# Patient Record
Sex: FEMALE | Race: WHITE | NOT HISPANIC OR LATINO | Employment: OTHER | URBAN - NONMETROPOLITAN AREA
[De-identification: names, ages, dates, MRNs, and addresses within clinical notes are randomized per-mention and may not be internally consistent; named-entity substitution may affect disease eponyms.]

---

## 2016-10-31 LAB — LDLC SERPL CALC-MCNC: 87 MG/DL

## 2017-03-23 ENCOUNTER — TELEPHONE (OUTPATIENT)
Dept: WOUND CARE | Facility: OTHER | Age: 72
End: 2017-03-23

## 2017-03-23 NOTE — TELEPHONE ENCOUNTER
Patient is cancelling her appointment with Kerri Elliott on Friday 3/24/17 and the rest of her appointments with Diabetes because her new insurance requires her to pay $40.00 every time she come in to see a specialist visit. Patient will be following up with her primary doctor for diabetes.

## 2017-04-11 ENCOUNTER — TELEPHONE (OUTPATIENT)
Dept: EDUCATION SERVICES | Facility: HOSPITAL | Age: 72
End: 2017-04-11

## 2017-04-11 DIAGNOSIS — Z79.4 TYPE 2 DIABETES MELLITUS WITH HYPERGLYCEMIA, WITH LONG-TERM CURRENT USE OF INSULIN (H): Primary | ICD-10-CM

## 2017-04-11 DIAGNOSIS — E11.65 TYPE 2 DIABETES MELLITUS WITH HYPERGLYCEMIA, WITH LONG-TERM CURRENT USE OF INSULIN (H): Primary | ICD-10-CM

## 2017-04-17 ENCOUNTER — OFFICE VISIT (OUTPATIENT)
Dept: WOUND CARE | Facility: OTHER | Age: 72
End: 2017-04-17
Attending: NURSE PRACTITIONER
Payer: MEDICARE

## 2017-04-17 VITALS
HEIGHT: 63 IN | DIASTOLIC BLOOD PRESSURE: 60 MMHG | HEART RATE: 70 BPM | SYSTOLIC BLOOD PRESSURE: 118 MMHG | OXYGEN SATURATION: 98 % | BODY MASS INDEX: 27.36 KG/M2 | WEIGHT: 154.4 LBS

## 2017-04-17 DIAGNOSIS — E11.65 TYPE 2 DIABETES MELLITUS WITH HYPERGLYCEMIA, WITH LONG-TERM CURRENT USE OF INSULIN (H): Primary | ICD-10-CM

## 2017-04-17 DIAGNOSIS — Z79.4 TYPE 2 DIABETES MELLITUS WITH HYPERGLYCEMIA, WITH LONG-TERM CURRENT USE OF INSULIN (H): Primary | ICD-10-CM

## 2017-04-17 LAB — HBA1C MFR BLD: 7 % (ref 0–5.7)

## 2017-04-17 PROCEDURE — 36416 COLLJ CAPILLARY BLOOD SPEC: CPT | Performed by: NURSE PRACTITIONER

## 2017-04-17 PROCEDURE — 83036 HEMOGLOBIN GLYCOSYLATED A1C: CPT | Performed by: NURSE PRACTITIONER

## 2017-04-17 PROCEDURE — 99213 OFFICE O/P EST LOW 20 MIN: CPT | Performed by: NURSE PRACTITIONER

## 2017-04-17 PROCEDURE — 99212 OFFICE O/P EST SF 10 MIN: CPT

## 2017-04-17 ASSESSMENT — PAIN SCALES - GENERAL: PAINLEVEL: NO PAIN (0)

## 2017-04-17 NOTE — PATIENT INSTRUCTIONS
Peripheral Neuropathy  Peripheral neuropathy is a condition that affects the nerves of the arms or legs. It causes a change in physical feeling. Sometimes it causes weakness in the muscles. You may feel tingling, numbness or shooting pains. Symptoms may be more common at night). Skin may be extra sensitive to light touch or temperature changes.  Neuropathy may be a complication of a chronic disease such as diabetes. A ruptured disk with pressure on the spinal nerve may also lead to the problem. Certain vitamin deficiencies may lead to it. It may also be caused by exposure to certain drugs or chemicals   Home care    Tell the healthcare provider about all medicines you take. This includes prescription and over-the-counter medicines, vitamins, and herbs. Ask if any of the medicines may be causing your problems. Do not make any changes to prescription medicines without talking to your healthcare provider first.     You may be prescribed medicines to help relieve the tingling feeling or for pain. Take all medicines as directed.    A numb hand or foot may be more prone to injury. To help protect it:    Always use oven mitts.    Test water with an unaffected hand or foot.     Use caution when trimming nails. File sharp areas.    Wear shoes that fit well to avoid pressure points, blisters, and ulcers.    Inspect your hands and feet carefully (including the soles of your feet and between your toes) at least once a week. If you see red areas, sores, or other problems, tell your healthcare provider.  Follow-up care  Follow up with your doctor or as advised by our staff. You may need further testing or evaluation.  When to seek medical advice  Call your healthcare provider right away if any of the following occur:    Redness, swelling, cracking, or ulcer on any numb area, especially the feet    New symptoms of numbness or muscle weakness numbness    Loss of bowel or bladder control     Slurred speech, confusion, or trouble  speaking, walking, or seeing    9150-1219 [employer]. [address] [place] [address], [place] [address]. All rights reserved. This information is not intended as a substitute for professional medical care. Always follow your healthcare professional's instructions.      A1c 7%--Great job!!      Continue working on healthy eating and exercising (start low and slow, work up to 30 min, 5x/week)    BG goals:  Fasting and before meals <130, >80  2 hour after eating <180    We only need 1/2 of these numbers to be within target then your A1c will be within target    Medication changes   Watch for continued low BGs    Please let me know if you have continue low blood sugars       Follow up   CGM (continuous glucose monitor) study    Check his BGs at least 4 times a day.     Use your dual wave with meals that contain fat (either good or bad)  Use your temp basal when you have had 2 BGs >250 (increase it)  Use your temp basal when you are more active (decrease it)     Please add the correction to pump when BGs >150.       It's okay to add only 50% of carbs before consuming a meal    Call me sooner if any problems/concerns and/or questions develop including consistent low BGs <70 or consistent high BGs >200  [phone] (Justine, Unit Coordinator)    [phone] (Ruth Nurse)  [phone] (Kerri's cell)

## 2017-04-17 NOTE — PROGRESS NOTES
SUBJECTIVE:  Flora Acuna, 72 year old, female presents with the following Chief Complaint(s) with HPI to follow:  Chief Complaint   Patient presents with     Diabetes     follow up        Diabetes Follow-up      Patient is checking blood sugars: 2.5x/day.  Results:  Overnight: 63, 104-284  Breakfast:   Mid-mornin-247  Lunch: 112-183  Mid-afternoon: 152-202  Dinner: 120-255  Bedtime: 135      Symptoms of hypoglycemia (low blood sugar): yes, history of occasional hypoglycemia unawareness;     Paresthesias (numbness or burning in feet) or sores: Yes--sometimes worse , no sores    Diabetic eye exam within the last year: Yes    Breakfast eaten regularly: No    Patient counting carbs: Yes       HPI: Flora's here today for the follow up regarding her Diabetes mellitus, Type 2.    A1C      7.4   2016  A1C      7.5   2/10/2015  Current Diabetes medication:   1.  Insulin pump, with humalog   ASA use: yes, 325 mg daily  Statin use: yes, simvastatin 40 mg daily  Denies any issues with the insulin pump.      Due for her A1c.    Flora reports that she was sick for the month of January.  She's feeling better.  States she has lost 5 lbs from this illness.  Has been having occasional low BGs.        Patient Active Problem List   Diagnosis     CARDIOVASCULAR SCREENING; LDL GOAL LESS THAN 160     Numbness and tingling in left hand     Elevated blood pressure     Diabetes mellitus, type 2 (H)     Personal history of malignant neoplasm of breast     ACP (advance care planning)       Past Medical History:   Diagnosis Date     Congestive heart failure, unspecified      Diabetes (H)      H/O rheumatoid arthritis      HLD (hyperlipidemia)      HTN (hypertension)      Myocardial infarction (H)      Uncomplicated asthma        Past Surgical History:   Procedure Laterality Date     APPENDECTOMY OPEN CHILD       AS TOTAL KNEE ARTHROPLASTY Right      CHOLECYSTECTOMY       HYSTERECTOMY       MASTECTOMY Bilateral       SHOULDER SURGERY Right      SHOULDER SURGERY Left        Family History   Problem Relation Age of Onset     Breast Cancer Maternal Aunt      Breast Cancer Maternal Aunt      Lung Cancer Father        Social History   Substance Use Topics     Smoking status: Never Smoker     Smokeless tobacco: Never Used     Alcohol use No       Current Outpatient Prescriptions   Medication Sig Dispense Refill     baclofen (LIORESAL) 10 MG tablet 2 tabs in the morning, 1 tab mid day and 2 tabs at night.       acetaminophen (TYLENOL) 325 MG tablet Take 650 mg by mouth       albuterol (PROAIR HFA, PROVENTIL HFA, VENTOLIN HFA) 108 (90 BASE) MCG/ACT inhaler Inhale 2 puffs into the lungs       Calcium Carb-Cholecalciferol (CALCIUM 500 + D) 500-400 MG-UNIT TABS        Insulin Aspart (INSULIN PUMP - OUTPATIENT)        Lancet Devices MISC        LIFESCAN FINEPOINT LANCETS MISC TEST FOUR TIMES DAILY       anastrozole (ARIMIDEX) 1 MG tablet Take 1 mg by mouth       blood glucose monitoring (CATHY CONTOUR NEXT) test strip Test blood sugars four times per day.  Type 2 Diabetes 250.00       cholecalciferol (VITAMIN D3) 5000 UNITS TABS tablet Take 5,000 mg by mouth       furosemide (LASIX) 40 MG tablet TAKE 1 TABLET BY MOUTH DAILY       insulin lispro (HUMALOG VIAL) 100 UNIT/ML VIAL USE IN INSULIN PUMP AS DIRECTED       metoprolol (LOPRESSOR) 50 MG tablet TAKE 1 TABLET BY MOUTH TWICE DAILY       levothyroxine (SYNTHROID, LEVOTHROID) 75 MCG tablet TAKE 1 TABLET BY MOUTH DAILY       nitroglycerin (NITROSTAT) 0.4 MG SL tablet Place 0.4 mg under the tongue       omeprazole (PRILOSEC) 20 MG capsule Take 20 mg by mouth       potassium chloride SA (K-DUR,KLOR-CON M) 10 MEQ tablet TAKE 2 TABLETS BY MOUTH DAILY       ramipril (ALTACE) 10 MG capsule TAKE 1 CAPSULE BY MOUTH EVERY DAY       simvastatin (ZOCOR) 40 MG tablet TAKE 1 TABLET BY MOUTH AT BEDTIME       benzonatate (TESSALON) 200 MG capsule Take 1 capsule (200 mg) by mouth 3 times daily as needed for  cough 20 capsule 0     acyclovir (ZOVIRAX) 5 % ointment Apply topically 5 times daily 5 g 0     anastrozole (ARIMIDEX) 1 MG tablet   3     famotidine (PEPCID) 20 MG tablet        metoprolol (LOPRESSOR) 50 MG tablet   3     omeprazole (PRILOSEC) 20 MG capsule   2     potassium chloride SA (K-DUR,KLOR-CON M) 10 MEQ tablet   7     Acetone, Urine, Test (KETONE TEST) STRP Use as directed 50 strip 4     glucagon (GLUCAGON EMERGENCY) 1 MG injection Inject 1 mg Subcutaneous once 1 mg 12     insulin lispro (HUMALOG VIAL) 100 UNIT/ML soln For use in an insulin pump.  TDD 40-50 Units10 10 mL 12     INSULIN PUMP - OUTPATIENT Date last updated:  2/4/15  Music Kickup: Model 723  BASAL RATES and times:  12   AM (midnight): 0.9 units/hour    9    PM: 0.8 units/hour   Basal Pattern A:  Flora Acuna will use when N/A.  CARB RATIO and times:  12   AM (midnight): 13.0  4     PM: 10  Corection Factor (Sensitivity) and times:  12   AM (midnight): 55 mg/dL  BLOOD GLUCOSE TARGET and times:  12   AM (midnight): 100 - 150  7     AM:  100 - 120  9    PM:  100 - 150  Active Insulin Time:  3 hours  Sensor:  No  Carelink / Diasend username:  [email or web address]  Carelink / Diasend Password:  [email or web address]       blood glucose (CATHY CONTOUR NEXT) test strip Use to test blood sugar 4-6 times daily or as directed. 100 strip 12     clindamycin (CLEOCIN) 300 MG capsule Take 300 mg by mouth as needed (Take 3 capsules by mouth before dental procedure.)       SIMVASTATIN PO Take 40 mg by mouth At Bedtime       Furosemide (LASIX PO) Take 40 mg by mouth daily       Levothyroxine Sodium (SYNTHROID PO) Take 75 mcg by mouth daily       Nitroglycerin (NITROSTAT SL) Place 0.4 mg under the tongue every 5 minutes as needed for chest pain       BACLOFEN PO Take 10 mg by mouth        RAMIPRIL PO Take 10 mg by mouth daily       calcium carbonate (OS-BUDDY 500 MG [place]. [place]) 500 MG tablet Take 500 mg by mouth daily       VITAMIN D, CHOLECALCIFEROL, PO Take 5,000 Units by  "mouth daily         Allergies   Allergen Reactions     Contrast Dye Difficulty breathing     Gadolinium Derivatives      Other reaction(s): Difficulty in swallowing, Laryngeal spasm  Patient had an injection into rt. Shoulder capsule prior to MRI arthrogram.  Contained multihance gadobenate, omnipaque iohexol, and buffered lidocaine.       Ammonia      Cory-1 [Lidocaine Hcl]      Novocaine allergy       Codeine Sulfate      Food      Shrimp     Iodine-131 Swelling     Ct dye     Nitrous Oxide      Novocain [Procaine]      Penicillins      Tramadol      Morphine Palpitations       REVIEW OF SYSTEMS  Skin: negative  Eyes: negative  Ears/Nose/Throat: negative  Respiratory: No shortness of breath, dyspnea on exertion, cough, or hemoptysis; history of asthma  Cardiovascular: history of HF (cause unknown)  Gastrointestinal: negative  Genitourinary: urgency and frequency   Musculoskeletal: positive for back pain, neck pain, arthritis and right shoulder (needs surgery)  Neurologic: positive for numbness or tingling of feet  Psychiatric: negative  Hematologic/Lymphatic/Immunologic: history of breast cancer (left) x 2 (same spot)  Endocrine: positive for diabetes and thyroid disease     OBJECTIVE:  /60 (BP Location: Right arm, Patient Position: Chair, Cuff Size: Adult Large)  Pulse 70  Ht 5' 3\" (1.6 m)  Wt 154 lb 6.4 oz (70 kg)  SpO2 98%  BMI 27.35 kg/m2  General appearance: healthy, alert and no distress  Neck: Neck supple. No adenopathy. Thyroid symmetric, normal size, Carotids without bruits.  Lungs: negative. Good diaphragmatic excursion. Lungs clear  Heart: negative.  No lifts, heaves, or thrills. RRR. No murmurs, clicks gallops or rub  Abdomen: Abdomen soft, non-tender. BS normal.    Psych: normal mentation; affect bright   Hematologic/Lymphatic/Immunologic: normal ant/post cervical nodes    LABs:  Results for orders placed or performed in visit on 04/17/17   Hemoglobin A1c   Result Value Ref Range    " Hemoglobin A1C 7 %         ASSESSMENT / PLAN:  (E11.65,  Z79.4) Type 2 diabetes mellitus with hyperglycemia, with long-term current use of insulin (H)  (primary encounter diagnosis)  Comment:   A1c is great.  However, Flora has been having issues with low BGs.      Insulin pump information:   Average: 170 +/-57  Basal/bolus ratio: 21.3 (73%)/8.1 (27%)   Testin.5x/day    Hypoglycemia: 1 (3%)    Plan:   Encouraged a CGM study  Hemoglobin A1c          Adjustment:   Carb ratio  000 14 (was 13)      Education focused on the following:  Work on checking your BGs at least 4 times a day.     Use your dual wave with meals that contain fat (either good or bad)  Use your temp basal when you have had 2 BGs >250 (increase it)  Use your temp basal when you are more active (decrease it)     Please add the correction to pump when BGs >150.       Talked about entering 1/2 the amount of carbs BEFORE consuming the carbs (if concerns with low BGs)--you can always add another bolus if needed.      Patient Instructions     Peripheral Neuropathy  Peripheral neuropathy is a condition that affects the nerves of the arms or legs. It causes a change in physical feeling. Sometimes it causes weakness in the muscles. You may feel tingling, numbness or shooting pains. Symptoms may be more common at night). Skin may be extra sensitive to light touch or temperature changes.  Neuropathy may be a complication of a chronic disease such as diabetes. A ruptured disk with pressure on the spinal nerve may also lead to the problem. Certain vitamin deficiencies may lead to it. It may also be caused by exposure to certain drugs or chemicals   Home care    Tell the healthcare provider about all medicines you take. This includes prescription and over-the-counter medicines, vitamins, and herbs. Ask if any of the medicines may be causing your problems. Do not make any changes to prescription medicines without talking to your healthcare provider  first.     You may be prescribed medicines to help relieve the tingling feeling or for pain. Take all medicines as directed.    A numb hand or foot may be more prone to injury. To help protect it:    Always use oven mitts.    Test water with an unaffected hand or foot.     Use caution when trimming nails. File sharp areas.    Wear shoes that fit well to avoid pressure points, blisters, and ulcers.    Inspect your hands and feet carefully (including the soles of your feet and between your toes) at least once a week. If you see red areas, sores, or other problems, tell your healthcare provider.  Follow-up care  Follow up with your doctor or as advised by our staff. You may need further testing or evaluation.  When to seek medical advice  Call your healthcare provider right away if any of the following occur:    Redness, swelling, cracking, or ulcer on any numb area, especially the feet    New symptoms of numbness or muscle weakness numbness    Loss of bowel or bladder control     Slurred speech, confusion, or trouble speaking, walking, or seeing    0552-2257 [employer]. [address] [place] [address]. All rights reserved. This information is not intended as a substitute for professional medical care. Always follow your healthcare professional's instructions.      A1c 7%--Great job!!      Continue working on healthy eating and exercising (start low and slow, work up to 30 min, 5x/week)    BG goals:  Fasting and before meals <130, >80  2 hour after eating <180    We only need 1/2 of these numbers to be within target then your A1c will be within target    Medication changes   Watch for continued low BGs    Please let me know if you have continue low blood sugars       Follow up   CGM (continuous glucose monitor) study    Check his BGs at least 4 times a day.     Use your dual wave with meals that contain fat (either good or bad)  Use your temp basal when you have had 2 BGs >250 (increase  it)  Use your temp basal when you are more active (decrease it)     Please add the correction to pump when BGs >150.       It's okay to add only 50% of carbs before consuming a meal    Call me sooner if any problems/concerns and/or questions develop including consistent low BGs <70 or consistent high BGs >200  [phone] (Justine, Unit Coordinator)    [phone] (Ruth Nurse)  [phone] (Kerri's cell)      Time: 45 minutes  Barrier: none  Willingness to learn: accepting    Kerri Elliott RN P-BC  Disease Management    Cc: Dr. Mejia      With the electronic record, we can now more quickly and easily track our patient diabetic goals. Our diabetes clinical review is in progress and these are the indicators we are monitoring for good diabetes health.     1.) HbA1C less than 7 (measurement of your average blood sugars)  2.) Blood Pressure less than 140/80  3.) LDL less than 100 (bad cholesterol)  4.) HbA1C is checked in the last 6 months and below 7% (more frequently if not at goal or adjusting medications)  5.) LDL is checked in the last 12 months (more frequently if not at goal or adjusting medications)  6.) Taking one baby aspirin daily (unless otherwise instructed)  7.) No tobacco use  8) Statin use     You have achieved 7 out of 8 of these and I am encouraging you to come in and get tuned up to achieve 8 out of 8!  Here is what you have achieved so far in my goals for you:  1.) HbA1C  less than 7:                              NO  Your last  HbA1C :A1C       Lab Results   Component Value Date    A1C 7 04/17/2017    A1C 6.8 09/21/2016    A1C 7.4 01/26/2016    A1C 7.5 02/10/2015       2.) Blood Pressure less than 140/80:       YES      Your last    BP Readings from Last 1 Encounters:   04/17/17 118/60     3.) LDL less than 100:                              YES      Your last     LDL Cholesterol Calculated   Date Value Ref Range Status   10/31/2016 87 mg/dL Final   ]      4.) Checked HbA1C in the past 6 months:  YES      5.) Checked LDL in the past 12 months:    YES   6.) Taking one aspirin daily:                       YES     7.) No tobacco use:                                        YES      8.) Statin use      YES

## 2017-04-17 NOTE — MR AVS SNAPSHOT
After Visit Summary   4/17/2017    Flora Acuna    MRN: [id number]           Patient Information     Date Of Birth          1945        Visit Information        Provider Department      4/17/2017 2:00 PM Kerri Elliott NP [facility] [place]        Care Instructions      Peripheral Neuropathy  Peripheral neuropathy is a condition that affects the nerves of the arms or legs. It causes a change in physical feeling. Sometimes it causes weakness in the muscles. You may feel tingling, numbness or shooting pains. Symptoms may be more common at night). Skin may be extra sensitive to light touch or temperature changes.  Neuropathy may be a complication of a chronic disease such as diabetes. A ruptured disk with pressure on the spinal nerve may also lead to the problem. Certain vitamin deficiencies may lead to it. It may also be caused by exposure to certain drugs or chemicals   Home care    Tell the healthcare provider about all medicines you take. This includes prescription and over-the-counter medicines, vitamins, and herbs. Ask if any of the medicines may be causing your problems. Do not make any changes to prescription medicines without talking to your healthcare provider first.     You may be prescribed medicines to help relieve the tingling feeling or for pain. Take all medicines as directed.    A numb hand or foot may be more prone to injury. To help protect it:    Always use oven mitts.    Test water with an unaffected hand or foot.     Use caution when trimming nails. File sharp areas.    Wear shoes that fit well to avoid pressure points, blisters, and ulcers.    Inspect your hands and feet carefully (including the soles of your feet and between your toes) at least once a week. If you see red areas, sores, or other problems, tell your healthcare provider.  Follow-up care  Follow up with your doctor or as advised by our staff. You may need further testing or evaluation.  When to seek  medical advice  Call your healthcare provider right away if any of the following occur:    Redness, swelling, cracking, or ulcer on any numb area, especially the feet    New symptoms of numbness or muscle weakness numbness    Loss of bowel or bladder control     Slurred speech, confusion, or trouble speaking, walking, or seeing    3509-2289 [employer]. [address] [place] [address]. All rights reserved. This information is not intended as a substitute for professional medical care. Always follow your healthcare professional's instructions.      A1c 7%--Great job!!      Continue working on healthy eating and exercising (start low and slow, work up to 30 min, 5x/week)    BG goals:  Fasting and before meals <130, >80  2 hour after eating <180    We only need 1/2 of these numbers to be within target then your A1c will be within target    Medication changes   Watch for continued low BGs    Please let me know if you have continue low blood sugars       Follow up   CGM (continuous glucose monitor) study    Check his BGs at least 4 times a day.     Use your dual wave with meals that contain fat (either good or bad)  Use your temp basal when you have had 2 BGs >250 (increase it)  Use your temp basal when you are more active (decrease it)     Please add the correction to pump when BGs >150.       It's okay to add only 50% of carbs before consuming a meal    Call me sooner if any problems/concerns and/or questions develop including consistent low BGs <70 or consistent high BGs >200  [phone] (Justine, Unit Coordinator)    [phone] (Ruth Nurse)  [phone] (Kerri's cell)          Follow-ups after your visit        Who to contact     If you have questions or need follow up information about today's clinic visit or your schedule please contact [facility] directly at [phone].  Normal or non-critical lab and imaging results will be communicated to you by MyChart, letter or phone  "within 4 business days after the clinic has received the results. If you do not hear from us within 7 days, please contact the clinic through [employer] or phone. If you have a critical or abnormal lab result, we will notify you by phone as soon as possible.  Submit refill requests through [employer] or call your pharmacy and they will forward the refill request to us. Please allow 3 business days for your refill to be completed.          Additional Information About Your Visit        Interview Master Information     Interview Master lets you send messages to your doctor, view your test results, renew your prescriptions, schedule appointments and more. To sign up, go to [email or web address] . Click on \"Log in\" on the left side of the screen, which will take you to the Welcome page. Then click on \"Sign up Now\" on the right side of the page.     You will be asked to enter the access code listed below, as well as some personal information. Please follow the directions to create your username and password.     Your access code is: [email or web address]  Expires: 2017  2:42 PM     Your access code will  in 90 days. If you need help or a new code, please call your [facility] clinic or [phone].        Care EveryWhere ID     This is your Care EveryWhere ID. This could be used by other organizations to access your [facility] medical records  [id number]        Your Vitals Were     Pulse Height Pulse Oximetry BMI (Body Mass Index)          70 5' 3\" (1.6 m) 98% 27.35 kg/m2         Blood Pressure from Last 3 Encounters:   17 118/60   16 102/62   16 104/59    Weight from Last 3 Encounters:   17 154 lb 6.4 oz (70 kg)   16 155 lb 4.8 oz (70.4 kg)   16 156 lb 9.6 oz (71 kg)              Today, you had the following     No orders found for display       Primary Care Provider Office Phone # Fax #    Salvador Mejia [phone]       [facility] [address] [facility] [address]        Thank you!  "    [facility] you [facility]  [facility] your [facility]. Our goal is always to provide you with excellent care. Hearing back from our patients is one way we can continue to improve our services. Please take a few minutes to complete the written survey that you may receive in the mail after your visit with us. Thank you!             Your Updated Medication List - Protect others around you: Learn how to safely use, store and throw away your medicines at www.disposemymeds.org.          This list is accurate as of: 4/17/17  2:42 PM.  Always use your most recent med list.                   Brand Name Dispense Instructions for use    acetaminophen 325 MG tablet    TYLENOL     Take 650 mg by mouth       acyclovir 5 % ointment    ZOVIRAX    5 g    Apply topically 5 times daily       albuterol 108 (90 BASE) MCG/ACT Inhaler    PROAIR HFA/PROVENTIL HFA/VENTOLIN HFA     Inhale 2 puffs into the lungs       * anastrozole 1 MG tablet    ARIMIDEX         * anastrozole 1 MG tablet    ARIMIDEX     Take 1 mg by mouth       * BACLOFEN PO      Take 10 mg by mouth       * baclofen 10 MG tablet    LIORESAL     2 tabs in the morning, 1 tab mid day and 2 tabs at night.       benzonatate 200 MG capsule    TESSALON    20 capsule    Take 1 capsule (200 mg) by mouth 3 times daily as needed for cough       * CATHY CONTOUR NEXT test strip   Generic drug:  blood glucose monitoring      Test blood sugars four times per day.  Type 2 Diabetes 250.00       * blood glucose monitoring test strip    CATHY CONTOUR NEXT    100 strip    Use to test blood sugar 4-6 times daily or as directed.       calcium 500 + D 500-400 MG-UNIT Tabs   Generic drug:  Calcium Carb-Cholecalciferol          calcium carbonate 500 MG tablet    OS-BUDDY 500 mg [place]. Ca     Take 500 mg by mouth daily       clindamycin 300 MG capsule    CLEOCIN     Take 300 mg by mouth as needed (Take 3 capsules by mouth before dental procedure.)       famotidine 20 MG tablet    PEPCID          GLUCAGON EMERGENCY 1 MG kit   Generic drug:  glucagon     1 mg    Inject 1 mg Subcutaneous once       * humaLOG 100 UNIT/ML injection   Generic drug:  insulin lispro     10 mL    For use in an insulin pump.  TDD 40-50 Units10       * humaLOG 100 UNIT/ML injection   Generic drug:  insulin lispro      USE IN INSULIN PUMP AS DIRECTED       INSULIN PUMP - OUTPATIENT          insulin pump infusion      Date last updated:  2/4/15 [employer] Minimed: Model 723 BASAL RATES and times: 12   AM (midnight): 0.9 units/hour   9    PM: 0.8 units/hour  Basal Pattern A:  Flora Apariciocurt will use when N/A. CARB RATIO and times: 12   AM (midnight): 13.0 4     PM: 10 Corection Factor (Sensitivity) and times: 12   AM (midnight): 55 mg/dL BLOOD GLUCOSE TARGET and times: 12   AM (midnight): 100 - 150 7     AM:  100 - 120 9    PM:  100 - 150  Active Insulin Time:  3 hours Sensor:  No Carelink / Diasend username:  [email or web address] Carelink / Diasend Password:  [email or web address]       Ketone Test Strp     50 strip    Use as directed       Lancet Devices Misc          * LASIX PO      Take 40 mg by mouth daily       * furosemide 40 MG tablet    LASIX     TAKE 1 TABLET BY MOUTH DAILY       LIFESCAN FINEPOINT LANCETS Misc      TEST FOUR TIMES DAILY       * metoprolol 50 MG tablet    LOPRESSOR         * metoprolol 50 MG tablet    LOPRESSOR     TAKE 1 TABLET BY MOUTH TWICE DAILY       * NITROSTAT SL      Place 0.4 mg under the tongue every 5 minutes as needed for chest pain       * NITROSTAT 0.4 MG sublingual tablet   Generic drug:  nitroglycerin      Place 0.4 mg under the tongue       * omeprazole 20 MG CR capsule    priLOSEC         * priLOSEC 20 MG CR capsule   Generic drug:  omeprazole      Take 20 mg by mouth       * potassium chloride SA 10 MEQ CR tablet    K-DUR/KLOR-CON M         * potassium chloride SA 10 MEQ CR tablet    K-DUR/KLOR-CON M     TAKE 2 TABLETS BY MOUTH DAILY       * RAMIPRIL PO      Take 10 mg by mouth daily       * ramipril 10 MG  capsule    ALTACE     TAKE 1 CAPSULE BY MOUTH EVERY DAY       * SIMVASTATIN PO      Take 40 mg by mouth At Bedtime       * simvastatin 40 MG tablet    ZOCOR     TAKE 1 TABLET BY MOUTH AT BEDTIME       * SYNTHROID PO      Take 75 mcg by mouth daily       * levothyroxine 75 MCG tablet    SYNTHROID/LEVOTHROID     TAKE 1 TABLET BY MOUTH DAILY       * VITAMIN D (CHOLECALCIFEROL) PO      Take 5,000 Units by mouth daily       * cholecalciferol 5000 UNITS Tabs tablet    vitamin D3     Take 5,000 mg by mouth       * Notice:  This list has 26 medication(s) that are the same as other medications prescribed for you. Read the directions carefully, and ask your doctor or other care provider to review them with you.

## 2017-04-17 NOTE — PROGRESS NOTES
"Chief Complaint   Patient presents with     Diabetes     follow up       Initial There were no vitals taken for this visit. Estimated body mass index is 27.51 kg/(m^2) as calculated from the following:    Height as of 6/6/16: 5' 3\" (1.6 m).    Weight as of 9/21/16: 155 lb 4.8 oz (70.4 kg).  Medication Reconciliation: complete   Ruth Velasquez      "

## 2017-04-25 DIAGNOSIS — E11.65 TYPE 2 DIABETES MELLITUS WITH HYPERGLYCEMIA, WITH LONG-TERM CURRENT USE OF INSULIN (H): Primary | ICD-10-CM

## 2017-04-25 DIAGNOSIS — Z79.4 TYPE 2 DIABETES MELLITUS WITH HYPERGLYCEMIA, WITH LONG-TERM CURRENT USE OF INSULIN (H): Primary | ICD-10-CM

## 2017-04-27 ENCOUNTER — ALLIED HEALTH/NURSE VISIT (OUTPATIENT)
Dept: WOUND CARE | Facility: OTHER | Age: 72
End: 2017-04-27
Attending: NURSE PRACTITIONER
Payer: MEDICARE

## 2017-04-27 DIAGNOSIS — E11.9 DIABETES MELLITUS, TYPE 2 (H): Primary | ICD-10-CM

## 2017-04-27 PROCEDURE — 95250 CONT GLUC MNTR PHYS/QHP EQP: CPT | Performed by: NURSE PRACTITIONER

## 2017-04-27 NOTE — MR AVS SNAPSHOT
"              After Visit Summary   4/27/2017    Flora Acuna    MRN: [id number]           Patient Information     Date Of Birth          1945        Visit Information        Provider Department      4/27/2017 2:45 PM Nurse, Hc Connor [facility]        Today's Diagnoses     Diabetes mellitus, type 2 (H)    -  1       Follow-ups after your visit        Your next 10 appointments already scheduled     May 03, 2017 10:15 AM CDT   (Arrive by 10:00 AM)   Nurse Only with Hc Dm Nurse   [facility] ([facility])    [address]  [facility] [address] [phone]            May 05, 2017 10:30 AM CDT   (Arrive by 10:15 AM)   Return Visit with Kerri Elliott NP   [facility] ([facility])    [address]  [facility] [address] [phone]              Who to contact     If you have questions or need follow up information about today's clinic visit or your schedule please contact [facility] directly at [phone].  Normal or non-critical lab and imaging results will be communicated to you by Aidinhart, letter or phone within 4 business days after the clinic has received the results. If you do not hear from us within 7 days, please contact the clinic through Aidinhart or phone. If you have a critical or abnormal lab result, we will notify you by phone as soon as possible.  Submit refill requests through [employer] or call your pharmacy and they will forward the refill request to us. Please allow 3 business days for your refill to be completed.          Additional Information About Your Visit        MyChart Information     [employer] lets you send messages to your doctor, view your test results, renew your prescriptions, schedule appointments and more. To sign up, go to [email or web address] . Click on \"Log in\" on the left side of the screen, which will take you to the Welcome page. Then click on \"Sign up Now\" on the right side of the page. "     You will be asked to enter the access code listed below, as well as some personal information. Please follow the directions to create your username and password.     Your access code is: [email or web address]  Expires: 2017  2:42 PM     Your access code will  in 90 days. If you need help or a new code, please call your [facility] clinic or [phone].        Care EveryWhere ID     This is your Care EveryWhere ID. This could be used by other organizations to access your [facility] medical records  [id number]         Blood Pressure from Last 3 Encounters:   17 118/60   16 102/62   16 104/59    Weight from Last 3 Encounters:   17 154 lb 6.4 oz (70 kg)   16 155 lb 4.8 oz (70.4 kg)   16 156 lb 9.6 oz (71 kg)              Today, you had the following     No orders found for display       Primary Care Provider Office Phone # Fax #    Salvador Mejia [phone]       [facility] [address] [address]        Thank you!     Thank you for choosing [facility]  for your care. Our goal is always to provide you with excellent care. Hearing back from our patients is one way we can continue to improve our services. Please take a few minutes to complete the written survey that you may receive in the mail after your visit with us. Thank you!             Your Updated Medication List - Protect others around you: Learn how to safely use, store and throw away your medicines at www.disposemymeds.org.          This list is accurate as of: 17 11:59 PM.  Always use your most recent med list.                   Brand Name Dispense Instructions for use    acetaminophen 325 MG tablet    TYLENOL     Take 650 mg by mouth       acyclovir 5 % ointment    ZOVIRAX    5 g    Apply topically 5 times daily       albuterol 108 (90 BASE) MCG/ACT Inhaler    PROAIR HFA/PROVENTIL HFA/VENTOLIN HFA     Inhale 2 puffs into the lungs       * anastrozole 1 MG tablet     ARIMIDEX         * anastrozole 1 MG tablet    ARIMIDEX     Take 1 mg by mouth       * BACLOFEN PO      Take 10 mg by mouth       * baclofen 10 MG tablet    LIORESAL     2 tabs in the morning, 1 tab mid day and 2 tabs at night.       benzonatate 200 MG capsule    TESSALON    20 capsule    Take 1 capsule (200 mg) by mouth 3 times daily as needed for cough       * CATHY CONTOUR NEXT test strip   Generic drug:  blood glucose monitoring      Test blood sugars four times per day.  Type 2 Diabetes 250.00       * blood glucose monitoring test strip    CATHY CONTOUR NEXT    100 strip    Use to test blood sugar 4-6 times daily or as directed.       calcium 500 + D 500-400 MG-UNIT Tabs   Generic drug:  Calcium Carb-Cholecalciferol          calcium carbonate 500 MG tablet    OS-BUDDY 500 mg [place]. Ca     Take 500 mg by mouth daily       clindamycin 300 MG capsule    CLEOCIN     Take 300 mg by mouth as needed (Take 3 capsules by mouth before dental procedure.)       famotidine 20 MG tablet    PEPCID         GLUCAGON EMERGENCY 1 MG kit   Generic drug:  glucagon     1 mg    Inject 1 mg Subcutaneous once       * INSULIN PUMP - OUTPATIENT          * insulin aspart 100 UNITS/ML injection    NovoLOG VIAL    20 mL    To be used with the insulin pump  TDD: 40 units       insulin pump infusion      Date last updated:  2/4/15 [employer] LS15 Minimed: Model 723 BASAL RATES and times: 12   AM (midnight): 0.9 units/hour   9    PM: 0.8 units/hour  Basal Pattern A:  Flora Acuna will use when N/A. CARB RATIO and times: 12   AM (midnight): 13.0 4     PM: 10 Corection Factor (Sensitivity) and times: 12   AM (midnight): 55 mg/dL BLOOD GLUCOSE TARGET and times: 12   AM (midnight): 100 - 150 7     AM:  100 - 120 9    PM:  100 - 150  Active Insulin Time:  3 hours Sensor:  No Carelink / Diasend username:  [email or web address] Carelink / Diasend Password:  [email or web address]       Ketone Test Strp     50 strip    Use as directed       Lancet Devices Misc          * LASIX PO       Take 40 mg by mouth daily       * furosemide 40 MG tablet    LASIX     TAKE 1 TABLET BY MOUTH DAILY       [employer] LANCETS Misc      TEST FOUR TIMES DAILY       * metoprolol 50 MG tablet    LOPRESSOR         * metoprolol 50 MG tablet    LOPRESSOR     TAKE 1 TABLET BY MOUTH TWICE DAILY       * NITROSTAT SL      Place 0.4 mg under the tongue every 5 minutes as needed for chest pain       * NITROSTAT 0.4 MG sublingual tablet   Generic drug:  nitroglycerin      Place 0.4 mg under the tongue       * omeprazole 20 MG CR capsule    priLOSEC         * priLOSEC 20 MG CR capsule   Generic drug:  omeprazole      Take 20 mg by mouth       * potassium chloride SA 10 MEQ CR tablet    K-DUR/KLOR-CON M         * potassium chloride SA 10 MEQ CR tablet    K-DUR/KLOR-CON M     TAKE 2 TABLETS BY MOUTH DAILY       * RAMIPRIL PO      Take 10 mg by mouth daily       * ramipril 10 MG capsule    ALTACE     TAKE 1 CAPSULE BY MOUTH EVERY DAY       * SIMVASTATIN PO      Take 40 mg by mouth At Bedtime       * simvastatin 40 MG tablet    ZOCOR     TAKE 1 TABLET BY MOUTH AT BEDTIME       * SYNTHROID PO      Take 75 mcg by mouth daily       * levothyroxine 75 MCG tablet    SYNTHROID/LEVOTHROID     TAKE 1 TABLET BY MOUTH DAILY       * VITAMIN D (CHOLECALCIFEROL) PO      Take 5,000 Units by mouth daily       * cholecalciferol 5000 UNITS Tabs tablet    vitamin D3     Take 5,000 mg by mouth       * Notice:  This list has 26 medication(s) that are the same as other medications prescribed for you. Read the directions carefully, and ask your doctor or other care provider to review them with you.

## 2017-04-28 NOTE — PROGRESS NOTES
Flora Acuna is here for a glucose sensor insertion for a CGMS study. Sensor agreement signed.    Sensor attached to Left flank. No redness, drainage or bleeding noted. Pt instructed on testing schedule, how to complete the patient log, restrictions during wear, and to remove if needs to have MRI, CT or x-ray. Pt will return on 5/3/2017 for detach and evaluation.     Sensor Lot #: [id number]  Exp date: 10/7/2017  Transmitter S/N:  [id number]  Hook up time: 2:00 pm    First BG to be done at:     Written instructions and patient log given to patient.

## 2017-05-01 ENCOUNTER — TELEPHONE (OUTPATIENT)
Dept: WOUND CARE | Facility: OTHER | Age: 72
End: 2017-05-01

## 2017-05-01 NOTE — TELEPHONE ENCOUNTER
Called Flora, who's been having issues with high BGs.     She does have an appointment this Friday.      Denies any recent illnesses, colds, steroid use.      Told her that diabetes can change.    Encouraged her to please take her correction as she can.      Kerri Elliott RN FNP-BC  Disease Management

## 2017-05-03 ENCOUNTER — ALLIED HEALTH/NURSE VISIT (OUTPATIENT)
Dept: WOUND CARE | Facility: OTHER | Age: 72
End: 2017-05-03
Attending: NURSE PRACTITIONER
Payer: MEDICARE

## 2017-05-03 DIAGNOSIS — E11.9 DIABETES MELLITUS, TYPE 2 (H): Primary | ICD-10-CM

## 2017-05-03 NOTE — MR AVS SNAPSHOT
"              After Visit Summary   5/3/2017    Flora Acuna    MRN: [id number]           Patient Information     Date Of Birth          1945        Visit Information        Provider Department      5/3/2017 10:15 AM [facility]        Today's Diagnoses     Diabetes mellitus, type 2 (H)    -  1       Follow-ups after your visit        Your next 10 appointments already scheduled     May 05, 2017 10:30 AM CDT   (Arrive by 10:15 AM)   Return Visit with Kerri Elliott NP   [facility] ([facility] [place] [facility])    [address]  [place] [id number] [phone]              Who to contact     If you have questions or need follow up information about today's clinic visit or your schedule please contact [facility] directly at [phone].  Normal or non-critical lab and imaging results will be communicated to you by Tocagenhart, letter or phone within 4 business days after the clinic has received the results. If you do not hear from us within 7 days, please contact the clinic through MyChart or phone. If you have a critical or abnormal lab result, we will notify you by phone as soon as possible.  Submit refill requests through Apto or call your pharmacy and they will forward the refill request to us. Please allow 3 business days for your refill to be completed.          Additional Information About Your Visit        MyChart Information     Apto lets you send messages to your doctor, view your test results, renew your prescriptions, schedule appointments and more. To sign up, go to [email or web address] . Click on \"Log in\" on the left side of the screen, which will take you to the Welcome page. Then click on \"Sign up Now\" on the right side of the page.     You will be asked to enter the access code listed below, as well as some personal information. Please follow the directions to create your username and password.     Your access code is: " [email or web address]  Expires: 2017  2:42 PM     Your access code will  in 90 days. If you need help or a new code, please call your [facility] clinic or [phone].        Care EveryWhere ID     This is your Care EveryWhere ID. This could be used by other organizations to access your [facility] medical records  [id number]         Blood Pressure from Last 3 Encounters:   17 118/60   16 102/62   16 104/59    Weight from Last 3 Encounters:   17 154 lb 6.4 oz (70 kg)   16 155 lb 4.8 oz (70.4 kg)   16 156 lb 9.6 oz (71 kg)              Today, you had the following     No orders found for display       Primary Care Provider Office Phone # Fax #    Salvador Mejia [phone]       [facility] [address] [facility] [address]        Thank you!     Thank you for choosing [facility]  for your care. Our goal is always to provide you with excellent care. Hearing back from our patients is one way we can continue to improve our services. Please take a few minutes to complete the written survey that you may receive in the mail after your visit with us. Thank you!             Your Updated Medication List - Protect others around you: Learn how to safely use, store and throw away your medicines at www.disposemymeds.org.          This list is accurate as of: 5/3/17 11:59 PM.  Always use your most recent med list.                   Brand Name Dispense Instructions for use    acetaminophen 325 MG tablet    TYLENOL     Take 650 mg by mouth       acyclovir 5 % ointment    ZOVIRAX    5 g    Apply topically 5 times daily       albuterol 108 (90 BASE) MCG/ACT Inhaler    PROAIR HFA/PROVENTIL HFA/VENTOLIN HFA     Inhale 2 puffs into the lungs       * anastrozole 1 MG tablet    ARIMIDEX         * anastrozole 1 MG tablet    ARIMIDEX     Take 1 mg by mouth       * BACLOFEN PO      Take 10 mg by mouth       * baclofen 10 MG tablet    LIORESAL     2 tabs in the morning, 1 tab  mid day and 2 tabs at night.       benzonatate 200 MG capsule    TESSALON    20 capsule    Take 1 capsule (200 mg) by mouth 3 times daily as needed for cough       * CATHY CONTOUR NEXT test strip   Generic drug:  blood glucose monitoring      Test blood sugars four times per day.  Type 2 Diabetes 250.00       * blood glucose monitoring test strip    CATHY CONTOUR NEXT    100 strip    Use to test blood sugar 4-6 times daily or as directed.       calcium 500 + D 500-400 MG-UNIT Tabs   Generic drug:  Calcium Carb-Cholecalciferol          calcium carbonate 500 MG tablet    OS-BUDDY 500 mg [place]. Ca     Take 500 mg by mouth daily       clindamycin 300 MG capsule    CLEOCIN     Take 300 mg by mouth as needed (Take 3 capsules by mouth before dental procedure.)       famotidine 20 MG tablet    PEPCID         GLUCAGON EMERGENCY 1 MG kit   Generic drug:  glucagon     1 mg    Inject 1 mg Subcutaneous once       * INSULIN PUMP - OUTPATIENT          * insulin aspart 100 UNITS/ML injection    NovoLOG VIAL    20 mL    To be used with the insulin pump  TDD: 40 units       insulin pump infusion      Date last updated:  2/4/15 [employer] MinimAquaMost: Model 723 BASAL RATES and times: 12   AM (midnight): 0.9 units/hour   9    PM: 0.8 units/hour  Basal Pattern A:  Flora Armand will use when N/A. CARB RATIO and times: 12   AM (midnight): 13.0 4     PM: 10 Corection Factor (Sensitivity) and times: 12   AM (midnight): 55 mg/dL BLOOD GLUCOSE TARGET and times: 12   AM (midnight): 100 - 150 7     AM:  100 - 120 9    PM:  100 - 150  Active Insulin Time:  3 hours Sensor:  No Carelink / Diasend username:  [email or web address] Carelink / Diasend Password:  [email or web address]       Ketone Test Strp     50 strip    Use as directed       Lancet Devices Misc          * LASIX PO      Take 40 mg by mouth daily       * furosemide 40 MG tablet    LASIX     TAKE 1 TABLET BY MOUTH DAILY       LIFESCAN FINEPOINT LANCETS Misc      TEST FOUR TIMES DAILY       * metoprolol 50 MG  tablet    LOPRESSOR         * metoprolol 50 MG tablet    LOPRESSOR     TAKE 1 TABLET BY MOUTH TWICE DAILY       * NITROSTAT SL      Place 0.4 mg under the tongue every 5 minutes as needed for chest pain       * NITROSTAT 0.4 MG sublingual tablet   Generic drug:  nitroglycerin      Place 0.4 mg under the tongue       * omeprazole 20 MG CR capsule    priLOSEC         * priLOSEC 20 MG CR capsule   Generic drug:  omeprazole      Take 20 mg by mouth       * potassium chloride SA 10 MEQ CR tablet    K-DUR/KLOR-CON M         * potassium chloride SA 10 MEQ CR tablet    K-DUR/KLOR-CON M     TAKE 2 TABLETS BY MOUTH DAILY       * RAMIPRIL PO      Take 10 mg by mouth daily       * ramipril 10 MG capsule    ALTACE     TAKE 1 CAPSULE BY MOUTH EVERY DAY       * SIMVASTATIN PO      Take 40 mg by mouth At Bedtime       * simvastatin 40 MG tablet    ZOCOR     TAKE 1 TABLET BY MOUTH AT BEDTIME       * SYNTHROID PO      Take 75 mcg by mouth daily       * levothyroxine 75 MCG tablet    SYNTHROID/LEVOTHROID     TAKE 1 TABLET BY MOUTH DAILY       * VITAMIN D (CHOLECALCIFEROL) PO      Take 5,000 Units by mouth daily       * cholecalciferol 5000 UNITS Tabs tablet    vitamin D3     Take 5,000 mg by mouth       * Notice:  This list has 26 medication(s) that are the same as other medications prescribed for you. Read the directions carefully, and ask your doctor or other care provider to review them with you.

## 2017-05-05 ENCOUNTER — OFFICE VISIT (OUTPATIENT)
Dept: WOUND CARE | Facility: OTHER | Age: 72
End: 2017-05-05
Attending: NURSE PRACTITIONER
Payer: COMMERCIAL

## 2017-05-05 VITALS
SYSTOLIC BLOOD PRESSURE: 121 MMHG | BODY MASS INDEX: 27.29 KG/M2 | WEIGHT: 154 LBS | DIASTOLIC BLOOD PRESSURE: 66 MMHG | OXYGEN SATURATION: 96 % | HEART RATE: 64 BPM | HEIGHT: 63 IN

## 2017-05-05 DIAGNOSIS — E13.9 LADA (LATENT AUTOIMMUNE DIABETES IN ADULTS), MANAGED AS TYPE 1 (H): Primary | ICD-10-CM

## 2017-05-05 PROCEDURE — 95250 CONT GLUC MNTR PHYS/QHP EQP: CPT | Performed by: NURSE PRACTITIONER

## 2017-05-05 PROCEDURE — 99212 OFFICE O/P EST SF 10 MIN: CPT

## 2017-05-05 PROCEDURE — 95251 CONT GLUC MNTR ANALYSIS I&R: CPT | Performed by: NURSE PRACTITIONER

## 2017-05-05 PROCEDURE — 99214 OFFICE O/P EST MOD 30 MIN: CPT | Mod: 25 | Performed by: NURSE PRACTITIONER

## 2017-05-05 ASSESSMENT — PAIN SCALES - GENERAL: PAINLEVEL: SEVERE PAIN (6)

## 2017-05-05 NOTE — PROGRESS NOTES
"Chief Complaint   Patient presents with     Diabetes     CGM evaluation       Initial /66 (BP Location: Right arm, Patient Position: Chair, Cuff Size: Adult Large)  Pulse 64  Ht 5' 3\" (1.6 m)  Wt 154 lb (69.9 kg)  SpO2 96%  BMI 27.28 kg/m2 Estimated body mass index is 27.28 kg/(m^2) as calculated from the following:    Height as of this encounter: 5' 3\" (1.6 m).    Weight as of this encounter: 154 lb (69.9 kg).  Medication Reconciliation: complete   Ruth Velasquez      "

## 2017-05-09 NOTE — PROGRESS NOTES
DOS: 5/5/17  SUBJECTIVE:  Flora Acuna, 72 year old, female presents with the following Chief Complaint(s) with HPI to follow:  Chief Complaint   Patient presents with     Diabetes     CGM evaluation        Diabetes Follow-up      Patient is checking blood sugars: 4.0x/day.  Results:  Overnight: 171  Breakfast:   Mid-morning: no checks  Lunch: 113-330  Mid-afternoon: 123 and 202  Dinner: 116-309  Bedtime: 108-342      Symptoms of hypoglycemia (low blood sugar): none, history of occasional hypoglycemia unawareness;     Paresthesias (numbness or burning in feet) or sores: Yes--sometimes worse , no sores    Diabetic eye exam within the last year: Yes    Breakfast eaten regularly: No    Patient counting carbs: Yes       HPI: Flora's here today for the follow up regarding her Diabetes mellitus, Type 2.    Lab Results   Component Value Date    A1C 7 04/17/2017    A1C 6.8 09/21/2016    A1C 7.4 01/26/2016    A1C 7.5 02/10/2015     Current Diabetes medication:   1.  Insulin pump, with humalog   ASA use: yes, 325 mg daily  Statin use: yes, simvastatin 40 mg daily  Denies any issues with the insulin pump.      Flora's here today for her CGM study evaluation.            Patient Active Problem List   Diagnosis     CARDIOVASCULAR SCREENING; LDL GOAL LESS THAN 160     Numbness and tingling in left hand     Elevated blood pressure     Diabetes mellitus, type 2 (H)     Personal history of malignant neoplasm of breast     ACP (advance care planning)       Past Medical History:   Diagnosis Date     Congestive heart failure, unspecified      Diabetes (H)      H/O rheumatoid arthritis      HLD (hyperlipidemia)      HTN (hypertension)      Myocardial infarction (H)      Uncomplicated asthma        Past Surgical History:   Procedure Laterality Date     APPENDECTOMY OPEN CHILD       AS TOTAL KNEE ARTHROPLASTY Right      CHOLECYSTECTOMY       HYSTERECTOMY       MASTECTOMY Bilateral      SHOULDER SURGERY Right      SHOULDER  SURGERY Left        Family History   Problem Relation Age of Onset     Breast Cancer Maternal Aunt      Breast Cancer Maternal Aunt      Lung Cancer Father        Social History   Substance Use Topics     Smoking status: Never Smoker     Smokeless tobacco: Never Used     Alcohol use No       Current Outpatient Prescriptions   Medication Sig Dispense Refill     insulin aspart (NOVOLOG VIAL) 100 UNITS/ML injection To be used with the insulin pump    TDD: 40 units 20 mL 3     baclofen (LIORESAL) 10 MG tablet 2 tabs in the morning, 1 tab mid day and 2 tabs at night.       acetaminophen (TYLENOL) 325 MG tablet Take 650 mg by mouth       albuterol (PROAIR HFA, PROVENTIL HFA, VENTOLIN HFA) 108 (90 BASE) MCG/ACT inhaler Inhale 2 puffs into the lungs       Insulin Aspart (INSULIN PUMP - OUTPATIENT)        Lancet Devices MISC        LIFESCAN FINEPOINT LANCETS MISC TEST FOUR TIMES DAILY       blood glucose monitoring (CATHY CONTOUR NEXT) test strip Test blood sugars four times per day.  Type 2 Diabetes 250.00       cholecalciferol (VITAMIN D3) 5000 UNITS TABS tablet Take 5,000 mg by mouth       furosemide (LASIX) 40 MG tablet TAKE 1 TABLET BY MOUTH DAILY       metoprolol (LOPRESSOR) 50 MG tablet TAKE 1 TABLET BY MOUTH TWICE DAILY       levothyroxine (SYNTHROID, LEVOTHROID) 75 MCG tablet TAKE 1 TABLET BY MOUTH DAILY       nitroglycerin (NITROSTAT) 0.4 MG SL tablet Place 0.4 mg under the tongue       omeprazole (PRILOSEC) 20 MG capsule Take 20 mg by mouth       potassium chloride SA (K-DUR,KLOR-CON M) 10 MEQ tablet TAKE 2 TABLETS BY MOUTH DAILY       ramipril (ALTACE) 10 MG capsule TAKE 1 CAPSULE BY MOUTH EVERY DAY       simvastatin (ZOCOR) 40 MG tablet TAKE 1 TABLET BY MOUTH AT BEDTIME       acyclovir (ZOVIRAX) 5 % ointment Apply topically 5 times daily 5 g 0     anastrozole (ARIMIDEX) 1 MG tablet   3     famotidine (PEPCID) 20 MG tablet        metoprolol (LOPRESSOR) 50 MG tablet   3     Acetone, Urine, Test (KETONE TEST) STRP  Use as directed 50 strip 4     glucagon (GLUCAGON EMERGENCY) 1 MG injection Inject 1 mg Subcutaneous once 1 mg 12     INSULIN PUMP - OUTPATIENT Date last updated:  2/4/15  [employer] Minimed: Model 723  BASAL RATES and times:  12   AM (midnight): 0.9 units/hour    9    PM: 0.8 units/hour   Basal Pattern A:  Flora Acuna will use when N/A.  CARB RATIO and times:  12   AM (midnight): 13.0  4     PM: 10  Corection Factor (Sensitivity) and times:  12   AM (midnight): 55 mg/dL  BLOOD GLUCOSE TARGET and times:  12   AM (midnight): 100 - 150  7     AM:  100 - 120  9    PM:  100 - 150  Active Insulin Time:  3 hours  Sensor:  No  Carelink / Diasend username:  [email or web address]  Carelink / Diasend Password:  [email or web address]       clindamycin (CLEOCIN) 300 MG capsule Take 300 mg by mouth as needed (Take 3 capsules by mouth before dental procedure.)       SIMVASTATIN PO Take 40 mg by mouth At Bedtime       RAMIPRIL PO Take 10 mg by mouth daily       calcium carbonate (OS-BUDDY 500 MG [place]. CA) 500 MG tablet Take 500 mg by mouth daily       VITAMIN D, CHOLECALCIFEROL, PO Take 5,000 Units by mouth daily         Allergies   Allergen Reactions     Contrast Dye Difficulty breathing     Gadolinium Derivatives      Other reaction(s): Difficulty in swallowing, Laryngeal spasm  Patient had an injection into rt. Shoulder capsule prior to MRI arthrogram.  Contained multihance gadobenate, omnipaque iohexol, and buffered lidocaine.       Ammonia      Cory-1 [Lidocaine Hcl]      Novocaine allergy       Codeine Sulfate      Food      Shrimp     Iodine-131 Swelling     Ct dye     Nitrous Oxide      Novocain [Procaine]      Penicillins      Tramadol      Morphine Palpitations       REVIEW OF SYSTEMS  Skin: negative  Eyes: negative  Ears/Nose/Throat: negative  Respiratory: No shortness of breath, dyspnea on exertion, cough, or hemoptysis; history of asthma  Cardiovascular: history of HF (cause unknown)  Gastrointestinal: negative  Genitourinary: urgency and  "frequency   Musculoskeletal: positive for back pain, neck pain, arthritis and right shoulder (needs surgery)  Neurologic: positive for numbness or tingling of feet  Psychiatric: negative  Hematologic/Lymphatic/Immunologic: history of breast cancer (left) x 2 (same spot)  Endocrine: positive for diabetes and thyroid disease     OBJECTIVE:  /66 (BP Location: Right arm, Patient Position: Chair, Cuff Size: Adult Large)  Pulse 64  Ht 5' 3\" (1.6 m)  Wt 154 lb (69.9 kg)  SpO2 96%  BMI 27.28 kg/m2  General appearance: healthy, alert and no distress  Neck: Neck supple. No adenopathy. Thyroid symmetric, normal size, Carotids without bruits.  Lungs: negative. Good diaphragmatic excursion. Lungs clear  Heart: negative.  No lifts, heaves, or thrills. RRR. No murmurs, clicks gallops or rub  Abdomen: Abdomen soft, non-tender. BS normal.    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 S  Est. A1c:  9.7%    Date: 17  Lowest: 198 Highest: 371  Date: 17  Lowest: 205 Highest: 400  Date: 17  Lowest: 83 Highest: 383  Date: 17  Lowest: 69 Highest: 254  Date: 17  Lowest: 146 Highest: 276  Date: 17  Lowest: 72 Highest: 361  Date: 5/3/17  Lowest: 104 Highest: 252    Distribution Data:  Percentage above 140: 95%  Percentage within : 5%  Percentage below 70: 0%    Nocturnal glucose control:  Lowest: 72  Highest: 362    Post-prandial glucose control:  After breakfast:  Lowest: 154  Highest: 400    After lunch:  Lowest: 69  Highest 383    After supper:  Lowest: " 83  Highest: 321    Hypoglycemia incidence:  none    Food choices/Carbohydrate countin17  1.  Twizzlers pull apart  2.  Chicken with brown gravey    17  1.  Hard cinnamon toast  2.  Subway  3.  Check dumpling soup    17  1.  No breakfast  2.  Chicken dumpling soup  3.  Rigatoni with meat sauce    17  1.  Melania snap cookies  2.  Chicken dumpling soup  3.  Ham, mashed potatoes, corn on the cob  4.  Chocolate milk    17  1.  Melania snap cookies  2.  Chicken dumpling soup and ice cream cone  3.  Rigatoni with meat sauce    17  1.  No breakfast  2.  Rigatoni with meat sauce  3.  Lean cuisine, herb crusted chicken      Exercise:  Nothing regular    Recommendations:  See note    Insulin pump information:   Average: 203 +/- 65  Basal/bolus ratio: 21.3 (65%)/11.5 (35%)   Testin.0x/day    Hypoglycemia: 0    Plan:   Adjustment:   Carb ratio  0000 12 (was 14)    Insulin sensitivity  0000 52 (was 55)  1800 46 (was 50)          Education focused on the following:  Work on checking your BGs at least 4 times a day.     Use your dual wave with supper   Please add the correction to pump when BGs >150.       Encouraged Flora to add protein to meals and snacks.      Reminded her to let the insulin pump do the math    Talked about entering 1/2 the amount of carbs BEFORE consuming the carbs (if concerns with low BGs)--you can always add another bolus if needed.      Patient Instructions     Continue working on healthy eating and moving (start low and slow, work up to 30 min, 5x/week)    BG goals:  Fasting and before meals <130, >80  2 hour after eating <180    We only need 1/2 of these numbers to be within target then your A1c will be within target    Medication changes   Watch for low BGs with recent adjustments    Please let me know if you have continue low blood sugars       Follow up   2 weeks pump download    Check his BGs at least 4 times a day.     Use your dual wave with supper     Please  add the correction to pump when BGs >150.       It's okay to add only 50% of carbs before consuming a meal    Call me sooner if any problems/concerns and/or questions develop including consistent low BGs <70 or consistent high BGs >200  [phone] (Justine, Unit Coordinator)    [phone] (Ruth Nurse)  [phone] (Kerri's cell)          Time: 60 minutes  Barrier: none  Willingness to learn: accepting    Kerri Elliott RN [facility]  Disease Management    Cc: Dr. Mejia    60 minutes was spent with patient.    Over 50%  of this time was spent on counseling patient regarding illness, medication and/or treatment options, coordinating further cares and follow ups that are needed along with resource material that will be helpful in the treatment of these issues.       With the electronic record, we can now more quickly and easily track our patient diabetic goals. Our diabetes clinical review is in progress and these are the indicators we are monitoring for good diabetes health.     1.) HbA1C less than 7 (measurement of your average blood sugars)  2.) Blood Pressure less than 140/80  3.) LDL less than 100 (bad cholesterol)  4.) HbA1C is checked in the last 6 months and below 7% (more frequently if not at goal or adjusting medications)  5.) LDL is checked in the last 12 months (more frequently if not at goal or adjusting medications)  6.) Taking one baby aspirin daily (unless otherwise instructed)  7.) No tobacco use  8) Statin use     You have achieved 7 out of 8 of these and I am encouraging you to come in and get tuned up to achieve 8 out of 8!  Here is what you have achieved so far in my goals for you:  1.) HbA1C  less than 7:                              NO  Your last  HbA1C :A1C       Lab Results   Component Value Date    A1C 7 04/17/2017    A1C 6.8 09/21/2016    A1C 7.4 01/26/2016    A1C 7.5 02/10/2015       2.) Blood Pressure less than 140/80:       YES      Your last    BP Readings from Last 1 Encounters:    05/05/17 121/66     3.) LDL less than 100:                              YES      Your last     LDL Cholesterol Calculated   Date Value Ref Range Status   10/31/2016 87 mg/dL Final   ]  4.) Checked HbA1C in the past 6 months: YES      5.) Checked LDL in the past 12 months:    YES   6.) Taking one aspirin daily:                       YES     7.) No tobacco use:                                        YES      8.) Statin use      YES

## 2017-05-09 NOTE — PATIENT INSTRUCTIONS
Continue working on healthy eating and moving (start low and slow, work up to 30 min, 5x/week)    BG goals:  Fasting and before meals <130, >80  2 hour after eating <180    We only need 1/2 of these numbers to be within target then your A1c will be within target    Medication changes   Watch for low BGs with recent adjustments    Please let me know if you have continue low blood sugars       Follow up   2 weeks pump download    Check his BGs at least 4 times a day.     Use your dual wave with supper     Please add the correction to pump when BGs >150.       It's okay to add only 50% of carbs before consuming a meal    Call me sooner if any problems/concerns and/or questions develop including consistent low BGs <70 or consistent high BGs >200  [phone] (Justine, Unit Coordinator)    [phone] (Ruth Nurse)  [phone] (Kerri's cell)

## 2017-05-11 DIAGNOSIS — M25.569: Primary | ICD-10-CM

## 2017-05-15 ENCOUNTER — HOSPITAL ENCOUNTER (OUTPATIENT)
Dept: MRI IMAGING | Facility: HOSPITAL | Age: 72
Discharge: HOME | End: 2017-05-15
Attending: FAMILY MEDICINE | Admitting: FAMILY MEDICINE
Payer: MEDICARE

## 2017-05-15 ENCOUNTER — ALLIED HEALTH/NURSE VISIT (OUTPATIENT)
Dept: WOUND CARE | Facility: OTHER | Age: 72
End: 2017-05-15
Attending: NURSE PRACTITIONER
Payer: MEDICARE

## 2017-05-15 DIAGNOSIS — E11.9 DIABETES MELLITUS, TYPE 2 (H): Primary | ICD-10-CM

## 2017-05-15 PROCEDURE — 73721 MRI JNT OF LWR EXTRE W/O DYE: CPT | Mod: TC,LT

## 2017-05-15 NOTE — MR AVS SNAPSHOT
"              After Visit Summary   5/15/2017    Flora Acuna    MRN: [id number]           Patient Information     Date Of Birth          1945        Visit Information        Provider Department      5/15/2017 10:00 AM Nurse, [facility]        Today's Diagnoses     Diabetes mellitus, type 2 (H)    -  1       Follow-ups after your visit        Your next 10 appointments already scheduled     May 15, 2017  2:00 PM CDT   Radiology with [facility]   [facility] ([facility] )    [address] [place] [address] [phone]              Who to contact     If you have questions or need follow up information about today's clinic visit or your schedule please contact [facility] directly at [phone].  Normal or non-critical lab and imaging results will be communicated to you by MyChart, letter or phone within 4 business days after the clinic has received the results. If you do not hear from us within 7 days, please contact the clinic through MyChart or phone. If you have a critical or abnormal lab result, we will notify you by phone as soon as possible.  Submit refill requests through [employer] or call your pharmacy and they will forward the refill request to us. Please allow 3 business days for your refill to be completed.          Additional Information About Your Visit        Flexishar[employer] video Information     [employer] lets you send messages to your doctor, view your test results, renew your prescriptions, schedule appointments and more. To sign up, go to [email or web address] . Click on \"Log in\" on the left side of the screen, which will take you to the Welcome page. Then click on \"Sign up Now\" on the right side of the page.     You will be asked to enter the access code listed below, as well as some personal information. Please follow the directions to create your username and password.     Your access code is: [email or web address]  Expires: 7/16/2017  2:42 PM     Your access " code will  in 90 days. If you need help or a new code, please call your [facility] clinic or [phone].        Care EveryWhere ID     This is your Care EveryWhere ID. This could be used by other organizations to access your [facility] medical records  [id number]         Blood Pressure from Last 3 Encounters:   17 121/66   17 118/60   16 102/62    Weight from Last 3 Encounters:   17 154 lb (69.9 kg)   17 154 lb 6.4 oz (70 kg)   16 155 lb 4.8 oz (70.4 kg)              Today, you had the following     No orders found for display       Primary Care Provider Office Phone # Fax #    Salvador Mejia [phone]       [facility] [address] [facility] [address]        Thank you!     Thank you for choosing [facility]  for your care. Our goal is always to provide you with excellent care. Hearing back from our patients is one way we can continue to improve our services. Please take a few minutes to complete the written survey that you may receive in the mail after your visit with us. Thank you!             Your Updated Medication List - Protect others around you: Learn how to safely use, store and throw away your medicines at www.disposemymeds.org.          This list is accurate as of: 5/15/17 10:26 AM.  Always use your most recent med list.                   Brand Name Dispense Instructions for use    acetaminophen 325 MG tablet    TYLENOL     Take 650 mg by mouth       acyclovir 5 % ointment    ZOVIRAX    5 g    Apply topically 5 times daily       albuterol 108 (90 BASE) MCG/ACT Inhaler    PROAIR HFA/PROVENTIL HFA/VENTOLIN HFA     Inhale 2 puffs into the lungs       anastrozole 1 MG tablet    ARIMIDEX         baclofen 10 MG tablet    LIORESAL     2 tabs in the morning, 1 tab mid day and 2 tabs at night.       CATHY CONTOUR NEXT test strip   Generic drug:  blood glucose monitoring      Test blood sugars four times per day.  Type 2 Diabetes 250.00        calcium carbonate 500 MG tablet    OS-BUDDY 500 mg [place]. Ca     Take 500 mg by mouth daily       clindamycin 300 MG capsule    CLEOCIN     Take 300 mg by mouth as needed (Take 3 capsules by mouth before dental procedure.)       famotidine 20 MG tablet    PEPCID         furosemide 40 MG tablet    LASIX     TAKE 1 TABLET BY MOUTH DAILY       GLUCAGON EMERGENCY 1 MG kit   Generic drug:  glucagon     1 mg    Inject 1 mg Subcutaneous once       * INSULIN PUMP - OUTPATIENT          * insulin aspart 100 UNITS/ML injection    NovoLOG VIAL    20 mL    To be used with the insulin pump  TDD: 40 units       insulin pump infusion      Date last updated:  2/4/15 [employer] Labs: Model 723 BASAL RATES and times: 12   AM (midnight): 0.9 units/hour   9    PM: 0.8 units/hour  Basal Pattern A:  Flora Acuna will use when N/A. CARB RATIO and times: 12   AM (midnight): 13.0 4     PM: 10 Corection Factor (Sensitivity) and times: 12   AM (midnight): 55 mg/dL BLOOD GLUCOSE TARGET and times: 12   AM (midnight): 100 - 150 7     AM:  100 - 120 9    PM:  100 - 150  Active Insulin Time:  3 hours Sensor:  No Carelink / Diasend username:  [email or web address] Carelink / Diasend Password:  [email or web address]       Ketone Test Strp     50 strip    Use as directed       Lancet Devices Misc          levothyroxine 75 MCG tablet    SYNTHROID/LEVOTHROID     TAKE 1 TABLET BY MOUTH DAILY       [employer] FINEPOINT LANCETS Misc      TEST FOUR TIMES DAILY       * metoprolol 50 MG tablet    LOPRESSOR         * metoprolol 50 MG tablet    LOPRESSOR     TAKE 1 TABLET BY MOUTH TWICE DAILY       NITROSTAT 0.4 MG sublingual tablet   Generic drug:  nitroglycerin      Place 0.4 mg under the tongue       potassium chloride SA 10 MEQ CR tablet    K-DUR/KLOR-CON M     TAKE 2 TABLETS BY MOUTH DAILY       priLOSEC 20 MG CR capsule   Generic drug:  omeprazole      Take 20 mg by mouth       * RAMIPRIL PO      Take 10 mg by mouth daily       * ramipril 10 MG capsule    ALTACE     TAKE 1  CAPSULE BY MOUTH EVERY DAY       * SIMVASTATIN PO      Take 40 mg by mouth At Bedtime       * simvastatin 40 MG tablet    ZOCOR     TAKE 1 TABLET BY MOUTH AT BEDTIME       * VITAMIN D (CHOLECALCIFEROL) PO      Take 5,000 Units by mouth daily       * cholecalciferol 5000 UNITS Tabs tablet    vitamin D3     Take 5,000 mg by mouth       * Notice:  This list has 10 medication(s) that are the same as other medications prescribed for you. Read the directions carefully, and ask your doctor or other care provider to review them with you.

## 2017-05-30 ENCOUNTER — TELEPHONE (OUTPATIENT)
Dept: WOUND CARE | Facility: OTHER | Age: 72
End: 2017-05-30

## 2017-05-30 NOTE — TELEPHONE ENCOUNTER
Flora stopped by for an insulin pump download:    Insulin pump information:   Average: 181 +/- 51  Basal/bolus ratio: 21.3 (64%)/ 12.1 (36%)   Testin.4x/day    Hypoglycemia: 0    BGs continue to improve.      Encouraged Flora to please call if any issues arise.      Kerri Elliott RN FNP-BC  Disease Management

## 2017-06-05 ENCOUNTER — TELEPHONE (OUTPATIENT)
Dept: WOUND CARE | Facility: OTHER | Age: 72
End: 2017-06-05

## 2017-06-05 NOTE — TELEPHONE ENCOUNTER
Flora stopped by for a pump download:    Insulin pump information:   Average: 181 +/- 51  Basal/bolus ratio: 21.3 (64%)/12.1 (36%)   Testin.4x/day    Hypoglycemia: 0%    No adjustments at this time.      Encouraged Flora to keep using her dual wave.      Call with questions/concerns and/or problems.      Kerri Elliott RN FNP-BC  Disease Management

## 2017-06-11 ENCOUNTER — HOSPITAL ENCOUNTER (EMERGENCY)
Facility: HOSPITAL | Age: 72
Discharge: HOME | End: 2017-06-11
Attending: PHYSICIAN ASSISTANT | Admitting: PHYSICIAN ASSISTANT
Payer: MEDICARE

## 2017-06-11 VITALS
BODY MASS INDEX: 27.1 KG/M2 | SYSTOLIC BLOOD PRESSURE: 164 MMHG | HEART RATE: 93 BPM | DIASTOLIC BLOOD PRESSURE: 84 MMHG | TEMPERATURE: 98.2 F | WEIGHT: 153 LBS | RESPIRATION RATE: 16 BRPM | OXYGEN SATURATION: 98 %

## 2017-06-11 DIAGNOSIS — M54.50 ACUTE MIDLINE LOW BACK PAIN WITHOUT SCIATICA: ICD-10-CM

## 2017-06-11 PROCEDURE — 99284 EMERGENCY DEPT VISIT MOD MDM: CPT | Mod: 25

## 2017-06-11 PROCEDURE — 96375 TX/PRO/DX INJ NEW DRUG ADDON: CPT

## 2017-06-11 PROCEDURE — 96374 THER/PROPH/DIAG INJ IV PUSH: CPT

## 2017-06-11 PROCEDURE — 72131 CT LUMBAR SPINE W/O DYE: CPT | Mod: TC

## 2017-06-11 PROCEDURE — 25000125 ZZHC RX 250: Performed by: PHYSICIAN ASSISTANT

## 2017-06-11 PROCEDURE — 99284 EMERGENCY DEPT VISIT MOD MDM: CPT | Performed by: PHYSICIAN ASSISTANT

## 2017-06-11 PROCEDURE — 25000128 H RX IP 250 OP 636: Performed by: PHYSICIAN ASSISTANT

## 2017-06-11 PROCEDURE — 96376 TX/PRO/DX INJ SAME DRUG ADON: CPT

## 2017-06-11 RX ORDER — FENTANYL CITRATE 50 UG/ML
50 INJECTION, SOLUTION INTRAMUSCULAR; INTRAVENOUS ONCE
Status: COMPLETED | OUTPATIENT
Start: 2017-06-11 | End: 2017-06-11

## 2017-06-11 RX ORDER — ONDANSETRON 2 MG/ML
4 INJECTION INTRAMUSCULAR; INTRAVENOUS EVERY 30 MIN PRN
Status: DISCONTINUED | OUTPATIENT
Start: 2017-06-11 | End: 2017-06-11 | Stop reason: HOSPADM

## 2017-06-11 RX ORDER — METHYLPREDNISOLONE SODIUM SUCCINATE 125 MG/2ML
125 INJECTION, POWDER, LYOPHILIZED, FOR SOLUTION INTRAMUSCULAR; INTRAVENOUS ONCE
Status: COMPLETED | OUTPATIENT
Start: 2017-06-11 | End: 2017-06-11

## 2017-06-11 RX ORDER — KETOROLAC TROMETHAMINE 15 MG/ML
15 INJECTION, SOLUTION INTRAMUSCULAR; INTRAVENOUS ONCE
Status: COMPLETED | OUTPATIENT
Start: 2017-06-11 | End: 2017-06-11

## 2017-06-11 RX ADMIN — METHYLPREDNISOLONE SODIUM SUCCINATE 125 MG: 125 INJECTION, POWDER, FOR SOLUTION INTRAMUSCULAR; INTRAVENOUS at 19:24

## 2017-06-11 RX ADMIN — KETOROLAC TROMETHAMINE 15 MG: 15 INJECTION, SOLUTION INTRAMUSCULAR; INTRAVENOUS at 21:14

## 2017-06-11 RX ADMIN — FENTANYL CITRATE 50 MCG: 50 INJECTION, SOLUTION INTRAMUSCULAR; INTRAVENOUS at 18:23

## 2017-06-11 RX ADMIN — ONDANSETRON 4 MG: 2 INJECTION INTRAMUSCULAR; INTRAVENOUS at 18:23

## 2017-06-11 RX ADMIN — FENTANYL CITRATE 50 MCG: 50 INJECTION, SOLUTION INTRAMUSCULAR; INTRAVENOUS at 19:24

## 2017-06-11 NOTE — ED NOTES
"73 y/o female presents with \"excuritiating back pain\" States she has a hx of herniated discs and \"this feels like that\". Patient usually uses walker and walks very slowly - however unable to walk or bear weight on feet today. Patient requesting MRI. Denies any new injury. Also c/o numbness in legs  "

## 2017-06-11 NOTE — ED AVS SNAPSHOT
[facility]    [address] [address]    Phone:  [phone]                                       Flora Acuna   MRN: [id number]    Department:  [facility]   Date of Visit:  6/11/2017           Patient Information     Date Of Birth          1945        Your diagnoses for this visit were:     Acute midline low back pain without sciatica        You were seen by Taylor Velazquez PA-C.      Follow-up Information     Follow up with Salvador Mejia MD.    Specialty:  Family Practice    Contact information:    [facility]  [address] [facility] [address] [phone]          Follow up with [facility].    Specialty:  EMERGENCY MEDICINE    Why:  If symptoms worsen    Contact information:    [address] [address] [phone]    Additional information:    From [facility]: Take [address]. Turn left at [address]. Turn left at the first stoplight on [address]. At the first stop sign, take a right onto [address]. Take a left into the parking lot and continue through until you reach the North enterance of the building.       From [place]: Take [address]. Take the [address] ramp towards [place]. Turn left onto [address]. Take a slight right onto [address]. Turn left at the first stoplight on [address]. At the first stop sign, take a right onto [address]. Take a left into the parking lot and continue through until you reach the North enterance of the building.       From [place]: Take [address]. Take a right at [address]. At the first stop sign, take a right onto [address]. Take a left into the parking lot and continue through until you reach the North enterance of the building.         Discharge Instructions       Please follow-up with your primary care provider.    Please return HERE for ANY other concerns or questions.      Future Appointments        Provider Department Dept Phone Center    6/15/2017 11:15 AM HC DM Nurse [facility] [place] [phone] [place]         Review of your medicines      Our records show that you are taking the medicines listed below. If these are incorrect, please call your family doctor or clinic.        Dose / Directions Last dose taken    acetaminophen 325 MG tablet   Commonly known as:  TYLENOL   Dose:  650 mg        Take 650 mg by mouth   Refills:  0        albuterol 108 (90 BASE) MCG/ACT Inhaler   Commonly known as:  PROAIR HFA/PROVENTIL HFA/VENTOLIN HFA   Dose:  2 puff        Inhale 2 puffs into the lungs   Refills:  0        anastrozole 1 MG tablet   Commonly known as:  ARIMIDEX        Refills:  3        baclofen 10 MG tablet   Commonly known as:  LIORESAL        2 tabs in the morning, 1 tab mid day and 2 tabs at night.   Refills:  0        CATHY CONTOUR NEXT test strip   Generic drug:  blood glucose monitoring        Test blood sugars four times per day.  Type 2 Diabetes 250.00   Refills:  0        calcium carbonate 1250 MG tablet   Commonly known as:  OS-BUDDY 500 mg [place]. Ca   Dose:  500 mg        Take 500 mg by mouth daily   Refills:  0        clindamycin 300 MG capsule   Commonly known as:  CLEOCIN   Dose:  300 mg        Take 300 mg by mouth as needed (Take 3 capsules by mouth before dental procedure.)   Refills:  0        famotidine 20 MG tablet   Commonly known as:  PEPCID        Refills:  0        furosemide 40 MG tablet   Commonly known as:  LASIX        TAKE 1 TABLET BY MOUTH DAILY   Refills:  0        GLUCAGON EMERGENCY 1 MG kit   Dose:  1 mg   Quantity:  1 mg   Generic drug:  glucagon        Inject 1 mg Subcutaneous once   Refills:  12        * INSULIN PUMP - OUTPATIENT        Refills:  0        * insulin aspart 100 UNITS/ML injection   Commonly known as:  NovoLOG VIAL   Quantity:  20 mL        To be used with the insulin pump  TDD: 40 units   Refills:  3        insulin pump  infusion        Date last updated:  2/4/15 Medtronic Minimed: Model 723 BASAL RATES and times: 12   AM (midnight): 0.9 units/hour   9    PM: 0.8 units/hour  Basal Pattern A:  Flora Acuna will use when N/A. CARB RATIO and times: 12   AM (midnight): 13.0 4     PM: 10 Corection Factor (Sensitivity) and times: 12   AM (midnight): 55 mg/dL BLOOD GLUCOSE TARGET and times: 12   AM (midnight): 100 - 150 7     AM:  100 - 120 9    PM:  100 - 150  Active Insulin Time:  3 hours Sensor:  No Carelink / Diasend username:  [email or web address] Carelink / Diasend Password:  [email or web address]   Refills:  0        Ketone Test Strp   Quantity:  50 strip        Use as directed   Refills:  4        Lancet Devices Misc        Refills:  0        levothyroxine 75 MCG tablet   Commonly known as:  SYNTHROID/LEVOTHROID        TAKE 1 TABLET BY MOUTH DAILY   Refills:  0        [employer] Digital MediaCAN FINEPOINT LANCETS Misc        TEST FOUR TIMES DAILY   Refills:  0        * metoprolol 50 MG tablet   Commonly known as:  LOPRESSOR        Refills:  3        * metoprolol 50 MG tablet   Commonly known as:  LOPRESSOR        TAKE 1 TABLET BY MOUTH TWICE DAILY   Refills:  0        NITROSTAT 0.4 MG sublingual tablet   Dose:  0.4 mg   Generic drug:  nitroglycerin        Place 0.4 mg under the tongue   Refills:  0        potassium chloride SA 10 MEQ CR tablet   Commonly known as:  K-DUR/KLOR-CON M        TAKE 2 TABLETS BY MOUTH DAILY   Refills:  0        priLOSEC 20 MG CR capsule   Dose:  20 mg   Generic drug:  omeprazole        Take 20 mg by mouth   Refills:  0        * RAMIPRIL PO   Dose:  10 mg        Take 10 mg by mouth daily   Refills:  0        * ramipril 10 MG capsule   Commonly known as:  ALTACE        TAKE 1 CAPSULE BY MOUTH EVERY DAY   Refills:  0        * SIMVASTATIN PO   Dose:  40 mg        Take 40 mg by mouth At Bedtime   Refills:  0        * simvastatin 40 MG tablet   Commonly known as:  ZOCOR        TAKE 1 TABLET BY MOUTH AT BEDTIME   Refills:  0        * VITAMIN D  "(CHOLECALCIFEROL) PO   Dose:  5000 Units        Take 5,000 Units by mouth daily   Refills:  0        * cholecalciferol 5000 UNITS Tabs tablet   Commonly known as:  vitamin D3   Dose:  5000 mg        Take 5,000 mg by mouth   Refills:  0        * Notice:  This list has 10 medication(s) that are the same as other medications prescribed for you. Read the directions carefully, and ask your doctor or other care provider to review them with you.            Procedures and tests performed during your visit     CT Lumbar Spine w/o Contrast      Orders Needing Specimen Collection     None      Pending Results     Date and Time Order Name Status Description    2017 1920 CT Lumbar Spine w/o Contrast In process             Pending Culture Results     No orders found from 2017 to 2017.            Thank you for choosing [facility]       Thank you for choosing [facility] for your care. Our goal is always to provide you with excellent care. Hearing back from our patients is one way we can continue to improve our services. Please take a few minutes to complete the written survey that you may receive in the mail after you visit with us. Thank you!        [employer] Information     [employer] lets you send messages to your doctor, view your test results, renew your prescriptions, schedule appointments and more. To sign up, go to [email or web address] . Click on \"Log in\" on the left side of the screen, which will take you to the Welcome page. Then click on \"Sign up Now\" on the right side of the page.     You will be asked to enter the access code listed below, as well as some personal information. Please follow the directions to create your username and password.     Your access code is: [email or web address]  Expires: 2017  2:42 PM     Your access code will  in 90 days. If you need help or a new code, please call your [facility] clinic or [phone].        Care EveryWhere ID     This is your Care EveryWhere ID. This could be " used by other organizations to access your [facility] medical records  [id number]        After Visit Summary       This is your record. Keep this with you and show to your community pharmacist(s) and doctor(s) at your next visit.

## 2017-06-12 ASSESSMENT — ENCOUNTER SYMPTOMS
BRUISES/BLEEDS EASILY: 0
APPETITE CHANGE: 0
SHORTNESS OF BREATH: 0
ACTIVITY CHANGE: 1
VOMITING: 0
CHILLS: 0
BACK PAIN: 1
NAUSEA: 0
FEVER: 0
HEADACHES: 0
ABDOMINAL PAIN: 0

## 2017-06-12 NOTE — ED NOTES
"Patient up to bathroom, crying out stating she is in \"excruciating pain.\" ALISON Vazquez updated and orders placed for pain medication.   "

## 2017-06-12 NOTE — DISCHARGE INSTRUCTIONS
Please follow-up with your primary care provider.    Please return HERE for ANY other concerns or questions.

## 2017-06-12 NOTE — ED NOTES
Discharge instructions reviewed with patient with no questions or concerns. Vital signs stable prior to discharge. Patient's family member to bring patient home.

## 2017-06-12 NOTE — ED PROVIDER NOTES
"  History     Chief Complaint   Patient presents with     Back Pain     The history is provided by the patient.     Flora Acuna is a 72 year old female who presented to the ED along with son for evaluation of low back pain.  She tells me that \"I've had a herniated disk in the past and this is what this is.\"  She is requesting an MRI.  NO bowel or bladder dysfunction.  No falls.  Able to ambulate. No abdominal pain.  No pain when still.  Pain much improved when pushing on back.     I have reviewed the Medications, Allergies, Past Medical and Surgical History, and Social History in the Epic system.    Allergies:   Allergies   Allergen Reactions     Contrast Dye Difficulty breathing     Gadolinium Derivatives      Other reaction(s): Difficulty in swallowing, Laryngeal spasm  Patient had an injection into rt. Shoulder capsule prior to MRI arthrogram.  Contained multihance gadobenate, omnipaque iohexol, and buffered lidocaine.       Ammonia      Cory-1 [Lidocaine Hcl]      Novocaine allergy       Codeine Sulfate      Food      Shrimp     Iodine-131 Swelling     Ct dye     Nitrous Oxide      Novocain [Procaine]      Penicillins      Tramadol      Morphine Palpitations         No current facility-administered medications on file prior to encounter.   Current Outpatient Prescriptions on File Prior to Encounter:  insulin aspart (NOVOLOG VIAL) 100 UNITS/ML injection To be used with the insulin pumpTDD: 40 units   baclofen (LIORESAL) 10 MG tablet 2 tabs in the morning, 1 tab mid day and 2 tabs at night.   acetaminophen (TYLENOL) 325 MG tablet Take 650 mg by mouth   Insulin Aspart (INSULIN PUMP - OUTPATIENT)    Lancet Devices MISC    LIFESCAN FINEPOINT LANCETS MISC TEST FOUR TIMES DAILY   blood glucose monitoring (CATHY CONTOUR NEXT) test strip Test blood sugars four times per day.  Type 2 Diabetes 250.00   cholecalciferol (VITAMIN D3) 5000 UNITS TABS tablet Take 5,000 mg by mouth   furosemide (LASIX) 40 MG tablet TAKE 1 " TABLET BY MOUTH DAILY   metoprolol (LOPRESSOR) 50 MG tablet TAKE 1 TABLET BY MOUTH TWICE DAILY   levothyroxine (SYNTHROID, LEVOTHROID) 75 MCG tablet TAKE 1 TABLET BY MOUTH DAILY   omeprazole (PRILOSEC) 20 MG capsule Take 20 mg by mouth   potassium chloride SA (K-DUR,KLOR-CON M) 10 MEQ tablet TAKE 2 TABLETS BY MOUTH DAILY   ramipril (ALTACE) 10 MG capsule TAKE 1 CAPSULE BY MOUTH EVERY DAY   simvastatin (ZOCOR) 40 MG tablet TAKE 1 TABLET BY MOUTH AT BEDTIME   anastrozole (ARIMIDEX) 1 MG tablet    famotidine (PEPCID) 20 MG tablet    metoprolol (LOPRESSOR) 50 MG tablet    INSULIN PUMP - OUTPATIENT Date last updated:  2/4/15[employer] Minimed: Model 723BASAL RATES and times:12   AM (midnight): 0.9 units/hour  9    PM: 0.8 units/hour Basal Pattern A:  Flora Acuna will use when N/A.CARB RATIO and times:12   AM (midnight): 13.04     PM: 10Corection Factor (Sensitivity) and times:12   AM (midnight): 55 mg/dLBLOOD GLUCOSE TARGET and times:12   AM (midnight): 100 - 1507     AM:  100 - 1209    PM:  100 - 150  Active Insulin Time:  3 hoursSensor:  S[employer]Stream / Diasend username:  [email or web address] / Diasend Password:  [email or web address]   calcium carbonate (OS-BUDDY 500 MG [place]. [place]) 500 MG tablet Take 500 mg by mouth daily   VITAMIN D, CHOLECALCIFEROL, PO Take 5,000 Units by mouth daily   albuterol (PROAIR HFA, PROVENTIL HFA, VENTOLIN HFA) 108 (90 BASE) MCG/ACT inhaler Inhale 2 puffs into the lungs   nitroglycerin (NITROSTAT) 0.4 MG SL tablet Place 0.4 mg under the tongue   Acetone, Urine, Test (KETONE TEST) STRP Use as directed   glucagon (GLUCAGON EMERGENCY) 1 MG injection Inject 1 mg Subcutaneous once   clindamycin (CLEOCIN) 300 MG capsule Take 300 mg by mouth as needed (Take 3 capsules by mouth before dental procedure.)   SIMVASTATIN PO Take 40 mg by mouth At Bedtime   RAMIPRIL PO Take 10 mg by mouth daily       Patient Active Problem List   Diagnosis     CARDIOVASCULAR SCREENING; LDL GOAL LESS THAN 160     Numbness and tingling in  "left hand     Elevated blood pressure     Diabetes mellitus, type 2 (H)     Personal history of malignant neoplasm of breast     ACP (advance care planning)       Past Surgical History:   Procedure Laterality Date     APPENDECTOMY OPEN CHILD       AS TOTAL KNEE ARTHROPLASTY Right      CHOLECYSTECTOMY       HYSTERECTOMY       MASTECTOMY Bilateral      SHOULDER SURGERY Right      SHOULDER SURGERY Left        Social History   Substance Use Topics     Smoking status: Never Smoker     Smokeless tobacco: Never Used     Alcohol use No         There is no immunization history on file for this patient.    BMI: Estimated body mass index is 27.1 kg/(m^2) as calculated from the following:    Height as of 5/5/17: 1.6 m (5' 3\").    Weight as of this encounter: 69.4 kg (153 lb).      Review of Systems   Constitutional: Positive for activity change. Negative for appetite change, chills and fever.   Respiratory: Negative for shortness of breath.    Cardiovascular: Negative for chest pain.   Gastrointestinal: Negative for abdominal pain, nausea and vomiting.   Genitourinary: Negative.    Musculoskeletal: Positive for back pain.   Skin: Negative.    Neurological: Negative for headaches.   Hematological: Does not bruise/bleed easily.       Physical Exam   BP: 166/80  Heart Rate: 78  Temp: 98.2  F (36.8  C)  Weight: 69.4 kg (153 lb)  SpO2: 100 %  Physical Exam   Constitutional: She is oriented to person, place, and time. She appears well-developed and well-nourished.   Cardiovascular: Normal rate and regular rhythm.    Pulmonary/Chest: Effort normal.   Musculoskeletal: She exhibits tenderness. She exhibits no edema.   Mild increased pain on palpation of the lumbar spine    Neurological: She is alert and oriented to person, place, and time.   Skin: Skin is warm and dry.   Psychiatric: She has a normal mood and affect.   Nursing note and vitals reviewed.      ED Course     ED Course     Procedures        Medications   fentaNYL Citrate " (PF) (SUBLIMAZE) injection 50 mcg (50 mcg Intravenous Given 6/11/17 1823)   methylPREDNISolone sodium succinate (solu-MEDROL) injection 125 mg (125 mg Intravenous Given 6/11/17 1924)   fentaNYL Citrate (PF) (SUBLIMAZE) injection 50 mcg (50 mcg Intravenous Given 6/11/17 1924)   ketorolac (TORADOL) injection 15 mg (15 mg Intravenous Given 6/11/17 2114)     Results for orders placed or performed during the hospital encounter of 06/11/17 (from the past 24 hour(s))   CT Lumbar Spine w/o Contrast    Narrative    CT SCAN OF LUMBAR SPINE    REPORT:  The T12-L1 disk is normal in height.  There is broad-based  annular bulging noted at L1-L2.  There is posterior osteophyte and  posterolateral disk protrusion on the right L1 nerve root.  There is  gaseous degeneration of the L2-L3 disk. There is posterolateral  osteophyte at L2-L3 on the right, pressing the right L2 nerve root.  At L3-L4 there is broad-based annular bulging, facet joint  hypertrophic changes, and ligamentum flavum hypertrophy.  This results  in moderate central spinal stenosis and compression of both L4 nerve  roots in the lateral recesses.  At L4-L5 postoperative changes are  noted on the right.  There are facet joint degenerative changes noted  bilaterally.  At L5-S1 there is severe facet joint degenerative  changes.  There is posterolateral disk protrusions which, together  with the facet joint degenerative changes, result in narrowing of the  neural foramina at L5-S1 bilaterally, compressing both L5 nerve roots.  No intradural abnormalities are seen.  Paravertebral soft tissues  appear normal.    IMPRESSION:  ASYMMETRIC DISK PROTRUSIONS L1-L2 AND L2-L3 ON THE RIGHT,  COMPRESSING THE RIGHT L1 AND THE RIGHT L2 NERVE ROOTS.  CENTRAL SPINAL  STENOSIS AT L3-L4, COMPRESSING BOTH L4 NERVE ROOTS.  Exam Date: Jun 11, 2017 07:55:44 PM  Author: SURINDER FOSTER  This report is final and signed          Critical Care time:  none               Labs Ordered and Resulted  "from Time of ED Arrival Up to the Time of Departure from the ED - No data to display    Assessments & Plan (with Medical Decision Making)   Ms. Acuna refused any oral medications because \"they attack my heart.\"  She will not take morphine or dilaudid.  I gave her a trial of Fentanyl, toradol, and solumedrol.  This seems to help and she was able to ambulate slowly.  I had her admitted here for intractable pain and possible MRI in the morning.  She refused.  No red flags.  Discharged with son.  Follow-up with [facility] tomorrow. She has orthopedics scheduled tomorrow as well in [place].     I have reviewed the nursing notes.    I have reviewed the findings, diagnosis, plan and need for follow up with the patient.       New Prescriptions    No medications on file       Final diagnoses:   Acute midline low back pain without sciatica       6/11/2017   [facility]     Taylor Velazquez PA-C  06/12/17 1008    "

## 2017-06-13 NOTE — PROGRESS NOTES
CT Scan of Lumbar Spine report routed to PCP, Dr. VIRA Mejia.  Impression -ASYMMETRIC DISK PROTRUSIONS L1-L2 AND L2-L3 ON THE RIGHT, COMPRESSING THE RIGHT L1 AND THE RIGHT L2 NERVE ROOTS.  CENTRAL SPINAL STENOSIS AT L3-L4, COMPRESSING BOTH L4 NERVE ROOTS. Pt was seen in ED on6/11/17, refused admission, follow-up with [facility] tomorrow. She has orthopedics scheduled tomorrow as well in [place].

## 2017-06-14 DIAGNOSIS — M54.16 LUMBAR BACK PAIN WITH RADICULOPATHY AFFECTING LEFT LOWER EXTREMITY: Primary | ICD-10-CM

## 2017-06-28 ENCOUNTER — HOSPITAL ENCOUNTER (EMERGENCY)
Facility: HOSPITAL | Age: 72
Discharge: HOME | End: 2017-06-28
Attending: EMERGENCY MEDICINE | Admitting: EMERGENCY MEDICINE
Payer: MEDICARE

## 2017-06-28 ENCOUNTER — HOSPITAL ENCOUNTER (OUTPATIENT)
Dept: INTERVENTIONAL RADIOLOGY/VASCULAR | Facility: HOSPITAL | Age: 72
Discharge: HOME | End: 2017-06-28
Attending: FAMILY MEDICINE | Admitting: FAMILY MEDICINE
Payer: MEDICARE

## 2017-06-28 VITALS
SYSTOLIC BLOOD PRESSURE: 144 MMHG | HEIGHT: 62 IN | DIASTOLIC BLOOD PRESSURE: 62 MMHG | RESPIRATION RATE: 18 BRPM | TEMPERATURE: 98.1 F | OXYGEN SATURATION: 94 %

## 2017-06-28 DIAGNOSIS — T78.40XA ALLERGIC REACTION, INITIAL ENCOUNTER: ICD-10-CM

## 2017-06-28 LAB — GLUCOSE BLDC GLUCOMTR-MCNC: 313 MG/DL (ref 70–99)

## 2017-06-28 PROCEDURE — 99284 EMERGENCY DEPT VISIT MOD MDM: CPT | Performed by: EMERGENCY MEDICINE

## 2017-06-28 PROCEDURE — 25000125 ZZHC RX 250: Performed by: EMERGENCY MEDICINE

## 2017-06-28 PROCEDURE — 25000132 ZZH RX MED GY IP 250 OP 250 PS 637: Mod: GY | Performed by: EMERGENCY MEDICINE

## 2017-06-28 PROCEDURE — A9270 NON-COVERED ITEM OR SERVICE: HCPCS | Mod: GY | Performed by: EMERGENCY MEDICINE

## 2017-06-28 PROCEDURE — S0028 INJECTION, FAMOTIDINE, 20 MG: HCPCS | Performed by: EMERGENCY MEDICINE

## 2017-06-28 PROCEDURE — 99284 EMERGENCY DEPT VISIT MOD MDM: CPT | Mod: 25

## 2017-06-28 PROCEDURE — 96374 THER/PROPH/DIAG INJ IV PUSH: CPT | Mod: XS

## 2017-06-28 PROCEDURE — 00000146 ZZHCL STATISTIC GLUCOSE BY METER IP

## 2017-06-28 PROCEDURE — 94640 AIRWAY INHALATION TREATMENT: CPT

## 2017-06-28 PROCEDURE — 96375 TX/PRO/DX INJ NEW DRUG ADDON: CPT | Mod: XS

## 2017-06-28 PROCEDURE — 96372 THER/PROPH/DIAG INJ SC/IM: CPT | Mod: XS

## 2017-06-28 PROCEDURE — 25000128 H RX IP 250 OP 636

## 2017-06-28 RX ORDER — METHYLPREDNISOLONE ACETATE 80 MG/ML
80 INJECTION, SUSPENSION INTRA-ARTICULAR; INTRALESIONAL; INTRAMUSCULAR; SOFT TISSUE ONCE
Status: COMPLETED | OUTPATIENT
Start: 2017-06-28 | End: 2017-06-28

## 2017-06-28 RX ORDER — DIPHENHYDRAMINE HYDROCHLORIDE 50 MG/ML
25 INJECTION INTRAMUSCULAR; INTRAVENOUS ONCE
Status: COMPLETED | OUTPATIENT
Start: 2017-06-28 | End: 2017-06-28

## 2017-06-28 RX ORDER — METHYLPREDNISOLONE SODIUM SUCCINATE 125 MG/2ML
62.5 INJECTION, POWDER, LYOPHILIZED, FOR SOLUTION INTRAMUSCULAR; INTRAVENOUS ONCE
Status: COMPLETED | OUTPATIENT
Start: 2017-06-28 | End: 2017-06-28

## 2017-06-28 RX ORDER — METHYLPREDNISOLONE SODIUM SUCCINATE 125 MG/2ML
INJECTION, POWDER, LYOPHILIZED, FOR SOLUTION INTRAMUSCULAR; INTRAVENOUS
Status: COMPLETED
Start: 2017-06-28 | End: 2017-06-28

## 2017-06-28 RX ORDER — IOPAMIDOL 612 MG/ML
15 INJECTION, SOLUTION INTRATHECAL ONCE
Status: COMPLETED | OUTPATIENT
Start: 2017-06-28 | End: 2017-06-28

## 2017-06-28 RX ORDER — DIPHENHYDRAMINE HYDROCHLORIDE 50 MG/ML
INJECTION INTRAMUSCULAR; INTRAVENOUS
Status: COMPLETED
Start: 2017-06-28 | End: 2017-06-28

## 2017-06-28 RX ORDER — EPINEPHRINE 1 MG/ML
INJECTION INTRAMUSCULAR; INTRAVENOUS; SUBCUTANEOUS
Status: COMPLETED
Start: 2017-06-28 | End: 2017-06-28

## 2017-06-28 RX ORDER — EPINEPHRINE 1 MG/ML
0.3 INJECTION INTRAMUSCULAR; INTRAVENOUS; SUBCUTANEOUS ONCE
Status: COMPLETED | OUTPATIENT
Start: 2017-06-28 | End: 2017-06-28

## 2017-06-28 RX ORDER — METHYLPREDNISOLONE ACETATE 80 MG/ML
INJECTION, SUSPENSION INTRA-ARTICULAR; INTRALESIONAL; INTRAMUSCULAR; SOFT TISSUE
Status: DISPENSED
Start: 2017-06-28 | End: 2017-06-28

## 2017-06-28 RX ADMIN — METHYLPREDNISOLONE SODIUM SUCCINATE 62.5 MG: 125 INJECTION INTRAMUSCULAR; INTRAVENOUS at 12:35

## 2017-06-28 RX ADMIN — EPINEPHRINE 0.3 MG: 1 INJECTION INTRAMUSCULAR; INTRAVENOUS; SUBCUTANEOUS at 12:32

## 2017-06-28 RX ADMIN — RACEPINEPHRINE HYDROCHLORIDE 0.5 ML: 11.25 SOLUTION RESPIRATORY (INHALATION) at 12:59

## 2017-06-28 RX ADMIN — METHYLPREDNISOLONE SODIUM SUCCINATE 62.5 MG: 125 INJECTION, POWDER, FOR SOLUTION INTRAMUSCULAR; INTRAVENOUS at 12:35

## 2017-06-28 RX ADMIN — METHYLPREDNISOLONE ACETATE 80 MG: 80 INJECTION, SUSPENSION INTRA-ARTICULAR; INTRALESIONAL; INTRAMUSCULAR; SOFT TISSUE at 12:07

## 2017-06-28 RX ADMIN — DIPHENHYDRAMINE HYDROCHLORIDE 50 MG: 50 INJECTION, SOLUTION INTRAMUSCULAR; INTRAVENOUS at 12:37

## 2017-06-28 RX ADMIN — IOPAMIDOL 6 ML: 612 INJECTION, SOLUTION INTRATHECAL at 12:08

## 2017-06-28 RX ADMIN — FAMOTIDINE 20 MG: 10 INJECTION, SOLUTION INTRAVENOUS at 12:57

## 2017-06-28 RX ADMIN — DIPHENHYDRAMINE HYDROCHLORIDE 50 MG: 50 INJECTION INTRAMUSCULAR; INTRAVENOUS at 12:37

## 2017-06-28 ASSESSMENT — ENCOUNTER SYMPTOMS
FACIAL SWELLING: 1
RESPIRATORY NEGATIVE: 1
TROUBLE SWALLOWING: 1
SORE THROAT: 1
EYES NEGATIVE: 1
COLOR CHANGE: 1
ACTIVITY CHANGE: 1
NEUROLOGICAL NEGATIVE: 1
CARDIOVASCULAR NEGATIVE: 1
VOICE CHANGE: 1
GASTROINTESTINAL NEGATIVE: 1
RHINORRHEA: 1

## 2017-06-28 NOTE — IP AVS SNAPSHOT
MRN:[id number]                      After Visit Summary   6/28/2017    Flora Acuna    MRN: [id number]           Visit Information        Provider Department      6/28/2017 11:30 AM Radiologist, Need Interventional; [facility] Inter[facility]iology           Review of your medicines      UNREVIEWED medicines. Ask your doctor about these medicines        Dose / Directions    acetaminophen 325 MG tablet   Commonly known as:  TYLENOL        Dose:  650 mg   Take 650 mg by mouth   Refills:  0       albuterol 108 (90 BASE) MCG/ACT Inhaler   Commonly known as:  PROAIR HFA/PROVENTIL HFA/VENTOLIN HFA        Dose:  2 puff   Inhale 2 puffs into the lungs   Refills:  0       anastrozole 1 MG tablet   Commonly known as:  ARIMIDEX        Refills:  3       baclofen 10 MG tablet   Commonly known as:  LIORESAL        2 tabs in the morning, 1 tab mid day and 2 tabs at night.   Refills:  0       calcium carbonate 1250 MG tablet   Commonly known as:  OS-BUDDY 500 mg [place]. [place]        Dose:  500 mg   Take 500 mg by mouth daily   Refills:  0       clindamycin 300 MG capsule   Commonly known as:  CLEOCIN        Dose:  300 mg   Take 300 mg by mouth as needed (Take 3 capsules by mouth before dental procedure.)   Refills:  0       famotidine 20 MG tablet   Commonly known as:  PEPCID        Refills:  0       furosemide 40 MG tablet   Commonly known as:  LASIX        TAKE 1 TABLET BY MOUTH DAILY   Refills:  0       GLUCAGON EMERGENCY 1 MG kit   Used for:  Diabetes mellitus, type 2 (H)   Generic drug:  glucagon        Dose:  1 mg   Inject 1 mg Subcutaneous once   Quantity:  1 mg   Refills:  12       * INSULIN PUMP - OUTPATIENT        Refills:  0       * insulin aspart 100 UNITS/ML injection   Commonly known as:  NovoLOG VIAL   Used for:  Type 2 diabetes mellitus with hyperglycemia, with long-term current use of insulin (H)        To be used with the insulin pump  TDD: 40 units   Quantity:  20 mL   Refills:   3       levothyroxine 75 MCG tablet   Commonly known as:  SYNTHROID/LEVOTHROID        TAKE 1 TABLET BY MOUTH DAILY   Refills:  0       * metoprolol 50 MG tablet   Commonly known as:  LOPRESSOR        Refills:  3       * metoprolol 50 MG tablet   Commonly known as:  LOPRESSOR        TAKE 1 TABLET BY MOUTH TWICE DAILY   Refills:  0       NITROSTAT 0.4 MG sublingual tablet   Generic drug:  nitroGLYcerin        Dose:  0.4 mg   Place 0.4 mg under the tongue   Refills:  0       potassium chloride SA 10 MEQ CR tablet   Commonly known as:  K-DUR/KLOR-CON M        TAKE 2 TABLETS BY MOUTH DAILY   Refills:  0       priLOSEC 20 MG CR capsule   Generic drug:  omeprazole        Dose:  20 mg   Take 20 mg by mouth   Refills:  0       * RAMIPRIL PO        Dose:  10 mg   Take 10 mg by mouth daily   Refills:  0       * ramipril 10 MG capsule   Commonly known as:  ALTACE        TAKE 1 CAPSULE BY MOUTH EVERY DAY   Refills:  0       * SIMVASTATIN PO        Dose:  40 mg   Take 40 mg by mouth At Bedtime   Refills:  0       * simvastatin 40 MG tablet   Commonly known as:  ZOCOR        TAKE 1 TABLET BY MOUTH AT BEDTIME   Refills:  0       * VITAMIN D (CHOLECALCIFEROL) PO        Dose:  5000 Units   Take 5,000 Units by mouth daily   Refills:  0       * cholecalciferol 5000 UNITS Tabs tablet   Commonly known as:  vitamin D3        Dose:  5000 mg   Take 5,000 mg by mouth   Refills:  0       * Notice:  This list has 10 medication(s) that are the same as other medications prescribed for you. Read the directions carefully, and ask your doctor or other care provider to review them with you.      CONTINUE these medicines which have NOT CHANGED        Dose / Directions    CATHY CONTOUR NEXT test strip   Generic drug:  blood glucose monitoring        Test blood sugars four times per day.  Type 2 Diabetes 250.00   Refills:  0       insulin pump infusion        Date last updated:  2/4/15 [employer] Minimed: Model 723 BASAL RATES and times: 12   AM  (midnight): 0.9 units/hour   9    PM: 0.8 units/hour  Basal Pattern A:  Flora Acuna will use when N/A. CARB RATIO and times: 12   AM (midnight): 13.0 4     PM: 10 Corection Factor (Sensitivity) and times: 12   AM (midnight): 55 mg/dL BLOOD GLUCOSE TARGET and times: 12   AM (midnight): 100 - 150 7     AM:  100 - 120 9    PM:  100 - 150  Active Insulin Time:  3 hours Sensor:  No Carelink / Diasend username:  elias Carelink / Diasend Password:  [email or web address]   Refills:  0       Ketone Test Strp   Used for:  Diabetes mellitus type 1 (H)        Use as directed   Quantity:  50 strip   Refills:  4       Lancet Devices Misc        Refills:  0       LIFESCAN FINEPOINT LANCETS Misc        TEST FOUR TIMES DAILY   Refills:  0                Protect others around you: Learn how to safely use, store and throw away your medicines at www.disposemymeds.org.         Follow-ups after your visit         Care Instructions        Further instructions from your care team       Home number on file [phone] (home)  Is it ok to leave a message at this number(s)? Yes    Dr. Melendrez completed your procedure on 6/28/2017.    Current Pain Level (0-10 Scale): 4/10  Post Pain Level (0-10):  3/10    Radiology Discharge instructions for Steroid Injection    Activity Level:     Do not do any heavy activity or exercise for 24 hours.   Do not drive for 4 hours after your injection.  Diet:   Return to your normal diet.  Medications:   If you have stopped taking your Aspirin, Coumadin/Warfarin, Ibuprofen, or any   other blood thinner for this procedure you may resume in the morning unless   your primary care provider has given you other instructions.    Diabetics may see an increase in blood sugar after steroid injections. If you are concerned about your blood sugar, please contact your family doctor.    Site Care:  Remove the bandage and bathe or shower the morning after the procedure.      Please allow two weeks to experience improvement in  "your pain.  If you have any further issues, please contact your provider.    Call your Primary Care Provider if you have the following (if your primary care provider is not available please seek emergency care):   Nausea with vomiting   Severe headache   Drowsiness or confusion   Redness or drainage at the injection or puncture site   Temperature over 101 degrees F   Other concerns   Worsening back pain   Stiff neck       Additional Information About Your Visit        DreamDryhart Information     DreamDryhart lets you send messages to your doctor, view your test results, renew your prescriptions, schedule appointments and more. To sign up, go to [email or web address] . Click on \"Log in\" on the left side of the screen, which will take you to the Welcome page. Then click on \"Sign up Now\" on the right side of the page.     You will be asked to enter the access code listed below, as well as some personal information. Please follow the directions to create your username and password.     Your access code is: [email or web address]  Expires: 2017  2:42 PM     Your access code will  in 90 days. If you need help or a new code, please call your [facility] clinic or [phone].        Care EveryWhere ID     This is your Care EveryWhere ID. This could be used by other organizations to access your [facility] medical records  [id number]         Primary Care Provider Office Phone # Fax #    Salvador Mejia [phone]      Equal Access to Services     [facility]: Hadii leola fragoso hadasho Soomaali, waaxda luqadaha, qaybta kaalmada adeyuliayada, fracisco jain . So [facility] [phone].    ATENCIÓN: Si habla español, tiene a conley disposición servicios gratuitos de asistencia lingüística. Gisell al [phone].    We comply with applicable federal civil rights laws and [place] laws. We do not discriminate on the basis of race, color, national origin, age, disability sex, sexual orientation or gender " identity.            Thank you!     Thank you for choosing [facility] for your care. Our goal is always to provide you with excellent care. Hearing back from our patients is one way we can continue to improve our services. Please take a few minutes to complete the written survey that you may receive in the mail after you visit with us. Thank you!             Medication List: This is a list of all your medications and when to take them. Check marks below indicate your daily home schedule. Keep this list as a reference.      Medications           Morning Afternoon Evening Bedtime As Needed    acetaminophen 325 MG tablet   Commonly known as:  TYLENOL   Take 650 mg by mouth                                albuterol 108 (90 BASE) MCG/ACT Inhaler   Commonly known as:  PROAIR HFA/PROVENTIL HFA/VENTOLIN HFA   Inhale 2 puffs into the lungs                                anastrozole 1 MG tablet   Commonly known as:  ARIMIDEX                                baclofen 10 MG tablet   Commonly known as:  LIORESAL   2 tabs in the morning, 1 tab mid day and 2 tabs at night.                                CATHY CONTOUR NEXT test strip   Test blood sugars four times per day.  Type 2 Diabetes 250.00   Generic drug:  blood glucose monitoring                                calcium carbonate 1250 MG tablet   Commonly known as:  OS-BUDDY 500 mg [place]. [place]   Take 500 mg by mouth daily                                clindamycin 300 MG capsule   Commonly known as:  CLEOCIN   Take 300 mg by mouth as needed (Take 3 capsules by mouth before dental procedure.)                                famotidine 20 MG tablet   Commonly known as:  PEPCID                                furosemide 40 MG tablet   Commonly known as:  LASIX   TAKE 1 TABLET BY MOUTH DAILY                                GLUCAGON EMERGENCY 1 MG kit   Inject 1 mg Subcutaneous once   Generic drug:  glucagon                                * INSULIN PUMP - OUTPATIENT                                 * insulin aspart 100 UNITS/ML injection   Commonly known as:  NovoLOG VIAL   To be used with the insulin pump  TDD: 40 units                                insulin pump infusion   Date last updated:  2/4/15 Medtronic Minimed: Model 723 BASAL RATES and times: 12   AM (midnight): 0.9 units/hour   9    PM: 0.8 units/hour  Basal Pattern A:  Flora Acuna will use when N/A. CARB RATIO and times: 12   AM (midnight): 13.0 4     PM: 10 Corection Factor (Sensitivity) and times: 12   AM (midnight): 55 mg/dL BLOOD GLUCOSE TARGET and times: 12   AM (midnight): 100 - 150 7     AM:  100 - 120 9    PM:  100 - 150  Active Insulin Time:  3 hours Sensor:  No Carelink / Diasend username:  [email or web address] Carelink / Diasend Password:  [email or web address]                                Ketone Test Strp   Use as directed                                Lancet Devices Misc                                levothyroxine 75 MCG tablet   Commonly known as:  SYNTHROID/LEVOTHROID   TAKE 1 TABLET BY MOUTH DAILY                                [employer] LANCETS Misc   TEST FOUR TIMES DAILY                                * metoprolol 50 MG tablet   Commonly known as:  LOPRESSOR                                * metoprolol 50 MG tablet   Commonly known as:  LOPRESSOR   TAKE 1 TABLET BY MOUTH TWICE DAILY                                NITROSTAT 0.4 MG sublingual tablet   Place 0.4 mg under the tongue   Generic drug:  nitroGLYcerin                                potassium chloride SA 10 MEQ CR tablet   Commonly known as:  K-DUR/KLOR-CON M   TAKE 2 TABLETS BY MOUTH DAILY                                priLOSEC 20 MG CR capsule   Take 20 mg by mouth   Generic drug:  omeprazole                                * RAMIPRIL PO   Take 10 mg by mouth daily                                * ramipril 10 MG capsule   Commonly known as:  ALTACE   TAKE 1 CAPSULE BY MOUTH EVERY DAY                                * SIMVASTATIN PO   Take 40 mg by mouth At Bedtime                                 * simvastatin 40 MG tablet   Commonly known as:  ZOCOR   TAKE 1 TABLET BY MOUTH AT BEDTIME                                * VITAMIN D (CHOLECALCIFEROL) PO   Take 5,000 Units by mouth daily                                * cholecalciferol 5000 UNITS Tabs tablet   Commonly known as:  vitamin D3   Take 5,000 mg by mouth                                * Notice:  This list has 10 medication(s) that are the same as other medications prescribed for you. Read the directions carefully, and ask your doctor or other care provider to review them with you.

## 2017-06-28 NOTE — ED PROVIDER NOTES
"  History     Chief Complaint   Patient presents with     Allergic Reaction     Was  having stroid injection in DI, received 6ml of dye and starting having difficulty breathing     HPI  Flora Acuna is a 72 year old female with above hx.  Apparently was having an JAYLEN with a steroid prep because of previous issues with contrast.  Since she was having localization before injection today, she received some contrast before the actual steroid injection.  By the time she left the Imaging dep't she had developed some pruritic tightness in her throat but without other respiratory or systemic symptoms.     I have reviewed the Medications, Allergies, Past Medical and Surgical History, and Social History in the Epic system.  Review of Systems   Constitutional: Positive for activity change.   HENT: Positive for congestion, facial swelling, rhinorrhea, sore throat, trouble swallowing and voice change.    Eyes: Negative.    Respiratory: Negative.    Cardiovascular: Negative.    Gastrointestinal: Negative.    Skin: Positive for color change.   Neurological: Negative.      Physical Exam   BP: (!) 189/97  Heart Rate: 95  Temp: 97.7  F (36.5  C)  Resp: 24  Height: 157.5 cm (5' 2\")  SpO2: 100 %  Physical Exam   Constitutional: She is oriented to person, place, and time. She appears well-developed and well-nourished. No distress.   HENT:   Head: Normocephalic and atraumatic.   Right Ear: External ear normal.   Left Ear: External ear normal.   Mouth/Throat: No oropharyngeal exudate.   Eyes: Conjunctivae and EOM are normal. Pupils are equal, round, and reactive to light.   Neck: Normal range of motion. Neck supple. No JVD present. No tracheal deviation present. No thyromegaly present.   Cardiovascular: Normal rate.    Pulmonary/Chest: Effort normal. No respiratory distress. She has no wheezes. She has no rales.   Abdominal: Soft.   Musculoskeletal: Normal range of motion.   Neurological: She is alert and oriented to person, place, " and time.   Skin: Skin is warm. She is not diaphoretic.     ED Course     ED Course     Procedures  Critical Care time:  none    Labs Ordered and Resulted from Time of ED Arrival Up to the Time of Departure from the ED   GLUCOSE BY METER - Abnormal; Notable for the following:        Result Value    Glucose 313 (*)     All other components within normal limits     Assessments & Plan (with Medical Decision Making)   Flora had a mild upper respiratory mucous membraine allergic response so epi 0.3mg SC given, IV placed for benadry 25mg + pepcid 20mg IV for antihistaminic effect along with solumedrol 62.5mg IV and racemic epi neb all with salutary effect as Flora felt improved and ready to go home.   I have reviewed the nursing notes.    I have reviewed the findings, diagnosis, plan and need for follow up with the patient.       New Prescriptions    No medications on file       Final diagnoses:   Allergic reaction, initial encounter       6/28/2017   [facility]     Alexander Rubalcava MD  06/28/17 5101

## 2017-06-28 NOTE — ED NOTES
"Pt brought over from DI. Pt had recieved an injection in her back. Pt had known allergies to medication given but was given a steriod preinjection. Injection was at 1200. Pt suddenly developed \"My throat is swollen and I can't talk\" and anxiety. Pt brought over from DI to ER and pt hooked onto monitor/iv started.  "

## 2017-06-28 NOTE — ED NOTES
Will hold remaining 62.5 mg Solumedrol / order of Dr. Rubalcava. Pt did receive 62.5mg IV solumedrol.

## 2017-06-28 NOTE — ED NOTES
Dr. Rubalcava notified of prior steroid injections and the Pt was given 62.5mg of Solumedrol and would wait for further orders if he wished for the full 125mg to be given.

## 2017-06-28 NOTE — ED NOTES
Dr. Rubalcava notified of pt's arrival, allergic Rx from IV contrast given in DI prior to admit to the ED. Pt stating sensation of throat closing. Sats 100%. Order for Epinephrine 0.3 cc Subq, Benadryl 50mg IV, and Solumedrol 125mg IV. ([facility] / phone)

## 2017-06-28 NOTE — ED NOTES
Pt stated she understood discharge instructions. Pt trying to find a friend to pick her up. Pt informed she may have to wait in lobby for friend if ER space needed. Pt stated she understood.

## 2017-06-28 NOTE — ED NOTES
"Pt stated she had a oral steroid \"8mg 12 hours before the procedure and 8mg 2 hours before the procedure\". Pt was placed on O2 by RN avtar intial sat monitor done . Color at that time was pink with brisk cap refill. O2 shut off, sats 100 % on 4 lpm nc.  "

## 2017-06-28 NOTE — ED NOTES
Solumedrol 125 being given. Pt stating that she had taken a steroid prep, 8mg of a steroid 12 hours prior toprocedure and 2 hours prior to procedure. Then states she was given a steroid injection into her back. Gave Solumedrol 62.5 mg and stopped to confirm with Dr. Rubalcava he wished to have the full 125 mg given due to prior steroids given.

## 2017-06-28 NOTE — ED NOTES
Pt still talking in soft voice, periodically coughing/clearing throat. RR 20, sat on room air 92-95%. States feels sleepy after medications given.

## 2017-06-28 NOTE — IP AVS SNAPSHOT
[facility]    [address] [place] [id number]    Phone:  [phone]    Fax:  [phone]                                       After Visit Summary   6/28/2017    Flora Acuna    MRN: [id number]           After Visit Summary Signature Page     I have received my discharge instructions, and my questions have been answered. I have discussed any challenges I see with this plan with the nurse or doctor.    ..........................................................................................................................................  Patient/Patient Representative Signature      ..........................................................................................................................................  Patient Representative Print Name and Relationship to Patient    ..................................................               ................................................  Date                                            Time    ..........................................................................................................................................  Reviewed by Signature/Title    ...................................................              ..............................................  Date                                                            Time

## 2017-06-28 NOTE — DISCHARGE INSTRUCTIONS
Home number on file [phone] (home)  Is it ok to leave a message at this number(s)? Yes    Dr. Melendrez completed your procedure on 6/28/2017.    Current Pain Level (0-10 Scale): 4/10  Post Pain Level (0-10):  3/10    Radiology Discharge instructions for Steroid Injection    Activity Level:     Do not do any heavy activity or exercise for 24 hours.   Do not drive for 4 hours after your injection.  Diet:   Return to your normal diet.  Medications:   If you have stopped taking your Aspirin, Coumadin/Warfarin, Ibuprofen, or any   other blood thinner for this procedure you may resume in the morning unless   your primary care provider has given you other instructions.    Diabetics may see an increase in blood sugar after steroid injections. If you are concerned about your blood sugar, please contact your family doctor.    Site Care:  Remove the bandage and bathe or shower the morning after the procedure.      Please allow two weeks to experience improvement in your pain.  If you have any further issues, please contact your provider.    Call your Primary Care Provider if you have the following (if your primary care provider is not available please seek emergency care):   Nausea with vomiting   Severe headache   Drowsiness or confusion   Redness or drainage at the injection or puncture site   Temperature over 101 degrees F   Other concerns   Worsening back pain   Stiff neck

## 2017-06-28 NOTE — ED AVS SNAPSHOT
[facility]    [address] [address]    Phone:  [phone]                                       Flora Acuna   MRN: [id number]    Department:  [facility]   Date of Visit:  6/28/2017           Patient Information     Date Of Birth          1945        Your diagnoses for this visit were:     Allergic reaction, initial encounter        You were seen by Alexander Rubalcava MD.      Follow-up Information     Follow up with Salvador Mejia MD.    Specialty:  Family Practice    Why:  As needed    Contact information:    [facility]  [address] [place] [id number]  [phone]          Discharge Instructions         Generalized Allergic Reaction (Other)  You are having an allergic reaction. Almost anything can cause one. Different people are allergic to different things. It is usually something that you ate or swallowed, came into contact with by getting or putting it on your skin or clothes, or something you breathed in the air. This can be very annoying and sometimes scary.  Most of us think of allergic reactions when we have a rash or itchy skin. Symptoms can include:    Rash, hives, redness, welts, blisters    Itching, burning, stinging, pain    Dry, flaky, cracking, scaly skin    Swelling of the face, lips or other parts of the body    Hoarse voice    Trouble swallowing, feeling like your throat is closing    Trouble breathing, wheezing    Nausea, vomiting, diarrhea, stomach cramps    Feeling faint or lightheaded, rapid heart rate  Sometimes the cause may be obvious. However, there are so many things that can cause a reaction that you may not be able to figure out. The most important things to help find your allergen are:    Remembering when it started    What you were doing at the time or just before that    Any activities you were involved in    Any new products or contacts  Here are some common causes, but remember almost anything can cause a reaction,  and you may not even be aware that you came into contact with one of these things.    Dust, mold, pollen    Plants, such aspoison ivy and poison oak are common ones, but there are many others    Animals    Foods such as shrimp, shellfish, peanuts, milk products, gluten, eggs; also colorings, flavorings, additives    Insect bites or stings such as bees, mosquitos, flees, ticks    Medicines such as penicillin, sulfa drugs, amoxicillin, aspirin, ibuprofen; any medicine can cause a reaction    Jewelry such as nickel, gold (new, or something you ve worn for a while including zippers, and buttons)    Latex such as in gloves, clothes, toys, balloons, or some tapes (some people allergic to latex may also have problems with foods like bananas, avocados, kiwi, papaya, or chestnuts)    Lotions, perfumes, cosmetics, soaps, shampoos, skincare products, nail products    Chemicals or dyes in clothing, linen, , hair dyes, soaps, iodine  Many viruses and common colds can cause a rash, but is not an allergic reaction. Sometimes it is hard to tell the difference between allergies, sensitivity and intolerance to something. This is especially true with food. Many things can cause diarrhea, vomiting, stomach cramps, and skin irritation.  Home care    The goal of our treatment is to help relieve the symptoms, and get you feeling better. The rash will usually fade over several days, but can sometimes last a couple of weeks. Over the next couple of days, there may be times when it is gets a little worse, and then better again. Here are some things to do:    If you know what you are allergic to, avoid it because future reactions could be worse than this one.    Avoid tight clothing and anything that heats up your skin (hot showers/baths, direct sunlight) since heat will make itching worse.    An ice pack will relieve local areas of intense itching and redness. Don t put the ice directly on the skin, because it can damage the skin.  You can also ice put it in a plastic bag. Wrap it in something like a thin towel, opal shirt, or cloth, or use a bag of frozen peas.    Oral Benadryl (diphenhydramine) is an antihistamine available at drug and grocery stores. Unless a prescription antihistamine was given, Benadryl may be used to reduce itching if large areas of the skin are involved. It may make you sleepy, so be careful using it in the daytime or when going to school, working, or driving. [NOTE: Do not use Benadryl if you have glaucoma or if you are a man with trouble urinating due to an enlarged prostate.] There are antihistamines that causes less drowsiness and are good alternatives for daytime use. Ask your pharmacist for suggestions.    Do not use Benadryl cream on your skin, because in some people it can cause a further reaction, and make you allergic to Benadryl.    Try not to scratch. This can tear the skin and cause an infection.    Using heat-steam to clean your home, using high-efficiency particulate (HEPA) vacuums and filters, avoiding food and pet triggers, exterminating cockroaches, and frequent house cleaning are a few of the strategies used to decrease allergic reactions.  Follow-up care  Follow up with your healthcare provider, or as advised. If you had a severe reaction today, or if you have had several mild-moderate allergic reactions in the past, ask your doctor about allergy testing to find out what you are allergic to. If your reaction included dizziness, fainting or trouble breathing or swallowing, ask your doctor about carrying injectable epinephrine for home use.  Call 911  Call 911 if any of these occur:    Trouble breathing or swallowing, wheezing    Hoarse voice or trouble speaking    Confused    Very drowsy or trouble awakening    Fainting or loss of consciousness    Rapid heart rate    Low blood pressure    Feeling of doom    Nausea, vomiting, abdominal pain, diarrhea    Vomiting blood, or large amounts of blood in  stool    Seizure  When to seek medical advice  Call your healthcare provider right away if any of these occur:    Spreading areas of itching, redness or swelling    New or worse swelling in the face, eyelids, lips, mouth, throat or tongue    Dizziness, weakness    Signs of infection:    Spreading redness    Increased pain or swelling    Fever (1 degree above your normal temperature) lasting for 24 to 48 hours  Date Last Reviewed: 7/30/2015 2000-2017 [employer]. [address] [place] [address], [place] [address]. All rights reserved. This information is not intended as a substitute for professional medical care. Always follow your healthcare professional's instructions.          Drug Reaction: Allergic  You are having an allergic reaction to a drug you have taken. This causes an itchy rash and sometimes swelling of various parts of the body. It may also cause trouble swallowing or breathing. The rash may take a few hours or up to 2 weeks to go away. In the future, remember to tell your doctor about your allergy to this drug so that drugs of this type won't be used again.  Any medicine can cause an allergic reaction. However, penicillin and related drugs, sulfa drugs, aspirin, ibuprofen, and seizure medicines cause the most allergic reactions. Vaccines may also trigger allergies. People whose parents or siblings have allergies are at a higher risk of developing a drug allergy. Allergy testing may sometimes be required to determine the cause.  Symptoms may occur within minutes, hours, or even weeks after exposure to the drug. It can be a mild or severe reaction, or potentially life threatening. Most of us think of allergic reactions when we have a rash or itchy skin. Symptoms can include:    Rash, hives, redness, welts, blisters    Itching, burning, stinging, pain    Dry, flaky, cracking, scaly skin    Swelling of the face, lips or other parts of the body    Fever.  Sometimes fever is the only symptom of a  drug reaction.  In elderly people, the risk of fever increases with the number of drugs the older person takes.  More severe symptoms include:    Trouble swallowing, feeling like your throat is closing    Trouble breathing, wheezing    Hoarse voice or trouble speaking    Nausea, vomiting, diarrhea, stomach cramps    Feeling faint or lightheaded, rapid heart rate  Home care    The goal of treatment is to help relieve the symptoms, and get you feeling better. Mild to moderate drug reactions usually respond quickly to antihistamines and steroids. The rash will usually fade over several days, but can sometimes last a couple of weeks. Over the next couple of days, there may be times when it is gets a little worse, and then better again. Here are some things to do:    Throw the drug away and do not take it again. The next reaction may be much worse.    Add this drug reaction to your electronic medical record.    When getting a new medicine, always tell the healthcare provider that you are allergic to this drug. Make certain they write it down in your medical record.    Avoid tight clothing and anything that heats up your skin (hot showers/baths, direct sunlight) since heat will make itching worse.    An ice pack (ice cubes in a plastic bag, wrapped in a thin towel, or a frozen bag of peas) will relieve local areas of intense itching and redness.  Don't put ice directly on the skin.    Avoid scratching which may worsen the reaction, damage your skin and lead to an infection.    Oral Benadryl (diphenhydramine) is an antihistamine available at drug and grocery stores. Unless a prescription antihistamine was given, Benadryl may be used to reduce itching if large areas of the skin are involved. It may make you sleepy, so be careful using it in the daytime or when going to school, working, or driving. [Note: Do not use Benadryl if you have glaucoma or if you are a man with trouble urinating due to an enlarged prostate.]  There  are other antihistamines that cause less drowsiness and are a good alternative for daytime use. Ask your pharmacist for suggestions.    Do not use Benadryl cream on your skin, because in some people it can cause a further reaction, and make you allergic to Benadryl.    Calamine lotion or oatmeal baths sometimes help with itching.  Follow-up care  Follow up with your healthcare provider, or as advised if your symptoms do not continue to improve or they get worse.  Call 911  Call 911 if any of these occur:    Trouble breathing or swallowing, wheezing    New or worsening swelling in the mouth, throat, or tongue    Hoarse voice or trouble speaking     Fainting or loss of consciousness    Rapid heart rate    Low blood pressure    Feeling of doom    Nausea, vomiting, abdominal pain, diarrhea  When to seek medical advice  Call your healthcare provider right away if any of these occur:    Shortness of breath    Increased swelling in the face, eyelids, or lips    Dizziness, weakness    Continuing or recurring symptoms    Spreading areas of itching, redness or swelling    Signs of infection:    Spreading redness    Increased pain or swelling    Fever (1 degree above your normal temperature) lasting for 24 to 48 hours Or, whatever your healthcare provider told you to report based on your medical condition    Colored fluid draining from the inflamed area  Date Last Reviewed: 7/30/2015 2000-2017 [employer]. [address] [place] [address], [place] [address]. All rights reserved. This information is not intended as a substitute for professional medical care. Always follow your healthcare professional's instructions.      Flora,   You had some allergic reaction with the hoarseness and itchiness in your throat.  This was anticipated in part so the prep you received before must have helped keep it from getting more serious.   With the epinephrine, antihistamines in the form of benadryl and pepcid, partial dose of  steroids, and the specially formulated nebulization treatment hopefully have stopped the allergic reaction.  Presumably you should feel better over the next couple days.  Hopefully, the disk injections will work making all of this worthwhile.  Good luck!       Review of your medicines      Our records show that you are taking the medicines listed below. If these are incorrect, please call your family doctor or clinic.        Dose / Directions Last dose taken    acetaminophen 325 MG tablet   Commonly known as:  TYLENOL   Dose:  650 mg        Take 650 mg by mouth   Refills:  0        albuterol 108 (90 BASE) MCG/ACT Inhaler   Commonly known as:  PROAIR HFA/PROVENTIL HFA/VENTOLIN HFA   Dose:  2 puff        Inhale 2 puffs into the lungs   Refills:  0        baclofen 10 MG tablet   Commonly known as:  LIORESAL        2 tabs in the morning, 1 tab mid day and 2 tabs at night.   Refills:  0        CATHY CONTOUR NEXT test strip   Generic drug:  blood glucose monitoring        Test blood sugars four times per day.  Type 2 Diabetes 250.00   Refills:  0        calcium carbonate 1250 MG tablet   Commonly known as:  OS-BUDDY 500 mg [place]. [place]   Dose:  500 mg        Take 500 mg by mouth daily   Refills:  0        clindamycin 300 MG capsule   Commonly known as:  CLEOCIN   Dose:  300 mg        Take 300 mg by mouth as needed (Take 3 capsules by mouth before dental procedure.)   Refills:  0        furosemide 40 MG tablet   Commonly known as:  LASIX        TAKE 1 TABLET BY MOUTH DAILY   Refills:  0        GLUCAGON EMERGENCY 1 MG kit   Dose:  1 mg   Quantity:  1 mg   Generic drug:  glucagon        Inject 1 mg Subcutaneous once   Refills:  12        * INSULIN PUMP - OUTPATIENT        Refills:  0        * insulin aspart 100 UNITS/ML injection   Commonly known as:  NovoLOG VIAL   Quantity:  20 mL        To be used with the insulin pump  TDD: 40 units   Refills:  3        insulin pump infusion        Date last updated:  2/4/15 [employer] Minimed:  Model 723 BASAL RATES and times: 12   AM (midnight): 0.9 units/hour   9    PM: 0.8 units/hour  Basal Pattern A:  Flora Acuna will use when N/A. CARB RATIO and times: 12   AM (midnight): 13.0 4     PM: 10 Corection Factor (Sensitivity) and times: 12   AM (midnight): 55 mg/dL BLOOD GLUCOSE TARGET and times: 12   AM (midnight): 100 - 150 7     AM:  100 - 120 9    PM:  100 - 150  Active Insulin Time:  3 hours Sensor:  No Carelink / Diasend username:  [email or web address] Carelink / Diasend Password:  [email or web address]   Refills:  0        Ketone Test Strp   Quantity:  50 strip        Use as directed   Refills:  4        Lancet Devices Misc        Refills:  0        levothyroxine 75 MCG tablet   Commonly known as:  SYNTHROID/LEVOTHROID        TAKE 1 TABLET BY MOUTH DAILY   Refills:  0        LIFESCAN FINEPOINT LANCETS Misc        TEST FOUR TIMES DAILY   Refills:  0        * metoprolol 50 MG tablet   Commonly known as:  LOPRESSOR        2 times daily   Refills:  3        * metoprolol 50 MG tablet   Commonly known as:  LOPRESSOR        TAKE 1 TABLET BY MOUTH TWICE DAILY   Refills:  0        NITROSTAT 0.4 MG sublingual tablet   Dose:  0.4 mg   Generic drug:  nitroGLYcerin        Place 0.4 mg under the tongue   Refills:  0        potassium chloride SA 10 MEQ CR tablet   Commonly known as:  K-DUR/KLOR-CON M        TAKE 2 TABLETS BY MOUTH DAILY   Refills:  0        priLOSEC 20 MG CR capsule   Dose:  20 mg   Generic drug:  omeprazole        Take 20 mg by mouth 2 times daily   Refills:  0        ramipril 10 MG capsule   Commonly known as:  ALTACE        TAKE 1 CAPSULE BY MOUTH twice a day   Refills:  0        simvastatin 40 MG tablet   Commonly known as:  ZOCOR        TAKE 1 TABLET BY MOUTH AT BEDTIME   Refills:  0        * VITAMIN D (CHOLECALCIFEROL) PO   Dose:  5000 Units        Take 5,000 Units by mouth daily   Refills:  0        * cholecalciferol 5000 UNITS Tabs tablet   Commonly known as:  vitamin D3   Dose:  5000 mg        Take 5,000 mg  "by mouth   Refills:  0        * Notice:  This list has 6 medication(s) that are the same as other medications prescribed for you. Read the directions carefully, and ask your doctor or other care provider to review them with you.            Procedures and tests performed during your visit     Glucose by meter      Orders Needing Specimen Collection     None      Pending Results     No orders found from 2017 to 2017.            Pending Culture Results     No orders found from 2017 to 2017.            Thank you for choosing [facility]       Thank you for choosing [facility] for your care. Our goal is always to provide you with excellent care. Hearing back from our patients is one way we can continue to improve our services. Please take a few minutes to complete the written survey that you may receive in the mail after you visit with us. Thank you!        [employer] Information     [employer] lets you send messages to your doctor, view your test results, renew your prescriptions, schedule appointments and more. To sign up, go to [email or web address] . Click on \"Log in\" on the left side of the screen, which will take you to the Welcome page. Then click on \"Sign up Now\" on the right side of the page.     You will be asked to enter the access code listed below, as well as some personal information. Please follow the directions to create your username and password.     Your access code is: [email or web address]  Expires: 2017  2:42 PM     Your access code will  in 90 days. If you need help or a new code, please call your [facility] clinic or [phone].        Care EveryWhere ID     This is your Care EveryWhere ID. This could be used by other organizations to access your [facility] medical records  [id number]        Equal Access to Services     LANA GARCIA : romana Loya qaybta kaalmada adeegyada, waxay idiin hayaan adeeg kharash la'aan ah. So [facility] [phone].    ATENCIÓN: " Si habla bakari, tiene a conley disposición servicios gratuitos de asistencia lingüística. Llame al [phone].    We comply with applicable federal civil rights laws and [place] laws. We do not discriminate on the basis of race, color, national origin, age, disability sex, sexual orientation or gender identity.            After Visit Summary       This is your record. Keep this with you and show to your community pharmacist(s) and doctor(s) at your next visit.

## 2017-06-28 NOTE — PROGRESS NOTES
Injection Documentation of Provider History    PER-PROVIDER HISTORY, Dr. VELEZ   Is this for treatment of acute herpes zoster, post herpetic neuralgia, post-decompressive radiculitis, or post-surgical scarring?   No  Does the patient have radiculopathy or sciatica?  Yes  How long has the patient had any physical therapy, home therapy or chiropractor care?  O weeks.  FUNCTIONAL LOSS/WALKER  How long has the patient taken NSaids or muscle relaxants?  2 weeks.  IBUPROFREN 800/TYLENOL  Is there any history of systemic/local infection or unstable medical conditions?  No

## 2017-06-28 NOTE — DISCHARGE INSTRUCTIONS
Generalized Allergic Reaction (Other)  You are having an allergic reaction. Almost anything can cause one. Different people are allergic to different things. It is usually something that you ate or swallowed, came into contact with by getting or putting it on your skin or clothes, or something you breathed in the air. This can be very annoying and sometimes scary.  Most of us think of allergic reactions when we have a rash or itchy skin. Symptoms can include:    Rash, hives, redness, welts, blisters    Itching, burning, stinging, pain    Dry, flaky, cracking, scaly skin    Swelling of the face, lips or other parts of the body    Hoarse voice    Trouble swallowing, feeling like your throat is closing    Trouble breathing, wheezing    Nausea, vomiting, diarrhea, stomach cramps    Feeling faint or lightheaded, rapid heart rate  Sometimes the cause may be obvious. However, there are so many things that can cause a reaction that you may not be able to figure out. The most important things to help find your allergen are:    Remembering when it started    What you were doing at the time or just before that    Any activities you were involved in    Any new products or contacts  Here are some common causes, but remember almost anything can cause a reaction, and you may not even be aware that you came into contact with one of these things.    Dust, mold, pollen    Plants, such aspoison ivy and poison oak are common ones, but there are many others    Animals    Foods such as shrimp, shellfish, peanuts, milk products, gluten, eggs; also colorings, flavorings, additives    Insect bites or stings such as bees, mosquitos, flees, ticks    Medicines such as penicillin, sulfa drugs, amoxicillin, aspirin, ibuprofen; any medicine can cause a reaction    Jewelry such as nickel, gold (new, or something you ve worn for a while including zippers, and buttons)    Latex such as in gloves, clothes, toys, balloons, or some tapes (some people  allergic to latex may also have problems with foods like bananas, avocados, kiwi, papaya, or chestnuts)    Lotions, perfumes, cosmetics, soaps, shampoos, skincare products, nail products    Chemicals or dyes in clothing, linen, , hair dyes, soaps, iodine  Many viruses and common colds can cause a rash, but is not an allergic reaction. Sometimes it is hard to tell the difference between allergies, sensitivity and intolerance to something. This is especially true with food. Many things can cause diarrhea, vomiting, stomach cramps, and skin irritation.  Home care    The goal of our treatment is to help relieve the symptoms, and get you feeling better. The rash will usually fade over several days, but can sometimes last a couple of weeks. Over the next couple of days, there may be times when it is gets a little worse, and then better again. Here are some things to do:    If you know what you are allergic to, avoid it because future reactions could be worse than this one.    Avoid tight clothing and anything that heats up your skin (hot showers/baths, direct sunlight) since heat will make itching worse.    An ice pack will relieve local areas of intense itching and redness. Don t put the ice directly on the skin, because it can damage the skin. You can also ice put it in a plastic bag. Wrap it in something like a thin towel, opal shirt, or cloth, or use a bag of frozen peas.    Oral Benadryl (diphenhydramine) is an antihistamine available at drug and grocery stores. Unless a prescription antihistamine was given, Benadryl may be used to reduce itching if large areas of the skin are involved. It may make you sleepy, so be careful using it in the daytime or when going to school, working, or driving. [NOTE: Do not use Benadryl if you have glaucoma or if you are a man with trouble urinating due to an enlarged prostate.] There are antihistamines that causes less drowsiness and are good alternatives for daytime use. Ask  your pharmacist for suggestions.    Do not use Benadryl cream on your skin, because in some people it can cause a further reaction, and make you allergic to Benadryl.    Try not to scratch. This can tear the skin and cause an infection.    Using heat-steam to clean your home, using high-efficiency particulate (HEPA) vacuums and filters, avoiding food and pet triggers, exterminating cockroaches, and frequent house cleaning are a few of the strategies used to decrease allergic reactions.  Follow-up care  Follow up with your healthcare provider, or as advised. If you had a severe reaction today, or if you have had several mild-moderate allergic reactions in the past, ask your doctor about allergy testing to find out what you are allergic to. If your reaction included dizziness, fainting or trouble breathing or swallowing, ask your doctor about carrying injectable epinephrine for home use.  Call 911  Call 911 if any of these occur:    Trouble breathing or swallowing, wheezing    Hoarse voice or trouble speaking    Confused    Very drowsy or trouble awakening    Fainting or loss of consciousness    Rapid heart rate    Low blood pressure    Feeling of doom    Nausea, vomiting, abdominal pain, diarrhea    Vomiting blood, or large amounts of blood in stool    Seizure  When to seek medical advice  Call your healthcare provider right away if any of these occur:    Spreading areas of itching, redness or swelling    New or worse swelling in the face, eyelids, lips, mouth, throat or tongue    Dizziness, weakness    Signs of infection:    Spreading redness    Increased pain or swelling    Fever (1 degree above your normal temperature) lasting for 24 to 48 hours  Date Last Reviewed: 7/30/2015 2000-2017 [employer]. [address] [place] [address], [place] [address]. All rights reserved. This information is not intended as a substitute for professional medical care. Always follow your healthcare professional's  instructions.          Drug Reaction: Allergic  You are having an allergic reaction to a drug you have taken. This causes an itchy rash and sometimes swelling of various parts of the body. It may also cause trouble swallowing or breathing. The rash may take a few hours or up to 2 weeks to go away. In the future, remember to tell your doctor about your allergy to this drug so that drugs of this type won't be used again.  Any medicine can cause an allergic reaction. However, penicillin and related drugs, sulfa drugs, aspirin, ibuprofen, and seizure medicines cause the most allergic reactions. Vaccines may also trigger allergies. People whose parents or siblings have allergies are at a higher risk of developing a drug allergy. Allergy testing may sometimes be required to determine the cause.  Symptoms may occur within minutes, hours, or even weeks after exposure to the drug. It can be a mild or severe reaction, or potentially life threatening. Most of us think of allergic reactions when we have a rash or itchy skin. Symptoms can include:    Rash, hives, redness, welts, blisters    Itching, burning, stinging, pain    Dry, flaky, cracking, scaly skin    Swelling of the face, lips or other parts of the body    Fever.  Sometimes fever is the only symptom of a drug reaction.  In elderly people, the risk of fever increases with the number of drugs the older person takes.  More severe symptoms include:    Trouble swallowing, feeling like your throat is closing    Trouble breathing, wheezing    Hoarse voice or trouble speaking    Nausea, vomiting, diarrhea, stomach cramps    Feeling faint or lightheaded, rapid heart rate  Home care    The goal of treatment is to help relieve the symptoms, and get you feeling better. Mild to moderate drug reactions usually respond quickly to antihistamines and steroids. The rash will usually fade over several days, but can sometimes last a couple of weeks. Over the next couple of days, there  may be times when it is gets a little worse, and then better again. Here are some things to do:    Throw the drug away and do not take it again. The next reaction may be much worse.    Add this drug reaction to your electronic medical record.    When getting a new medicine, always tell the healthcare provider that you are allergic to this drug. Make certain they write it down in your medical record.    Avoid tight clothing and anything that heats up your skin (hot showers/baths, direct sunlight) since heat will make itching worse.    An ice pack (ice cubes in a plastic bag, wrapped in a thin towel, or a frozen bag of peas) will relieve local areas of intense itching and redness.  Don't put ice directly on the skin.    Avoid scratching which may worsen the reaction, damage your skin and lead to an infection.    Oral Benadryl (diphenhydramine) is an antihistamine available at drug and grocery stores. Unless a prescription antihistamine was given, Benadryl may be used to reduce itching if large areas of the skin are involved. It may make you sleepy, so be careful using it in the daytime or when going to school, working, or driving. [Note: Do not use Benadryl if you have glaucoma or if you are a man with trouble urinating due to an enlarged prostate.]  There are other antihistamines that cause less drowsiness and are a good alternative for daytime use. Ask your pharmacist for suggestions.    Do not use Benadryl cream on your skin, because in some people it can cause a further reaction, and make you allergic to Benadryl.    Calamine lotion or oatmeal baths sometimes help with itching.  Follow-up care  Follow up with your healthcare provider, or as advised if your symptoms do not continue to improve or they get worse.  Call 911  Call 911 if any of these occur:    Trouble breathing or swallowing, wheezing    New or worsening swelling in the mouth, throat, or tongue    Hoarse voice or trouble speaking     Fainting or loss  of consciousness    Rapid heart rate    Low blood pressure    Feeling of doom    Nausea, vomiting, abdominal pain, diarrhea  When to seek medical advice  Call your healthcare provider right away if any of these occur:    Shortness of breath    Increased swelling in the face, eyelids, or lips    Dizziness, weakness    Continuing or recurring symptoms    Spreading areas of itching, redness or swelling    Signs of infection:    Spreading redness    Increased pain or swelling    Fever (1 degree above your normal temperature) lasting for 24 to 48 hours Or, whatever your healthcare provider told you to report based on your medical condition    Colored fluid draining from the inflamed area  Date Last Reviewed: 7/30/2015 2000-2017 [employer]. [address] [place] [address]. All rights reserved. This information is not intended as a substitute for professional medical care. Always follow your healthcare professional's instructions.      Flora,   You had some allergic reaction with the hoarseness and itchiness in your throat.  This was anticipated in part so the prep you received before must have helped keep it from getting more serious.   With the epinephrine, antihistamines in the form of benadryl and pepcid, partial dose of steroids, and the specially formulated nebulization treatment hopefully have stopped the allergic reaction.  Presumably you should feel better over the next couple days.  Hopefully, the disk injections will work making all of this worthwhile.  Good luck!

## 2017-06-28 NOTE — PROGRESS NOTES
Verified post-procedural status.  There were no complicationsand patient has no symptoms..  The patient had no questions or concerns.Patient reports pain scale of 3/10, and No bleeding.      UPON DISCHARGE, PATIENT HAD THROAT IRRITATION AND WAS ANXIOUS ABOUT THIS.  SHE KEEPS CLEARING HER THROAT AND FELT HER FACE PUFFY AND THROAT MAYBE CLOSING.  RADIOLOGY RN IQRA AND DIONICIO ACCOMPANIED PATIENT TO ER; REPORT GIVEN TO DODIE LE NOTIFIED AND STAYED WITH PATIENT UNTIL ON WAY TO ER WITH RADIOLOGY RN & ANESTHESIA.

## 2017-06-28 NOTE — ED AVS SNAPSHOT
[facility]    [address]    [place] [address]    Phone:  [phone]                                       Flora Acuna   MRN: [id number]    Department:  [facility]   Date of Visit:  6/28/2017           After Visit Summary Signature Page     I have received my discharge instructions, and my questions have been answered. I have discussed any challenges I see with this plan with the nurse or doctor.    ..........................................................................................................................................  Patient/Patient Representative Signature      ..........................................................................................................................................  Patient Representative Print Name and Relationship to Patient    ..................................................               ................................................  Date                                            Time    ..........................................................................................................................................  Reviewed by Signature/Title    ...................................................              ..............................................  Date                                                            Time

## 2017-08-15 LAB — HBA1C MFR BLD: 7.7 % (ref 0–5.7)

## 2017-08-22 ENCOUNTER — TELEPHONE (OUTPATIENT)
Dept: WOUND CARE | Facility: OTHER | Age: 72
End: 2017-08-22

## 2017-08-22 NOTE — TELEPHONE ENCOUNTER
Called Flora.      Explained the following    Morning of surgery:  1. Check your blood sugar.   If it's <70--correct it with 16 grams of glucose (4 of them) and recheck 15 minutes later. Also, decrease using your temp basal--6 hours, 75%.   If your blood sugar is normal--just leave it  If your blood sugar is high--correct it. Check your blood 2 hours after correction. If your blood sugar is low after a correction--correct it with glucose tablets and use temp basal (6 hours, 75%)     2. Check your blood sugar before surgery  If it's low <70, tell them and correct it. Decrease your basal using the temp basal (4 hours, 50%)    3. After surgery  Check your blood sugar.   If you have any carb food/drink--okay to using your bolus      Please call if any issues.      Follow up in one month.      Kerri Elliott RN FNP-BC  Disease Management

## 2017-08-22 NOTE — TELEPHONE ENCOUNTER
Patient is having surgery on Thursday and she would like to speak to Kerri about that and her insulin pump.

## 2017-09-05 ENCOUNTER — HOSPITAL ENCOUNTER (EMERGENCY)
Facility: HOSPITAL | Age: 72
Discharge: HOME | End: 2017-09-05
Attending: FAMILY MEDICINE | Admitting: FAMILY MEDICINE
Payer: MEDICARE

## 2017-09-05 VITALS
DIASTOLIC BLOOD PRESSURE: 72 MMHG | HEART RATE: 91 BPM | TEMPERATURE: 98.2 F | OXYGEN SATURATION: 98 % | SYSTOLIC BLOOD PRESSURE: 104 MMHG | RESPIRATION RATE: 16 BRPM

## 2017-09-05 DIAGNOSIS — R42 LIGHTHEADEDNESS: ICD-10-CM

## 2017-09-05 LAB
ALBUMIN SERPL-MCNC: 3.4 G/DL (ref 3.4–5)
ALBUMIN UR-MCNC: NEGATIVE MG/DL
ALP SERPL-CCNC: 36 U/L (ref 40–150)
ALT SERPL W P-5'-P-CCNC: 26 U/L (ref 0–50)
ANION GAP SERPL CALCULATED.3IONS-SCNC: 9 MMOL/L (ref 3–14)
APPEARANCE UR: CLEAR
AST SERPL W P-5'-P-CCNC: 22 U/L (ref 0–45)
BASOPHILS # BLD AUTO: 0 10E9/L (ref 0–0.2)
BASOPHILS NFR BLD AUTO: 0.5 %
BILIRUB SERPL-MCNC: 0.9 MG/DL (ref 0.2–1.3)
BILIRUB UR QL STRIP: NEGATIVE
BUN SERPL-MCNC: 38 MG/DL (ref 7–30)
CALCIUM SERPL-MCNC: 10 MG/DL (ref 8.5–10.1)
CHLORIDE SERPL-SCNC: 91 MMOL/L (ref 94–109)
CO2 SERPL-SCNC: 34 MMOL/L (ref 20–32)
COLOR UR AUTO: YELLOW
CREAT SERPL-MCNC: 1.28 MG/DL (ref 0.52–1.04)
CRP SERPL-MCNC: <2.9 MG/L (ref 0–8)
D DIMER PPP DDU-MCNC: 762 NG/ML D-DU (ref 0–300)
DEPRECATED S PYO AG THROAT QL EIA: NORMAL
DIFFERENTIAL METHOD BLD: NORMAL
EOSINOPHIL # BLD AUTO: 0.2 10E9/L (ref 0–0.7)
EOSINOPHIL NFR BLD AUTO: 1.9 %
ERYTHROCYTE [DISTWIDTH] IN BLOOD BY AUTOMATED COUNT: 11.9 % (ref 10–15)
GFR SERPL CREATININE-BSD FRML MDRD: 41 ML/MIN/1.7M2
GLUCOSE SERPL-MCNC: 212 MG/DL (ref 70–99)
GLUCOSE UR STRIP-MCNC: 30 MG/DL
HCT VFR BLD AUTO: 36.3 % (ref 35–47)
HGB BLD-MCNC: 12.1 G/DL (ref 11.7–15.7)
HGB UR QL STRIP: NEGATIVE
IMM GRANULOCYTES # BLD: 0.1 10E9/L (ref 0–0.4)
IMM GRANULOCYTES NFR BLD: 0.8 %
KETONES UR STRIP-MCNC: NEGATIVE MG/DL
LEUKOCYTE ESTERASE UR QL STRIP: NEGATIVE
LYMPHOCYTES # BLD AUTO: 1.5 10E9/L (ref 0.8–5.3)
LYMPHOCYTES NFR BLD AUTO: 16.5 %
MCH RBC QN AUTO: 30.8 PG (ref 26.5–33)
MCHC RBC AUTO-ENTMCNC: 33.3 G/DL (ref 31.5–36.5)
MCV RBC AUTO: 92 FL (ref 78–100)
MONOCYTES # BLD AUTO: 0.6 10E9/L (ref 0–1.3)
MONOCYTES NFR BLD AUTO: 7 %
NEUTROPHILS # BLD AUTO: 6.5 10E9/L (ref 1.6–8.3)
NEUTROPHILS NFR BLD AUTO: 73.3 %
NITRATE UR QL: NEGATIVE
NRBC # BLD AUTO: 0 10*3/UL
NRBC BLD AUTO-RTO: 0 /100
NT-PROBNP SERPL-MCNC: 242 PG/ML (ref 0–900)
PH UR STRIP: 6.5 PH (ref 4.7–8)
PLATELET # BLD AUTO: 209 10E9/L (ref 150–450)
POTASSIUM SERPL-SCNC: 4.1 MMOL/L (ref 3.4–5.3)
PROT SERPL-MCNC: 7.1 G/DL (ref 6.8–8.8)
RBC # BLD AUTO: 3.93 10E12/L (ref 3.8–5.2)
SODIUM SERPL-SCNC: 134 MMOL/L (ref 133–144)
SOURCE: ABNORMAL
SP GR UR STRIP: 1.01 (ref 1–1.03)
SPECIMEN SOURCE: NORMAL
TROPONIN I SERPL-MCNC: <0.015 UG/L (ref 0–0.04)
UROBILINOGEN UR STRIP-MCNC: NORMAL MG/DL (ref 0–2)
WBC # BLD AUTO: 8.9 10E9/L (ref 4–11)

## 2017-09-05 PROCEDURE — 99284 EMERGENCY DEPT VISIT MOD MDM: CPT | Performed by: FAMILY MEDICINE

## 2017-09-05 PROCEDURE — 81003 URINALYSIS AUTO W/O SCOPE: CPT | Performed by: FAMILY MEDICINE

## 2017-09-05 PROCEDURE — 71020 ZZHC CHEST TWO VIEWS, FRONT/LAT: CPT | Mod: TC

## 2017-09-05 PROCEDURE — 85379 FIBRIN DEGRADATION QUANT: CPT | Performed by: FAMILY MEDICINE

## 2017-09-05 PROCEDURE — 85025 COMPLETE CBC W/AUTO DIFF WBC: CPT | Performed by: FAMILY MEDICINE

## 2017-09-05 PROCEDURE — 84484 ASSAY OF TROPONIN QUANT: CPT | Performed by: FAMILY MEDICINE

## 2017-09-05 PROCEDURE — 25000128 H RX IP 250 OP 636: Performed by: FAMILY MEDICINE

## 2017-09-05 PROCEDURE — 96360 HYDRATION IV INFUSION INIT: CPT

## 2017-09-05 PROCEDURE — 87880 STREP A ASSAY W/OPTIC: CPT | Performed by: FAMILY MEDICINE

## 2017-09-05 PROCEDURE — 93010 ELECTROCARDIOGRAM REPORT: CPT | Performed by: INTERNAL MEDICINE

## 2017-09-05 PROCEDURE — 93005 ELECTROCARDIOGRAM TRACING: CPT

## 2017-09-05 PROCEDURE — A9270 NON-COVERED ITEM OR SERVICE: HCPCS | Mod: GY | Performed by: FAMILY MEDICINE

## 2017-09-05 PROCEDURE — 25000131 ZZH RX MED GY IP 250 OP 636 PS 637: Mod: GY | Performed by: FAMILY MEDICINE

## 2017-09-05 PROCEDURE — 36415 COLL VENOUS BLD VENIPUNCTURE: CPT | Performed by: FAMILY MEDICINE

## 2017-09-05 PROCEDURE — 80053 COMPREHEN METABOLIC PANEL: CPT | Performed by: FAMILY MEDICINE

## 2017-09-05 PROCEDURE — 83880 ASSAY OF NATRIURETIC PEPTIDE: CPT | Performed by: FAMILY MEDICINE

## 2017-09-05 PROCEDURE — 86140 C-REACTIVE PROTEIN: CPT | Performed by: FAMILY MEDICINE

## 2017-09-05 PROCEDURE — 87081 CULTURE SCREEN ONLY: CPT | Performed by: FAMILY MEDICINE

## 2017-09-05 PROCEDURE — 99285 EMERGENCY DEPT VISIT HI MDM: CPT | Mod: 25

## 2017-09-05 RX ORDER — MECLIZINE HYDROCHLORIDE 25 MG/1
25 TABLET ORAL 3 TIMES DAILY PRN
Qty: 90 TABLET | Refills: 0 | Status: SHIPPED | OUTPATIENT
Start: 2017-09-05 | End: 2017-10-24

## 2017-09-05 RX ORDER — MECLIZINE HYDROCHLORIDE 25 MG/1
25 TABLET ORAL ONCE
Status: COMPLETED | OUTPATIENT
Start: 2017-09-05 | End: 2017-09-05

## 2017-09-05 RX ADMIN — SODIUM CHLORIDE 500 ML: 9 INJECTION, SOLUTION INTRAVENOUS at 17:13

## 2017-09-05 RX ADMIN — MECLIZINE HYDROCHLORIDE 25 MG: 25 TABLET ORAL at 15:20

## 2017-09-05 ASSESSMENT — ENCOUNTER SYMPTOMS
APPETITE CHANGE: 0
LIGHT-HEADEDNESS: 1
ACTIVITY CHANGE: 1
NERVOUS/ANXIOUS: 1
WHEEZING: 0
FATIGUE: 1
DIARRHEA: 0
ABDOMINAL PAIN: 0
CONSTIPATION: 0
FEVER: 0
SHORTNESS OF BREATH: 1
MUSCULOSKELETAL NEGATIVE: 1
COUGH: 0
WEAKNESS: 1
DIZZINESS: 0
NAUSEA: 0
VOMITING: 0
DYSURIA: 0

## 2017-09-05 NOTE — ED AVS SNAPSHOT
[facility]    [address] [place] [address]    Phone:  [phone]                                       Flora Acuna   MRN: [id number]    Department:  [facility]   Date of Visit:  9/5/2017           Patient Information     Date Of Birth          1945        Your diagnoses for this visit were:     Lightheadedness        You were seen by Tracee Mae MD.      Follow-up Information     Follow up with Salvador Mejia MD In 2 days.    Specialty:  Family Practice    Why:  Follow up ED visit    Contact information:    [facility]  [address] [facility] [address]  [phone]          Discharge Instructions         Dizziness (Vertigo) and Balance Problems: Staying Safe     Replace burned-out lightbulbs to keep your home safe and well lit.   Falls or accidents can lead to pain, broken bones, and fear of future falls. Protect yourself and others by preparing for episodes. Simple steps can help you stay safe at home and wherever you go.  Lighting  Keep all areas well lit. This helps your eyes send the right signals to the brain. It also makes you less likely to trip and fall. If bright lights make symptoms worse, dim the lights or lie in a dark room until the dizziness passes. Then turn the lights back to their normal level.  Tips:    Keep a flashlight by the bed.    Place nightlights in bathrooms and hallways.    Replace burned-out bulbs, or have someone replace them for you.  Preventing falls  To reduce your risk of falling:    Get out of bed or up from a chair slowly.    Wear low-heeled shoes that fit properly and have slip-resistant soles.    Remove throw rugs. Clear clutter from walkways.    Use handrails on stairs. Have handrails installed or adjusted if needed.    Install grab bars in the bathroom. Don't use towel racks for balance.    Use a shower stool. Also put adhesive strips in the shower or on the tub floor.  Going out  With a little time  and preparation, you can get around safely.  Tips:    Bring a cane or walking aid if needed.    Give yourself plenty of time in case you start to get dizzy.    Ask your healthcare provider what type of exercise is safe for your condition.    Be patient. If an activity such as walking through a crowded shop causes you stress, you may not be ready for it yet.  Driving  If you become dizzy or disoriented while driving, you could hurt yourself and others. That's why it's best to not drive until symptoms have gone away. In some cases, your license may be temporarily held until it's safe for you to drive again.  For safety:    Ask a friend to drive for you.    Take public transportation.    Walk to stores and other places when you can.  Asking for help  Don't be afraid to ask for help running errands, cooking meals, and doing exercise. Whether it's a friend, loved one, neighbor, or stranger on the street, a little help can make a world of difference.   Date Last Reviewed: 11/1/2016 2000-2017 [employer]. [address] [place] [address]. All rights reserved. This information is not intended as a substitute for professional medical care. Always follow your healthcare professional's instructions.          Future Appointments        Provider Department Dept Phone Center    10/24/2017 10:30 AM Kerri Elliott NP [facility] [phone] Hossein Delcid         Review of your medicines      START taking        Dose / Directions Last dose taken    meclizine 25 MG tablet   Commonly known as:  MEDI-MECLIZINE   Dose:  25 mg   Quantity:  90 tablet        Take 1 tablet (25 mg) by mouth 3 times daily as needed for dizziness   Refills:  0          Our records show that you are taking the medicines listed below. If these are incorrect, please call your family doctor or clinic.        Dose / Directions Last dose taken    acetaminophen 325 MG tablet   Commonly known as:  TYLENOL   Dose:  650 mg         Take 650 mg by mouth   Refills:  0        albuterol 108 (90 BASE) MCG/ACT Inhaler   Commonly known as:  PROAIR HFA/PROVENTIL HFA/VENTOLIN HFA   Dose:  2 puff        Inhale 2 puffs into the lungs   Refills:  0        baclofen 10 MG tablet   Commonly known as:  LIORESAL        2 tabs in the morning, 1 tab mid day and 2 tabs at night.   Refills:  0        CATHY CONTOUR NEXT test strip   Generic drug:  blood glucose monitoring        Test blood sugars four times per day.  Type 2 Diabetes 250.00   Refills:  0        calcium carbonate 1250 MG tablet   Commonly known as:  OS-BUDDY 500 mg [place]. Ca   Dose:  500 mg        Take 500 mg by mouth daily   Refills:  0        clindamycin 300 MG capsule   Commonly known as:  CLEOCIN   Dose:  300 mg        Take 300 mg by mouth as needed (Take 3 capsules by mouth before dental procedure.)   Refills:  0        furosemide 40 MG tablet   Commonly known as:  LASIX        TAKE 1 TABLET BY MOUTH DAILY   Refills:  0        GLUCAGON EMERGENCY 1 MG kit   Dose:  1 mg   Quantity:  1 mg   Generic drug:  glucagon        Inject 1 mg Subcutaneous once   Refills:  12        * INSULIN PUMP - OUTPATIENT        Refills:  0        * insulin aspart 100 UNITS/ML injection   Commonly known as:  NovoLOG VIAL   Quantity:  20 mL        To be used with the insulin pump  TDD: 40 units   Refills:  3        insulin pump infusion        Date last updated:  2/4/15 [employer]: Model 723 BASAL RATES and times: 12   AM (midnight): 0.9 units/hour   9    PM: 0.8 units/hour  Basal Pattern A:  Flora Acuna will use when N/A. CARB RATIO and times: 12   AM (midnight): 13.0 4     PM: 10 Corection Factor (Sensitivity) and times: 12   AM (midnight): 55 mg/dL BLOOD GLUCOSE TARGET and times: 12   AM (midnight): 100 - 150 7     AM:  100 - 120 9    PM:  100 - 150  Active Insulin Time:  3 hours Sensor:  No Carelink / Diasend username:  [email or web address] Carelink / Diasend Password:  [email or web address]   Refills:  0        Ketone Test Strp    Quantity:  50 strip        Use as directed   Refills:  4        Lancet Devices Misc        Refills:  0        levothyroxine 75 MCG tablet   Commonly known as:  SYNTHROID/LEVOTHROID        TAKE 1 TABLET BY MOUTH DAILY   Refills:  0        LIFESCAN FINEPOINT LANCETS Misc        TEST FOUR TIMES DAILY   Refills:  0        metoprolol 50 MG tablet   Commonly known as:  LOPRESSOR        2 times daily   Refills:  3        NITROSTAT 0.4 MG sublingual tablet   Dose:  0.4 mg   Generic drug:  nitroGLYcerin        Place 0.4 mg under the tongue   Refills:  0        potassium chloride SA 10 MEQ CR tablet   Commonly known as:  K-DUR/KLOR-CON M        TAKE 2 TABLETS BY MOUTH DAILY   Refills:  0        priLOSEC 20 MG CR capsule   Dose:  20 mg   Generic drug:  omeprazole        Take 20 mg by mouth 2 times daily   Refills:  0        ramipril 10 MG capsule   Commonly known as:  ALTACE        TAKE 1 CAPSULE BY MOUTH twice a day   Refills:  0        simvastatin 40 MG tablet   Commonly known as:  ZOCOR        TAKE 1 TABLET BY MOUTH AT BEDTIME   Refills:  0        * VITAMIN D (CHOLECALCIFEROL) PO   Dose:  5000 Units        Take 5,000 Units by mouth daily   Refills:  0        * cholecalciferol 5000 UNITS Tabs tablet   Commonly known as:  vitamin D3   Dose:  5000 mg        Take 5,000 mg by mouth   Refills:  0        * Notice:  This list has 4 medication(s) that are the same as other medications prescribed for you. Read the directions carefully, and ask your doctor or other care provider to review them with you.            Prescriptions were sent or printed at these locations (1 Prescription)                   [facility] [address] [place] [address] [address] AT [facility] [address] [place] [address]    Telephone:  [phone]   Fax:  [phone]   Hours:                  E-Prescribed (1 of 1)         meclizine (MEDI-MECLIZINE) 25 MG tablet                Procedures and tests performed during your visit      "Beta strep group A culture    CBC with platelets differential    CRP inflammation    Chest XR,  PA & LAT    Comprehensive metabolic panel    D-Dimer (FV Range)    EKG 12 lead    Nt probnp inpatient (BNP)    Orthostatic blood pressure and pulse    Peripheral IV catheter    Rapid strep screen    Troponin I    UA reflex to Microscopic and Culture    Vital signs      Orders Needing Specimen Collection     None      Pending Results     Date and Time Order Name Status Description    2017 1358 Beta strep group A culture In process             Pending Culture Results     Date and Time Order Name Status Description    2017 1358 Beta strep group A culture In process             Thank you for choosing [facility]       Thank you for choosing [facility] for your care. Our goal is always to provide you with excellent care. Hearing back from our patients is one way we can continue to improve our services. Please take a few minutes to complete the written survey that you may receive in the mail after you visit with us. Thank you!        [employer] Information     [employer] lets you send messages to your doctor, view your test results, renew your prescriptions, schedule appointments and more. To sign up, go to [email or web address] . Click on \"Log in\" on the left side of the screen, which will take you to the Welcome page. Then click on \"Sign up Now\" on the right side of the page.     You will be asked to enter the access code listed below, as well as some personal information. Please follow the directions to create your username and password.     Your access code is: [email or web address]  Expires: 2017  7:05 PM     Your access code will  in 90 days. If you need help or a new code, please call your [facility] clinic or [phone].        Care EveryWhere ID     This is your Care EveryWhere ID. This could be used by other organizations to access your [facility] medical records  [id number]        Equal Access to Services     LANA " JOSE : Karenii leola Thakur, wapkda luqadaha, qaybta kaalmemo finley, fracisco martinez. So [facility] [phone].    ATENCIÓN: Si habla español, tiene a conley disposición servicios gratuitos de asistencia lingüística. Llame al [phone].    We comply with applicable federal civil rights laws and [place] laws. We do not discriminate on the basis of race, color, national origin, age, disability sex, sexual orientation or gender identity.            After Visit Summary       This is your record. Keep this with you and show to your community pharmacist(s) and doctor(s) at your next visit.

## 2017-09-05 NOTE — ED PROVIDER NOTES
History     Chief Complaint   Patient presents with     Dizziness     c/i gets dizzy when she starts walking since friday     Shortness of Breath     c/o sob with any exertion, total knee replacement aug 24th     HPI  lFora Acuna is a 72 year old female who has been having lightheadedness when she gets up to walk.  She states she also feels weak, has dyspnea with any exertion.  She is not running a fever, has not had any GI issues, chest pain or pain of any sort.  She had a TKA done on 8/24, has not had any issues with the knee.  Patient has multiple cardiac stents.  She also has recurrent breast cancer that is in process of evaluation.    I have reviewed the Medications, Allergies, Past Medical and Surgical History, and Social History in the Epic system.    Allergies:   Allergies   Allergen Reactions     Contrast Dye Difficulty breathing     Gadolinium Derivatives      Other reaction(s): Difficulty in swallowing, Laryngeal spasm  Patient had an injection into rt. Shoulder capsule prior to MRI arthrogram.  Contained multihance gadobenate, omnipaque iohexol, and buffered lidocaine.       Ammonia      Cory-1 [Lidocaine Hcl]      Novocaine allergy       Codeine Sulfate      Food      Shrimp     Iodine-131 Swelling     Ct dye     Nitrous Oxide      Novocain [Procaine]      Penicillins      Tramadol      Morphine Palpitations         No current facility-administered medications on file prior to encounter.   Current Outpatient Prescriptions on File Prior to Encounter:  insulin aspart (NOVOLOG VIAL) 100 UNITS/ML injection To be used with the insulin pumpTDD: 40 units   baclofen (LIORESAL) 10 MG tablet 2 tabs in the morning, 1 tab mid day and 2 tabs at night.   acetaminophen (TYLENOL) 325 MG tablet Take 650 mg by mouth   Insulin Aspart (INSULIN PUMP - OUTPATIENT)    blood glucose monitoring (CATHY CONTOUR NEXT) test strip Test blood sugars four times per day.  Type 2 Diabetes 250.00   cholecalciferol (VITAMIN D3)  5000 UNITS TABS tablet Take 5,000 mg by mouth   furosemide (LASIX) 40 MG tablet TAKE 1 TABLET BY MOUTH DAILY   levothyroxine (SYNTHROID, LEVOTHROID) 75 MCG tablet TAKE 1 TABLET BY MOUTH DAILY   omeprazole (PRILOSEC) 20 MG capsule Take 20 mg by mouth 2 times daily    potassium chloride SA (K-DUR,KLOR-CON M) 10 MEQ tablet TAKE 2 TABLETS BY MOUTH DAILY   ramipril (ALTACE) 10 MG capsule TAKE 1 CAPSULE BY MOUTH twice a day   simvastatin (ZOCOR) 40 MG tablet TAKE 1 TABLET BY MOUTH AT BEDTIME   metoprolol (LOPRESSOR) 50 MG tablet 2 times daily    INSULIN PUMP - OUTPATIENT Date last updated:  2/4/15[employer]: Model 723BASAL RATES and times:12   AM (midnight): 0.9 units/hour  9    PM: 0.8 units/hour Basal Pattern A:  Flora Acnua will use when N/A.CARB RATIO and times:12   AM (midnight): 13.04     PM: 10Corection Factor (Sensitivity) and times:12   AM (midnight): 55 mg/dLBLOOD GLUCOSE TARGET and times:12   AM (midnight): 100 - 1507     AM:  100 - 1209    PM:  100 - 150  Active Insulin Time:  3 hoursSensor:  NoCThisClicks / Diasend username:  [email or web address] [employer] / [employer] Password:  [email or web address]   calcium carbonate (OS-BUDDY 500 MG [place]. [place]) 500 MG tablet Take 500 mg by mouth daily   VITAMIN D, CHOLECALCIFEROL, PO Take 5,000 Units by mouth daily   albuterol (PROAIR HFA, PROVENTIL HFA, VENTOLIN HFA) 108 (90 BASE) MCG/ACT inhaler Inhale 2 puffs into the lungs   Lancet Devices MISC    LIFESCAN FINEPOINT LANCETS MISC TEST FOUR TIMES DAILY   nitroglycerin (NITROSTAT) 0.4 MG SL tablet Place 0.4 mg under the tongue   [DISCONTINUED] metoprolol (LOPRESSOR) 50 MG tablet TAKE 1 TABLET BY MOUTH TWICE DAILY   Acetone, Urine, Test (KETONE TEST) STRP Use as directed   glucagon (GLUCAGON EMERGENCY) 1 MG injection Inject 1 mg Subcutaneous once   clindamycin (CLEOCIN) 300 MG capsule Take 300 mg by mouth as needed (Take 3 capsules by mouth before dental procedure.)       Patient Active Problem List   Diagnosis     CARDIOVASCULAR SCREENING; LDL  "GOAL LESS THAN 160     Numbness and tingling in left hand     Elevated blood pressure     Diabetes mellitus, type 2 (H)     Personal history of malignant neoplasm of breast     ACP (advance care planning)       Past Surgical History:   Procedure Laterality Date     APPENDECTOMY OPEN CHILD       AS TOTAL KNEE ARTHROPLASTY Right      CHOLECYSTECTOMY       HYSTERECTOMY       MASTECTOMY Bilateral      SHOULDER SURGERY Right      SHOULDER SURGERY Left        Social History   Substance Use Topics     Smoking status: Never Smoker     Smokeless tobacco: Never Used     Alcohol use No         There is no immunization history on file for this patient.    BMI: Estimated body mass index is 27.1 kg/(m^2) as calculated from the following:    Height as of 5/5/17: 1.6 m (5' 3\").    Weight as of 6/11/17: 69.4 kg (153 lb).      Review of Systems   Constitutional: Positive for activity change and fatigue. Negative for appetite change and fever.   HENT: Negative.    Respiratory: Positive for shortness of breath. Negative for cough and wheezing.    Cardiovascular: Negative for chest pain.   Gastrointestinal: Negative for abdominal pain, constipation, diarrhea, nausea and vomiting.   Genitourinary: Negative for dysuria.   Musculoskeletal: Negative.    Neurological: Positive for weakness and light-headedness. Negative for dizziness.   Psychiatric/Behavioral: The patient is nervous/anxious.        Physical Exam   BP: 148/73  Heart Rate: 75  Temp: 98.2  F (36.8  C)  Resp: 16  SpO2: 99 %  Physical Exam   Constitutional: She is oriented to person, place, and time. She appears well-developed and well-nourished. No distress.   HENT:   Head: Normocephalic and atraumatic.   Eyes: EOM are normal. Pupils are equal, round, and reactive to light. Right eye exhibits no nystagmus. Left eye exhibits no nystagmus.   Neck: Normal range of motion. Neck supple.   Cardiovascular: Normal rate, regular rhythm, normal heart sounds and intact distal pulses.    No " murmur heard.  Pulmonary/Chest: Effort normal and breath sounds normal. No respiratory distress.   Abdominal: Soft. Bowel sounds are normal.   Musculoskeletal: Normal range of motion. She exhibits no edema.   Neurological: She is alert and oriented to person, place, and time. No cranial nerve deficit.   Skin: Skin is warm and dry.   Psychiatric: She has a normal mood and affect.   Nursing note and vitals reviewed.      ED Course     ED Course     Procedures             EKG Interpretation:      Interpreted by Tracee Julian  Time reviewed: 1420  Symptoms at time of EKG: lightheadedness   Rhythm: normal sinus   Rate: Normal  Axis: Normal  Ectopy: none  Conduction: normal  ST Segments/ T Waves: T wave inversion I, V2, V3, V4, V5 and V6  Q Waves: III  Comparison to prior: T wave inversion new since 2014 (last EKG in chart)    Clinical Impression: no acute changes and non-specific EKG      Labs Ordered and Resulted from Time of ED Arrival Up to the Time of Departure from the ED   COMPREHENSIVE METABOLIC PANEL - Abnormal; Notable for the following:        Result Value    Chloride 91 (*)     Carbon Dioxide 34 (*)     Glucose 212 (*)     Urea Nitrogen 38 (*)     Creatinine 1.28 (*)     GFR Estimate 41 (*)     GFR Estimate If Black 50 (*)     Alkaline Phosphatase 36 (*)     All other components within normal limits   UA MACROSCOPIC WITH REFLEX TO MICRO AND CULTURE - Abnormal; Notable for the following:     Glucose Urine 30 (*)     All other components within normal limits   D-DIMER (FV RANGE) - Abnormal; Notable for the following:     D-Dimer ng/mL 762 (*)     All other components within normal limits   CBC WITH PLATELETS DIFFERENTIAL   CRP INFLAMMATION   TROPONIN I   NT PROBNP INPATIENT   VITAL SIGNS   PERIPHERAL IV CATHETER   ORTHOSTATIC BLOOD PRESSURE AND PULSE   RAPID STREP SCREEN   BETA STREP GROUP A CULTURE       Assessments & Plan (with Medical Decision Making)   Patient orthostatic with 40mmHg drop from  lying to sitting.  Given 500ml NS bolus.  CXR clear, labs show renal insufficiency and elevated blood glucose (212), but no other major abnormalities.  Given 500ml of fluid, orthostasis gone, but markedly unsteady when getting up.  Discussed CT of head to look for thalamic or basilar stroke, patient wants to try to mobilize and will then consider CT if she fails to improve once up.  She did well with her walker, asked to be discharged.  Reviewed reason for CT with her, she states she will return if things worsen.    I have reviewed the nursing notes.    I have reviewed the findings, diagnosis, plan and need for follow up with the patient.       New Prescriptions    MECLIZINE (MEDI-MECLIZINE) 25 MG TABLET    Take 1 tablet (25 mg) by mouth 3 times daily as needed for dizziness       Final diagnoses:   Lightheadedness       9/5/2017   [facility]     Tracee Mae MD  09/05/17 [id number]

## 2017-09-05 NOTE — ED AVS SNAPSHOT
[facility]    [address]    [place] [address]    Phone:  [phone]                                       Flora Acuna   MRN: [id number]    Department:  [facility]   Date of Visit:  9/5/2017           After Visit Summary Signature Page     I have received my discharge instructions, and my questions have been answered. I have discussed any challenges I see with this plan with the nurse or doctor.    ..........................................................................................................................................  Patient/Patient Representative Signature      ..........................................................................................................................................  Patient Representative Print Name and Relationship to Patient    ..................................................               ................................................  Date                                            Time    ..........................................................................................................................................  Reviewed by Signature/Title    ...................................................              ..............................................  Date                                                            Time

## 2017-09-05 NOTE — DISCHARGE INSTRUCTIONS
Dizziness (Vertigo) and Balance Problems: Staying Safe     Replace burned-out lightbulbs to keep your home safe and well lit.   Falls or accidents can lead to pain, broken bones, and fear of future falls. Protect yourself and others by preparing for episodes. Simple steps can help you stay safe at home and wherever you go.  Lighting  Keep all areas well lit. This helps your eyes send the right signals to the brain. It also makes you less likely to trip and fall. If bright lights make symptoms worse, dim the lights or lie in a dark room until the dizziness passes. Then turn the lights back to their normal level.  Tips:    Keep a flashlight by the bed.    Place nightlights in bathrooms and hallways.    Replace burned-out bulbs, or have someone replace them for you.  Preventing falls  To reduce your risk of falling:    Get out of bed or up from a chair slowly.    Wear low-heeled shoes that fit properly and have slip-resistant soles.    Remove throw rugs. Clear clutter from walkways.    Use handrails on stairs. Have handrails installed or adjusted if needed.    Install grab bars in the bathroom. Don't use towel racks for balance.    Use a shower stool. Also put adhesive strips in the shower or on the tub floor.  Going out  With a little time and preparation, you can get around safely.  Tips:    Bring a cane or walking aid if needed.    Give yourself plenty of time in case you start to get dizzy.    Ask your healthcare provider what type of exercise is safe for your condition.    Be patient. If an activity such as walking through a crowded shop causes you stress, you may not be ready for it yet.  Driving  If you become dizzy or disoriented while driving, you could hurt yourself and others. That's why it's best to not drive until symptoms have gone away. In some cases, your license may be temporarily held until it's safe for you to drive again.  For safety:    Ask a friend to drive for you.    Take public  transportation.    Walk to stores and other places when you can.  Asking for help  Don't be afraid to ask for help running errands, cooking meals, and doing exercise. Whether it's a friend, loved one, neighbor, or stranger on the street, a little help can make a world of difference.   Date Last Reviewed: 11/1/2016 2000-2017 [employer]. [address] [place] [address], [place] [id number]. All rights reserved. This information is not intended as a substitute for professional medical care. Always follow your healthcare professional's instructions.

## 2017-09-05 NOTE — ED NOTES
Pt to ED room 3 with c/o dizziness and shortness of breath since Friday. Pt denies chest pain and states she has a hx of CHF and takes lasix. Pt denies n/v/d and also states that she has had a sore throat and feeling like she can't talk since Friday. Pt is alert and oriented and denies chest pain. Pt has a hx of bilateral breast CA and was in remission after a double mastectomy. Pt now has a dx of breast CA.

## 2017-09-06 NOTE — ED NOTES
Discharge instructions reviewed with patient, and Rx sent to [facility]. Pt ambulated with a steady gait to the exit with daughter-in-law.

## 2017-09-07 LAB
BACTERIA SPEC CULT: NORMAL
SPECIMEN SOURCE: NORMAL

## 2017-10-24 ENCOUNTER — OFFICE VISIT (OUTPATIENT)
Dept: WOUND CARE | Facility: OTHER | Age: 72
End: 2017-10-24
Attending: NURSE PRACTITIONER
Payer: COMMERCIAL

## 2017-10-24 VITALS
BODY MASS INDEX: 27.71 KG/M2 | HEART RATE: 80 BPM | OXYGEN SATURATION: 97 % | SYSTOLIC BLOOD PRESSURE: 120 MMHG | DIASTOLIC BLOOD PRESSURE: 61 MMHG | HEIGHT: 62 IN | WEIGHT: 150.6 LBS

## 2017-10-24 DIAGNOSIS — E13.9 LADA (LATENT AUTOIMMUNE DIABETES IN ADULTS), MANAGED AS TYPE 1 (H): Primary | ICD-10-CM

## 2017-10-24 PROCEDURE — 99214 OFFICE O/P EST MOD 30 MIN: CPT | Performed by: NURSE PRACTITIONER

## 2017-10-24 PROCEDURE — 99212 OFFICE O/P EST SF 10 MIN: CPT

## 2017-10-24 ASSESSMENT — PAIN SCALES - GENERAL: PAINLEVEL: NO PAIN (0)

## 2017-10-24 NOTE — PROGRESS NOTES
"Chief Complaint   Patient presents with     Diabetes     insulin adjustment       Initial /61  Pulse 80  Ht 5' 2\" (1.575 m)  Wt 150 lb 9.6 oz (68.3 kg)  SpO2 97%  BMI 27.55 kg/m2 Estimated body mass index is 27.55 kg/(m^2) as calculated from the following:    Height as of this encounter: 5' 2\" (1.575 m).    Weight as of this encounter: 150 lb 9.6 oz (68.3 kg).  Medication Reconciliation: complete   Ruth Velasquez      "

## 2017-10-24 NOTE — PATIENT INSTRUCTIONS
Continue working on healthy eating and moving as best as you can (start low and slow, work up to 30 min, 5x/week)    BG goals:  Fasting and before meals <130, >80  2 hour after eating <180    We only need 1/2 of these numbers to be within target then your A1c will be within target    Medication changes   None for now.      1.  Check BGs at least 4 times a day.     2.  Use your dual with meals     3.  Please add the correction to pump when BGs >125.  4.  Please work on giving bolus (some is better than none) BEFORE you consume carbs     Please let me know if you have continue low blood sugars       Follow up   Mid-November (after 15th)--stop by lab first    Call me sooner if any problems/concerns and/or questions develop including consistent low BGs <70 or consistent high BGs >200  [phone] (Justine, Unit Coordinator)    [phone] (Ruth Nurse)  [phone] (Kerri's cell)

## 2017-10-24 NOTE — MR AVS SNAPSHOT
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      1.  Check BGs at least 4 times a day.     2.  Use your dual with meals     3.  Please add the correction to pump when BGs >125.  4.  Please work on giving bolus (some is better than none) BEFORE you consume carbs     Please let me know if you have continue low blood sugars       Follow up   Mid-November (after 15th)--stop by lab first    Call me sooner if any problems/concerns and/or questions develop including consistent low BGs <70 or consistent high BGs >200  [phone] (Justine, Unit Coordinator)    [phone] (Ruth Nurse)  [phone] (Kerri's cell)            Follow-ups after your visit        Who to contact     If you have questions or need follow up information about today's clinic visit or your schedule please contact [facility] directly at [phone].  Normal or non-critical lab and imaging results will be communicated to you by MyChart, letter or phone within 4 business days after the clinic has received the results. If you do not hear from us within 7 days, please contact the clinic through [employer] or phone. If you have a critical or abnormal lab result, we will notify you by phone as soon as possible.  Submit refill requests through [employer] or call your pharmacy and  "they will forward the refill request to us. Please allow 3 business days for your refill to be completed.          Additional Information About Your Visit        MyChart Information     [employer] lets you send messages to your doctor, view your test results, renew your prescriptions, schedule appointments and more. To sign up, go to [email or web address] . Click on \"Log in\" on the left side of the screen, which will take you to the Welcome page. Then click on \"Sign up Now\" on the right side of the page.     You will be asked to enter the access code listed below, as well as some personal information. Please follow the directions to create your username and password.     Your access code is: [email or web address]  Expires: 2017  7:05 PM     Your access code will  in 90 days. If you need help or a new code, please call your [facility] clinic or [phone].        Care EveryWhere ID     This is your [facility] EveryWhere ID. This could be used by other organizations to access your [facility] medical records  [id number]        Your Vitals Were     Pulse Height Pulse Oximetry BMI (Body Mass Index)          80 5' 2\" (1.575 m) 97% 27.55 kg/m2         Blood Pressure from Last 3 Encounters:   10/24/17 120/61   17 104/72   17 144/62    Weight from Last 3 Encounters:   10/24/17 150 lb 9.6 oz (68.3 kg)   17 153 lb (69.4 kg)   17 154 lb (69.9 kg)              Today, you had the following     No orders found for display       Primary Care Provider Office Phone # Fax #    Salvador Mejia [phone]       [facility] [address] [facility] [address]        Equal Access to Services     ALONZO GARCIA : Inna Thakur, romana gordon, fracisco dunn. So [facility] [phone].    ATENCIÓN: Si habla español, tiene a conley disposición servicios gratuitos de asistencia lingüística. Llcoral al [phone].    We comply with applicable " federal civil rights laws and [place] laws. We do not discriminate on the basis of race, color, national origin, age, disability, sex, sexual orientation, or gender identity.            Thank you!     Thank you for choosing [facility]  for your care. Our goal is always to provide you with excellent care. Hearing back from our patients is one way we can continue to improve our services. Please take a few minutes to complete the written survey that you may receive in the mail after your visit with us. Thank you!             Your Updated Medication List - Protect others around you: Learn how to safely use, store and throw away your medicines at www.disposemymeds.org.          This list is accurate as of: 10/24/17 11:28 AM.  Always use your most recent med list.                   Brand Name Dispense Instructions for use Diagnosis    acetaminophen 325 MG tablet    TYLENOL     Take 650 mg by mouth        albuterol 108 (90 BASE) MCG/ACT Inhaler    PROAIR HFA/PROVENTIL HFA/VENTOLIN HFA     Inhale 2 puffs into the lungs        baclofen 10 MG tablet    LIORESAL     2 tabs in the morning, 1 tab mid day and 2 tabs at night.        CATHY CONTOUR NEXT test strip   Generic drug:  blood glucose monitoring      Test blood sugars four times per day.  Type 2 Diabetes 250.00        calcium carbonate 1250 MG tablet    OS-BUDDY 500 mg [place]     Take 500 mg by mouth daily        clindamycin 300 MG capsule    CLEOCIN     Take 300 mg by mouth as needed (Take 3 capsules by mouth before dental procedure.)        furosemide 40 MG tablet    LASIX     TAKE 1 TABLET BY MOUTH DAILY        GLUCAGON EMERGENCY 1 MG kit   Generic drug:  glucagon     1 mg    Inject 1 mg Subcutaneous once    Diabetes mellitus, type 2 (H)       * INSULIN PUMP - OUTPATIENT           * insulin aspart 100 UNITS/ML injection    NovoLOG VIAL    20 mL    To be used with the insulin pump  TDD: 40 units    Type 2 diabetes mellitus with hyperglycemia, with  long-term current use of insulin (H)       insulin pump infusion      Date last updated:  2/4/15 Medtronic Minimed: Model 723 BASAL RATES and times: 12   AM (midnight): 0.9 units/hour   9    PM: 0.8 units/hour  Basal Pattern A:  Flora Acuna will use when N/A. CARB RATIO and times: 12   AM (midnight): 13.0 4     PM: 10 Corection Factor (Sensitivity) and times: 12   AM (midnight): 55 mg/dL BLOOD GLUCOSE TARGET and times: 12   AM (midnight): 100 - 150 7     AM:  100 - 120 9    PM:  100 - 150  Active Insulin Time:  3 hours Sensor:  No Carelink / Diasend username:  [email or web address] Carelink / Diasend Password:  [email or web address]        Ketone Test Strp     50 strip    Use as directed    Diabetes mellitus type 1 (H)       Lancet Devices Misc           levothyroxine 75 MCG tablet    SYNTHROID/LEVOTHROID     TAKE 1 TABLET BY MOUTH DAILY        LIFESCAN FINEPOINT LANCETS Misc      TEST FOUR TIMES DAILY        metoprolol 50 MG tablet    LOPRESSOR     2 times daily        NITROSTAT 0.4 MG sublingual tablet   Generic drug:  nitroGLYcerin      Place 0.4 mg under the tongue        potassium chloride SA 10 MEQ CR tablet    K-DUR/KLOR-CON M     TAKE 2 TABLETS BY MOUTH DAILY        priLOSEC 20 MG CR capsule   Generic drug:  omeprazole      Take 20 mg by mouth 2 times daily        ramipril 10 MG capsule    ALTACE     TAKE 1 CAPSULE BY MOUTH twice a day        simvastatin 40 MG tablet    ZOCOR     TAKE 1 TABLET BY MOUTH AT BEDTIME        * VITAMIN D (CHOLECALCIFEROL) PO      Take 5,000 Units by mouth daily        * cholecalciferol 5000 UNITS Tabs tablet    vitamin D3     Take 5,000 mg by mouth        * Notice:  This list has 4 medication(s) that are the same as other medications prescribed for you. Read the directions carefully, and ask your doctor or other care provider to review them with you.

## 2017-10-24 NOTE — PROGRESS NOTES
SUBJECTIVE:  Flora Acuna, 72 year old, female presents with the following Chief Complaint(s) with HPI to follow:  Chief Complaint   Patient presents with     Diabetes     insulin adjustment        Diabetes Follow-up      Patient is checking blood sugars: 3.2x/day.  Results:  Overnight: no checks  Breakfast: 121-177  Mid-mornin and 154  Lunch: 126-283  Mid-afternoon: no checks  Dinner: 63, 111-301  Bedtime: 81 and 318      Symptoms of hypoglycemia (low blood sugar): BG 63; history of occasional hypoglycemia unawareness;     Paresthesias (numbness or burning in feet) or sores: Yes--same; no sores    Diabetic eye exam within the last year: Yes (Nov.)    Breakfast eaten regularly: No (bret)    Patient counting carbs: Yes       HPI: Flora's here today for the follow up regarding her KAREEM, managed as a Type 1    Lab Results   Component Value Date    A1C 7.7 08/15/2017    A1C 7 2017    A1C 6.8 2016    A1C 7.4 2016    A1C 7.5 02/10/2015     Current Diabetes medication:   1.  Insulin pump, with Novolog   ASA use: yes, 325 mg daily  Statin use: yes, simvastatin 40 mg daily  Denies any issues with the insulin pump.      Flora's here today for follow up regarding her last insulin pump adjust.    Denies any complaints     Noted one low BG, followed by a BG of 318.  When asked what she used to correct it--2 glasses of chocolate milk + 10 candies.      Continues to enter bolus during or after consuming carbs.              Patient Active Problem List   Diagnosis     CARDIOVASCULAR SCREENING; LDL GOAL LESS THAN 160     Numbness and tingling in left hand     Elevated blood pressure     Diabetes mellitus, type 2 (H)     Personal history of malignant neoplasm of breast     ACP (advance care planning)       Past Medical History:   Diagnosis Date     Congestive heart failure, unspecified      Diabetes (H)      H/O rheumatoid arthritis      HLD (hyperlipidemia)      HTN (hypertension)      Myocardial  infarction      Uncomplicated asthma        Past Surgical History:   Procedure Laterality Date     APPENDECTOMY OPEN CHILD       AS TOTAL KNEE ARTHROPLASTY Right      CHOLECYSTECTOMY       HYSTERECTOMY       MASTECTOMY Bilateral      SHOULDER SURGERY Right      SHOULDER SURGERY Left        Family History   Problem Relation Age of Onset     Breast Cancer Maternal Aunt      Breast Cancer Maternal Aunt      Lung Cancer Father        Social History   Substance Use Topics     Smoking status: Never Smoker     Smokeless tobacco: Never Used     Alcohol use No       Current Outpatient Prescriptions   Medication Sig Dispense Refill     insulin aspart (NOVOLOG VIAL) 100 UNITS/ML injection To be used with the insulin pump    TDD: 40 units 20 mL 3     baclofen (LIORESAL) 10 MG tablet 2 tabs in the morning, 1 tab mid day and 2 tabs at night.       acetaminophen (TYLENOL) 325 MG tablet Take 650 mg by mouth       albuterol (PROAIR HFA, PROVENTIL HFA, VENTOLIN HFA) 108 (90 BASE) MCG/ACT inhaler Inhale 2 puffs into the lungs       Insulin Aspart (INSULIN PUMP - OUTPATIENT)        Lancet Devices MIS[employer] World [employer] Group[employer] FINEPOINT LANCETS MISC TEST FOUR TIMES DAILY       blood glucose monitoring (CATHY CONTOUR NEXT) test strip Test blood sugars four times per day.  Type 2 Diabetes 250.00       cholecalciferol (VITAMIN D3) 5000 UNITS TABS tablet Take 5,000 mg by mouth       furosemide (LASIX) 40 MG tablet TAKE 1 TABLET BY MOUTH DAILY       levothyroxine (SYNTHROID, LEVOTHROID) 75 MCG tablet TAKE 1 TABLET BY MOUTH DAILY       nitroglycerin (NITROSTAT) 0.4 MG SL tablet Place 0.4 mg under the tongue       omeprazole (PRILOSEC) 20 MG capsule Take 20 mg by mouth 2 times daily        potassium chloride SA (K-DUR,KLOR-CON M) 10 MEQ tablet TAKE 2 TABLETS BY MOUTH DAILY       ramipril (ALTACE) 10 MG capsule TAKE 1 CAPSULE BY MOUTH twice a day       simvastatin (ZOCOR) 40 MG tablet TAKE 1 TABLET BY MOUTH AT BEDTIME       metoprolol (LOPRESSOR) 50 MG  tablet 2 times daily   3     Acetone, Urine, Test (KETONE TEST) STRP Use as directed 50 strip 4     glucagon (GLUCAGON EMERGENCY) 1 MG injection Inject 1 mg Subcutaneous once 1 mg 12     INSULIN PUMP - OUTPATIENT Date last updated:  2/4/15  Medtronic Minimed: Model 723  BASAL RATES and times:  12   AM (midnight): 0.9 units/hour    9    PM: 0.8 units/hour   Basal Pattern A:  Flora Apariciocurt will use when N/A.  CARB RATIO and times:  12   AM (midnight): 13.0  4     PM: 10  Corection Factor (Sensitivity) and times:  12   AM (midnight): 55 mg/dL  BLOOD GLUCOSE TARGET and times:  12   AM (midnight): 100 - 150  7     AM:  100 - 120  9    PM:  100 - 150  Active Insulin Time:  3 hours  Sensor:  No  Carelink / Diasend username:  [email or web address]  Carelink / Diasend Password:  [email or web address]       clindamycin (CLEOCIN) 300 MG capsule Take 300 mg by mouth as needed (Take 3 capsules by mouth before dental procedure.)       calcium carbonate (OS-BUDDY 500 MG [place]) 500 MG tablet Take 500 mg by mouth daily       VITAMIN D, CHOLECALCIFEROL, PO Take 5,000 Units by mouth daily         Allergies   Allergen Reactions     Contrast Dye Difficulty breathing     Gadolinium Derivatives      Other reaction(s): Difficulty in swallowing, Laryngeal spasm  Patient had an injection into rt. Shoulder capsule prior to MRI arthrogram.  Contained multihance gadobenate, omnipaque iohexol, and buffered lidocaine.       Ammonia      Cory-1 [Lidocaine Hcl]      Novocaine allergy       Codeine Sulfate      Food      Shrimp     Iodine-131 Swelling     Ct dye     Nitrous Oxide      Novocain [Procaine]      Penicillins      Tramadol      Morphine Palpitations       REVIEW OF SYSTEMS  Skin: negative  Eyes: negative  Ears/Nose/Throat: negative  Respiratory: No shortness of breath, dyspnea on exertion, cough, or hemoptysis; history of asthma  Cardiovascular: history of HF (cause unknown)  Gastrointestinal: negative  Genitourinary: urgency and frequency  "  Musculoskeletal: positive for back pain, neck pain, arthritis and right shoulder (needs surgery)  Neurologic: positive for numbness or tingling of feet  Psychiatric: negative  Hematologic/Lymphatic/Immunologic: history of breast cancer (left) x 2 (same spot)  Endocrine: positive for diabetes and thyroid disease     OBJECTIVE:  /61  Pulse 80  Ht 5' 2\" (1.575 m)  Wt 150 lb 9.6 oz (68.3 kg)  SpO2 97%  BMI 27.55 kg/m2  General appearance: healthy, alert and no distress  Neck: Neck supple. No adenopathy. Thyroid symmetric, normal size.  Lungs: negative. Good diaphragmatic excursion. Lungs clear  Heart: negative.  No lifts, heaves, or thrills. RRR. 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   Education focused on entering the carb bolus BEFORE she consume carbs.      Insulin pump information:   Average: 162 +/- 55 (last visit: 203 +/- 65)  Basal/bolus ratio: 21.2 (69%)/9.4 (31%)   Testing: 3.2x/day    Hypoglycemia: 1 (2%)     Plan: Lipid Profile (Chol, Trig, HDL, LDL calc),         Albumin Random Urine Quantitative with Creat         Ratio, Hemoglobin A1c          She due for her labs.   Will have her stop by for the above lab work.     No adjustments today    Education focused on the following:  Work on checking your BGs at least 4 times a day.     Continue using your dual wave with every meal (or meals that contain fat and carb)   Please add the correction to pump when BGs >150.     Enter bolus BEFORE consuming carbs.      Talked about entering 1/2 the amount of carbs BEFORE consuming the carbs (if concerns " with low BGs)--you can always add another bolus if needed.      Follow up as scheduled.      Patient Instructions     Continue working on healthy eating and moving as best as you can (start low and slow, work up to 30 min, 5x/week)    BG goals:  Fasting and before meals <130, >80  2 hour after eating <180    We only need 1/2 of these numbers to be within target then your A1c will be within target    Medication changes   None for now.      1.  Check BGs at least 4 times a day.     2.  Use your dual with meals     3.  Please add the correction to pump when BGs >125.  4.  Please work on giving bolus (some is better than none) BEFORE you consume carbs     Please let me know if you have continue low blood sugars       Follow up   Mid-November (after 15th)--stop by lab first    Call me sooner if any problems/concerns and/or questions develop including consistent low BGs <70 or consistent high BGs >200  [phone] (Justine, Unit Coordinator)    [phone] (Ruth Nurse)  [phone] (Kerri's cell)      Time: 50 minutes  Barrier: none  Willingness to learn: accepting    Kerri Elliott RN [facility]  Disease Management    Cc: Dr. Mejia    50 minutes was spent with patient.    Over 50%  of this time was spent on counseling patient regarding illness, medication and/or treatment options, coordinating further cares and follow ups that are needed along with resource material that will be helpful in the treatment of these issues.       With the electronic record, we can now more quickly and easily track our patient diabetic goals. Our diabetes clinical review is in progress and these are the indicators we are monitoring for good diabetes health.     1.) HbA1C less than 7 (measurement of your average blood sugars)  2.) Blood Pressure less than 140/80  3.) LDL less than 100 (bad cholesterol)  4.) HbA1C is checked in the last 6 months and below 7% (more frequently if not at goal or adjusting medications)  5.) LDL is checked in the last  12 months (more frequently if not at goal or adjusting medications)  6.) Taking one baby aspirin daily (unless otherwise instructed)  7.) No tobacco use  8) Statin use     You have achieved 7 out of 8 of these and I am encouraging you to come in and get tuned up to achieve 8 out of 8!  Here is what you have achieved so far in my goals for you:  1.) HbA1C  less than 7:                              NO  Your last  HbA1C :A1C       Lab Results   Component Value Date    A1C 7.7 08/15/2017    A1C 7 04/17/2017    A1C 6.8 09/21/2016    A1C 7.4 01/26/2016    A1C 7.5 02/10/2015     2.) Blood Pressure less than 140/80:       YES      Your last    BP Readings from Last 1 Encounters:   10/24/17 120/61     3.) LDL less than 100:                              YES      Your last     LDL Cholesterol Calculated   Date Value Ref Range Status   10/31/2016 87 mg/dL Final   ]  4.) Checked HbA1C in the past 6 months: YES      5.) Checked LDL in the past 12 months:    YES   6.) Taking one aspirin daily:                       YES     7.) No tobacco use:                                        YES      8.) Statin use      YES

## 2017-11-20 ENCOUNTER — OFFICE VISIT (OUTPATIENT)
Dept: WOUND CARE | Facility: OTHER | Age: 72
End: 2017-11-20
Attending: NURSE PRACTITIONER
Payer: COMMERCIAL

## 2017-11-20 VITALS
DIASTOLIC BLOOD PRESSURE: 66 MMHG | HEART RATE: 78 BPM | OXYGEN SATURATION: 99 % | WEIGHT: 149.2 LBS | HEIGHT: 62 IN | SYSTOLIC BLOOD PRESSURE: 119 MMHG | BODY MASS INDEX: 27.46 KG/M2

## 2017-11-20 DIAGNOSIS — E13.9 LADA (LATENT AUTOIMMUNE DIABETES IN ADULTS), MANAGED AS TYPE 1 (H): Primary | ICD-10-CM

## 2017-11-20 PROCEDURE — 99213 OFFICE O/P EST LOW 20 MIN: CPT | Performed by: NURSE PRACTITIONER

## 2017-11-20 PROCEDURE — 99212 OFFICE O/P EST SF 10 MIN: CPT

## 2017-11-20 ASSESSMENT — ANXIETY QUESTIONNAIRES
2. NOT BEING ABLE TO STOP OR CONTROL WORRYING: NOT AT ALL
3. WORRYING TOO MUCH ABOUT DIFFERENT THINGS: NOT AT ALL
6. BECOMING EASILY ANNOYED OR IRRITABLE: NOT AT ALL
GAD7 TOTAL SCORE: 0
1. FEELING NERVOUS, ANXIOUS, OR ON EDGE: NOT AT ALL
5. BEING SO RESTLESS THAT IT IS HARD TO SIT STILL: NOT AT ALL
7. FEELING AFRAID AS IF SOMETHING AWFUL MIGHT HAPPEN: NOT AT ALL

## 2017-11-20 ASSESSMENT — PAIN SCALES - GENERAL: PAINLEVEL: NO PAIN (0)

## 2017-11-20 ASSESSMENT — PATIENT HEALTH QUESTIONNAIRE - PHQ9: 5. POOR APPETITE OR OVEREATING: NOT AT ALL

## 2017-11-20 NOTE — PATIENT INSTRUCTIONS
"  Continue working on healthy eating and moving as best as you can (start low and slow, work up to 30 min, 5x/week)    BG goals:  Fasting and before meals <130, >80  2 hour after eating <180    We only need 1/2 of these numbers to be within target then your A1c will be within target    Medication changes   Watch for low BGs.      1.  Keep checking your BG before meals and bedtime    2.  Use your dual with meals     3.  Please work on giving bolus (some is better than none) BEFORE you consume carbs     Follow up   One month    Please consider the tips from the \"Sleep Steal[employer]\"    Call me sooner if any problems/concerns and/or questions develop including consistent low BGs <70 or consistent high BGs >200  [phone] (Justine, Unit Coordinator)    [phone] (Ruth Nurse)  [phone] (Kerri's cell)    "

## 2017-11-20 NOTE — MR AVS SNAPSHOT
"              After Visit Summary   11/20/2017    Flora Acuna    MRN: [id number]           Patient Information     Date Of Birth          1945        Visit Information        Provider Department      11/20/2017 11:00 AM Kerri Elliott NP [facility]        Care Instructions      Continue working on healthy eating and moving as best as you can (start low and slow, work up to 30 min, 5x/week)    BG goals:  Fasting and before meals <130, >80  2 hour after eating <180    We only need 1/2 of these numbers to be within target then your A1c will be within target    Medication changes   Watch for low BGs.      1.  Keep checking your BG before meals and bedtime    2.  Use your dual with meals     3.  Please work on giving bolus (some is better than none) BEFORE you consume carbs     Follow up   One month    Please consider the tips from the \"Sleep Stealers\"    Call me sooner if any problems/concerns and/or questions develop including consistent low BGs <70 or consistent high BGs >200  [phone] (Justine, Unit Coordinator)    [phone] (Ruth Nurse)  [phone] (Kerri's cell)            Follow-ups after your visit        Who to contact     If you have questions or need follow up information about today's clinic visit or your schedule please contact [facility] directly at [phone].  Normal or non-critical lab and imaging results will be communicated to you by MyChart, letter or phone within 4 business days after the clinic has received the results. If you do not hear from us within 7 days, please contact the clinic through MyChart or phone. If you have a critical or abnormal lab result, we will notify you by phone as soon as possible.  Submit refill requests through [employer] or call your pharmacy and they will forward the refill request to us. Please allow 3 business days for your refill to be completed.          Additional Information About Your Visit        MyChart Information  " "   [employer] lets you send messages to your doctor, view your test results, renew your prescriptions, schedule appointments and more. To sign up, go to [email or web address] . Click on \"Log in\" on the left side of the screen, which will take you to the Welcome page. Then click on \"Sign up Now\" on the right side of the page.     You will be asked to enter the access code listed below, as well as some personal information. Please follow the directions to create your username and password.     Your access code is: [email or web address]  Expires: 2017  6:05 PM     Your access code will  in 90 days. If you need help or a new code, please call your [facility] clinic or [phone].        Care EveryWhere ID     This is your [facility] EveryWhere ID. This could be used by other organizations to access your [facility] medical records  [id number]        Your Vitals Were     Pulse Height Pulse Oximetry BMI (Body Mass Index)          78 5' 2\" (1.575 m) 99% 27.29 kg/m2         Blood Pressure from Last 3 Encounters:   17 119/66   10/24/17 120/61   17 104/72    Weight from Last 3 Encounters:   17 149 lb 3.2 oz (67.7 kg)   10/24/17 150 lb 9.6 oz (68.3 kg)   17 153 lb (69.4 kg)              Today, you had the following     No orders found for display       Primary Care Provider Office Phone # Fax #    Salvador Mejia [phone]       [facility] [address] [facility] [address]        Equal Access to Services     [facility] JOSE : Hadjayden Thakur, romana gordon, fracisco dunn. So [facility] [phone].    ATENCIÓN: Si habla español, tiene a conley disposición servicios gratuitos de asistencia lingüística. Gisell al [phone].    We comply with applicable federal civil rights laws and [place] laws. We do not discriminate on the basis of race, color, national origin, age, disability, sex, sexual orientation, or gender " identity.            Thank you!     [facility]  [facility]. Our goal is always to provide you with excellent care. Hearing back from our patients is one way we can continue to improve our services. Please take a few minutes to complete the written survey that you may receive in the mail after your visit with us. Thank you!             Your Updated Medication List - Protect others around you: Learn how to safely use, store and throw away your medicines at www.disposemymeds.org.          This list is accurate as of: 11/20/17 11:30 AM.  Always use your most recent med list.                   Brand Name Dispense Instructions for use Diagnosis    acetaminophen 325 MG tablet    TYLENOL     Take 650 mg by mouth        albuterol 108 (90 BASE) MCG/ACT Inhaler    PROAIR HFA/PROVENTIL HFA/VENTOLIN HFA     Inhale 2 puffs into the lungs        baclofen 10 MG tablet    LIORESAL     2 tabs in the morning, 1 tab mid day and 2 tabs at night.        CATHY CONTOUR NEXT test strip   Generic drug:  blood glucose monitoring      Test blood sugars four times per day.  Type 2 Diabetes 250.00        calcium carbonate 1250 MG tablet    OS-BUDDY 500 mg [place]     Take 500 mg by mouth daily        clindamycin 300 MG capsule    CLEOCIN     Take 300 mg by mouth as needed (Take 3 capsules by mouth before dental procedure.)        furosemide 40 MG tablet    LASIX     TAKE 1 TABLET BY MOUTH DAILY        GLUCAGON EMERGENCY 1 MG kit   Generic drug:  glucagon     1 mg    Inject 1 mg Subcutaneous once    Diabetes mellitus, type 2 (H)       * INSULIN PUMP - OUTPATIENT           * insulin aspart 100 UNITS/ML injection    NovoLOG VIAL    20 mL    To be used with the insulin pump  TDD: 40 units    Type 2 diabetes mellitus with hyperglycemia, with long-term current use of insulin (H)       insulin pump infusion      Date last updated:  2/4/15 [employer]: Model 723 BASAL RATES and times: 12   AM (midnight): 0.9  units/hour   9    PM: 0.8 units/hour  Basal Pattern A:  Flora Acuna will use when N/A. CARB RATIO and times: 12   AM (midnight): 13.0 4     PM: 10 Corection Factor (Sensitivity) and times: 12   AM (midnight): 55 mg/dL BLOOD GLUCOSE TARGET and times: 12   AM (midnight): 100 - 150 7     AM:  100 - 120 9    PM:  100 - 150  Active Insulin Time:  3 hours Sensor:  No Carelink / Diasend username:  [email or web address] Carelink / Diasend Password:  [email or web address]        Ketone Test Strp     50 strip    Use as directed    Diabetes mellitus type 1 (H)       Lancet Devices Misc           levothyroxine 75 MCG tablet    SYNTHROID/LEVOTHROID     TAKE 1 TABLET BY MOUTH DAILY        LuluCAN FINEPOINT LANCETS Misc      TEST FOUR TIMES DAILY        metoprolol 50 MG tablet    LOPRESSOR     2 times daily        NITROSTAT 0.4 MG sublingual tablet   Generic drug:  nitroGLYcerin      Place 0.4 mg under the tongue        potassium chloride SA 10 MEQ CR tablet    K-DUR/KLOR-CON M     TAKE 2 TABLETS BY MOUTH DAILY        priLOSEC 20 MG CR capsule   Generic drug:  omeprazole      Take 20 mg by mouth 2 times daily        ramipril 10 MG capsule    ALTACE     TAKE 1 CAPSULE BY MOUTH twice a day        simvastatin 40 MG tablet    ZOCOR     TAKE 1 TABLET BY MOUTH AT BEDTIME        * VITAMIN D (CHOLECALCIFEROL) PO      Take 5,000 Units by mouth daily        * cholecalciferol 5000 UNITS Tabs tablet    vitamin D3     Take 5,000 mg by mouth        * Notice:  This list has 4 medication(s) that are the same as other medications prescribed for you. Read the directions carefully, and ask your doctor or other care provider to review them with you.

## 2017-11-20 NOTE — PROGRESS NOTES
SUBJECTIVE:  Flora Acuna, 72 year old, female presents with the following Chief Complaint(s) with HPI to follow:  Chief Complaint   Patient presents with     Diabetes     pump adjustment        Diabetes Follow-up      Patient is checking blood sugars: 3.6x/day.  Results:  Overnight: no checks  Breakfast: 108-252  Mid-mornin-148  Lunch: 105-345  Mid-afternoon: 67 and 333  Dinner:   Bedtime: 129      Symptoms of hypoglycemia (low blood sugar): BG 67; history of occasional hypoglycemia unawareness;     Paresthesias (numbness or burning in feet) or sores: Yes--same; no sores    Diabetic eye exam within the last year: Yes (Nov.)    Breakfast eaten regularly: No (bret)    Patient counting carbs: Yes       HPI: Flora's here today for the follow up regarding her KAREEM, managed as a Type 1    Lab Results   Component Value Date    A1C 7.7 08/15/2017    A1C 7 2017    A1C 6.8 2016    A1C 7.4 2016    A1C 7.5 02/10/2015     Current Diabetes medication:   1.  Insulin pump, with Novolog   ASA use: yes, 325 mg daily  Statin use: yes, simvastatin 40 mg daily  Denies any issues with the insulin pump.      Flora's here today for follow up regarding her last insulin pump adjust.    Denies any complaints     Continues to enter bolus during or after consuming carbs.      Due for her foot exam           Patient Active Problem List   Diagnosis     CARDIOVASCULAR SCREENING; LDL GOAL LESS THAN 160     Numbness and tingling in left hand     Elevated blood pressure     Diabetes mellitus, type 2 (H)     Personal history of malignant neoplasm of breast     ACP (advance care planning)       Past Medical History:   Diagnosis Date     Congestive heart failure, unspecified      Diabetes (H)      H/O rheumatoid arthritis      HLD (hyperlipidemia)      HTN (hypertension)      Myocardial infarction      Uncomplicated asthma        Past Surgical History:   Procedure Laterality Date     APPENDECTOMY OPEN CHILD        AS TOTAL KNEE ARTHROPLASTY Right      CHOLECYSTECTOMY       HYSTERECTOMY       MASTECTOMY Bilateral      SHOULDER SURGERY Right      SHOULDER SURGERY Left        Family History   Problem Relation Age of Onset     Breast Cancer Maternal Aunt      Breast Cancer Maternal Aunt      Lung Cancer Father        Social History   Substance Use Topics     Smoking status: Never Smoker     Smokeless tobacco: Never Used     Alcohol use No       Current Outpatient Prescriptions   Medication Sig Dispense Refill     insulin aspart (NOVOLOG VIAL) 100 UNITS/ML injection To be used with the insulin pump    TDD: 40 units 20 mL 3     baclofen (LIORESAL) 10 MG tablet 2 tabs in the morning, 1 tab mid day and 2 tabs at night.       acetaminophen (TYLENOL) 325 MG tablet Take 650 mg by mouth       albuterol (PROAIR HFA, PROVENTIL HFA, VENTOLIN HFA) 108 (90 BASE) MCG/ACT inhaler Inhale 2 puffs into the lungs       Insulin Aspart (INSULIN PUMP - OUTPATIENT)        Lancet Devices MISEgr [employer]ovationCAN FINEPOINT LANCETS MISC TEST FOUR TIMES DAILY       blood glucose monitoring (CATHY CONTOUR NEXT) test strip Test blood sugars four times per day.  Type 2 Diabetes 250.00       cholecalciferol (VITAMIN D3) 5000 UNITS TABS tablet Take 5,000 mg by mouth       furosemide (LASIX) 40 MG tablet TAKE 1 TABLET BY MOUTH DAILY       levothyroxine (SYNTHROID, LEVOTHROID) 75 MCG tablet TAKE 1 TABLET BY MOUTH DAILY       nitroglycerin (NITROSTAT) 0.4 MG SL tablet Place 0.4 mg under the tongue       omeprazole (PRILOSEC) 20 MG capsule Take 20 mg by mouth 2 times daily        potassium chloride SA (K-DUR,KLOR-CON M) 10 MEQ tablet TAKE 2 TABLETS BY MOUTH DAILY       ramipril (ALTACE) 10 MG capsule TAKE 1 CAPSULE BY MOUTH twice a day       simvastatin (ZOCOR) 40 MG tablet TAKE 1 TABLET BY MOUTH AT BEDTIME       metoprolol (LOPRESSOR) 50 MG tablet 2 times daily   3     Acetone, Urine, Test (KETONE TEST) STRP Use as directed 50 strip 4     glucagon (GLUCAGON  EMERGENCY) 1 MG injection Inject 1 mg Subcutaneous once 1 mg 12     INSULIN PUMP - OUTPATIENT Date last updated:  2/4/15  Medtronic Minimed: Model 723  BASAL RATES and times:  12   AM (midnight): 0.9 units/hour    9    PM: 0.8 units/hour   Basal Pattern A:  Flora Acuna will use when N/A.  CARB RATIO and times:  12   AM (midnight): 13.0  4     PM: 10  Corection Factor (Sensitivity) and times:  12   AM (midnight): 55 mg/dL  BLOOD GLUCOSE TARGET and times:  12   AM (midnight): 100 - 150  7     AM:  100 - 120  9    PM:  100 - 150  Active Insulin Time:  3 hours  Sensor:  No  Carelink / Diasend username:  elias  Carelink / Diasend Password:  [email or web address]       clindamycin (CLEOCIN) 300 MG capsule Take 300 mg by mouth as needed (Take 3 capsules by mouth before dental procedure.)       calcium carbonate (OS-BUDDY 500 MG [place]. [place]) 500 MG tablet Take 500 mg by mouth daily       VITAMIN D, CHOLECALCIFEROL, PO Take 5,000 Units by mouth daily         Allergies   Allergen Reactions     Contrast Dye Difficulty breathing     Gadolinium Derivatives      Other reaction(s): Difficulty in swallowing, Laryngeal spasm  Patient had an injection into rt. Shoulder capsule prior to MRI arthrogram.  Contained multihance gadobenate, omnipaque iohexol, and buffered lidocaine.       Ammonia      Cory-1 [Lidocaine Hcl]      Novocaine allergy       Codeine Sulfate      Food      Shrimp     Iodine-131 Swelling     Ct dye     Nitrous Oxide      Novocain [Procaine]      Penicillins      Tramadol      Morphine Palpitations       REVIEW OF SYSTEMS  Skin: negative  Eyes: negative  Ears/Nose/Throat: negative  Respiratory: No shortness of breath, dyspnea on exertion, cough, or hemoptysis; history of asthma  Cardiovascular: history of HF (cause unknown)  Gastrointestinal: negative  Genitourinary: urgency and frequency   Musculoskeletal: positive for back pain, neck pain, arthritis and right shoulder (needs surgery)  Neurologic: positive for numbness or  "tingling of feet  Psychiatric: negative  Hematologic/Lymphatic/Immunologic: history of breast cancer (left) x 2 (same spot)  Endocrine: positive for diabetes and thyroid disease     OBJECTIVE:  /66  Pulse 78  Ht 5' 2\" (1.575 m)  Wt 149 lb 3.2 oz (67.7 kg)  SpO2 99%  BMI 27.29 kg/m2  General appearance: healthy, alert and no distress  Neck: Neck supple. No adenopathy. Thyroid symmetric, normal size.  Lungs: negative. Good diaphragmatic excursion. Lungs clear  Heart: negative.  No lifts, heaves, or thrills. RRR. 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   Will have her stop by for the above lab work.     No adjustments today    Education focused on the following:  Work on checking your BGs at least 4 times a day.     Continue using your dual wave with every meal (or meals that contain fat and carb)   Please add the correction to pump when BGs >150.     Enter bolus BEFORE consuming carbs.      Talked about entering 1/2 the amount of carbs BEFORE consuming the carbs (if concerns with low BGs)--you can always add another bolus if needed.      Follow up as scheduled.      Patient Instructions " "    Continue working on healthy eating and moving as best as you can (start low and slow, work up to 30 min, 5x/week)    BG goals:  Fasting and before meals <130, >80  2 hour after eating <180    We only need 1/2 of these numbers to be within target then your A1c will be within target    Medication changes   Watch for low BGs.      1.  Keep checking your BG before meals and bedtime    2.  Use your dual with meals     3.  Please work on giving bolus (some is better than none) BEFORE you consume carbs     Follow up   One month    Please consider the tips from the \"Sleep Stealers\"    Call me sooner if any problems/concerns and/or questions develop including consistent low BGs <70 or consistent high BGs >200  [phone] (Justine, Unit Coordinator)    [phone] (Ruth Nurse)  [phone] (Kerri's cell)      Time: 40 minutes  Barrier: none  Willingness to learn: accepting    Kerri Elliott RN FNP-BC  Disease Management    Cc: Dr. Mejia    With the electronic record, we can now more quickly and easily track our patient diabetic goals. Our diabetes clinical review is in progress and these are the indicators we are monitoring for good diabetes health.     1.) HbA1C less than 7 (measurement of your average blood sugars)  2.) Blood Pressure less than 140/80  3.) LDL less than 100 (bad cholesterol)  4.) HbA1C is checked in the last 6 months and below 7% (more frequently if not at goal or adjusting medications)  5.) LDL is checked in the last 12 months (more frequently if not at goal or adjusting medications)  6.) Taking one baby aspirin daily (unless otherwise instructed)  7.) No tobacco use  8) Statin use     You have achieved 6 out of 8 of these and I am encouraging you to come in and get tuned up to achieve 8 out of 8!  Here is what you have achieved so far in my goals for you:  1.) HbA1C  less than 7:                              NO  Your last  HbA1C :A1C       Lab Results   Component Value Date    A1C 7.7 08/15/2017    " A1C 7 04/17/2017    A1C 6.8 09/21/2016    A1C 7.4 01/26/2016    A1C 7.5 02/10/2015     2.) Blood Pressure less than 140/80:       YES      Your last    BP Readings from Last 1 Encounters:   11/20/17 119/66     3.) LDL less than 100:                              YES      Your last     LDL Cholesterol Calculated   Date Value Ref Range Status   10/31/2016 87 mg/dL Final   ]  4.) Checked HbA1C in the past 6 months: YES      5.) Checked LDL in the past 12 months:    NO  6.) Taking one aspirin daily:                       YES     7.) No tobacco use:                                        YES      8.) Statin use      YES

## 2017-11-20 NOTE — PROGRESS NOTES
"Chief Complaint   Patient presents with     Diabetes     pump adjustment       Initial /66  Pulse 78  Ht 5' 2\" (1.575 m)  Wt 149 lb 3.2 oz (67.7 kg)  SpO2 99%  BMI 27.29 kg/m2 Estimated body mass index is 27.29 kg/(m^2) as calculated from the following:    Height as of this encounter: 5' 2\" (1.575 m).    Weight as of this encounter: 149 lb 3.2 oz (67.7 kg).  Medication Reconciliation: complete   Ruth Velasquez      "

## 2017-11-21 ASSESSMENT — PATIENT HEALTH QUESTIONNAIRE - PHQ9: SUM OF ALL RESPONSES TO PHQ QUESTIONS 1-9: 4

## 2017-11-22 ASSESSMENT — ANXIETY QUESTIONNAIRES: GAD7 TOTAL SCORE: 0

## 2018-02-16 ENCOUNTER — OFFICE VISIT (OUTPATIENT)
Dept: WOUND CARE | Facility: OTHER | Age: 73
End: 2018-02-16
Attending: NURSE PRACTITIONER
Payer: MEDICARE

## 2018-02-16 VITALS
WEIGHT: 150 LBS | OXYGEN SATURATION: 98 % | HEIGHT: 62 IN | SYSTOLIC BLOOD PRESSURE: 113 MMHG | HEART RATE: 73 BPM | BODY MASS INDEX: 27.6 KG/M2 | DIASTOLIC BLOOD PRESSURE: 66 MMHG

## 2018-02-16 DIAGNOSIS — E13.9 LADA (LATENT AUTOIMMUNE DIABETES IN ADULTS), MANAGED AS TYPE 1 (H): Primary | ICD-10-CM

## 2018-02-16 LAB
CHOLEST SERPL-MCNC: 125 MG/DL
CREAT UR-MCNC: 47 MG/DL
EST. AVERAGE GLUCOSE BLD GHB EST-MCNC: 146 MG/DL
HBA1C MFR BLD: 6.7 % (ref 4.3–6)
HDLC SERPL-MCNC: 55 MG/DL
LDLC SERPL CALC-MCNC: 33 MG/DL
MICROALBUMIN UR-MCNC: <5 MG/L
MICROALBUMIN/CREAT UR: NORMAL MG/G CR (ref 0–25)
NONHDLC SERPL-MCNC: 70 MG/DL
TRIGL SERPL-MCNC: 187 MG/DL
TSH SERPL DL<=0.005 MIU/L-ACNC: 0.91 MU/L (ref 0.4–4)

## 2018-02-16 PROCEDURE — 83036 HEMOGLOBIN GLYCOSYLATED A1C: CPT | Mod: ZL | Performed by: NURSE PRACTITIONER

## 2018-02-16 PROCEDURE — 84443 ASSAY THYROID STIM HORMONE: CPT | Mod: ZL | Performed by: NURSE PRACTITIONER

## 2018-02-16 PROCEDURE — 82043 UR ALBUMIN QUANTITATIVE: CPT | Mod: ZL | Performed by: NURSE PRACTITIONER

## 2018-02-16 PROCEDURE — 99213 OFFICE O/P EST LOW 20 MIN: CPT | Performed by: NURSE PRACTITIONER

## 2018-02-16 PROCEDURE — 36415 COLL VENOUS BLD VENIPUNCTURE: CPT | Mod: ZL | Performed by: NURSE PRACTITIONER

## 2018-02-16 PROCEDURE — 40000788 ZZHCL STATISTIC ESTIMATED AVERAGE GLUCOSE: Mod: ZL | Performed by: NURSE PRACTITIONER

## 2018-02-16 PROCEDURE — 80061 LIPID PANEL: CPT | Mod: ZL | Performed by: NURSE PRACTITIONER

## 2018-02-16 PROCEDURE — G0463 HOSPITAL OUTPT CLINIC VISIT: HCPCS

## 2018-02-16 RX ORDER — TAMOXIFEN CITRATE 20 MG/1
TABLET ORAL
COMMUNITY
Start: 2018-02-07 | End: 2018-02-16

## 2018-02-16 RX ORDER — METOLAZONE 2.5 MG/1
TABLET ORAL
COMMUNITY
Start: 2017-08-30 | End: 2019-12-16

## 2018-02-16 RX ORDER — ALENDRONATE SODIUM 70 MG/1
TABLET ORAL
COMMUNITY
Start: 2017-10-17 | End: 2019-05-10

## 2018-02-16 RX ORDER — TAMOXIFEN CITRATE 20 MG/1
20 TABLET ORAL DAILY
COMMUNITY
Start: 2017-05-09 | End: 2022-02-17

## 2018-02-16 RX ORDER — HYDROMORPHONE HYDROCHLORIDE 2 MG/1
1-2 TABLET ORAL
COMMUNITY
Start: 2017-08-25 | End: 2019-01-22

## 2018-02-16 RX ORDER — AMOXICILLIN 250 MG
1 CAPSULE ORAL AT BEDTIME
COMMUNITY
Start: 2017-08-25

## 2018-02-16 RX ORDER — MECLIZINE HYDROCHLORIDE 25 MG/1
25 TABLET ORAL
COMMUNITY
Start: 2017-09-05 | End: 2019-01-22

## 2018-02-16 ASSESSMENT — PAIN SCALES - GENERAL: PAINLEVEL: NO PAIN (0)

## 2018-02-16 NOTE — NURSING NOTE
"Chief Complaint   Patient presents with     Diabetes     Insulin pump adjustment.       Initial /66 (BP Location: Right arm, Patient Position: Chair, Cuff Size: Adult Large)  Pulse 73  Ht 5' 2\" (1.575 m)  Wt 150 lb (68 kg)  SpO2 98%  BMI 27.44 kg/m2 Estimated body mass index is 27.44 kg/(m^2) as calculated from the following:    Height as of this encounter: 5' 2\" (1.575 m).    Weight as of this encounter: 150 lb (68 kg).  Medication Reconciliation: complete   Nikki Patel LPN    "

## 2018-02-16 NOTE — PATIENT INSTRUCTIONS
Continue working on healthy eating and moving as best as you can (start low and slow, work up to 30 min, 5x/week)    BG goals:  Fasting and before meals <130, >80  2 hour after eating <180    We only need 1/2 of these numbers to be within target then your A1c will be within target    Medication changes   Watch for low BGs.      If you are having problems with continued low BGs--please call me sooner.      Follow up   labs    Call me sooner if any problems/concerns and/or questions develop including consistent low BGs <70 or consistent high BGs >200  [phone] (Justine, Unit Coordinator)    [phone] (Ruth Nurse)  [phone] (Kerri's cell)

## 2018-02-16 NOTE — PROGRESS NOTES
"SUBJECTIVE:  Flora Acuna, 73 year old, female presents with the following Chief Complaint(s) with HPI to follow:  Chief Complaint   Patient presents with     Diabetes     Insulin pump adjustment.        Diabetes Follow-up      Patient is checking blood sugars: 3.6x/day.  Results:  AM and PM were switched (keep data as recorded)  Overnight:   Breakfast:   Mid-mornin  Lunch: 163  Mid-afternoon: no checks  Dinner: 104-151  Bedtime: 63,       Symptoms of hypoglycemia (low blood sugar): BG 63; reports \"a lot of crashes\"--she feels symptomatic <100     Paresthesias (numbness or burning in feet) or sores: Yes--same; no sores    Diabetic eye exam within the last year: Yes (March or May)    Breakfast eaten regularly: No (bret)    Patient counting carbs: Yes       HPI: Flora's here today for the follow up regarding her KAREEM, managed as a Type 1    Lab Results   Component Value Date    A1C 7.7 08/15/2017    A1C 7 2017    A1C 6.8 2016    A1C 7.4 2016    A1C 7.5 02/10/2015     Current Diabetes medication:   1.  Insulin pump, with Novolog   ASA use: yes, 325 mg daily  Statin use: yes, simvastatin 40 mg daily  Denies any issues with the insulin pump.      Flora's here today for follow up regarding her last insulin pump adjust.      As noted above, her AM and PM were switched in her insulin pump.  She's been having issues with \"crashes\" (these are described as having BG <100).  Flora corrects these \"crashes\" with chocolate milk.      Denies any new complaints.   Due for labs.    No future appointment with Dr. Mejia         Patient Active Problem List   Diagnosis     CARDIOVASCULAR SCREENING; LDL GOAL LESS THAN 160     Numbness and tingling in left hand     Elevated blood pressure     Diabetes mellitus, type 2 (H)     Personal history of malignant neoplasm of breast     ACP (advance care planning)       Past Medical History:   Diagnosis Date     Congestive heart failure, " unspecified      Diabetes (H)      H/O rheumatoid arthritis      HLD (hyperlipidemia)      HTN (hypertension)      Myocardial infarction      Uncomplicated asthma        Past Surgical History:   Procedure Laterality Date     APPENDECTOMY OPEN CHILD       AS TOTAL KNEE ARTHROPLASTY Right      CHOLECYSTECTOMY       HYSTERECTOMY       MASTECTOMY Bilateral      SHOULDER SURGERY Right      SHOULDER SURGERY Left        Family History   Problem Relation Age of Onset     Breast Cancer Maternal Aunt      Breast Cancer Maternal Aunt      Lung Cancer Father        Social History   Substance Use Topics     Smoking status: Never Smoker     Smokeless tobacco: Never Used     Alcohol use No       Current Outpatient Prescriptions   Medication Sig Dispense Refill     alendronate (FOSAMAX) 70 MG tablet Take 1 tablet by mouth once weekly. Take with water in the AM 30 minutes prior to eating. Avoid lying down 30 minutes after taking med.       Calcium Carb-Cholecalciferol (OYSTER SHELL CALCIUM) 500-400 MG-UNIT TABS        HYDROmorphone (DILAUDID) 2 MG tablet 1-2 mg by Nasogastric route       meclizine (ANTIVERT) 25 MG tablet Take 25 mg by mouth       metolazone (ZAROXOLYN) 2.5 MG tablet Take as directed, 1/2 hour prior to furosemide, max once daily.       senna-docusate (SENOKOT-S;PERICOLACE) 8.6-50 MG per tablet        tamoxifen (NOLVADEX) 20 MG tablet Take 20 mg by mouth       insulin aspart (NOVOLOG VIAL) 100 UNITS/ML injection To be used with the insulin pump    TDD: 40 units 20 mL 3     baclofen (LIORESAL) 10 MG tablet 2 tabs in the morning, 1 tab mid day and 2 tabs at night.       acetaminophen (TYLENOL) 325 MG tablet Take 650 mg by mouth       albuterol (PROAIR HFA, PROVENTIL HFA, VENTOLIN HFA) 108 (90 BASE) MCG/ACT inhaler Inhale 2 puffs into the lungs       Insulin Aspart (INSULIN PUMP - OUTPATIENT)        Lancet Devices MISC        LIFESCAN FINEPOINT LANCETS MISC TEST FOUR TIMES DAILY       blood glucose monitoring (CATHY  CONTOUR NEXT) test strip Test blood sugars four times per day.  Type 2 Diabetes 250.00       furosemide (LASIX) 40 MG tablet TAKE 1 TABLET BY MOUTH DAILY       levothyroxine (SYNTHROID, LEVOTHROID) 75 MCG tablet TAKE 1 TABLET BY MOUTH DAILY       nitroglycerin (NITROSTAT) 0.4 MG SL tablet Place 0.4 mg under the tongue       omeprazole (PRILOSEC) 20 MG capsule Take 20 mg by mouth 2 times daily        potassium chloride SA (K-DUR,KLOR-CON M) 10 MEQ tablet TAKE 2 TABLETS BY MOUTH DAILY       ramipril (ALTACE) 10 MG capsule TAKE 1 CAPSULE BY MOUTH twice a day       simvastatin (ZOCOR) 40 MG tablet TAKE 1 TABLET BY MOUTH AT BEDTIME       metoprolol (LOPRESSOR) 50 MG tablet 2 times daily   3     Acetone, Urine, Test (KETONE TEST) STRP Use as directed 50 strip 4     glucagon (GLUCAGON EMERGENCY) 1 MG injection Inject 1 mg Subcutaneous once 1 mg 12     INSULIN PUMP - OUTPATIENT Date last updated:  2/4/15  HOSTING Min[employer]: Model 723  BASAL RATES and times:  12   AM (midnight): 0.9 units/hour    9    PM: 0.8 units/hour   Basal Pattern A:  Flora Acuna will use when N/A.  CARB RATIO and times:  12   AM (midnight): 13.0  4     PM: 10  Corection Factor (Sensitivity) and times:  12   AM (midnight): 55 mg/dL  BLOOD GLUCOSE TARGET and times:  12   AM (midnight): 100 - 150  7     AM:  100 - 120  9    PM:  100 - 150  Active Insulin Time:  3 hours  Sensor:  No  Carelink / Diasend username:  [email or web address]  Carelink / Diasend Password:  [email or web address]       clindamycin (CLEOCIN) 300 MG capsule Take 300 mg by mouth as needed (Take 3 capsules by mouth before dental procedure.)       VITAMIN D, CHOLECALCIFEROL, PO Take 5,000 Units by mouth daily       [DISCONTINUED] tamoxifen (NOLVADEX) 20 MG tablet          Allergies   Allergen Reactions     Contrast Dye Difficulty breathing     Gadolinium Derivatives      Other reaction(s): Difficulty in swallowing, Laryngeal spasm  Patient had an injection into rt. Shoulder capsule prior to MRI arthrogram.   "Contained multihance gadobenate, omnipaque iohexol, and buffered lidocaine.       Ammonia      Cory-1 [Lidocaine Hcl]      Novocaine allergy       Codeine Sulfate      Food      Shrimp     Iodine-131 Swelling     Ct dye     Lidocaine      Nitrous Oxide      No Clinical Screening - See Comments Nausea and Vomiting and Other (See Comments)     (3/6/09)- N/V and Heart racing     Novocain [Procaine]      Penicillins      Tramadol      Morphine Palpitations       REVIEW OF SYSTEMS  Skin: crack fingers (lotion given)  Eyes: negative  Ears/Nose/Throat: negative  Respiratory: No shortness of breath, dyspnea on exertion, cough, or hemoptysis; history of asthma  Cardiovascular: history of HF (cause unknown)  Gastrointestinal: negative  Genitourinary: urgency and frequency   Musculoskeletal: positive for back pain, neck pain, arthritis and right shoulder (needs surgery)--same; history of RA  Neurologic: positive for numbness or tingling of feet  Psychiatric: negative  Hematologic/Lymphatic/Immunologic: history of breast cancer (left) x 2 (same spot)  Endocrine: positive for diabetes and thyroid disease     OBJECTIVE:  /66 (BP Location: Right arm, Patient Position: Chair, Cuff Size: Adult Large)  Pulse 73  Ht 5' 2\" (1.575 m)  Wt 150 lb (68 kg)  SpO2 98%  BMI 27.44 kg/m2  General appearance: healthy, alert and no distress  Neck: Neck supple. No adenopathy. Thyroid symmetric, normal size.  Lungs: negative. Good diaphragmatic excursion. Lungs clear  Heart: negative.  No lifts, heaves, or thrills. RRR. 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    I do feel this was due to her insulin pump's time being switched.   This was corrected today.   If she continues to have low BGs, please call me and we can take action.      Insulin pump information:   Average: 140 +/- 40 (last visit: 178 +/- 64)  Basal/bolus ratio: 21.5 (70%)/9 (30%)   Testing: 3.6x/day    Hypoglycemia: 1 (2%)     Plan: Hemoglobin A1c, Lipid Profile (Chol, Trig, HDL,        LDL calc), TSH with free T4 reflex, Albumin         Random Urine Quantitative with Creat Ratio          No adjustments today--if she continues to have these \"crashes\" please contact me sooner.     Education focused on the following:  Keep checking your BGs at least 4 times a day.     Continue using your dual wave with every meal (or meals that contain fat and carb)   Please add the correction to pump when BGs >150.     Enter bolus BEFORE consuming carbs.      Follow up as scheduled.      Patient Instructions     Continue working on healthy eating and moving as best as you can (start low and slow, work up to 30 min, 5x/week)    BG goals:  Fasting and before meals <130, >80  2 hour after eating <180    We only need 1/2 of these numbers to be within target then your A1c will be within target    Medication changes   Watch for low BGs.      If you are having problems with continued low BGs--please call me sooner.      Follow up   labs    Call me sooner if any problems/concerns and/or questions develop including consistent low BGs <70 or consistent high BGs >200  [phone] (Justine, Unit Coordinator)    [phone] (Ruth Nurse)  [phone] (Kerri's cell)      Time: 40 minutes  Barrier: none  Willingness to learn: accepting    Kerri Elliott RN FNP-BC  Disease Management    Cc: Dr. Mejia    With the electronic record, we can " now more quickly and easily track our patient diabetic goals. Our diabetes clinical review is in progress and these are the indicators we are monitoring for good diabetes health.     1.) HbA1C less than 7 (measurement of your average blood sugars)  2.) Blood Pressure less than 140/80  3.) LDL less than 100 (bad cholesterol)  4.) HbA1C is checked in the last 6 months and below 7% (more frequently if not at goal or adjusting medications)  5.) LDL is checked in the last 12 months (more frequently if not at goal or adjusting medications)  6.) Taking one baby aspirin daily (unless otherwise instructed)  7.) No tobacco use  8) Statin use     You have achieved 6 out of 8 of these and I am encouraging you to come in and get tuned up to achieve 8 out of 8!  Here is what you have achieved so far in my goals for you:  1.) HbA1C  less than 7:                              NO  Your last  HbA1C :A1C       Lab Results   Component Value Date    A1C 7.7 08/15/2017    A1C 7 04/17/2017    A1C 6.8 09/21/2016    A1C 7.4 01/26/2016    A1C 7.5 02/10/2015     2.) Blood Pressure less than 140/80:       YES      Your last    BP Readings from Last 1 Encounters:   02/16/18 113/66     3.) LDL less than 100:                              YES      Your last     LDL Cholesterol Calculated   Date Value Ref Range Status   10/31/2016 87 mg/dL Final   ]  4.) Checked HbA1C in the past 6 months: YES      5.) Checked LDL in the past 12 months:    NO  6.) Taking one aspirin daily:                       YES     7.) No tobacco use:                                        YES      8.) Statin use      YES

## 2018-02-16 NOTE — MR AVS SNAPSHOT
After Visit Summary   2/16/2018    Flora Acuna    MRN: [id number]           Patient Information     Date Of Birth          1945        Visit Information        Provider Department      2/16/2018 1:00 PM Kerri Elliott NP 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      If you are having problems with continued low BGs--please call me sooner.      Follow up   labs    Call me sooner if any problems/concerns and/or questions develop including consistent low BGs <70 or consistent high BGs >200  [phone] (Justine, Unit Coordinator)    [phone] (Ruth Nurse)  [phone] (Kerri's cell)            Follow-ups after your visit        Who to contact     If you have questions or need follow up information about today's clinic visit or your schedule please contact [facility] directly at [phone].  Normal or non-critical lab and imaging results will be communicated to you by [employer], letter or phone within 4 business days after the clinic has received the results. If you do not hear from us within 7 days, please contact the clinic through [employer] or phone. If you have a critical or abnormal lab result, we will notify you by phone as soon as possible.  Submit refill requests through [employer] or call your pharmacy and they will forward the refill request to us. Please allow 3 business days for your refill to be completed.          Additional Information About Your Visit        [employer] Information     [employer] lets you send messages to your doctor, view  "your test results, renew your prescriptions, schedule appointments and more. To sign up, go to [email or web address] . Click on \"Log in\" on the left side of the screen, which will take you to the Welcome page. Then click on \"Sign up Now\" on the right side of the page.     You will be asked to enter the access code listed below, as well as some personal information. Please follow the directions to create your username and password.     Your access code is: [email or web address]  Expires: 2018  1:51 PM     Your access code will  in 90 days. If you need help or a new code, please call your [place] clinic or [phone].        Care EveryWhere ID     This is your Care EveryWhere ID. This could be used by other organizations to access your [place] medical records  [id number]        Your Vitals Were     Pulse Height Pulse Oximetry BMI (Body Mass Index)          73 5' 2\" (1.575 m) 98% 27.44 kg/m2         Blood Pressure from Last 3 Encounters:   18 113/66   17 119/66   10/24/17 120/61    Weight from Last 3 Encounters:   18 150 lb (68 kg)   17 149 lb 3.2 oz (67.7 kg)   10/24/17 150 lb 9.6 oz (68.3 kg)              We Performed the Following     Albumin Random Urine Quantitative with Creat Ratio     Hemoglobin A1c     Lipid Profile (Chol, Trig, HDL, LDL calc)     TSH with free T4 reflex        Primary Care Provider Office Phone # Fax #    Salvador Mejia [phone]       [facility] [address] [facility] [address]        Equal Access to Services     [facility] Center[facility] : Hadii leola garcia Soosmany, waaxda luqadaha, qaybta kaalmada malia, fracisco jain . So [facility] [phone].    ATENCIÓN: Si habla español, tiene a conley disposición servicios gratuitos de asistencia lingüística. Llame al [phone].    We comply with applicable federal civil rights laws and [place] laws. We do not discriminate on the basis of race, color, national origin, age, " disability, sex, sexual orientation, or gender identity.            Thank you!     Thank you for choosing [facility]  [facility] your [facility]. Our goal is always to provide you with excellent care. Hearing back from our patients is one way we can continue to improve our services. Please take a few minutes to complete the written survey that you may receive in the mail after your visit with us. Thank you!             Your Updated Medication List - Protect others around you: Learn how to safely use, store and throw away your medicines at www.disposemymeds.org.          This list is accurate as of 2/16/18  1:51 PM.  Always use your most recent med list.                   Brand Name Dispense Instructions for use Diagnosis    acetaminophen 325 MG tablet    TYLENOL     Take 650 mg by mouth        albuterol 108 (90 BASE) MCG/ACT Inhaler    PROAIR HFA/PROVENTIL HFA/VENTOLIN HFA     Inhale 2 puffs into the lungs        alendronate 70 MG tablet    FOSAMAX     Take 1 tablet by mouth once weekly. Take with water in the AM 30 minutes prior to eating. Avoid lying down 30 minutes after taking med.        baclofen 10 MG tablet    LIORESAL     2 tabs in the morning, 1 tab mid day and 2 tabs at night.        CATHY CONTOUR NEXT test strip   Generic drug:  blood glucose monitoring      Test blood sugars four times per day.  Type 2 Diabetes 250.00        clindamycin 300 MG capsule    CLEOCIN     Take 300 mg by mouth as needed (Take 3 capsules by mouth before dental procedure.)        furosemide 40 MG tablet    LASIX     TAKE 1 TABLET BY MOUTH DAILY        GLUCAGON EMERGENCY 1 MG kit   Generic drug:  glucagon     1 mg    Inject 1 mg Subcutaneous once    Diabetes mellitus, type 2 (H)       HYDROmorphone 2 MG tablet    DILAUDID     1-2 mg by Nasogastric route        * INSULIN PUMP - OUTPATIENT           * insulin aspart 100 UNITS/ML injection    NovoLOG VIAL    20 mL    To be used with the insulin pump  TDD: 40 units    Type 2 diabetes  mellitus with hyperglycemia, with long-term current use of insulin (H)       insulin pump infusion      Date last updated:  2/4/15 [employer] Minimed: Model 723 BASAL RATES and times: 12   AM (midnight): 0.9 units/hour   9    PM: 0.8 units/hour  Basal Pattern A:  Flora Acuna will use when N/A. CARB RATIO and times: 12   AM (midnight): 13.0 4     PM: 10 Corection Factor (Sensitivity) and times: 12   AM (midnight): 55 mg/dL BLOOD GLUCOSE TARGET and times: 12   AM (midnight): 100 - 150 7     AM:  100 - 120 9    PM:  100 - 150  Active Insulin Time:  3 hours Sensor:  No Carelink / Diasend username:  [email or web address] Carelink / Diasend Password:  [email or web address]        Ketone Test Strp     50 strip    Use as directed    Diabetes mellitus type 1 (H)       Lancet Devices Misc           levothyroxine 75 MCG tablet    SYNTHROID/LEVOTHROID     TAKE 1 TABLET BY MOUTH DAILY        LIFESCAN FINEPOINT LANCETS Misc      TEST FOUR TIMES DAILY        meclizine 25 MG tablet    ANTIVERT     Take 25 mg by mouth        metolazone 2.5 MG tablet    ZAROXOLYN     Take as directed, 1/2 hour prior to furosemide, max once daily.        metoprolol tartrate 50 MG tablet    LOPRESSOR     2 times daily        NITROSTAT 0.4 MG sublingual tablet   Generic drug:  nitroGLYcerin      Place 0.4 mg under the tongue        Oyster Shell Calcium 500-400 MG-UNIT Tabs           potassium chloride SA 10 MEQ CR tablet    K-DUR/KLOR-CON M     TAKE 2 TABLETS BY MOUTH DAILY        priLOSEC 20 MG CR capsule   Generic drug:  omeprazole      Take 20 mg by mouth 2 times daily        ramipril 10 MG capsule    ALTACE     TAKE 1 CAPSULE BY MOUTH twice a day        senna-docusate 8.6-50 MG per tablet    SENOKOT-S;PERICOLACE          simvastatin 40 MG tablet    ZOCOR     TAKE 1 TABLET BY MOUTH AT BEDTIME        tamoxifen 20 MG tablet    NOLVADEX     Take 20 mg by mouth        VITAMIN D (CHOLECALCIFEROL) PO      Take 5,000 Units by mouth daily        * Notice:  This list has 2  medication(s) that are the same as other medications prescribed for you. Read the directions carefully, and ask your doctor or other care provider to review them with you.

## 2018-02-16 NOTE — PROGRESS NOTES
SUBJECTIVE:  Flora Acuna, 72 year old, female presents with the following Chief Complaint(s) with HPI to follow:  Chief Complaint   Patient presents with     Diabetes     Insulin pump adjustment.        Diabetes Follow-up      Patient is checking blood sugars: 3.6x/day.  Results:  Overnight: no checks  Breakfast: 108-252  Mid-mornin-148  Lunch: 105-345  Mid-afternoon: 67 and 333  Dinner:   Bedtime: 129      Symptoms of hypoglycemia (low blood sugar): BG 67; history of occasional hypoglycemia unawareness;     Paresthesias (numbness or burning in feet) or sores: Yes--same; no sores    Diabetic eye exam within the last year: Yes (Nov.)    Breakfast eaten regularly: No (bret)    Patient counting carbs: Yes       HPI: Flora's here today for the follow up regarding her KAREEM, managed as a Type 1    Lab Results   Component Value Date    A1C 7.7 08/15/2017    A1C 7 2017    A1C 6.8 2016    A1C 7.4 2016    A1C 7.5 02/10/2015     Current Diabetes medication:   1.  Insulin pump, with Novolog   ASA use: yes, 325 mg daily  Statin use: yes, simvastatin 40 mg daily  Denies any issues with the insulin pump.      Flora's here today for follow up regarding her last insulin pump adjust.    Denies any complaints     Continues to enter bolus during or after consuming carbs.      Due for her foot exam           Patient Active Problem List   Diagnosis     CARDIOVASCULAR SCREENING; LDL GOAL LESS THAN 160     Numbness and tingling in left hand     Elevated blood pressure     Diabetes mellitus, type 2 (H)     Personal history of malignant neoplasm of breast     ACP (advance care planning)       Past Medical History:   Diagnosis Date     Congestive heart failure, unspecified      Diabetes (H)      H/O rheumatoid arthritis      HLD (hyperlipidemia)      HTN (hypertension)      Myocardial infarction      Uncomplicated asthma        Past Surgical History:   Procedure Laterality Date     APPENDECTOMY OPEN  CHILD       AS TOTAL KNEE ARTHROPLASTY Right      CHOLECYSTECTOMY       HYSTERECTOMY       MASTECTOMY Bilateral      SHOULDER SURGERY Right      SHOULDER SURGERY Left        Family History   Problem Relation Age of Onset     Breast Cancer Maternal Aunt      Breast Cancer Maternal Aunt      Lung Cancer Father        Social History   Substance Use Topics     Smoking status: Never Smoker     Smokeless tobacco: Never Used     Alcohol use No       Current Outpatient Prescriptions   Medication Sig Dispense Refill     alendronate (FOSAMAX) 70 MG tablet Take 1 tablet by mouth once weekly. Take with water in the AM 30 minutes prior to eating. Avoid lying down 30 minutes after taking med.       Calcium Carb-Cholecalciferol (OYSTER SHELL CALCIUM) 500-400 MG-UNIT TABS        HYDROmorphone (DILAUDID) 2 MG tablet 1-2 mg by Nasogastric route       meclizine (ANTIVERT) 25 MG tablet Take 25 mg by mouth       metolazone (ZAROXOLYN) 2.5 MG tablet Take as directed, 1/2 hour prior to furosemide, max once daily.       senna-docusate (SENOKOT-S;PERICOLACE) 8.6-50 MG per tablet        tamoxifen (NOLVADEX) 20 MG tablet Take 20 mg by mouth       insulin aspart (NOVOLOG VIAL) 100 UNITS/ML injection To be used with the insulin pump    TDD: 40 units 20 mL 3     baclofen (LIORESAL) 10 MG tablet 2 tabs in the morning, 1 tab mid day and 2 tabs at night.       acetaminophen (TYLENOL) 325 MG tablet Take 650 mg by mouth       albuterol (PROAIR HFA, PROVENTIL HFA, VENTOLIN HFA) 108 (90 BASE) MCG/ACT inhaler Inhale 2 puffs into the lungs       Insulin Aspart (INSULIN PUMP - OUTPATIENT)        Lancet Devices MISC        LIFESCAN FINEPOINT LANCETS MISC TEST FOUR TIMES DAILY       blood glucose monitoring (CATHY CONTOUR NEXT) test strip Test blood sugars four times per day.  Type 2 Diabetes 250.00       furosemide (LASIX) 40 MG tablet TAKE 1 TABLET BY MOUTH DAILY       levothyroxine (SYNTHROID, LEVOTHROID) 75 MCG tablet TAKE 1 TABLET BY MOUTH DAILY        nitroglycerin (NITROSTAT) 0.4 MG SL tablet Place 0.4 mg under the tongue       omeprazole (PRILOSEC) 20 MG capsule Take 20 mg by mouth 2 times daily        potassium chloride SA (K-DUR,KLOR-CON M) 10 MEQ tablet TAKE 2 TABLETS BY MOUTH DAILY       ramipril (ALTACE) 10 MG capsule TAKE 1 CAPSULE BY MOUTH twice a day       simvastatin (ZOCOR) 40 MG tablet TAKE 1 TABLET BY MOUTH AT BEDTIME       metoprolol (LOPRESSOR) 50 MG tablet 2 times daily   3     Acetone, Urine, Test (KETONE TEST) STRP Use as directed 50 strip 4     glucagon (GLUCAGON EMERGENCY) 1 MG injection Inject 1 mg Subcutaneous once 1 mg 12     INSULIN PUMP - OUTPATIENT Date last updated:  2/4/15  [employer]: Model 723  BASAL RATES and times:  12   AM (midnight): 0.9 units/hour    9    PM: 0.8 units/hour   Basal Pattern A:  Flora Acuna will use when N/A.  CARB RATIO and times:  12   AM (midnight): 13.0  4     PM: 10  Corection Factor (Sensitivity) and times:  12   AM (midnight): 55 mg/dL  BLOOD GLUCOSE TARGET and times:  12   AM (midnight): 100 - 150  7     AM:  100 - 120  9    PM:  100 - 150  Active Insulin Time:  3 hours  Sensor:  No  Carelink / Diasend username:  [email or web address]  Carelink / Diasend Password:  [email or web address]       clindamycin (CLEOCIN) 300 MG capsule Take 300 mg by mouth as needed (Take 3 capsules by mouth before dental procedure.)       VITAMIN D, CHOLECALCIFEROL, PO Take 5,000 Units by mouth daily       [DISCONTINUED] tamoxifen (NOLVADEX) 20 MG tablet          Allergies   Allergen Reactions     Contrast Dye Difficulty breathing     Gadolinium Derivatives      Other reaction(s): Difficulty in swallowing, Laryngeal spasm  Patient had an injection into rt. Shoulder capsule prior to MRI arthrogram.  Contained multihance gadobenate, omnipaque iohexol, and buffered lidocaine.       Ammonia      Cory-1 [Lidocaine Hcl]      Novocaine allergy       Codeine Sulfate      Food      Shrimp     Iodine-131 Swelling     Ct dye     Lidocaine       "Nitrous Oxide      No Clinical Screening - See Comments Nausea and Vomiting and Other (See Comments)     (3/6/09)- N/V and Heart racing     Novocain [Procaine]      Penicillins      Tramadol      Morphine Palpitations       REVIEW OF SYSTEMS  Skin: negative  Eyes: negative  Ears/Nose/Throat: negative  Respiratory: No shortness of breath, dyspnea on exertion, cough, or hemoptysis; history of asthma  Cardiovascular: history of HF (cause unknown)  Gastrointestinal: negative  Genitourinary: urgency and frequency   Musculoskeletal: positive for back pain, neck pain, arthritis and right shoulder (needs surgery)  Neurologic: positive for numbness or tingling of feet  Psychiatric: negative  Hematologic/Lymphatic/Immunologic: history of breast cancer (left) x 2 (same spot)  Endocrine: positive for diabetes and thyroid disease     OBJECTIVE:  /66 (BP Location: Right arm, Patient Position: Chair, Cuff Size: Adult Large)  Pulse 73  Ht 5' 2\" (1.575 m)  Wt 150 lb (68 kg)  SpO2 98%  BMI 27.44 kg/m2  General appearance: healthy, alert and no distress  Neck: Neck supple. No adenopathy. Thyroid symmetric, normal size.  Lungs: negative. Good diaphragmatic excursion. Lungs clear  Heart: negative.  No lifts, heaves, or thrills. RRR. No murmurs, clicks gallops or rub  Psych: normal mentation; affect bright   Hematologic/Lymphatic/Immunologic: normal ant/post cervical nodes  Diabetic foot exam: normal DP and PT pulses, no trophic changes or ulcerative lesions and normal monofilament exam; bunions bilaterally; noted blanchable erythema on both bunions      LABs:  Results for orders placed or performed in visit on 11/20/17   C FOOT EXAM  NO CHARGE    Narrative    Diabetic foot exam: normal DP and PT pulses, no trophic changes or ulcerative lesions and normal monofilament exam; bunions bilaterally; noted blanchable erythema on both bunions       ASSESSMENT / PLAN:  (E10.9) 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   Will have her stop by for the above lab work.     No adjustments today    Education focused on the following:  Work on checking your BGs at least 4 times a day.     Continue using your dual wave with every meal (or meals that contain fat and carb)   Please add the correction to pump when BGs >150.     Enter bolus BEFORE consuming carbs.      Talked about entering 1/2 the amount of carbs BEFORE consuming the carbs (if concerns with low BGs)--you can always add another bolus if needed.      Follow up as scheduled.      Patient Instructions     Continue working on healthy eating and moving as best as you can (start low and slow, work up to 30 min, 5x/week)    BG goals:  Fasting and before meals <130, >80  2 hour after eating <180    We only need 1/2 of these numbers to be within target then your A1c will be within target    Medication changes   Watch for low BGs.      If you are having problems with continued low BGs--please call me sooner.      Follow up   labs    Call me sooner if any problems/concerns and/or questions develop including consistent low BGs <70 or consistent high BGs >200  [phone] (Justine, Unit Coordinator)    [phone] (Ruth Nurse)  [phone] (Kerri's cell)      Time: 40 minutes  Barrier: none  Willingness to learn: accepting    Kerri Elliott RN [facility]  Disease Management    Cc: Dr. Mejia    With the electronic record, we can now more quickly and easily track our patient diabetic goals. Our diabetes clinical review is in progress and these are the indicators we are monitoring for good diabetes health.     1.) HbA1C less than 7 (measurement of your average blood sugars)  2.) Blood  Pressure less than 140/80  3.) LDL less than 100 (bad cholesterol)  4.) HbA1C is checked in the last 6 months and below 7% (more frequently if not at goal or adjusting medications)  5.) LDL is checked in the last 12 months (more frequently if not at goal or adjusting medications)  6.) Taking one baby aspirin daily (unless otherwise instructed)  7.) No tobacco use  8) Statin use     You have achieved 6 out of 8 of these and I am encouraging you to come in and get tuned up to achieve 8 out of 8!  Here is what you have achieved so far in my goals for you:  1.) HbA1C  less than 7:                              NO  Your last  HbA1C :A1C       Lab Results   Component Value Date    A1C 7.7 08/15/2017    A1C 7 04/17/2017    A1C 6.8 09/21/2016    A1C 7.4 01/26/2016    A1C 7.5 02/10/2015     2.) Blood Pressure less than 140/80:       YES      Your last    BP Readings from Last 1 Encounters:   02/16/18 113/66     3.) LDL less than 100:                              YES      Your last     LDL Cholesterol Calculated   Date Value Ref Range Status   10/31/2016 87 mg/dL Final   ]  4.) Checked HbA1C in the past 6 months: YES      5.) Checked LDL in the past 12 months:    NO  6.) Taking one aspirin daily:                       YES     7.) No tobacco use:                                        YES      8.) Statin use      YES

## 2018-02-21 ENCOUNTER — TELEPHONE (OUTPATIENT)
Dept: WOUND CARE | Facility: OTHER | Age: 73
End: 2018-02-21

## 2018-02-23 ENCOUNTER — TELEPHONE (OUTPATIENT)
Dept: WOUND CARE | Facility: OTHER | Age: 73
End: 2018-02-23

## 2018-02-23 DIAGNOSIS — E13.9 LADA (LATENT AUTOIMMUNE DIABETES IN ADULTS), MANAGED AS TYPE 1 (H): Primary | ICD-10-CM

## 2018-02-26 ENCOUNTER — TELEPHONE (OUTPATIENT)
Dept: WOUND CARE | Facility: OTHER | Age: 73
End: 2018-02-26

## 2018-02-26 NOTE — TELEPHONE ENCOUNTER
Patient has called twice last week for her lab results and she hasn't gotten them and she is calling today and she said it has been over two weeks. No body has return her call back. [phone]

## 2018-03-02 ENCOUNTER — ALLIED HEALTH/NURSE VISIT (OUTPATIENT)
Dept: WOUND CARE | Facility: OTHER | Age: 73
End: 2018-03-02
Attending: NURSE PRACTITIONER
Payer: MEDICARE

## 2018-03-02 DIAGNOSIS — E13.9 LADA (LATENT AUTOIMMUNE DIABETES IN ADULTS), MANAGED AS TYPE 1 (H): Primary | ICD-10-CM

## 2018-03-02 NOTE — MR AVS SNAPSHOT
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            Follow-ups after your visit        Who to contact     If you have questions or need follow up information about today's clinic visit or your schedule please contact [facility] directly at [phone].  Normal or non-critical lab and imaging results will be communicated to you by MyChart, letter or phone within 4 business days after the clinic has received the results. If you do not hear from us within 7 days, please contact the clinic through [employer] or phone. If you have a critical or abnormal lab result, we will notify you by phone as soon as possible.  Submit refill requests through [employer] or call your pharmacy and they will forward the refill request to us. Please allow 3 business days for your refill to be completed.          Additional Information About Your Visit        MyChart Information     [employer] lets you send messages to your doctor, view your test results, renew your prescriptions, schedule appointments and more. To sign up, go to [email or web address] . Click on \"Log in\" on the left side of the screen, which will take you to the Welcome page. Then click on \"Sign up Now\" on the right side of the page.     You will be asked to enter the access code listed below, as well as some personal information. Please follow the directions to create your username and password.     Your access code is: [email or web address]  Expires: 2018  1:51 PM     Your access code will  in 90 days. If you need help or a new code, please call your [facility] or [phone].      "   Care EveryWhere ID     This is your Care EveryWhere ID. This could be used by other organizations to access your [place] medical records  [id number]         Blood Pressure from Last 3 Encounters:   02/16/18 113/66   11/20/17 119/66   10/24/17 120/61    Weight from Last 3 Encounters:   02/16/18 150 lb (68 kg)   11/20/17 149 lb 3.2 oz (67.7 kg)   10/24/17 150 lb 9.6 oz (68.3 kg)              Today, you had the following     No orders found for display       Primary Care Provider Office Phone # Fax #    Salvador Mejia [phone]       [facility] [address] [facility] [address]        Equal Access to Services     LANA GARCIA : Hadii aad ku hadasho Soosmany, waaxda luqadaha, qaybta kaalmada adeegyada, waxandres martinez. So [facility] [phone].    ATENCIÓN: Si habla español, tiene a conley disposición servicios gratuitos de asistencia lingüística. [facility] [phone].    We comply with applicable federal civil rights laws and [place] laws. We do not discriminate on the basis of race, color, national origin, age, disability, sex, sexual orientation, or gender identity.            Thank you!     Thank you for choosing [facility]  for your care. Our goal is always to provide you with excellent care. Hearing back from our patients is one way we can continue to improve our services. Please take a few minutes to complete the written survey that you may receive in the mail after your visit with us. Thank you!             Your Updated Medication List - Protect others around you: Learn how to safely use, store and throw away your medicines at www.disposemymeds.org.          This list is accurate as of 3/2/18 11:59 PM.  Always use your most recent med list.                   Brand Name Dispense Instructions for use Diagnosis    acetaminophen 325 MG tablet    TYLENOL     Take 650 mg by mouth        albuterol 108 (90 BASE) MCG/ACT Inhaler    PROAIR HFA/PROVENTIL  HFA/VENTOLIN HFA     Inhale 2 puffs into the lungs        alendronate 70 MG tablet    FOSAMAX     Take 1 tablet by mouth once weekly. Take with water in the AM 30 minutes prior to eating. Avoid lying down 30 minutes after taking med.        baclofen 10 MG tablet    LIORESAL     2 tabs in the morning, 1 tab mid day and 2 tabs at night.        CATHY CONTOUR NEXT test strip   Generic drug:  blood glucose monitoring      Test blood sugars four times per day.  Type 2 Diabetes 250.00        clindamycin 300 MG capsule    CLEOCIN     Take 300 mg by mouth as needed (Take 3 capsules by mouth before dental procedure.)        furosemide 40 MG tablet    LASIX     TAKE 1 TABLET BY MOUTH DAILY        GLUCAGON EMERGENCY 1 MG kit   Generic drug:  glucagon     1 mg    Inject 1 mg Subcutaneous once    Diabetes mellitus, type 2 (H)       HYDROmorphone 2 MG tablet    DILAUDID     1-2 mg by Nasogastric route        * INSULIN PUMP - OUTPATIENT           * insulin aspart 100 UNITS/ML injection    NovoLOG VIAL    20 mL    To be used with the insulin pump  TDD: 40 units    Type 2 diabetes mellitus with hyperglycemia, with long-term current use of insulin (H)       insulin pump infusion      Date last updated:  2/4/15 [employer]: Model 723 BASAL RATES and times: 12   AM (midnight): 0.9 units/hour   9    PM: 0.8 units/hour  Basal Pattern A:  Flora Acuna will use when N/A. CARB RATIO and times: 12   AM (midnight): 13.0 4     PM: 10 Corection Factor (Sensitivity) and times: 12   AM (midnight): 55 mg/dL BLOOD GLUCOSE TARGET and times: 12   AM (midnight): 100 - 150 7     AM:  100 - 120 9    PM:  100 - 150  Active Insulin Time:  3 hours Sensor:  No Carelink / Diasend username:  [email or web address] Carelink / Diasend Password:  [email or web address]        Ketone Test Strp     50 strip    Use as directed    Diabetes mellitus type 1 (H)       Lancet Devices Misc           levothyroxine 75 MCG tablet    SYNTHROID/LEVOTHROID     TAKE 1 TABLET BY MOUTH DAILY         C2FO FINEPOINT LANCETS Misc      TEST FOUR TIMES DAILY        meclizine 25 MG tablet    ANTIVERT     Take 25 mg by mouth        metolazone 2.5 MG tablet    ZAROXOLYN     Take as directed, 1/2 hour prior to furosemide, max once daily.        metoprolol tartrate 50 MG tablet    LOPRESSOR     2 times daily        NITROSTAT 0.4 MG sublingual tablet   Generic drug:  nitroGLYcerin      Place 0.4 mg under the tongue        Oyster Shell Calcium 500-400 MG-UNIT Tabs           potassium chloride SA 10 MEQ CR tablet    K-DUR/KLOR-CON M     TAKE 2 TABLETS BY MOUTH DAILY        priLOSEC 20 MG CR capsule   Generic drug:  omeprazole      Take 20 mg by mouth 2 times daily        ramipril 10 MG capsule    ALTACE     TAKE 1 CAPSULE BY MOUTH twice a day        senna-docusate 8.6-50 MG per tablet    SENOKOT-S;PERICOLACE          simvastatin 40 MG tablet    ZOCOR     TAKE 1 TABLET BY MOUTH AT BEDTIME        tamoxifen 20 MG tablet    NOLVADEX     Take 20 mg by mouth        VITAMIN D (CHOLECALCIFEROL) PO      Take 5,000 Units by mouth daily        * Notice:  This list has 2 medication(s) that are the same as other medications prescribed for you. Read the directions carefully, and ask your doctor or other care provider to review them with you.

## 2018-03-07 NOTE — PROGRESS NOTES
"Flora stopped by for a pump download:    She's having issues with \"crashing\".      Insulin pump information:   Average: 153 +/- 66  Basal/bolus ratio: 21.5 (71%)/8.9 (29%)   Testing: 3x/day    Hypoglycemia: 1 (2%)    Adjustments:  Basal  0000 0.875 (was 0.925)  2100 0.750 (was 0.8)      Call with questions/concerns and/or problems.      Kerri Elliott RN FNP-BC  Disease Management        "

## 2018-04-09 ENCOUNTER — ALLIED HEALTH/NURSE VISIT (OUTPATIENT)
Dept: EDUCATION SERVICES | Facility: OTHER | Age: 73
End: 2018-04-09
Attending: NURSE PRACTITIONER
Payer: MEDICARE

## 2018-04-09 DIAGNOSIS — E11.65 TYPE 2 DIABETES MELLITUS WITH HYPERGLYCEMIA, WITH LONG-TERM CURRENT USE OF INSULIN (H): Primary | ICD-10-CM

## 2018-04-09 DIAGNOSIS — Z79.4 TYPE 2 DIABETES MELLITUS WITH HYPERGLYCEMIA, WITH LONG-TERM CURRENT USE OF INSULIN (H): Primary | ICD-10-CM

## 2018-04-09 NOTE — MR AVS SNAPSHOT
"              After Visit Summary   2018    Flora Acuna    MRN: [id number]           Patient Information     Date Of Birth          1945        Visit Information        Provider Department      2018 9:45 AM Nurse, Collin Ryan [facility]        Today's Diagnoses     Type 2 diabetes mellitus with hyperglycemia, with long-term current use of insulin (H)    -  1       Follow-ups after your visit        Who to contact     If you have questions or need follow up information about today's clinic visit or your schedule please contact [facility] directly at [phone].  Normal or non-critical lab and imaging results will be communicated to you by [employer], letter or phone within 4 business days after the clinic has received the results. If you do not hear from us within 7 days, please contact the clinic through [employer] or phone. If you have a critical or abnormal lab result, we will notify you by phone as soon as possible.  Submit refill requests through [employer] or call your pharmacy and they will forward the refill request to us. Please allow 3 business days for your refill to be completed.          Additional Information About Your Visit        MyChart Information     [employer] lets you send messages to your doctor, view your test results, renew your prescriptions, schedule appointments and more. To sign up, go to [email or web address] . Click on \"Log in\" on the left side of the screen, which will take you to the Welcome page. Then click on \"Sign up Now\" on the right side of the page.     You will be asked to enter the access code listed below, as well as some personal information. Please follow the directions to create your username and password.     Your access code is: [email or web address]  Expires: 2018  2:51 PM     Your access code will  in 90 days. If you need help or a new code, please call your [facility] or [phone].        Care EveryWhere ID     This is your " Care EveryWhere ID. This could be used by other organizations to access your [place] medical records  [id number]         Blood Pressure from Last 3 Encounters:   02/16/18 113/66   11/20/17 119/66   10/24/17 120/61    Weight from Last 3 Encounters:   02/16/18 150 lb (68 kg)   11/20/17 149 lb 3.2 oz (67.7 kg)   10/24/17 150 lb 9.6 oz (68.3 kg)              Today, you had the following     No orders found for display       Primary Care Provider Office Phone # Fax #    Salvador Mejia [phone]       [facility] [address] [facility] [address]        Equal Access to Services     ALONZO GARCIA : Hadii leola sandovalo Ofe, waaxda lugastonadaha, qaybta kaalmada adeyuliayaloreto, fracisco jain . So [facility] [phone].    ATENCIÓN: Si habla español, tiene a conley disposición servicios gratuitos de asistencia lingüística. Llame al [phone].    We comply with applicable federal civil rights laws and [place] laws. We do not discriminate on the basis of race, color, national origin, age, disability, sex, sexual orientation, or gender identity.            Thank you!     Thank you for choosing [facility]  for your care. Our goal is always to provide you with excellent care. Hearing back from our patients is one way we can continue to improve our services. Please take a few minutes to complete the written survey that you may receive in the mail after your visit with us. Thank you!             Your Updated Medication List - Protect others around you: Learn how to safely use, store and throw away your medicines at www.disposemymeds.org.          This list is accurate as of 4/9/18 11:59 PM.  Always use your most recent med list.                   Brand Name Dispense Instructions for use Diagnosis    acetaminophen 325 MG tablet    TYLENOL     Take 650 mg by mouth        albuterol 108 (90 Base) MCG/ACT Inhaler    PROAIR HFA/PROVENTIL HFA/VENTOLIN HFA     Inhale 2 puffs into the  lungs        alendronate 70 MG tablet    FOSAMAX     Take 1 tablet by mouth once weekly. Take with water in the AM 30 minutes prior to eating. Avoid lying down 30 minutes after taking med.        baclofen 10 MG tablet    LIORESAL     2 tabs in the morning, 1 tab mid day and 2 tabs at night.        CATHY CONTOUR NEXT test strip   Generic drug:  blood glucose monitoring      Test blood sugars four times per day.  Type 2 Diabetes 250.00        clindamycin 300 MG capsule    CLEOCIN     Take 300 mg by mouth as needed (Take 3 capsules by mouth before dental procedure.)        furosemide 40 MG tablet    LASIX     TAKE 1 TABLET BY MOUTH DAILY        GLUCAGON EMERGENCY 1 MG kit   Generic drug:  glucagon     1 mg    Inject 1 mg Subcutaneous once    Diabetes mellitus, type 2 (H)       HYDROmorphone 2 MG tablet    DILAUDID     1-2 mg by Nasogastric route        * INSULIN PUMP - OUTPATIENT           * insulin aspart 100 UNITS/ML injection    NovoLOG VIAL    20 mL    To be used with the insulin pump  TDD: 40 units    Type 2 diabetes mellitus with hyperglycemia, with long-term current use of insulin (H)       insulin pump infusion      Date last updated:  2/4/15 Mastodon C: Model 723 BASAL RATES and times: 12   AM (midnight): 0.9 units/hour   9    PM: 0.8 units/hour  Basal Pattern A:  Flora Acuna will use when N/A. CARB RATIO and times: 12   AM (midnight): 13.0 4     PM: 10 Corection Factor (Sensitivity) and times: 12   AM (midnight): 55 mg/dL BLOOD GLUCOSE TARGET and times: 12   AM (midnight): 100 - 150 7     AM:  100 - 120 9    PM:  100 - 150  Active Insulin Time:  3 hours Sensor:  No Carelink / Diasend username:  [email or web address] Carelink / Diasend Password:  [email or web address]        Ketone Test Strp     50 strip    Use as directed    Diabetes mellitus type 1 (H)       Lancet Devices Misc           levothyroxine 75 MCG tablet    SYNTHROID/LEVOTHROID     TAKE 1 TABLET BY MOUTH DAILY        [employer] Elephanti LANCETS Misc      TEST  FOUR TIMES DAILY        meclizine 25 MG tablet    ANTIVERT     Take 25 mg by mouth        metolazone 2.5 MG tablet    ZAROXOLYN     Take as directed, 1/2 hour prior to furosemide, max once daily.        metoprolol tartrate 50 MG tablet    LOPRESSOR     2 times daily        NITROSTAT 0.4 MG sublingual tablet   Generic drug:  nitroGLYcerin      Place 0.4 mg under the tongue        Oyster Shell Calcium 500-400 MG-UNIT Tabs           potassium chloride SA 10 MEQ CR tablet    K-DUR/KLOR-CON M     TAKE 2 TABLETS BY MOUTH DAILY        priLOSEC 20 MG CR capsule   Generic drug:  omeprazole      Take 20 mg by mouth 2 times daily        ramipril 10 MG capsule    ALTACE     TAKE 1 CAPSULE BY MOUTH twice a day        senna-docusate 8.6-50 MG per tablet    SENOKOT-S;PERICOLACE          simvastatin 40 MG tablet    ZOCOR     TAKE 1 TABLET BY MOUTH AT BEDTIME        tamoxifen 20 MG tablet    NOLVADEX     Take 20 mg by mouth        VITAMIN D (CHOLECALCIFEROL) PO      Take 5,000 Units by mouth daily        * Notice:  This list has 2 medication(s) that are the same as other medications prescribed for you. Read the directions carefully, and ask your doctor or other care provider to review them with you.

## 2018-04-10 ENCOUNTER — TELEPHONE (OUTPATIENT)
Dept: EDUCATION SERVICES | Facility: OTHER | Age: 73
End: 2018-04-10

## 2018-04-10 DIAGNOSIS — Z79.4 TYPE 2 DIABETES MELLITUS WITH HYPERGLYCEMIA, WITH LONG-TERM CURRENT USE OF INSULIN (H): Primary | ICD-10-CM

## 2018-04-10 DIAGNOSIS — E11.65 TYPE 2 DIABETES MELLITUS WITH HYPERGLYCEMIA, WITH LONG-TERM CURRENT USE OF INSULIN (H): Primary | ICD-10-CM

## 2018-04-10 NOTE — TELEPHONE ENCOUNTER
Flora presented today with increased episodes of hypoglycemia. Pump download completed. Flora has been sick off and on the past couple of weeks.     Pump download noted increased lows in the evening even if eating an adequate amount of carbs and proteins with supper. She has also had lows during the day if eating an all carb meal or snack.     Changed pump setting bolus carb ratio at 1600 to 13 from 10. Encouraged to always eat a protein with any all carb snack or meal.     Flora agreed with the plan and will call or stop by if she continues to have low BGs.     Lisa NELSON P-BC  Family Nurse Practitioner

## 2018-05-04 ENCOUNTER — HOSPITAL ENCOUNTER (EMERGENCY)
Facility: HOSPITAL | Age: 73
Discharge: HOME | End: 2018-05-04
Attending: NURSE PRACTITIONER | Admitting: NURSE PRACTITIONER
Payer: MEDICARE

## 2018-05-04 VITALS
OXYGEN SATURATION: 98 % | SYSTOLIC BLOOD PRESSURE: 150 MMHG | HEIGHT: 62 IN | DIASTOLIC BLOOD PRESSURE: 58 MMHG | RESPIRATION RATE: 16 BRPM | TEMPERATURE: 98.4 F

## 2018-05-04 DIAGNOSIS — S61.259A: ICD-10-CM
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
## 2018-05-04 DIAGNOSIS — S61.459A: ICD-10-CM

## 2018-05-04 PROCEDURE — A9270 NON-COVERED ITEM OR SERVICE: HCPCS | Mod: GY | Performed by: NURSE PRACTITIONER

## 2018-05-04 PROCEDURE — 99213 OFFICE O/P EST LOW 20 MIN: CPT | Performed by: NURSE PRACTITIONER

## 2018-05-04 PROCEDURE — 25000132 ZZH RX MED GY IP 250 OP 250 PS 637: Mod: GY | Performed by: NURSE PRACTITIONER

## 2018-05-04 PROCEDURE — G0463 HOSPITAL OUTPT CLINIC VISIT: HCPCS

## 2018-05-04 RX ORDER — DOXYCYCLINE HYCLATE 100 MG
100 TABLET ORAL ONCE
Status: COMPLETED | OUTPATIENT
Start: 2018-05-04 | End: 2018-05-04

## 2018-05-04 RX ORDER — METRONIDAZOLE 500 MG/1
500 TABLET ORAL ONCE
Status: COMPLETED | OUTPATIENT
Start: 2018-05-04 | End: 2018-05-04

## 2018-05-04 RX ORDER — DOXYCYCLINE 100 MG/1
100 CAPSULE ORAL 2 TIMES DAILY
Qty: 10 CAPSULE | Refills: 0 | Status: SHIPPED | OUTPATIENT
Start: 2018-05-04 | End: 2018-05-09

## 2018-05-04 RX ORDER — METRONIDAZOLE 500 MG/1
500 TABLET ORAL 3 TIMES DAILY
Qty: 15 TABLET | Refills: 0 | Status: SHIPPED | OUTPATIENT
Start: 2018-05-04 | End: 2018-05-09

## 2018-05-04 RX ADMIN — DOXYCYCLINE HYCLATE 100 MG: 100 TABLET, FILM COATED ORAL at 17:52

## 2018-05-04 RX ADMIN — METRONIDAZOLE 500 MG: 500 TABLET ORAL at 17:52

## 2018-05-04 NOTE — ED NOTES
C/o being bit by a dog today while trying to break it free from attacking her dog, unknown dog police looking for dog

## 2018-05-04 NOTE — ED AVS SNAPSHOT
[facility]    [address] [address]    Phone:  [phone]                                       Flora Acuna   MRN: [id number]    Department:  [facility]   Date of Visit:  5/4/2018           Patient Information     Date Of Birth          1945        Your diagnoses for this visit were:     Dog bite of multiple sites of hand and fingers, initial encounter, unspecified laterality right middle finger, left thumb       You were seen by Heather Sharma NP.      Follow-up Information     Follow up with [facility].    Specialty:  EMERGENCY MEDICINE    Why:  As needed, If symptoms worsen, or concerns develop    Contact information:    [address] [address] [phone]    Additional information:    From [facility]: Take [address]. Turn left at [address]. Turn left at the first stoplight on [address]. At the first stop sign, take a right onto [address]. Take a left into the parking lot and continue through until you reach the North enterance of the building.       From [place]: Take [address]. Take the [address] ramp towards [place]. Turn left onto [address]. Take a slight right onto [address]. Turn left at the first stoplight on [address]. At the first stop sign, take a right onto [address]. Take a left into the parking lot and continue through until you reach the North enterance of the building.       From [place]: Take [address]. Take a right at [address]. At the first stop sign, take a right onto [address]. Take a left into the parking lot and continue through until you reach the North enterance of the building.         Follow up with Salvador Mejia MD.    Specialty:  Family Practice    Why:  As needed, if symptoms do not improve    Contact information:    [facility]  [address] [facility]  [address]  [phone]          Discharge Instructions         Dog Bite  A dog bite can cause a wound deep enough to break the skin. In such cases, the wound is cleaned and sometimes closed. If the wound is closed, it is usually not completely closed. This is so that fluid can drain if the wound becomes infected. Often, wounds will be left open to heal. In addition to wound care, a tetanus shot may be given, if needed.    Home care    Wash your hands well with soap and warm water before and after caring for the wound. This helps lower the risk of infection.    Care for the wound as directed. If a dressing was applied to the wound, be sure to change it as directed.    If the wound bleeds, place a clean, soft cloth on the wound. Then firmly apply pressure until the bleeding stops. This may take up to 5 minutes. Do not release the pressure and look at the wound during this time.    Most wounds heal within 10 days. But an infection can occur even with proper treatment. So be sure to check the wound daily for signs of infection (see below).    Antibiotics may be prescribed. These help prevent or treat infection. If you re given antibiotics, take them as directed. Also be sure to complete the medicines.  Rabies prevention  Rabies is a virus that can be carried in certain animals. These can include domestic animals such as dogs and cats. Pets fully vaccinated against rabies (2 shots) are at very low risk of infection. But because human rabies is almost always fatal, any biting pet should be confined for 10 days as an extra precaution. In general, if there is a risk for rabies, the following steps may need to be taken:    If someone s pet dog has bitten you, it should be kept in a secure area for the next 10 days to watch for signs of illness. (If the pet owner won t allow this, contact your local animal control center.) If the dog becomes ill or dies during that time, contact your local animal control center at once so the  animal may be tested for rabies. If the dog stays healthy for the next 10 days, there is no danger of rabies in the animal or you.  ? If a stray dog bit you, contact your local animal control center. They can give information on capture, quarantine, and animal rabies testing.  ? If you can t find the animal that bit you in the next 2 days, and if rabies exists in your area, you may need to receive the rabies vaccine series. Call your healthcare provider right away. Or, return to the emergency department promptly.  ? All animal bites should be reported to the local animal control center. If you were not given a form to fill out, you can report this yourself.  Follow-up care  Follow up with your healthcare provider, or as directed.  When to seek medical advice  Call your healthcare provider right away if any of these occur:    Signs of infection:  ? Spreading redness or warmth from the wound  ? Increased pain or swelling  ? Fever of 100.4 F (38 C) or higher, or as directed by your healthcare provider  ? Colored fluid or pus draining from the wound    Signs of rabies infection:  ? Headache  ? Confusion  ? Strange behavior  ? Increased salivating and drooling  ? Seizure    Decreased ability to move any body part near the wound    Bleeding that can't be stopped after 5 minutes of firm pressure  Date Last Reviewed: 3/1/2017    [id number] [employer]. [address] [place] [address]. All rights reserved. This information is not intended as a substitute for professional medical care. Always follow your healthcare professional's instructions.             Review of your medicines      START taking        Dose / Directions Last dose taken    doxycycline 100 MG capsule   Commonly known as:  VIBRAMYCIN   Dose:  100 mg   Quantity:  10 capsule        Take 1 capsule (100 mg) by mouth 2 times daily for 5 days   Refills:  0        metroNIDAZOLE 500 MG tablet   Commonly known as:  FLAGYL   Dose:  500 mg    Quantity:  15 tablet        Take 1 tablet (500 mg) by mouth 3 times daily for 5 days   Refills:  0          Our records show that you are taking the medicines listed below. If these are incorrect, please call your family doctor or clinic.        Dose / Directions Last dose taken    acetaminophen 325 MG tablet   Commonly known as:  TYLENOL   Dose:  650 mg        Take 650 mg by mouth   Refills:  0        albuterol 108 (90 Base) MCG/ACT Inhaler   Commonly known as:  PROAIR HFA/PROVENTIL HFA/VENTOLIN HFA   Dose:  2 puff        Inhale 2 puffs into the lungs   Refills:  0        alendronate 70 MG tablet   Commonly known as:  FOSAMAX        Take 1 tablet by mouth once weekly. Take with water in the AM 30 minutes prior to eating. Avoid lying down 30 minutes after taking med.   Refills:  0        baclofen 10 MG tablet   Commonly known as:  LIORESAL        2 tabs in the morning, 1 tab mid day and 2 tabs at night.   Refills:  0        CATHY CONTOUR NEXT test strip   Generic drug:  blood glucose monitoring        Test blood sugars four times per day.  Type 2 Diabetes 250.00   Refills:  0        clindamycin 300 MG capsule   Commonly known as:  CLEOCIN   Dose:  300 mg        Take 300 mg by mouth as needed (Take 3 capsules by mouth before dental procedure.)   Refills:  0        furosemide 40 MG tablet   Commonly known as:  LASIX        TAKE 1 TABLET BY MOUTH DAILY   Refills:  0        GLUCAGON EMERGENCY 1 MG kit   Dose:  1 mg   Quantity:  1 mg   Generic drug:  glucagon        Inject 1 mg Subcutaneous once   Refills:  12        HYDROmorphone 2 MG tablet   Commonly known as:  DILAUDID   Dose:  1-2 mg        1-2 mg by Nasogastric route   Refills:  0        * INSULIN PUMP - OUTPATIENT        Refills:  0        * insulin aspart 100 UNITS/ML injection   Commonly known as:  NovoLOG VIAL   Quantity:  20 mL        To be used with the insulin pump  TDD: 40 units   Refills:  3        insulin pump infusion        Date last updated:  2/4/15  [employer] Min[employer]ed: Model 723 BASAL RATES and times: 12   AM (midnight): 0.9 units/hour   9    PM: 0.8 units/hour  Basal Pattern A:  Flora Acuna will use when N/A. CARB RATIO and times: 12   AM (midnight): 13.0 4     PM: 10 Corection Factor (Sensitivity) and times: 12   AM (midnight): 55 mg/dL BLOOD GLUCOSE TARGET and times: 12   AM (midnight): 100 - 150 7     AM:  100 - 120 9    PM:  100 - 150  Active Insulin Time:  3 hours Sensor:  No Carelink / Diasend username:  [email or web address] Carelink / Diasend Password:  [email or web address]   Refills:  0        Ketone Test Strp   Quantity:  50 strip        Use as directed   Refills:  4        Lancet Devices Misc        Refills:  0        levothyroxine 75 MCG tablet   Commonly known as:  SYNTHROID/LEVOTHROID        TAKE 1 TABLET BY MOUTH DAILY   Refills:  0        LIFESCAN FINEPOINT LANCETS Misc        TEST FOUR TIMES DAILY   Refills:  0        meclizine 25 MG tablet   Commonly known as:  ANTIVERT   Dose:  25 mg        Take 25 mg by mouth   Refills:  0        metolazone 2.5 MG tablet   Commonly known as:  ZAROXOLYN        Take as directed, 1/2 hour prior to furosemide, max once daily.   Refills:  0        metoprolol tartrate 50 MG tablet   Commonly known as:  LOPRESSOR        2 times daily   Refills:  3        NITROSTAT 0.4 MG sublingual tablet   Dose:  0.4 mg   Generic drug:  nitroGLYcerin        Place 0.4 mg under the tongue   Refills:  0        Oyster Shell Calcium 500-400 MG-UNIT Tabs        Refills:  0        potassium chloride SA 10 MEQ CR tablet   Commonly known as:  K-DUR/KLOR-CON M        TAKE 2 TABLETS BY MOUTH DAILY   Refills:  0        priLOSEC 20 MG CR capsule   Dose:  20 mg   Generic drug:  omeprazole        Take 20 mg by mouth 2 times daily   Refills:  0        ramipril 10 MG capsule   Commonly known as:  ALTACE        TAKE 1 CAPSULE BY MOUTH twice a day   Refills:  0        senna-docusate 8.6-50 MG per tablet   Commonly known as:  SENOKOT-S;PERICOLACE        Refills:  0      "   simvastatin 40 MG tablet   Commonly known as:  ZOCOR        TAKE 1 TABLET BY MOUTH AT BEDTIME   Refills:  0        tamoxifen 20 MG tablet   Commonly known as:  NOLVADEX   Dose:  20 mg        Take 20 mg by mouth   Refills:  0        VITAMIN D (CHOLECALCIFEROL) PO   Dose:  5000 Units        Take 5,000 Units by mouth daily   Refills:  0        * Notice:  This list has 2 medication(s) that are the same as other medications prescribed for you. Read the directions carefully, and ask your doctor or other care provider to review them with you.            Prescriptions were sent or printed at these locations (2 Prescriptions)                   [place] - [address] [address]    Telephone:  [phone]   Fax:  [phone]   Hours:                  E-Prescribed (2 of 2)         doxycycline (VIBRAMYCIN) 100 MG capsule               metroNIDAZOLE (FLAGYL) 500 MG tablet                Orders Needing Specimen Collection     None      Pending Results     No orders found from 5/2/2018 to 5/5/2018.            Pending Culture Results     No orders found from 5/2/2018 to 5/5/2018.            Thank you for choosing [facility]       Thank you for choosing [facility] for your care. Our goal is always to provide you with excellent care. Hearing back from our patients is one way we can continue to improve our services. Please take a few minutes to complete the written survey that you may receive in the mail after you visit with us. Thank you!        [employer] Information     Facebook lets you send messages to your doctor, view your test results, renew your prescriptions, schedule appointments and more. To sign up, go to [email or web address] . Click on \"Log in\" on the left side of the screen, which will take you to the Welcome page. Then click on \"Sign up Now\" on the right side of the page.     You will be asked to enter the access code listed below, as well as some " personal information. Please follow the directions to create your username and password.     Your access code is: [email or web address]  Expires: 2018  2:51 PM     Your access code will  in 90 days. If you need help or a new code, please call your [place] clinic or [phone].        Care EveryWhere ID     This is your Care EveryWhere ID. This could be used by other organizations to access your [place] medical records  [id number]        Equal Access to Services     [facility] Hospital[facility] : Inna garcia Soosmany, waaxda luqadaha, qaybta kaalmada malia, fracisco jain . So [facility] [phone].    ATENCIÓN: Si habla español, tiene a conley disposición servicios gratuitos de asistencia lingüística. Llame al [phone].    We comply with applicable federal civil rights laws and [place] laws. We do not discriminate on the basis of race, color, national origin, age, disability, sex, sexual orientation, or gender identity.            After Visit Summary       This is your record. Keep this with you and show to your community pharmacist(s) and doctor(s) at your next visit.

## 2018-05-04 NOTE — ED AVS SNAPSHOT
[facility]    [address]    [place] [address]    Phone:  [phone]                                       Flora Acuna   MRN: [id number]    Department[facility]  [facility]   Date of Visit:  5/4/2018           After Visit Summary Signature Page     I have received my discharge instructions, and my questions have been answered. I have discussed any challenges I see with this plan with the nurse or doctor.    ..........................................................................................................................................  Patient/Patient Representative Signature      ..........................................................................................................................................  Patient Representative Print Name and Relationship to Patient    ..................................................               ................................................  Date                                            Time    ..........................................................................................................................................  Reviewed by Signature/Title    ...................................................              ..............................................  Date                                                            Time

## 2018-05-04 NOTE — DISCHARGE INSTRUCTIONS
Dog Bite  A dog bite can cause a wound deep enough to break the skin. In such cases, the wound is cleaned and sometimes closed. If the wound is closed, it is usually not completely closed. This is so that fluid can drain if the wound becomes infected. Often, wounds will be left open to heal. In addition to wound care, a tetanus shot may be given, if needed.    Home care    Wash your hands well with soap and warm water before and after caring for the wound. This helps lower the risk of infection.    Care for the wound as directed. If a dressing was applied to the wound, be sure to change it as directed.    If the wound bleeds, place a clean, soft cloth on the wound. Then firmly apply pressure until the bleeding stops. This may take up to 5 minutes. Do not release the pressure and look at the wound during this time.    Most wounds heal within 10 days. But an infection can occur even with proper treatment. So be sure to check the wound daily for signs of infection (see below).    Antibiotics may be prescribed. These help prevent or treat infection. If you re given antibiotics, take them as directed. Also be sure to complete the medicines.  Rabies prevention  Rabies is a virus that can be carried in certain animals. These can include domestic animals such as dogs and cats. Pets fully vaccinated against rabies (2 shots) are at very low risk of infection. But because human rabies is almost always fatal, any biting pet should be confined for 10 days as an extra precaution. In general, if there is a risk for rabies, the following steps may need to be taken:    If someone s pet dog has bitten you, it should be kept in a secure area for the next 10 days to watch for signs of illness. (If the pet owner won t allow this, contact your local animal control center.) If the dog becomes ill or dies during that time, contact your local animal control center at once so the animal may be tested for rabies. If the dog stays healthy  for the next 10 days, there is no danger of rabies in the animal or you.  ? If a stray dog bit you, contact your local animal control center. They can give information on capture, quarantine, and animal rabies testing.  ? If you can t find the animal that bit you in the next 2 days, and if rabies exists in your area, you may need to receive the rabies vaccine series. Call your healthcare provider right away. Or, return to the emergency department promptly.  ? All animal bites should be reported to the local animal control center. If you were not given a form to fill out, you can report this yourself.  Follow-up care  Follow up with your healthcare provider, or as directed.  When to seek medical advice  Call your healthcare provider right away if any of these occur:    Signs of infection:  ? Spreading redness or warmth from the wound  ? Increased pain or swelling  ? Fever of 100.4 F (38 C) or higher, or as directed by your healthcare provider  ? Colored fluid or pus draining from the wound    Signs of rabies infection:  ? Headache  ? Confusion  ? Strange behavior  ? Increased salivating and drooling  ? Seizure    Decreased ability to move any body part near the wound    Bleeding that can't be stopped after 5 minutes of firm pressure  Date Last Reviewed: 3/1/2017    5507-3113 [employer]. [address] [place] [address], [place] [id number]. All rights reserved. This information is not intended as a substitute for professional medical care. Always follow your healthcare professional's instructions.

## 2018-05-04 NOTE — ED TRIAGE NOTES
Pt states she was trying to break up a fight between her dog and a stray and received bites on hands.

## 2018-05-04 NOTE — ED PROVIDER NOTES
History     Chief Complaint   Patient presents with     Dog Bite     Pt states unknown dog bit her today. PD involved and looking for the dog/owners to get vaccination info.     The history is provided by the patient. No  was used.     Flora Acuna is a 73 year old female who presents with a CC of a dog bite in her right middle finger and left thumb just PTA today.  She was walking her dog and it was attacked by another dog (pitbull).  She tried to break up the dogs fighting and was bit in the process.  She denies bone pain and has normal ROM.  There is a burning sensation to the cuts.  She is unsure of the dog's owner at present but the [place] PD are looking for it.  She has the license plate of the dog owner's vehicle.  She washed the wounds with soap and water at home.      Problem List:    Patient Active Problem List    Diagnosis Date Noted     ACP (advance care planning) 09/21/2016     Priority: Medium     Advance Care Planning 9/21/2016: ACP Review of Chart / Resources Provided:  Reviewed chart for advance care plan.  Flora Acuna has no plan or code status on file. Discussed available resources and provided with information. Confirmed code status reflects current choices pending further ACP discussions.  Confirmed/documented legally designated decision makers.  Added by Ruth Velasquez           Personal history of malignant neoplasm of breast 11/23/2015     Priority: Medium     Diabetes mellitus, type 2 (H) 10/01/2014     Priority: Medium     CARDIOVASCULAR SCREENING; LDL GOAL LESS THAN 160 10/05/2012     Priority: Medium     Numbness and tingling in left hand 10/05/2012     Priority: Medium     Elevated blood pressure 10/05/2012     Priority: Medium        Past Medical History:    Past Medical History:   Diagnosis Date     Congestive heart failure, unspecified      Diabetes (H)      H/O rheumatoid arthritis      HLD (hyperlipidemia)      HTN (hypertension)      Myocardial  infarction      Uncomplicated asthma        Past Surgical History:    Past Surgical History:   Procedure Laterality Date     APPENDECTOMY OPEN CHILD       AS TOTAL KNEE ARTHROPLASTY Right      CHOLECYSTECTOMY       HYSTERECTOMY       MASTECTOMY Bilateral      SHOULDER SURGERY Right      SHOULDER SURGERY Left        Family History:    Family History   Problem Relation Age of Onset     Breast Cancer Maternal Aunt      Breast Cancer Maternal Aunt      Lung Cancer Father        Social History:  Marital Status:   [5]  Social History   Substance Use Topics     Smoking status: Never Smoker     Smokeless tobacco: Never Used     Alcohol use No        Medications:      doxycycline (VIBRAMYCIN) 100 MG capsule   metroNIDAZOLE (FLAGYL) 500 MG tablet   acetaminophen (TYLENOL) 325 MG tablet   Acetone, Urine, Test (KETONE TEST) STRP   albuterol (PROAIR HFA, PROVENTIL HFA, VENTOLIN HFA) 108 (90 BASE) MCG/ACT inhaler   alendronate (FOSAMAX) 70 MG tablet   baclofen (LIORESAL) 10 MG tablet   blood glucose monitoring (CATHY CONTOUR NEXT) test strip   Calcium Carb-Cholecalciferol (OYSTER SHELL CALCIUM) 500-400 MG-UNIT TABS   clindamycin (CLEOCIN) 300 MG capsule   furosemide (LASIX) 40 MG tablet   glucagon (GLUCAGON EMERGENCY) 1 MG injection   HYDROmorphone (DILAUDID) 2 MG tablet   Insulin Aspart (INSULIN PUMP - OUTPATIENT)   insulin aspart (NOVOLOG VIAL) 100 UNITS/ML injection   INSULIN PUMP - OUTPATIENT   Lancet Devices MISC   levothyroxine (SYNTHROID, LEVOTHROID) 75 MCG tablet   [employer] FINEPOINT LANCETS MISC   meclizine (ANTIVERT) 25 MG tablet   metolazone (ZAROXOLYN) 2.5 MG tablet   metoprolol (LOPRESSOR) 50 MG tablet   nitroglycerin (NITROSTAT) 0.4 MG SL tablet   omeprazole (PRILOSEC) 20 MG capsule   potassium chloride SA (K-DUR,KLOR-CON M) 10 MEQ tablet   ramipril (ALTACE) 10 MG capsule   senna-docusate (SENOKOT-S;PERICOLACE) 8.6-50 MG per tablet   simvastatin (ZOCOR) 40 MG tablet   tamoxifen (NOLVADEX) 20 MG tablet  "  VITAMIN D, CHOLECALCIFEROL, PO         Review of Systems   Constitutional: Negative for appetite change, chills, fatigue and fever.   Musculoskeletal: Negative for arthralgias and joint swelling.   Skin: Positive for wound.   Neurological: Negative for weakness and numbness.       Physical Exam   BP: 150/58  Heart Rate: 90  Temp: 98.4  F (36.9  C)  Resp: 16  Height: 157.5 cm (5' 2\")  SpO2: 98 %      Physical Exam   Constitutional: She is oriented to person, place, and time. She appears well-developed.   HENT:   Head: Normocephalic and atraumatic.   Cardiovascular: Normal rate.    Pulmonary/Chest: Effort normal.   Musculoskeletal:        Right hand: She exhibits tenderness (middle finger, distal phalange puncture wound into dermal layer, no active bleeding or discharge, no erythema surrounding wound). She exhibits normal range of motion, no bony tenderness, normal capillary refill and no swelling. Normal sensation noted. Normal strength noted.        Left hand: She exhibits tenderness (thumb with 2 puncture wounds on distal phalange into dermal layer, they are both <1 mm, appear superficial, no erythema surrounding wounds, no active bleeding or drainage. ). She exhibits normal range of motion, no bony tenderness and normal capillary refill. Normal sensation noted. Normal strength noted.   Neurological: She is alert and oriented to person, place, and time.   Psychiatric: She has a normal mood and affect. Her behavior is normal.   Nursing note and vitals reviewed.      ED Course     ED Course     Procedures    Soaked bilateral hands in warm water and hibiclens, applied triple abx ointment and bandages    Medications   doxycycline (VIBRA-TABS) tablet 100 mg (100 mg Oral Given 5/4/18 1752)   metroNIDAZOLE (FLAGYL) tablet 500 mg (500 mg Oral Given 5/4/18 1752)       Assessments & Plan (with Medical Decision Making)     I have reviewed the nursing notes.    I have reviewed the findings, diagnosis, plan and need for " "follow up with the patient.  ASSESSMENT / PLAN:  (S61.456H,  S61.259A,  W54.0XXA) Dog bite of multiple sites of hand and fingers, initial encounter, unspecified laterality  Comment: right middle finger, left thumb  Plan:  Patient allergic to Augmentin, will cover prophylactically with Doxycycline and flagyl per Up to date guidelines.   Keep dressing in place for the next 48 hours, do not soak the wound or swim.     Wash wounds 1-2 times daily with soap and water and cover with antiseptic ointment   Watch for signs and symptoms of infection including redness, swelling, increasing pain, drainage, odor, warmth, fever/chills   Return to PCP, ED or UC if this develops   Take all medications as prescribed   Pets fully vaccinated against rabies are at very low risk of rabies infection but    because human rabies is almost always fatal, any biting pet should be confined for 10 days.     If the animal is not found in the next 2 days, return here or to PCP for rabies injections.     If the animal is found and is healthy at the end of the 10 days, there is no danger of rabies.  If the animal becomes ill or dies in the 10 day period,    contact animal control so the animal can be tested for rabies.     Return to ED/UC if symptoms increase or concerns develop such as those discussed and listed on the \"When to go the Emergency Room\" portion of your discharge instructions.     Patient verbally educated and given appropriate education sheets for their diagnoses and all questions answered to the best of my ability.      Discharge Medication List as of 5/4/2018  5:52 PM      START taking these medications    Details   doxycycline (VIBRAMYCIN) 100 MG capsule Take 1 capsule (100 mg) by mouth 2 times daily for 5 days, Disp-10 capsule, R-0, E-Prescribe      metroNIDAZOLE (FLAGYL) 500 MG tablet Take 1 tablet (500 mg) by mouth 3 times daily for 5 days, Disp-15 tablet, R-0, E-PrescribeEat yogurt or cottage cheese daily to prevent diarrhea " that can be caused by taking this medication.             Final diagnoses:   Dog bite of multiple sites of hand and fingers, initial encounter, unspecified laterality - right middle finger, left thumb       5/4/2018   [facility]     Heather Sharma NP  05/05/18 103

## 2018-05-05 ASSESSMENT — ENCOUNTER SYMPTOMS
APPETITE CHANGE: 0
CHILLS: 0
WEAKNESS: 0
ARTHRALGIAS: 0
FATIGUE: 0
NUMBNESS: 0
FEVER: 0
WOUND: 1
JOINT SWELLING: 0

## 2018-05-21 ENCOUNTER — TRANSFERRED RECORDS (OUTPATIENT)
Dept: HEALTH INFORMATION MANAGEMENT | Facility: CLINIC | Age: 73
End: 2018-05-21

## 2018-05-25 ENCOUNTER — TELEPHONE (OUTPATIENT)
Dept: EDUCATION SERVICES | Facility: OTHER | Age: 73
End: 2018-05-25

## 2018-05-25 NOTE — TELEPHONE ENCOUNTER
Called Flora.     She's having surgery on Tuesday at [facility].     Morning of surgery:  1. Check your blood sugar.   If it's <70--correct it with 16 grams of glucose (4 of them) and recheck 15 minutes later. Also, decrease using your temp basal--6 hours, 75%.   If your blood sugar is normal--just leave it  If your blood sugar is high--correct it. Check your blood 2 hours after correction. If your blood sugar is low after a correction--correct it with glucose tablets and use temp basal (6 hours, 75%)     2. Check your blood sugar before surgery  If it's low <70, tell them and correct it. Decrease your basal using the temp basal (4 hours, 50%)    3. After surgery  Check your blood sugar.   If you have any carb food/drink--okay to using your bolus    Kerri NELSON FNP-BC  Diabetes and Wound Care

## 2018-05-25 NOTE — TELEPHONE ENCOUNTER
Patient left a voicemail stating she is having surgery the day after Memorial Day May 29 she needs to see Kerri today it is very important that she does and this is like critical and patient also stated she has tried since Monday or Tuesday.   (No other telephone or voicemail was left earlier this week)

## 2018-07-23 ENCOUNTER — TELEPHONE (OUTPATIENT)
Dept: EDUCATION SERVICES | Facility: OTHER | Age: 73
End: 2018-07-23

## 2018-07-23 NOTE — TELEPHONE ENCOUNTER
Patient needs more insulin by the end of this week. Kerri sometimes just gives it to her and other times she wants Flora to make an appointment. Flora wants a phone call back.

## 2018-07-24 ENCOUNTER — TELEPHONE (OUTPATIENT)
Dept: EDUCATION SERVICES | Facility: OTHER | Age: 73
End: 2018-07-24

## 2018-07-24 NOTE — TELEPHONE ENCOUNTER
Patient called requesting refill of Insulin. States will be out by end of week. She would like to know if you would need to see her or if she could just  insulin?. # [phone].

## 2018-07-25 ENCOUNTER — ALLIED HEALTH/NURSE VISIT (OUTPATIENT)
Dept: EDUCATION SERVICES | Facility: OTHER | Age: 73
End: 2018-07-25
Attending: NURSE PRACTITIONER
Payer: MEDICARE

## 2018-07-25 DIAGNOSIS — E11.65 TYPE 2 DIABETES MELLITUS WITH HYPERGLYCEMIA, WITH LONG-TERM CURRENT USE OF INSULIN (H): ICD-10-CM

## 2018-07-25 DIAGNOSIS — Z79.4 TYPE 2 DIABETES MELLITUS WITH HYPERGLYCEMIA, WITH LONG-TERM CURRENT USE OF INSULIN (H): ICD-10-CM

## 2018-07-25 NOTE — MR AVS SNAPSHOT
"              After Visit Summary   7/25/2018    Flora Acuna    MRN: [id number]           Patient Information     Date Of Birth          1945        Visit Information        Provider Department      7/25/2018 11:15 AM Nurse, Hc [facility]        Today's Diagnoses     Type 2 diabetes mellitus with hyperglycemia, with long-term current use of insulin (H)          Care Instructions    Follow up in August, sooner if need          Follow-ups after your visit        Your next 10 appointments already scheduled     Aug 23, 2018 10:00 AM CDT   (Arrive by 9:45 AM)   Diabetic Education with Kerri Elliott NP   [facility] ([facility] )    [address]  [facility] [address] [phone]              Who to contact     If you have questions or need follow up information about today's clinic visit or your schedule please contact [facility] directly at [phone].  Normal or non-critical lab and imaging results will be communicated to you by [employer], letter or phone within 4 business days after the clinic has received the results. If you do not hear from us within 7 days, please contact the clinic through [employer] or phone. If you have a critical or abnormal lab result, we will notify you by phone as soon as possible.  Submit refill requests through [facility] or call your pharmacy and they will forward the refill request to us. Please allow 3 business days for your refill to be completed.          Additional Information About Your Visit        [employer] Information     [facility] lets you send messages to your doctor, view your test results, renew your prescriptions, schedule appointments and more. To sign up, go to [email or web address] . Click on \"Log in\" on the left side of the screen, which will take you to the Welcome page. Then click on \"Sign up Now\" on the right side of the page.     You will be asked to enter the access code listed below, as " well as some personal information. Please follow the directions to create your username and password.     Your access code is: [email or web address]  Expires: 10/23/2018  5:30 PM     Your access code will  in 90 days. If you need help or a new code, please call your [facility] clinic or [phone].        Care EveryWhere ID     This is your Care EveryWhere ID. This could be used by other organizations to access your [facility] medical records  [id number]         Blood Pressure from Last 3 Encounters:   18 150/58   18 113/66   17 119/66    Weight from Last 3 Encounters:   18 150 lb (68 kg)   17 149 lb 3.2 oz (67.7 kg)   10/24/17 150 lb 9.6 oz (68.3 kg)              Today, you had the following     No orders found for display         Where to get your medicines      Some of these will need a paper prescription and others can be bought over the counter.  Ask your nurse if you have questions.     Bring a paper prescription for each of these medications     insulin aspart 100 UNITS/ML injection          Primary Care Provider Office Phone # Fax #    Salvador Mejia [phone]       [facility] [address] [facility] [address]        Equal Access to Services     LANA GARCIA : Hadii leola fragoso hadasho Soomaali, waaxda luqadaha, qaybta kaalmada adeegyada, fracisco jain . So [facility] [phone].    ATENCIÓN: Si habla español, tiene a conley disposición servicios gratuitos de asistencia lingüística. coral al [phone].    We comply with applicable federal civil rights laws and [place] laws. We do not discriminate on the basis of race, color, national origin, age, disability, sex, sexual orientation, or gender identity.            Thank you!     Thank you for choosing [facility]  for your care. Our goal is always to provide you with excellent care. Hearing back from our patients is one way we can continue to improve our services. Please take a  few minutes to complete the written survey that you may receive in the mail after your visit with us. Thank you!             Your Updated Medication List - Protect others around you: Learn how to safely use, store and throw away your medicines at www.disposemymeds.org.          This list is accurate as of 7/25/18  5:30 PM.  Always use your most recent med list.                   Brand Name Dispense Instructions for use Diagnosis    acetaminophen 325 MG tablet    TYLENOL     Take 650 mg by mouth        albuterol 108 (90 Base) MCG/ACT Inhaler    PROAIR HFA/PROVENTIL HFA/VENTOLIN HFA     Inhale 2 puffs into the lungs        alendronate 70 MG tablet    FOSAMAX     Take 1 tablet by mouth once weekly. Take with water in the AM 30 minutes prior to eating. Avoid lying down 30 minutes after taking med.        baclofen 10 MG tablet    LIORESAL     2 tabs in the morning, 1 tab mid day and 2 tabs at night.        CATHY CONTOUR NEXT test strip   Generic drug:  blood glucose monitoring      Test blood sugars four times per day.  Type 2 Diabetes 250.00        clindamycin 300 MG capsule    CLEOCIN     Take 300 mg by mouth as needed (Take 3 capsules by mouth before dental procedure.)        furosemide 40 MG tablet    LASIX     TAKE 1 TABLET BY MOUTH DAILY        GLUCAGON EMERGENCY 1 MG kit   Generic drug:  glucagon     1 mg    Inject 1 mg Subcutaneous once    Diabetes mellitus, type 2 (H)       HYDROmorphone 2 MG tablet    DILAUDID     1-2 mg by Nasogastric route        * INSULIN PUMP - OUTPATIENT           * insulin aspart 100 UNITS/ML injection    NovoLOG VIAL    20 mL    To be used with the insulin pump  TDD: 40 units    Type 2 diabetes mellitus with hyperglycemia, with long-term current use of insulin (H)       insulin pump infusion      Date last updated:  2/4/15 [employer]: Model 723 BASAL RATES and times: 12   AM (midnight): 0.9 units/hour   9    PM: 0.8 units/hour  Basal Pattern A:  Flora Acuna will use when  N/A. CARB RATIO and times: 12   AM (midnight): 13.0 4     PM: 10 Corection Factor (Sensitivity) and times: 12   AM (midnight): 55 mg/dL BLOOD GLUCOSE TARGET and times: 12   AM (midnight): 100 - 150 7     AM:  100 - 120 9    PM:  100 - 150  Active Insulin Time:  3 hours Sensor:  No Carelink / Diasend username:  [email or web address] Carelink / Diasend Password:  [email or web address]        Ketone Test Strp     50 strip    Use as directed    Diabetes mellitus type 1 (H)       Lancet Devices Misc           levothyroxine 75 MCG tablet    SYNTHROID/LEVOTHROID     TAKE 1 TABLET BY MOUTH DAILY        LIF[employer] FINEPOINT LANCETS Misc      TEST FOUR TIMES DAILY        meclizine 25 MG tablet    ANTIVERT     Take 25 mg by mouth        metolazone 2.5 MG tablet    ZAROXOLYN     Take as directed, 1/2 hour prior to furosemide, max once daily.        metoprolol tartrate 50 MG tablet    LOPRESSOR     2 times daily        NITROSTAT 0.4 MG sublingual tablet   Generic drug:  nitroGLYcerin      Place 0.4 mg under the tongue        Oyster Shell Calcium 500-400 MG-UNIT Tabs           potassium chloride SA 10 MEQ CR tablet    K-DUR/KLOR-CON M     TAKE 2 TABLETS BY MOUTH DAILY        priLOSEC 20 MG CR capsule   Generic drug:  omeprazole      Take 20 mg by mouth 2 times daily        ramipril 10 MG capsule    ALTACE     TAKE 1 CAPSULE BY MOUTH twice a day        senna-docusate 8.6-50 MG per tablet    SENOKOT-S;PERICOLACE          simvastatin 40 MG tablet    ZOCOR     TAKE 1 TABLET BY MOUTH AT BEDTIME        tamoxifen 20 MG tablet    NOLVADEX     Take 20 mg by mouth        VITAMIN D (CHOLECALCIFEROL) PO      Take 5,000 Units by mouth daily        * Notice:  This list has 2 medication(s) that are the same as other medications prescribed for you. Read the directions carefully, and ask your doctor or other care provider to review them with you.

## 2018-07-25 NOTE — TELEPHONE ENCOUNTER
Patient hasn't heard anything about her insulin. This is her third time calling. Please have someone call her back.

## 2018-07-25 NOTE — PROGRESS NOTES
Flora stopped by for an insulin pump download.      She also needs a rx for insulin--sent.     Gave her a sample as she ran out today.     Download as followed:  Insulin pump information:   Average: 139 +/- 50  Basal/bolus ratio: 20.6 (70%)/9 (30%)   Testin.5x/day    Hypoglycemia: 1 (2%)    Adjustments:  BG target  0700 100 to 135 (was 125)    Active insulin time:   4 (was 3)    Follow up in August for A1c    Call sooner if any issues.     Kerri Elliott APRN P-BC  Diabetes and Wound Care

## 2018-07-25 NOTE — TELEPHONE ENCOUNTER
Flora stopped here today for insulin pump download.  See other note.     Kerri Elliott APRN FNP-BC  Diabetes and Wound Care

## 2018-10-08 ENCOUNTER — TELEPHONE (OUTPATIENT)
Dept: EDUCATION SERVICES | Facility: HOSPITAL | Age: 73
End: 2018-10-08

## 2018-10-08 LAB — HBA1C MFR BLD: 6.1 % (ref 0–5.6)

## 2018-10-08 NOTE — TELEPHONE ENCOUNTER
"Will provide pt with 2 vials of Novolog.  Pt will be scheduled with Lisa Jean-Baptiste CNP early next week (Lisa is unavailable this week).  If pt continues to \"crash\" she should call [employer] for pump setting adjustment.    "

## 2018-10-08 NOTE — TELEPHONE ENCOUNTER
"Called pt back.  Asked about her \"crashing\"- what she means.  Per pt, her lowest glucose reading has been 68.  Has noticed lower readings the last couple of weeks.  She is in need of Novolog before Wednesday; and is willing to come in for appt.  Will send Massiel the message.  [place]ian [place]  "

## 2018-10-22 ENCOUNTER — ALLIED HEALTH/NURSE VISIT (OUTPATIENT)
Dept: EDUCATION SERVICES | Facility: OTHER | Age: 73
End: 2018-10-22
Attending: NURSE PRACTITIONER
Payer: COMMERCIAL

## 2018-10-22 VITALS
BODY MASS INDEX: 27.09 KG/M2 | HEIGHT: 62 IN | OXYGEN SATURATION: 98 % | WEIGHT: 147.2 LBS | HEART RATE: 81 BPM | RESPIRATION RATE: 16 BRPM | SYSTOLIC BLOOD PRESSURE: 114 MMHG | DIASTOLIC BLOOD PRESSURE: 57 MMHG

## 2018-10-22 DIAGNOSIS — Z79.4 TYPE 2 DIABETES MELLITUS WITH HYPERGLYCEMIA, WITH LONG-TERM CURRENT USE OF INSULIN (H): ICD-10-CM

## 2018-10-22 DIAGNOSIS — E11.65 TYPE 2 DIABETES MELLITUS WITH HYPERGLYCEMIA, WITH LONG-TERM CURRENT USE OF INSULIN (H): ICD-10-CM

## 2018-10-22 LAB — HBA1C MFR BLD: 6.1 % (ref 4.3–6)

## 2018-10-22 PROCEDURE — G0463 HOSPITAL OUTPT CLINIC VISIT: HCPCS

## 2018-10-22 PROCEDURE — 99214 OFFICE O/P EST MOD 30 MIN: CPT | Performed by: NURSE PRACTITIONER

## 2018-10-22 ASSESSMENT — PAIN SCALES - GENERAL: PAINLEVEL: NO PAIN (0)

## 2018-10-22 NOTE — MR AVS SNAPSHOT
After Visit Summary   10/22/2018    Flora Acuna    MRN: [id number]           Patient Information     Date Of Birth          1945        Visit Information        Provider Department      10/22/2018 2:30 PM Lisa Jean-Baptiste APRN CNP [facility]        Today's Diagnoses     Diabetes mellitus, type 2 (H)          Care Instructions      BG goals:  Fasting and before meals <130, >80  2 hour after eating <180    We only need 1/2 of these numbers to be within target then your A1c will be within target    Medication changes   - pump adjustment today    Follow up   -3 months     Call me sooner if any problems/concerns and/or questions develop including consistent low BGs <70 or consistent high BGs >200  [phone] (Unit Coordinator)    [phone] (Nurse)          Follow-ups after your visit        Follow-up notes from your care team     Return in about 3 months (around 1/22/2019).      Your next 10 appointments already scheduled     Jan 22, 2019 11:00 AM CST   (Arrive by 10:45 AM)   Diabetes Education with Kerri Elliott NP   [facility]    [address]  [place] [address] [phone]              Who to contact     If you have questions or need follow up information about today's clinic visit or your schedule please contact [facility] directly at [phone].  Normal or non-critical lab and imaging results will be communicated to you by MyChart, letter or phone within 4 business days after the clinic has received the results. If you do not hear from us within 7 days, please contact the clinic through MyChart or phone. If you have a critical or abnormal lab result, we will notify you by phone as soon as possible.  Submit refill requests through [employer] or call your pharmacy and they will forward the refill request to us. Please allow 3 business days for your refill to be  "completed.          Additional Information About Your Visit        [employer]Setup Information     [employer] lets you send messages to your doctor, view your test results, renew your prescriptions, schedule appointments and more. To sign up, go to [email or web address] . Click on \"Log in\" on the left side of the screen, which will take you to the Welcome page. Then click on \"Sign up Now\" on the right side of the page.     You will be asked to enter the access code listed below, as well as some personal information. Please follow the directions to create your username and password.     Your access code is: [email or web address]  Expires: 10/23/2018  5:30 PM     Your access code will  in 90 days. If you need help or a new code, please call your [place] clinic or [phone].        Care EveryWhere ID     This is your Care EveryWhere ID. This could be used by other organizations to access your [place] medical records  [id number]        Your Vitals Were     Pulse Respirations Height Pulse Oximetry BMI (Body Mass Index)       81 16 5' 2\" (1.575 m) 98% 26.92 kg/m2        Blood Pressure from Last 3 Encounters:   10/22/18 114/57   18 150/58   18 113/66    Weight from Last 3 Encounters:   10/22/18 147 lb 3.2 oz (66.8 kg)   18 150 lb (68 kg)   17 149 lb 3.2 oz (67.7 kg)              Today, you had the following     No orders found for display       Primary Care Provider Office Phone # Fax #    Salvador Mejia [phone]       [facility] [address] [facility] [address]        Equal Access to Services     [facility] AH: Hadii leola Thakur, wapkda luqadaha, qaybta kaalmada malia, fracisco martinez. So [facility] [phone].    ATENCIÓN: Si habla español, tiene a conley disposición servicios gratuitos de asistencia lingüística. Llame al [phone].    We comply with applicable federal civil rights laws and [place] laws. We do not discriminate on the basis " of race, color, national origin, age, disability, sex, sexual orientation, or gender identity.            Thank you!     Thank you for choosing [facility]  for your care. Our goal is always to provide you with excellent care. Hearing back from our patients is one way we can continue to improve our services. Please take a few minutes to complete the written survey that you may receive in the mail after your visit with us. Thank you!             Your Updated Medication List - Protect others around you: Learn how to safely use, store and throw away your medicines at www.disposemymeds.org.          This list is accurate as of 10/22/18  3:25 PM.  Always use your most recent med list.                   Brand Name Dispense Instructions for use Diagnosis    acetaminophen 325 MG tablet    TYLENOL     Take 650 mg by mouth        albuterol 108 (90 Base) MCG/ACT inhaler    PROAIR HFA/PROVENTIL HFA/VENTOLIN HFA     Inhale 2 puffs into the lungs        alendronate 70 MG tablet    FOSAMAX     Take 1 tablet by mouth once weekly. Take with water in the AM 30 minutes prior to eating. Avoid lying down 30 minutes after taking med.        baclofen 10 MG tablet    LIORESAL     2 tabs in the morning, 1 tab mid day and 2 tabs at night.        CATHY CONTOUR NEXT test strip   Generic drug:  blood glucose monitoring      Test blood sugars four times per day.  Type 2 Diabetes 250.00        clindamycin 300 MG capsule    CLEOCIN     Take 300 mg by mouth as needed (Take 3 capsules by mouth before dental procedure.)        furosemide 40 MG tablet    LASIX     TAKE 1 TABLET BY MOUTH DAILY        GLUCAGON EMERGENCY 1 MG kit   Generic drug:  glucagon     1 mg    Inject 1 mg Subcutaneous once    Diabetes mellitus, type 2 (H)       HYDROmorphone 2 MG tablet    DILAUDID     1-2 mg by Nasogastric route        * INSULIN PUMP - OUTPATIENT           * insulin aspart 100 UNITS/ML injection    NovoLOG VIAL    20 mL    To be used with  the insulin pump  TDD: 40 units    Type 2 diabetes mellitus with hyperglycemia, with long-term current use of insulin (H)       insulin pump infusion      Date last updated:  2/4/15 Medtronic Minimed: Model 723 BASAL RATES and times: 12   AM (midnight): 0.9 units/hour   9    PM: 0.8 units/hour  Basal Pattern A:  Flora Acuna will use when N/A. CARB RATIO and times: 12   AM (midnight): 13.0 4     PM: 10 Corection Factor (Sensitivity) and times: 12   AM (midnight): 55 mg/dL BLOOD GLUCOSE TARGET and times: 12   AM (midnight): 100 - 150 7     AM:  100 - 120 9    PM:  100 - 150  Active Insulin Time:  3 hours Sensor:  No Carelink / Diasend username:  [email or web address] Carelink / Diasend Password:  [email or web address]        Ketone Test Strp     50 strip    Use as directed    Diabetes mellitus type 1 (H)       Lancet Devices Misc           levothyroxine 75 MCG tablet    SYNTHROID/LEVOTHROID     TAKE 1 TABLET BY MOUTH DAILY        [employer] FINEPOINT LANCETS Misc      TEST FOUR TIMES DAILY        meclizine 25 MG tablet    ANTIVERT     Take 25 mg by mouth        metolazone 2.5 MG tablet    ZAROXOLYN     Take as directed, 1/2 hour prior to furosemide, max once daily.        metoprolol tartrate 50 MG tablet    LOPRESSOR     2 times daily        NITROSTAT 0.4 MG sublingual tablet   Generic drug:  nitroGLYcerin      Place 0.4 mg under the tongue        Oyster Shell Calcium 500-400 MG-UNIT Tabs           potassium chloride SA 10 MEQ CR tablet    K-DUR/KLOR-CON M     TAKE 2 TABLETS BY MOUTH DAILY        priLOSEC 20 MG CR capsule   Generic drug:  omeprazole      Take 20 mg by mouth 2 times daily        ramipril 10 MG capsule    ALTACE     TAKE 1 CAPSULE BY MOUTH twice a day        senna-docusate 8.6-50 MG per tablet    SENOKOT-S;PERICOLACE          simvastatin 40 MG tablet    ZOCOR     TAKE 1 TABLET BY MOUTH AT BEDTIME        tamoxifen 20 MG tablet    NOLVADEX     Take 20 mg by mouth        VITAMIN D (CHOLECALCIFEROL) PO      Take 5,000 Units by  mouth daily        * Notice:  This list has 2 medication(s) that are the same as other medications prescribed for you. Read the directions carefully, and ask your doctor or other care provider to review them with you.

## 2018-10-22 NOTE — PROGRESS NOTES
Diabetes Education Self Management & Training    SUBJECTIVE/OBJECTIVE:  Presents for: Follow-up  Accompanied by: Self  Diabetes education in the past 24mo: Yes  Focus of Visit: Monitoring, Taking Medication  Diabetes type: Type 1  Date of diagnosis: 2003  Disease course: Stable  How confident are you filling out medical forms by yourself:: Extremely  Diabetes management related comments/concerns: having some lows while going to [employer]  Transportation concerns: No  Other concerns:: Glasses  Cultural Influences/Ethnic Background:  American      Diabetes Follow-up    Patient is checking blood sugars: 3.8 times daily.    Blood sugar testing frequency justification: Adjustment of medication(s), Risk of hypoglycemia with medication(s) and Patient modifying lifestyle changes (diet, exercise) with blood sugars    Diabetic concerns: other - occasional lows while shopping     Symptoms of hypoglycemia (low blood sugar): shaky, confusion, sweaty     Paresthesias (numbness or burning in feet) or sores: No     Date of last diabetic eye exam: 5/2018    insulin pump download, which is as followed:    Insulin pump information:   Average: 136 +/- 44  Basal/bolus ratio: 20.9 (80%)/5.1 (20%)    Testing: 3.8x/day    Hypoglycemia: 2%    Basal dose:  00:00 0.875  08:00 0.900  21:00 0.750    Adjusted the basal rate:     00:00 0.875  08:00 0.900  21:00 0.700    Carb Ratio  00:00 12.0  16:00 13.0    Adjusted Carb Ratio  00:00 13.0  16:00 15.0    Lab Results   Component Value Date    A1C 6.7 02/16/2018    A1C 7.7 08/15/2017    A1C 7 04/17/2017    A1C 6.8 09/21/2016    A1C 7.4 01/26/2016         BP Readings from Last 2 Encounters:   10/22/18 114/57   05/04/18 150/58     Hemoglobin A1C (%)   Date Value   02/16/2018 6.7 (H)   08/15/2017 7.7     LDL Cholesterol Calculated (mg/dL)   Date Value   02/16/2018 33   10/31/2016 87       Diabetes Management Resources        Problem list and histories reviewed & adjusted, as indicated.  Additional  history: as documented    Patient Active Problem List   Diagnosis     CARDIOVASCULAR SCREENING; LDL GOAL LESS THAN 160     Numbness and tingling in left hand     Elevated blood pressure     Diabetes mellitus, type 2 (H)     Personal history of malignant neoplasm of breast     ACP (advance care planning)     Past Surgical History:   Procedure Laterality Date     APPENDECTOMY OPEN CHILD       AS TOTAL KNEE ARTHROPLASTY Right      CHOLECYSTECTOMY       COLONOSCOPY - HIM SCAN N/A 03/12/2009    per Care Everywhere documentation per [facility].      HYSTERECTOMY       MASTECTOMY, BILATERAL Bilateral 02/26/1992     SHOULDER SURGERY Right      SHOULDER SURGERY Left        Social History   Substance Use Topics     Smoking status: Never Smoker     Smokeless tobacco: Never Used     Alcohol use No     Family History   Problem Relation Age of Onset     Breast Cancer Maternal Aunt      Breast Cancer Maternal Aunt      Lung Cancer Father          Current Outpatient Prescriptions   Medication Sig Dispense Refill     acetaminophen (TYLENOL) 325 MG tablet Take 650 mg by mouth       Acetone, Urine, Test (KETONE TEST) STRP Use as directed 50 strip 4     albuterol (PROAIR HFA, PROVENTIL HFA, VENTOLIN HFA) 108 (90 BASE) MCG/ACT inhaler Inhale 2 puffs into the lungs       alendronate (FOSAMAX) 70 MG tablet Take 1 tablet by mouth once weekly. Take with water in the AM 30 minutes prior to eating. Avoid lying down 30 minutes after taking med.       baclofen (LIORESAL) 10 MG tablet 2 tabs in the morning, 1 tab mid day and 2 tabs at night.       blood glucose monitoring (CATHY CONTOUR NEXT) test strip Test blood sugars four times per day.  Type 2 Diabetes 250.00       Calcium Carb-Cholecalciferol (OYSTER SHELL CALCIUM) 500-400 MG-UNIT TABS        clindamycin (CLEOCIN) 300 MG capsule Take 300 mg by mouth as needed (Take 3 capsules by mouth before dental procedure.)       furosemide (LASIX) 40 MG tablet TAKE 1 TABLET BY MOUTH DAILY        glucagon (GLUCAGON EMERGENCY) 1 MG injection Inject 1 mg Subcutaneous once 1 mg 12     HYDROmorphone (DILAUDID) 2 MG tablet 1-2 mg by Nasogastric route       Insulin Aspart (INSULIN PUMP - OUTPATIENT)        insulin aspart (NOVOLOG VIAL) 100 UNITS/ML injection To be used with the insulin pump    TDD: 40 units 20 mL 3     INSULIN PUMP - OUTPATIENT Date last updated:  2/4/15  Medtronic Minimed: Model 723  BASAL RATES and times:  12   AM (midnight): 0.9 units/hour    9    PM: 0.8 units/hour   Basal Pattern A:  Flora Armand will use when N/A.  CARB RATIO and times:  12   AM (midnight): 13.0  4     PM: 10  Corection Factor (Sensitivity) and times:  12   AM (midnight): 55 mg/dL  BLOOD GLUCOSE TARGET and times:  12   AM (midnight): 100 - 150  7     AM:  100 - 120  9    PM:  100 - 150  Active Insulin Time:  3 hours  Sensor:  No  Carelink / Diasend username:  [email or web address]  Carelink / Diasend Password:  [email or web address]       Lancet Devices MISC        levothyroxine (SYNTHROID, LEVOTHROID) 75 MCG tablet TAKE 1 TABLET BY MOUTH DAILY       LIFESCAN FINEPOINT LANCETS MISC TEST FOUR TIMES DAILY       meclizine (ANTIVERT) 25 MG tablet Take 25 mg by mouth       metolazone (ZAROXOLYN) 2.5 MG tablet Take as directed, 1/2 hour prior to furosemide, max once daily.       metoprolol (LOPRESSOR) 50 MG tablet 2 times daily   3     nitroglycerin (NITROSTAT) 0.4 MG SL tablet Place 0.4 mg under the tongue       omeprazole (PRILOSEC) 20 MG capsule Take 20 mg by mouth 2 times daily        potassium chloride SA (K-DUR,KLOR-CON M) 10 MEQ tablet TAKE 2 TABLETS BY MOUTH DAILY       ramipril (ALTACE) 10 MG capsule TAKE 1 CAPSULE BY MOUTH twice a day       senna-docusate (SENOKOT-S;PERICOLACE) 8.6-50 MG per tablet        simvastatin (ZOCOR) 40 MG tablet TAKE 1 TABLET BY MOUTH AT BEDTIME       tamoxifen (NOLVADEX) 20 MG tablet Take 20 mg by mouth       VITAMIN D, CHOLECALCIFEROL, PO Take 5,000 Units by mouth daily       Allergies   Allergen Reactions      "Contrast Dye Difficulty breathing     Gadolinium Derivatives      Other reaction(s): Difficulty in swallowing, Laryngeal spasm  Patient had an injection into rt. Shoulder capsule prior to MRI arthrogram.  Contained multihance gadobenate, omnipaque iohexol, and buffered lidocaine.       Ammonia      Cory-1 [Lidocaine Hcl]      Novocaine allergy       Codeine Sulfate      Food      Shrimp     Iodine-131 Swelling     Ct dye     Lidocaine      Nitrous Oxide      No Clinical Screening - See Comments Nausea and Vomiting and Other (See Comments)     (3/6/09)- N/V and Heart racing     Novocain [Procaine]      Penicillins      Tramadol      Morphine Palpitations       Reviewed and updated as needed this visit by clinical staff       Reviewed and updated as needed this visit by Provider         Diabetes Symptoms & Complications  Blurred vision: Yes  Fatigue: Yes  Neuropathy: No  Foot ulcerations: No  Polydipsia: Yes  Polyphagia: No  Polyuria: No  Symptom course: Stable       Patient Problem List and Family Medical History reviewed for relevant medical history, current medical status, and diabetes risk factors.    Vitals:  /57  Pulse 81  Resp 16  Ht 5' 2\" (1.575 m)  Wt 147 lb 3.2 oz (66.8 kg)  SpO2 98%  BMI 26.92 kg/m2  Estimated body mass index is 26.92 kg/(m^2) as calculated from the following:    Height as of this encounter: 5' 2\" (1.575 m).    Weight as of this encounter: 147 lb 3.2 oz (66.8 kg).   Last 3 BP:   BP Readings from Last 3 Encounters:   10/22/18 114/57   05/04/18 150/58   02/16/18 113/66       History   Smoking Status     Never Smoker   Smokeless Tobacco     Never Used       Labs:  Lab Results   Component Value Date    A1C 6.7 02/16/2018     Lab Results   Component Value Date     09/05/2017     Lab Results   Component Value Date    LDL 33 02/16/2018     HDL Cholesterol   Date Value Ref Range Status   02/16/2018 55 >49 mg/dL Final   ]  GFR Estimate   Date Value Ref Range Status   09/05/2017 " 41 (L) >60 mL/min/1.7m2 Final     Comment:     Non  GFR Calc     GFR Estimate If Black   Date Value Ref Range Status   09/05/2017 50 (L) >60 mL/min/1.7m2 Final     Comment:      GFR Calc     Lab Results   Component Value Date    CR 1.28 09/05/2017     No results found for: MICROALBUMIN    Healthy Eating  Healthy Eating Assessed Today: Yes  Cultural/Gnosticist diet restrictions?: No  Patient on a regular basis: Skips meals regularly, Snacks frequently throughout the day, Counts carbohydrates, Has an inconsistent intake of carbohydrates  Meal planning: None  Meals include: Snacks, Dinner  Beverages: Juice, Soda, Coffee, Milk  Has patient met with a dietitian in the past?: No    Being Active  Being Active Assessed Today: Yes  Exercise:: Currently not exercising  Barrier to exercise: Physical limitation    Monitoring  Monitoring Assessed Today: Yes  Home Glucose (Sugar) Monitoring: 3-4 times per day  Blood glucose trend: No change  Low Glucose Range (mg/dL): <70  High Glucose Range (mg/dL): >200  Overall Range (mg/dL): 130-140    Taking Medications  Diabetes Medication(s)     Diabetic Other Sig    glucagon (GLUCAGON EMERGENCY) 1 MG injection Inject 1 mg Subcutaneous once    Insulin Sig    Insulin Aspart (INSULIN PUMP - OUTPATIENT)     insulin aspart (NOVOLOG VIAL) 100 UNITS/ML injection To be used with the insulin pump    TDD: 40 units    INSULIN PUMP - OUTPATIENT Date last updated:  2/4/15  [employer] MinimBrandwatch: Model 723  BASAL RATES and times:  12   AM (midnight): 0.9 units/hour    9    PM: 0.8 units/hour   Basal Pattern A:  Folra Acuna will use when N/A.  CARB RATIO and times:  12   AM (midnight): 13.0  4     PM: 10  Corection Factor (Sensitivity) and times:  12   AM (midnight): 55 mg/dL  BLOOD GLUCOSE TARGET and times:  12   AM (midnight): 100 - 150  7     AM:  100 - 120  9    PM:  100 - 150  Active Insulin Time:  3 hours  Sensor:  No  Carelink / Diasend username:   [email or web address]  [facility] [employer] Password:  [email or web address]          Taking Medication Assessed Today: Yes  Current Treatments: Insulin Pump  Given by: Patient  Problems taking diabetes medications regularly?: No  Diabetes medication side effects?: No  Treatment Compliance: All of the time    Problem Solving  Problem Solving Assessed Today: Yes  Hypoglycemia Frequency: Other  Hypoglycemia Treatment: Candy  Patient carries a carbohydrate source: Yes  Medical alert: No  Severe weather/disaster plan for diabetes management?: No  DKA prevention plan?: No              Reducing Risks  Reducing Risks Assessed Today: Yes  Diabetes Risks: Age over 45 years, Sedentary Lifestyle  CAD Risks: Diabetes Mellitus, Sedentary lifestyle  Has dilated eye exam at least once a year?: Yes  Sees dentist every 6 months?: Yes  Sees podiatrist (foot doctor)?: Yes    Healthy Coping  Healthy Coping Assessed Today: Yes  Emotional response to diabetes: Acceptance  Informal Support system:: Children  Stage of change: MAINTENANCE (Working to maintain change, with risk of relapse)  Support resources: None  Patient Activation Measure Survey Score:  SPEEDY Score (Last Two) 10/5/2012   SPEEDY Raw Score 39   Activation Score 56.4   SPEEDY Level 3       ROS:  CONSTITUTIONAL: NEGATIVE for fever, chills, change in weight  INTEGUMENTARY/SKIN: NEGATIVE for worrisome rashes, moles or lesions  EYES: NEGATIVE for vision changes or irritation  ENT/MOUTH: NEGATIVE for ear, mouth and throat problems  RESP: NEGATIVE for significant cough or SOB  CV: NEGATIVE for chest pain, palpitations or peripheral edema  GI: NEGATIVE for nausea, abdominal pain, heartburn, or change in bowel habits  : denies dysuria  MUSCULOSKELETAL: NEGATIVE for significant arthralgias or myalgia  NEURO: NEGATIVE for weakness, dizziness or paresthesias  ENDOCRINE: Hx diabetes  PSYCHIATRIC: NEGATIVE for changes in mood or affect    GENERAL: healthy, alert and no distress  NECK: no adenopathy, no asymmetry, masses,  or scars and thyroid normal to palpation  RESP: lungs clear to auscultation - no rales, rhonchi or wheezes  CV: regular rate and rhythm, normal S1 S2, no S3 or S4, no murmur, click or rub, no peripheral edema and peripheral pulses strong  ABDOMEN: soft, nontender, no hepatosplenomegaly, no masses and bowel sounds normal  PSYCH: mentation appears normal, affect normal/bright    ASSESSMENT:  Due to lows discussed decreasing overnight insulin, eating a protein once BGs are back to 70 or greater. Plan to increase carb ratio.        Patient's most recent   Lab Results   Component Value Date    A1C 6.7 02/16/2018    is meeting goal of <7.0    INTERVENTION:   Diabetes knowledge and skills assessment:     Patient is knowledgeable in diabetes management concepts related to: Healthy Eating and Monitoring    Patient needs further education on the following diabetes management concepts: Taking Medication and eating healthy   Based on learning assessment above, most appropriate setting for further diabetes education would be: Individual setting.    Education provided today on:  AADE Self-Care Behaviors:  Healthy Eating: adding protein once BGs are above 70  Taking Medication: action of prescribed medication, side effects of prescribed medications and when to take medications    Opportunities for ongoing education and support in diabetes-self management were discussed.    Pt verbalized understanding of concepts discussed and recommendations provided today.       Education Materials Provided:      PLAN:  1. Type 2 diabetes mellitus with hyperglycemia, with long-term current use of insulin (H)  A1c 6.1 Flora is to perfect with her A1c. Adjusted insulin pump today and discussed eating protein to maintain BGs. She should also take 1/2 the dose of insulin before going to [employer].   - Hemoglobin A1c POCT        Follow up in 3 months. Call with any continued lows.   Patient Instructions     BG goals:  Fasting and before meals <130,  >80  2 hour after eating <180    We only need 1/2 of these numbers to be within target then your A1c will be within target    Medication changes   - pump adjustment today    Follow up   -3 months     Call me sooner if any problems/concerns and/or questions develop including consistent low BGs <70 or consistent high BGs >200  [phone] (Unit Coordinator)    [phone] (Nurse)        Time Spent: 60 minutes 50 % of the time spent on counseling   Encounter Type: Individual

## 2018-10-23 NOTE — PROGRESS NOTES
"Chief Complaint   Patient presents with     Diabetes       Initial /57  Pulse 81  Resp 16  Ht 1.575 m (5' 2\")  Wt 66.8 kg (147 lb 3.2 oz)  SpO2 98%  BMI 26.92 kg/m2 Estimated body mass index is 26.92 kg/(m^2) as calculated from the following:    Height as of this encounter: 1.575 m (5' 2\").    Weight as of this encounter: 66.8 kg (147 lb 3.2 oz).  Medication Reconciliation: complete    Ginger Lerner LPN    "

## 2018-10-24 DIAGNOSIS — Z79.4 TYPE 2 DIABETES MELLITUS WITH HYPERGLYCEMIA, WITH LONG-TERM CURRENT USE OF INSULIN (H): Primary | ICD-10-CM

## 2018-10-24 DIAGNOSIS — E11.65 TYPE 2 DIABETES MELLITUS WITH HYPERGLYCEMIA, WITH LONG-TERM CURRENT USE OF INSULIN (H): Primary | ICD-10-CM

## 2018-11-07 ENCOUNTER — TELEPHONE (OUTPATIENT)
Dept: EDUCATION SERVICES | Facility: HOSPITAL | Age: 73
End: 2018-11-07

## 2018-11-07 ENCOUNTER — ALLIED HEALTH/NURSE VISIT (OUTPATIENT)
Dept: EDUCATION SERVICES | Facility: OTHER | Age: 73
End: 2018-11-07
Attending: NURSE PRACTITIONER
Payer: MEDICARE

## 2018-11-07 DIAGNOSIS — E11.65 TYPE 2 DIABETES MELLITUS WITH HYPERGLYCEMIA, WITH LONG-TERM CURRENT USE OF INSULIN (H): Primary | ICD-10-CM

## 2018-11-07 DIAGNOSIS — Z79.4 TYPE 2 DIABETES MELLITUS WITH HYPERGLYCEMIA, WITH LONG-TERM CURRENT USE OF INSULIN (H): Primary | ICD-10-CM

## 2018-11-07 NOTE — MR AVS SNAPSHOT
"              After Visit Summary   11/7/2018    Flora Acuna    MRN: [id number]           Patient Information     Date Of Birth          1945        Visit Information        Provider Department      11/7/2018 1:00 PM Nurse, [facility]        Today's Diagnoses     Type 2 diabetes mellitus with hyperglycemia, with long-term current use of insulin (H)    -  1       Follow-ups after your visit        Your next 10 appointments already scheduled     Jan 22, 2019 11:00 AM CST   (Arrive by 10:45 AM)   Diabetes Education with Kerri Elliott NP   [facility]    [address]  [facility] [id number] [phone]              Who to contact     If you have questions or need follow up information about today's clinic visit or your schedule please contact [facility] directly at [phone].  Normal or non-critical lab and imaging results will be communicated to you by MyChart, letter or phone within 4 business days after the clinic has received the results. If you do not hear from us within 7 days, please contact the clinic through [employer] or phone. If you have a critical or abnormal lab result, we will notify you by phone as soon as possible.  Submit refill requests through [employer] or call your pharmacy and they will forward the refill request to us. Please allow 3 business days for your refill to be completed.          Additional Information About Your Visit        [employer] Information     [employer] lets you send messages to your doctor, view your test results, renew your prescriptions, schedule appointments and more. To sign up, go to [email or web address] . Click on \"Log in\" on the left side of the screen, which will take you to the Welcome page. Then click on \"Sign up Now\" on the right side of the page.     You will be asked to enter the access code listed below, as well as some personal " information. Please follow the directions to create your username and password.     Your access code is: [email or web address]  Expires: 2019  1:28 PM     Your access code will  in 90 days. If you need help or a new code, please call your [place] clinic or [phone].        Care EveryWhere ID     This is your Care EveryWhere ID. This could be used by other organizations to access your [place] medical records  [id number]         Blood Pressure from Last 3 Encounters:   10/22/18 114/57   18 150/58   18 113/66    Weight from Last 3 Encounters:   10/22/18 147 lb 3.2 oz (66.8 kg)   18 150 lb (68 kg)   17 149 lb 3.2 oz (67.7 kg)              Today, you had the following     No orders found for display       Primary Care Provider Office Phone # Fax #    Salvador Mejia [phone]       [facility] [address] [address]        Goals        General    Problem Solving (pt-stated)     Notes - Note created  10/22/2018  3:41 PM by Lisa Jean-Baptiste APRN CNP    My Goal: I will take 1/2 carb ratio before going shopping    What I need to meet my goal: meter to check BGs for lows    I plan to meet my goal by this date: next visit        Equal Access to Services     LANA GARCIA AH: Hadii leola sandovalo Soosmany, waaxda luqadaha, qaybta kaalmada malia, fracisco jain . So [facility] [phone].    ATENCIÓN: Si habla español, tiene a conley disposición servicios gratuitos de asistencia lingüística. Llame al [phone].    We comply with applicable federal civil rights laws and [place] laws. We do not discriminate on the basis of race, color, national origin, age, disability, sex, sexual orientation, or gender identity.            Thank you!     Thank you for choosing [facility]  for your care. Our goal is always to provide you with excellent care. Hearing back from our patients is one way we can continue to improve our  services. Please take a few minutes to complete the written survey that you may receive in the mail after your visit with us. Thank you!             Your Updated Medication List - Protect others around you: Learn how to safely use, store and throw away your medicines at www.disposemymeds.org.          This list is accurate as of 11/7/18  1:28 PM.  Always use your most recent med list.                   Brand Name Dispense Instructions for use Diagnosis    acetaminophen 325 MG tablet    TYLENOL     Take 650 mg by mouth        albuterol 108 (90 Base) MCG/ACT inhaler    PROAIR HFA/PROVENTIL HFA/VENTOLIN HFA     Inhale 2 puffs into the lungs        alendronate 70 MG tablet    FOSAMAX     Take 1 tablet by mouth once weekly. Take with water in the AM 30 minutes prior to eating. Avoid lying down 30 minutes after taking med.        baclofen 10 MG tablet    LIORESAL     2 tabs in the morning, 1 tab mid day and 2 tabs at night.        CATHY CONTOUR NEXT test strip   Generic drug:  blood glucose monitoring      Test blood sugars four times per day.  Type 2 Diabetes 250.00        clindamycin 300 MG capsule    CLEOCIN     Take 300 mg by mouth as needed (Take 3 capsules by mouth before dental procedure.)        furosemide 40 MG tablet    LASIX     TAKE 1 TABLET BY MOUTH DAILY        GLUCAGON EMERGENCY 1 MG kit   Generic drug:  glucagon     1 mg    Inject 1 mg Subcutaneous once    Diabetes mellitus, type 2 (H)       HYDROmorphone 2 MG tablet    DILAUDID     1-2 mg by Nasogastric route        * INSULIN PUMP - OUTPATIENT           * insulin aspart 100 UNITS/ML injection    NovoLOG VIAL    20 mL    To be used with the insulin pump  TDD: 40 units    Type 2 diabetes mellitus with hyperglycemia, with long-term current use of insulin (H)       insulin pump infusion      Date last updated:  2/4/15 [employer]: Model 723 BASAL RATES and times: 12   AM (midnight): 0.9 units/hour   9    PM: 0.8 units/hour  Basal Pattern A:  Flora  Pessenda will use when N/A. CARB RATIO and times: 12   AM (midnight): 13.0 4     PM: 10 Corection Factor (Sensitivity) and times: 12   AM (midnight): 55 mg/dL BLOOD GLUCOSE TARGET and times: 12   AM (midnight): 100 - 150 7     AM:  100 - 120 9    PM:  100 - 150  Active Insulin Time:  3 hours Sensor:  No Carelink / Diasend username:  [email or web address] Carelink / Diasend Password:  [email or web address]        Ketone Test Strp     50 strip    Use as directed    Diabetes mellitus type 1 (H)       Lancet Devices Misc           levothyroxine 75 MCG tablet    SYNTHROID/LEVOTHROID     TAKE 1 TABLET BY MOUTH DAILY        LIFESCAN FINEPOINT LANCETS Misc      TEST FOUR TIMES DAILY        meclizine 25 MG tablet    ANTIVERT     Take 25 mg by mouth        metolazone 2.5 MG tablet    ZAROXOLYN     Take as directed, 1/2 hour prior to furosemide, max once daily.        metoprolol tartrate 50 MG tablet    LOPRESSOR     2 times daily        NITROSTAT 0.4 MG sublingual tablet   Generic drug:  nitroGLYcerin      Place 0.4 mg under the tongue        Oyster Shell Calcium 500-400 MG-UNIT Tabs           potassium chloride SA 10 MEQ CR tablet    K-DUR/KLOR-CON M     TAKE 2 TABLETS BY MOUTH DAILY        priLOSEC 20 MG CR capsule   Generic drug:  omeprazole      Take 20 mg by mouth 2 times daily        ramipril 10 MG capsule    ALTACE     TAKE 1 CAPSULE BY MOUTH twice a day        senna-docusate 8.6-50 MG per tablet    SENOKOT-S;PERICOLACE          simvastatin 40 MG tablet    ZOCOR     TAKE 1 TABLET BY MOUTH AT BEDTIME        tamoxifen 20 MG tablet    NOLVADEX     Take 20 mg by mouth        VITAMIN D (CHOLECALCIFEROL) PO      Take 5,000 Units by mouth daily        * Notice:  This list has 2 medication(s) that are the same as other medications prescribed for you. Read the directions carefully, and ask your doctor or other care provider to review them with you.

## 2018-11-07 NOTE — PROGRESS NOTES
Flora called today stating her pump  yesterday and she did receive a new pump today. She was encouraged to come to the clinic for pump settings as she was not able to get into her pump to get any of the settings.     Flora presented stating she continues to have some low BGs.     Basal Rate:  00:00  0.875  08:00 0.900  21:00 0.700    Basal Rate Changes:  00:00 0.850  08:00 0.875  21:00 0.675    Pump settings continued:  Bolus  Carb ratio  00:00  13.0  16:00 15:00    Insulin Sensitivity:  00:00 52  16:00 45    BG Target  00:00 100-150  07:00 100-135  21:00 100-150    Maximum bolus 10.0 U    Bolus Wizard  Active insulin time 4:00      Flora was provided a sliding scale today and a bag of needles with instructions if she ever has pump problems again. For a back up plan.     Correction Scale  150-200  1 Unit  201-250  2 units  251-300  3 Units  301-350 4 units  351-400 5 units  >400  6 units    She is encouraged to follow up in 2 weeks for a download of her pump and review settings at that time due to her continued lows     Lisa NELSON [facility]-BC  Family Nurse Practitioner

## 2018-11-07 NOTE — TELEPHONE ENCOUNTER
Pt calls she is getting a new pump today and needs to get all the data that goes into the new pump. She would like to be seen today. Please advise.

## 2018-11-08 ENCOUNTER — TELEPHONE (OUTPATIENT)
Dept: EDUCATION SERVICES | Facility: HOSPITAL | Age: 73
End: 2018-11-08

## 2018-11-08 NOTE — TELEPHONE ENCOUNTER
Pt calls her BG readings are > 200, and now is 348. Pt started new pump yesterday and pump's settings were completed by Lisa Jean-Baptiste. I spoke to the pt she states that Lisa adjusted her pump when placed. Elzbieta recommends she see Lisa tomorrow to discuss/adjust further. Pt is scheduled for tomorrow am at 8 am.

## 2018-11-09 ENCOUNTER — TELEPHONE (OUTPATIENT)
Dept: EDUCATION SERVICES | Facility: OTHER | Age: 73
End: 2018-11-09

## 2018-11-09 NOTE — TELEPHONE ENCOUNTER
Pt calls she was unable to come to her appt. She thinks her pump is fine now, her BG was 100 this am. She will check in next week.

## 2018-11-09 NOTE — TELEPHONE ENCOUNTER
Sounds good. If she wants a pump download in the future we can put her in for a nurse only on a day that I am working in the clinic and I can see if she needs a pump adjusted or she can just follow up at her next scheduled appointment if her BGs are doing ok.     Lisa NELSON FNP-BC  Family Nurse Practitioner

## 2018-11-12 NOTE — TELEPHONE ENCOUNTER
I called the pt she has an appt for Follow-up. She spoke to [employer] and got help with her pump. She is not having anymore problems with her pump/ BG's.

## 2018-12-11 ENCOUNTER — TELEPHONE (OUTPATIENT)
Dept: EDUCATION SERVICES | Facility: OTHER | Age: 73
End: 2018-12-11

## 2018-12-12 NOTE — TELEPHONE ENCOUNTER
Pt calls to check status. Pt needs Novolog insulin. This is too expensive for her. It costs $150 each time she refills this an is unable to pay for. She usually just gets samples from Kerri Elliott. I spoke to Elzbieta Eason and advised the pt she can get 1 vial of Novolog. Pt is to  at the  of the [facility].

## 2018-12-19 ENCOUNTER — TELEPHONE (OUTPATIENT)
Dept: EDUCATION SERVICES | Facility: OTHER | Age: 73
End: 2018-12-19

## 2018-12-19 NOTE — TELEPHONE ENCOUNTER
Patient left message on answering machine asking if provider has received new forms for a new insulin pump. However patient corrected herself and said check her blood sugars machine.

## 2018-12-21 NOTE — TELEPHONE ENCOUNTER
LM to call.    Told her I'm waiting for Jose's reply regarding the new pump.  With the holiday season, I haven't heard anything.      Kerri NELSON FNP-BC  Diabetes and Wound Care

## 2019-01-22 ENCOUNTER — ALLIED HEALTH/NURSE VISIT (OUTPATIENT)
Dept: EDUCATION SERVICES | Facility: OTHER | Age: 74
End: 2019-01-22
Attending: NURSE PRACTITIONER
Payer: COMMERCIAL

## 2019-01-22 VITALS
HEART RATE: 80 BPM | BODY MASS INDEX: 28.08 KG/M2 | SYSTOLIC BLOOD PRESSURE: 96 MMHG | WEIGHT: 152.6 LBS | OXYGEN SATURATION: 99 % | DIASTOLIC BLOOD PRESSURE: 63 MMHG | HEIGHT: 62 IN

## 2019-01-22 DIAGNOSIS — M21.612 BILATERAL BUNIONS: ICD-10-CM

## 2019-01-22 DIAGNOSIS — M21.611 BILATERAL BUNIONS: ICD-10-CM

## 2019-01-22 DIAGNOSIS — E13.9 LADA (LATENT AUTOIMMUNE DIABETES IN ADULTS), MANAGED AS TYPE 1 (H): Primary | ICD-10-CM

## 2019-01-22 PROCEDURE — G0463 HOSPITAL OUTPT CLINIC VISIT: HCPCS

## 2019-01-22 PROCEDURE — 99214 OFFICE O/P EST MOD 30 MIN: CPT | Performed by: NURSE PRACTITIONER

## 2019-01-22 RX ORDER — BACLOFEN 10 MG/1
10 TABLET ORAL 3 TIMES DAILY
COMMUNITY
End: 2021-01-29

## 2019-01-22 ASSESSMENT — MIFFLIN-ST. JEOR: SCORE: 1160.44

## 2019-01-22 NOTE — PROGRESS NOTES
"Chief Complaint   Patient presents with     Diabetes       Initial BP 96/63   Pulse 80   Ht 1.575 m (5' 2\")   Wt 69.2 kg (152 lb 9.6 oz)   SpO2 99%   BMI 27.91 kg/m   Estimated body mass index is 27.91 kg/m  as calculated from the following:    Height as of this encounter: 1.575 m (5' 2\").    Weight as of this encounter: 69.2 kg (152 lb 9.6 oz).  Medication Reconciliation: complete    Ginger Lerner LPN    "

## 2019-01-22 NOTE — PROGRESS NOTES
SUBJECTIVE:  Flora Acuna, 73 year old, female presents with the following Chief Complaint(s) with HPI to follow:  Chief Complaint   Patient presents with     Diabetes     insulin pump download with possible adjustment        Diabetes Follow-up      Patient is checking blood sugars: 4.5x.day.  Results:  Overnight: no checks  Breakfast:   Mid-mornin and 146  Lunch:   Mid-afternoon: 129-295  Overnight:        Symptoms of hypoglycemia (low blood sugar): feels symptomatically <100    Paresthesias (numbness or burning in feet) or sores: Yes--same; no sores    Diabetic eye exam within the last year: Yes    Breakfast eaten regularly: Yes    Patient counting carbs: Yes       HPI:  Flora's here today for the follow up regarding her KAREEM, managed as a Type 1    Lab Results   Component Value Date    A1C 6.1 10/08/2018    A1C 6.7 2018    A1C 7.7 08/15/2017    A1C 7 2017    A1C 6.8 2016     Current Diabetes medication:   1.  Insulin pump, with Novolog   ASA use: yes, 325 mg daily  Statin use: yes, simvastatin 40 mg daily  Denies any issues with the insulin pump.     Flora's here today for an insulin pump download and possible adjust.  She reports the followin.  Dexcom  She's in the process of getting a Dexcom. Just needs a copy of the note sent to them.    She would like it before her trip to [place], which is the first weekend of February  2.  Low BGs  Upon arrival, Flora felt symptomatic.  BG in the clinic 71.  Gave her juice and crackers.  Reports she didn't eat today.  However, there was 45 grams of carbs entered with a BG of 284.      No other complaints noted.   No changes in health per Flora    Due for her foot exam     19 1309   General   Presents for Follow-up   Accompanied by Self   Diabetes education in the past 24mo Yes   Focus of Visit Monitoring;Reducing Risks;Problem Solving   Diabetes type Type 1   Disease course Getting harder to manage    How confident are you filling out medical forms by yourself: Quite a bit   Transportation concerns No   Other concerns: None   Symptoms   Blurred vision No   Fatigue No   Neuropathy Yes   Foot ulcerations No   Polydipsia No   Polyphagia No   Polyuria No   Visual change No   Weakness No   Weight loss No   Slow healing wounds No   Recent Infection(s) No   Symptom course Stable   Weight trend Fluctuating minimally   Diabetic Complications   Autonomic neuropathy No   CVA No   Heart disease No   Nephropathy No   Peripheral neuropathy Yes   Peripheral Vascular Disease No   Retinopathy No        01/22/19 1311   Healthy Eating   Healthy Eating Assessed Today Yes   Cultural/Uatsdin diet restrictions? No   Patient on a regular basis Snacks frequently throughout the day;Has an inconsistent intake of carbohydrates  (likes licorice)   Meal planning Carbohydrate counting   Meals include Breakfast;Lunch;Dinner   Beverages Water;Coffee;Milk   Has patient met with a dietitian in the past? Yes   Being Active   Being Active Assessed Today Yes   Exercise: Unable to exercise   Barrier to exercise Physical limitation   Monitoring   Monitoring Assessed Today Yes   Did patient bring glucose meter to appointment?  Yes   Blood Glucose Meter ContourNext   Home Glucose (Sugar) Monitoring 3-4 times per day   Blood glucose trend Fluctuating dramtically   Low Glucose Range (mg/dL) 70-90   High Glucose Range (mg/dL) >200   Overall Range (mg/dL) 140-180   Taking Medication   Taking Medication Assessed Today Yes   Current Treatments Insulin Pump   Given by Patient   Injection/Infusion sites Abdomen   Problems taking diabetes medications regularly? No   Diabetes medication side effects? No   Treatment Compliance Most of the time  (enter bolus AFTER eating)   Problem Solving   Problem Solving Assessed Today Yes   Hypoglycemia Frequency Weekly   Hypoglycemia Treatment Other food  (milk)   Patient carries a carbohydrate source Yes   Hypoglycemia  symptoms   Dizziness or Light-Headedness Yes   Hunger Yes   Tremors Yes   Hypoglycemia Complications   Blackouts No   Hospitalization No   Nocturnal hypoglycemia No   Required assistance No   Required glucagon injection No   Seizures No   Reducing Risks   Reducing Risks Assessed Today Yes   Diabetes Risks Age over 45 years;Sedentary Lifestyle   CAD Risks Diabetes Mellitus;Sedentary lifestyle   Has dilated eye exam at least once a year? Yes   Healthy Coping   Healthy Coping Assessed Today Yes   Emotional response to diabetes Ready to learn   Informal Support system: Friends   Stage of change PREPARATION (Decided to change - considering how)   Difficulty affording diabetes management supplies? Yes   Support resources In-person Offerings        01/24/19 1208   Insulin Pump   Insulin Pump Type Medtronic [id number]   Infusion Set Medtronic   Insulin Pump Review   Taking other diabetes medications? No   Patient understands DKA prevention Yes   Patient has an insulin multiple daily injection back-up plan Yes   Patient would benefit from Bolusing before meals;Counting carbohydrates accurately;Treating hypoglycemia appopriately   Education specific to insulin pump provided today Importance of changing infusion set every 3 days;Importance of bolusing before meals;Importance of counting carbohydrates accurately;Treating hypoglycemia correctly (Rule of 15)       Patient Active Problem List   Diagnosis     CARDIOVASCULAR SCREENING; LDL GOAL LESS THAN 160     Numbness and tingling in left hand     Elevated blood pressure     Diabetes mellitus, type 2 (H)     Personal history of malignant neoplasm of breast     ACP (advance care planning)       Past Medical History:   Diagnosis Date     Congestive heart failure, unspecified      Diabetes (H)      H/O rheumatoid arthritis      HLD (hyperlipidemia)      HTN (hypertension)      Myocardial infarction      Uncomplicated asthma        Past Surgical History:   Procedure Laterality Date      APPENDECTOMY OPEN CHILD       AS TOTAL KNEE ARTHROPLASTY Right      CHOLECYSTECTOMY       COLONOSCOPY - HIM SCAN N/A 03/12/2009    per Care Everywhere documentation per [facility].      HYSTERECTOMY       MASTECTOMY, BILATERAL Bilateral 02/26/1992     SHOULDER SURGERY Right      SHOULDER SURGERY Left        Family History   Problem Relation Age of Onset     Breast Cancer Maternal Aunt      Breast Cancer Maternal Aunt      Lung Cancer Father        Social History     Tobacco Use     Smoking status: Never Smoker     Smokeless tobacco: Never Used   Substance Use Topics     Alcohol use: No     Alcohol/week: 0.0 oz       Current Outpatient Medications   Medication Sig Dispense Refill     acetaminophen (TYLENOL) 325 MG tablet Take 650 mg by mouth       Acetone, Urine, Test (KETONE TEST) STRP Use as directed 50 strip 4     albuterol (PROAIR HFA, PROVENTIL HFA, VENTOLIN HFA) 108 (90 BASE) MCG/ACT inhaler Inhale 2 puffs into the lungs       alendronate (FOSAMAX) 70 MG tablet Take 1 tablet by mouth once weekly. Take with water in the AM 30 minutes prior to eating. Avoid lying down 30 minutes after taking med.       baclofen (LIORESAL) 10 MG tablet Take 10 mg by mouth 3 times daily       blood glucose (NO BRAND SPECIFIED) test strip by In Vitro route 4 times daily Use to test blood sugar 4 times daily or as directed.       Calcium Carb-Cholecalciferol (OYSTER SHELL CALCIUM) 500-400 MG-UNIT TABS        clindamycin (CLEOCIN) 300 MG capsule Take 300 mg by mouth as needed (Take 3 capsules by mouth before dental procedure.)       furosemide (LASIX) 40 MG tablet TAKE 1 TABLET BY MOUTH DAILY       glucagon (GLUCAGON EMERGENCY) 1 MG injection Inject 1 mg Subcutaneous once 1 mg 12     Insulin Aspart (INSULIN PUMP - OUTPATIENT)        insulin aspart (NOVOLOG VIAL) 100 UNITS/ML injection To be used with the insulin pump    TDD: 40 units 20 mL 3     INSULIN PUMP - OUTPATIENT Date last updated:  2/4/15  Medtronic Minimed: Model  723  BASAL RATES and times:  12   AM (midnight): 0.9 units/hour    9    PM: 0.8 units/hour   Basal Pattern A:  Flora Acuna will use when N/A.  CARB RATIO and times:  12   AM (midnight): 13.0  4     PM: 10  Corection Factor (Sensitivity) and times:  12   AM (midnight): 55 mg/dL  BLOOD GLUCOSE TARGET and times:  12   AM (midnight): 100 - 150  7     AM:  100 - 120  9    PM:  100 - 150  Active Insulin Time:  3 hours  Sensor:  No  Carelink / Diasend username:  [email or web address]  Carelink / Diasend Password:  [email or web address]       Lancet Devices MISC        levothyroxine (SYNTHROID, LEVOTHROID) 75 MCG tablet TAKE 1 TABLET BY MOUTH DAILY       RNA NetworksCAN FINEPOINT LANCETS MISC TEST FOUR TIMES DAILY       metolazone (ZAROXOLYN) 2.5 MG tablet Take as directed, 1/2 hour prior to furosemide, max once daily.       metoprolol (LOPRESSOR) 50 MG tablet 2 times daily   3     nitroglycerin (NITROSTAT) 0.4 MG SL tablet Place 0.4 mg under the tongue       potassium chloride SA (K-DUR,KLOR-CON M) 10 MEQ tablet TAKE 2 TABLETS BY MOUTH DAILY       ramipril (ALTACE) 10 MG capsule TAKE 1 CAPSULE BY MOUTH twice a day       senna-docusate (SENOKOT-S;PERICOLACE) 8.6-50 MG per tablet        simvastatin (ZOCOR) 40 MG tablet TAKE 1 TABLET BY MOUTH AT BEDTIME       tamoxifen (NOLVADEX) 20 MG tablet Take 20 mg by mouth       VITAMIN D, CHOLECALCIFEROL, PO Take 5,000 Units by mouth daily         Allergies   Allergen Reactions     Contrast Dye Difficulty breathing     Gadolinium Derivatives      Other reaction(s): Difficulty in swallowing, Laryngeal spasm  Patient had an injection into rt. Shoulder capsule prior to MRI arthrogram.  Contained multihance gadobenate, omnipaque iohexol, and buffered lidocaine.       Ammonia      Cory-1 [Lidocaine Hcl]      Novocaine allergy       Codeine Sulfate      Food      Shrimp     Iodine-131 Swelling     Ct dye     Lidocaine      Nitrous Oxide      No Clinical Screening - See Comments Nausea and Vomiting and Other (See  "Comments)     (3/6/09)- N/V and Heart racing     Novocain [Procaine]      Penicillins      Tramadol      Morphine Palpitations       REVIEW OF SYSTEMS  Skin: negative  Eyes: negative  Ears/Nose/Throat: negative  Respiratory: No shortness of breath, dyspnea on exertion, cough, or hemoptysis; history of asthma  Cardiovascular: history of HF (cause unknown)  Gastrointestinal: negative  Genitourinary: urgency and frequency   Musculoskeletal: positive for back pain, neck pain, arthritis and right shoulder (needs surgery)  Neurologic: positive for numbness or tingling of feet  Psychiatric: negative  Hematologic/Lymphatic/Immunologic: history of breast cancer (left) x 2 (same spot)  Endocrine: positive for diabetes and thyroid disease     OBJECTIVE:  BP 96/63   Pulse 80   Ht 1.575 m (5' 2\")   Wt 69.2 kg (152 lb 9.6 oz)   SpO2 99%   BMI 27.91 kg/m    General appearance: healthy, alert and no distress  Lungs: negative. Good diaphragmatic excursion. Lungs clear  Heart: negative.  No lifts, heaves, or thrills. RRR. 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 This is needed to get her Dexcom    Education was focused on how to insert the Dexcom, as well as remove.  Even though Flora has dexterity issues, she was able to perform these tasks    (M21.611,  M21.612) Bilateral bunions  Comment: noted  Plan: PODIATRY/FOOT & ANKLE SURGERY REFERRAL          Referral sent to Dr. Jane Chaidez--thank you.       Patient Instructions     Continue working on healthy eating and moving as best as you can (start low and slow, work up to 30 min, 5x/week)    BG goals:  Fasting and before meals <130, >80  2 hour after eating <180    We only need 1/2 of these numbers to be within target then your A1c will be within target    Medication changes   Watch for low BGs.      Recommendations:  Trust your insulin pump  Don't override it  Don't add carbs to \"correct\" high BGs--again, trust your insulin pump  Don't add more carbs during meals--again, trust your insulin pump  Keep working on giving insulin BEFORE consuming carbs    Follow up   1 week    Call me sooner if any problems/concerns and/or questions develop including consistent low BGs <70 or consistent high BGs >200  [phone] (Justine, Unit Coordinator)    [phone] (Ruth Nurse)  [phone] (Loiss huber)    Referral sent to podiatry      Time: 50 minutes  Barrier: none  Willingness to learn: accepting    Kerri NELSON [facility]  Disease Management    Cc: Dr. Mejia    50 minutes was spent with patient.    Over 50%  of this time was spent on counseling patient regarding illness, medication and/or treatment options, coordinating further cares and follow ups that are needed along with resource material that will be helpful in the treatment of these issues.         With the electronic record, we can now more quickly and easily track our patient diabetic goals. Our diabetes clinical review is in progress and these are the indicators we are monitoring for good diabetes health.     1.) HbA1C less than 7 " (measurement of your average blood sugars)  2.) Blood Pressure less than 140/80  3.) LDL less than 100 (bad cholesterol)  4.) HbA1C is checked in the last 6 months and below 7% (more frequently if not at goal or adjusting medications)  5.) LDL is checked in the last 12 months (more frequently if not at goal or adjusting medications)  6.) Taking one baby aspirin daily (unless otherwise instructed)  7.) No tobacco use  8) Statin use     You have achieved 8 out of 8 of these and I am encouraging you to come in and get tuned up to achieve 8 out of 8!  Here is what you have achieved so far in my goals for you:  1.) HbA1C  less than 7:                              YES     Your last  HbA1C :  Lab Results   Component Value Date    A1C 6.1 10/08/2018    A1C 6.7 02/16/2018    A1C 7.7 08/15/2017    A1C 7 04/17/2017    A1C 6.8 09/21/2016       2.) Blood Pressure less than 140/80:       YES      Your last    BP Readings from Last 1 Encounters:   01/22/19 96/63     3.) LDL less than 100:                              YES      Your last     LDL Cholesterol Calculated   Date Value Ref Range Status   02/16/2018 33 <100 mg/dL Final     Comment:     Desirable:       <100 mg/dl   '  4.) Checked HbA1C in the past 6 months: YES      5.) Checked LDL in the past 12 months:    YES      6.) Taking one aspirin daily:                       YES     7.) No tobacco use:                                        YES      8.) Statin use      YES

## 2019-01-24 NOTE — PATIENT INSTRUCTIONS
"  Continue working on healthy eating and moving as best as you can (start low and slow, work up to 30 min, 5x/week)    BG goals:  Fasting and before meals <130, >80  2 hour after eating <180    We only need 1/2 of these numbers to be within target then your A1c will be within target    Medication changes   Watch for low BGs.      Recommendations:  Trust your insulin pump  Don't override it  Don't add carbs to \"correct\" high BGs--again, trust your insulin pump  Don't add more carbs during meals--again, trust your insulin pump  Keep working on giving insulin BEFORE consuming carbs    Follow up   1 week    Call me sooner if any problems/concerns and/or questions develop including consistent low BGs <70 or consistent high BGs >200  [phone] (Justine, Unit Coordinator)    [phone] (Ruth Nurse)  [phone] (Kerri's cell)    Referral sent to podiatry  "

## 2019-01-28 ENCOUNTER — ALLIED HEALTH/NURSE VISIT (OUTPATIENT)
Dept: EDUCATION SERVICES | Facility: OTHER | Age: 74
End: 2019-01-28
Attending: FAMILY MEDICINE
Payer: COMMERCIAL

## 2019-01-28 ENCOUNTER — DOCUMENTATION ONLY (OUTPATIENT)
Dept: FAMILY MEDICINE | Facility: OTHER | Age: 74
End: 2019-01-28

## 2019-01-28 NOTE — PROGRESS NOTES
Received Flora's insulin pump download, which is as followed:    Insulin pump information:   Average: 202 +/- 69  Basal/bolus ratio: 20.6 (69%)/9.1 (31%)    Testin.5x/day    Hypoglycemia: 0%    Basal dose:  00:00 0.850  08:00 0.875  23:00 0.675    Adjusted the basal rate:  No changes with pump today       Lab Results   Component Value Date    A1C 6.1 10/08/2018    A1C 6.7 2018       Follow up in : as directed     Call sooner if needed.     Lisa Jean-Baptiste APRN FNP-BC  Family Nurse Practitioner

## 2019-01-28 NOTE — PROGRESS NOTES
Pt came in for a pump download today. This was completed and results given to Lisa Jean-Baptiste.    Ginger Lerner

## 2019-02-07 ENCOUNTER — MEDICAL CORRESPONDENCE (OUTPATIENT)
Dept: HEALTH INFORMATION MANAGEMENT | Facility: CLINIC | Age: 74
End: 2019-02-07

## 2019-02-25 ENCOUNTER — TELEPHONE (OUTPATIENT)
Dept: EDUCATION SERVICES | Facility: HOSPITAL | Age: 74
End: 2019-02-25

## 2019-02-25 NOTE — TELEPHONE ENCOUNTER
Pt calls she got a new pump and needs to have an installation and training. When would you have time to do this? If you are unavailable Lisa has time available on Friday and can do this for her. Your schedule is full.

## 2019-02-26 ENCOUNTER — OFFICE VISIT (OUTPATIENT)
Dept: PODIATRY | Facility: OTHER | Age: 74
End: 2019-02-26
Attending: PODIATRIST
Payer: COMMERCIAL

## 2019-02-26 VITALS — DIASTOLIC BLOOD PRESSURE: 78 MMHG | TEMPERATURE: 98.2 F | HEART RATE: 66 BPM | SYSTOLIC BLOOD PRESSURE: 125 MMHG

## 2019-02-26 DIAGNOSIS — M20.12 HALLUX VALGUS (ACQUIRED), LEFT FOOT: ICD-10-CM

## 2019-02-26 DIAGNOSIS — M20.42 HAMMER TOE OF LEFT FOOT: ICD-10-CM

## 2019-02-26 DIAGNOSIS — E10.9 DIABETES MELLITUS TYPE 1, CONTROLLED, WITHOUT COMPLICATIONS (H): ICD-10-CM

## 2019-02-26 DIAGNOSIS — L60.3 ONYCHODYSTROPHY: ICD-10-CM

## 2019-02-26 DIAGNOSIS — M20.41 HAMMER TOE OF RIGHT FOOT: ICD-10-CM

## 2019-02-26 DIAGNOSIS — M06.9 RHEUMATOID ARTHRITIS INVOLVING BOTH FEET, UNSPECIFIED RHEUMATOID FACTOR PRESENCE: ICD-10-CM

## 2019-02-26 DIAGNOSIS — E10.42 DIABETIC POLYNEUROPATHY ASSOCIATED WITH TYPE 1 DIABETES MELLITUS (H): ICD-10-CM

## 2019-02-26 DIAGNOSIS — M20.11 HALLUX VALGUS (ACQUIRED), RIGHT FOOT: Primary | ICD-10-CM

## 2019-02-26 PROCEDURE — G0463 HOSPITAL OUTPT CLINIC VISIT: HCPCS

## 2019-02-26 PROCEDURE — G0463 HOSPITAL OUTPT CLINIC VISIT: HCPCS | Mod: 25

## 2019-02-26 PROCEDURE — 99203 OFFICE O/P NEW LOW 30 MIN: CPT | Mod: 25 | Performed by: PODIATRIST

## 2019-02-26 PROCEDURE — 11721 DEBRIDE NAIL 6 OR MORE: CPT | Performed by: PODIATRIST

## 2019-02-26 PROCEDURE — 11721 DEBRIDE NAIL 6 OR MORE: CPT

## 2019-02-26 ASSESSMENT — PAIN SCALES - GENERAL: PAINLEVEL: NO PAIN (0)

## 2019-02-26 NOTE — PROGRESS NOTES
Chief complaint: Patient presents with:  Foot Problems        History of Present Illness: This 74 year old IDDM type I female with Rheumatoid Arthritis is seen at the request of Kerri Elliott for evaluation and suggestions of management of bilateral foot care. She has prominent bilateral bunions. She saw a podiatrist 15 years ago who told her nothing could be done for them due to her RA. She rarely has pain from the bunions, but she recently went on vacation and had multiple [place] the airports. Due to the long day of walking around the airport, she developed sores on the bilateral dorsal / medial bunions. They are no longer open sores and she says this isn't common for her. She otherwise has no pain from the bunions. She states her little toes are all starting to cross over each other and she is wondering why.    In addition, the patient relates that she gets tingling, burning and numbness in her feet, but it does not affect her sleeping or cause her excessive pain. She does her own toenails, however, she had a RIGHT shoulder replaced and it is challenging for her to trim her own toenails. Some of her toenails ingrow into her skin. She has not worn diabetic shoes in the past. She was previously treated for her RA, but he medication caused heart failure. She is no longer on any medications (including prednisone) to control her RA.  Last HbA1C was 6.1% on 10/22/2019. No further pedal complaints today.       /78   Pulse 66   Temp 98.2  F (36.8  C)     Patient Active Problem List   Diagnosis     CARDIOVASCULAR SCREENING; LDL GOAL LESS THAN 160     Numbness and tingling in left hand     Elevated blood pressure     Diabetes mellitus, type 2 (H)     Personal history of malignant neoplasm of breast     ACP (advance care planning)       Past Surgical History:   Procedure Laterality Date     APPENDECTOMY OPEN CHILD       AS TOTAL KNEE ARTHROPLASTY Right      CHOLECYSTECTOMY       COLONOSCOPY - HIM SCAN N/A  03/12/2009    per Care Everywhere documentation per [facility].      HYSTERECTOMY       MASTECTOMY, BILATERAL Bilateral 02/26/1992     SHOULDER SURGERY Right      SHOULDER SURGERY Left        Current Outpatient Medications   Medication     Acetone, Urine, Test (KETONE TEST) STRP     albuterol (PROAIR HFA, PROVENTIL HFA, VENTOLIN HFA) 108 (90 BASE) MCG/ACT inhaler     alendronate (FOSAMAX) 70 MG tablet     baclofen (LIORESAL) 10 MG tablet     blood glucose (NO BRAND SPECIFIED) test strip     Calcium Carb-Cholecalciferol (OYSTER SHELL CALCIUM) 500-400 MG-UNIT TABS     furosemide (LASIX) 40 MG tablet     Insulin Aspart (INSULIN PUMP - OUTPATIENT)     insulin aspart (NOVOLOG VIAL) 100 UNITS/ML injection     levothyroxine (SYNTHROID, LEVOTHROID) 75 MCG tablet     [employer] LANCETS MISC     metolazone (ZAROXOLYN) 2.5 MG tablet     metoprolol (LOPRESSOR) 50 MG tablet     potassium chloride SA (K-DUR,KLOR-CON M) 10 MEQ tablet     ramipril (ALTACE) 10 MG capsule     senna-docusate (SENOKOT-S;PERICOLACE) 8.6-50 MG per tablet     simvastatin (ZOCOR) 40 MG tablet     tamoxifen (NOLVADEX) 20 MG tablet     VITAMIN D, CHOLECALCIFEROL, PO     acetaminophen (TYLENOL) 325 MG tablet     clindamycin (CLEOCIN) 300 MG capsule     glucagon (GLUCAGON EMERGENCY) 1 MG injection     INSULIN PUMP - OUTPATIENT     Lancet Devices MISC     nitroglycerin (NITROSTAT) 0.4 MG SL tablet     No current facility-administered medications for this visit.           Allergies   Allergen Reactions     Contrast Dye Difficulty breathing     Gadolinium Derivatives      Other reaction(s): Difficulty in swallowing, Laryngeal spasm  Patient had an injection into rt. Shoulder capsule prior to MRI arthrogram.  Contained multihance gadobenate, omnipaque iohexol, and buffered lidocaine.       Ammonia      Cory-1 [Lidocaine Hcl]      Novocaine allergy       Codeine Sulfate      Food      Shrimp     Iodine-131 Swelling     Ct dye     Lidocaine       Nitrous Oxide      No Clinical Screening - See Comments Nausea and Vomiting and Other (See Comments)     (3/6/09)- N/V and Heart racing     Novocain [Procaine]      Penicillins      Tramadol      Morphine Palpitations       Family History   Problem Relation Age of Onset     Breast Cancer Maternal Aunt      Breast Cancer Maternal Aunt      Lung Cancer Father        Social History     Socioeconomic History     Marital status:      Spouse name: Not on file     Number of children: 2     Years of education: Not on file     Highest education level: Not on file   Occupational History     Occupation: [employer]     Employer: RETIRED   Social Needs     Financial resource strain: Not on file     Food insecurity:     Worry: Not on file     Inability: Not on file     Transportation needs:     Medical: Not on file     Non-medical: Not on file   Tobacco Use     Smoking status: Never Smoker     Smokeless tobacco: Never Used   Substance and Sexual Activity     Alcohol use: No     Alcohol/week: 0.0 oz     Drug use: No     Sexual activity: Not on file   Lifestyle     Physical activity:     Days per week: Not on file     Minutes per session: Not on file     Stress: Not on file   Relationships     Social connections:     Talks on phone: Not on file     Gets together: Not on file     Attends Sabianist service: Not on file     Active member of club or organization: Not on file     Attends meetings of clubs or organizations: Not on file     Relationship status: Not on file     Intimate partner violence:     Fear of current or ex partner: Not on file     Emotionally abused: Not on file     Physically abused: Not on file     Forced sexual activity: Not on file   Other Topics Concern     Parent/sibling w/ CABG, MI or angioplasty before 65F 55M? Not Asked   Social History Narrative     Not on file       ROS: 10 point ROS neg other than the symptoms noted above in the HPI.  EXAM  Constitutional: healthy, alert and no  distress    Psychiatric: mentation appears normal and affect normal/bright    VASCULAR:  -Dorsalis pedis pulse +2/4 b/l  -Posterior tibial pulse +2/4 b/l  -Capillary refill time < 3 seconds to b/l hallux  -Hair growth Absent to b/l anterior legs and ankles  NEURO:  -Light touch sensation intact to b/l plantar forefoot  Protective sensation intact with SWM +10/10 RIGHT and +10/10 LEFT   DERM:  -Small scabs (approximately 0.4cm x 0.4cm) to the bilateral dorsal medial 1st metatarsal head  ---No open skin lesions, no drainage, no ascending erythema, no other SOI  -Mild erythema to the bilateral dorsal 1st metatarsal head  -Skin thin, dry, flaking, and mild plantar erythema to the bilateral foot  -Toenails elongated, dystrophic and discolored x 10  MSK:  -Moderate lateral deviation of hallux with medial deviation of 1st metatarsal bilaterally  ---Deformity is rigid  -Moderate prominent bony prominence to dorsal and medial 1st metatarsal head bilaterally   -DORSIFLEXION contracture to MTPJ 2-5 b/l with flexion contracture to PIPJ of digits 2-5 b/l bilaterally    -Muscle strength of ankles +5/5 for dorsiflexion, plantarflexion, ABDUction and ADDuction b/l  -Decreased ROM of 1st MTPJ with forefoot loading   ============================================================    ASSESSMENT:  (M20.11) Hallux valgus (acquired), right foot  (primary encounter diagnosis)    (M20.12) Hallux valgus (acquired), left foot    (M20.41) Hammer toe of right foot    (M20.42) Hammer toe of left foot    (M20.42) Onychodystrophy    (E10.9) Diabetes mellitus type 1, controlled, without complications (H)    (E10.42) Diabetic polyneuropathy associated with type 1 diabetes mellitus (H)    (M06.9) Rheumatoid arthritis involving both feet, unspecified rheumatoid factor presence (H)            PLAN:  -Patient evaluated and examined. Treatment options discussed with no educational barriers noted.  --Diabetic Foot Education provided. This included checking  the feet daily looking for new new blisters or wounds, wearing shoes at all times when walking including around the house, and avoiding lotion application between the toes. Any sign of infection in the foot warrant's the patient presenting to the ED as soon as possible.   -Debrided nails x 10 w/o incident  -Foot exam for diabetic patient with sensation  -Discussed surgical options with patient. She has surgical treatment options if her bunions start to cause pain or if she starts to develop plantar metatarsal head pain. Explained how RA contributes to bilateral digital deformities. Will discuss further treatment options if she develops foot pain, but her foot pain is controlled at this time.  -Orthotist referral for DM shoes and a silipos bunion sleeve  -Patient currently has no pain from her bunions or hammertoes and she rarely has issues with ulcerating along the prominent bones of her feet. She will monitor this and consider surgical correction if she starts developing consistent wounds or if she starts experiencing foot pain.   -Patient in agreement with the above treatment plan and all of patient's questions were answered.      RTC 3 months for routine DFE and high risk nail debridement        Jane Chaidez DPM

## 2019-03-14 ENCOUNTER — TELEPHONE (OUTPATIENT)
Dept: EDUCATION SERVICES | Facility: HOSPITAL | Age: 74
End: 2019-03-14

## 2019-03-14 ENCOUNTER — TELEPHONE (OUTPATIENT)
Dept: EDUCATION SERVICES | Facility: OTHER | Age: 74
End: 2019-03-14

## 2019-03-14 DIAGNOSIS — E13.9 LADA (LATENT AUTOIMMUNE DIABETES IN ADULTS), MANAGED AS TYPE 1 (H): Primary | ICD-10-CM

## 2019-03-21 DIAGNOSIS — E13.9 LADA (LATENT AUTOIMMUNE DIABETES IN ADULTS), MANAGED AS TYPE 1 (H): ICD-10-CM

## 2019-03-21 LAB
CREAT UR-MCNC: 159 MG/DL
MICROALBUMIN UR-MCNC: 10 MG/L
MICROALBUMIN/CREAT UR: 6.54 MG/G CR (ref 0–25)

## 2019-03-21 PROCEDURE — 82043 UR ALBUMIN QUANTITATIVE: CPT | Mod: ZL | Performed by: NURSE PRACTITIONER

## 2019-04-01 ENCOUNTER — HOSPITAL ENCOUNTER (EMERGENCY)
Facility: HOSPITAL | Age: 74
Discharge: HOME | End: 2019-04-01
Attending: PHYSICIAN ASSISTANT | Admitting: PHYSICIAN ASSISTANT
Payer: MEDICARE

## 2019-04-01 ENCOUNTER — APPOINTMENT (OUTPATIENT)
Dept: GENERAL RADIOLOGY | Facility: HOSPITAL | Age: 74
End: 2019-04-01
Attending: NURSE PRACTITIONER
Payer: MEDICARE

## 2019-04-01 VITALS
SYSTOLIC BLOOD PRESSURE: 118 MMHG | RESPIRATION RATE: 18 BRPM | OXYGEN SATURATION: 96 % | DIASTOLIC BLOOD PRESSURE: 76 MMHG | TEMPERATURE: 97.9 F

## 2019-04-01 DIAGNOSIS — D69.6 THROMBOCYTOPENIA (H): ICD-10-CM

## 2019-04-01 DIAGNOSIS — E10.9 TYPE 1 DIABETES MELLITUS (H): ICD-10-CM

## 2019-04-01 DIAGNOSIS — E87.6 HYPOKALEMIA: ICD-10-CM

## 2019-04-01 DIAGNOSIS — J20.9 ACUTE BRONCHITIS: ICD-10-CM

## 2019-04-01 DIAGNOSIS — E83.51 HYPOCALCEMIA: ICD-10-CM

## 2019-04-01 LAB
ANION GAP SERPL CALCULATED.3IONS-SCNC: 8 MMOL/L (ref 3–14)
BASOPHILS # BLD AUTO: 0 10E9/L (ref 0–0.2)
BASOPHILS NFR BLD AUTO: 0.2 %
BUN SERPL-MCNC: 16 MG/DL (ref 7–30)
CALCIUM SERPL-MCNC: 8.2 MG/DL (ref 8.5–10.1)
CHLORIDE SERPL-SCNC: 97 MMOL/L (ref 94–109)
CO2 SERPL-SCNC: 28 MMOL/L (ref 20–32)
CREAT SERPL-MCNC: 0.8 MG/DL (ref 0.52–1.04)
DIFFERENTIAL METHOD BLD: ABNORMAL
EOSINOPHIL # BLD AUTO: 0.1 10E9/L (ref 0–0.7)
EOSINOPHIL NFR BLD AUTO: 2.5 %
ERYTHROCYTE [DISTWIDTH] IN BLOOD BY AUTOMATED COUNT: 11.9 % (ref 10–15)
FLUAV+FLUBV RNA SPEC QL NAA+PROBE: NEGATIVE
FLUAV+FLUBV RNA SPEC QL NAA+PROBE: NEGATIVE
GFR SERPL CREATININE-BSD FRML MDRD: 74 ML/MIN/{1.73_M2}
GLUCOSE SERPL-MCNC: 298 MG/DL (ref 70–99)
HCT VFR BLD AUTO: 38.3 % (ref 35–47)
HGB BLD-MCNC: 12.6 G/DL (ref 11.7–15.7)
IMM GRANULOCYTES # BLD: 0 10E9/L (ref 0–0.4)
IMM GRANULOCYTES NFR BLD: 0.5 %
LYMPHOCYTES # BLD AUTO: 1.3 10E9/L (ref 0.8–5.3)
LYMPHOCYTES NFR BLD AUTO: 29.2 %
MCH RBC QN AUTO: 30.4 PG (ref 26.5–33)
MCHC RBC AUTO-ENTMCNC: 32.9 G/DL (ref 31.5–36.5)
MCV RBC AUTO: 92 FL (ref 78–100)
MONOCYTES # BLD AUTO: 0.3 10E9/L (ref 0–1.3)
MONOCYTES NFR BLD AUTO: 6.8 %
NEUTROPHILS # BLD AUTO: 2.7 10E9/L (ref 1.6–8.3)
NEUTROPHILS NFR BLD AUTO: 60.8 %
NRBC # BLD AUTO: 0 10*3/UL
NRBC BLD AUTO-RTO: 0 /100
PLATELET # BLD AUTO: 109 10E9/L (ref 150–450)
POTASSIUM SERPL-SCNC: 3.3 MMOL/L (ref 3.4–5.3)
PROCALCITONIN SERPL-MCNC: <0.05 NG/ML
RBC # BLD AUTO: 4.15 10E12/L (ref 3.8–5.2)
RSV RNA SPEC NAA+PROBE: NEGATIVE
SODIUM SERPL-SCNC: 133 MMOL/L (ref 133–144)
SPECIMEN SOURCE: NORMAL
WBC # BLD AUTO: 4.4 10E9/L (ref 4–11)

## 2019-04-01 PROCEDURE — 87631 RESP VIRUS 3-5 TARGETS: CPT | Performed by: PHYSICIAN ASSISTANT

## 2019-04-01 PROCEDURE — 84145 PROCALCITONIN (PCT): CPT | Performed by: PHYSICIAN ASSISTANT

## 2019-04-01 PROCEDURE — 40000275 ZZH STATISTIC RCP TIME EA 10 MIN

## 2019-04-01 PROCEDURE — 25000132 ZZH RX MED GY IP 250 OP 250 PS 637: Mod: GY | Performed by: PHYSICIAN ASSISTANT

## 2019-04-01 PROCEDURE — 99214 OFFICE O/P EST MOD 30 MIN: CPT | Mod: Z6 | Performed by: PHYSICIAN ASSISTANT

## 2019-04-01 PROCEDURE — 94664 DEMO&/EVAL PT USE INHALER: CPT | Mod: 59

## 2019-04-01 PROCEDURE — 94640 AIRWAY INHALATION TREATMENT: CPT

## 2019-04-01 PROCEDURE — A9270 NON-COVERED ITEM OR SERVICE: HCPCS | Mod: GY | Performed by: PHYSICIAN ASSISTANT

## 2019-04-01 PROCEDURE — 36415 COLL VENOUS BLD VENIPUNCTURE: CPT | Performed by: PHYSICIAN ASSISTANT

## 2019-04-01 PROCEDURE — 71046 X-RAY EXAM CHEST 2 VIEWS: CPT | Mod: TC

## 2019-04-01 PROCEDURE — G0463 HOSPITAL OUTPT CLINIC VISIT: HCPCS | Mod: 25

## 2019-04-01 PROCEDURE — 80048 BASIC METABOLIC PNL TOTAL CA: CPT | Performed by: PHYSICIAN ASSISTANT

## 2019-04-01 PROCEDURE — 85025 COMPLETE CBC W/AUTO DIFF WBC: CPT | Performed by: PHYSICIAN ASSISTANT

## 2019-04-01 RX ORDER — POTASSIUM CHLORIDE 1500 MG/1
TABLET, EXTENDED RELEASE ORAL
Qty: 7 TABLET | Refills: 0 | Status: SHIPPED | OUTPATIENT
Start: 2019-04-01 | End: 2020-03-06

## 2019-04-01 RX ORDER — ALBUTEROL SULFATE 90 UG/1
2 AEROSOL, METERED RESPIRATORY (INHALATION) ONCE
Status: COMPLETED | OUTPATIENT
Start: 2019-04-01 | End: 2019-04-01

## 2019-04-01 RX ORDER — POTASSIUM CHLORIDE 1500 MG/1
40 TABLET, EXTENDED RELEASE ORAL ONCE
Status: DISCONTINUED | OUTPATIENT
Start: 2019-04-01 | End: 2019-04-01

## 2019-04-01 RX ORDER — DOXYCYCLINE 100 MG/1
100 CAPSULE ORAL 2 TIMES DAILY
Qty: 10 CAPSULE | Refills: 0 | Status: SHIPPED | OUTPATIENT
Start: 2019-04-01 | End: 2019-05-07

## 2019-04-01 RX ADMIN — ALBUTEROL SULFATE 2 PUFF: 90 AEROSOL, METERED RESPIRATORY (INHALATION) at 14:50

## 2019-04-01 ASSESSMENT — ENCOUNTER SYMPTOMS
APPETITE CHANGE: 1
DIARRHEA: 0
CONSTIPATION: 0
ABDOMINAL PAIN: 0
FEVER: 0
VOMITING: 0
SORE THROAT: 1
DYSURIA: 0
CHEST TIGHTNESS: 1
ACTIVITY CHANGE: 0
CONFUSION: 0
FATIGUE: 0
NECK PAIN: 0
COUGH: 1

## 2019-04-01 NOTE — PROGRESS NOTES
The proper method of use of this metered-dose inhaler and spacer are discussed and demonstrated to the patient. Patient was given inhaler, spacer and paper instructions. Haley Brian RT

## 2019-04-01 NOTE — ED AVS SNAPSHOT
[facility]  [address]  [place] [address]  Phone:  [phone]                                    Flora Acuna   MRN: [id number]    Department:  [facility]   Date of Visit:  4/1/2019           After Visit Summary Signature Page    I have received my discharge instructions, and my questions have been answered. I have discussed any challenges I see with this plan with the nurse or doctor.    ..........................................................................................................................................  Patient/Patient Representative Signature      ..........................................................................................................................................  Patient Representative Print Name and Relationship to Patient    ..................................................               ................................................  Date                                   Time    ..........................................................................................................................................  Reviewed by Signature/Title    ...................................................              ..............................................  Date                                               Time          22EPIC Rev 08/18

## 2019-04-01 NOTE — ED PROVIDER NOTES
History     Chief Complaint   Patient presents with     Flu Symptoms     1 week     HPI  Flora Acuna is a 74 year old female who presents to urgent care for concerns of continued cough.  She states she was evaluated at her PCP office on 3/28/19 however has had worsening of her condition since then. States that she is hearing a rattling in her chest that is worsened when she takes a deep breath. She is coughing frequently and feels that she can hardly breathe while coughing. At times she has been vomiting due to coughing. Denies fevers, hemoptysis, chest pain present not while coughing. She is concerned that her sugars have been elevated-- has been as high as 275 at home. States she is having poor PO intake out of concern of her blood sugars although today was able to eat 1/2 sandwich. Has had a minor sore throat that she attributes to the coughing. States she had an MI when she was in her 40's but this does not feel like this pain and in fact she denies chest pain at all. She has not been on any antibiotics.     Allergies:  Allergies   Allergen Reactions     Contrast Dye Difficulty breathing     Gadolinium Derivatives      Other reaction(s): Difficulty in swallowing, Laryngeal spasm  Patient had an injection into rt. Shoulder capsule prior to MRI arthrogram.  Contained multihance gadobenate, omnipaque iohexol, and buffered lidocaine.       Ammonia      Cory-1 [Lidocaine Hcl]      Novocaine allergy       Codeine Sulfate      Food      Shrimp     Iodine-131 Swelling     Ct dye     Lidocaine      Nitrous Oxide      No Clinical Screening - See Comments Nausea and Vomiting and Other (See Comments)     (3/6/09)- N/V and Heart racing     Novocain [Procaine]      Penicillins      Tramadol      Morphine Palpitations       Problem List:    Patient Active Problem List    Diagnosis Date Noted     ACP (advance care planning) 09/21/2016     Priority: Medium     Advance Care Planning 9/21/2016: ACP Review of Chart /  Resources Provided:  Reviewed chart for advance care plan.  Flora Acuna has no plan or code status on file. Discussed available resources and provided with information. Confirmed code status reflects current choices pending further ACP discussions.  Confirmed/documented legally designated decision makers.  Added by Ruth Velasquez           Personal history of malignant neoplasm of breast 11/23/2015     Priority: Medium     Diabetes mellitus, type 2 (H) 10/01/2014     Priority: Medium     CARDIOVASCULAR SCREENING; LDL GOAL LESS THAN 160 10/05/2012     Priority: Medium     Numbness and tingling in left hand 10/05/2012     Priority: Medium     Elevated blood pressure 10/05/2012     Priority: Medium        Past Medical History:    Past Medical History:   Diagnosis Date     Congestive heart failure, unspecified      Diabetes (H)      H/O rheumatoid arthritis      HLD (hyperlipidemia)      HTN (hypertension)      Myocardial infarction (H)      Uncomplicated asthma        Past Surgical History:    Past Surgical History:   Procedure Laterality Date     APPENDECTOMY OPEN CHILD       AS TOTAL KNEE ARTHROPLASTY Right      CHOLECYSTECTOMY       COLONOSCOPY - HIM SCAN N/A 03/12/2009    per Care Everywhere documentation per [facility].      HYSTERECTOMY       MASTECTOMY, BILATERAL Bilateral 02/26/1992     SHOULDER SURGERY Right      SHOULDER SURGERY Left        Family History:    Family History   Problem Relation Age of Onset     Breast Cancer Maternal Aunt      Breast Cancer Maternal Aunt      Lung Cancer Father        Social History:  Marital Status:   [5]  Social History     Tobacco Use     Smoking status: Never Smoker     Smokeless tobacco: Never Used   Substance Use Topics     Alcohol use: No     Alcohol/week: 0.0 oz     Drug use: No        Medications:      acetaminophen (TYLENOL) 325 MG tablet   Acetone, Urine, Test (KETONE TEST) STRP   albuterol (PROAIR HFA, PROVENTIL HFA, VENTOLIN HFA) 108 (90 BASE)  MCG/ACT inhaler   alendronate (FOSAMAX) 70 MG tablet   baclofen (LIORESAL) 10 MG tablet   blood glucose (NO BRAND SPECIFIED) test strip   Calcium Carb-Cholecalciferol (OYSTER SHELL CALCIUM) 500-400 MG-UNIT TABS   clindamycin (CLEOCIN) 300 MG capsule   doxycycline hyclate (VIBRAMYCIN) 100 MG capsule   furosemide (LASIX) 40 MG tablet   glucagon (GLUCAGON EMERGENCY) 1 MG injection   Insulin Aspart (INSULIN PUMP - OUTPATIENT)   insulin aspart (NOVOLOG VIAL) 100 UNITS/ML injection   INSULIN PUMP - OUTPATIENT   Lancet Devices MISC   levothyroxine (SYNTHROID, LEVOTHROID) 75 MCG tablet   [email or web address] LANCETS MISC   Magnesium Cl-Calcium Carbonate (SLOW MAGNESIUM/CALCIUM)  MG TBEC   metolazone (ZAROXOLYN) 2.5 MG tablet   metoprolol (LOPRESSOR) 50 MG tablet   nitroglycerin (NITROSTAT) 0.4 MG SL tablet   potassium chloride ER (K-DUR/KLOR-CON M) 20 MEQ CR tablet   potassium chloride SA (K-DUR,KLOR-CON M) 10 MEQ tablet   ramipril (ALTACE) 10 MG capsule   senna-docusate (SENOKOT-S;PERICOLACE) 8.6-50 MG per tablet   simvastatin (ZOCOR) 40 MG tablet   tamoxifen (NOLVADEX) 20 MG tablet   VITAMIN D, CHOLECALCIFEROL, PO         Review of Systems   Constitutional: Positive for appetite change (patient eating less for concern of sugars). Negative for activity change, fatigue and fever.   HENT: Positive for sore throat.    Eyes: Negative for visual disturbance.   Respiratory: Positive for cough and chest tightness.    Cardiovascular: Negative for chest pain and leg swelling (has chronic R>L leg swelling and NO CHANGE from baseline).   Gastrointestinal: Negative for abdominal pain, constipation, diarrhea and vomiting.   Genitourinary: Negative for dysuria.   Musculoskeletal: Negative for neck pain.   Allergic/Immunologic: Positive for immunocompromised state (type 1 DM).   Neurological: Negative for syncope.   Psychiatric/Behavioral: Negative for confusion.       Physical Exam   BP: 118/76  Heart Rate: 95  Temp: 97.9  F (36.6   C)  Resp: 18  SpO2: 96 %      Physical Exam   Constitutional: She is oriented to person, place, and time. She appears well-developed and well-nourished. No distress.   HENT:   Head: Normocephalic and atraumatic.   Faint posterior oropharyngeal erythema   Eyes: EOM are normal. Pupils are equal, round, and reactive to light.   Neck: Normal range of motion. Neck supple.   Cardiovascular: Normal rate, regular rhythm and normal heart sounds. Exam reveals no gallop and no friction rub.   No murmur heard.  Pulmonary/Chest: Effort normal. No stridor. No respiratory distress. She has no wheezes. She has rales (very faint, b/l).   Neurological: She is alert and oriented to person, place, and time.   Skin: Skin is warm. She is not diaphoretic. No pallor.   Nursing note and vitals reviewed.      ED Course     ED Course as of Apr 01 2111   Mon Apr 01, 2019   1418 EF 55% in 2015      Procedures               Critical Care time:  none               Results for orders placed or performed during the hospital encounter of 04/01/19 (from the past 24 hour(s))   Influenza A and B and RSV PCR   Result Value Ref Range    Specimen Description Nasopharyngeal     Influenza A PCR Negative NEG^Negative    Influenza B PCR Negative NEG^Negative    Resp Syncytial Virus Negative NEG^Negative   Chest XR,  PA & LAT    Narrative    Procedure:XR CHEST 2 VW    Clinical history:Female, 74 years, cough    Technique: Two views are submitted.    Comparison: None    Findings: The cardiac silhouette is normal. The pulmonary vasculature  is normal.    The lungs are clear. Bony structures demonstrate an arthroplasty  device in the right shoulder. Postoperative changes are seen in the  left axilla.      Impression    Impression:   No acute abnormality. Lungs appear to be clear.    Postoperative changes of the right shoulder and left axilla/chest wall    BRIDGER SERRA MD   CBC with platelets differential   Result Value Ref Range    WBC 4.4 4.0 - 11.0 10e9/L     RBC Count 4.15 3.8 - 5.2 10e12/L    Hemoglobin 12.6 11.7 - 15.7 g/dL    Hematocrit 38.3 35.0 - 47.0 %    MCV 92 78 - 100 fl    MCH 30.4 26.5 - 33.0 pg    MCHC 32.9 31.5 - 36.5 g/dL    RDW 11.9 10.0 - 15.0 %    Platelet Count 109 (L) 150 - 450 10e9/L    Diff Method Automated Method     % Neutrophils 60.8 %    % Lymphocytes 29.2 %    % Monocytes 6.8 %    % Eosinophils 2.5 %    % Basophils 0.2 %    % Immature Granulocytes 0.5 %    Nucleated RBCs 0 0 /100    Absolute Neutrophil 2.7 1.6 - 8.3 10e9/L    Absolute Lymphocytes 1.3 0.8 - 5.3 10e9/L    Absolute Monocytes 0.3 0.0 - 1.3 10e9/L    Absolute Eosinophils 0.1 0.0 - 0.7 10e9/L    Absolute Basophils 0.0 0.0 - 0.2 10e9/L    Abs Immature Granulocytes 0.0 0 - 0.4 10e9/L    Absolute Nucleated RBC 0.0    Basic metabolic panel   Result Value Ref Range    Sodium 133 133 - 144 mmol/L    Potassium 3.3 (L) 3.4 - 5.3 mmol/L    Chloride 97 94 - 109 mmol/L    Carbon Dioxide 28 20 - 32 mmol/L    Anion Gap 8 3 - 14 mmol/L    Glucose 298 (H) 70 - 99 mg/dL    Urea Nitrogen 16 7 - 30 mg/dL    Creatinine 0.80 0.52 - 1.04 mg/dL    GFR Estimate 74 >60 mL/min/[1.73_m2]    GFR Estimate If Black 86 >60 mL/min/[1.73_m2]    Calcium 8.2 (L) 8.5 - 10.1 mg/dL   Procalcitonin   Result Value Ref Range    Procalcitonin <0.05 ng/ml       Medications   albuterol (PROAIR HFA/PROVENTIL HFA/VENTOLIN HFA) 108 (90 Base) MCG/ACT inhaler 2 puff (2 puffs Inhalation Given 4/1/19 1450)       Assessments & Plan (with Medical Decision Making)     I have reviewed the nursing notes.    I have reviewed the findings, diagnosis, plan and need for follow up with the patient.   Pt presented to urgent care for evaluation of cough after recent visit to PCP office for the same. She denies improvement and has been feeling that her coughing is becoming worse. She is starting to become concerned about elevated blood sugars in the absence of eating much. On arrival she was hemodynamically normal; however she did have  frequent coughing fits with abnormal sounds in b/l lower lung fields. Obtained labwork and this showed a mild hypokalemia and hypocalcemia. Prescribed patient replacements of these as below.     Her CXR returned negative for acute abnormality. However in the setting of this lingering illness in this type 1 diabetic, have greater concern for occult bacterial process. Discussed with patient and she strongly preferred to be on antibiotics. Will start patient on doxycycline. Advised she have close follow up with her PCP. She left in agreement and understanding of plan. She was also given an albuterol inhaler that helped greatly.        Medication List      Started    doxycycline hyclate 100 MG capsule  Commonly known as:  VIBRAMYCIN  100 mg, Oral, 2 TIMES DAILY     Magnesium Cl-Calcium Carbonate  MG Tbec  Commonly known as:  SLOW MAGNESIUM/CALCIUM  1 tablet, Oral, DAILY        Modified    * potassium chloride ER 10 MEQ CR tablet  Commonly known as:  K-DUR/KLOR-CON M  What changed:  Another medication with the same name was added. Make sure you understand how and when to take each.     * potassium chloride ER 20 MEQ CR tablet  Commonly known as:  K-DUR/KLOR-CON M  Add 20 mEq once daily (total of 40 mEq)  What changed:  You were already taking a medication with the same name, and this prescription was added. Make sure you understand how and when to take each.         * This list has 2 medication(s) that are the same as other medications prescribed for you. Read the directions carefully, and ask your doctor or other care provider to review them with you.                Final diagnoses:   Acute bronchitis   Type 1 diabetes mellitus (H)   Thrombocytopenia (H)   Hypokalemia   Hypocalcemia       4/1/2019   [facility]     Vannesa Ward PA-C  04/01/19 [id number]

## 2019-04-01 NOTE — DISCHARGE INSTRUCTIONS
You were seen today for continued cough. Given the elevated blood sugars and worsening of cough, will cover with antibiotics. Take doxycycline twice daily for 5 days.     -Take potassiun 20 mEq in addition to the 20 mEq you already take  -Take slow magnesium/calcium once daily for 7 days  -Can use inhaler 2 puffs every 4-6 hours as needed for coughing fits    Please see your PCP in 3-5 days if no improvement. If worsening of symptoms please return for evaluation

## 2019-04-01 NOTE — ED TRIAGE NOTES
Pt presents today with c/o cough, SOB, wheezing and crackling, weakness. Started 2 weeks ago, was in last Wednesday and had viral URI. States its getting worse.

## 2019-04-11 NOTE — PROGRESS NOTES
Patient called asking if she could have a couple vials of insulin as she's running low.      Will do.     Kerri NELSON FNP-BC  Diabetes and Wound Care

## 2019-04-19 LAB — HBA1C MFR BLD: 7.3 % (ref 0–5.6)

## 2019-04-24 ENCOUNTER — TRANSFERRED RECORDS (OUTPATIENT)
Dept: HEALTH INFORMATION MANAGEMENT | Facility: CLINIC | Age: 74
End: 2019-04-24

## 2019-04-24 LAB — ABSTRACT IFOB-NO CHARGE: NEGATIVE

## 2019-05-07 ENCOUNTER — OFFICE VISIT (OUTPATIENT)
Dept: FAMILY MEDICINE | Facility: OTHER | Age: 74
End: 2019-05-07
Attending: NURSE PRACTITIONER
Payer: COMMERCIAL

## 2019-05-07 VITALS
BODY MASS INDEX: 27.25 KG/M2 | DIASTOLIC BLOOD PRESSURE: 70 MMHG | SYSTOLIC BLOOD PRESSURE: 130 MMHG | WEIGHT: 149 LBS | OXYGEN SATURATION: 96 % | HEART RATE: 69 BPM

## 2019-05-07 DIAGNOSIS — Z78.0 ASYMPTOMATIC POSTMENOPAUSAL ESTROGEN DEFICIENCY: ICD-10-CM

## 2019-05-07 DIAGNOSIS — Z76.89 ENCOUNTER TO ESTABLISH CARE: ICD-10-CM

## 2019-05-07 DIAGNOSIS — K21.9 GASTROESOPHAGEAL REFLUX DISEASE, ESOPHAGITIS PRESENCE NOT SPECIFIED: Primary | ICD-10-CM

## 2019-05-07 PROBLEM — E03.9 HYPOTHYROIDISM: Status: ACTIVE | Noted: 2019-05-07

## 2019-05-07 PROBLEM — Z96.652 H/O TOTAL KNEE REPLACEMENT, LEFT: Status: ACTIVE | Noted: 2019-05-07

## 2019-05-07 PROBLEM — E78.49 OTHER HYPERLIPIDEMIA: Status: ACTIVE | Noted: 2019-05-07

## 2019-05-07 PROBLEM — Z86.79: Status: ACTIVE | Noted: 2019-05-07

## 2019-05-07 PROBLEM — C50.912 MALIGNANT NEOPLASM OF LEFT BREAST IN FEMALE, ESTROGEN RECEPTOR POSITIVE (H): Status: ACTIVE | Noted: 2019-05-07

## 2019-05-07 PROBLEM — E13.9 LADA (LATENT AUTOIMMUNE DIABETES IN ADULTS), MANAGED AS TYPE 1 (H): Status: ACTIVE | Noted: 2019-05-07

## 2019-05-07 PROBLEM — M51.16 LUMBAR DISC DISEASE WITH RADICULOPATHY: Status: ACTIVE | Noted: 2019-05-07

## 2019-05-07 PROBLEM — Z17.0 MALIGNANT NEOPLASM OF LEFT BREAST IN FEMALE, ESTROGEN RECEPTOR POSITIVE (H): Status: ACTIVE | Noted: 2019-05-07

## 2019-05-07 PROBLEM — Z96.611 S/P REVERSE TOTAL SHOULDER ARTHROPLASTY, RIGHT: Status: ACTIVE | Noted: 2019-05-07

## 2019-05-07 PROBLEM — I10 ESSENTIAL HYPERTENSION: Status: ACTIVE | Noted: 2019-05-07

## 2019-05-07 PROBLEM — M81.0 AGE-RELATED OSTEOPOROSIS WITHOUT CURRENT PATHOLOGICAL FRACTURE: Status: ACTIVE | Noted: 2019-05-07

## 2019-05-07 PROCEDURE — 99213 OFFICE O/P EST LOW 20 MIN: CPT | Performed by: NURSE PRACTITIONER

## 2019-05-07 PROCEDURE — G0463 HOSPITAL OUTPT CLINIC VISIT: HCPCS

## 2019-05-07 RX ORDER — OMEPRAZOLE 40 MG/1
40 CAPSULE, DELAYED RELEASE ORAL DAILY
Qty: 30 CAPSULE | Refills: 1 | Status: SHIPPED | OUTPATIENT
Start: 2019-05-07 | End: 2019-08-02

## 2019-05-07 RX ORDER — ALENDRONATE SODIUM 70 MG/1
70 TABLET ORAL
COMMUNITY
Start: 2019-05-07 | End: 2019-05-30

## 2019-05-07 ASSESSMENT — ANXIETY QUESTIONNAIRES
5. BEING SO RESTLESS THAT IT IS HARD TO SIT STILL: NOT AT ALL
1. FEELING NERVOUS, ANXIOUS, OR ON EDGE: SEVERAL DAYS
IF YOU CHECKED OFF ANY PROBLEMS ON THIS QUESTIONNAIRE, HOW DIFFICULT HAVE THESE PROBLEMS MADE IT FOR YOU TO DO YOUR WORK, TAKE CARE OF THINGS AT HOME, OR GET ALONG WITH OTHER PEOPLE: NOT DIFFICULT AT ALL
6. BECOMING EASILY ANNOYED OR IRRITABLE: NOT AT ALL
GAD7 TOTAL SCORE: 1
7. FEELING AFRAID AS IF SOMETHING AWFUL MIGHT HAPPEN: NOT AT ALL
2. NOT BEING ABLE TO STOP OR CONTROL WORRYING: NOT AT ALL
3. WORRYING TOO MUCH ABOUT DIFFERENT THINGS: NOT AT ALL

## 2019-05-07 ASSESSMENT — PATIENT HEALTH QUESTIONNAIRE - PHQ9
SUM OF ALL RESPONSES TO PHQ QUESTIONS 1-9: 1
5. POOR APPETITE OR OVEREATING: NOT AT ALL

## 2019-05-07 ASSESSMENT — PAIN SCALES - GENERAL: PAINLEVEL: MODERATE PAIN (4)

## 2019-05-07 NOTE — PROGRESS NOTES
SUBJECTIVE:   Flora Acuna is a 74 year old female who presents to clinic today for the following   health issues:    New Patient/Transfer of Care  At this time, past medical history, current medications, allergies and drug sensitivities, immunizations, habits and life style, family history, and social history are reviewed and updated.    She does have a h/o osteoporosis. Last Dexa-scan was done in 11/2016. She currently takes Fosamax without side effects. Due for repeat scan.       Abdominal Pain      Duration: 2 months    Description (location/character/radiation): patient notes that she has had epigastric pain and a burning sensation in her throat and epigastric region       Associated flank pain: None    Intensity:  moderate    Accompanying signs and symptoms:        Fever/Chills: no        Gas/Bloating: no       Nausea/vomitting: no        Diarrhea: no, having soft stools daily, no melena       Dysuria or Hematuria: no     History (previous similar pain/trauma/previous testing): none    Precipitating or alleviating factors:       Pain worse with eating/BM/urination: pain increases when she eats certain foods or large meals, worse with spicy or greasy foods, also worse with carbonated foods       Pain relieved by BM: no     Therapies tried and outcome: None    LMP:  not applicable    She has had her gallbladder and appendix removed.     Drinking a lot of milk as she feels this helps with her symptoms.     About 3-4 months ago, she was taken off of her omeprazole as she was no longer having heart burn.     She also notes that she has had laryngitis for the past 2 months. Wonders if this has something to do with her epigastric pain as they have been occurring for the same length of time. She does have an appointment with ENT to discuss this next week.         Additional history: as documented    Reviewed  and updated as needed this visit by clinical staff         Reviewed and updated as needed this visit by  Provider         Patient Active Problem List   Diagnosis     CARDIOVASCULAR SCREENING; LDL GOAL LESS THAN 160     Personal history of malignant neoplasm of breast     ACP (advance care planning)     Hypothyroidism     Essential hypertension     Other hyperlipidemia     Malignant neoplasm of left breast in female, estrogen receptor positive (H)     S/P reverse total shoulder arthroplasty, right     Lumbar disc disease with radiculopathy     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      HYSTERECTOMY       MASTECTOMY, BILATERAL Bilateral 02/26/1992     SHOULDER SURGERY Right      SHOULDER SURGERY Left        Social History     Tobacco Use     Smoking status: Never Smoker     Smokeless tobacco: Never Used   Substance Use Topics     Alcohol use: No     Alcohol/week: 0.0 oz     Family History   Problem Relation Age of Onset     Breast Cancer Maternal Aunt      Breast Cancer Maternal Aunt      Lung Cancer Father          Current Outpatient Medications   Medication Sig Dispense Refill     acetaminophen (TYLENOL) 325 MG tablet Take 650 mg by mouth       Acetone, Urine, Test (KETONE TEST) STRP Use as directed 50 strip 4     albuterol (PROAIR HFA, PROVENTIL HFA, VENTOLIN HFA) 108 (90 BASE) MCG/ACT inhaler Inhale 2 puffs into the lungs       alendronate (FOSAMAX) 70 MG tablet Take 1 tablet (70 mg) by mouth every 7 days       alendronate (FOSAMAX) 70 MG tablet Take 1 tablet by mouth once weekly. Take  with water in the AM 30 minutes prior to eating. Avoid lying down 30 minutes after taking med.       baclofen (LIORESAL) 10 MG tablet Take 10 mg by mouth 3 times daily       blood glucose (NO BRAND SPECIFIED) test strip by In Vitro route 4 times daily Use to test blood sugar 4 times daily or as directed.       Calcium Carb-Cholecalciferol (OYSTER SHELL CALCIUM) 500-400 MG-UNIT TABS        furosemide (LASIX) 40 MG tablet TAKE 1 TABLET BY MOUTH DAILY       glucagon (GLUCAGON EMERGENCY) 1 MG injection Inject 1 mg Subcutaneous once 1 mg 12     Insulin Aspart (INSULIN PUMP - OUTPATIENT)        insulin aspart (NOVOLOG VIAL) 100 UNITS/ML injection To be used with the insulin pump    TDD: 40 units 20 mL 3     INSULIN PUMP - OUTPATIENT Date last updated:  2/4/15  [employer] Minim[employer]: Model 723  BASAL RATES and times:  12   AM (midnight): 0.9 units/hour    9    PM: 0.8 units/hour   Basal Pattern A:  Flora Acuna will use when N/A.  CARB RATIO and times:  12   AM (midnight): 13.0  4     PM: 10  Corection Factor (Sensitivity) and times:  12   AM (midnight): 55 mg/dL  BLOOD GLUCOSE TARGET and times:  12   AM (midnight): 100 - 150  7     AM:  100 - 120  9    PM:  100 - 150  Active Insulin Time:  3 hours  Sensor:  No  Carelink / Diasend username:  [email or web address]  Carelink / Diasend Password:  [email or web address]       Lancet Devices MISC        levothyroxine (SYNTHROID, LEVOTHROID) 75 MCG tablet TAKE 1 TABLET BY MOUTH DAILY       Magnesium Cl-Calcium Carbonate (SLOW MAGNESIUM/CALCIUM)  MG TBEC Take 1 tablet by mouth daily 7 tablet 0     metolazone (ZAROXOLYN) 2.5 MG tablet Take as directed, 1/2 hour prior to furosemide, max once daily.       metoprolol (LOPRESSOR) 50 MG tablet 2 times daily   3     nitroglycerin (NITROSTAT) 0.4 MG SL tablet Place 0.4 mg under the tongue       omeprazole (PRILOSEC) 40 MG DR capsule Take 1 capsule (40 mg) by mouth daily 30 capsule 1     potassium chloride ER (K-DUR/KLOR-CON M) 20 MEQ CR tablet Add 20 mEq  once daily (total of 40 mEq) 7 tablet 0     ramipril (ALTACE) 10 MG capsule TAKE 1 CAPSULE BY MOUTH twice a day       senna-docusate (SENOKOT-S;PERICOLACE) 8.6-50 MG per tablet        simvastatin (ZOCOR) 40 MG tablet TAKE 1 TABLET BY MOUTH AT BEDTIME       tamoxifen (NOLVADEX) 20 MG tablet Take 20 mg by mouth       VITAMIN D, CHOLECALCIFEROL, PO Take 5,000 Units by mouth daily       Allergies   Allergen Reactions     Contrast Dye Difficulty breathing     Gadolinium Derivatives      Other reaction(s): Difficulty in swallowing, Laryngeal spasm  Patient had an injection into rt. Shoulder capsule prior to MRI arthrogram.  Contained multihance gadobenate, omnipaque iohexol, and buffered lidocaine.       Ammonia      Cory-1 [Lidocaine Hcl]      Novocaine allergy       Codeine Sulfate      Food      Shrimp     Iodine-131 Swelling     Ct dye     Lidocaine      Nitrous Oxide      No Clinical Screening - See Comments Nausea and Vomiting and Other (See Comments)     (3/6/09)- N/V and Heart racing     Novocain [Procaine]      Penicillins      Tramadol      Morphine Palpitations       ROS:  As noted in the HPI.     OBJECTIVE:     /70   Pulse 69   Wt 67.6 kg (149 lb)   SpO2 96%   BMI 27.25 kg/m    Body mass index is 27.25 kg/m .  GENERAL: healthy, alert and no distress  EYES: Eyes grossly normal to inspection, PERRL and conjunctivae and sclerae normal  HENT: ear canals and TM's normal, nose and mouth without ulcers or lesions  NECK: no adenopathy, no asymmetry, masses, or scars and thyroid normal to palpation  RESP: lungs clear to auscultation - no rales, rhonchi or wheezes  CV: regular rate and rhythm, normal S1 S2, no S3 or S4, no murmur  ABDOMEN: soft, slight tenderness in epigastric region, no hepatosplenomegaly, no masses and bowel sounds normal  PSYCH: mentation appears normal, affect normal/bright    Diagnostic Test Results:  none     ASSESSMENT/PLAN:   (K21.9) Gastroesophageal reflux disease, esophagitis  presence not specified  (primary encounter diagnosis)  Comment: patient having epigastric pain that worsens with large meals and spicy foods and carbonated beverages, she was taken off of her omeprazole 3-4 months ago and her pain began 2 months ago, pain seems to be r/t her reflux, also may be a cause of her laryngitis,   Plan: omeprazole (PRILOSEC) 40 MG DR capsule        Will restart her omeprazole and recheck her in 4 weeks. She will also avoid trigger foods and consume frequency small meals. If this does not get better, will consider additional blood work/imaging. Will see her sooner with new or worsening symptoms.     (Z78.0) Asymptomatic postmenopausal estrogen deficiency  Comment: due for dexa-scan  Plan: Ordered. Will notify patient of the results when available and intervene accordingly.     (Z76.89) Encounter to establish care  Plan: Patient is in need of a new provider. she has been explained the role of a CNP and the fact that I do not follow patients in the hospital. she was told that should he get admitted, he would then be followed by a hospitalist. she verbalizes understanding and would like to establish a relationship now. She is due for a DM, HTN, and lipids follow up. Will return in 4 weeks to readdress these issues. Declines to be seen sooner.         Nicolasa Guillen NP  [facility]

## 2019-05-07 NOTE — NURSING NOTE
"Chief Complaint   Patient presents with     Establish Care       Initial /70   Pulse 69   Wt 67.6 kg (149 lb)   SpO2 96%   BMI 27.25 kg/m   Estimated body mass index is 27.25 kg/m  as calculated from the following:    Height as of 1/22/19: 1.575 m (5' 2\").    Weight as of this encounter: 67.6 kg (149 lb).  Medication Reconciliation: complete    Yumiko Rincon LPN  "

## 2019-05-08 ASSESSMENT — ANXIETY QUESTIONNAIRES: GAD7 TOTAL SCORE: 1

## 2019-05-09 ENCOUNTER — HOSPITAL ENCOUNTER (OUTPATIENT)
Dept: BONE DENSITY | Facility: HOSPITAL | Age: 74
Discharge: HOME | End: 2019-05-09
Attending: NURSE PRACTITIONER | Admitting: NURSE PRACTITIONER
Payer: MEDICARE

## 2019-05-09 PROCEDURE — 77080 DXA BONE DENSITY AXIAL: CPT | Mod: TC

## 2019-05-10 DIAGNOSIS — M81.0 AGE-RELATED OSTEOPOROSIS WITHOUT CURRENT PATHOLOGICAL FRACTURE: Primary | ICD-10-CM

## 2019-05-10 DIAGNOSIS — M81.0 OSTEOPOROSIS: Primary | ICD-10-CM

## 2019-05-10 RX ORDER — ALENDRONATE SODIUM 70 MG/1
TABLET ORAL
Qty: 4 TABLET | Refills: 11 | Status: CANCELLED | OUTPATIENT
Start: 2019-05-10

## 2019-05-10 RX ORDER — ALENDRONATE SODIUM 70 MG/1
TABLET ORAL
Qty: 4 TABLET | Refills: 11 | Status: SHIPPED | OUTPATIENT
Start: 2019-05-10 | End: 2019-11-27

## 2019-05-21 ENCOUNTER — TRANSFERRED RECORDS (OUTPATIENT)
Dept: HEALTH INFORMATION MANAGEMENT | Facility: HOSPITAL | Age: 74
End: 2019-05-21

## 2019-05-22 ENCOUNTER — TRANSFERRED RECORDS (OUTPATIENT)
Dept: HEALTH INFORMATION MANAGEMENT | Facility: CLINIC | Age: 74
End: 2019-05-22

## 2019-05-22 LAB — COLOGUARD-ABSTRACT: NEGATIVE

## 2019-05-30 ENCOUNTER — ALLIED HEALTH/NURSE VISIT (OUTPATIENT)
Dept: EDUCATION SERVICES | Facility: OTHER | Age: 74
End: 2019-05-30
Attending: NURSE PRACTITIONER
Payer: COMMERCIAL

## 2019-05-30 VITALS
HEART RATE: 65 BPM | SYSTOLIC BLOOD PRESSURE: 117 MMHG | WEIGHT: 150.1 LBS | OXYGEN SATURATION: 96 % | RESPIRATION RATE: 16 BRPM | DIASTOLIC BLOOD PRESSURE: 66 MMHG | BODY MASS INDEX: 27.45 KG/M2

## 2019-05-30 DIAGNOSIS — E13.9 LADA (LATENT AUTOIMMUNE DIABETES IN ADULTS), MANAGED AS TYPE 1 (H): Primary | ICD-10-CM

## 2019-05-30 PROCEDURE — G0463 HOSPITAL OUTPT CLINIC VISIT: HCPCS

## 2019-05-30 PROCEDURE — 95251 CONT GLUC MNTR ANALYSIS I&R: CPT | Performed by: NURSE PRACTITIONER

## 2019-05-30 PROCEDURE — 99213 OFFICE O/P EST LOW 20 MIN: CPT | Mod: 25 | Performed by: NURSE PRACTITIONER

## 2019-05-30 PROCEDURE — G0463 HOSPITAL OUTPT CLINIC VISIT: HCPCS | Mod: 25

## 2019-05-30 RX ORDER — RAMIPRIL 10 MG/1
10 CAPSULE ORAL DAILY
COMMUNITY
End: 2020-06-02

## 2019-05-30 ASSESSMENT — PAIN SCALES - GENERAL: PAINLEVEL: MODERATE PAIN (5)

## 2019-05-30 NOTE — PATIENT INSTRUCTIONS
"  Continue working on healthy eating and moving as best as you can (start low and slow, work up to 30 min, 5x/week)    BG goals:  Fasting and before meals <130, >80  2 hour after eating <180    We only need 1/2 of these numbers to be within target then your A1c will be within target    Medication changes   Watch for low BGs.      Recommendations:  1. Trust your insulin pump  2.  Don't add carbs to \"correct\" high BGs--again, trust your insulin pump  3.  Just enter the BG when doing a correction  4.  Keep working on giving the carb bolus before consuming the carbs    Follow up   Bring your pump and Dexcom when you see Nicolasa on June 7th    Call me sooner if any problems/concerns and/or questions develop including consistent low BGs <70 or consistent high BGs >200  [phone] (Justine, Unit Coordinator)    [phone] (Ruth Nurse)    "

## 2019-05-30 NOTE — PROGRESS NOTES
"SUBJECTIVE:  Flora Acuna, 74 year old, female presents with the following Chief Complaint(s) with HPI to follow:  Chief Complaint   Patient presents with     Diabetes     insulin pump download and Dexcom download        Diabetes Follow-up      Patient is checking blood sugars: personal CGM.  Results:  She has been using her Dexcom for over 60 days  Glucose management indicator: 7.4%    Ave: 170 +/- 54    Time in Range:  >180: 46%  : 54%  <80: 0       Symptoms of hypoglycemia (low blood sugar): reports several episodes of \"crashing\"--feels symptomatically low <100    Paresthesias (numbness or burning in feet) or sores: Yes--same; no sores    Diabetic eye exam within the last year: Yes    Breakfast eaten regularly: Yes    Patient counting carbs: Yes       HPI:  Flora's here today for the follow up regarding her KAREEM, managed as a Type 1    Lab Results   Component Value Date    A1C 6.1 10/08/2018    A1C 6.7 2018    A1C 7.7 08/15/2017    A1C 7 2017    A1C 6.8 2016       Current Diabetes medication:   1.  Insulin pump, with Novolog   ASA use: yes, 325 mg daily  Statin use: yes, simvastatin 40 mg daily  Denies any issues with the insulin pump.     Flora's here today for an insulin pump and Dexcom download with possible adjust.  She reports the followin.  Dexcom  No issues with it.    Loves it.    2.  Reflux:  Has a future appointment in [facility] [place] for a scope as she has been told that her reflux \"burnt\" her voice box and muscles surrounding it.    Has an appointment in [facility] [place] with the ENT regarding speech therapy.      No other complaints noted.   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), managed as type 1 (H)     H/O total knee replacement, left     Age-related osteoporosis without current pathological fracture     Rheumatoid arthritis (H)     Osteoporosis     Family history of melanoma     Family history of breast cancer in female     Cardiomegaly     H/O heart failure       Past Medical History:   Diagnosis Date     Congestive heart failure, unspecified      Diabetes (H)      H/O rheumatoid arthritis      HLD (hyperlipidemia)      HTN (hypertension)      Myocardial infarction (H)      Uncomplicated asthma        Past Surgical History:   Procedure Laterality Date     APPENDECTOMY OPEN CHILD       AS TOTAL KNEE ARTHROPLASTY Right      CHOLECYSTECTOMY       COLONOSCOPY - HIM SCAN N/A 03/12/2009    per Care Everywhere documentation per [facility].      HYSTERECTOMY       MASTECTOMY, BILATERAL Bilateral 02/26/1992     SHOULDER SURGERY Right      SHOULDER SURGERY Left        Family History   Problem Relation Age of Onset     Breast Cancer Maternal Aunt      Breast Cancer Maternal Aunt      Lung Cancer Father        Social History     Tobacco Use     Smoking status: Never Smoker     Smokeless tobacco: Never Used   Substance Use Topics     Alcohol use: No     Alcohol/week: 0.0 oz       Current Outpatient Medications   Medication Sig Dispense Refill     acetaminophen (TYLENOL) 325 MG tablet Take 650 mg by mouth       Acetone, Urine, Test (KETONE TEST) STRP Use as directed 50 strip 4     albuterol (PROAIR HFA, PROVENTIL HFA, VENTOLIN HFA) 108 (90 BASE) MCG/ACT inhaler Inhale 2 puffs into the lungs       alendronate (FOSAMAX) 70 MG tablet Take 1 tablet by mouth once weekly. Take with water in the AM 30 minutes prior to eating. Avoid lying down 30 minutes after taking med. 4 tablet 11     aspirin (ASA) 325 MG EC tablet Take 325 mg by mouth  daily       baclofen (LIORESAL) 10 MG tablet Take 10 mg by mouth 3 times daily       blood glucose (NO BRAND SPECIFIED) test strip by In Vitro route 4 times daily Use to test blood sugar 4 times daily or as directed.       Calcium-Vitamin D-Vitamin K (VIACTIV PO) Take 2 chew tab by mouth every morning       furosemide (LASIX) 40 MG tablet TAKE 1 TABLET BY MOUTH DAILY       glucagon (GLUCAGON EMERGENCY) 1 MG injection Inject 1 mg Subcutaneous once 1 mg 12     Insulin Aspart (INSULIN PUMP - OUTPATIENT)        insulin aspart (NOVOLOG VIAL) 100 UNITS/ML injection To be used with the insulin pump    TDD: 40 units 20 mL 3     INSULIN PUMP - OUTPATIENT Date last updated:  2/4/15  [employer] ([employer] Minim[employer]: Model 723  BASAL RATES and times:  12   AM (midnight): 0.9 units/hour    9    PM: 0.8 units/hour   Basal Pattern A:  Flora Acuna will use when N/A.  CARB RATIO and times:  12   AM (midnight): 13.0  4     PM: 10  Corection Factor (Sensitivity) and times:  12   AM (midnight): 55 mg/dL  BLOOD GLUCOSE TARGET and times:  12   AM (midnight): 100 - 150  7     AM:  100 - 120  9    PM:  100 - 150  Active Insulin Time:  3 hours  Sensor:  No  Carelink / Diasend username:  [email or web address]  Carelink / Diasend Password:  [email or web address]       Lancet Devices MISC        levothyroxine (SYNTHROID, LEVOTHROID) 75 MCG tablet TAKE 1 TABLET BY MOUTH DAILY       Magnesium Cl-Calcium Carbonate (SLOW MAGNESIUM/CALCIUM)  MG TBEC Take 1 tablet by mouth daily 7 tablet 0     metolazone (ZAROXOLYN) 2.5 MG tablet Take as directed, 1/2 hour prior to furosemide, max once daily.       metoprolol (LOPRESSOR) 50 MG tablet 2 times daily   3     nitroglycerin (NITROSTAT) 0.4 MG SL tablet Place 0.4 mg under the tongue       omeprazole (PRILOSEC) 40 MG DR capsule Take 1 capsule (40 mg) by mouth daily 30 capsule 1     potassium chloride ER (K-DUR/KLOR-CON M) 20 MEQ CR tablet Add 20 mEq once daily (total of 40 mEq) (Patient taking differently: 20 mEq 2 times daily Add 20  mEq once daily (total of 40 mEq)) 7 tablet 0     ramipril (ALTACE) 10 MG capsule Take 10 mg by mouth daily       RANITIDINE HCL PO Take 1 tablet by mouth At Bedtime       senna-docusate (SENOKOT-S;PERICOLACE) 8.6-50 MG per tablet        simvastatin (ZOCOR) 40 MG tablet TAKE 1 TABLET BY MOUTH AT BEDTIME       tamoxifen (NOLVADEX) 20 MG tablet Take 20 mg by mouth daily        VITAMIN D, CHOLECALCIFEROL, PO Take 5,000 Units by mouth daily         Allergies   Allergen Reactions     Contrast Dye Difficulty breathing     Gadolinium Derivatives      Other reaction(s): Difficulty in swallowing, Laryngeal spasm  Patient had an injection into rt. Shoulder capsule prior to MRI arthrogram.  Contained multihance gadobenate, omnipaque iohexol, and buffered lidocaine.       Ammonia      Cory-1 [Lidocaine Hcl]      Novocaine allergy       Codeine Sulfate      Food      Shrimp     Iodine-131 Swelling     Ct dye     Lidocaine      Nitrous Oxide      No Clinical Screening - See Comments Nausea and Vomiting and Other (See Comments)     (3/6/09)- N/V and Heart racing     Novocain [Procaine]      Penicillins      Tramadol      Morphine Palpitations       REVIEW OF SYSTEMS  Skin: negative  Eyes: negative  Ears/Nose/Throat: burnt voice and muscles from GERD  Respiratory: No shortness of breath, dyspnea on exertion, cough, or hemoptysis; history of asthma  Cardiovascular: history of HF (cause unknown)  Gastrointestinal: GERD  Genitourinary: urgency and frequency   Musculoskeletal: positive for back pain, neck pain, arthritis and right shoulder (needs surgery)  Neurologic: positive for numbness or tingling of feet  Psychiatric: negative  Hematologic/Lymphatic/Immunologic: history of breast cancer (left) x 2 (same spot)  Endocrine: positive for diabetes and thyroid disease     OBJECTIVE:  /66   Pulse 65   Resp 16   Wt 68.1 kg (150 lb 1.6 oz)   SpO2 96%   BMI 27.45 kg/m    Constitutional: alert and no distress  Cardiovascular: RRR.  No murmurs, clicks gallops or rub  Respiratory:  Good diaphragmatic excursion. Lungs clear  Psychiatric: mentation appears normal and affect normal/bright      LABS  Results for orders placed or performed during the hospital encounter of 05/09/19   DX Hip/Pelvis/Spine    Narrative    PROCEDURE: DX HIP/PELVIS/SPINE 5/9/2019 2:09 PM    HISTORY: Postmenopausal osteoporosis    COMPARISONS: None.    TECHNIQUE: DEXA bone mineral density study of the lumbar spine and  left hip    FINDINGS: Bone mineral density in the lumbar spine L1-L4 measured  1.063 g/sq cm with a T score is 0.1. There is been a 0.2% decline from  2016 which is not statistically thickened. Bone mineral density in the  left hip measured 0.709 g/sq cm with a T score -1.9. There is been a  1% decline from 2016 which is not statistically significant. Bone  mineral density in the femoral neck measured 0.680 g/sq cm with a T  score -1.5. The 10 year major osteoporotic fracture risk is 22%. The  10 year hip fracture risk is 7.4%.         Impression    IMPRESSION: The patient is osteopenic and fracture risk is moderate.  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), managed as type 1 (H)  (primary encounter diagnosis)  Comment:   Glucose management indicator is 7.4%    Insulin pump information:   Average: 156 +/- 49  Basal/bolus ratio: 20.6 (74%)/7.3 (26%)   Testing: CGM    Hypoglycemia: 0%    Dexcom information:  5/3/19-5/16/19  GMI: 8%  Ave: 197 +/- 64  Time in range:  > 180: 61%  In time: 39%  <80: 0    5/17/19 to 5/30/19  GMI: 7.4%  Ave: 170 +/- 54  Time in range:  > 180: 46%  In time: 54%  <80: 0    Plan: GLUCOSE MONITOR, 72 HOUR, PHYS INTERP          Reminded Flora to start using her insulin pump as directed--enter bolus before consuming carbs and don't override the pump    Reminded Flora that it's okay to use  "the Dexcom data to make medical decisions--okay to add BG into insulin pump for correction    Follow up as discussed, sooner if needed    Patient Instructions     Continue working on healthy eating and moving as best as you can (start low and slow, work up to 30 min, 5x/week)    BG goals:  Fasting and before meals <130, >80  2 hour after eating <180    We only need 1/2 of these numbers to be within target then your A1c will be within target    Medication changes   Watch for low BGs.      Recommendations:  1. Trust your insulin pump  2.  Don't add carbs to \"correct\" high BGs--again, trust your insulin pump  3.  Just enter the BG when doing a correction  4.  Keep working on giving the carb bolus before consuming the carbs    Follow up   Bring your pump and Dexcom when you see Nicolasa on June 7th    Call me sooner if any problems/concerns and/or questions develop including consistent low BGs <70 or consistent high BGs >200  [phone] (Justine, Unit Coordinator)    [phone] (Ruth Santos)        Time: 40 minutes  Barrier: none  Willingness to learn: accepting    Kerri NELSON [facility]  Disease Management    Cc: Dr. Mejia    With the electronic record, we can now more quickly and easily track our patient diabetic goals. Our diabetes clinical review is in progress and these are the indicators we are monitoring for good diabetes health.     1.) HbA1C less than 7 (measurement of your average blood sugars)  2.) Blood Pressure less than 140/80  3.) LDL less than 100 (bad cholesterol)  4.) HbA1C is checked in the last 6 months and below 7% (more frequently if not at goal or adjusting medications)  5.) LDL is checked in the last 12 months (more frequently if not at goal or adjusting medications)  6.) Taking one baby aspirin daily (unless otherwise instructed)  7.) No tobacco use  8) Statin use     You have achieved 6 out of 8 of these and I am encouraging you to come in and get tuned up to achieve 8 out of 8!  Here is " what you have achieved so far in my goals for you:  1.) HbA1C  less than 7:                              NO     Your last  HbA1C  Lab Results   Component Value Date    A1C 7.3 04/19/2019    A1C 6.1 10/08/2018    A1C 6.7 02/16/2018    A1C 7.7 08/15/2017    A1C 7 04/17/2017     2.) Blood Pressure less than 140/80:       YES      Your last    BP Readings from Last 1 Encounters:   05/30/19 117/66     3.) LDL less than 100:                              YES      Your last     LDL Cholesterol Calculated   Date Value Ref Range Status   02/16/2018 33 <100 mg/dL Final     Comment:     Desirable:       <100 mg/dl       4.) Checked HbA1C in the past 6 months: YES      5.) Checked LDL in the past 12 months:    NO      6.) Taking one aspirin daily:                       YES     7.) No tobacco use:                                        YES      8.) Statin use      YES

## 2019-05-30 NOTE — PROGRESS NOTES
"Chief Complaint   Patient presents with     Diabetes       Initial /66   Pulse 65   Resp 16   Wt 68.1 kg (150 lb 1.6 oz)   SpO2 96%   BMI 27.45 kg/m   Estimated body mass index is 27.45 kg/m  as calculated from the following:    Height as of 1/22/19: 1.575 m (5' 2\").    Weight as of this encounter: 68.1 kg (150 lb 1.6 oz).  Medication Reconciliation: complete    Ginger Lerner LPN    "

## 2019-06-04 NOTE — PROGRESS NOTES
Subjective     Flora Acuna is a 74 year old female who presents to clinic today for the following health issues:    HPI   GERD/Heartburn    Do remember, when last seen on 5/7/19, she was having epigastric pain and a burning sensation in her throat that was made worse with large meals, spicy or greasy foods, and carbonated beverages. She had also had hoarseness times 2 months. About 3-4 months prior, she had been taken off her omeprazole. Reasons unclear why. This was restarted. She was also referred to ENT for the hoarseness and their team also started zantac. Today she states that her heartburn and epigastric pain have resolved. Denies nausea or vomiting. No abdominal pain. No melena. Plans to continue the medications.     Diabetes Follow-up  She currently follows with the DM center and has an insulin pump.      How often are you checking your blood sugar? Four or more times daily    What time of day are you checking your blood sugars (select all that apply)?  Before meals    Have you had any blood sugars above 200? Yes, as high as 449, rarely occurs     Have you had any blood sugars below 70?  Yes, did have one reading under 50     What symptoms do you notice when your blood sugar is low?  Shaky, Dizzy, Weak, Blurred vision and Confusion    What concerns do you have today about your diabetes? None     Do you have any of these symptoms? (Select all that apply)  No numbness or tingling in feet.  No redness, sores or blisters on feet.  No complaints of excessive thirst.  No reports of blurry vision.  No significant changes to weight.     Have you had a diabetic eye exam in the last 12 months? Yes just recently on 5/21/19    Diabetes Management Resources    Hyperlipidemia Follow-Up      Are you having any of the following symptoms? (Select all that apply)  No complaints of shortness of breath, chest pain or pressure.  No increased sweating or nausea with activity.  No left-sided neck or arm pain.  No complaints  of pain in calves when walking 1-2 blocks.    Are you regularly taking any medication or supplement to lower your cholesterol?   Yes- simvastatin    Are you having muscle aches or other side effects that you think could be caused by your cholesterol lowering medication?  No    Hypertension Follow-up    She currently takes 10 mg of ramipril and 50 mg of metoprolol BID. She also takes 40 mg of lasix daily for a h/o heart failure with bilateral lower leg edema. Last echo was done in 2015. EF at this time was 55 percent. Possible diastolic dysfunction. She notes that she has had no new symptoms since that time. Denies chest pain, shortness of breath, syncope, or palpitations.       Do you check your blood pressure regularly outside of the clinic? No     Are you following a low salt diet? Yes     Are your blood pressures ever more than 140 on the top number (systolic) OR more   than 90 on the bottom number (diastolic), for example 140/90? No    BP Readings from Last 2 Encounters:   06/07/19 110/50   05/30/19 117/66     Hemoglobin A1C (%)   Date Value   04/19/2019 7.3 (A)   10/08/2018 6.1 (A)     LDL Cholesterol Calculated (mg/dL)   Date Value   02/16/2018 33   10/31/2016 87       Amount of exercise or physical activity: None    Problems taking medications regularly: No    Medication side effects: none    Diet: carbohydrate counting           Patient Active Problem List   Diagnosis     CARDIOVASCULAR SCREENING; LDL GOAL LESS THAN 160     Personal history of malignant neoplasm of breast     ACP (advance care planning)     Hypothyroidism     Essential hypertension     Other hyperlipidemia     Malignant neoplasm of left breast in female, estrogen receptor positive (H)     S/P reverse total shoulder arthroplasty, right     Lumbar disc disease with radiculopathy     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      HYSTERECTOMY       MASTECTOMY, BILATERAL Bilateral 02/26/1992     SHOULDER SURGERY Right      SHOULDER SURGERY Left        Social History     Tobacco Use     Smoking status: Never Smoker     Smokeless tobacco: Never Used   Substance Use Topics     Alcohol use: No     Alcohol/week: 0.0 oz     Family History   Problem Relation Age of Onset     Breast Cancer Maternal Aunt      Breast Cancer Maternal Aunt      Lung Cancer Father          Current Outpatient Medications   Medication Sig Dispense Refill     acetaminophen (TYLENOL) 325 MG tablet Take 650 mg by mouth       Acetone, Urine, Test (KETONE TEST) STRP Use as directed 50 strip 4     albuterol (PROAIR HFA, PROVENTIL HFA, VENTOLIN HFA) 108 (90 BASE) MCG/ACT inhaler Inhale 2 puffs into the lungs       alendronate (FOSAMAX) 70 MG tablet Take 1 tablet by mouth once weekly. Take with water in the AM 30 minutes prior to eating. Avoid lying down 30 minutes after taking med. 4 tablet 11     aspirin (ASA) 325 MG EC tablet Take 325 mg by mouth daily       baclofen (LIORESAL) 10 MG tablet Take 10 mg by mouth 3 times daily       blood glucose (NO BRAND SPECIFIED) test strip by In Vitro route 4 times daily Use to test blood sugar 4 times daily or as directed.       Calcium-Vitamin D-Vitamin K (VIACTIV PO) Take 2 chew tab by mouth every morning       furosemide (LASIX) 40 MG tablet TAKE 1 TABLET BY MOUTH DAILY       glucagon (GLUCAGON EMERGENCY) 1 MG injection Inject 1 mg Subcutaneous once 1 mg 12     Insulin Aspart (INSULIN PUMP - OUTPATIENT)        insulin aspart  (NOVOLOG VIAL) 100 UNITS/ML injection To be used with the insulin pump    TDD: 40 units 20 mL 3     INSULIN PUMP - OUTPATIENT Date last updated:  2/4/15  Medtronic Minimed: Model 723  BASAL RATES and times:  12   AM (midnight): 0.9 units/hour    9    PM: 0.8 units/hour   Basal Pattern A:  Flora Acuna will use when N/A.  CARB RATIO and times:  12   AM (midnight): 13.0  4     PM: 10  Corection Factor (Sensitivity) and times:  12   AM (midnight): 55 mg/dL  BLOOD GLUCOSE TARGET and times:  12   AM (midnight): 100 - 150  7     AM:  100 - 120  9    PM:  100 - 150  Active Insulin Time:  3 hours  Sensor:  No  Carelink / Diasend username:  elias  Carelink / Diasend Password:  [email or web address]       Lancet Devices MISC        levothyroxine (SYNTHROID, LEVOTHROID) 75 MCG tablet TAKE 1 TABLET BY MOUTH DAILY       Magnesium Cl-Calcium Carbonate (SLOW MAGNESIUM/CALCIUM)  MG TBEC Take 1 tablet by mouth daily 7 tablet 0     metoprolol (LOPRESSOR) 50 MG tablet 2 times daily   3     nitroglycerin (NITROSTAT) 0.4 MG SL tablet Place 0.4 mg under the tongue       omeprazole (PRILOSEC) 40 MG DR capsule Take 1 capsule (40 mg) by mouth daily 30 capsule 1     potassium chloride ER (K-DUR/KLOR-CON M) 20 MEQ CR tablet Add 20 mEq once daily (total of 40 mEq) (Patient taking differently: 20 mEq 2 times daily Add 20 mEq once daily (total of 40 mEq)) 7 tablet 0     ramipril (ALTACE) 10 MG capsule Take 10 mg by mouth daily       RANITIDINE HCL PO Take 1 tablet by mouth At Bedtime       senna-docusate (SENOKOT-S;PERICOLACE) 8.6-50 MG per tablet        simvastatin (ZOCOR) 40 MG tablet TAKE 1 TABLET BY MOUTH AT BEDTIME       tamoxifen (NOLVADEX) 20 MG tablet Take 20 mg by mouth daily        VITAMIN D, CHOLECALCIFEROL, PO Take 5,000 Units by mouth daily       metolazone (ZAROXOLYN) 2.5 MG tablet Take as directed, 1/2 hour prior to furosemide, max once daily.       Allergies   Allergen Reactions     Contrast Dye Difficulty breathing      Gadolinium Derivatives      Other reaction(s): Difficulty in swallowing, Laryngeal spasm  Patient had an injection into rt. Shoulder capsule prior to MRI arthrogram.  Contained multihance gadobenate, omnipaque iohexol, and buffered lidocaine.       Ammonia      Cory-1 [Lidocaine Hcl]      Novocaine allergy       Codeine Sulfate      Food      Shrimp     Iodine-131 Swelling     Ct dye     Lidocaine      Nitrous Oxide      No Clinical Screening - See Comments Nausea and Vomiting and Other (See Comments)     (3/6/09)- N/V and Heart racing     Novocain [Procaine]      Penicillins      Tramadol      Morphine Palpitations     Recent Labs   Lab Test 04/19/19 04/01/19  1500 10/08/18 02/16/18  1402 09/05/17  1512  10/31/16  12/17/14  1540 01/18/13   A1C 7.3*  --  6.1* 6.7*  --    < >  --    < >  --   --    LDL  --   --   --  33  --   --  87  --   --  85   HDL  --   --   --  55  --   --   --   --   --   --    TRIG  --   --   --  187*  --   --   --   --   --   --    ALT  --   --   --   --  26  --   --   --  21  --    CR  --  0.80  --   --  1.28*  --   --    < > 0.73  --    GFRESTIMATED  --  74  --   --  41*  --   --    < > 80  --    GFRESTBLACK  --  86  --   --  50*  --   --    < > >90   GFR Calc    --    POTASSIUM  --  3.3*  --   --  4.1  --   --    < > 3.9  --    TSH  --   --   --  0.91  --   --   --   --   --   --     < > = values in this interval not displayed.      BP Readings from Last 3 Encounters:   06/07/19 110/50   05/30/19 117/66   05/07/19 130/70    Wt Readings from Last 3 Encounters:   06/07/19 67.1 kg (148 lb)   05/30/19 68.1 kg (150 lb 1.6 oz)   05/07/19 67.6 kg (149 lb)                    Reviewed and updated as needed this visit by Provider  Tobacco  Allergies  Meds  Problems  Med Hx  Surg Hx  Fam Hx         Review of Systems   ROS COMP: CONSTITUTIONAL: NEGATIVE for fever, chills, change in weight  EYES: NEGATIVE for vision changes or irritation  ENT/MOUTH: NEGATIVE for ear, mouth and  throat problems  RESP: NEGATIVE for significant cough or SOB  CV: NEGATIVE for chest pain, palpitations or peripheral edema  GI: NEGATIVE for nausea, abdominal pain, heartburn, or change in bowel habits  : NEGATIVE for frequency, dysuria, or hematuria  PSYCHIATRIC: NEGATIVE for changes in mood or affect      Objective    /50 (BP Location: Right arm, Patient Position: Chair, Cuff Size: Adult Regular)   Pulse 78   Temp 98.6  F (37  C) (Tympanic)   Wt 67.1 kg (148 lb)   SpO2 98%   BMI 27.07 kg/m    Body mass index is 27.07 kg/m .  Physical Exam   GENERAL: healthy, alert and no distress, still hoarse-following with ENT  EYES: Eyes grossly normal to inspection, PERRL and conjunctivae and sclerae normal  HENT: ear canals and TM's normal, nose and mouth without ulcers or lesions  NECK: no adenopathy, no asymmetry, masses, or scars and thyroid normal to palpation  RESP: lungs clear to auscultation - no rales, rhonchi or wheezes  CV: regular rate and rhythm, normal S1 S2, no S3 or S4, no murmur  ABDOMEN: soft, nontender, no hepatosplenomegaly, no masses and bowel sounds normal  PSYCH: mentation appears normal, affect normal/bright  Diabetic foot exam: normal DP and PT pulses, no trophic changes or ulcerative lesions and normal sensory exam    Diagnostic Test Results:  Labs reviewed in Epic  A1C done at [facility]-was 7.3        Assessment & Plan   (K21.9) Gastroesophageal reflux disease, esophagitis presence not specified  (primary encounter diagnosis)  Comment: resolved  Plan: continue omeprazole and Zantac for now as recommended by ENT    (I10) Essential hypertension  Plan: Well controlled. Continue current medications. Encouraged daily exercise and a low sodium diet. Recommended checking BP's 2x/wk, call the clinic if consistantly s>140 or d>90. Follow up in 6 months.     (E78.49) Other hyperlipidemia  Comment: due for lipids, patient would like it added on to labs drawn at [facility]  Plan: Will have nursing  "call to see if we can add this on. Will then notify patient of the results when available and intervene accordingly.     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     (R49.0) Hoarseness  Plan: Will continue to see ENT.          BMI:   Estimated body mass index is 27.45 kg/m  as calculated from the following:    Height as of 1/22/19: 1.575 m (5' 2\").    Weight as of 5/30/19: 68.1 kg (150 lb 1.6 oz).         Return in about 6 months (around 12/7/2019) for DM, GERD, HTNLab [employer].    Nicolasa Guillen NP  [facility] [place]      "

## 2019-06-06 ENCOUNTER — TELEPHONE (OUTPATIENT)
Dept: FAMILY MEDICINE | Facility: OTHER | Age: 74
End: 2019-06-06

## 2019-06-07 ENCOUNTER — OFFICE VISIT (OUTPATIENT)
Dept: FAMILY MEDICINE | Facility: OTHER | Age: 74
End: 2019-06-07
Attending: NURSE PRACTITIONER
Payer: COMMERCIAL

## 2019-06-07 ENCOUNTER — TRANSFERRED RECORDS (OUTPATIENT)
Dept: HEALTH INFORMATION MANAGEMENT | Facility: CLINIC | Age: 74
End: 2019-06-07

## 2019-06-07 VITALS
BODY MASS INDEX: 27.07 KG/M2 | DIASTOLIC BLOOD PRESSURE: 50 MMHG | OXYGEN SATURATION: 98 % | HEART RATE: 78 BPM | WEIGHT: 148 LBS | SYSTOLIC BLOOD PRESSURE: 110 MMHG | TEMPERATURE: 98.6 F

## 2019-06-07 DIAGNOSIS — K21.9 GASTROESOPHAGEAL REFLUX DISEASE, ESOPHAGITIS PRESENCE NOT SPECIFIED: Primary | ICD-10-CM

## 2019-06-07 DIAGNOSIS — E13.9 LADA (LATENT AUTOIMMUNE DIABETES IN ADULTS), MANAGED AS TYPE 1 (H): ICD-10-CM

## 2019-06-07 DIAGNOSIS — E78.49 OTHER HYPERLIPIDEMIA: ICD-10-CM

## 2019-06-07 DIAGNOSIS — R49.0 HOARSENESS: ICD-10-CM

## 2019-06-07 DIAGNOSIS — I10 ESSENTIAL HYPERTENSION: ICD-10-CM

## 2019-06-07 LAB
CHOLEST SERPL-MCNC: 150 MG/DL (ref 114–200)
GLUCOSE SERPL-MCNC: 143 MG/DL (ref 70–99)
HBA1C MFR BLD: 7.3 % (ref 4–5.6)
HDLC SERPL-MCNC: 56 MG/DL (ref 40–60)
LDLC SERPL CALC-MCNC: 77 MG/DL
TRIGL SERPL-MCNC: 87 MG/DL (ref 10–200)
TSH SERPL-ACNC: 1.17 UIU/ML (ref 0.4–3.99)

## 2019-06-07 PROCEDURE — G0463 HOSPITAL OUTPT CLINIC VISIT: HCPCS

## 2019-06-07 PROCEDURE — 99214 OFFICE O/P EST MOD 30 MIN: CPT | Performed by: NURSE PRACTITIONER

## 2019-06-07 ASSESSMENT — ANXIETY QUESTIONNAIRES
1. FEELING NERVOUS, ANXIOUS, OR ON EDGE: NOT AT ALL
IF YOU CHECKED OFF ANY PROBLEMS ON THIS QUESTIONNAIRE, HOW DIFFICULT HAVE THESE PROBLEMS MADE IT FOR YOU TO DO YOUR WORK, TAKE CARE OF THINGS AT HOME, OR GET ALONG WITH OTHER PEOPLE: NOT DIFFICULT AT ALL
5. BEING SO RESTLESS THAT IT IS HARD TO SIT STILL: NOT AT ALL
6. BECOMING EASILY ANNOYED OR IRRITABLE: NOT AT ALL
GAD7 TOTAL SCORE: 0
2. NOT BEING ABLE TO STOP OR CONTROL WORRYING: NOT AT ALL
3. WORRYING TOO MUCH ABOUT DIFFERENT THINGS: NOT AT ALL
7. FEELING AFRAID AS IF SOMETHING AWFUL MIGHT HAPPEN: NOT AT ALL

## 2019-06-07 ASSESSMENT — PATIENT HEALTH QUESTIONNAIRE - PHQ9
SUM OF ALL RESPONSES TO PHQ QUESTIONS 1-9: 5
5. POOR APPETITE OR OVEREATING: NOT AT ALL

## 2019-06-07 ASSESSMENT — PAIN SCALES - GENERAL: PAINLEVEL: NO PAIN (0)

## 2019-06-07 NOTE — NURSING NOTE
"Chief Complaint   Patient presents with     Gastrophageal Reflux       Initial /50 (BP Location: Right arm, Patient Position: Chair, Cuff Size: Adult Regular)   Pulse 78   Temp 98.6  F (37  C) (Tympanic)   Wt 67.1 kg (148 lb)   SpO2 98%   BMI 27.07 kg/m   Estimated body mass index is 27.07 kg/m  as calculated from the following:    Height as of 1/22/19: 1.575 m (5' 2\").    Weight as of this encounter: 67.1 kg (148 lb).  Medication Reconciliation: complete    Erendira Estrada LPN  "

## 2019-06-08 ASSESSMENT — ANXIETY QUESTIONNAIRES: GAD7 TOTAL SCORE: 0

## 2019-06-13 DIAGNOSIS — E03.9 HYPOTHYROIDISM, UNSPECIFIED TYPE: Primary | ICD-10-CM

## 2019-06-13 RX ORDER — LEVOTHYROXINE SODIUM 75 UG/1
75 TABLET ORAL DAILY
Qty: 90 TABLET | Refills: 1 | Status: SHIPPED | OUTPATIENT
Start: 2019-06-13 | End: 2019-09-09

## 2019-06-13 NOTE — TELEPHONE ENCOUNTER
levothyroxine      Last Written Prescription Date:  Patient reported medication  Last Fill Quantity: n/a,   # refills: 0  Last Office Visit: 06/07/19  Future Office visit:

## 2019-07-26 ENCOUNTER — TRANSFERRED RECORDS (OUTPATIENT)
Dept: HEALTH INFORMATION MANAGEMENT | Facility: CLINIC | Age: 74
End: 2019-07-26

## 2019-07-26 ENCOUNTER — MEDICAL CORRESPONDENCE (OUTPATIENT)
Dept: HEALTH INFORMATION MANAGEMENT | Facility: CLINIC | Age: 74
End: 2019-07-26

## 2019-08-02 DIAGNOSIS — K21.9 GASTROESOPHAGEAL REFLUX DISEASE, ESOPHAGITIS PRESENCE NOT SPECIFIED: ICD-10-CM

## 2019-08-02 RX ORDER — OMEPRAZOLE 40 MG/1
40 CAPSULE, DELAYED RELEASE ORAL DAILY
Qty: 30 CAPSULE | Refills: 5 | Status: SHIPPED | OUTPATIENT
Start: 2019-08-02 | End: 2019-12-16

## 2019-08-02 NOTE — TELEPHONE ENCOUNTER
omeprazole  Last Written Prescription Date: 5/7/19  Last Fill Quantity: 30 # of Refills: 1  Last Office Visit: 6/7/19

## 2019-08-12 ENCOUNTER — TELEPHONE (OUTPATIENT)
Dept: FAMILY MEDICINE | Facility: OTHER | Age: 74
End: 2019-08-12

## 2019-08-12 NOTE — TELEPHONE ENCOUNTER
Dear Dr. Sandoval Him had the pleasure of seeing your patient, Sophie Cobb, in my office today. Thanks so much for involving me in his care. Please see my note and let me if you have any questions or concerns.   Best Regards,  Abdoulaye Salgado Spoke with patient and informed her that Nicolasa was fine with her discontinuing the Zantac. The medication has been discontinue from patients medication list.

## 2019-08-12 NOTE — TELEPHONE ENCOUNTER
Pt called and is wondering if she continues taking both the Omeprazole and the Ranitidine?  Please call her back at [phone]

## 2019-08-21 NOTE — TELEPHONE ENCOUNTER
FUTURE VISIT INFORMATION      FUTURE VISIT INFORMATION:    Date: 10/28/19     Time: 7:40 am ENT/Speech    Location: [facility]  REFERRAL INFORMATION:    Referring provider:  Dr. Reza Wall    Referring providers clinic:  [facility] ENT [facility]    Reason for visit/diagnosis  Functional Dysphonia    RECORDS REQUESTED FROM:       Clinic name Comments Records Status Imaging Status   [facility] ENT Clinic Office Visit-7/26/19-Dr. Reza Wall  Office Visit-5/14/19-Dr. Lenin Durbin Care Everywhere    [facility] Speech Therapy Office Visit-6/3/19, 6/20/19 Care Everywhere    [facility] XR Chest-4/19/19, 3/16/18, 3/1/16 Reports in Care Everywhere Received  Archived to PACS                       Action    Action Taken 9/11/2019 -11:51 AM-Faxed request for imaging to [facility].  PB  9/12/2019 -12:53 PM-Received images from [facility]. Archived to PACS PB

## 2019-09-09 DIAGNOSIS — E03.9 HYPOTHYROIDISM, UNSPECIFIED TYPE: ICD-10-CM

## 2019-09-11 ENCOUNTER — OFFICE VISIT (OUTPATIENT)
Dept: FAMILY MEDICINE | Facility: OTHER | Age: 74
End: 2019-09-11
Attending: NURSE PRACTITIONER
Payer: MEDICARE

## 2019-09-11 VITALS
HEART RATE: 68 BPM | DIASTOLIC BLOOD PRESSURE: 72 MMHG | WEIGHT: 151 LBS | BODY MASS INDEX: 27.79 KG/M2 | TEMPERATURE: 98.1 F | HEIGHT: 62 IN | SYSTOLIC BLOOD PRESSURE: 134 MMHG | OXYGEN SATURATION: 96 %

## 2019-09-11 DIAGNOSIS — K14.8 TONGUE LUMP: Primary | ICD-10-CM

## 2019-09-11 DIAGNOSIS — E11.65 TYPE 2 DIABETES MELLITUS WITH HYPERGLYCEMIA, WITH LONG-TERM CURRENT USE OF INSULIN (H): ICD-10-CM

## 2019-09-11 DIAGNOSIS — Z79.4 TYPE 2 DIABETES MELLITUS WITH HYPERGLYCEMIA, WITH LONG-TERM CURRENT USE OF INSULIN (H): ICD-10-CM

## 2019-09-11 LAB
EST. AVERAGE GLUCOSE BLD GHB EST-MCNC: 169 MG/DL
HBA1C MFR BLD: 7.5 % (ref 0–5.6)

## 2019-09-11 PROCEDURE — G0463 HOSPITAL OUTPT CLINIC VISIT: HCPCS

## 2019-09-11 PROCEDURE — 99213 OFFICE O/P EST LOW 20 MIN: CPT | Performed by: NURSE PRACTITIONER

## 2019-09-11 PROCEDURE — 36415 COLL VENOUS BLD VENIPUNCTURE: CPT | Mod: ZL | Performed by: NURSE PRACTITIONER

## 2019-09-11 PROCEDURE — 40000788 ZZHCL STATISTIC ESTIMATED AVERAGE GLUCOSE: Mod: ZL | Performed by: NURSE PRACTITIONER

## 2019-09-11 PROCEDURE — 83036 HEMOGLOBIN GLYCOSYLATED A1C: CPT | Mod: ZL | Performed by: NURSE PRACTITIONER

## 2019-09-11 RX ORDER — LEVOTHYROXINE SODIUM 75 UG/1
75 TABLET ORAL DAILY
Qty: 90 TABLET | Refills: 1 | Status: SHIPPED | OUTPATIENT
Start: 2019-09-11 | End: 2020-06-11

## 2019-09-11 ASSESSMENT — MIFFLIN-ST. JEOR: SCORE: 1148.18

## 2019-09-11 ASSESSMENT — PAIN SCALES - GENERAL: PAINLEVEL: NO PAIN (0)

## 2019-09-11 NOTE — Clinical Note
Please see note.  I did down load her pump and CGM and A1c updated.Lisa NELSON FNP-BCFamily Nurse Practitioner

## 2019-09-11 NOTE — Clinical Note
I saw Flora in family practice. But I did down load her pump and CGM do I need to charge differently because of this?Lias NELSON FNP-BCFamily Nurse Practitioner

## 2019-09-11 NOTE — PATIENT INSTRUCTIONS
Try lemon drops for the next week if no improvement call the oral surgeon for removal of the lump on your tongue

## 2019-09-11 NOTE — NURSING NOTE
"Chief Complaint   Patient presents with     Growth     on tongue     Diabetes     Check Pump       Initial /72 (BP Location: Right arm, Patient Position: Sitting, Cuff Size: Adult Regular)   Pulse 68   Temp 98.1  F (36.7  C) (Tympanic)   Ht 1.575 m (5' 2\")   Wt 68.5 kg (151 lb)   SpO2 96%   BMI 27.62 kg/m   Estimated body mass index is 27.62 kg/m  as calculated from the following:    Height as of this encounter: 1.575 m (5' 2\").    Weight as of this encounter: 68.5 kg (151 lb).  Medication Reconciliation: complete   Marita Cardoza LPN    "

## 2019-09-11 NOTE — PROGRESS NOTES
Subjective     Flora Acuna is a 74 year old female who presents to clinic today for the following health issues:    HPI   Growth on Tongue      Duration: years, dentist noticed it, growth has gotten bigger    Description (location/character/radiation): right, dorsal side of tongue    Intensity:  mild    Accompanying signs and symptoms: none    History (similar episodes/previous evaluation): has history of biting that area    Precipitating or alleviating factors: None    Therapies tried and outcome: None         Patient Active Problem List   Diagnosis     CARDIOVASCULAR SCREENING; LDL GOAL LESS THAN 160     Personal history of malignant neoplasm of breast     ACP (advance care planning)     Hypothyroidism     Essential hypertension     Other hyperlipidemia     Malignant neoplasm of left breast in female, estrogen receptor positive (H)     S/P reverse total shoulder arthroplasty, right     Lumbar disc disease with radiculopathy     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      HYSTERECTOMY TOTAL ABDOMINAL       MASTECTOMY, BILATERAL Bilateral 02/26/1992     SHOULDER SURGERY Right      SHOULDER SURGERY Left        Social History     Tobacco Use     Smoking status: Never Smoker     Smokeless tobacco: Never Used   Substance Use Topics     Alcohol use: No     Alcohol/week: 0.0 oz     Family History   Problem Relation Age of Onset     Breast Cancer Maternal Aunt      Breast  Cancer Maternal Aunt      Lung Cancer Father          Current Outpatient Medications   Medication Sig Dispense Refill     acetaminophen (TYLENOL) 325 MG tablet Take 650 mg by mouth       Acetone, Urine, Test (KETONE TEST) STRP Use as directed 50 strip 4     albuterol (PROAIR HFA, PROVENTIL HFA, VENTOLIN HFA) 108 (90 BASE) MCG/ACT inhaler Inhale 2 puffs into the lungs       alendronate (FOSAMAX) 70 MG tablet Take 1 tablet by mouth once weekly. Take with water in the AM 30 minutes prior to eating. Avoid lying down 30 minutes after taking med. 4 tablet 11     aspirin (ASA) 325 MG EC tablet Take 325 mg by mouth daily       baclofen (LIORESAL) 10 MG tablet Take 10 mg by mouth 3 times daily       blood glucose (NO BRAND SPECIFIED) test strip by In Vitro route 4 times daily Use to test blood sugar 4 times daily or as directed.       Calcium-Vitamin D-Vitamin K (VIACTIV PO) Take 2 chew tab by mouth every morning       furosemide (LASIX) 40 MG tablet TAKE 1 TABLET BY MOUTH DAILY       glucagon (GLUCAGON EMERGENCY) 1 MG injection Inject 1 mg Subcutaneous once 1 mg 12     Insulin Aspart (INSULIN PUMP - OUTPATIENT)        insulin aspart (NOVOLOG VIAL) 100 UNITS/ML injection To be used with the insulin pump    TDD: 40 units 20 mL 3     INSULIN PUMP - OUTPATIENT Date last updated:  2/4/15  [employer]: Model 723  BASAL RATES and times:  12   AM (midnight): 0.9 units/hour    9    PM: 0.8 units/hour   Basal Pattern A:  Flora Acuna will use when N/A.  CARB RATIO and times:  12   AM (midnight): 13.0  4     PM: 10  Corection Factor (Sensitivity) and times:  12   AM (midnight): 55 mg/dL  BLOOD GLUCOSE TARGET and times:  12   AM (midnight): 100 - 150  7     AM:  100 - 120  9    PM:  100 - 150  Active Insulin Time:  3 hours  Sensor:  No  Carelink / Diasend username:  [email or web address]  Carelink / Diasend Password:  [email or web address]       Lancet Devices MISC        levothyroxine (SYNTHROID/LEVOTHROID) 75 MCG tablet Take 1 tablet (75 mcg) by  mouth daily 90 tablet 1     Magnesium Cl-Calcium Carbonate (SLOW MAGNESIUM/CALCIUM)  MG TBEC Take 1 tablet by mouth daily 7 tablet 0     metolazone (ZAROXOLYN) 2.5 MG tablet Take as directed, 1/2 hour prior to furosemide, max once daily.       metoprolol (LOPRESSOR) 50 MG tablet 2 times daily   3     nitroglycerin (NITROSTAT) 0.4 MG SL tablet Place 0.4 mg under the tongue       omeprazole (PRILOSEC) 40 MG DR capsule Take 1 capsule (40 mg) by mouth daily 30 capsule 5     potassium chloride ER (K-DUR/KLOR-CON M) 20 MEQ CR tablet Add 20 mEq once daily (total of 40 mEq) (Patient taking differently: 20 mEq 2 times daily Add 20 mEq once daily (total of 40 mEq)) 7 tablet 0     ramipril (ALTACE) 10 MG capsule Take 10 mg by mouth daily       senna-docusate (SENOKOT-S;PERICOLACE) 8.6-50 MG per tablet        simvastatin (ZOCOR) 40 MG tablet TAKE 1 TABLET BY MOUTH AT BEDTIME       tamoxifen (NOLVADEX) 20 MG tablet Take 20 mg by mouth daily        VITAMIN D, CHOLECALCIFEROL, PO Take 5,000 Units by mouth daily       Allergies   Allergen Reactions     Contrast Dye Difficulty breathing     Gadolinium Derivatives      Other reaction(s): Difficulty in swallowing, Laryngeal spasm  Patient had an injection into rt. Shoulder capsule prior to MRI arthrogram.  Contained multihance gadobenate, omnipaque iohexol, and buffered lidocaine.       Ammonia      Cory-1 [Lidocaine Hcl]      Novocaine allergy       Codeine Sulfate      Food      Shrimp     Iodine-131 Swelling     Ct dye     Lidocaine      Nitrous Oxide      No Clinical Screening - See Comments Nausea and Vomiting and Other (See Comments)     (3/6/09)- N/V and Heart racing     Novocain [Procaine]      Penicillins      Tramadol      Morphine Palpitations     Recent Labs   Lab Test 06/07/19 04/19/19 04/01/19  1500 10/08/18 02/16/18  1402 09/05/17  1512  10/31/16  12/17/14  1540   A1C 7.3* 7.3*  --  6.1* 6.7*  --    < >  --    < >  --    LDL 77  --   --   --  33  --   --  87  --   " --    HDL 56  --   --   --  55  --   --   --   --   --    TRIG 87  --   --   --  187*  --   --   --   --   --    ALT  --   --   --   --   --  26  --   --   --  21   CR  --   --  0.80  --   --  1.28*  --   --    < > 0.73   GFRESTIMATED  --   --  74  --   --  41*  --   --    < > 80   GFRESTBLACK  --   --  86  --   --  50*  --   --    < > >90   GFR Calc     POTASSIUM  --   --  3.3*  --   --  4.1  --   --    < > 3.9   TSH 1.17  --   --   --  0.91  --   --   --   --   --     < > = values in this interval not displayed.      BP Readings from Last 3 Encounters:   09/11/19 134/72   06/07/19 110/50   05/30/19 117/66    Wt Readings from Last 3 Encounters:   09/11/19 68.5 kg (151 lb)   06/07/19 67.1 kg (148 lb)   05/30/19 68.1 kg (150 lb 1.6 oz)                 Reviewed and updated as needed this visit by Provider         Review of Systems   ROS COMP: CONSTITUTIONAL: NEGATIVE for fever, chills, change in weight  ENT/MOUTH: lesion on tongue and horse voice  RESP: NEGATIVE for significant cough or SOB  CV: NEGATIVE for chest pain, palpitations or peripheral edema      Objective    /72 (BP Location: Right arm, Patient Position: Sitting, Cuff Size: Adult Regular)   Pulse 68   Temp 98.1  F (36.7  C) (Tympanic)   Ht 1.575 m (5' 2\")   Wt 68.5 kg (151 lb)   SpO2 96%   BMI 27.62 kg/m    Body mass index is 27.62 kg/m .  Physical Exam   GENERAL: healthy, alert and no distress  HENT: lump on distal end tongue  RESP: lungs clear to auscultation - no rales, rhonchi or wheezes  CV: regular rate and rhythm, normal S1 S2, no S3 or S4, no murmur, click or rub, no peripheral edema and peripheral pulses strong    Diagnostic Test Results:  Labs reviewed in Epic  Results for orders placed or performed in visit on 09/11/19 (from the past 24 hour(s))   Hemoglobin A1c   Result Value Ref Range    Hemoglobin A1C 7.5 (H) 0 - 5.6 %   Estimated Average Glucose   Result Value Ref Range    Estimated Average Glucose 169 mg/dL      " "     Assessment & Plan   Assessment  Received Flora insulin pump download, which is as followed:    Insulin pump information:   Average: 155 +/- 38  Basal/bolus ratio: 20.59 (79%)/5.33 (21%)    Testing: DEXCOM CGM  4.1 x/day READINGS ON PUMP  Hypoglycemia: 0%    Basal dose:  00:00 0.850  08:00 0.875  23:00 0.675    Adjusted the basal rate:  NO ADJUSTMENT        Lab Results   Component Value Date    A1C 7.5 09/11/2019    A1C 7.3 06/07/2019     Dexcom Personal CGM:    Glucose Management indicator: N/A    Ave: 168+/-58    Time in Range:  >180  42.9%  : 64.2%  <80:  0.1%      Plan  1. Type 2 diabetes mellitus with hyperglycemia, with long-term current use of insulin (H)  No change with insulin pump continue with current settings  - Hemoglobin A1c  - insulin aspart (NOVOLOG VIAL) 100 UNITS/ML vial; To be used with the insulin pump  TDD: 40 units  Dispense: 30 mL; Refill: 3    2. Tongue lump  Discussed trying lemon drops, possible plugged gland  If no improvement or continues to grow she should go to oral surgery for removal   - Estimated Average Glucose  - Estimated Average Glucose    BMI:   Estimated body mass index is 27.62 kg/m  as calculated from the following:    Height as of this encounter: 1.575 m (5' 2\").    Weight as of this encounter: 68.5 kg (151 lb).           See Patient Instructions    No follow-ups on file.    LESLIE Solorio [facility] [place]          "

## 2019-09-16 ENCOUNTER — TELEPHONE (OUTPATIENT)
Dept: OTOLARYNGOLOGY | Facility: CLINIC | Age: 74
End: 2019-09-16

## 2019-09-16 NOTE — TELEPHONE ENCOUNTER
LVM with patient letting her know I was able to get her rescheduled to 9/23 with Dr. Shen.  Left call center number in case that won't work and needs to reschedule.  Can see Dr. Shen, Asya, or Melva/Kings.

## 2019-09-16 NOTE — TELEPHONE ENCOUNTER
[facility] Call Center    Phone Message    May a detailed message be left on voicemail: yes    Reason for Call: Other: Patient called for a sooner appt with Dr. Shen, as her sister is taking her to the appt; however, her sister is leaving on 10/23/19  to go to [place]. Patient does not drive and lives 4 hours away.  Is there any chance of a sooner appt with Hermelinda?   Please follow up with patient.  Thank you!     Action Taken: Message routed to:  [facility] ([facility] Clinic Coord [facility]

## 2019-09-18 ENCOUNTER — DOCUMENTATION ONLY (OUTPATIENT)
Dept: CARE COORDINATION | Facility: CLINIC | Age: 74
End: 2019-09-18

## 2019-09-23 ENCOUNTER — OFFICE VISIT (OUTPATIENT)
Dept: OTOLARYNGOLOGY | Facility: CLINIC | Age: 74
End: 2019-09-23
Payer: COMMERCIAL

## 2019-09-23 ENCOUNTER — TELEPHONE (OUTPATIENT)
Dept: OTOLARYNGOLOGY | Facility: CLINIC | Age: 74
End: 2019-09-23

## 2019-09-23 VITALS — BODY MASS INDEX: 27.79 KG/M2 | WEIGHT: 151 LBS | HEIGHT: 62 IN

## 2019-09-23 DIAGNOSIS — R49.0 DYSPHONIA: Primary | ICD-10-CM

## 2019-09-23 DIAGNOSIS — J45.909 ASTHMA, UNSPECIFIED ASTHMA SEVERITY, UNSPECIFIED WHETHER COMPLICATED, UNSPECIFIED WHETHER PERSISTENT: ICD-10-CM

## 2019-09-23 ASSESSMENT — MIFFLIN-ST. JEOR: SCORE: 1148.18

## 2019-09-23 NOTE — TELEPHONE ENCOUNTER
LVM for pt to call my direct line or call center to set up F/U appts per Dr. Lopez. Instructions as follows: Patient needs to be scheduled for a Return Voice SLP appointment with Patient choice or first available SLP (Clem Araiza, Marialuisa Lopez, or Joy Ross).     Number and Frequency of sessions:   Special instructions: 4 two-part sessions, a day apart (e.g. a Thursday afternoon, and then the following Friday morning), each session one hour long   Frequency of two-part sessions to be determined by patient; every three weeks?  Four weeks?     NOTE, UNLESS SPECIFICALLY STATED IN SPECIAL INSTRUCTIONS ABOVE   Once a patient has begun therapy they should only see that specific SLP (this does not include initial or follow-up evaluation)   Recommend that the patient schedule the first four or five sessions (if that many recommended), in order to get sessions in a timely manner

## 2019-09-23 NOTE — PROGRESS NOTES
Dear Dr. Wall:    I had the pleasure of meeting Flora Acuna in consultation at the [facility] of the [facility] at your request, for evaluation of dysphonia. The note from our visit follows. Speech recognition software may have been used in the documentation below; input is reviewed before signature to the best of my ability. I appreciate the opportunity to participate in the care of this pleasant patient.    Please feel free to contact me with any questions.    Sincerely yours,    Cande Shen M.D., M.P.H.  , Laryngology  Director, [facility]  Otolaryngology- Head & Neck Surgery  [phone]          =====    History of Present Illness:   Flora Acuna is a pleasant 74-year-old female with asthma who presents with a several month history of dysphonia and throat concerns. Symptoms include total loss of voice, increased effort to talk/sing, throat irritation, throat tightness, frequent throat-clearing, frequent cough, shortness of breath and poor voice quality.      Cough/Throat-clearing  In January of 2019, she developed a severe cough and was diagnosed with chronic bronchitis and was treated with antibiotics and prednisone.  The cough resolved but the voice problem began subsequently.  A proton pump inhibitor did not help.   Voice rest did not help.  She does have a history of secondhand smoke exposure.     Voice  She notes voice problems since earlier in 2019.  She feels this is a very big problem.  Her typical vocal demand is routine, including socializing.  Her voice quality is inconsistent.  Speaking vocal effort is normal, but singing vocal effort is significantly increased.    A Speech Language Pathologist in June noted pharyngeal redness and swelling, with significant pain on scope exam.    Swallowing  No concerns.     Breathing  No concerns.     Throat discomfort  No concerns.     Reflux-type  symptoms  She experiences heartburn/indigestion: never. She is not taking reflux medications.    Prior outside records were reviewed for this visit.       MEDICATIONS:     Current Outpatient Medications   Medication Sig Dispense Refill     acetaminophen (TYLENOL) 325 MG tablet Take 650 mg by mouth       Acetone, Urine, Test (KETONE TEST) STRP Use as directed 50 strip 4     albuterol (PROAIR HFA, PROVENTIL HFA, VENTOLIN HFA) 108 (90 BASE) MCG/ACT inhaler Inhale 2 puffs into the lungs       alendronate (FOSAMAX) 70 MG tablet Take 1 tablet by mouth once weekly. Take with water in the AM 30 minutes prior to eating. Avoid lying down 30 minutes after taking med. 4 tablet 11     aspirin (ASA) 325 MG EC tablet Take 325 mg by mouth daily       baclofen (LIORESAL) 10 MG tablet Take 10 mg by mouth 3 times daily       blood glucose (NO BRAND SPECIFIED) test strip by In Vitro route 4 times daily Use to test blood sugar 4 times daily or as directed.       Calcium-Vitamin D-Vitamin K (VIACTIV PO) Take 2 chew tab by mouth every morning       furosemide (LASIX) 40 MG tablet TAKE 1 TABLET BY MOUTH DAILY       glucagon (GLUCAGON EMERGENCY) 1 MG injection Inject 1 mg Subcutaneous once 1 mg 12     Insulin Aspart (INSULIN PUMP - OUTPATIENT)        insulin aspart (NOVOLOG VIAL) 100 UNITS/ML vial To be used with the insulin pump  TDD: 40 units 30 mL 3     INSULIN PUMP - OUTPATIENT Date last updated:  2/4/15  [employer]: Model 723  BASAL RATES and times:  12   AM (midnight): 0.9 units/hour    9    PM: 0.8 units/hour   Basal Pattern A:  Flora Acuna will use when N/A.  CARB RATIO and times:  12   AM (midnight): 13.0  4     PM: 10  Corection Factor (Sensitivity) and times:  12   AM (midnight): 55 mg/dL  BLOOD GLUCOSE TARGET and times:  12   AM (midnight): 100 - 150  7     AM:  100 - 120  9    PM:  100 - 150  Active Insulin Time:  3 hours  Sensor:  No  Carelink / Diasend username:  [email or web address]  Carelink / Diasend Password:  [email or web address]        Lancet Devices MISC        levothyroxine (SYNTHROID/LEVOTHROID) 75 MCG tablet Take 1 tablet (75 mcg) by mouth daily 90 tablet 1     Magnesium Cl-Calcium Carbonate (SLOW MAGNESIUM/CALCIUM)  MG TBEC Take 1 tablet by mouth daily 7 tablet 0     metolazone (ZAROXOLYN) 2.5 MG tablet Take as directed, 1/2 hour prior to furosemide, max once daily.       metoprolol (LOPRESSOR) 50 MG tablet 2 times daily   3     nitroglycerin (NITROSTAT) 0.4 MG SL tablet Place 0.4 mg under the tongue       omeprazole (PRILOSEC) 40 MG DR capsule Take 1 capsule (40 mg) by mouth daily 30 capsule 5     potassium chloride ER (K-DUR/KLOR-CON M) 20 MEQ CR tablet Add 20 mEq once daily (total of 40 mEq) (Patient taking differently: 20 mEq 2 times daily Add 20 mEq once daily (total of 40 mEq)) 7 tablet 0     ramipril (ALTACE) 10 MG capsule Take 10 mg by mouth daily       senna-docusate (SENOKOT-S;PERICOLACE) 8.6-50 MG per tablet        simvastatin (ZOCOR) 40 MG tablet TAKE 1 TABLET BY MOUTH AT BEDTIME       tamoxifen (NOLVADEX) 20 MG tablet Take 20 mg by mouth daily        VITAMIN D, CHOLECALCIFEROL, PO Take 5,000 Units by mouth daily         ALLERGIES:    Allergies   Allergen Reactions     Contrast Dye Difficulty breathing     Gadolinium Derivatives      Other reaction(s): Difficulty in swallowing, Laryngeal spasm  Patient had an injection into rt. Shoulder capsule prior to MRI arthrogram.  Contained multihance gadobenate, omnipaque iohexol, and buffered lidocaine.       Ammonia      Cory-1 [Lidocaine Hcl]      Novocaine allergy       Codeine Sulfate      Food      Shrimp     Iodine-131 Swelling     Ct dye     Lidocaine      Nitrous Oxide      No Clinical Screening - See Comments Nausea and Vomiting and Other (See Comments)     (3/6/09)- N/V and Heart racing     Novocain [Procaine]      Penicillins      Tramadol      Morphine Palpitations       PAST MEDICAL HISTORY:   Past Medical History:   Diagnosis Date     Congestive heart failure,  unspecified      Diabetes (H)      H/O rheumatoid arthritis      HLD (hyperlipidemia)      HTN (hypertension)      Myocardial infarction (H)      Uncomplicated asthma         PAST SURGICAL HISTORY:   Past Surgical History:   Procedure Laterality Date     APPENDECTOMY OPEN CHILD       AS TOTAL KNEE ARTHROPLASTY Right      CHOLECYSTECTOMY       COLONOSCOPY - HIM SCAN N/A 03/12/2009    per Care Everywhere documentation per [facility].      HYSTERECTOMY TOTAL ABDOMINAL       MASTECTOMY, BILATERAL Bilateral 02/26/1992     SHOULDER SURGERY Right      SHOULDER SURGERY Left        HABITS/SOCIAL HISTORY:    Social History     Tobacco Use     Smoking status: Never Smoker     Smokeless tobacco: Never Used   Substance Use Topics     Alcohol use: No     Alcohol/week: 0.0 standard drinks       FAMILY HISTORY:    Family History   Problem Relation Age of Onset     Breast Cancer Maternal Aunt      Breast Cancer Maternal Aunt      Lung Cancer Father         REVIEW OF SYSTEMS:  The patient completed a comprehensive 11 point review of systems (below), which was reviewed. Positives are as noted below; pertinent findings are as noted in the HPI.     Patient Supplied Answers to Review of Systems   ENT ROS 9/23/2019   Psychology Frequently feeling depressed or sad, Frequently feeling anxious   Cardiopulmonary Cough, Breathing problems, Wheezing   Musculoskeletal Sore or stiff joints, Swollen joints, Back pain, Neck pain, Swollen legs/feet   Endocrine Thirst, Heat or cold intolerance       PHYSICAL EXAMINATION:  General: The patient was alert and conversant, and in no acute distress.    Eyes: PERRL, conjunctiva and lids normal, sclera nonicteric.  Nose: Anterior rhinoscopy: no gross abnormalities. no  bleeding; no  mucopurulence; septum grossly normal, mild mucoid drainage and/or crusting.  Oral cavity/oropharynx: No masses or lesions. Floor of mouth and oral tongue soft to palpation. Tongue mobility and palate elevation intact and  symmetric. Anterior lateral tongue with small mass, may be inflamed papilla, patient seeing oral surgeon about this.  Ears: Normal auricles, external auditory canals bilaterally. Visualized portions of tympanic membranes normal bilaterally.   Neck: No palpable cervical lymphadenopathy. There was moderate  tenderness to palpation of the thyrohyoid space, which was very narrow. No obvious thyroid abnormality. Landmarks palpable.  Resp: Breathing comfortably, no stridor or stertor.  Neuro: Symmetric facial function. Other cranial nerves as documented above.  Psych: Normal affect, pleasant and cooperative.  Voice/speech: Severe dysphonia characterized by breathiness, roughness, strain and very occasional slight phonation.  Extremities: No cyanosis, clubbing, or edema of the upper extremities.    Intake scores  Total Score for Last Patient-Answered VHI Questionnaire  No flowsheet data found.  Total Score for Last Patient-Answered EAT Questionnaire  No flowsheet data found.  Total Score for Last Patient-Answered CSI Questionnaire  No flowsheet data found.      PROCEDURE:   Flexible fiberoptic laryngoscopy  Indications: This procedure was warranted to evaluate the patient's laryngeal anatomy and function. Risks, benefits, and alternatives were discussed.  Description: After written informed consent was obtained, a time-out was performed to confirm patient identity, procedure, and procedure site. Topical 3% lidocaine with 0.25% phenylephrine was applied to the nasal cavities. I performed the endoscopy and no complications were apparent.  Performed by: Cande Shen MD MPH  SLP: Marialuisa Lopez, PhD, CCC-SLP   Findings: Normal nasopharynx. Normal base of tongue, valleculae, and epiglottis. The right true vocal fold demonstrated normal mobility. The left true vocal fold demonstrated normal mobility. The medial edges of the vocal folds appeared smooth and straight. No focal mucosal lesions were observed on the true  vocal folds. Glissade produced appropriate elongation. There was severe supraglottic recruitment with connected speech. Mucosa of the false vocal folds, aryepiglottic folds, piriform sinuses, and posterior glottis unremarkable. Airway was patent. Response to the therapy probes was good.  No focal lesions on NBI.             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     In concordance with Dr Wall, I recommended speech therapy as initial primary management, with goals including improving laryngeal efficiency, improving respiratory/phonatory coordination and improving phonatory mechanics.      I agreed with the plan to see her oral surgeon about her tongue finding, which appears benign.     She will return to see me as needed. I appreciate the opportunity to participate in the care of this pleasant patient.

## 2019-09-23 NOTE — PROGRESS NOTES
[facility] VOICE [facility]  Roger Sky Jr., M.D., F.A.C.S.  Cande Shen M.D., M.P.H.  Marialuisa Lopez, Ph.D., CCC/SLP  Joy Ross M.M. (voice), M.ZARA., CCC/SLP  Clem Araiza M.M. (voice), M.A., CCC/SLP    [facility]  VOICE/SPEECH/BREATHING EVALUATION AND LARYNGEAL EXAMINATION REPORT    Patient: Flora Acuna  Date of Visit: 9/23/2019    Clinician: Marialuisa Lopez, Ph.D., CCC/SLP  Seen in conjunction with: Dr. Shen  Referring Physician: Reza Wall, [facility]    CHIEF COMPLAINT:  voice    HISTORY  Flora Acuna is a 74 year old presenting today for evaluation of dysphonia and related concerns.    Please refer to Dr. Shen s dictation for a more complete history and impressions.   Salient details of her history are as follows:    Onset: February 2019, after resolution of a cough due to bronchitis    Severe emotional trauma at the time of the bronchitis    Saw Dr. Durbin 5/14/19; added ranitidine to PPI, and referred for speech therapy    Speech evaluation started 6/3, another session on 6/20    Stroboscopy attempted but unsuccessful due to inflamed and narrowed pharyngeal lumen and pain with procedure    Recommendations made, and referred back to Dr. Wall    Recommended a week of total voice rest; she did this but no change    No improvement with any of the other recommendations or therapeutic strategies    Saw Dr. 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 Shen's note  o Multiple problems, but states \"I've accepted these and moved on\"    OBJECTIVE FINDINGS  VOICE/ SPEECH/ NON-COMMUNICATIVE LARYNGEAL BEHAVIORS EVALUATION  An evaluation of the voice was accomplished today; salient features are as follows:    Palpation of the laryngeal area shows:    Tight neck musculature    No tenderness of the thyrohyoid area     Reduced thyrohyoid space     Complete  closure of the thyrohyoid space on phonation    Cough/ Throat clear:    Not observed    Breathing pattern:    There can be normal thoracic/abdominal muscle use pattern    Breathing becomes more incoordinated as speech continues    Eventually stridorous on inspiration     Tension is evident:    Jaw    Neck    Upper body    Voice quality is characterized by    Mostly a severely strained whisper, with minimal phonation    Frequent bursts of aphonia (complete whisper with less strain)    Bursts of increased phonation, markedly rough    Short bursts of near-normal quality    Highly variable; strained qualities vary from task to task    Habitual pitch cannot usually be discerned due to the severe strain, nearly aphonic quality     Bursts of normal quality are at the low end of normal pitch: D3 to A3    Loudness is significantly reduced due to the frequent aphonia or near-aphonia    In a task comparing voiced and voiceless phonemes, there is no difference between the two    Sustained vowels:     Comfortable pitch /a/: a strained whisper (pitch given in acoustic measures is suspect)    Comfortable pitch /i/: more strained and nearly aphonic than /a/; no discernable pitch    A singing task shows voice quality is improved in singing; she uses a tight, narrow upper register in the range from F4 to C5; closer to normal    In a pitch glide task to determine pitch range, " "she has intermittent phonation, and cannot find low pitches    Pitch measures given in acoustic measures are suspect    Highest pitch: probably correct at 728 Hz (around F#5)    Lowest pitch: cannot be discerned    she states today is a typical voice day    GLOBAL ASSESSMENT OF DYSPHONIA: 65 /100    CAPE-V Overall Severity:   86/100    AERODYNAMIC AND ACOUSTIC EVALUATION (CPT 88375 - Laryngeal Function Studies)  Laryngeal Function Studies (CPT 24993)     Acoustic Measures     Protocol / Parameter = result     Fundamental frequency Metrics        /a/ mean F0 = 211 Hz (SD = 38.18 Hz)        /i/ mean F0 = --undefined-- Hz (SD = --undefined-- Hz)       [place] Mean f0 = 202 Hz (SD 82.28 Hz)       Glide:            Min F0 = 53 Hz           Max F0 = 728 Hz           Range = 675 Hz           Notes = pitch measures are suspect due to aperidocity     Cepstral Measures        CPPS /a/ = 14.32 dB        CPPS /i/ = 14.02 dB        CPPS \"all voiced\" = 14.42 dB        AVQI (v.3.01) = 8.56     Additional Measures        Harmonic to Noise Ratio /a/ = 4.00 dB        Harmonic to Noise Ratio: [place] = 4.40 dB       Jitter (local) /a/ = 3.036 %        Shimmer (local) /a/ = 19.599 %     Aerodynamic Measures     Protocol / Parameter Result Normative Mean (SD)   Vital Capacity     Expiratory Volume 2.71 Liters 2.24 (0.6) Liters   Comfortable Sustained Phonation     Mean SPL 53.81 dB 79.89 (5.47) dB   Mean Pitch 175.57 Hz 185.27 (25.93) Hz   Peak Expiratory Airflow 0.478 Lit/Sec 0.17 (0.09) Lit/Sec   Mean Expiratory Airflow 0.264 Lit/Sec 0.1 (0.06) Lit/Sec   Voicing Efficiency     Peak Air Pressure -1 cm H2O 9.7 (5.12) cm H2O   Mean Peak Air Pressure -1 cm H2O 7.95 (4.28) cm H2O   Peak Expiratory Airflow 0.01 Lit/Sec 0.25 (0.19) Lit/Sec   Mean Airflow During Voicing 0.003 Lit/Sec 0.13 (0.07) Lit/Sec   Running Speech: All Voiced Stimulus     Mean SPL 50.23 dB    Mean Pitch 203.09 Hz    Peak Expiratory Airflow 0.613 Lit/Sec  "   Mean Expiratory Airflow 0.23 Lit/Sec    Mean Airflow During Voicing 0.308 Lit/Sec    Running Speech: [place] Passage     Mean SPL 52.08 dB    SPL Range 38.93 dB    Mean Pitch 177.93 Hz    Pitch Range 265.66 Hz    Peak Expiratory Airflow 0.527 Lit/Sec    Mean Expiratory Airflow 0.1 Lit/Sec    Expiratory Volume 3.12 Liters    Mean Airflow During Voicing 0.121 Lit/Sec    Peak Inspiratory Airflow -1.546 Lit/Sec    Inspiratory Volume -3.75 Liters              LARYNGEAL EXAMINATION    Endoscopic laryngeal examination was performed by:  Cande Shen MD,   I provided technical support, and provided the protocol of instructions for the patient.  Type of exam:   Flexible endoscopy with chip-tip technology  This exam shows:    Laryngeal and Vocal Fold Mucosa    Essentially healthy laryngeal mucosa    No remarkable signs of reflux    unremarkable presence of thickened secretions on the vocal folds and throughout the laryngeal area    Status of vocal fold mucosa:   o Within normal limits, with no lesions and straight vibratory margins    Neurological and Functional Integrity of the Larynx    Vocal folds are mobile and meet at midline; movement is brisk and symmetric; exam is neurologically normal     Airway is normal    Incoordination is evident during a task of 20 quickly repeated vowels    Elongation of the vocal folds for pitch increase is essentially WNL, though not necessarily paired with phonaton  o Phonation at high pitches can be normal, but she cannot find modal register phonation    On phonation, glottic closure is variable: sometimes posterior glottic gap, sometimes triangular gap, sometimes pressed, often not visible due to ventricular appropximation    Supraglottic Function and Therapy Probes    severe ventricular approximation during phonation;   o ventricular folds occasionally appear to be a vibratory source   o Ventricular folds oten appear to damp vibration of the true vocal folds    Supraglottic  function during singing is improved    Therapy probes show   o Inhalation phonation produces normal phonation briefly, but she cannot maintain the posture with exhalation     Stroboscopy was not warranted due to lack of periodicity.    Dr. Shen and I reviewed this laryngeal exam with Ms. Acuna today, and I provided pertinent explanations:    Endoscopic findings are consistent with audio-perceptual assessment and patient Hx/complaints.    her symptoms are accounted for by severe supraglottic hyperfunction, specifically ventricular approximation, damping, and sometimes vibration     Because there appears to be a functional component to her symptoms, she would most likely benefit from functional speech therapy.    ASSESSMENT / PLAN  IMPRESSIONS:  This evaluation has resulted in the following diagnosis/diagnoses for Ms. Acuna  R49.0 (Dysphonia)    Laryngeal evaluation demonstrated severe ventricular approximation that damps true-fold vibration and is sometimes a vibratory source; other aspects of the larynx and laryngeal movement are normal    Perceptual evaluation demonstrated highly variable but always severe dysphonia that sometimes interferes with intelligibility and is always highly unnatural    Laryngeal function studies demonstrated:     Acoustic measures: Pitch measures are suspect due to extreme periodicity, but high pitch on glide may be correct; AVQI does show severe dysphonia    Airflow measures: Voicing Efficiency task shows very poor airflow because there was no phonation during the task, consistent with ventricular damping; other measures of very high airflow consistent with whispered phonation  STIMULABILITY: results of therapy probes during perceptual and laryngeal evaluation demonstrate improvement with use of inhalation phonation  RECOMMENDATIONS:     A course of speech therapy is recommended to optimize vocal technique, improve voice quality and promote reduced discomfort, effort and  fatigue.    We recommend she undergo speech therapy in this facility, as we have voice specialists with experience in treating severe functional disorders such as hers    She demonstrates a Good prognosis for improvement given adherence to therapeutic recommendations. Therapeutic     Positive indicators: commitment to process; diagnosis is known to respond to functional treatment;     Negative indicators: none    TREATMENT PLAN  Speech therapy    DURATION/FREQUENCY OF TREATMENT  6 one-hour sessions, at three-four week intervals (due to driving distance) with four monthly one-hour follow-up sessions    GOALS  Patient goal:    To have a normal and acceptable voice quality    Long-term goal(s): In 9 months, Ms. Acuna will:  1.  Report a week of typical vocal activities, in which dysphonia does not exceed a level of 3 out of 10, 80% of the time   2.  Report a speaking and singing voice quality that is acceptable to her, 80%of the time  3.  In a 20-minute conversation task, demonstrate strain and bursts of aphonia that do not exceed a level of 3 out of 10, 80% of the time, by therapist judgment    This treatment plan was developed with the patient who agreed with the recommendations.    TOTAL SERVICE TIME: 100 minutes  EVALUATION OF VOICE AND RESONANCE (01631)  LARYNGEAL FUNCTION STUDIES (71045)  NO CHARGE FACILITY FEE (91235)      Marialuisa Lopez, Ph.D., [facility]-SLP  Speech-Language Pathologist  Director, [facility]  [phone]

## 2019-09-23 NOTE — LETTER
9/23/2019       RE: Flora Acuna  [address]  [place] [facility] [address]     Dear Colleague,    Thank you for referring your patient, Flora Acuna, to the [facility]. Please see a copy of my visit note below.    [facility]  Roger Sky Jr., M.D., F.A.C.S.  Cande Shen M.D., M.P.H.  Marialuisa Lopez, Ph.D., CCC/SLP  Joy Ross M.M. (voice), MGIACOMO., CCC/SLP  Clem Araiza M.M. (voice), MGIACOMO., CCC/SLP    [facility]  VOICE/SPEECH/BREATHING EVALUATION AND LARYNGEAL EXAMINATION REPORT    Patient: Flora Acuna  Date of Visit: 9/23/2019    Clinician: Marialuisa Lopez, Ph.D., CCC/SLP  Seen in conjunction with: Dr. Shen  Referring Physician: Reza Wall, [facility]    CHIEF COMPLAINT:  voice    HISTORY  Flora Acuna is a 74 year old presenting today for evaluation of dysphonia and related concerns.    Please refer to Dr. Shen s dictation for a more complete history and impressions.   Salient details of her history are as follows:    Onset: February 2019, after resolution of a cough due to bronchitis    Severe emotional trauma at the time of the bronchitis    Saw Dr. Durbin 5/14/19; added ranitidine to PPI, and referred for speech therapy    Speech evaluation started 6/3, another session on 6/20    Stroboscopy attempted but unsuccessful due to inflamed and narrowed pharyngeal lumen and pain with procedure    Recommendations made, and referred back to Dr. Wall    Recommended a week of total voice rest; she did this but no change    No improvement with any of the other recommendations or therapeutic strategies    Saw Dr. Wall 7/26/19; also complained of some shortness of breath and some solid and liquid dysphagia at that time, that were new in addition to the original hoarseness    Normal structures and vocal fold movement    Ventricular approximation prevented view of vocal folds during  "phonation    DIAGNOSIS/REASON FOR REFERRAL  Dysphonia/ Evaluate, perform laryngeal exam, treat as appropriate    CURRENT PATIENT COMPLAINTS    Primary: Voice    Secondary: swallow and breathing problems that seem to be related to the voice problem    Voice is highly variable, but never normal    occasional cough for no apparent reason    Denies significant dyspnea, dysphagia, or pain    OTHER PERTINENT HISTORY    Complex medical Hx; please see Dr. Shen's note  o Multiple problems, but states \"I've accepted these and moved on\"    OBJECTIVE FINDINGS  VOICE/ SPEECH/ NON-COMMUNICATIVE LARYNGEAL BEHAVIORS EVALUATION  An evaluation of the voice was accomplished today; salient features are as follows:    Palpation of the laryngeal area shows:    Tight neck musculature    No tenderness of the thyrohyoid area     Reduced thyrohyoid space     Complete  closure of the thyrohyoid space on phonation    Cough/ Throat clear:    Not observed    Breathing pattern:    There can be normal thoracic/abdominal muscle use pattern    Breathing becomes more incoordinated as speech continues    Eventually stridorous on inspiration     Tension is evident:    Jaw    Neck    Upper body    Voice quality is characterized by    Mostly a severely strained whisper, with minimal phonation    Frequent bursts of aphonia (complete whisper with less strain)    Bursts of increased phonation, markedly rough    Short bursts of near-normal quality    Highly variable; strained qualities vary from task to task    Habitual pitch cannot usually be discerned due to the severe strain, nearly aphonic quality     Bursts of normal quality are at the low end of normal pitch: D3 to A3    Loudness is significantly reduced due to the frequent aphonia or near-aphonia    In a task comparing voiced and voiceless phonemes, there is no difference between the two    Sustained vowels:     Comfortable pitch /a/: a strained whisper (pitch given in acoustic measures is " "suspect)    Comfortable pitch /i/: more strained and nearly aphonic than /a/; no discernable pitch    A singing task shows voice quality is improved in singing; she uses a tight, narrow upper register in the range from F4 to C5; closer to normal    In a pitch glide task to determine pitch range, she has intermittent phonation, and cannot find low pitches    Pitch measures given in acoustic measures are suspect    Highest pitch: probably correct at 728 Hz (around F#5)    Lowest pitch: cannot be discerned    she states today is a typical voice day    GLOBAL ASSESSMENT OF DYSPHONIA: 65 /100    CAPE-V Overall Severity:   86/100    AERODYNAMIC AND ACOUSTIC EVALUATION (CPT 12247 - Laryngeal Function Studies)  Laryngeal Function Studies (CPT 15141)     Acoustic Measures     Protocol / Parameter = result     Fundamental frequency Metrics        /a/ mean F0 = 211 Hz (SD = 38.18 Hz)        /i/ mean F0 = --undefined-- Hz (SD = --undefined-- Hz)       [place] Mean f0 = 202 Hz (SD 82.28 Hz)       Glide:            Min F0 = 53 Hz           Max F0 = 728 Hz           Range = 675 Hz           Notes = pitch measures are suspect due to aperidocity     Cepstral Measures        CPPS /a/ = 14.32 dB        CPPS /i/ = 14.02 dB        CPPS \"all voiced\" = 14.42 dB        AVQI (v.3.01) = 8.56     Additional Measures        Harmonic to Noise Ratio /a/ = 4.00 dB        Harmonic to Noise Ratio: [place] = 4.40 dB       Jitter (local) /a/ = 3.036 %        Shimmer (local) /a/ = 19.599 %     Aerodynamic Measures     Protocol / Parameter Result Normative Mean (SD)   Vital Capacity     Expiratory Volume 2.71 Liters 2.24 (0.6) Liters   Comfortable Sustained Phonation     Mean SPL 53.81 dB 79.89 (5.47) dB   Mean Pitch 175.57 Hz 185.27 (25.93) Hz   Peak Expiratory Airflow 0.478 Lit/Sec 0.17 (0.09) Lit/Sec   Mean Expiratory Airflow 0.264 Lit/Sec 0.1 (0.06) Lit/Sec   Voicing Efficiency     Peak Air Pressure -1 cm H2O 9.7 (5.12) cm H2O "   Mean Peak Air Pressure -1 cm H2O 7.95 (4.28) cm H2O   Peak Expiratory Airflow 0.01 Lit/Sec 0.25 (0.19) Lit/Sec   Mean Airflow During Voicing 0.003 Lit/Sec 0.13 (0.07) Lit/Sec   Running Speech: All Voiced Stimulus     Mean SPL 50.23 dB    Mean Pitch 203.09 Hz    Peak Expiratory Airflow 0.613 Lit/Sec    Mean Expiratory Airflow 0.23 Lit/Sec    Mean Airflow During Voicing 0.308 Lit/Sec    Running Speech: [place] Passage     Mean SPL 52.08 dB    SPL Range 38.93 dB    Mean Pitch 177.93 Hz    Pitch Range 265.66 Hz    Peak Expiratory Airflow 0.527 Lit/Sec    Mean Expiratory Airflow 0.1 Lit/Sec    Expiratory Volume 3.12 Liters    Mean Airflow During Voicing 0.121 Lit/Sec    Peak Inspiratory Airflow -1.546 Lit/Sec    Inspiratory Volume -3.75 Liters              LARYNGEAL EXAMINATION    Endoscopic laryngeal examination was performed by:  Cande Shen MD,   I provided technical support, and provided the protocol of instructions for the patient.  Type of exam:   Flexible endoscopy with chip-tip technology  This exam shows:    Laryngeal and Vocal Fold Mucosa    Essentially healthy laryngeal mucosa    No remarkable signs of reflux    unremarkable presence of thickened secretions on the vocal folds and throughout the laryngeal area    Status of vocal fold mucosa:   o Within normal limits, with no lesions and straight vibratory margins    Neurological and Functional Integrity of the Larynx    Vocal folds are mobile and meet at midline; movement is brisk and symmetric; exam is neurologically normal     Airway is normal    Incoordination is evident during a task of 20 quickly repeated vowels    Elongation of the vocal folds for pitch increase is essentially WNL, though not necessarily paired with phonaton  o Phonation at high pitches can be normal, but she cannot find modal register phonation    On phonation, glottic closure is variable: sometimes posterior glottic gap, sometimes triangular gap, sometimes pressed, often not  visible due to ventricular appropximation    Supraglottic Function and Therapy Probes    severe ventricular approximation during phonation;   o ventricular folds occasionally appear to be a vibratory source   o Ventricular folds oten appear to damp vibration of the true vocal folds    Supraglottic function during singing is improved    Therapy probes show   o Inhalation phonation produces normal phonation briefly, but she cannot maintain the posture with exhalation     Stroboscopy was not warranted due to lack of periodicity.    Dr. Shen and I reviewed this laryngeal exam with Ms. Acuna today, and I provided pertinent explanations:    Endoscopic findings are consistent with audio-perceptual assessment and patient Hx/complaints.    her symptoms are accounted for by severe supraglottic hyperfunction, specifically ventricular approximation, damping, and sometimes vibration     Because there appears to be a functional component to her symptoms, she would most likely benefit from functional speech therapy.    ASSESSMENT / PLAN  IMPRESSIONS:  This evaluation has resulted in the following diagnosis/diagnoses for Ms. Acuna  R49.0 (Dysphonia)    Laryngeal evaluation demonstrated severe ventricular approximation that damps true-fold vibration and is sometimes a vibratory source; other aspects of the larynx and laryngeal movement are normal    Perceptual evaluation demonstrated highly variable but always severe dysphonia that sometimes interferes with intelligibility and is always highly unnatural    Laryngeal function studies demonstrated:     Acoustic measures: Pitch measures are suspect due to extreme periodicity, but high pitch on glide may be correct; AVQI does show severe dysphonia    Airflow measures: Voicing Efficiency task shows very poor airflow because there was no phonation during the task, consistent with ventricular damping; other measures of very high airflow consistent with whispered  phonation  STIMULABILITY: results of therapy probes during perceptual and laryngeal evaluation demonstrate improvement with use of inhalation phonation  RECOMMENDATIONS:     A course of speech therapy is recommended to optimize vocal technique, improve voice quality and promote reduced discomfort, effort and fatigue.    We recommend she undergo speech therapy in this facility, as we have voice specialists with experience in treating severe functional disorders such as hers    She demonstrates a Good prognosis for improvement given adherence to therapeutic recommendations. Therapeutic     Positive indicators: commitment to process; diagnosis is known to respond to functional treatment;     Negative indicators: none    TREATMENT PLAN  Speech therapy    DURATION/FREQUENCY OF TREATMENT  6 one-hour sessions, at three-four week intervals (due to driving distance) with four monthly one-hour follow-up sessions    GOALS  Patient goal:    To have a normal and acceptable voice quality    Long-term goal(s): In 9 months, Ms. Acuna will:  1.  Report a week of typical vocal activities, in which dysphonia does not exceed a level of 3 out of 10, 80% of the time   2.  Report a speaking and singing voice quality that is acceptable to her, 80%of the time  3.  In a 20-minute conversation task, demonstrate strain and bursts of aphonia that do not exceed a level of 3 out of 10, 80% of the time, by therapist judgment    This treatment plan was developed with the patient who agreed with the recommendations.    TOTAL SERVICE TIME: 100 minutes  EVALUATION OF VOICE AND RESONANCE (68117)  LARYNGEAL FUNCTION STUDIES (79905)  NO CHARGE FACILITY FEE (99790)      Marialuisa Lopez, Ph.D., [facility]-SLP  Speech-Language Pathologist  Director, [facility]  [phone]              Again, thank you for allowing me to participate in the care of your patient.      Sincerely,    Marialuisa Lopez, SLP

## 2019-09-23 NOTE — LETTER
9/23/2019      RE: Flora Acuna  [address]  [facility] [address]       Dear Dr. Wall:    I had the pleasure of meeting Flora Acuna in consultation at the [facility] of the [facility] at your request, for evaluation of dysphonia. The note from our visit follows. Speech recognition software may have been used in the documentation below; input is reviewed before signature to the best of my ability. I appreciate the opportunity to participate in the care of this pleasant patient.    Please feel free to contact me with any questions.    Sincerely yours,    Cande Shen M.D., M.P.H.  , Laryngology  Director, [facility]  Otolaryngology- Head & Neck Surgery  [phone]          =====    History of Present Illness:   Flora Acuna is a pleasant 74-year-old female with asthma who presents with a several month history of dysphonia and throat concerns. Symptoms include total loss of voice, increased effort to talk/sing, throat irritation, throat tightness, frequent throat-clearing, frequent cough, shortness of breath and poor voice quality.      Cough/Throat-clearing  In January of 2019, she developed a severe cough and was diagnosed with chronic bronchitis and was treated with antibiotics and prednisone.  The cough resolved but the voice problem began subsequently.  A proton pump inhibitor did not help.   Voice rest did not help.  She does have a history of secondhand smoke exposure.     Voice  She notes voice problems since earlier in 2019.  She feels this is a very big problem.  Her typical vocal demand is routine, including socializing.  Her voice quality is inconsistent.  Speaking vocal effort is normal, but singing vocal effort is significantly increased.    A Speech Language Pathologist in June noted pharyngeal redness and swelling, with significant pain on scope exam.    Swallowing  No  concerns.     Breathing  No concerns.     Throat discomfort  No concerns.     Reflux-type symptoms  She experiences heartburn/indigestion: never. She is not taking reflux medications.    Prior outside records were reviewed for this visit.       MEDICATIONS:     Current Outpatient Medications   Medication Sig Dispense Refill     acetaminophen (TYLENOL) 325 MG tablet Take 650 mg by mouth       Acetone, Urine, Test (KETONE TEST) STRP Use as directed 50 strip 4     albuterol (PROAIR HFA, PROVENTIL HFA, VENTOLIN HFA) 108 (90 BASE) MCG/ACT inhaler Inhale 2 puffs into the lungs       alendronate (FOSAMAX) 70 MG tablet Take 1 tablet by mouth once weekly. Take with water in the AM 30 minutes prior to eating. Avoid lying down 30 minutes after taking med. 4 tablet 11     aspirin (ASA) 325 MG EC tablet Take 325 mg by mouth daily       baclofen (LIORESAL) 10 MG tablet Take 10 mg by mouth 3 times daily       blood glucose (NO BRAND SPECIFIED) test strip by In Vitro route 4 times daily Use to test blood sugar 4 times daily or as directed.       Calcium-Vitamin D-Vitamin K (VIACTIV PO) Take 2 chew tab by mouth every morning       furosemide (LASIX) 40 MG tablet TAKE 1 TABLET BY MOUTH DAILY       glucagon (GLUCAGON EMERGENCY) 1 MG injection Inject 1 mg Subcutaneous once 1 mg 12     Insulin Aspart (INSULIN PUMP - OUTPATIENT)        insulin aspart (NOVOLOG VIAL) 100 UNITS/ML vial To be used with the insulin pump  TDD: 40 units 30 mL 3     INSULIN PUMP - OUTPATIENT Date last updated:  2/4/15  Vantos MinimCellrox: Model 723  BASAL RATES and times:  12   AM (midnight): 0.9 units/hour    9    PM: 0.8 units/hour   Basal Pattern A:  Flora Acuna will use when N/A.  CARB RATIO and times:  12   AM (midnight): 13.0  4     PM: 10  Corection Factor (Sensitivity) and times:  12   AM (midnight): 55 mg/dL  BLOOD GLUCOSE TARGET and times:  12   AM (midnight): 100 - 150  7     AM:  100 - 120  9    PM:  100 - 150  Active Insulin Time:  3  hours  Sensor:  No  Carelink / Diasend username:  [email or web address]  Carelink / Diasend Password:  [email or web address]       Lancet Devices MISC        levothyroxine (SYNTHROID/LEVOTHROID) 75 MCG tablet Take 1 tablet (75 mcg) by mouth daily 90 tablet 1     Magnesium Cl-Calcium Carbonate (SLOW MAGNESIUM/CALCIUM)  MG TBEC Take 1 tablet by mouth daily 7 tablet 0     metolazone (ZAROXOLYN) 2.5 MG tablet Take as directed, 1/2 hour prior to furosemide, max once daily.       metoprolol (LOPRESSOR) 50 MG tablet 2 times daily   3     nitroglycerin (NITROSTAT) 0.4 MG SL tablet Place 0.4 mg under the tongue       omeprazole (PRILOSEC) 40 MG DR capsule Take 1 capsule (40 mg) by mouth daily 30 capsule 5     potassium chloride ER (K-DUR/KLOR-CON M) 20 MEQ CR tablet Add 20 mEq once daily (total of 40 mEq) (Patient taking differently: 20 mEq 2 times daily Add 20 mEq once daily (total of 40 mEq)) 7 tablet 0     ramipril (ALTACE) 10 MG capsule Take 10 mg by mouth daily       senna-docusate (SENOKOT-S;PERICOLACE) 8.6-50 MG per tablet        simvastatin (ZOCOR) 40 MG tablet TAKE 1 TABLET BY MOUTH AT BEDTIME       tamoxifen (NOLVADEX) 20 MG tablet Take 20 mg by mouth daily        VITAMIN D, CHOLECALCIFEROL, PO Take 5,000 Units by mouth daily         ALLERGIES:    Allergies   Allergen Reactions     Contrast Dye Difficulty breathing     Gadolinium Derivatives      Other reaction(s): Difficulty in swallowing, Laryngeal spasm  Patient had an injection into rt. Shoulder capsule prior to MRI arthrogram.  Contained multihance gadobenate, omnipaque iohexol, and buffered lidocaine.       Ammonia      Cory-1 [Lidocaine Hcl]      Novocaine allergy       Codeine Sulfate      Food      Shrimp     Iodine-131 Swelling     Ct dye     Lidocaine      Nitrous Oxide      No Clinical Screening - See Comments Nausea and Vomiting and Other (See Comments)     (3/6/09)- N/V and Heart racing     Novocain [Procaine]      Penicillins      Tramadol      Morphine Palpitations        PAST MEDICAL HISTORY:   Past Medical History:   Diagnosis Date     Congestive heart failure, unspecified      Diabetes (H)      H/O rheumatoid arthritis      HLD (hyperlipidemia)      HTN (hypertension)      Myocardial infarction (H)      Uncomplicated asthma         PAST SURGICAL HISTORY:   Past Surgical History:   Procedure Laterality Date     APPENDECTOMY OPEN CHILD       AS TOTAL KNEE ARTHROPLASTY Right      CHOLECYSTECTOMY       COLONOSCOPY - HIM SCAN N/A 03/12/2009    per Care Everywhere documentation per [facility].      HYSTERECTOMY TOTAL ABDOMINAL       MASTECTOMY, BILATERAL Bilateral 02/26/1992     SHOULDER SURGERY Right      SHOULDER SURGERY Left        HABITS/SOCIAL HISTORY:    Social History     Tobacco Use     Smoking status: Never Smoker     Smokeless tobacco: Never Used   Substance Use Topics     Alcohol use: No     Alcohol/week: 0.0 standard drinks       FAMILY HISTORY:    Family History   Problem Relation Age of Onset     Breast Cancer Maternal Aunt      Breast Cancer Maternal Aunt      Lung Cancer Father         REVIEW OF SYSTEMS:  The patient completed a comprehensive 11 point review of systems (below), which was reviewed. Positives are as noted below; pertinent findings are as noted in the HPI.     Patient Supplied Answers to Review of Systems  [facility] ENT ROS 9/23/2019   Psychology Frequently feeling depressed or sad, Frequently feeling anxious   Cardiopulmonary Cough, Breathing problems, Wheezing   Musculoskeletal Sore or stiff joints, Swollen joints, Back pain, Neck pain, Swollen legs/feet   Endocrine Thirst, Heat or cold intolerance       PHYSICAL EXAMINATION:  General: The patient was alert and conversant, and in no acute distress.    Eyes: PERRL, conjunctiva and lids normal, sclera nonicteric.  Nose: Anterior rhinoscopy: no gross abnormalities. no  bleeding; no  mucopurulence; septum grossly normal, mild mucoid drainage and/or crusting.  Oral cavity/oropharynx: No masses or lesions.  Floor of mouth and oral tongue soft to palpation. Tongue mobility and palate elevation intact and symmetric. Anterior lateral tongue with small mass, may be inflamed papilla, patient seeing oral surgeon about this.  Ears: Normal auricles, external auditory canals bilaterally. Visualized portions of tympanic membranes normal bilaterally.   Neck: No palpable cervical lymphadenopathy. There was moderate  tenderness to palpation of the thyrohyoid space, which was very narrow. No obvious thyroid abnormality. Landmarks palpable.  Resp: Breathing comfortably, no stridor or stertor.  Neuro: Symmetric facial function. Other cranial nerves as documented above.  Psych: Normal affect, pleasant and cooperative.  Voice/speech: Severe dysphonia characterized by breathiness, roughness, strain and very occasional slight phonation.  Extremities: No cyanosis, clubbing, or edema of the upper extremities.    Intake scores  Total Score for Last Patient-Answered VHI Questionnaire  No flowsheet data found.  Total Score for Last Patient-Answered EAT Questionnaire  No flowsheet data found.  Total Score for Last Patient-Answered CSI Questionnaire  No flowsheet data found.      PROCEDURE:   Flexible fiberoptic laryngoscopy  Indications: This procedure was warranted to evaluate the patient's laryngeal anatomy and function. Risks, benefits, and alternatives were discussed.  Description: After written informed consent was obtained, a time-out was performed to confirm patient identity, procedure, and procedure site. Topical 3% lidocaine with 0.25% phenylephrine was applied to the nasal cavities. I performed the endoscopy and no complications were apparent.  Performed by: Cande Shen MD MPH  SLP: Marialuisa Lopez, PhD, CCC-SLP   Findings: Normal nasopharynx. Normal base of tongue, valleculae, and epiglottis. The right true vocal fold demonstrated normal mobility. The left true vocal fold demonstrated normal mobility. The medial edges of the  vocal folds appeared smooth and straight. No focal mucosal lesions were observed on the true vocal folds. Glissade produced appropriate elongation. There was severe supraglottic recruitment with connected speech. Mucosa of the false vocal folds, aryepiglottic folds, piriform sinuses, and posterior glottis unremarkable. Airway was patent. Response to the therapy probes was good.  No focal lesions on NBI.             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     In concordance with Dr Wall, I recommended speech therapy as initial primary management, with goals including improving laryngeal efficiency, improving respiratory/phonatory coordination and improving phonatory mechanics.      I agreed with the plan to see her oral surgeon about her tongue finding, which appears benign.     She will return to see me as needed. I appreciate the opportunity to participate in the care of this pleasant patient.             Cande Shen MD

## 2019-10-01 ENCOUNTER — TELEPHONE (OUTPATIENT)
Dept: OTOLARYNGOLOGY | Facility: CLINIC | Age: 74
End: 2019-10-01

## 2019-10-01 NOTE — TELEPHONE ENCOUNTER
LVM letting pt know the the call center erroneously scheduled her for 10/10 and 10/11 and that the appointments needed to be moved to 11/21 and 11/22. Left my direct line for any questions.

## 2019-10-09 DIAGNOSIS — I10 ESSENTIAL HYPERTENSION: Primary | ICD-10-CM

## 2019-10-09 NOTE — TELEPHONE ENCOUNTER
furosemide (LASIX) 40 MG tablet     Last Written Prescription Date:  historical medication   Last Fill Quantity: 00,   # refills: 0  Last Office Visit: 9-11-19  Future Office visit:    Next 5 appointments (look out 90 days)    Dec 09, 2019 11:00 AM CST  (Arrive by 10:45 AM)  SHORT with Nicolasa Guillen NP  [facility] ([facility] [place] ) [address]  [place] [address]  [phone]

## 2019-10-10 ENCOUNTER — OFFICE VISIT (OUTPATIENT)
Dept: OTOLARYNGOLOGY | Facility: CLINIC | Age: 74
End: 2019-10-10
Payer: COMMERCIAL

## 2019-10-10 DIAGNOSIS — R49.0 DYSPHONIA: Primary | ICD-10-CM

## 2019-10-10 NOTE — PATIENT INSTRUCTIONS
"  After Visit Summary    Patient: Flora Acuna  Date of Visit: 10/10/2019    Next appointments:   Thursday Nov 21, 3:00  Friday Nov. 22, 2:00  You'll be seeing Clem Araiza    In the meantime, look for a better Voice Recorder galilea        Breathing:    In the morning and evening (twice daily) for 2-5 minutes:   o Breathe while lying on your tummy with your arms down at your side. Feel the small of your back rise as you inhale.  (Put your nice warm microwave beanbag on your back so you can feel it rise and fall.) Turn on to your back, knees up, hands on tummy, and feel your tummy rise as you inhale.  o You can also sit and bend over on yourself (body on knees) and feel your belly lift you up as you inhale, and set you back down again as you exhale.   o Inhale = Inflate; Exhale = Deflate  o Ssshhhhhhh on exhale, silence on inhale; the exhale will FLOW......  o Throughout the day (10x/day for 10 seconds to several minutes) check breathing while keeping shoulders, neck, and jaw relaxed and maintaining awareness of abdominal movement    Breathing Tips:  o Try the exhale through the straw and add your voice     Voice:    Straw exercise:   o 100x per day: blow into the straw 5-8 seconds with no voice and feel the air coming out, gently and flowingly, as if you were fogging a mirror.  o 1-3x: Add a sustained  who    - Sense your voice in the straw, or in your mouth/face, not in your throat  o 5x: hum into the strawand vary  who  gliding up and down             Up and down like a siren  o Let those sirens get bigger  (higher and lower), and go longer, always very smooth and connected and easy and flowing  o 3x: blow on a sustained pitch softest to loudest to softest     o 1-2x: Happy birthday bubbles (keep connected)  Now try without the straw, just doing the \"WHooo\" with your lips  ( you can also try puffing air into your upper lip, and then putting your voice there)  These exercises are great for:  *  Abdominal " breathing and applying optimal breath flow to speech/singing.  *  Learning laryngeal muscle relaxation while producing sound; feel sound in your mouth/face/straw/lip but not in your throat  *

## 2019-10-10 NOTE — LETTER
10/10/2019       RE: Flora Acuna  [address]  [facility] [address]     Dear Colleague,    Thank you for referring your patient, Flora Acuna, to the [facility]. Please see a copy of my visit note below.    [facility] VOICE [facility]  Roger Sky Jr., M.D., F.A.C.S.  Cande Shen M.D., M.P.H.  Marialuisa Lopez, Ph.D., CCC/SLP  Joy Ross M.M. (voice), MGIACOMO., CCC/SLP  Clem Araiza M.M. (voice), MGIACOMO., [facility]/SLP    [facility]  VOICE/SPEECH/BREATHING THERAPY PROGRESS REPORT    Patient: Flora Acuna  Date of Service: 10/10/2019    Date of Last Service: 9/23/19  Session number: 1  Referring physician: Dr. Shen  Initial evaluation: 9/23/19    I had the pleasure of seeing Ms. Acuna today, for speech therapy to address a diagnosis of:  R49.0 (Dysphonia)       PROGRESS SINCE LAST SESSION  Ms. Acuna was seen for evaluation on 9/23/19.  At that time, it was determined that she would benefit from a course of speech therapy to address the above diagnosis.  Since then, she states she has not had any change in her symptoms, which is expected, as no therapeutic suggestions were made at the time.    Ms. Acuna also states that:    She is essentially unchanged, with good days and bad days    She wonders why our diagnosis is so different from the report of her first ENT who told her she had infection and swelling in the larynx     Ms. Acuna presents today with the following:  Voice quality:    High variability, most often a strained, high-pitched whisper    Short bursts of normal or near-normal quality    Frequent bursts of aphonia    Very frequent noisy inhalation, often in the middle of a phrase or even a word    THERAPEUTIC ACTIVITIES  Today Ms. Acuna participated in the following therapeutic activities:    Asked many questions about the nature of her symptoms, and I answered all of these thoroughly.  o I clarified her  "original 's report, and showed her the video of her laryngeal examination, so she could understand why her larynx could have appeared \"swollen\"  o She seemed helped by further explanation of the various aspects of hyperfunction    [place] exercises for optimal respiratory mechanics for speech.  o I provided explanation of the anatomy and physiology of respiration for speech and singing; she found this to be helpful  o she demonstrated clavicular/neck/shoulder involvement in inhalation  o demonstrated difficulty allowing abdominal relaxation for inhalation  o with guidance, learned improved abdominal relaxation for inhalation  o learned techniques for optimal airflow on exhalation  o practiced in various sitting and standing positions; this was helpful  o good learning, but will need practice    [place] exercises to add phonation to the optimal flowing airstream.  o Semi-occluded vocal tract exercises with a straw (\"cup and bubbles\") were most facilitating  o Able to produce normal voice quality when humming into the straw  o Instructed to use as laryngeal relaxation and for coordination of breath flow with phonation  o good learning, but will need practice     [place] concepts of an optimal regimen for practice.  o she should use an interval schedule of practice, with brief periods of practice frequently throughout each day    I provided an after visit summary to help facilitate practice.    IMPRESSIONS/GOALS/PLAN  Ms. Acuna had a productive session of speech therapy today, to address the following:  R49.0 (Dysphonia)   Speech therapy for her is medically necessary to allow  her to meet personal and professional demands and fully engage in activities of daily living.     She will continue to work on her exercises on a daily basis, and work on incorporating the techniques into her daily activities.    Goals for this practice period:     practice all exercises according to instructions    Plan: Ms. Acuna will " be seen by my associate, Clem Araiza, in a month, to work on education, modification, and carryover of therapeutic activities to more complex activities.    TOTAL SERVICE TIME: 75 minutes  TREATMENT (99976)  NO CHARGE FACILITY FEE (92323)    Marialuisa Lopez, Ph.D., [facility]-SLP  Speech-Language Pathologist  Director, [facility]  [phone]

## 2019-10-10 NOTE — PROGRESS NOTES
"[facility] VOICE [facility]  Roger Sky Jr., M.D., F.A.C.S.  Cande Shen M.D., M.P.H.  Marialuisa Lopez, Ph.D., CCC/SLP  Joy Ross M.M. (voice), M.A., CCC/SLP  Clem Araiza M.M. (voice), MGIACOMO., [facility]/SLP    [facility] VOICE [facility]  VOICE/SPEECH/BREATHING THERAPY PROGRESS REPORT    Patient: Flora Acuna  Date of Service: 10/10/2019    Date of Last Service: 9/23/19  Session number: 1  Referring physician: Dr. Shen  Initial evaluation: 9/23/19    I had the pleasure of seeing Ms. Acuna today, for speech therapy to address a diagnosis of:  R49.0 (Dysphonia)       PROGRESS SINCE LAST SESSION  Ms. Acuna was seen for evaluation on 9/23/19.  At that time, it was determined that she would benefit from a course of speech therapy to address the above diagnosis.  Since then, she states she has not had any change in her symptoms, which is expected, as no therapeutic suggestions were made at the time.    Ms. Acuna also states that:    She is essentially unchanged, with good days and bad days    She wonders why our diagnosis is so different from the report of her first ENT who told her she had infection and swelling in the larynx     Ms. Acuna presents today with the following:  Voice quality:    High variability, most often a strained, high-pitched whisper    Short bursts of normal or near-normal quality    Frequent bursts of aphonia    Very frequent noisy inhalation, often in the middle of a phrase or even a word    THERAPEUTIC ACTIVITIES  Today Ms. Acuna participated in the following therapeutic activities:    Asked many questions about the nature of her symptoms, and I answered all of these thoroughly.  o I clarified her original 's report, and showed her the video of her laryngeal examination, so she could understand why her larynx could have appeared \"swollen\"  o She seemed helped by further explanation of the various aspects of hyperfunction    [place] exercises for optimal respiratory mechanics " "for speech.  o I provided explanation of the anatomy and physiology of respiration for speech and singing; she found this to be helpful  o she demonstrated clavicular/neck/shoulder involvement in inhalation  o demonstrated difficulty allowing abdominal relaxation for inhalation  o with guidance, learned improved abdominal relaxation for inhalation  o learned techniques for optimal airflow on exhalation  o practiced in various sitting and standing positions; this was helpful  o good learning, but will need practice    [place] exercises to add phonation to the optimal flowing airstream.  o Semi-occluded vocal tract exercises with a straw (\"cup and bubbles\") were most facilitating  o Able to produce normal voice quality when humming into the straw  o Instructed to use as laryngeal relaxation and for coordination of breath flow with phonation  o good learning, but will need practice     [place] concepts of an optimal regimen for practice.  o she should use an interval schedule of practice, with brief periods of practice frequently throughout each day    I provided an after visit summary to help facilitate practice.    IMPRESSIONS/GOALS/PLAN  Ms. Acuna had a productive session of speech therapy today, to address the following:  R49.0 (Dysphonia)   Speech therapy for her is medically necessary to allow  her to meet personal and professional demands and fully engage in activities of daily living.     She will continue to work on her exercises on a daily basis, and work on incorporating the techniques into her daily activities.    Goals for this practice period:     practice all exercises according to instructions    Plan: Ms. Acuna will be seen by my associate, Clem Araiza, in a month, to work on education, modification, and carryover of therapeutic activities to more complex activities.    TOTAL SERVICE TIME: 75 minutes  TREATMENT (25505)  NO CHARGE FACILITY FEE (78221)    Marialuisa Lopez, Ph.D., " CCC-SLP  Speech-Language Pathologist  Director, [facility]  [phone]

## 2019-10-11 RX ORDER — FUROSEMIDE 40 MG
TABLET ORAL
Qty: 90 TABLET | Refills: 0 | Status: SHIPPED | OUTPATIENT
Start: 2019-10-11 | End: 2020-04-13

## 2019-10-28 ENCOUNTER — PRE VISIT (OUTPATIENT)
Dept: OTOLARYNGOLOGY | Facility: CLINIC | Age: 74
End: 2019-10-28

## 2019-11-22 ENCOUNTER — TELEPHONE (OUTPATIENT)
Dept: EDUCATION SERVICES | Facility: OTHER | Age: 74
End: 2019-11-22

## 2019-11-22 DIAGNOSIS — E13.9 LADA (LATENT AUTOIMMUNE DIABETES IN ADULTS), MANAGED AS TYPE 1 (H): Primary | ICD-10-CM

## 2019-11-25 DIAGNOSIS — M81.0 AGE-RELATED OSTEOPOROSIS WITHOUT CURRENT PATHOLOGICAL FRACTURE: ICD-10-CM

## 2019-11-25 NOTE — TELEPHONE ENCOUNTER
alendronate (FOSAMAX) 70 MG tablet  Last visit date with prescribing provider: 6-7-2019  Last refill date: 5-  Quantity: 4, Refills: 11    Pt is changing pharmacies   using [facility] now      Lian Mack LPN      Next 5 appointments (look out 90 days)    Dec 09, 2019 11:00 AM CST  (Arrive by 10:45 AM)  SHORT with Nicolasa Guillen NP  [facility] ([facility] [place] ) [address]  [place] [address]  [phone]

## 2019-11-25 NOTE — TELEPHONE ENCOUNTER
Pt calls she had [facility] fax a form about her Contour Next strips and she needs a refill. Pt no longer needs Kerri to call her back. Pt has enough strips until tomorrow afternoon.    Test strips      Last Written Prescription Date:  Unknown   Last Fill Quantity: unknown,   # refills: unknown  Last Office Visit: 05/30/19 with Kerri Elliott  Future Office visit:    Next 5 appointments (look out 90 days)    Dec 09, 2019 11:00 AM CST  (Arrive by 10:45 AM)  SHORT with Nicolasa Guillen NP  [facility] ([facility] [place] ) [address]  [place] [address]  [phone]           Routing refill request to provider for review/approval because:  Medication is reported/historical

## 2019-11-26 DIAGNOSIS — E13.9 LADA (LATENT AUTOIMMUNE DIABETES IN ADULTS), MANAGED AS TYPE 1 (H): ICD-10-CM

## 2019-11-27 RX ORDER — ALENDRONATE SODIUM 70 MG/1
TABLET ORAL
Qty: 4 TABLET | Refills: 5 | Status: SHIPPED | OUTPATIENT
Start: 2019-11-27 | End: 2020-05-15

## 2019-12-06 NOTE — PROGRESS NOTES
Subjective     Flora Acuna is a 74 year old female who presents to clinic today for the following health issues:    HPI   Diabetes Follow-up    How often are you checking your blood sugar? Three times daily  Blood sugar testing frequency justification:  on the pump  What time of day are you checking your blood sugars (select all that apply)?  Before meals and if it states to calibrate  Have you had any blood sugars above 200?  No  Have you had any blood sugars below 70?  No    What symptoms do you notice when your blood sugar is low?  spacey    What concerns do you have today about your diabetes? None and Other: crashing symptoms     Do you have any of these symptoms? (Select all that apply)  Numbness in feet and Burning in feet, does not get that bad     Have you had a diabetic eye exam in the last 12 months? Yes- Date of last eye exam: [facility] with the past year , last was seen in 5/2019    [facility] and is on insulin.   -She denies chest pain, shortness of breath, dizziness, palpitations, or syncope.     Diabetes Management Resources    Hyperlipidemia Follow-Up      Are you regularly taking any medication or supplement to lower your cholesterol?   Yes- simvastatin    Are you having muscle aches or other side effects that you think could be caused by your cholesterol lowering medication?  No     She denies chest pain, shortness of breath, dizziness, palpitations, or syncope.     Hypertension Follow-up      Do you check your blood pressure regularly outside of the clinic? No     Are you following a low salt diet? Yes    Are your blood pressures ever more than 140 on the top number (systolic) OR more   than 90 on the bottom number (diastolic), for example 140/90? No   She currently is taking ramipril and metoprolol without side effects.   Also on furosemide daily for lower leg swelling. Takes potassium along with this.   She denies chest pain, shortness of  breath, dizziness, syncope, or palpitations.     BP Readings from Last 2 Encounters:   12/16/19 104/60   09/11/19 134/72     Hemoglobin A1C (%)   Date Value   09/11/2019 7.5 (H)   06/07/2019 7.3 (H)     LDL Cholesterol Calculated (mg/dL)   Date Value   06/07/2019 77   02/16/2018 33         How many servings of fruits and vegetables do you eat daily?  2-3    On average, how many sweetened beverages do you drink each day (Examples: soda, juice, sweet tea, etc.  Do NOT count diet or artificially sweetened beverages)?   0    How many days per week do you miss taking your medication? 0    No longer suffers from GERD. She stopped omeprazole and feels great. No nausea or vomiting. No melena. No abdominal pain.  She does have osteoporosis and takes Fosamax. Dexa-scan was done in 3/2019.     Asthma Follow-Up    Was ACT completed today?  Yes    ACT Total Scores 12/16/2019   In the past 12 months, how many times did you visit the emergency room for your asthma without being admitted to the hospital? 1   In the past 12 months, how many times were you hospitalized overnight because of your asthma? 0       How many days per week do you miss taking your asthma controller medication?  0    Please describe any recent triggers for your asthma: cold air and Gastric Reflux    Have you had any Emergency Room Visits, Urgent Care Visits, or Hospital Admissions since your last office visit?  No     Rarely uses albuterol.     She does not smoke.     She does not use any ICS.          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      HYSTERECTOMY TOTAL ABDOMINAL       MASTECTOMY, BILATERAL Bilateral 02/26/1992     SHOULDER SURGERY Right      SHOULDER SURGERY Left        Social History     Tobacco Use     Smoking status: Never Smoker     Smokeless tobacco: Never Used   Substance Use Topics     Alcohol use: No     Alcohol/week: 0.0 standard drinks     Family History   Problem Relation Age of Onset     Breast Cancer Maternal Aunt      Breast Cancer Maternal Aunt      Lung Cancer Father          Current Outpatient Medications   Medication Sig Dispense Refill     acetaminophen (TYLENOL) 325 MG tablet Take 650 mg by mouth       Acetone, Urine, Test (KETONE TEST) STRP Use as directed 50 strip 4     albuterol (PROAIR HFA, PROVENTIL HFA, VENTOLIN HFA) 108 (90 BASE) MCG/ACT inhaler Inhale 2 puffs into the lungs       alendronate (FOSAMAX) 70 MG tablet Take 1 tablet by mouth once weekly. Take with water in the AM 30 minutes prior to eating. Avoid lying down 30 minutes after taking med. 4 tablet 5     aspirin (ASA) 325 MG EC tablet Take 325 mg by mouth daily       baclofen (LIORESAL) 10 MG tablet Take 10 mg by mouth 3 times daily       blood glucose (CONTOUR NEXT TEST) test strip Use 6 times a day with the insulin pump to help manage your diabetes 200 each 11     blood glucose (NO BRAND SPECIFIED) test strip by In Vitro route 4 times daily Use to test blood sugar 4 times daily or as directed.        Calcium-Vitamin D-Vitamin K (VIACTIV PO) Take 2 chew tab by mouth every morning       furosemide (LASIX) 40 MG tablet TAKE ONE (1) TABLET BY MOUTH DAILY LASIX 90 tablet 0     glucagon (GLUCAGON EMERGENCY) 1 MG injection Inject 1 mg Subcutaneous once 1 mg 12     Insulin Aspart (INSULIN PUMP - OUTPATIENT)        insulin aspart (NOVOLOG VIAL) 100 UNITS/ML vial To be used with the insulin pump  TDD: 40 units 30 mL 3     INSULIN PUMP - OUTPATIENT Date last updated:  2/4/15  "[employer] MinimCURRENT: Model 723  BASAL RATES and times:  12   AM (midnight): 0.9 units/hour    9    PM: 0.8 units/hour   Basal Pattern A:  Flora Armand will use when N/A.  CARB RATIO and times:  12   AM (midnight): 13.0  4     PM: 10  Corection Factor (Sensitivity) and times:  12   AM (midnight): 55 mg/dL  BLOOD GLUCOSE TARGET and times:  12   AM (midnight): 100 - 150  7     AM:  100 - 120  9    PM:  100 - 150  Active Insulin Time:  3 hours  Sensor:  No  Carelink / Diasend username:  [email or web address]  Carelink / Diasend Password:  [email or web address]       Lancet Devices MISC        levothyroxine (SYNTHROID/LEVOTHROID) 75 MCG tablet Take 1 tablet (75 mcg) by mouth daily 90 tablet 1     metoprolol (LOPRESSOR) 50 MG tablet 2 times daily   3     nitroglycerin (NITROSTAT) 0.4 MG SL tablet Place 0.4 mg under the tongue       potassium chloride ER (K-DUR/KLOR-CON M) 20 MEQ CR tablet Add 20 mEq once daily (total of 40 mEq) (Patient taking differently: 20 mEq 2 times daily Add 20 mEq once daily (total of 40 mEq)) 7 tablet 0     ramipril (ALTACE) 10 MG capsule Take 10 mg by mouth daily       senna-docusate (SENOKOT-S;PERICOLACE) 8.6-50 MG per tablet        simvastatin (ZOCOR) 40 MG tablet TAKE 1 TABLET BY MOUTH AT BEDTIME       tamoxifen (NOLVADEX) 20 MG tablet Take 20 mg by mouth daily        VITAMIN D, CHOLECALCIFEROL, PO Take 5,000 Units by mouth daily       Allergies   Allergen Reactions     Contrast Dye Difficulty breathing     Gadolinium Derivatives      Other  "reaction(s): Difficulty in swallowing, Laryngeal spasm  Patient had an injection into rt. Shoulder capsule prior to MRI arthrogram.  Contained multihance gadobenate, omnipaque iohexol, and buffered lidocaine.       Ammonia      Cory-1 [Lidocaine Hcl]      Novocaine allergy       Codeine Sulfate      Food      Shrimp     Iodine-131 Swelling     Ct dye     Lidocaine      Nitrous Oxide      No Clinical Screening - See Comments Nausea and Vomiting and Other (See Comments)     (3/6/09)- N/V and Heart racing     Novocain [Procaine]      Penicillins      Tramadol      Morphine Palpitations       Reviewed and updated as needed this visit by Provider         Review of Systems   As noted in the HPI.       Objective    /60 (BP Location: Right arm, Cuff Size: Adult Large)   Pulse 61   Temp 98.3  F (36.8  C) (Tympanic)   Resp 16   Ht 1.575 m (5' 2\")   Wt 66.7 kg (147 lb)   SpO2 98%   BMI 26.89 kg/m    Body mass index is 26.89 kg/m .  Physical Exam   GENERAL: healthy, alert and no distress  EYES: Eyes grossly normal to inspection, PERRL and conjunctivae and sclerae normal  HENT: ear canals and TM's normal, nose and mouth without ulcers or lesions  NECK: no adenopathy and no carotid bruits  RESP: lungs clear to auscultation - no rales, rhonchi or wheezes  CV: regular rates and rhythm, no murmur, click or rub and 1+ bilateral lower leg edema  ABDOMEN: soft, nontender, no hepatosplenomegaly, no masses and bowel sounds normal  PSYCH: mentation appears normal, affect normal/bright  Diabetic foot exam: normal DP and PT pulses, no trophic changes or ulcerative lesions and normal sensory exam    Diagnostic Test Results:  Labs reviewed in Epic  pending        Assessment & Plan   (I10) Essential hypertension  (primary encounter diagnosis)  Plan: Well controlled. Continue current medications. Encouraged daily exercise and a low sodium diet. Recommended checking BP's 2x/wk, call the clinic if consistantly s>140 or d>90. Follow " "up in 6 months.     (E11.65,  Z79.4) Type 2 diabetes mellitus with hyperglycemia, with long-term current use of insulin (H)  Plan: A1C pending. Will notify patient of the results when available and intervene accordingly. On statin. On asa. BP at goal. Eye exam UTD. F/u 6 months.     (E78.49) Other hyperlipidemia  Comment: tolerating statin, will continue  Plan: AST/ALT pending today. Will notify patient of the results when available and intervene accordingly.     (K21.9) Gastroesophageal reflux disease, esophagitis presence not specified  Comment: resolved  Plan: Will stop the PPI.     (M81.0) Age-related osteoporosis without current pathological fracture  Plan: Continue Fosamax. UTD with DEXA-scan.     (Z11.59) Need for hepatitis C screening test  Plan: Hepatitis C antibody        Will notify patient of the results when available and intervene accordingly.          BMI:   Estimated body mass index is 27.62 kg/m  as calculated from the following:    Height as of 9/23/19: 1.575 m (5' 2\").    Weight as of 9/23/19: 68.5 kg (151 lb).         Nicolasa Guillen NP  [facility] - [place]    "

## 2019-12-16 ENCOUNTER — OFFICE VISIT (OUTPATIENT)
Dept: FAMILY MEDICINE | Facility: OTHER | Age: 74
End: 2019-12-16
Attending: NURSE PRACTITIONER
Payer: COMMERCIAL

## 2019-12-16 VITALS
RESPIRATION RATE: 16 BRPM | DIASTOLIC BLOOD PRESSURE: 60 MMHG | OXYGEN SATURATION: 98 % | SYSTOLIC BLOOD PRESSURE: 104 MMHG | TEMPERATURE: 98.3 F | HEIGHT: 62 IN | HEART RATE: 61 BPM | BODY MASS INDEX: 27.05 KG/M2 | WEIGHT: 147 LBS

## 2019-12-16 DIAGNOSIS — E78.49 OTHER HYPERLIPIDEMIA: ICD-10-CM

## 2019-12-16 DIAGNOSIS — K21.9 GASTROESOPHAGEAL REFLUX DISEASE, ESOPHAGITIS PRESENCE NOT SPECIFIED: ICD-10-CM

## 2019-12-16 DIAGNOSIS — Z11.59 NEED FOR HEPATITIS C SCREENING TEST: ICD-10-CM

## 2019-12-16 DIAGNOSIS — M81.0 AGE-RELATED OSTEOPOROSIS WITHOUT CURRENT PATHOLOGICAL FRACTURE: ICD-10-CM

## 2019-12-16 DIAGNOSIS — I10 ESSENTIAL HYPERTENSION: Primary | ICD-10-CM

## 2019-12-16 DIAGNOSIS — E11.65 TYPE 2 DIABETES MELLITUS WITH HYPERGLYCEMIA, WITH LONG-TERM CURRENT USE OF INSULIN (H): ICD-10-CM

## 2019-12-16 DIAGNOSIS — Z79.4 TYPE 2 DIABETES MELLITUS WITH HYPERGLYCEMIA, WITH LONG-TERM CURRENT USE OF INSULIN (H): ICD-10-CM

## 2019-12-16 LAB
ALBUMIN SERPL-MCNC: 3.7 G/DL (ref 3.4–5)
ALP SERPL-CCNC: 31 U/L (ref 40–150)
ALT SERPL W P-5'-P-CCNC: 20 U/L (ref 0–50)
ANION GAP SERPL CALCULATED.3IONS-SCNC: 5 MMOL/L (ref 3–14)
AST SERPL W P-5'-P-CCNC: 13 U/L (ref 0–45)
BILIRUB SERPL-MCNC: 0.9 MG/DL (ref 0.2–1.3)
BUN SERPL-MCNC: 14 MG/DL (ref 7–30)
CALCIUM SERPL-MCNC: 9.2 MG/DL (ref 8.5–10.1)
CHLORIDE SERPL-SCNC: 103 MMOL/L (ref 94–109)
CO2 SERPL-SCNC: 30 MMOL/L (ref 20–32)
CREAT SERPL-MCNC: 0.66 MG/DL (ref 0.52–1.04)
CREAT UR-MCNC: 121 MG/DL
EST. AVERAGE GLUCOSE BLD GHB EST-MCNC: 180 MG/DL
GFR SERPL CREATININE-BSD FRML MDRD: 88 ML/MIN/{1.73_M2}
GLUCOSE SERPL-MCNC: 154 MG/DL (ref 70–99)
HBA1C MFR BLD: 7.9 % (ref 0–5.6)
MICROALBUMIN UR-MCNC: 12 MG/L
MICROALBUMIN/CREAT UR: 10.25 MG/G CR (ref 0–25)
POTASSIUM SERPL-SCNC: 3.9 MMOL/L (ref 3.4–5.3)
PROT SERPL-MCNC: 7.6 G/DL (ref 6.8–8.8)
SODIUM SERPL-SCNC: 138 MMOL/L (ref 133–144)

## 2019-12-16 PROCEDURE — 40000788 ZZHCL STATISTIC ESTIMATED AVERAGE GLUCOSE: Mod: ZL | Performed by: NURSE PRACTITIONER

## 2019-12-16 PROCEDURE — 99214 OFFICE O/P EST MOD 30 MIN: CPT | Performed by: NURSE PRACTITIONER

## 2019-12-16 PROCEDURE — 36415 COLL VENOUS BLD VENIPUNCTURE: CPT | Mod: ZL | Performed by: NURSE PRACTITIONER

## 2019-12-16 PROCEDURE — 82043 UR ALBUMIN QUANTITATIVE: CPT | Mod: ZL | Performed by: NURSE PRACTITIONER

## 2019-12-16 PROCEDURE — 83036 HEMOGLOBIN GLYCOSYLATED A1C: CPT | Mod: ZL | Performed by: NURSE PRACTITIONER

## 2019-12-16 PROCEDURE — G0463 HOSPITAL OUTPT CLINIC VISIT: HCPCS

## 2019-12-16 PROCEDURE — 86803 HEPATITIS C AB TEST: CPT | Mod: ZL | Performed by: NURSE PRACTITIONER

## 2019-12-16 PROCEDURE — 80053 COMPREHEN METABOLIC PANEL: CPT | Mod: ZL | Performed by: NURSE PRACTITIONER

## 2019-12-16 ASSESSMENT — ASTHMA QUESTIONNAIRES
QUESTION_3 LAST FOUR WEEKS HOW OFTEN DID YOUR ASTHMA SYMPTOMS (WHEEZING, COUGHING, SHORTNESS OF BREATH, CHEST TIGHTNESS OR PAIN) WAKE YOU UP AT NIGHT OR EARLIER THAN USUAL IN THE MORNING: ONCE OR TWICE
QUESTION_4 LAST FOUR WEEKS HOW OFTEN HAVE YOU USED YOUR RESCUE INHALER OR NEBULIZER MEDICATION (SUCH AS ALBUTEROL): NOT AT ALL
ACT_TOTALSCORE: 22
QUESTION_5 LAST FOUR WEEKS HOW WOULD YOU RATE YOUR ASTHMA CONTROL: WELL CONTROLLED

## 2019-12-16 ASSESSMENT — PAIN SCALES - GENERAL: PAINLEVEL: NO PAIN (0)

## 2019-12-16 ASSESSMENT — MIFFLIN-ST. JEOR: SCORE: 1130.04

## 2019-12-16 NOTE — LETTER
My Asthma Action Plan    Name: Flora Acuna   YOB: 1945  Date: 12/11/2019   My doctor: Nicolasa Guillen NP   My clinic: [facility] - [place]        My Control Medicine: None  My Rescue Medicine: Albuterol (Proair/Ventolin/Proventil HFA) 2-4 puffs EVERY 4 HOURS as needed. Use a spacer if recommended by your provider.  My Oral Steroid Medicine: none My Asthma Severity:   Mild Persistent  Know your asthma triggers: upper respiratory infections               GREEN ZONE   Good Control    I feel good    No cough or wheeze    Can work, sleep and play without asthma symptoms       Take your asthma control medicine every day.     1. If exercise triggers your asthma, take your rescue medication    15 minutes before exercise or sports, and    During exercise if you have asthma symptoms  2. Spacer to use with inhaler: If you have a spacer, make sure to use it with your inhaler             YELLOW ZONE Getting Worse  I have ANY of these:    I do not feel good    Cough or wheeze    Chest feels tight    Wake up at night   1. Keep taking your Green Zone medications  2. Start taking your rescue medicine:    every 20 minutes for up to 1 hour. Then every 4 hours for 24-48 hours.  3. If you stay in the Yellow Zone for more than 12-24 hours, contact your doctor.  4. If you do not return to the Green Zone in 12-24 hours or you get worse, start taking your oral steroid medicine if prescribed by your provider.           RED ZONE Medical Alert - Get Help  I have ANY of these:    I feel awful    Medicine is not helping    Breathing getting harder    Trouble walking or talking    Nose opens wide to breathe       1. Take your rescue medicine NOW  2. If your provider has prescribed an oral steroid medicine, start taking it NOW  3. Call your doctor NOW  4. If you are still in the Red Zone after 20 minutes and you have not reached your doctor:    Take your rescue medicine again and    Call 911 or go to the  emergency room right away    See your regular doctor within 2 weeks of an Emergency Room or Urgent Care visit for follow-up treatment.          Annual Reminders:  Meet with Asthma Educator,  Flu Shot in the Fall, consider Pneumonia Vaccination for patients with asthma (aged 19 and older).    Pharmacy: [facility] [id number] [facility] [id number] [address]    Electronically signed by Nicolasa Guillen NP   Date: 12/11/19                      Asthma Triggers  How To Control Things That Make Your Asthma Worse    Triggers are things that make your asthma worse.  Look at the list below to help you find your triggers and what you can do about them.  You can help prevent asthma flare-ups by staying away from your triggers.      Trigger                                                          What you can do   Cigarette Smoke  Tobacco smoke can make asthma worse. Do not allow smoking in your home, car or around you.  Be sure no one smokes at a child s day care or school.  If you smoke, ask your health care provider for ways to help you quit.  Ask family members to quit too.  Ask your health care provider for a referral to Quit Plan to help you quit smoking, or call 7-329-231-PLAN.     Colds, Flu, Bronchitis  These are common triggers of asthma. Wash your hands often.  Don t touch your eyes, nose or mouth.  Get a flu shot every year.     Dust Mites  These are tiny bugs that live in cloth or carpet. They are too small to see. Wash sheets and blankets in hot water every week.   Encase pillows and mattress in dust mite proof covers.  Avoid having carpet if you can. If you have carpet, vacuum weekly.   Use a dust mask and HEPA vacuum.   Pollen and Outdoor Mold  Some people are allergic to trees, grass, or weed pollen, or molds. Try to keep your windows closed.  Limit time out doors when pollen count is high.   Ask you health care provider about taking medicine during allergy season.     Animal Dander  Some people are allergic to  skin flakes, urine or saliva from pets with fur or feathers. Keep pets with fur or feathers out of your home.    If you can t keep the pet outdoors, then keep the pet out of your bedroom.  Keep the bedroom door closed.  Keep pets off cloth furniture and away from stuffed toys.     Mice, Rats, and Cockroaches   Some people are allergic to the waste from these pests.   Cover food and garbage.  Clean up spills and food crumbs.  Store grease in the refrigerator.   Keep food out of the bedroom.   Indoor Mold  This can be a trigger if your home has high moisture. Fix leaking faucets, pipes, or other sources of water.   Clean moldy surfaces.  Dehumidify basement if it is damp and smelly.   Smoke, Strong Odors, and Sprays  These can reduce air quality. Stay away from strong odors and sprays, such as perfume, powder, hair spray, paints, smoke incense, paint, cleaning products, candles and new carpet.   Exercise or Sports  Some people with asthma have this trigger. Be active!  Ask your doctor about taking medicine before sports or exercise to prevent symptoms.    Warm up for 5-10 minutes before and after sports or exercise.     Other Triggers of Asthma  Cold air:  Cover your nose and mouth with a scarf.  Sometimes laughing or crying can be a trigger.  Some medicines and food can trigger asthma.

## 2019-12-16 NOTE — NURSING NOTE
"Chief Complaint   Patient presents with     Diabetes     Hypertension     Gastrointestinal Problem       Initial /60 (BP Location: Right arm, Cuff Size: Adult Large)   Pulse 61   Temp 98.3  F (36.8  C) (Tympanic)   Resp 16   Ht 1.575 m (5' 2\")   Wt 66.7 kg (147 lb)   SpO2 98%   BMI 26.89 kg/m   Estimated body mass index is 26.89 kg/m  as calculated from the following:    Height as of this encounter: 1.575 m (5' 2\").    Weight as of this encounter: 66.7 kg (147 lb).  Medication Reconciliation: complete  Leah Lerner LPN    "

## 2019-12-17 ASSESSMENT — ASTHMA QUESTIONNAIRES: ACT_TOTALSCORE: 22

## 2019-12-18 LAB — HCV AB SERPL QL IA: NONREACTIVE

## 2020-02-11 ENCOUNTER — TRANSFERRED RECORDS (OUTPATIENT)
Dept: HEALTH INFORMATION MANAGEMENT | Facility: CLINIC | Age: 75
End: 2020-02-11

## 2020-02-11 LAB — RETINOPATHY: NEGATIVE

## 2020-02-12 ENCOUNTER — TELEPHONE (OUTPATIENT)
Dept: FAMILY MEDICINE | Facility: OTHER | Age: 75
End: 2020-02-12

## 2020-02-12 DIAGNOSIS — E13.9 LADA (LATENT AUTOIMMUNE DIABETES IN ADULTS), MANAGED AS TYPE 1 (H): Primary | ICD-10-CM

## 2020-02-12 RX ORDER — BLOOD-GLUCOSE SENSOR
EACH MISCELLANEOUS
OUTPATIENT
Start: 2020-02-12

## 2020-02-18 ENCOUNTER — TELEPHONE (OUTPATIENT)
Dept: EDUCATION SERVICES | Facility: OTHER | Age: 75
End: 2020-02-18

## 2020-02-18 RX ORDER — BLOOD-GLUCOSE SENSOR
1 EACH MISCELLANEOUS
Qty: 3 EACH | Refills: 11 | Status: SHIPPED | OUTPATIENT
Start: 2020-02-18 | End: 2020-02-19 | Stop reason: ALTCHOICE

## 2020-02-18 NOTE — TELEPHONE ENCOUNTER
Pt calls her pharmacy got a denial on her sensor refill. She is calling to check on this. I spoke to Kerri Elliott and this has been taken care of. I called the pt and left a message.

## 2020-02-19 ENCOUNTER — TELEPHONE (OUTPATIENT)
Dept: EDUCATION SERVICES | Facility: OTHER | Age: 75
End: 2020-02-19

## 2020-02-19 DIAGNOSIS — E13.9 LADA (LATENT AUTOIMMUNE DIABETES IN ADULTS), MANAGED AS TYPE 1 (H): Primary | ICD-10-CM

## 2020-02-19 RX ORDER — PROCHLORPERAZINE 25 MG/1
1 SUPPOSITORY RECTAL CONTINUOUS
Qty: 1 EACH | Refills: 3 | Status: SHIPPED | OUTPATIENT
Start: 2020-02-19 | End: 2021-03-30

## 2020-02-19 RX ORDER — PROCHLORPERAZINE 25 MG/1
1 SUPPOSITORY RECTAL CONTINUOUS
Qty: 1 DEVICE | Refills: 0 | Status: SHIPPED | OUTPATIENT
Start: 2020-02-19 | End: 2023-11-15

## 2020-02-19 RX ORDER — PROCHLORPERAZINE 25 MG/1
1 SUPPOSITORY RECTAL CONTINUOUS
Qty: 3 EACH | Refills: 11 | Status: SHIPPED | OUTPATIENT
Start: 2020-02-19 | End: 2021-03-01

## 2020-02-19 NOTE — TELEPHONE ENCOUNTER
Gisell from [facility] calls they received an Rx for the pt's Dexcom G5 with directions to change every 10 days. She states this model is changed every 7 days, do you want to change to the Dexcom G6 which is to change every 10 days.

## 2020-02-24 ENCOUNTER — TELEPHONE (OUTPATIENT)
Dept: EDUCATION SERVICES | Facility: OTHER | Age: 75
End: 2020-02-24

## 2020-02-24 DIAGNOSIS — E13.9 LADA (LATENT AUTOIMMUNE DIABETES IN ADULTS), MANAGED AS TYPE 1 (H): Primary | ICD-10-CM

## 2020-02-24 DIAGNOSIS — E11.65 TYPE 2 DIABETES MELLITUS WITH HYPERGLYCEMIA, WITH LONG-TERM CURRENT USE OF INSULIN (H): ICD-10-CM

## 2020-02-24 DIAGNOSIS — Z79.4 TYPE 2 DIABETES MELLITUS WITH HYPERGLYCEMIA, WITH LONG-TERM CURRENT USE OF INSULIN (H): ICD-10-CM

## 2020-02-24 NOTE — TELEPHONE ENCOUNTER
I called the pt and notified when I spoke to [facility] and all pt's will be changed to Dexcom G6. She states that she spoke to the pharmacy and has to use the G5 sensors for a little while yet. Please send in Rx.

## 2020-02-24 NOTE — TELEPHONE ENCOUNTER
Patient put in a request 2 weeks ago for G5 Sensors she hasn't gotten them the pharmacy had G6 which is wrong. Needs G5 Sensors sent to the [facility] [place]. Please advise.

## 2020-02-25 RX ORDER — BLOOD-GLUCOSE SENSOR
1 EACH MISCELLANEOUS CONTINUOUS
Qty: 3 EACH | Refills: 1 | Status: SHIPPED | OUTPATIENT
Start: 2020-02-25 | End: 2022-08-04 | Stop reason: ALTCHOICE

## 2020-03-06 DIAGNOSIS — E87.6 HYPOKALEMIA: Primary | ICD-10-CM

## 2020-03-06 NOTE — TELEPHONE ENCOUNTER
Keri  Last Written Prescription Date: 4/1/19  Last Fill Quantity: 7 # of Refills: 0  Last Office Visit: 12/16/19

## 2020-03-11 RX ORDER — POTASSIUM CHLORIDE 1500 MG/1
20 TABLET, EXTENDED RELEASE ORAL 2 TIMES DAILY
Qty: 60 TABLET | Refills: 0 | Status: SHIPPED | OUTPATIENT
Start: 2020-03-11 | End: 2020-04-14

## 2020-03-18 ENCOUNTER — HOSPITAL ENCOUNTER (EMERGENCY)
Facility: HOSPITAL | Age: 75
Discharge: HOME | End: 2020-03-18
Attending: EMERGENCY MEDICINE | Admitting: EMERGENCY MEDICINE
Payer: MEDICARE

## 2020-03-18 ENCOUNTER — NURSE TRIAGE (OUTPATIENT)
Dept: FAMILY MEDICINE | Facility: OTHER | Age: 75
End: 2020-03-18

## 2020-03-18 ENCOUNTER — APPOINTMENT (OUTPATIENT)
Dept: GENERAL RADIOLOGY | Facility: HOSPITAL | Age: 75
End: 2020-03-18
Attending: EMERGENCY MEDICINE
Payer: MEDICARE

## 2020-03-18 ENCOUNTER — TELEPHONE (OUTPATIENT)
Dept: FAMILY MEDICINE | Facility: OTHER | Age: 75
End: 2020-03-18

## 2020-03-18 VITALS
OXYGEN SATURATION: 98 % | RESPIRATION RATE: 16 BRPM | DIASTOLIC BLOOD PRESSURE: 70 MMHG | TEMPERATURE: 97.7 F | SYSTOLIC BLOOD PRESSURE: 137 MMHG

## 2020-03-18 DIAGNOSIS — R07.89 CHEST WALL PAIN: ICD-10-CM

## 2020-03-18 PROCEDURE — 71101 X-RAY EXAM UNILAT RIBS/CHEST: CPT | Mod: TC,RT

## 2020-03-18 PROCEDURE — 99283 EMERGENCY DEPT VISIT LOW MDM: CPT

## 2020-03-18 PROCEDURE — 99283 EMERGENCY DEPT VISIT LOW MDM: CPT | Mod: Z6 | Performed by: EMERGENCY MEDICINE

## 2020-03-18 NOTE — ED AVS SNAPSHOT
[facility]  [address]  [place] [address]  Phone:  [phone]                                    Flora Acuna   MRN: [id number]    Department:  [facility]   Date of Visit:  3/18/2020           After Visit Summary Signature Page    I have received my discharge instructions, and my questions have been answered. I have discussed any challenges I see with this plan with the nurse or doctor.    ..........................................................................................................................................  Patient/Patient Representative Signature      ..........................................................................................................................................  Patient Representative Print Name and Relationship to Patient    ..................................................               ................................................  Date                                   Time    ..........................................................................................................................................  Reviewed by Signature/Title    ...................................................              ..............................................  Date                                               Time          22EPIC Rev 08/18

## 2020-03-18 NOTE — ED NOTES
Pt to ED and reported she bent down to  something and hard sharp pain in right shoulder pain.  Happened 2 weeks ago.  Hurts to move and breath. Hx of breast cancer twice 42 and 70 years old.  Hx of CHF. Pt stated she came in because she feels like she cant hang onto to stuff as much and hurts more.     Provider at bedside.

## 2020-03-18 NOTE — TELEPHONE ENCOUNTER
Pt states the she thinks that she pulled a muscle near her Rt breast and Rt back area as well below the shoulder blade.  Pt states that she would like return call to advise.  Pt has been using Asprin to treat.  Pt states is hurts to bend down and when she lays down.  It does hurt to take a deep breath in sometimes.  Pt does sound short of breath.  Please call back to advise.  Pt states this in not heart related.

## 2020-03-18 NOTE — TELEPHONE ENCOUNTER
"Patient called stating she has been experiencing intermittent chest pain on right side under shoulder blade and under right breast.  Patient states pain has become more frequent. Patient has extensive heart and lung history. Patient states pains have been becoming for frequent and severe. Patient states she is SOB due to chest pain. Patient advised to be seen in ED. Patient understood and agreed with recommendation.    Reason for Disposition    [1] Intermittent  chest pain or \"angina\" AND [2] increasing in severity or frequency  (Exception: pains lasting a few seconds)    Additional Information    Negative: Severe difficulty breathing (e.g., struggling for each breath, speaks in single words)    Negative: Difficult to awaken or acting confused (e.g., disoriented, slurred speech)    Negative: Shock suspected (e.g., cold/pale/clammy skin, too weak to stand, low BP, rapid pulse)    Negative: [1] Chest pain lasts > 5 minutes AND [2] history of heart disease  (i.e., heart attack, bypass surgery, angina, angioplasty, CHF; not just a heart murmur)    Negative: [1] Chest pain lasts > 5 minutes AND [2] described as crushing, pressure-like, or heavy    Negative: [1] Chest pain lasts > 5 minutes AND [2] age > 50    Negative: [1] Chest pain lasts > 5 minutes AND [2] age > 30 AND [3] at least one cardiac risk factor (i.e., hypertension, diabetes, obesity, smoker or strong family history of heart disease)    Negative: [1] Chest pain lasts > 5 minutes AND [2] not relieved with nitroglycerin    Negative: Passed out (i.e., lost consciousness, collapsed and was not responding)    Negative: Heart beating < 50 beats per minute OR > 140 beats per minute    Negative: Visible sweat on face or sweat dripping down face    Negative: Sounds like a life-threatening emergency to the triager    Negative: Followed a chest injury    Negative: SEVERE chest pain    Answer Assessment - Initial Assessment Questions  1. LOCATION: \"Where does it hurt?\"  " "      Under right shoulder blade and right breast  2. RADIATION: \"Does the pain go anywhere else?\" (e.g., into neck, jaw, arms, back)      no  3. ONSET: \"When did the chest pain begin?\" (Minutes, hours or days)       Two weeks ago but getting worse  4. PATTERN \"Does the pain come and go, or has it been constant since it started?\"  \"Does it get worse with exertion?\"       Constant but worsens with movement.  5. DURATION: \"How long does it last\" (e.g., seconds, minutes, hours)      constant  6. SEVERITY: \"How bad is the pain?\"  (e.g., Scale 1-10; mild, moderate, or severe)     - MILD (1-3): doesn't interfere with normal activities      - MODERATE (4-7): interferes with normal activities or awakens from sleep     - SEVERE (8-10): excruciating pain, unable to do any normal activities        severe  7. CARDIAC RISK FACTORS: \"Do you have any history of heart problems or risk factors for heart disease?\" (e.g., prior heart attack, angina; high blood pressure, diabetes, being overweight, high cholesterol, smoking, or strong family history of heart disease)      CHF, prior heart attack, diabetes, parul cholesterol  8. PULMONARY RISK FACTORS: \"Do you have any history of lung disease?\"  (e.g., blood clots in lung, asthma, emphysema, birth control pills)      Chronic bronchitis, asthma  9. CAUSE: \"What do you think is causing the chest pain?\"      Possible muscle strain  10. OTHER SYMPTOMS: \"Do you have any other symptoms?\" (e.g., dizziness, nausea, vomiting, sweating, fever, difficulty breathing, cough)       Shortness of breath  11. PREGNANCY: \"Is there any chance you are pregnant?\" \"When was your last menstrual period?\"        no    Protocols used: CHEST PAIN-A-AH      "

## 2020-03-18 NOTE — ED PROVIDER NOTES
History     Chief Complaint   Patient presents with     Chest Wall Pain     HPI  Folra Acuna is a 75 year old female who presents with 2 weeks of right posterior rib pain.  Patient states she bent over and had the rib pain.  Patient has not taken any Tylenol Motrin for the pain.  Patient does have a history of bilateral mastectomies with the first 1 done in 1980 and the second 1 done 28 years later.  Patient been on tamoxifen ever since.  Patient otherwise has no cold cough shortness of breath nausea vomiting fever chills.  Patient does have a history of congestive heart failure but has no current history of complaints about shortness of breath.  Patient is able to sleep well at nighttime.    Allergies:  Allergies   Allergen Reactions     Contrast Dye Difficulty breathing     Gadolinium Derivatives      Other reaction(s): Difficulty in swallowing, Laryngeal spasm  Patient had an injection into rt. Shoulder capsule prior to MRI arthrogram.  Contained multihance gadobenate, omnipaque iohexol, and buffered lidocaine.       Ammonia      Cory-1 [Lidocaine Hcl]      Novocaine allergy       Codeine Sulfate      Food      Shrimp     Iodine-131 Swelling     Ct dye     Lidocaine      Nitrous Oxide      No Clinical Screening - See Comments Nausea and Vomiting and Other (See Comments)     (3/6/09)- N/V and Heart racing     Novocain [Procaine]      Penicillins      Tramadol      Morphine Palpitations       Problem List:    Patient Active Problem List    Diagnosis Date Noted     Dysphonia 06/03/2019     Priority: Medium     Hypothyroidism 05/07/2019     Priority: Medium     Essential hypertension 05/07/2019     Priority: Medium     Other hyperlipidemia 05/07/2019     Priority: Medium     Malignant neoplasm of left breast in female, estrogen receptor positive (H) 05/07/2019     Priority: Medium     Follows with Dr. 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  Flora Acuna has no plan or code status on file. Discussed available resources and provided with information. Confirmed code status reflects current choices pending further ACP discussions.  Confirmed/documented legally designated decision makers.  Added by Ruth Velasquez           Personal history of malignant neoplasm of breast 11/23/2015     Priority: Medium     Family history of melanoma 11/21/2015     Priority: Medium     Family history of breast cancer in female 11/21/2015     Priority: Medium     CARDIOVASCULAR SCREENING; LDL GOAL LESS THAN 160 10/05/2012     Priority: Medium     Osteoporosis 01/03/2012     Priority: Medium     Overview:   IMO Update       Cardiomegaly 05/08/2009     Priority: Medium     Overview:   TTE w/Doppler       Rheumatoid arthritis (H) 09/10/2003     Priority: Medium     Overview:   Seronegative  IMO Update 10 2016    Was on Enbrel, this caused her CHF so she was taken off of this.          Past Medical History:    Past Medical History:   Diagnosis Date     Congestive heart failure, unspecified      Diabetes (H)      H/O rheumatoid arthritis      HLD (hyperlipidemia)      HTN (hypertension)      Myocardial infarction (H)      Uncomplicated asthma        Past Surgical History:    Past Surgical History:   Procedure Laterality Date     APPENDECTOMY OPEN CHILD       AS TOTAL KNEE ARTHROPLASTY Right      CHOLECYSTECTOMY        COLONOSCOPY - HIM SCAN N/A 03/12/2009    per Care Everywhere documentation per [facility].      HYSTERECTOMY TOTAL ABDOMINAL       MASTECTOMY, BILATERAL Bilateral 02/26/1992     SHOULDER SURGERY Right      SHOULDER SURGERY Left        Family History:    Family History   Problem Relation Age of Onset     Breast Cancer Maternal Aunt      Breast Cancer Maternal Aunt      Lung Cancer Father        Social History:  Marital Status:   [5]  Social History     Tobacco Use     Smoking status: Never Smoker     Smokeless tobacco: Never Used   Substance Use Topics     Alcohol use: No     Alcohol/week: 0.0 standard drinks     Drug use: No        Medications:    acetaminophen (TYLENOL) 325 MG tablet  Acetone, Urine, Test (KETONE TEST) STRP  alendronate (FOSAMAX) 70 MG tablet  aspirin (ASA) 325 MG EC tablet  baclofen (LIORESAL) 10 MG tablet  blood glucose (CONTOUR NEXT TEST) test strip  blood glucose (NO BRAND SPECIFIED) test strip  Calcium-Vitamin D-Vitamin K (VIACTIV PO)  Continuous Blood Gluc  (DEXCOM G6 ) XX DIMITRIS  Continuous Blood Gluc Sensor (DEXCOM G5 MOB/G4 PLAT SENSOR) MISC  Continuous Blood Gluc Sensor (DEXCOM G6 SENSOR) XX MISC  Continuous Blood Gluc Transmit (DEXCOM G6 TRANSMITTER) XX MISC  furosemide (LASIX) 40 MG tablet  glucagon (GLUCAGON EMERGENCY) 1 MG injection  Insulin Aspart (INSULIN PUMP - OUTPATIENT)  insulin aspart (NOVOLOG VIAL) 100 UNITS/ML vial  Lancet Devices MISC  levothyroxine (SYNTHROID/LEVOTHROID) 75 MCG tablet  metoprolol (LOPRESSOR) 50 MG tablet  nitroglycerin (NITROSTAT) 0.4 MG SL tablet  potassium chloride ER (KLOR-CON M) 20 MEQ CR tablet  ramipril (ALTACE) 10 MG capsule  senna-docusate (SENOKOT-S;PERICOLACE) 8.6-50 MG per tablet  simvastatin (ZOCOR) 40 MG tablet  tamoxifen (NOLVADEX) 20 MG tablet  VITAMIN D, CHOLECALCIFEROL, PO  INSULIN PUMP - OUTPATIENT          Review of Systems see HPI    Physical Exam   BP: 137/70  Heart Rate: 68  Temp: 97.7  F (36.5  C)  Resp:  16  SpO2: 98 %      Physical Exam patient does have mild pain with range of motion of her chest wall.  All pain is located to the right posterior mid rib area.  Patient's lungs are clear with no wheezing rales or rhonchi noted.  Heart normal no murmurs or rubs noted.  Patient is otherwise alert cooperative no acute distress.    ED Course        Procedures             Results for orders placed or performed during the hospital encounter of 03/18/20 (from the past 24 hour(s))   Ribs XR, unilat 3 views + PA chest, right    Narrative    EXAM:XR RIBS & CHEST RT 3VW     CLINICAL HISTORY: Patient Age  75 years Additional clinical info:  right posterior rib pain     COMPARISON: 4/1/2019      TECHNIQUE: 3 views      Impression    IMPRESSION: No rib fracture identified. Degenerative arthritis in the  spine. Lower thoracic curve convex to the right. Reverse right  shoulder arthroplasty. Surgical clips in left axilla. Chest and right  RIBS otherwise normal.    MAKENNA DESOUZA MD       Medications - No data to display    Assessments & Plan (with Medical Decision Making)     I have reviewed the nursing notes.    I have reviewed the findings, diagnosis, plan and need for follow up with the patient.  Patient is quite comfortable at rest but with movement patient does have the right posterior chest wall pain.  Patient does not wish any prescription pain medication but will use Tylenol or Motrin as needed.  Patient may use heat as needed.  As needed recheck in 1 to 2 weeks if not better sooner if new problems.    New Prescriptions    No medications on file       Final diagnoses:   Chest wall pain       3/18/2020   [facility]     Clifton Deutsch MD  03/18/20 [id number]

## 2020-03-18 NOTE — ED TRIAGE NOTES
"States has gained 7lb in 2 days. \"I thought my CHF is contributing to my pain\". Pt states she has had right chest pain that radiates to right shoulder blade area. States it is worse with bending and deep breath. Has had for 2 weeks. States history of breast cancer in the past.  "

## 2020-04-13 DIAGNOSIS — I10 ESSENTIAL HYPERTENSION: ICD-10-CM

## 2020-04-13 RX ORDER — FUROSEMIDE 40 MG
TABLET ORAL
Qty: 90 TABLET | Refills: 1 | Status: SHIPPED | OUTPATIENT
Start: 2020-04-13 | End: 2020-07-10

## 2020-04-13 NOTE — TELEPHONE ENCOUNTER
Lasix   Last Written Prescription Date: 10/111/19  Last Fill Quantity: 90 # of Refills: 0  Last Office Visit: 12/16/19

## 2020-04-20 DIAGNOSIS — I10 ESSENTIAL HYPERTENSION: Primary | ICD-10-CM

## 2020-04-20 RX ORDER — METOPROLOL TARTRATE 50 MG
50 TABLET ORAL 2 TIMES DAILY
Qty: 180 TABLET | Refills: 0 | Status: SHIPPED | OUTPATIENT
Start: 2020-04-20 | End: 2020-07-27

## 2020-04-20 NOTE — TELEPHONE ENCOUNTER
metoprolol (LOPRESSOR) 50 MG tablet       Last Written Prescription Date:  Medication is historical  Last Fill Quantity: NA,   # refills: NA  Last Office Visit: 12/16/19  Future Office visit:    Next 5 appointments (look out 90 days)    Jun 17, 2020 10:40 AM CDT  (Arrive by 10:25 AM)  Office Visit with Nicolasa Guillen NP  [facility] [place] ([facility] ) [address]  [place] [address]  [phone]           Routing refill request to provider for review/approval because:  Medication is reported/historical

## 2020-04-28 ENCOUNTER — MEDICAL CORRESPONDENCE (OUTPATIENT)
Dept: HEALTH INFORMATION MANAGEMENT | Facility: CLINIC | Age: 75
End: 2020-04-28

## 2020-05-13 ENCOUNTER — MEDICAL CORRESPONDENCE (OUTPATIENT)
Dept: HEALTH INFORMATION MANAGEMENT | Facility: CLINIC | Age: 75
End: 2020-05-13

## 2020-05-15 DIAGNOSIS — M81.0 AGE-RELATED OSTEOPOROSIS WITHOUT CURRENT PATHOLOGICAL FRACTURE: ICD-10-CM

## 2020-05-15 RX ORDER — ALENDRONATE SODIUM 70 MG/1
TABLET ORAL
Qty: 4 TABLET | Refills: 0 | Status: SHIPPED | OUTPATIENT
Start: 2020-05-15 | End: 2020-07-08

## 2020-05-15 NOTE — TELEPHONE ENCOUNTER
Pt of Alexis    Fosamayenny  Last Written Prescription Date:  11.27.19  Last Fill Quantity: 4   # refills: 5  Last Office Visit: 12.16.19  Future Office visit:    Next 5 appointments (look out 90 days)    Jun 17, 2020 10:40 AM CDT  (Arrive by 10:25 AM)  Office Visit with Nicolasa Guillen NP  [facility] [place] ([facility] ) [address]  [place] [address]  [phone]           Routing refill request to provider for review/approval because:  Drug not on the [facility], P or  Health refill protocol or controlled substance

## 2020-06-02 DIAGNOSIS — I10 ESSENTIAL HYPERTENSION: Primary | ICD-10-CM

## 2020-06-02 RX ORDER — RAMIPRIL 10 MG/1
10 CAPSULE ORAL DAILY
Qty: 90 CAPSULE | Refills: 0 | Status: SHIPPED | OUTPATIENT
Start: 2020-06-02 | End: 2020-09-04

## 2020-06-02 NOTE — TELEPHONE ENCOUNTER
ramipril (ALTACE) 10 MG capsule       Last Written Prescription Date:  historical  Last Fill Quantity: -,   # refills: -  Last Office Visit: 12/16/19  Future Office visit:    Next 5 appointments (look out 90 days)    Jun 17, 2020 10:40 AM CDT  (Arrive by 10:25 AM)  Office Visit with Nicolasa Guillen NP  [facility] [place] ([facility] [place] ) [address]  [place] [address]  [phone]           Routing refill request to provider for review/approval because:  Medication is reported/historical

## 2020-06-11 DIAGNOSIS — E87.6 HYPOKALEMIA: ICD-10-CM

## 2020-06-11 RX ORDER — POTASSIUM CHLORIDE 1500 MG/1
TABLET, EXTENDED RELEASE ORAL
Qty: 60 TABLET | Refills: 0 | Status: SHIPPED | OUTPATIENT
Start: 2020-06-11 | End: 2020-07-08

## 2020-06-11 NOTE — TELEPHONE ENCOUNTER
klor-cookie      Last Written Prescription Date:  5/6/2020  Last Fill Quantity: 60,   # refills: 0  Last Office Visit: 12/16/2019

## 2020-06-25 ENCOUNTER — TELEPHONE (OUTPATIENT)
Dept: FAMILY MEDICINE | Facility: OTHER | Age: 75
End: 2020-06-25

## 2020-06-25 ENCOUNTER — TRANSFERRED RECORDS (OUTPATIENT)
Dept: HEALTH INFORMATION MANAGEMENT | Facility: CLINIC | Age: 75
End: 2020-06-25

## 2020-06-25 DIAGNOSIS — E11.65 TYPE 2 DIABETES MELLITUS WITH HYPERGLYCEMIA, WITH LONG-TERM CURRENT USE OF INSULIN (H): Primary | ICD-10-CM

## 2020-06-25 DIAGNOSIS — Z79.4 TYPE 2 DIABETES MELLITUS WITH HYPERGLYCEMIA, WITH LONG-TERM CURRENT USE OF INSULIN (H): Primary | ICD-10-CM

## 2020-06-30 DIAGNOSIS — E13.9 LADA (LATENT AUTOIMMUNE DIABETES IN ADULTS), MANAGED AS TYPE 1 (H): Primary | ICD-10-CM

## 2020-07-01 ENCOUNTER — ALLIED HEALTH/NURSE VISIT (OUTPATIENT)
Dept: EDUCATION SERVICES | Facility: OTHER | Age: 75
End: 2020-07-01
Attending: NURSE PRACTITIONER
Payer: COMMERCIAL

## 2020-07-01 VITALS
DIASTOLIC BLOOD PRESSURE: 60 MMHG | OXYGEN SATURATION: 96 % | HEART RATE: 73 BPM | HEIGHT: 63 IN | WEIGHT: 150 LBS | TEMPERATURE: 97.7 F | BODY MASS INDEX: 26.58 KG/M2 | SYSTOLIC BLOOD PRESSURE: 124 MMHG

## 2020-07-01 DIAGNOSIS — E13.9 LADA (LATENT AUTOIMMUNE DIABETES IN ADULTS), MANAGED AS TYPE 1 (H): Primary | ICD-10-CM

## 2020-07-01 LAB
ALBUMIN UR-MCNC: NEGATIVE MG/DL
ANION GAP SERPL CALCULATED.3IONS-SCNC: 5 MMOL/L (ref 3–14)
APPEARANCE UR: CLEAR
BACTERIA #/AREA URNS HPF: ABNORMAL /HPF
BILIRUB UR QL STRIP: NEGATIVE
BUN SERPL-MCNC: 20 MG/DL (ref 7–30)
CALCIUM SERPL-MCNC: 10.2 MG/DL (ref 8.5–10.1)
CHLORIDE SERPL-SCNC: 99 MMOL/L (ref 94–109)
CHOLEST SERPL-MCNC: 153 MG/DL
CO2 SERPL-SCNC: 31 MMOL/L (ref 20–32)
COLOR UR AUTO: ABNORMAL
CREAT SERPL-MCNC: 0.69 MG/DL (ref 0.52–1.04)
EST. AVERAGE GLUCOSE BLD GHB EST-MCNC: 160 MG/DL
GFR SERPL CREATININE-BSD FRML MDRD: 86 ML/MIN/{1.73_M2}
GLUCOSE SERPL-MCNC: 207 MG/DL (ref 70–99)
GLUCOSE UR STRIP-MCNC: 300 MG/DL
HBA1C MFR BLD: 7.2 % (ref 0–5.6)
HDLC SERPL-MCNC: 54 MG/DL
HGB UR QL STRIP: NEGATIVE
KETONES UR STRIP-MCNC: NEGATIVE MG/DL
LDLC SERPL CALC-MCNC: 36 MG/DL
LEUKOCYTE ESTERASE UR QL STRIP: NEGATIVE
MUCOUS THREADS #/AREA URNS LPF: PRESENT /LPF
NITRATE UR QL: NEGATIVE
NONHDLC SERPL-MCNC: 99 MG/DL
PH UR STRIP: 5 PH (ref 4.7–8)
POTASSIUM SERPL-SCNC: 4 MMOL/L (ref 3.4–5.3)
RBC #/AREA URNS AUTO: <1 /HPF (ref 0–2)
SODIUM SERPL-SCNC: 135 MMOL/L (ref 133–144)
SOURCE: ABNORMAL
SP GR UR STRIP: 1.01 (ref 1–1.03)
TRIGL SERPL-MCNC: 316 MG/DL
UROBILINOGEN UR STRIP-MCNC: NORMAL MG/DL (ref 0–2)
WBC #/AREA URNS AUTO: <1 /HPF (ref 0–5)

## 2020-07-01 PROCEDURE — 99213 OFFICE O/P EST LOW 20 MIN: CPT | Mod: 25 | Performed by: NURSE PRACTITIONER

## 2020-07-01 PROCEDURE — 95251 CONT GLUC MNTR ANALYSIS I&R: CPT | Performed by: NURSE PRACTITIONER

## 2020-07-01 PROCEDURE — 81001 URINALYSIS AUTO W/SCOPE: CPT | Mod: ZL | Performed by: NURSE PRACTITIONER

## 2020-07-01 PROCEDURE — G0463 HOSPITAL OUTPT CLINIC VISIT: HCPCS

## 2020-07-01 PROCEDURE — 80048 BASIC METABOLIC PNL TOTAL CA: CPT | Mod: ZL | Performed by: NURSE PRACTITIONER

## 2020-07-01 PROCEDURE — 83036 HEMOGLOBIN GLYCOSYLATED A1C: CPT | Mod: ZL | Performed by: NURSE PRACTITIONER

## 2020-07-01 PROCEDURE — 40000788 ZZHCL STATISTIC ESTIMATED AVERAGE GLUCOSE: Mod: ZL | Performed by: NURSE PRACTITIONER

## 2020-07-01 PROCEDURE — 80061 LIPID PANEL: CPT | Mod: ZL | Performed by: NURSE PRACTITIONER

## 2020-07-01 PROCEDURE — 36415 COLL VENOUS BLD VENIPUNCTURE: CPT | Mod: ZL | Performed by: NURSE PRACTITIONER

## 2020-07-01 ASSESSMENT — MIFFLIN-ST. JEOR: SCORE: 1154.53

## 2020-07-01 ASSESSMENT — PAIN SCALES - GENERAL: PAINLEVEL: NO PAIN (0)

## 2020-07-01 NOTE — PROGRESS NOTES
"SUBJECTIVE:  Flora Acuna, 75 year old, female presents with the following Chief Complaint(s) with HPI to follow:  Chief Complaint   Patient presents with     Diabetes Education     Download and labs        Diabetes Follow-up      Patient is checking blood sugars: personal CGM.  Results:  She has been using her Dexcom for >90 days    Glucose management indicator: 8.9%    Ave: 229    Glucose range  Very low (<54): 0  low (<70): 0  In target range (): 15%  High (>180): 51%  Very high (>250): 34%         Symptoms of hypoglycemia (low blood sugar): reports several episodes of \"crashing\"--feels symptomatically low <100    Paresthesias (numbness or burning in feet) or sores: Yes--same; no sores    Diabetic eye exam within the last year: Yes    Breakfast eaten regularly: Yes    Patient counting carbs: Yes       HPI:  Flora's here today for the follow up regarding her KAREEM, managed as a Type 1    Lab Results   Component Value Date    A1C 7.9 12/16/2019    A1C 7.5 09/11/2019    A1C 7.3 06/07/2019    A1C 7.3 04/19/2019    A1C 6.1 10/08/2018     Current Diabetes medication:   1.  Insulin pump, with Fiasp  ASA use: yes, 325 mg daily  Statin use: yes, simvastatin 40 mg daily  Denies any issues with the insulin pump or Dexcom     Flora's here today for an insulin pump and Dexcom download with possible adjust.  She reports the following:  Continues to have issues with her throat/voice.   Providers tell her that it's going to be an ongoing issue.  BGs have been higher than normal  Denies any recent known illnesses      No other complaints noted.   No other health concerns    Due for labs.        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), managed as type 1 (H)     H/O total knee replacement, left     Age-related osteoporosis without current pathological fracture     Rheumatoid arthritis (H)     Osteoporosis     Family history of melanoma     Family history of breast cancer in female     Cardiomegaly     H/O heart failure     Dysphonia       Past Medical History:   Diagnosis Date     Congestive heart failure, unspecified      Diabetes (H)      H/O rheumatoid arthritis      HLD (hyperlipidemia)      HTN (hypertension)      Myocardial infarction (H)      Uncomplicated asthma        Past Surgical History:   Procedure Laterality Date     APPENDECTOMY OPEN CHILD       AS TOTAL KNEE ARTHROPLASTY Right      CHOLECYSTECTOMY       COLONOSCOPY - HIM SCAN N/A 03/12/2009    per Care Everywhere documentation per [facility].      HYSTERECTOMY TOTAL ABDOMINAL       MASTECTOMY, BILATERAL Bilateral 02/26/1992     SHOULDER SURGERY Right      SHOULDER SURGERY Left        Family History   Problem Relation Age of Onset     Breast Cancer Maternal Aunt      Breast Cancer Maternal Aunt      Lung Cancer Father        Social History     Tobacco Use     Smoking status: Never Smoker     Smokeless tobacco: Never Used   Substance Use Topics     Alcohol use: No     Alcohol/week: 0.0 standard drinks       Current Outpatient Medications   Medication Sig Dispense Refill     acetaminophen (TYLENOL) 325 MG tablet Take 650 mg by mouth       Acetone, Urine, Test (KETONE TEST) STRP Use as directed 50 strip 4     alendronate (FOSAMAX) 70 MG tablet Take 1 tablet by mouth once weekly. Take with water in the AM 30 minutes prior to eating. Avoid lying down 30 minutes after taking med. 4 tablet 0     aspirin (ASA) 325 MG EC tablet Take 325 mg by mouth daily       baclofen (LIORESAL) 10 MG tablet Take 10 mg by mouth 3 times daily       blood  glucose (CONTOUR NEXT TEST) test strip Use 6 times a day with the insulin pump to help manage your diabetes 200 each 11     blood glucose (NO BRAND SPECIFIED) test strip by In Vitro route 4 times daily Use to test blood sugar 4 times daily or as directed.       Calcium-Vitamin D-Vitamin K (VIACTIV PO) Take 2 chew tab by mouth every morning       Continuous Blood Gluc  (DEXCOM G6 ) XX DIMITRIS 1 each continuous 1 Device 0     Continuous Blood Gluc Sensor (DEXCOM G5 MOB/G4 PLAT SENSOR) MISC 1 each continuous Change every 7 days 3 each 1     Continuous Blood Gluc Sensor (DEXCOM G6 SENSOR) XX MISC 1 each continuous To be changed every 10 days 3 each 11     Continuous Blood Gluc Transmit (DEXCOM G6 TRANSMITTER) XX MISC 1 each continuous To be changed every 3 months 1 each 3     furosemide (LASIX) 40 MG tablet TAKE ONE (1) TABLET BY MOUTH DAILY LASIX 90 tablet 1     glucagon (GLUCAGON EMERGENCY) 1 MG injection Inject 1 mg Subcutaneous once 1 mg 12     Insulin Aspart (INSULIN PUMP - OUTPATIENT)        insulin aspart (NOVOLOG VIAL) 100 UNITS/ML vial To be used with the insulin pump  TDD: 40 units 30 mL 3     INSULIN PUMP - OUTPATIENT Date last updated:  2/4/15  Medtronic Minimed: Model 723  BASAL RATES and times:  12   AM (midnight): 0.9 units/hour    9    PM: 0.8 units/hour   Basal Pattern A:  Flora Acuna will use when N/A.  CARB RATIO and times:  12   AM (midnight): 13.0  4     PM: 10  Corection Factor (Sensitivity) and times:  12   AM (midnight): 55 mg/dL  BLOOD GLUCOSE TARGET and times:  12   AM (midnight): 100 - 150  7     AM:  100 - 120  9    PM:  100 - 150  Active Insulin Time:  3 hours  Sensor:  No  Carelink / Diasend username:  [email or web address]  Carelink / Diasend Password:  [email or web address]       Lancet Devices MISC        levothyroxine (SYNTHROID/LEVOTHROID) 75 MCG tablet Take 1 tablet by mouth once daily 30 tablet 0     metoprolol tartrate (LOPRESSOR) 50 MG tablet Take 1 tablet (50 mg) by mouth 2 times daily  "180 tablet 0     nitroglycerin (NITROSTAT) 0.4 MG SL tablet Place 0.4 mg under the tongue       potassium chloride ER (KLOR-CON M) 20 MEQ CR tablet TAKE 1  BY MOUTH TWICE DAILY 60 tablet 0     ramipril (ALTACE) 10 MG capsule Take 1 capsule (10 mg) by mouth daily 90 capsule 0     senna-docusate (SENOKOT-S;PERICOLACE) 8.6-50 MG per tablet        simvastatin (ZOCOR) 40 MG tablet TAKE 1 TABLET BY MOUTH AT BEDTIME       tamoxifen (NOLVADEX) 20 MG tablet Take 20 mg by mouth daily        VITAMIN D, CHOLECALCIFEROL, PO Take 5,000 Units by mouth daily         Allergies   Allergen Reactions     Contrast Dye Difficulty breathing     Gadolinium Derivatives      Other reaction(s): Difficulty in swallowing, Laryngeal spasm  Patient had an injection into rt. Shoulder capsule prior to MRI arthrogram.  Contained multihance gadobenate, omnipaque iohexol, and buffered lidocaine.       Ammonia      Cory-1 [Lidocaine Hcl]      Novocaine allergy       Codeine Sulfate      Food      Shrimp     Iodine-131 Swelling     Ct dye     Lidocaine      Nitrous Oxide      No Clinical Screening - See Comments Nausea and Vomiting and Other (See Comments)     (3/6/09)- N/V and Heart racing     Novocain [Procaine]      Penicillins      Tramadol      Morphine Palpitations       REVIEW OF SYSTEMS  Skin: negative  Eyes: negative  Ears/Nose/Throat: burnt voice and muscles from GERD  Respiratory: No shortness of breath, dyspnea on exertion, cough, or hemoptysis; history of asthma  Cardiovascular: history of HF  Gastrointestinal: negative   Genitourinary: negative   Musculoskeletal: positive for back pain, neck pain, arthritis and right shoulder   Neurologic: positive for numbness or tingling of feet--\"increase)  Psychiatric: negative  Hematologic/Lymphatic/Immunologic: history of breast cancer (left) x 2 (same spot)  Endocrine: positive for diabetes and thyroid disease     OBJECTIVE:  /60 (BP Location: Right arm, Patient Position: Sitting, Cuff Size: " "Adult Regular)   Pulse 73   Temp 97.7  F (36.5  C) (Tympanic)   Ht 1.6 m (5' 3\")   Wt 68 kg (150 lb)   SpO2 96%   BMI 26.57 kg/m    Constitutional: healthy, alert and no distress  Musculoskeletal: extremities normal- no gross deformities noted and gait normal  Skin: no suspicious lesions or rashes to visible skin  Psychiatric: mentation appears normal and affect normal/bright        LABS  Results for orders placed or performed in visit on 07/01/20   UA with Microscopic reflex to Culture - [place]     Status: Abnormal    Specimen: Midstream Urine   Result Value Ref Range    Color Urine Light Yellow     Appearance Urine Clear     Glucose Urine 300 (A) NEG^Negative mg/dL    Bilirubin Urine Negative NEG^Negative    Ketones Urine Negative NEG^Negative mg/dL    Specific Gravity Urine 1.012 1.003 - 1.035    Blood Urine Negative NEG^Negative    pH Urine 5.0 4.7 - 8.0 pH    Protein Albumin Urine Negative NEG^Negative mg/dL    Urobilinogen mg/dL Normal 0.0 - 2.0 mg/dL    Nitrite Urine Negative NEG^Negative    Leukocyte Esterase Urine Negative NEG^Negative    Source Midstream Urine     WBC Urine <1 0 - 5 /HPF    RBC Urine <1 0 - 2 /HPF    Bacteria Urine Few (A) NEG^Negative /HPF    Mucous Urine Present (A) NEG^Negative /LPF   Basic metabolic panel     Status: Abnormal   Result Value Ref Range    Sodium 135 133 - 144 mmol/L    Potassium 4.0 3.4 - 5.3 mmol/L    Chloride 99 94 - 109 mmol/L    Carbon Dioxide 31 20 - 32 mmol/L    Anion Gap 5 3 - 14 mmol/L    Glucose 207 (H) 70 - 99 mg/dL    Urea Nitrogen 20 7 - 30 mg/dL    Creatinine 0.69 0.52 - 1.04 mg/dL    GFR Estimate 86 >60 mL/min/[1.73_m2]    GFR Estimate If Black >90 >60 mL/min/[1.73_m2]    Calcium 10.2 (H) 8.5 - 10.1 mg/dL   Hemoglobin A1c     Status: Abnormal   Result Value Ref Range    Hemoglobin A1C 7.2 (H) 0 - 5.6 %   Lipid Profile (Chol, Trig, HDL, LDL calc)     Status: Abnormal   Result Value Ref Range    Cholesterol 153 <200 mg/dL    Triglycerides 316 (H) <150 " "mg/dL    HDL Cholesterol 54 >49 mg/dL    LDL Cholesterol Calculated 36 <100 mg/dL    Non HDL Cholesterol 99 <130 mg/dL   Estimated Average Glucose     Status: None   Result Value Ref Range    Estimated Average Glucose 160 mg/dL       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), managed as type 1 (H)  (primary encounter diagnosis)  Comment:   Insulin pump information:   Average: 178 +/- 50  Basal/bolus ratio: 20.29(74%)/7.17 (26%)   Testing: CGM    Hypoglycemia: 0%    Plan: UA with Microscopic reflex to Culture -         [place], Estimated Average Glucose, GLUCOSE         MONITOR, 72 HOUR, PHYS INTERP          Changed the following on her insulin pump:  Basal  0000 0.9  0600 0.875  0900 0.95  1900 0.9  2300 0.8    Carb ratio  0000 12  1600 14    Insulin sensitivity  0000 55  1800 50    I checked her urine to see if she was having a UTI that was driving her BGs higher.     Reminded Flora that it's okay to use the Dexcom data to make medical decisions--okay to add BG into insulin pump for correction    Follow up as discussed, sooner if needed    Download in 1 week      Patient Instructions     Continue working on healthy eating and moving as best as you can (start low and slow, work up to 30 min, 5x/week)    BG goals:  Fasting and before meals <130, >80  2 hour after eating <180    We only need 1/2 of these numbers to be within target then your A1c will be within target    Medication changes   Watch for low BGs.      Recommendations:  1. Trust your insulin pump  2.  Don't add carbs to \"correct\" high BGs--again, trust your insulin pump  3.  Just enter the BG when doing a correction  4.  Keep working on giving the carb bolus before consuming the carbs    Follow up   3 months, sooner if needed    Call me sooner if any problems/concerns and/or questions develop including consistent low BGs <70 or consistent high BGs >200  [phone] (Justine, Unit Coordinator)    [phone] (Ruth" Nurse)        Time: 40 minutes  Barrier: none  Willingness to learn: accepting    Kerri NELSON [facility] [place]  Diabetes and Wound Care    Cc: Nicolasa Guillen NP    With the electronic record, we can now more quickly and easily track our patient diabetic goals. Our diabetes clinical review is in progress and these are the indicators we are monitoring for good diabetes health.     1.) HbA1C less than 7 (measurement of your average blood sugars)  2.) Blood Pressure less than 140/80  3.) LDL less than 100 (bad cholesterol)  4.) HbA1C is checked in the last 6 months and below 7% (more frequently if not at goal or adjusting medications)  5.) LDL is checked in the last 12 months (more frequently if not at goal or adjusting medications)  6.) Taking one baby aspirin daily (unless otherwise instructed)  7.) No tobacco use  8) Statin use     You have achieved 7 out of 8 of these and I am encouraging you to come in and get tuned up to achieve 8 out of 8!  Here is what you have achieved so far in my goals for you:  1.) HbA1C  less than 7:                              NO     Your last  HbA1C  Lab Results   Component Value Date    A1C 7.2 07/01/2020    A1C 7.9 12/16/2019    A1C 7.5 09/11/2019    A1C 7.3 06/07/2019    A1C 7.3 04/19/2019      2.) Blood Pressure less than 140/80:       YES      Your last    BP Readings from Last 1 Encounters:   07/01/20 124/60     3.) LDL less than 100:                              YES      Your last     LDL Cholesterol Calculated   Date Value Ref Range Status   07/01/2020 36 <100 mg/dL Final     Comment:     Desirable:       <100 mg/dl       4.) Checked HbA1C in the past 6 months: YES      5.) Checked LDL in the past 12 months:    YES    6.) Taking one aspirin daily:                       YES     7.) No tobacco use:                                        YES      8.) Statin use      YES

## 2020-07-01 NOTE — PROGRESS NOTES
"Chief Complaint   Patient presents with     Diabetes Education     Download and labs       Initial /60 (BP Location: Right arm, Patient Position: Sitting, Cuff Size: Adult Regular)   Pulse 73   Temp 97.7  F (36.5  C) (Tympanic)   Ht 1.6 m (5' 3\")   Wt 68 kg (150 lb)   SpO2 96%   BMI 26.57 kg/m   Estimated body mass index is 26.57 kg/m  as calculated from the following:    Height as of this encounter: 1.6 m (5' 3\").    Weight as of this encounter: 68 kg (150 lb).  Medication Reconciliation: complete  Alison Novak MA    "

## 2020-07-07 DIAGNOSIS — E87.6 HYPOKALEMIA: ICD-10-CM

## 2020-07-07 NOTE — PATIENT INSTRUCTIONS
"  Continue working on healthy eating and moving as best as you can (start low and slow, work up to 30 min, 5x/week)    BG goals:  Fasting and before meals <130, >80  2 hour after eating <180    We only need 1/2 of these numbers to be within target then your A1c will be within target    Medication changes   Watch for low BGs.      Recommendations:  1. Trust your insulin pump  2.  Don't add carbs to \"correct\" high BGs--again, trust your insulin pump  3.  Just enter the BG when doing a correction  4.  Keep working on giving the carb bolus before consuming the carbs    Follow up   3 months, sooner if needed    Call me sooner if any problems/concerns and/or questions develop including consistent low BGs <70 or consistent high BGs >200  [phone] (Justine, Unit Coordinator)    [phone] (Ruth Nurse)    "

## 2020-07-07 NOTE — PROGRESS NOTES
Subjective     Flora Acuna is a 75 year old female who presents to clinic today for the following health issues:    HPI       Diabetes Follow-up    How often are you checking your blood sugar? Continuous glucose monitor Dexcom    What time of day are you checking your blood sugars (select all that apply)?  all day   Have you had any blood sugars above 200?  Yes, occasionally over 446  Have you had any blood sugars below 70?  No    What symptoms do you notice when your blood sugar is low?  Shaky, Dizzy, Weak and Blurred vision at BG of 100     What concerns do you have today about your diabetes? None     Do you have any of these symptoms? (Select all that apply)  No numbness or tingling in feet.  No redness, sores or blisters on feet.  No complaints of excessive thirst.  No reports of blurry vision.  No significant changes to weight.     [facility] and has [facility].    She denies chest pain, shortness of breath, dizziness, syncope, or palpitations.    Eye exam in 5/2019 completed at [facility] in [place]. Due again. Plans to est with [facility].     On asa 325 mg daily     A1C on 7/1/20 was 7.2.               Hyperlipidemia Follow-Up      Are you regularly taking any medication or supplement to lower your cholesterol?   Yes- simvastatin 40 mg at bedtime     Are you having muscle aches or other side effects that you think could be caused by your cholesterol lowering medication?  No     She denies chest pain, shortness of breath, dizziness, syncope, or palpitations.    On asa.      Hypertension Follow-up      Do you check your blood pressure regularly outside of the clinic? No     Are you following a low salt diet? Yes , Diabetic Diet     Are your blood pressures ever more than 140 on the top number (systolic) OR more   than 90 on the bottom number (diastolic), for example 140/90? No   -She currently is taking ramipril and metoprolol without side effects.   -Also on furosemide daily for  lower leg swelling. Takes potassium along with this.   -As noted above, she denies chest pain, shortness of breath, dizziness, syncope, or palpitations.     Asthma Follow-Up    Was ACT completed today?    Yes    ACT Total Scores 7/8/2020   ACT TOTAL SCORE (Goal Greater than or Equal to 20) 23   In the past 12 months, how many times did you visit the emergency room for your asthma without being admitted to the hospital? 0   In the past 12 months, how many times were you hospitalized overnight because of your asthma? 0         How many days per week do you miss taking your asthma controller medication?  0    Please describe any recent triggers for your asthma: None    Have you had any Emergency Room Visits, Urgent Care Visits, or Hospital Admissions since your last office visit?  No      Does not currently use any inhalers.     Hypothyroidism Follow-up      Since last visit, patient describes the following symptoms: Weight stable, no hair loss, no skin changes, no constipation, no loose stools    Currently taking Synthroid 75 mcg daily. Due for TSH check.     H/O recurrent breast cancer. Follows with Dr. Snyder. Last saw him in 5/2020. On tamoxifen.     H/O osteoporosis; DEXA last done in 5/2019, on Fosamax, tolerating, has taken since 5/2019.    H/O Heart Failure; echo last done in 2015, LVH seen, EF 55 percent, possible evidence of diastolic dysfunction, today denies chest pain, shortness of breath, dizziness, orthopnea; declines to repeat an echo today    She also complains of pain at the base of her left thumb. Present times 7 months. No erythema or edema. No known injury. She does have RA and has not seen rheumatology in years. She was on Enbrel, but this was stopped as it caused heart failure.       How many servings of fruits and vegetables do you eat daily?  2-3    On average, how many sweetened beverages do you drink each day (Examples: soda, juice, sweet tea, etc.  Do NOT count diet or artificially  sweetened beverages)?   3 drinks soda stream     How many days per week do you exercise enough to make your heart beat faster? 7    How many minutes a day do you exercise enough to make your heart beat faster? 60 or more    How many days per week do you miss taking your medication? 0         BP Readings from Last 2 Encounters:   07/08/20 130/62   07/01/20 124/60     Hemoglobin A1C (%)   Date Value   07/01/2020 7.2 (H)   12/16/2019 7.9 (H)     LDL Cholesterol Calculated (mg/dL)   Date Value   07/01/2020 36   06/07/2019 77           Patient Active Problem List   Diagnosis     CARDIOVASCULAR SCREENING; LDL GOAL LESS THAN 160     Personal history of malignant neoplasm of breast     ACP (advance care planning)     Hypothyroidism     Essential hypertension     Other hyperlipidemia     Malignant neoplasm of left breast in female, estrogen receptor positive (H)     S/P reverse total shoulder arthroplasty, right     Lumbar disc disease with radiculopathy     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.      HYSTERECTOMY TOTAL ABDOMINAL       MASTECTOMY, BILATERAL Bilateral 02/26/1992     SHOULDER SURGERY Right      SHOULDER SURGERY Left        Social History     Tobacco Use     Smoking status: Never Smoker     Smokeless tobacco: Never Used   Substance Use Topics     Alcohol use: No     Alcohol/week: 0.0 standard drinks     Family History   Problem Relation Age of Onset     Breast Cancer  Maternal Aunt      Breast Cancer Maternal Aunt      Lung Cancer Father          Current Outpatient Medications   Medication Sig Dispense Refill     acetaminophen (TYLENOL) 325 MG tablet Take 650 mg by mouth       Acetone, Urine, Test (KETONE TEST) STRP Use as directed 50 strip 4     alendronate (FOSAMAX) 70 MG tablet Take 1 tablet by mouth once weekly. Take with water in the AM 30 minutes prior to eating. Avoid lying down 30 minutes after taking med. 12 tablet 3     aspirin (ASA) 325 MG EC tablet Take 325 mg by mouth daily       baclofen (LIORESAL) 10 MG tablet Take 10 mg by mouth 3 times daily       blood glucose (CONTOUR NEXT TEST) test strip Use 6 times a day with the insulin pump to help manage your diabetes 200 each 11     blood glucose (NO BRAND SPECIFIED) test strip by In Vitro route 4 times daily Use to test blood sugar 4 times daily or as directed.       Calcium-Vitamin D-Vitamin K (VIACTIV PO) Take 2 chew tab by mouth every morning       Continuous Blood Gluc  (DEXCOM G6 ) XX DIMITRIS 1 each continuous 1 Device 0     Continuous Blood Gluc Sensor (DEXCOM G5 MOB/G4 PLAT SENSOR) MISC 1 each continuous Change every 7 days 3 each 1     Continuous Blood Gluc Sensor (DEXCOM G6 SENSOR) XX MISC 1 each continuous To be changed every 10 days 3 each 11     Continuous Blood Gluc Transmit (DEXCOM G6 TRANSMITTER) XX MISC 1 each continuous To be changed every 3 months 1 each 3     furosemide (LASIX) 40 MG tablet TAKE ONE (1) TABLET BY MOUTH DAILY LASIX 90 tablet 1     Insulin Aspart (INSULIN PUMP - OUTPATIENT)        insulin aspart (NOVOLOG VIAL) 100 UNITS/ML vial To be used with the insulin pump  TDD: 40 units 30 mL 3     INSULIN PUMP - OUTPATIENT Date last updated:  2/4/15  [employer] Total Media System MinimGamelet: Model 723  BASAL RATES and times:  12   AM (midnight): 0.9 units/hour    9    PM: 0.8 units/hour   Basal Pattern A:  Flora Acuna will use when N/A.  CARB RATIO and times:  12   AM (midnight): 13.0  4     PM:  10  Corection Factor (Sensitivity) and times:  12   AM (midnight): 55 mg/dL  BLOOD GLUCOSE TARGET and times:  12   AM (midnight): 100 - 150  7     AM:  100 - 120  9    PM:  100 - 150  Active Insulin Time:  3 hours  Sensor:  No  Carelink / Diasend username:  [email or web address]  Carelink / Diasend Password:  [email or web address]       Lancet Devices MISC        levothyroxine (SYNTHROID/LEVOTHROID) 75 MCG tablet Take 1 tablet by mouth once daily 30 tablet 0     metoprolol tartrate (LOPRESSOR) 50 MG tablet Take 1 tablet (50 mg) by mouth 2 times daily 180 tablet 0     potassium chloride ER (KLOR-CON M) 20 MEQ CR tablet TAKE 1  BY MOUTH TWICE DAILY 60 tablet 5     ramipril (ALTACE) 10 MG capsule Take 1 capsule (10 mg) by mouth daily 90 capsule 0     senna-docusate (SENOKOT-S;PERICOLACE) 8.6-50 MG per tablet        simvastatin (ZOCOR) 40 MG tablet TAKE 1 TABLET BY MOUTH AT BEDTIME       tamoxifen (NOLVADEX) 20 MG tablet Take 20 mg by mouth daily        VITAMIN D, CHOLECALCIFEROL, PO Take 5,000 Units by mouth daily       glucagon (GLUCAGON EMERGENCY) 1 MG injection Inject 1 mg Subcutaneous once 1 mg 12     nitroglycerin (NITROSTAT) 0.4 MG SL tablet Place 0.4 mg under the tongue       Allergies   Allergen Reactions     Contrast Dye Difficulty breathing     Gadolinium Derivatives      Other reaction(s): Difficulty in swallowing, Laryngeal spasm  Patient had an injection into rt. Shoulder capsule prior to MRI arthrogram.  Contained multihance gadobenate, omnipaque iohexol, and buffered lidocaine.       Ammonia      Cory-1 [Lidocaine Hcl]      Novocaine allergy       Codeine Sulfate      Food      Shrimp     Iodine-131 Swelling     Ct dye     Lidocaine      Nitrous Oxide      No Clinical Screening - See Comments Nausea and Vomiting and Other (See Comments)     (3/6/09)- N/V and Heart racing     Novocain [Procaine]      Penicillins      Tramadol      Morphine Palpitations       Reviewed and updated as needed this visit by Provider         Review of  "Systems   As noted in the HPI.       Objective    /62 (BP Location: Left arm, Patient Position: Chair, Cuff Size: Adult Regular)   Pulse 76   Temp 99.2  F (37.3  C) (Tympanic)   Ht 1.6 m (5' 3\")   Wt 68 kg (150 lb)   SpO2 98%   BMI 26.57 kg/m    Body mass index is 26.57 kg/m .  Physical Exam   GENERAL: healthy, alert and no distress  EYES: Eyes grossly normal to inspection, PERRL and conjunctivae and sclerae normal  HENT: ear canals and TM's normal, nose and mouth without ulcers or lesions  NECK: no adenopathy, no asymmetry, masses, or scars and thyroid normal to palpation  RESP: lungs clear to auscultation - no rales, rhonchi or wheezes  CV: regular rates and rhythm and no murmur, click or rub, trace edema in bilateral lower legs  NEURO: Normal strength and tone, mentation intact and speech normal  PSYCH: mentation appears normal, affect normal/bright  Diabetic foot exam: normal DP and PT pulses, no trophic changes or ulcerative lesions and normal sensory exam  Bilateral HANDS: Skin intact. No edema, erythema, or ecchymosis. She does have tenderness with palpation at the base of her left thumb. Ulnar deviation of fingers present    Diagnostic Test Results:  Labs reviewed in Epic  Results for orders placed or performed in visit on 07/08/20 (from the past 24 hour(s))   TSH with free T4 reflex   Result Value Ref Range    TSH 0.72 0.40 - 4.00 mU/L   Albumin Random Urine Quantitative with Creat Ratio   Result Value Ref Range    Creatinine Urine 44 mg/dL    Albumin Urine mg/L PENDING mg/L    Albumin Urine mg/g Cr PENDING 0 - 25 mg/g Cr           Assessment & Plan     1. Essential hypertension  Well controlled. Continue current medications. Encouraged daily exercise and a low sodium diet. Recommended checking BP's 2x/wk, call the clinic if consistantly s>140 or d>90. Follow up in 6 months.     2. Hypothyroidism, unspecified type  TSH normal. Synthroid refilled times one year.       3. KAREEM (latent autoimmune " "diabetes in adults), managed as type 1 (H)  Well controlled. Recent A1C 7.2. [facility] with [facility]. On statin. On asa. BP at goal. Will schedule eye exam. Follow up with me in 6 months.     4. Other hyperlipidemia  Tolerating statin. AST/ALT normal. Will continue.     5. Malignant neoplasm of left breast in female, estrogen receptor positive, unspecified site of breast (H)  Stable. Continue to follow with oncology.     6. Osteopenia, unspecified location  Tolerating Fosamax. Will continue. Will repeat DEXA-scan next year. Has taken the Fosamax since 5/2019.     7. Cardiomegaly  No recent echo. Asymptomatic. Encouraged to repeat echo, but pt declined.       8. Thumb pain, left  9. Rheumatoid arthritis, involving unspecified site, unspecified rheumatoid factor presence (H)  Patient has RA, but has not seen rheumatology in years. Will repeat CCP and RF. Will notify patient of the results when available and intervene accordingly. I suspect this is why she has thumb pain. Will repeat XR, however, if no acute findings are present, will refer back to rheumatology. May benefit from injection. Do note, she forgot to go to XR today. Will have her return for XR only.      BMI:   Estimated body mass index is 26.57 kg/m  as calculated from the following:    Height as of this encounter: 1.6 m (5' 3\").    Weight as of this encounter: 68 kg (150 lb).           Return in about 6 months (around 1/8/2021).    Nicolasa Guillen NP  [facility] [place]    "

## 2020-07-08 ENCOUNTER — ALLIED HEALTH/NURSE VISIT (OUTPATIENT)
Dept: EDUCATION SERVICES | Facility: OTHER | Age: 75
End: 2020-07-08
Attending: NURSE PRACTITIONER
Payer: COMMERCIAL

## 2020-07-08 ENCOUNTER — OFFICE VISIT (OUTPATIENT)
Dept: FAMILY MEDICINE | Facility: OTHER | Age: 75
End: 2020-07-08
Attending: NURSE PRACTITIONER
Payer: MEDICARE

## 2020-07-08 VITALS
WEIGHT: 150 LBS | DIASTOLIC BLOOD PRESSURE: 62 MMHG | OXYGEN SATURATION: 98 % | HEIGHT: 63 IN | BODY MASS INDEX: 26.58 KG/M2 | HEART RATE: 76 BPM | SYSTOLIC BLOOD PRESSURE: 130 MMHG | TEMPERATURE: 99.2 F

## 2020-07-08 DIAGNOSIS — M85.80 OSTEOPENIA, UNSPECIFIED LOCATION: ICD-10-CM

## 2020-07-08 DIAGNOSIS — C50.912 MALIGNANT NEOPLASM OF LEFT BREAST IN FEMALE, ESTROGEN RECEPTOR POSITIVE, UNSPECIFIED SITE OF BREAST (H): ICD-10-CM

## 2020-07-08 DIAGNOSIS — Z17.0 MALIGNANT NEOPLASM OF LEFT BREAST IN FEMALE, ESTROGEN RECEPTOR POSITIVE, UNSPECIFIED SITE OF BREAST (H): ICD-10-CM

## 2020-07-08 DIAGNOSIS — I10 ESSENTIAL HYPERTENSION: Primary | ICD-10-CM

## 2020-07-08 DIAGNOSIS — E13.9 LADA (LATENT AUTOIMMUNE DIABETES IN ADULTS), MANAGED AS TYPE 1 (H): ICD-10-CM

## 2020-07-08 DIAGNOSIS — I51.7 CARDIOMEGALY: ICD-10-CM

## 2020-07-08 DIAGNOSIS — Z79.4 TYPE 2 DIABETES MELLITUS WITH HYPERGLYCEMIA, WITH LONG-TERM CURRENT USE OF INSULIN (H): Primary | ICD-10-CM

## 2020-07-08 DIAGNOSIS — E03.9 HYPOTHYROIDISM, UNSPECIFIED TYPE: ICD-10-CM

## 2020-07-08 DIAGNOSIS — M79.645 THUMB PAIN, LEFT: ICD-10-CM

## 2020-07-08 DIAGNOSIS — M06.9 RHEUMATOID ARTHRITIS, INVOLVING UNSPECIFIED SITE, UNSPECIFIED RHEUMATOID FACTOR PRESENCE: ICD-10-CM

## 2020-07-08 DIAGNOSIS — E78.49 OTHER HYPERLIPIDEMIA: ICD-10-CM

## 2020-07-08 DIAGNOSIS — E11.65 TYPE 2 DIABETES MELLITUS WITH HYPERGLYCEMIA, WITH LONG-TERM CURRENT USE OF INSULIN (H): Primary | ICD-10-CM

## 2020-07-08 LAB
CREAT UR-MCNC: 44 MG/DL
MICROALBUMIN UR-MCNC: <5 MG/L
MICROALBUMIN/CREAT UR: NORMAL MG/G CR (ref 0–25)
TSH SERPL DL<=0.005 MIU/L-ACNC: 0.72 MU/L (ref 0.4–4)

## 2020-07-08 PROCEDURE — 86431 RHEUMATOID FACTOR QUANT: CPT | Mod: ZL | Performed by: NURSE PRACTITIONER

## 2020-07-08 PROCEDURE — 99214 OFFICE O/P EST MOD 30 MIN: CPT | Performed by: NURSE PRACTITIONER

## 2020-07-08 PROCEDURE — 84443 ASSAY THYROID STIM HORMONE: CPT | Mod: ZL | Performed by: NURSE PRACTITIONER

## 2020-07-08 PROCEDURE — 36415 COLL VENOUS BLD VENIPUNCTURE: CPT | Mod: ZL | Performed by: NURSE PRACTITIONER

## 2020-07-08 PROCEDURE — 86200 CCP ANTIBODY: CPT | Mod: ZL | Performed by: NURSE PRACTITIONER

## 2020-07-08 PROCEDURE — 82043 UR ALBUMIN QUANTITATIVE: CPT | Mod: ZL | Performed by: NURSE PRACTITIONER

## 2020-07-08 PROCEDURE — G0463 HOSPITAL OUTPT CLINIC VISIT: HCPCS

## 2020-07-08 RX ORDER — LEVOTHYROXINE SODIUM 75 UG/1
75 TABLET ORAL DAILY
Qty: 90 TABLET | Refills: 3 | Status: SHIPPED | OUTPATIENT
Start: 2020-07-08 | End: 2021-07-06

## 2020-07-08 RX ORDER — ALENDRONATE SODIUM 70 MG/1
TABLET ORAL
Qty: 12 TABLET | Refills: 3 | Status: SHIPPED | OUTPATIENT
Start: 2020-07-08 | End: 2020-12-14

## 2020-07-08 RX ORDER — POTASSIUM CHLORIDE 1500 MG/1
TABLET, EXTENDED RELEASE ORAL
Qty: 60 TABLET | Refills: 5 | Status: SHIPPED | OUTPATIENT
Start: 2020-07-08 | End: 2021-01-04

## 2020-07-08 ASSESSMENT — PATIENT HEALTH QUESTIONNAIRE - PHQ9: SUM OF ALL RESPONSES TO PHQ QUESTIONS 1-9: 1

## 2020-07-08 ASSESSMENT — ASTHMA QUESTIONNAIRES
QUESTION_3 LAST FOUR WEEKS HOW OFTEN DID YOUR ASTHMA SYMPTOMS (WHEEZING, COUGHING, SHORTNESS OF BREATH, CHEST TIGHTNESS OR PAIN) WAKE YOU UP AT NIGHT OR EARLIER THAN USUAL IN THE MORNING: NOT AT ALL
QUESTION_4 LAST FOUR WEEKS HOW OFTEN HAVE YOU USED YOUR RESCUE INHALER OR NEBULIZER MEDICATION (SUCH AS ALBUTEROL): NOT AT ALL
ACT_TOTALSCORE: 23
QUESTION_2 LAST FOUR WEEKS HOW OFTEN HAVE YOU HAD SHORTNESS OF BREATH: NOT AT ALL
QUESTION_5 LAST FOUR WEEKS HOW WOULD YOU RATE YOUR ASTHMA CONTROL: SOMEWHAT CONTROLLED
QUESTION_1 LAST FOUR WEEKS HOW MUCH OF THE TIME DID YOUR ASTHMA KEEP YOU FROM GETTING AS MUCH DONE AT WORK, SCHOOL OR AT HOME: NONE OF THE TIME

## 2020-07-08 ASSESSMENT — ANXIETY QUESTIONNAIRES
6. BECOMING EASILY ANNOYED OR IRRITABLE: NOT AT ALL
7. FEELING AFRAID AS IF SOMETHING AWFUL MIGHT HAPPEN: NOT AT ALL
2. NOT BEING ABLE TO STOP OR CONTROL WORRYING: NOT AT ALL
IF YOU CHECKED OFF ANY PROBLEMS ON THIS QUESTIONNAIRE, HOW DIFFICULT HAVE THESE PROBLEMS MADE IT FOR YOU TO DO YOUR WORK, TAKE CARE OF THINGS AT HOME, OR GET ALONG WITH OTHER PEOPLE: NOT DIFFICULT AT ALL
5. BEING SO RESTLESS THAT IT IS HARD TO SIT STILL: NOT AT ALL
GAD7 TOTAL SCORE: 0
1. FEELING NERVOUS, ANXIOUS, OR ON EDGE: NOT AT ALL
3. WORRYING TOO MUCH ABOUT DIFFERENT THINGS: NOT AT ALL
4. TROUBLE RELAXING: NOT AT ALL

## 2020-07-08 ASSESSMENT — MIFFLIN-ST. JEOR: SCORE: 1154.53

## 2020-07-08 ASSESSMENT — PAIN SCALES - GENERAL: PAINLEVEL: NO PAIN (0)

## 2020-07-08 NOTE — PROGRESS NOTES
Patient was here for meter and pump download. Both downloads were completed and a copy of the report was given to the patient and Kerri.    Payal Mendoza LPN

## 2020-07-08 NOTE — NURSING NOTE
"Chief Complaint   Patient presents with     Diabetes       Initial /62 (BP Location: Left arm, Patient Position: Chair, Cuff Size: Adult Regular)   Pulse 76   Temp 99.2  F (37.3  C) (Tympanic)   Ht 1.6 m (5' 3\")   Wt 68 kg (150 lb)   SpO2 98%   BMI 26.57 kg/m   Estimated body mass index is 26.57 kg/m  as calculated from the following:    Height as of this encounter: 1.6 m (5' 3\").    Weight as of this encounter: 68 kg (150 lb).  Medication Reconciliation: complete  Erendira Estrada LPN  "

## 2020-07-09 DIAGNOSIS — I10 ESSENTIAL HYPERTENSION: ICD-10-CM

## 2020-07-09 ASSESSMENT — ANXIETY QUESTIONNAIRES: GAD7 TOTAL SCORE: 0

## 2020-07-09 ASSESSMENT — ASTHMA QUESTIONNAIRES: ACT_TOTALSCORE: 23

## 2020-07-09 NOTE — TELEPHONE ENCOUNTER
Patient calling for lab results and to inform provider she was unable to get the xray done from her appointment. Informed her that some of her labs are still in process but that the TSH came back fine and that her synthroid was sent to pharmacy per provider notes. She also mentioned that she still needed refills on her lasix and potassium. I see that potassium has been pended in another encounter but nothing with the lasix, medication pended. She would like to know what she should do about the xray, if she needs to be seen again or just reschedule with xray and she would like a call when the rest of the results come back. Ashley LeChevalier LPN

## 2020-07-10 LAB
CCP AB SER IA-ACNC: 2 U/ML
RHEUMATOID FACT SER NEPH-ACNC: <7 IU/ML (ref 0–20)

## 2020-07-10 RX ORDER — FUROSEMIDE 40 MG
TABLET ORAL
Qty: 90 TABLET | Refills: 1 | Status: SHIPPED | OUTPATIENT
Start: 2020-07-10 | End: 2020-08-14

## 2020-07-13 ENCOUNTER — TELEPHONE (OUTPATIENT)
Dept: FAMILY MEDICINE | Facility: OTHER | Age: 75
End: 2020-07-13

## 2020-07-13 DIAGNOSIS — M79.641 PAIN OF RIGHT HAND: Primary | ICD-10-CM

## 2020-07-13 DIAGNOSIS — M79.645 THUMB PAIN, LEFT: ICD-10-CM

## 2020-07-13 PROCEDURE — 73130 X-RAY EXAM OF HAND: CPT | Mod: TC,LT

## 2020-07-13 NOTE — TELEPHONE ENCOUNTER
Pt calling and has an XRAY of left hand at 1 pm today.     She is wondering if she could get the right hand xrayed too? She woke up and both hands hurt.    Please advise.    Call pt back at [phone]      Annie Villela RN

## 2020-07-15 DIAGNOSIS — M79.644 PAIN OF FINGER OF RIGHT HAND: Primary | ICD-10-CM

## 2020-07-15 NOTE — TELEPHONE ENCOUNTER
Pt needed Right hand xray. Does not look like this is complete. Update pt? Or does Radiology call? Thank you.      Annie Villela RN

## 2020-07-22 DIAGNOSIS — M06.9 RHEUMATOID ARTHRITIS, INVOLVING UNSPECIFIED SITE, UNSPECIFIED RHEUMATOID FACTOR PRESENCE: ICD-10-CM

## 2020-07-22 DIAGNOSIS — M79.641 PAIN OF RIGHT HAND: ICD-10-CM

## 2020-07-22 DIAGNOSIS — M79.644 PAIN OF FINGER OF RIGHT HAND: Primary | ICD-10-CM

## 2020-07-22 PROCEDURE — 73130 X-RAY EXAM OF HAND: CPT | Mod: TC,RT

## 2020-07-27 DIAGNOSIS — E78.49 OTHER HYPERLIPIDEMIA: Primary | ICD-10-CM

## 2020-07-27 DIAGNOSIS — I10 ESSENTIAL HYPERTENSION: ICD-10-CM

## 2020-07-27 RX ORDER — METOPROLOL TARTRATE 50 MG
50 TABLET ORAL 2 TIMES DAILY
Qty: 180 TABLET | Refills: 3 | Status: SHIPPED | OUTPATIENT
Start: 2020-07-27 | End: 2021-05-05 | Stop reason: ALTCHOICE

## 2020-07-27 RX ORDER — SIMVASTATIN 40 MG
40 TABLET ORAL AT BEDTIME
Qty: 90 TABLET | Refills: 3 | Status: SHIPPED | OUTPATIENT
Start: 2020-07-27 | End: 2021-07-27

## 2020-08-12 ENCOUNTER — ALLIED HEALTH/NURSE VISIT (OUTPATIENT)
Dept: EDUCATION SERVICES | Facility: OTHER | Age: 75
End: 2020-08-12
Attending: NURSE PRACTITIONER
Payer: COMMERCIAL

## 2020-08-12 ENCOUNTER — TELEPHONE (OUTPATIENT)
Dept: FAMILY MEDICINE | Facility: OTHER | Age: 75
End: 2020-08-12

## 2020-08-12 VITALS
DIASTOLIC BLOOD PRESSURE: 55 MMHG | WEIGHT: 154.6 LBS | BODY MASS INDEX: 28.45 KG/M2 | HEART RATE: 70 BPM | RESPIRATION RATE: 16 BRPM | OXYGEN SATURATION: 97 % | HEIGHT: 62 IN | SYSTOLIC BLOOD PRESSURE: 105 MMHG

## 2020-08-12 DIAGNOSIS — R60.0 LOWER LEG EDEMA: ICD-10-CM

## 2020-08-12 DIAGNOSIS — I50.9 CHRONIC HEART FAILURE, UNSPECIFIED HEART FAILURE TYPE (H): ICD-10-CM

## 2020-08-12 DIAGNOSIS — I51.7 CARDIOMEGALY: Primary | ICD-10-CM

## 2020-08-12 DIAGNOSIS — E13.9 LADA (LATENT AUTOIMMUNE DIABETES IN ADULTS), MANAGED AS TYPE 1 (H): Primary | ICD-10-CM

## 2020-08-12 LAB
ANION GAP SERPL CALCULATED.3IONS-SCNC: 7 MMOL/L (ref 3–14)
BUN SERPL-MCNC: 14 MG/DL (ref 7–30)
CALCIUM SERPL-MCNC: 8.7 MG/DL (ref 8.5–10.1)
CHLORIDE SERPL-SCNC: 102 MMOL/L (ref 94–109)
CO2 SERPL-SCNC: 28 MMOL/L (ref 20–32)
CREAT SERPL-MCNC: 0.73 MG/DL (ref 0.52–1.04)
GFR SERPL CREATININE-BSD FRML MDRD: 81 ML/MIN/{1.73_M2}
GLUCOSE SERPL-MCNC: 250 MG/DL (ref 70–99)
NT-PROBNP SERPL-MCNC: 554 PG/ML (ref 0–125)
POTASSIUM SERPL-SCNC: 3.9 MMOL/L (ref 3.4–5.3)
SODIUM SERPL-SCNC: 137 MMOL/L (ref 133–144)

## 2020-08-12 PROCEDURE — 36415 COLL VENOUS BLD VENIPUNCTURE: CPT | Mod: ZL | Performed by: NURSE PRACTITIONER

## 2020-08-12 PROCEDURE — 83880 ASSAY OF NATRIURETIC PEPTIDE: CPT | Mod: ZL | Performed by: NURSE PRACTITIONER

## 2020-08-12 PROCEDURE — 99214 OFFICE O/P EST MOD 30 MIN: CPT | Mod: 25 | Performed by: NURSE PRACTITIONER

## 2020-08-12 PROCEDURE — G0463 HOSPITAL OUTPT CLINIC VISIT: HCPCS

## 2020-08-12 PROCEDURE — 95251 CONT GLUC MNTR ANALYSIS I&R: CPT | Performed by: NURSE PRACTITIONER

## 2020-08-12 PROCEDURE — 80048 BASIC METABOLIC PNL TOTAL CA: CPT | Mod: ZL | Performed by: NURSE PRACTITIONER

## 2020-08-12 RX ORDER — OMEPRAZOLE 40 MG/1
40 CAPSULE, DELAYED RELEASE ORAL DAILY
COMMUNITY
End: 2020-08-27

## 2020-08-12 ASSESSMENT — MIFFLIN-ST. JEOR: SCORE: 1159.51

## 2020-08-12 ASSESSMENT — PAIN SCALES - GENERAL: PAINLEVEL: NO PAIN (0)

## 2020-08-12 NOTE — TELEPHONE ENCOUNTER
Patient has now decided to complete the echo. Ordered. Will notify patient of the results when available and intervene accordingly.

## 2020-08-12 NOTE — PATIENT INSTRUCTIONS
Continue working on healthy eating and moving as best as you can (start low and slow, work up to 30 min, 5x/week)    BG goals:  Fasting and before meals <130, >80  2 hour after eating <180    We only need 1/2 of these numbers to be within target then your A1c will be within target    Medication changes   Watch for low BGs.      Follow up   3 weeks    Lab today  Nicolasa Guillen--telephone visit in the near    Call me sooner if any problems/concerns and/or questions develop including consistent low BGs <70 or consistent high BGs >200  [phone] (Marie, Unit Coordinator)    [phone] (Emeli, Nurse)

## 2020-08-12 NOTE — PROGRESS NOTES
"SUBJECTIVE:  Flora Acuna, 75 year old, female presents with the following Chief Complaint(s) with HPI to follow:  Chief Complaint   Patient presents with     Diabetes        Diabetes Follow-up      Patient is checking blood sugars: personal CGM.  Results:  She has been using her Dexcom for >90 days    Glucose management indicator: 8.8%    Ave: 231 +/- 69    Glucose range  Very low (<54): 0  low (<70): 0  In target range (): 28%  High (>180): 34%  Very high (>250): 38%         Symptoms of hypoglycemia (low blood sugar): reports several episodes of \"crashing\"--feels symptomatically low <100    Paresthesias (numbness or burning in feet) or sores: Yes--same; no sores    Diabetic eye exam within the last year: Yes    Breakfast eaten regularly: Yes    Patient counting carbs: Yes       HPI:  Flora's here today for the follow up regarding her KAREEM, managed as a Type 1    Lab Results   Component Value Date    A1C 7.2 07/01/2020    A1C 7.9 12/16/2019    A1C 7.5 09/11/2019    A1C 7.3 06/07/2019    A1C 7.3 04/19/2019     Current Diabetes medication:   1.  Insulin pump, with Fiasp  ASA use: yes, 325 mg daily  Statin use: yes, simvastatin 40 mg daily  Denies any issues with the insulin pump or Dexcom     Bo's here today for an insulin pump and Dexcom download with possible adjust.  She reports the following:  BGs have been higher than normal and she doesn't understand why.   It started about 2 months ago.   Hasn't changed her eating habits or lifestyle.     Bo also reports around the same time an increase in her weight (140 to 150 lb)  Reports hx of heart failure.   Has noted more SOB with activity and BLE edema  Denies any pink, frothy sputum, 2 lb per day or >5 lb in a week weight gain (beside what happened 2 months ago)    Saw her PCP, Nicolasa PEREZ NP and refused recent request for an updated echo.       No other complaints noted.   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), managed as type 1 (H)     H/O total knee replacement, left     Age-related osteoporosis without current pathological fracture     Rheumatoid arthritis (H)     Osteoporosis     Family history of melanoma     Family history of breast cancer in female     Cardiomegaly     H/O heart failure     Dysphonia       Past Medical History:   Diagnosis Date     Congestive heart failure, unspecified      Diabetes (H)      H/O rheumatoid arthritis      HLD (hyperlipidemia)      HTN (hypertension)      Myocardial infarction (H)      Uncomplicated asthma        Past Surgical History:   Procedure Laterality Date     APPENDECTOMY OPEN CHILD       AS TOTAL KNEE ARTHROPLASTY Right      CHOLECYSTECTOMY       COLONOSCOPY - HIM SCAN N/A 03/12/2009    per Care Everywhere documentation per [facility].      HYSTERECTOMY TOTAL ABDOMINAL       MASTECTOMY, BILATERAL Bilateral 02/26/1992     SHOULDER SURGERY Right      SHOULDER SURGERY Left        Family History   Problem Relation Age of Onset     Breast Cancer Maternal Aunt      Breast Cancer Maternal Aunt      Lung Cancer Father        Social History     Tobacco Use     Smoking status: Never Smoker     Smokeless tobacco: Never Used   Substance Use Topics     Alcohol use: No     Alcohol/week: 0.0 standard drinks       Current Outpatient Medications   Medication Sig Dispense Refill     acetaminophen (TYLENOL) 325 MG tablet Take 650 mg by mouth       Acetone, Urine, Test (KETONE TEST) STRP Use as directed 50 strip 4     alendronate (FOSAMAX) 70 MG tablet Take 1 tablet by  mouth once weekly. Take with water in the AM 30 minutes prior to eating. Avoid lying down 30 minutes after taking med. 12 tablet 3     aspirin (ASA) 325 MG EC tablet Take 325 mg by mouth daily       baclofen (LIORESAL) 10 MG tablet Take 10 mg by mouth 3 times daily       blood glucose (CONTOUR NEXT TEST) test strip Use 6 times a day with the insulin pump to help manage your diabetes 200 each 11     blood glucose (NO BRAND SPECIFIED) test strip by In Vitro route 4 times daily Use to test blood sugar 4 times daily or as directed.       Calcium-Vitamin D-Vitamin K (VIACTIV PO) Take 2 chew tab by mouth every morning       Continuous Blood Gluc  (DEXCOM G6 ) XX DIMITRIS 1 each continuous 1 Device 0     Continuous Blood Gluc Sensor (DEXCOM G5 MOB/G4 PLAT SENSOR) MISC 1 each continuous Change every 7 days 3 each 1     Continuous Blood Gluc Sensor (DEXCOM G6 SENSOR) XX MISC 1 each continuous To be changed every 10 days 3 each 11     Continuous Blood Gluc Transmit (DEXCOM G6 TRANSMITTER) XX MISC 1 each continuous To be changed every 3 months 1 each 3     glucagon (GLUCAGON EMERGENCY) 1 MG injection Inject 1 mg Subcutaneous once 1 mg 12     Insulin Aspart (INSULIN PUMP - OUTPATIENT)        INSULIN PUMP - OUTPATIENT Date last updated:  2/4/15  Medtronic Minimed: Model 723  BASAL RATES and times:  12   AM (midnight): 0.9 units/hour    9    PM: 0.8 units/hour   Basal Pattern A:  Flora Acuna will use when N/A.  CARB RATIO and times:  12   AM (midnight): 13.0  4     PM: 10  Corection Factor (Sensitivity) and times:  12   AM (midnight): 55 mg/dL  BLOOD GLUCOSE TARGET and times:  12   AM (midnight): 100 - 150  7     AM:  100 - 120  9    PM:  100 - 150  Active Insulin Time:  3 hours  Sensor:  No  Carelink / Diasend username:  [email or web address]  Carelink / Diasend Password:  [email or web address]       Lancet Devices MISC        levothyroxine (SYNTHROID/LEVOTHROID) 75 MCG tablet Take 1 tablet (75 mcg) by mouth daily 90 tablet 3      metoprolol tartrate (LOPRESSOR) 50 MG tablet Take 1 tablet (50 mg) by mouth 2 times daily 180 tablet 3     nitroglycerin (NITROSTAT) 0.4 MG SL tablet Place 0.4 mg under the tongue       omeprazole (PRILOSEC) 40 MG DR capsule Take 40 mg by mouth daily       potassium chloride ER (KLOR-CON M) 20 MEQ CR tablet TAKE 1  BY MOUTH TWICE DAILY 60 tablet 5     ramipril (ALTACE) 10 MG capsule Take 1 capsule (10 mg) by mouth daily 90 capsule 0     senna-docusate (SENOKOT-S;PERICOLACE) 8.6-50 MG per tablet Take 1 tablet by mouth At Bedtime        simvastatin (ZOCOR) 40 MG tablet Take 1 tablet (40 mg) by mouth At Bedtime 90 tablet 3     tamoxifen (NOLVADEX) 20 MG tablet Take 20 mg by mouth daily        VITAMIN D, CHOLECALCIFEROL, PO Take 5,000 Units by mouth daily       furosemide (LASIX) 40 MG tablet TAKE ONE (1) TABLET BY MOUTH every morning and half tablet every afternoon. 135 tablet 1     insulin aspart (NOVOLOG VIAL) 100 UNITS/ML vial To be used with the insulin pump  TDD: 40 units 30 mL 3       Allergies   Allergen Reactions     Contrast Dye Difficulty breathing     Gadolinium Derivatives      Other reaction(s): Difficulty in swallowing, Laryngeal spasm  Patient had an injection into rt. Shoulder capsule prior to MRI arthrogram.  Contained multihance gadobenate, omnipaque iohexol, and buffered lidocaine.       Ammonia      Cory-1 [Lidocaine Hcl]      Novocaine allergy       Codeine Sulfate      Food      Shrimp     Iodine-131 Swelling     Ct dye     Lidocaine      Nitrous Oxide      No Clinical Screening - See Comments Nausea and Vomiting and Other (See Comments)     (3/6/09)- N/V and Heart racing     Novocain [Procaine]      Penicillins      Tramadol      Morphine Palpitations       REVIEW OF SYSTEMS  Skin: negative  Eyes: negative  Ears/Nose/Throat: burnt voice and muscles from GERD  Respiratory: positive FRANCO--worse; cough--productive; no hemoptysis; history of asthma  Cardiovascular: history of HF  Gastrointestinal:  "GERD  Genitourinary: negative   Musculoskeletal: RA--worse: hx of back pain, neck pain, arthritis and right shoulder   Neurologic: positive for numbness or tingling of feet--same  Psychiatric: negative  Hematologic/Lymphatic/Immunologic: history of breast cancer (left) x 2 (same spot)  Endocrine: positive for diabetes and thyroid disease     OBJECTIVE:  /55   Pulse 70   Resp 16   Ht 1.575 m (5' 2\")   Wt 70.1 kg (154 lb 9.6 oz)   SpO2 97%   BMI 28.28 kg/m    Constitutional: healthy, alert and no distress  Cardiac: +2/+3 BLE pitting edema  Musculoskeletal: extremities normal- no gross deformities noted and gait normal  Skin: no suspicious lesions or rashes to visible skin  Psychiatric: mentation appears normal and affect normal/bright        LABS  Results for orders placed or performed in visit on 08/12/20   BNP-N terminal pro     Status: Abnormal   Result Value Ref Range    N-Terminal Pro Bnp 554 (H) 0 - 125 pg/mL   Basic metabolic panel     Status: Abnormal   Result Value Ref Range    Sodium 137 133 - 144 mmol/L    Potassium 3.9 3.4 - 5.3 mmol/L    Chloride 102 94 - 109 mmol/L    Carbon Dioxide 28 20 - 32 mmol/L    Anion Gap 7 3 - 14 mmol/L    Glucose 250 (H) 70 - 99 mg/dL    Urea Nitrogen 14 7 - 30 mg/dL    Creatinine 0.73 0.52 - 1.04 mg/dL    GFR Estimate 81 >60 mL/min/[1.73_m2]    GFR Estimate If Black >90 >60 mL/min/[1.73_m2]    Calcium 8.7 8.5 - 10.1 mg/dL       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    Basal:  0000 1.0  0300 0.95  0600 0.9  0900 0.975  1200 1.05  1900 1.0  2300 0.975    Carb ratio  0000 10  1600 12    Not sure what's " driving her higher BGs--progression of DM or something cooking with her hear    (I50.9) Chronic heart failure, unspecified heart failure type (H)   Comment: noted  Plan: BNP-N terminal pro          Nicolasa PEREZ NP aware of labs and will address them with patient    Reminded Flora that it's okay to use the Dexcom data to make medical decisions--okay to add BG into insulin pump for correction    Follow up as discussed, sooner if needed    Download when you see Sara PEREZ NP in 2  week      Patient Instructions     Continue working on healthy eating and moving as best as you can (start low and slow, work up to 30 min, 5x/week)    BG goals:  Fasting and before meals <130, >80  2 hour after eating <180    We only need 1/2 of these numbers to be within target then your A1c will be within target    Medication changes   Watch for low BGs.      Follow up   3 weeks    Lab today  Nicolasa Guillen--telephone visit in the near    Call me sooner if any problems/concerns and/or questions develop including consistent low BGs <70 or consistent high BGs >200  [phone] (Marie, Unit Coordinator)    [phone] (Emeli, Nurse)        Time: 40 minutes  Barrier: none  Willingness to learn: accepting    Kerri NELSON [facility]  Diabetes and Wound Care    Cc: Nicolasa Guillen NP    With the electronic record, we can now more quickly and easily track our patient diabetic goals. Our diabetes clinical review is in progress and these are the indicators we are monitoring for good diabetes health.     1.) HbA1C less than 7 (measurement of your average blood sugars)  2.) Blood Pressure less than 140/80  3.) LDL less than 100 (bad cholesterol)  4.) HbA1C is checked in the last 6 months and below 7% (more frequently if not at goal or adjusting medications)  5.) LDL is checked in the last 12 months (more frequently if not at goal or adjusting medications)  6.) Taking one baby aspirin daily (unless otherwise instructed)  7.) No tobacco use  8) Statin  use     You have achieved 7 out of 8 of these and I am encouraging you to come in and get tuned up to achieve 8 out of 8!  Here is what you have achieved so far in my goals for you:  1.) HbA1C  less than 7:                              NO     Your last  HbA1C  Lab Results   Component Value Date    A1C 7.2 07/01/2020    A1C 7.9 12/16/2019    A1C 7.5 09/11/2019    A1C 7.3 06/07/2019    A1C 7.3 04/19/2019      2.) Blood Pressure less than 140/80:       YES      Your last    BP Readings from Last 1 Encounters:   08/12/20 105/55     3.) LDL less than 100:                              YES      Your last     LDL Cholesterol Calculated   Date Value Ref Range Status   07/01/2020 36 <100 mg/dL Final     Comment:     Desirable:       <100 mg/dl       4.) Checked HbA1C in the past 6 months: YES      5.) Checked LDL in the past 12 months:    YES    6.) Taking one aspirin daily:                       YES     7.) No tobacco use:                                        YES      8.) Statin use      YES

## 2020-08-12 NOTE — PROGRESS NOTES
"Chief Complaint   Patient presents with     Diabetes       Initial Pulse 70   Resp 16   Wt 70.1 kg (154 lb 9.6 oz)   SpO2 97%   BMI 27.39 kg/m   Estimated body mass index is 27.39 kg/m  as calculated from the following:    Height as of 7/8/20: 1.6 m (5' 3\").    Weight as of this encounter: 70.1 kg (154 lb 9.6 oz).  Medication Reconciliation: complete  Ginger Lerner LPN    "

## 2020-08-14 ENCOUNTER — VIRTUAL VISIT (OUTPATIENT)
Dept: FAMILY MEDICINE | Facility: OTHER | Age: 75
End: 2020-08-14
Attending: NURSE PRACTITIONER
Payer: COMMERCIAL

## 2020-08-14 VITALS — OXYGEN SATURATION: 99 % | HEIGHT: 62 IN | WEIGHT: 152 LBS | BODY MASS INDEX: 27.97 KG/M2 | HEART RATE: 60 BPM

## 2020-08-14 DIAGNOSIS — I51.7 CARDIOMEGALY: Primary | ICD-10-CM

## 2020-08-14 DIAGNOSIS — I10 ESSENTIAL HYPERTENSION: ICD-10-CM

## 2020-08-14 DIAGNOSIS — Z86.79 H/O HEART FAILURE: ICD-10-CM

## 2020-08-14 DIAGNOSIS — R06.02 SHORTNESS OF BREATH: ICD-10-CM

## 2020-08-14 PROCEDURE — 99213 OFFICE O/P EST LOW 20 MIN: CPT | Mod: TEL | Performed by: NURSE PRACTITIONER

## 2020-08-14 RX ORDER — FUROSEMIDE 40 MG
TABLET ORAL
Qty: 135 TABLET | Refills: 1 | Status: SHIPPED | OUTPATIENT
Start: 2020-08-14 | End: 2021-01-29

## 2020-08-14 ASSESSMENT — PAIN SCALES - GENERAL: PAINLEVEL: NO PAIN (0)

## 2020-08-14 ASSESSMENT — MIFFLIN-ST. JEOR: SCORE: 1147.72

## 2020-08-14 NOTE — NURSING NOTE
"Chief Complaint   Patient presents with     Results       Initial There were no vitals taken for this visit. Estimated body mass index is 28.28 kg/m  as calculated from the following:    Height as of 8/12/20: 1.575 m (5' 2\").    Weight as of 8/12/20: 70.1 kg (154 lb 9.6 oz).  Medication Reconciliation: complete  Erendira Estrada LPN  "

## 2020-08-14 NOTE — PROGRESS NOTES
"Flora Acuna is a 75 year old female who is being evaluated via a billable telephone visit.      The patient has been notified of following:     \"This telephone visit will be conducted via a call between you and your physician/provider. We have found that certain health care needs can be provided without the need for a physical exam.  This service lets us provide the care you need with a short phone conversation.  If a prescription is necessary we can send it directly to your pharmacy.  If lab work is needed we can place an order for that and you can then stop by our lab to have the test done at a later time.    Telephone visits are billed at different rates depending on your insurance coverage. During this emergency period, for some insurers they may be billed the same as an in-person visit.  Please reach out to your insurance provider with any questions.    If during the course of the call the physician/provider feels a telephone visit is not appropriate, you will not be charged for this service.\"    Patient has given verbal consent for Telephone visit?  Yes    What phone number would you like to be contacted at? On file    How would you like to obtain your AVS? Mail a copy    Subjective     Flora Acuna is a 75 year old female who presents via phone visit today for the following health issues:    HPI    Heart Failure Follow-up    Patient was last seen by myself on 7/8/20. At this time, she denied shortness of breath or lower leg swelling. Declined a routine follow up echo at this time. She recently met with DM, however, and complained of more lower leg edema. A BNP was ordered and was 554. An echo was also ordered. This is scheduled next week.    Are you experiencing any shortness of breath? Yes, with activity only   How would you describe your shortness of breath?  Slightly worse    Are you experiencing any swelling in your legs or feet?  Worse than usual    Are you using more pillows than usual? " Yes    Do you cough at night?  Yes, clear sputum    Do you check your weight daily?  Yes, recently gained 8 pounds over night    Have you had a weight change recently?  Weight increase    Are you having any of the following side effects from your medications? (Select all that apply)  Cough    Since your last visit, how many times have you gone to the cardiologist, urgent care, emergency room, or hospital because of your heart failure?   None    Patient notes that she is more short of breath with exertion, but denies chest pain. Also having more lower leg edema and a dry cough. No fevers. She currently takes lasix 40 once daily and denies any side effects. No dizziness. No syncope. No palpitations. No nausea or vomiting. No abdominal pain. No abdominal bloating.       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      HYSTERECTOMY TOTAL ABDOMINAL       MASTECTOMY, BILATERAL Bilateral 02/26/1992     SHOULDER SURGERY Right      SHOULDER SURGERY Left        Social  History     Tobacco Use     Smoking status: Never Smoker     Smokeless tobacco: Never Used   Substance Use Topics     Alcohol use: No     Alcohol/week: 0.0 standard drinks     Family History   Problem Relation Age of Onset     Breast Cancer Maternal Aunt      Breast Cancer Maternal Aunt      Lung Cancer Father          Current Outpatient Medications   Medication Sig Dispense Refill     acetaminophen (TYLENOL) 325 MG tablet Take 650 mg by mouth       Acetone, Urine, Test (KETONE TEST) STRP Use as directed 50 strip 4     alendronate (FOSAMAX) 70 MG tablet Take 1 tablet by mouth once weekly. Take with water in the AM 30 minutes prior to eating. Avoid lying down 30 minutes after taking med. 12 tablet 3     aspirin (ASA) 325 MG EC tablet Take 325 mg by mouth daily       baclofen (LIORESAL) 10 MG tablet Take 10 mg by mouth 3 times daily       blood glucose (CONTOUR NEXT TEST) test strip Use 6 times a day with the insulin pump to help manage your diabetes 200 each 11     blood glucose (NO BRAND SPECIFIED) test strip by In Vitro route 4 times daily Use to test blood sugar 4 times daily or as directed.       Calcium-Vitamin D-Vitamin K (VIACTIV PO) Take 2 chew tab by mouth every morning       Continuous Blood Gluc  (DEXCOM G6 ) XX DIMITRIS 1 each continuous 1 Device 0     Continuous Blood Gluc Sensor (DEXCOM G5 MOB/G4 PLAT SENSOR) MISC 1 each continuous Change every 7 days 3 each 1     Continuous Blood Gluc Sensor (DEXCOM G6 SENSOR) XX MISC 1 each continuous To be changed every 10 days 3 each 11     Continuous Blood Gluc Transmit (DEXCOM G6 TRANSMITTER) XX MISC 1 each continuous To be changed every 3 months 1 each 3     furosemide (LASIX) 40 MG tablet TAKE ONE (1) TABLET BY MOUTH DAILY LASIX 90 tablet 1     glucagon (GLUCAGON EMERGENCY) 1 MG injection Inject 1 mg Subcutaneous once 1 mg 12     Insulin Aspart (INSULIN PUMP - OUTPATIENT)        insulin aspart (NOVOLOG VIAL) 100 UNITS/ML vial To be used with the  insulin pump  TDD: 40 units (Patient not taking: Reported on 8/12/2020) 30 mL 3     INSULIN PUMP - OUTPATIENT Date last updated:  2/4/15  Medtronic Minimed: Model 723  BASAL RATES and times:  12   AM (midnight): 0.9 units/hour    9    PM: 0.8 units/hour   Basal Pattern A:  Flora Acuna will use when N/A.  CARB RATIO and times:  12   AM (midnight): 13.0  4     PM: 10  Corection Factor (Sensitivity) and times:  12   AM (midnight): 55 mg/dL  BLOOD GLUCOSE TARGET and times:  12   AM (midnight): 100 - 150  7     AM:  100 - 120  9    PM:  100 - 150  Active Insulin Time:  3 hours  Sensor:  No  Carelink / Diasend username:  [email or web address]  Carelink / Diasend Password:  [email or web address]       Lancet Devices MISC        levothyroxine (SYNTHROID/LEVOTHROID) 75 MCG tablet Take 1 tablet (75 mcg) by mouth daily 90 tablet 3     metoprolol tartrate (LOPRESSOR) 50 MG tablet Take 1 tablet (50 mg) by mouth 2 times daily 180 tablet 3     nitroglycerin (NITROSTAT) 0.4 MG SL tablet Place 0.4 mg under the tongue       omeprazole (PRILOSEC) 40 MG DR capsule Take 40 mg by mouth daily       potassium chloride ER (KLOR-CON M) 20 MEQ CR tablet TAKE 1  BY MOUTH TWICE DAILY 60 tablet 5     ramipril (ALTACE) 10 MG capsule Take 1 capsule (10 mg) by mouth daily 90 capsule 0     senna-docusate (SENOKOT-S;PERICOLACE) 8.6-50 MG per tablet Take 1 tablet by mouth At Bedtime        simvastatin (ZOCOR) 40 MG tablet Take 1 tablet (40 mg) by mouth At Bedtime 90 tablet 3     tamoxifen (NOLVADEX) 20 MG tablet Take 20 mg by mouth daily        VITAMIN D, CHOLECALCIFEROL, PO Take 5,000 Units by mouth daily       Allergies   Allergen Reactions     Contrast Dye Difficulty breathing     Gadolinium Derivatives      Other reaction(s): Difficulty in swallowing, Laryngeal spasm  Patient had an injection into rt. Shoulder capsule prior to MRI arthrogram.  Contained multihance gadobenate, omnipaque iohexol, and buffered lidocaine.       Ammonia      Cory-1 [Lidocaine Hcl]       Novocaine allergy       Codeine Sulfate      Food      Shrimp     Iodine-131 Swelling     Ct dye     Lidocaine      Nitrous Oxide      No Clinical Screening - See Comments Nausea and Vomiting and Other (See Comments)     (3/6/09)- N/V and Heart racing     Novocain [Procaine]      Penicillins      Tramadol      Morphine Palpitations       Reviewed and updated as needed this visit by Provider         Review of Systems   As noted in the HPI.        Objective          Vitals:  No vitals were obtained today due to virtual visit.    healthy, alert and no distress  PSYCH: Alert and oriented times 3; coherent speech, normal   rate and volume, able to articulate logical thoughts, able   to abstract reason, no tangential thoughts, no hallucinations   or delusions  Her affect is normal  RESP: No cough, no audible wheezing, able to talk in full sentences  Remainder of exam unable to be completed due to telephone visits    Diagnostic Test Results:  Labs reviewed in Epic      Component      Latest Ref Rng & Units 8/12/2020   Sodium      133 - 144 mmol/L 137   Potassium      3.4 - 5.3 mmol/L 3.9   Chloride      94 - 109 mmol/L 102   Carbon Dioxide      20 - 32 mmol/L 28   Anion Gap      3 - 14 mmol/L 7   Glucose      70 - 99 mg/dL 250 (H)   Urea Nitrogen      7 - 30 mg/dL 14   Creatinine      0.52 - 1.04 mg/dL 0.73   GFR Estimate      >60 mL/min/1.73:m2 81   GFR Estimate If Black      >60 mL/min/1.73:m2 >90   Calcium      8.5 - 10.1 mg/dL 8.7   N-Terminal Pro Bnp      0 - 125 pg/mL 554 (H)       Assessment/Plan:    Assessment & Plan     1. Essential hypertension  2. Cardiomegaly  3. H/O heart failure  4. Shortness of breath  While patient is having slightly more SOB with exertion and her legs are swelling slightly, she notes that she is not in acute distress. Talking on the phone without any problems. Will, however, update her echo and add 20 mg of lasix in the afternoon in addition to the 40 mg she takes in the am. Kidney  function normal and BP ok. Will then see her back in 2 weeks with BNP and BMP prior. Will notify patient of the results when available and intervene accordingly.        No follow-ups on file.    Nicolasa Guillen NP  [facility] [place]    Phone call duration:  15 minutes

## 2020-08-24 ENCOUNTER — TELEPHONE (OUTPATIENT)
Dept: FAMILY MEDICINE | Facility: OTHER | Age: 75
End: 2020-08-24

## 2020-08-24 NOTE — TELEPHONE ENCOUNTER
Pt calling and thought she was to only have a lab this Friday and not an appt with PCP. Does she need to keep this appt?      Please advise.    Call back [phone]      Annie Villela RN

## 2020-08-27 DIAGNOSIS — K21.9 GASTROESOPHAGEAL REFLUX DISEASE, ESOPHAGITIS PRESENCE NOT SPECIFIED: Primary | ICD-10-CM

## 2020-08-27 RX ORDER — OMEPRAZOLE 40 MG/1
40 CAPSULE, DELAYED RELEASE ORAL DAILY
Qty: 90 CAPSULE | Refills: 3 | Status: SHIPPED | OUTPATIENT
Start: 2020-08-27 | End: 2021-08-24

## 2020-08-27 NOTE — PROGRESS NOTES
Subjective     Flora Acuna is a 75 year old female who presents to clinic today for the following health issues:    HPI   Heart Failure Follow-up    Patient was seen by myself on 7/8/20. At this time, she denied shortness of breath or lower leg swelling. Declined a routine follow up echo at this time. She does have a h/o heart failure. She then met with [facility], however, and complained of more lower leg edema. A BNP was ordered and was 554. An echo was also ordered and is scheduled for 9/17/20. A virtual visit was done on 8/14/20 and her lasix was increased from 40 mg daily to 60 mg daily. Today she notes that she feels much better. Shortness of breath has improved. No orthopnea. She does note that she occasionally has chest pressure with exertion. Last episode occurred one week ago while she was carrying her laundry. Subsided with rest. No fevers. No cough or cold like symptoms. No nausea or vomiting. No abdominal bloating. Tries to walk daily and denies any symptoms. She was using a Blue Emo lotion with lidocaine and feels this was causing her shortness of breath. She has an allergy to Novocain and becomes short of breath. She was unaware that the lotion had lidocaine in it. She stopped this and has felt better.        Are you experiencing any shortness of breath? No    Are you experiencing any swelling in your legs or feet?  Stable    Are you using more pillows than usual? No, has an adjustable bed and sleeps with her feet elevated     Do you cough at night?  Yes, on occasion       Do you check your weight daily?  Yes    Have you had a weight change recently?  Weight stable     Are you having any of the following side effects from your medications? (Select all that apply)  The patient does not report symptoms of dizziness, fatigue, cough, swelling, or slow heart beat.    Since your last visit, how many times have you gone to the cardiologist, urgent care, emergency room, or hospital because of your heart  "failure?   None     Last echo done in 2015:     ASSESSMENT:  Echocardiographic study revealing  1.  No pericardial effusion.  2.  Left ventricular enlargement with normal systolic function.  Ejection fraction at 55%.  3.  Normal atrium.  4.  Normal right ventricular size and function.  5.  Trace mitral regurgitation.  6.  Normal aortic valve.  7.  Trace tricuspid regurgitation.  8.  There is evidence of possible diastolic dysfunction.  Exam Date: Jun 29, 2015 10:40:00 AM  Author: CHINO CAIN  This report is final and signed    She tells me that she also had a stress test years ago and had a reaction to the medication. She notes that she was told that she can never have a stress test again. I am unable to find these results in Care Everywhere. I do see she had a left heart cath in 7/2015 and results show...    CONCLUSIONS  Patient with history of diabetes and rheumatoid arthritis presenting for cardiac catheterization  in further evaluation of epigastric discomfort and dyspnea complaints with and without exertion.  Angiography reveals sparse minimal luminal irregularities and some distal pruning of the LAD,  otherwise no significant CAD is noted.    Normal LVEDP    Review of Systems   As noted in the HPI.       Objective    /60 (BP Location: Left arm, Patient Position: Chair, Cuff Size: Adult Regular)   Pulse 71   Ht 1.6 m (5' 3\")   Wt 69.4 kg (153 lb)   SpO2 96%   BMI 27.10 kg/m    Body mass index is 27.1 kg/m .  Physical Exam   GENERAL: healthy, alert and no distress  EYES: Eyes grossly normal to inspection, PERRL and conjunctivae and sclerae normal  HENT: ear canals and TM's normal, nose and mouth without ulcers or lesions  NECK: no adenopathy  RESP: lungs clear to auscultation - no rales, rhonchi or wheezes  CV: regular rate and rhythm, normal S1 S2, no S3 or S4, soft systolic murmur heard  ABDOMEN: soft, nontender, no hepatosplenomegaly, no masses and bowel sounds normal  NEURO: Normal strength " and tone, mentation intact and speech normal  PSYCH: mentation appears normal, affect normal/bright  LOWER LEGS-trace edema seen bilaterally     Results for orders placed or performed in visit on 08/28/20 (from the past 24 hour(s))   D dimer quantitative   Result Value Ref Range    D Dimer 0.4 0.0 - 0.50 ug/ml FEU   CBC with platelets and differential   Result Value Ref Range    WBC 6.6 4.0 - 11.0 10e9/L    RBC Count 4.06 3.8 - 5.2 10e12/L    Hemoglobin 12.3 11.7 - 15.7 g/dL    Hematocrit 37.4 35.0 - 47.0 %    MCV 92 78 - 100 fl    MCH 30.3 26.5 - 33.0 pg    MCHC 32.9 31.5 - 36.5 g/dL    RDW 12.0 10.0 - 15.0 %    Platelet Count 160 150 - 450 10e9/L    Diff Method Automated Method     % Neutrophils 58.3 %    % Lymphocytes 29.4 %    % Monocytes 7.7 %    % Eosinophils 3.5 %    % Basophils 0.6 %    % Immature Granulocytes 0.5 %    Nucleated RBCs 0 0 /100    Absolute Neutrophil 3.8 1.6 - 8.3 10e9/L    Absolute Lymphocytes 1.9 0.8 - 5.3 10e9/L    Absolute Monocytes 0.5 0.0 - 1.3 10e9/L    Absolute Eosinophils 0.2 0.0 - 0.7 10e9/L    Absolute Basophils 0.0 0.0 - 0.2 10e9/L    Abs Immature Granulocytes 0.0 0 - 0.4 10e9/L    Absolute Nucleated RBC 0.0        EKG: VR 67, NRS, prolonged QT at 458 ms, nonspecific ST and T wave abnormality.     Assessment & Plan   (I51.7) Cardiomegaly  (primary encounter diagnosis)  (Z86.79) H/O heart failure  Plan: CARDIOLOGY EVAL ADULT REFERRAL        Echo ordered and scheduled. Will also refer to cardiology. BP controlled. She will limit her sodium intake to 2 grams or less daily. She will also keep a close eye on her BP and weigh herself daily. If she notes a 3-5 pound weight gain or increasing bloating, she will let us know.     (R06.02) Shortness of breath  (R07.89) Chest pressure  Comment: shortness of breath resolved with increased lasix dose and stopping the Blue Emo lotion, d-dimer normal. BNP stable, occasional chest pressure that does not radiate, EKG without acute findings, kidney  function normal  Plan: Will continue current medications and refer to cardiology. I suspect she needs a stress test, but she tells me that she reacts to them and can never have another. In the meantime, she will go to the ER with any chest pain.     (R60.0) Lower leg edema  Comment: greatly improving with increased lasix dose, kidney function normal  Plan: Will continue 60 mg lasix daily. Follow up with cardiology.     (I10) Essential hypertension  Plan: Well controlled. Continue current medications. Encouraged daily exercise and a low sodium diet. Recommended checking BP's 2x/wk, call the clinic if consistantly s>140 or d>90. Follow up in 6 months.     (R94.31) Prolonged QT interval  Comment: most likely from the Tomaxifen  Plan: CARDIOLOGY EVAL ADULT REFERRAL        Only mildly elevated. I therefore do not feel comfortable stopping her Tamoxifen. Will, however, make note of this to avoid prescribing medications that can further prolong this.       No follow-ups on file.    Nicolasa Guillen NP  [facility] [place]

## 2020-08-28 ENCOUNTER — OFFICE VISIT (OUTPATIENT)
Dept: FAMILY MEDICINE | Facility: OTHER | Age: 75
End: 2020-08-28
Attending: NURSE PRACTITIONER
Payer: MEDICARE

## 2020-08-28 VITALS
WEIGHT: 153 LBS | HEIGHT: 63 IN | BODY MASS INDEX: 27.11 KG/M2 | SYSTOLIC BLOOD PRESSURE: 122 MMHG | DIASTOLIC BLOOD PRESSURE: 60 MMHG | OXYGEN SATURATION: 96 % | HEART RATE: 71 BPM

## 2020-08-28 DIAGNOSIS — I10 ESSENTIAL HYPERTENSION: ICD-10-CM

## 2020-08-28 DIAGNOSIS — R94.31 PROLONGED QT INTERVAL: ICD-10-CM

## 2020-08-28 DIAGNOSIS — I51.7 CARDIOMEGALY: ICD-10-CM

## 2020-08-28 DIAGNOSIS — I51.7 CARDIOMEGALY: Primary | ICD-10-CM

## 2020-08-28 DIAGNOSIS — R06.02 SHORTNESS OF BREATH: ICD-10-CM

## 2020-08-28 DIAGNOSIS — R07.89 CHEST PRESSURE: ICD-10-CM

## 2020-08-28 DIAGNOSIS — R60.0 LOWER LEG EDEMA: ICD-10-CM

## 2020-08-28 DIAGNOSIS — Z86.79 H/O HEART FAILURE: ICD-10-CM

## 2020-08-28 LAB
ANION GAP SERPL CALCULATED.3IONS-SCNC: 5 MMOL/L (ref 3–14)
BASOPHILS # BLD AUTO: 0 10E9/L (ref 0–0.2)
BASOPHILS NFR BLD AUTO: 0.6 %
BUN SERPL-MCNC: 21 MG/DL (ref 7–30)
CALCIUM SERPL-MCNC: 8.8 MG/DL (ref 8.5–10.1)
CHLORIDE SERPL-SCNC: 101 MMOL/L (ref 94–109)
CO2 SERPL-SCNC: 28 MMOL/L (ref 20–32)
CREAT SERPL-MCNC: 0.83 MG/DL (ref 0.52–1.04)
D DIMER PPP FEU-MCNC: 0.4 UG/ML FEU (ref 0–0.5)
DIFFERENTIAL METHOD BLD: NORMAL
EOSINOPHIL # BLD AUTO: 0.2 10E9/L (ref 0–0.7)
EOSINOPHIL NFR BLD AUTO: 3.5 %
ERYTHROCYTE [DISTWIDTH] IN BLOOD BY AUTOMATED COUNT: 12 % (ref 10–15)
GFR SERPL CREATININE-BSD FRML MDRD: 69 ML/MIN/{1.73_M2}
GLUCOSE SERPL-MCNC: 180 MG/DL (ref 70–99)
HCT VFR BLD AUTO: 37.4 % (ref 35–47)
HGB BLD-MCNC: 12.3 G/DL (ref 11.7–15.7)
IMM GRANULOCYTES # BLD: 0 10E9/L (ref 0–0.4)
IMM GRANULOCYTES NFR BLD: 0.5 %
LYMPHOCYTES # BLD AUTO: 1.9 10E9/L (ref 0.8–5.3)
LYMPHOCYTES NFR BLD AUTO: 29.4 %
MCH RBC QN AUTO: 30.3 PG (ref 26.5–33)
MCHC RBC AUTO-ENTMCNC: 32.9 G/DL (ref 31.5–36.5)
MCV RBC AUTO: 92 FL (ref 78–100)
MONOCYTES # BLD AUTO: 0.5 10E9/L (ref 0–1.3)
MONOCYTES NFR BLD AUTO: 7.7 %
NEUTROPHILS # BLD AUTO: 3.8 10E9/L (ref 1.6–8.3)
NEUTROPHILS NFR BLD AUTO: 58.3 %
NRBC # BLD AUTO: 0 10*3/UL
NRBC BLD AUTO-RTO: 0 /100
NT-PROBNP SERPL-MCNC: 582 PG/ML (ref 0–125)
PLATELET # BLD AUTO: 160 10E9/L (ref 150–450)
POTASSIUM SERPL-SCNC: 3.9 MMOL/L (ref 3.4–5.3)
RBC # BLD AUTO: 4.06 10E12/L (ref 3.8–5.2)
SODIUM SERPL-SCNC: 134 MMOL/L (ref 133–144)
WBC # BLD AUTO: 6.6 10E9/L (ref 4–11)

## 2020-08-28 PROCEDURE — 85025 COMPLETE CBC W/AUTO DIFF WBC: CPT | Mod: ZL | Performed by: NURSE PRACTITIONER

## 2020-08-28 PROCEDURE — 85379 FIBRIN DEGRADATION QUANT: CPT | Mod: ZL | Performed by: NURSE PRACTITIONER

## 2020-08-28 PROCEDURE — 93010 ELECTROCARDIOGRAM REPORT: CPT | Performed by: INTERNAL MEDICINE

## 2020-08-28 PROCEDURE — 83880 ASSAY OF NATRIURETIC PEPTIDE: CPT | Mod: ZL | Performed by: NURSE PRACTITIONER

## 2020-08-28 PROCEDURE — 93005 ELECTROCARDIOGRAM TRACING: CPT

## 2020-08-28 PROCEDURE — 99214 OFFICE O/P EST MOD 30 MIN: CPT | Mod: 25 | Performed by: NURSE PRACTITIONER

## 2020-08-28 PROCEDURE — G0463 HOSPITAL OUTPT CLINIC VISIT: HCPCS

## 2020-08-28 PROCEDURE — 36415 COLL VENOUS BLD VENIPUNCTURE: CPT | Mod: ZL | Performed by: NURSE PRACTITIONER

## 2020-08-28 PROCEDURE — 80048 BASIC METABOLIC PNL TOTAL CA: CPT | Mod: ZL | Performed by: NURSE PRACTITIONER

## 2020-08-28 ASSESSMENT — PAIN SCALES - GENERAL: PAINLEVEL: NO PAIN (0)

## 2020-08-28 ASSESSMENT — MIFFLIN-ST. JEOR: SCORE: 1168.13

## 2020-08-28 NOTE — NURSING NOTE
"Chief Complaint   Patient presents with     Heart Failure     follow up       Initial /60 (BP Location: Left arm, Patient Position: Chair, Cuff Size: Adult Regular)   Pulse 71   Ht 1.6 m (5' 3\")   Wt 69.4 kg (153 lb)   SpO2 96%   BMI 27.10 kg/m   Estimated body mass index is 27.1 kg/m  as calculated from the following:    Height as of this encounter: 1.6 m (5' 3\").    Weight as of this encounter: 69.4 kg (153 lb).  Medication Reconciliation: complete  Erendira Estrada LPN  "

## 2020-09-01 ENCOUNTER — TELEPHONE (OUTPATIENT)
Dept: EDUCATION SERVICES | Facility: OTHER | Age: 75
End: 2020-09-01

## 2020-09-01 NOTE — TELEPHONE ENCOUNTER
Patient calling wanting to know if her appt can be changed to a telephone visit. She is having problems finding a ride for tomorrow. States she had her pump downloaded last week when she saw Miya

## 2020-09-02 ENCOUNTER — VIRTUAL VISIT (OUTPATIENT)
Dept: EDUCATION SERVICES | Facility: OTHER | Age: 75
End: 2020-09-02
Attending: NURSE PRACTITIONER
Payer: COMMERCIAL

## 2020-09-02 DIAGNOSIS — E13.9 LADA (LATENT AUTOIMMUNE DIABETES IN ADULTS), MANAGED AS TYPE 1 (H): Primary | ICD-10-CM

## 2020-09-02 PROCEDURE — 99213 OFFICE O/P EST LOW 20 MIN: CPT | Mod: 95 | Performed by: NURSE PRACTITIONER

## 2020-09-02 ASSESSMENT — PAIN SCALES - GENERAL: PAINLEVEL: NO PAIN (0)

## 2020-09-02 NOTE — NURSING NOTE
"No chief complaint on file.      Initial There were no vitals taken for this visit. Estimated body mass index is 27.1 kg/m  as calculated from the following:    Height as of 8/28/20: 1.6 m (5' 3\").    Weight as of 8/28/20: 69.4 kg (153 lb).  Medication Reconciliation: complete  Heather Adame LPN      "

## 2020-09-02 NOTE — PATIENT INSTRUCTIONS
Continue working on healthy eating and moving as best as you can (start low and slow, work up to 30 min, 5x/week)    BG goals:  Fasting and before meals <130, >80  2 hour after eating <180    We only need 1/2 of these numbers to be within target then your A1c will be within target    Medication changes   Watch for low BGs after adjustment    Follow up   Insulin pump and Dexcom download before echo    Call me sooner if any problems/concerns and/or questions develop including consistent low BGs <70 or consistent high BGs >200  [phone] (Marie, Unit Coordinator)    [phone] (Emeli, Nurse)

## 2020-09-02 NOTE — PROGRESS NOTES
"Flora Acuna is a 75 year old female who is being evaluated via a billable telephone visit.      The patient has been notified of following:     \"This telephone visit will be conducted via a call between you and your physician/provider. We have found that certain health care needs can be provided without the need for a physical exam.  This service lets us provide the care you need with a short phone conversation.  If a prescription is necessary we can send it directly to your pharmacy.  If lab work is needed we can place an order for that and you can then stop by our lab to have the test done at a later time.    Telephone visits are billed at different rates depending on your insurance coverage. During this emergency period, for some insurers they may be billed the same as an in-person visit.  Please reach out to your insurance provider with any questions.    If during the course of the call the physician/provider feels a telephone visit is not appropriate, you will not be charged for this service.\"    Patient has given verbal consent for Telephone visit?  Yes    What phone number would you like to be contacted at?     How would you like to obtain your AVS? Mail a copy    Subjective     Flora Acuna is a 75 year old female who presents via phone visit today for the following health issues:    HPI    Diabetes Follow-up    How often are you checking your blood sugar? Continuous glucose monitor  What time of day are you checking your blood sugars (select all that apply)?  Not applicable  Have you had any blood sugars above 200?  Not applicable  Have you had any blood sugars below 70?  No    What symptoms do you notice when your blood sugar is low?  Shaky, Weak, Blurred vision and Confusion    What concerns do you have today about your diabetes? None, Blood sugar is often over 200 and Low blood sugar     Do you have any of these symptoms? (Select all that apply)  Numbness in feet, Burning in feet, Blurry " vision and Weight gain      BP Readings from Last 2 Encounters:   08/28/20 122/60   08/12/20 105/55     Hemoglobin A1C (%)   Date Value   07/01/2020 7.2 (H)   12/16/2019 7.9 (H)     LDL Cholesterol Calculated (mg/dL)   Date Value   07/01/2020 36   06/07/2019 77                 How many servings of fruits and vegetables do you eat daily?  2-3    On average, how many sweetened beverages do you drink each day (Examples: soda, juice, sweet tea, etc.  Do NOT count diet or artificially sweetened beverages)?   0    How many days per week do you exercise enough to make your heart beat faster? 5    How many minutes a day do you exercise enough to make your heart beat faster? 9 or less    How many days per week do you miss taking your medication? 0      Review of Systems   Skin: negative  Eyes: negative  Ears/Nose/Throat: burnt voice and muscles from GERD  Respiratory: positive FRANCO--same; cough--productive; no hemoptysis; history of asthma  Cardiovascular: history of HF  Gastrointestinal: GERD  Genitourinary: negative   Musculoskeletal: RA--worse: hx of back pain, neck pain, arthritis and right shoulder   Neurologic: positive for numbness or tingling of feet--same  Psychiatric: negative  Hematologic/Lymphatic/Immunologic: history of breast cancer (left) x 2 (same spot)  Endocrine: positive for diabetes and thyroid disease        Objective        Vitals:  No vitals were obtained today due to virtual visit.    alert and no distress  PSYCH: Alert and oriented times 3; coherent speech, normal   rate and volume, able to articulate logical thoughts, able   to abstract reason, no tangential thoughts, no hallucinations   or delusions  Her affect is normal  RESP: No cough, no audible wheezing, able to talk in full sentences  Remainder of exam unable to be completed due to telephone visits    No results found for this or any previous visit (from the past 24 hour(s)).  No results found for any visits on  09/02/20.      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), managed as type 1 (H)  (primary encounter diagnosis)  Comment: noted  Plan:   Will stop by to adjust patient's insulin pump    Follow up  On 9/17 before echo    Patient Instructions     Continue working on healthy eating and moving as best as you can (start low and slow, work up to 30 min, 5x/week)    BG goals:  Fasting and before meals <130, >80  2 hour after eating <180    We only need 1/2 of these numbers to be within target then your A1c will be within target    Medication changes   Watch for low BGs after adjustment    Follow up   Insulin pump and Dexcom download before echo    Call me sooner if any problems/concerns and/or questions develop including consistent low BGs <70 or consistent high BGs >200  [phone] (Marie, Unit Coordinator)    [phone] (Emeli, Nurse)          LESLIE Vaughn [facility] [place]    Phone call duration:  22 minutes      Addendum:  It was noted that patient's ring on her insulin was broken.   Bo to call [employer]  Won't adjust her insulin pump at this time.     Follow up as discussed.

## 2020-09-03 DIAGNOSIS — I10 ESSENTIAL HYPERTENSION: ICD-10-CM

## 2020-09-03 NOTE — TELEPHONE ENCOUNTER
ramipril (ALTACE) 10 MG capsule    Last Written Prescription Date:  06/02/2020  Last Fill Quantity: 90,   # refills: 0  Last Office Visit: 08/28/2020  Future Office visit:    Next 5 appointments (look out 90 days)    Sep 17, 2020  9:30 AM CDT  (Arrive by 9:15 AM)  Nurse Only with Hc Dm Nurse  [facility] ([facility] ) [address] [place] [address] [phone]           Routing refill request to provider for review/approval because:

## 2020-09-04 RX ORDER — RAMIPRIL 10 MG/1
10 CAPSULE ORAL DAILY
Qty: 90 CAPSULE | Refills: 2 | Status: SHIPPED | OUTPATIENT
Start: 2020-09-04 | End: 2021-05-05

## 2020-09-17 ENCOUNTER — ALLIED HEALTH/NURSE VISIT (OUTPATIENT)
Dept: EDUCATION SERVICES | Facility: OTHER | Age: 75
End: 2020-09-17
Attending: NURSE PRACTITIONER
Payer: COMMERCIAL

## 2020-09-17 ENCOUNTER — HOSPITAL ENCOUNTER (OUTPATIENT)
Dept: CARDIOLOGY | Facility: HOSPITAL | Age: 75
Discharge: HOME | End: 2020-09-17
Attending: NURSE PRACTITIONER | Admitting: INTERNAL MEDICINE
Payer: MEDICARE

## 2020-09-17 DIAGNOSIS — I51.7 CARDIOMEGALY: ICD-10-CM

## 2020-09-17 DIAGNOSIS — R60.0 LOWER LEG EDEMA: ICD-10-CM

## 2020-09-17 DIAGNOSIS — E13.9 LADA (LATENT AUTOIMMUNE DIABETES IN ADULTS), MANAGED AS TYPE 1 (H): Primary | ICD-10-CM

## 2020-09-17 PROCEDURE — 93306 TTE W/DOPPLER COMPLETE: CPT | Mod: 26 | Performed by: INTERNAL MEDICINE

## 2020-09-17 PROCEDURE — 95251 CONT GLUC MNTR ANALYSIS I&R: CPT

## 2020-09-17 PROCEDURE — 93306 TTE W/DOPPLER COMPLETE: CPT | Mod: TC

## 2020-09-17 NOTE — PROGRESS NOTES
.Pt came in for a pump and CGM monitor download today. This was completed and given to Kerri Elliott.    Ginger Lerner

## 2020-09-30 NOTE — PROGRESS NOTES
Bo stopped by for a download (pump and Dexcom):    Insulin pump information:   Average: 167 +/- 26  Basal/bolus ratio: 24.53 (79%)/6.66 (21%)   Testing: continuous    Hypoglycemia: none    Date: 9/4/2020 to 9/17/2020  Glucose management indicator: 8.6%    Average glucose: 222    Glucose range  Very low (<54): 0  low (<70): 0  In target range (): 35%  High (>180): 28%  Very high (>250): 37%    It appears she misses boluses when she consumes carbs.   Will have her work on given a bolus EVERY times she consumes carbs--drink or food.     If this doesn't help, I will tighten her carb ratio    Follow up  1 months    Kerri NELSON FNP-BC  Diabetes [facility] W[facility]

## 2020-10-08 ENCOUNTER — TRANSFERRED RECORDS (OUTPATIENT)
Dept: HEALTH INFORMATION MANAGEMENT | Facility: CLINIC | Age: 75
End: 2020-10-08

## 2020-10-15 ENCOUNTER — ALLIED HEALTH/NURSE VISIT (OUTPATIENT)
Dept: EDUCATION SERVICES | Facility: OTHER | Age: 75
End: 2020-10-15
Attending: NURSE PRACTITIONER
Payer: COMMERCIAL

## 2020-10-15 VITALS
OXYGEN SATURATION: 96 % | HEIGHT: 62 IN | BODY MASS INDEX: 30 KG/M2 | SYSTOLIC BLOOD PRESSURE: 102 MMHG | WEIGHT: 163 LBS | RESPIRATION RATE: 16 BRPM | DIASTOLIC BLOOD PRESSURE: 72 MMHG | HEART RATE: 80 BPM

## 2020-10-15 DIAGNOSIS — E13.9 LADA (LATENT AUTOIMMUNE DIABETES IN ADULTS), MANAGED AS TYPE 1 (H): Primary | ICD-10-CM

## 2020-10-15 DIAGNOSIS — Z23 NEED FOR PROPHYLACTIC VACCINATION AND INOCULATION AGAINST INFLUENZA: ICD-10-CM

## 2020-10-15 PROCEDURE — G0463 HOSPITAL OUTPT CLINIC VISIT: HCPCS | Mod: 25

## 2020-10-15 PROCEDURE — 95251 CONT GLUC MNTR ANALYSIS I&R: CPT | Performed by: NURSE PRACTITIONER

## 2020-10-15 PROCEDURE — G0463 HOSPITAL OUTPT CLINIC VISIT: HCPCS

## 2020-10-15 PROCEDURE — G0008 ADMIN INFLUENZA VIRUS VAC: HCPCS

## 2020-10-15 PROCEDURE — 99213 OFFICE O/P EST LOW 20 MIN: CPT | Mod: 25 | Performed by: NURSE PRACTITIONER

## 2020-10-15 ASSESSMENT — PAIN SCALES - GENERAL: PAINLEVEL: EXTREME PAIN (8)

## 2020-10-15 ASSESSMENT — MIFFLIN-ST. JEOR: SCORE: 1197.61

## 2020-10-15 NOTE — PATIENT INSTRUCTIONS
Continue working on healthy eating and moving as best as you can (start low and slow, work up to 30 min, 5x/week)    BG goals:  Fasting and before meals <130, >70  2 hour after eating <180    We only need 1/2 of these numbers to be within target then your A1c will be within target    Medication changes   Watch for low BGs.      Follow up   2 months    Call me sooner if any problems/concerns and/or questions develop including consistent low BGs <70 or consistent high BGs >200  [phone] (Marie, Unit Coordinator)    [phone] (Emeli, Nurse)

## 2020-10-15 NOTE — PROGRESS NOTES
"SUBJECTIVE:  Flora Acuna, 75 year old, female presents with the following Chief Complaint(s) with HPI to follow:  Chief Complaint   Patient presents with     Diabetes     Imm/Inj     Flu Shot        Diabetes Follow-up      Patient is checking blood sugars: personal CGM.  Results:  She has been using her Dexcom for >90 days    Date: 10/2/2020 to 10/15/2020  Glucose management indicator: 7.7%    Average glucose: 185    Glucose range  Very low (<54): 0  low (<70): 0  In target range (): 51%  High (>180): 34%  Very high (>250): 15%       Symptoms of hypoglycemia (low blood sugar): reports several episodes of \"crashing\"--feels symptomatically low <100    Paresthesias (numbness or burning in feet) or sores: Yes--same; no sores    Diabetic eye exam within the last year: Yes    Breakfast eaten regularly: Yes    Patient counting carbs: Yes       HPI:  Flora's here today for the follow up regarding her KAREEM, managed as a Type 1    Lab Results   Component Value Date    A1C 7.2 07/01/2020    A1C 7.9 12/16/2019    A1C 7.5 09/11/2019    A1C 7.3 06/07/2019    A1C 7.3 04/19/2019     Current Diabetes medication:   1.  Insulin pump, with Fiasp  ASA use: yes, 325 mg daily  Statin use: yes, simvastatin 40 mg daily      Bo's here today for an insulin pump and Dexcom download with possible adjust.  She reports the following:  BGs are getting better.   A friend told her to start entering a carb bolus when she consuming coffee--which is helping.      No complaints noted.   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), managed as type 1 (H)     H/O total knee replacement, left     Age-related osteoporosis without current pathological fracture     Rheumatoid arthritis (H)     Osteoporosis     Family history of melanoma     Family history of breast cancer in female     Cardiomegaly     H/O heart failure     Dysphonia     Prolonged QT interval       Past Medical History:   Diagnosis Date     Congestive heart failure, unspecified      Diabetes (H)      H/O rheumatoid arthritis      HLD (hyperlipidemia)      HTN (hypertension)      Myocardial infarction (H)      Uncomplicated asthma        Past Surgical History:   Procedure Laterality Date     APPENDECTOMY OPEN CHILD       AS TOTAL KNEE ARTHROPLASTY Right      CHOLECYSTECTOMY       COLONOSCOPY - HIM SCAN N/A 03/12/2009    per Care Everywhere documentation per [facility].      HYSTERECTOMY TOTAL ABDOMINAL       MASTECTOMY, BILATERAL Bilateral 02/26/1992     SHOULDER SURGERY Right      SHOULDER SURGERY Left        Family History   Problem Relation Age of Onset     Breast Cancer Maternal Aunt      Breast Cancer Maternal Aunt      Lung Cancer Father        Social History     Tobacco Use     Smoking status: Never Smoker     Smokeless tobacco: Never Used   Substance Use Topics     Alcohol use: No     Alcohol/week: 0.0 standard drinks       Current Outpatient Medications   Medication Sig Dispense Refill     acetaminophen (TYLENOL) 325 MG tablet Take 650 mg by mouth       Acetone, Urine, Test (KETONE TEST) STRP Use as directed 50 strip 4     alendronate (FOSAMAX) 70 MG tablet Take 1 tablet by mouth once weekly. Take with water in the AM 30 minutes prior to eating. Avoid lying down 30 minutes after taking med. 12 tablet 3     aspirin (ASA) 325 MG EC tablet Take 325 mg by mouth daily       baclofen (LIORESAL) 10 MG tablet Take 10 mg by mouth 3 times daily       blood glucose (CONTOUR NEXT TEST) test  strip Use 6 times a day with the insulin pump to help manage your diabetes 200 each 11     blood glucose (NO BRAND SPECIFIED) test strip by In Vitro route 4 times daily Use to test blood sugar 4 times daily or as directed.       Calcium-Vitamin D-Vitamin K (VIACTIV PO) Take 2 chew tab by mouth every morning       Continuous Blood Gluc  (DEXCOM G6 ) XX DIMITRIS 1 each continuous 1 Device 0     Continuous Blood Gluc Sensor (DEXCOM G5 MOB/G4 PLAT SENSOR) MISC 1 each continuous Change every 7 days 3 each 1     Continuous Blood Gluc Sensor (DEXCOM G6 SENSOR) XX MISC 1 each continuous To be changed every 10 days 3 each 11     Continuous Blood Gluc Transmit (DEXCOM G6 TRANSMITTER) XX MISC 1 each continuous To be changed every 3 months 1 each 3     furosemide (LASIX) 40 MG tablet TAKE ONE (1) TABLET BY MOUTH every morning and half tablet every afternoon. 135 tablet 1     glucagon (GLUCAGON EMERGENCY) 1 MG injection Inject 1 mg Subcutaneous once 1 mg 12     Insulin Aspart (INSULIN PUMP - OUTPATIENT)        insulin aspart (NOVOLOG VIAL) 100 UNITS/ML vial To be used with the insulin pump  TDD: 40 units 30 mL 3     INSULIN PUMP - OUTPATIENT Date last updated:  2/4/15  [place] MinimGo[employer]stant: Model 723  BASAL RATES and times:  12   AM (midnight): 0.9 units/hour    9    PM: 0.8 units/hour   Basal Pattern A:  Flora Acuna will use when N/A.  CARB RATIO and times:  12   AM (midnight): 13.0  4     PM: 10  Corection Factor (Sensitivity) and times:  12   AM (midnight): 55 mg/dL  BLOOD GLUCOSE TARGET and times:  12   AM (midnight): 100 - 150  7     AM:  100 - 120  9    PM:  100 - 150  Active Insulin Time:  3 hours  Sensor:  No  Carelink / Diasend username:  [email or web address]  Carelink / Diasend Password:  [email or web address]       Lancet Devices MISC        levothyroxine (SYNTHROID/LEVOTHROID) 75 MCG tablet Take 1 tablet (75 mcg) by mouth daily 90 tablet 3     metoprolol tartrate (LOPRESSOR) 50 MG tablet Take 1 tablet (50 mg) by mouth 2 times  daily 180 tablet 3     nitroglycerin (NITROSTAT) 0.4 MG SL tablet Place 0.4 mg under the tongue       omeprazole (PRILOSEC) 40 MG DR capsule Take 1 capsule (40 mg) by mouth daily 90 capsule 3     potassium chloride ER (KLOR-CON M) 20 MEQ CR tablet TAKE 1  BY MOUTH TWICE DAILY 60 tablet 5     ramipril (ALTACE) 10 MG capsule Take 1 capsule (10 mg) by mouth daily 90 capsule 2     senna-docusate (SENOKOT-S;PERICOLACE) 8.6-50 MG per tablet Take 1 tablet by mouth At Bedtime        simvastatin (ZOCOR) 40 MG tablet Take 1 tablet (40 mg) by mouth At Bedtime 90 tablet 3     tamoxifen (NOLVADEX) 20 MG tablet Take 20 mg by mouth daily        VITAMIN D, CHOLECALCIFEROL, PO Take 5,000 Units by mouth daily         Allergies   Allergen Reactions     Contrast Dye Difficulty breathing     Gadolinium Derivatives      Other reaction(s): Difficulty in swallowing, Laryngeal spasm  Patient had an injection into rt. Shoulder capsule prior to MRI arthrogram.  Contained multihance gadobenate, omnipaque iohexol, and buffered lidocaine.       Ammonia      Cory-1 [Lidocaine Hcl]      Novocaine allergy       Codeine Sulfate      Food      Shrimp     Iodine-131 Swelling     Ct dye     Lidocaine      Nitrous Oxide      No Clinical Screening - See Comments Nausea and Vomiting and Other (See Comments)     (3/6/09)- N/V and Heart racing     Novocain [Procaine]      Penicillins      Tramadol      Morphine Palpitations       REVIEW OF SYSTEMS  Skin: negative  Eyes: negative  Ears/Nose/Throat: burnt voice and muscles from GERD  Respiratory: positive FRANCO--better; no cough; no hemoptysis; history of asthma  Cardiovascular: history of HF  Gastrointestinal: GERD  Genitourinary: negative   Musculoskeletal: RA--worse; bilateral thumb joints are displaced per pt: hx of back pain, neck pain, arthritis and right shoulder   Neurologic: positive for numbness or tingling of feet--same  Psychiatric: negative  Hematologic/Lymphatic/Immunologic: history of breast  "cancer (left) x 2 (same spot)  Endocrine: positive for diabetes and thyroid disease     OBJECTIVE:  /72   Pulse 80   Resp 16   Ht 1.575 m (5' 2\")   Wt 73.9 kg (163 lb)   SpO2 96%   BMI 29.81 kg/m    Constitutional: healthy, alert and no distress  Musculoskeletal: extremities normal- no gross deformities noted and gait normal  Skin: no suspicious lesions or rashes to visible skin  Psychiatric: mentation appears normal and affect normal/bright        LABS  Results for orders placed or performed in visit on 10/15/20   GLUCOSE MONITOR, 72 HOUR, PHYS INTERP     Status: None    Narrative    Date: 10/2/2020 to 10/15/2020  Glucose management indicator: 7.7%    Average glucose: 185    Glucose range  Very low (<54): 0  low (<70): 0  In target range (): 51%  High (>180): 34%  Very high (>250): 15%       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), managed as type 1 (H)  (primary encounter diagnosis)  Comment:   Noted CGM average and insulin pump info    Insulin pump information:   Average: 153 +/- 36  Basal/bolus ratio: 24.53 (74%)/8.74 (26%)   Testing: continuous    Hypoglycemia: none    Plan: GLUCOSE MONITOR, 72 HOUR, PHYS INTERP          Will adjust her insulin pump accordingly    (Z23) Need for prophylactic vaccination and inoculation against influenza  Comment: noted2  Plan: FLUZONE HIGH DOSE 65+  [80627], ADMIN INFLUENZA        (For MEDICARE Patients ONLY) []          See nurse's note    Carb ratio:  0000 10   0630 9.5    Insulin sensitivity  0000 55  0700 60  1600 45  2200 50     Follow up as discussed, sooner if needed    Call sooner if any issues develop    Patient Instructions     Continue working on healthy eating and moving as best as you can (start low and slow, work up to 30 min, 5x/week)    BG goals:  Fasting and before meals <130, >70  2 hour after eating <180    We only need 1/2 of these numbers to be within target then your A1c will be within " target    Medication changes   Watch for low BGs.      Follow up   2 months    Call me sooner if any problems/concerns and/or questions develop including consistent low BGs <70 or consistent high BGs >200  [phone] (Marie, Unit Coordinator)    [phone] (Emeli, Nurse)        Time: 40 minutes  Barrier: none  Willingness to learn: accepting    Kerri NELSON [facility]  Diabetes and Wound Care    Cc: Nicolasa Guillen NP    With the electronic record, we can now more quickly and easily track our patient diabetic goals. Our diabetes clinical review is in progress and these are the indicators we are monitoring for good diabetes health.     1.) HbA1C less than 7 (measurement of your average blood sugars)  2.) Blood Pressure less than 140/80  3.) LDL less than 100 (bad cholesterol)  4.) HbA1C is checked in the last 6 months and below 7% (more frequently if not at goal or adjusting medications)  5.) LDL is checked in the last 12 months (more frequently if not at goal or adjusting medications)  6.) Taking one baby aspirin daily (unless otherwise instructed)  7.) No tobacco use  8) Statin use     You have achieved 7 out of 8 of these and I am encouraging you to come in and get tuned up to achieve 8 out of 8!  Here is what you have achieved so far in my goals for you:  1.) HbA1C  less than 7:                              NO     Your last  HbA1C  Lab Results   Component Value Date    A1C 7.2 07/01/2020    A1C 7.9 12/16/2019    A1C 7.5 09/11/2019    A1C 7.3 06/07/2019    A1C 7.3 04/19/2019      2.) Blood Pressure less than 140/80:       YES      Your last    BP Readings from Last 1 Encounters:   10/15/20 102/72     3.) LDL less than 100:                              YES      Your last     LDL Cholesterol Calculated   Date Value Ref Range Status   07/01/2020 36 <100 mg/dL Final     Comment:     Desirable:       <100 mg/dl       4.) Checked HbA1C in the past 6 months: YES      5.) Checked LDL in the past 12 months:    YES     6.) Taking one aspirin daily:                       YES     7.) No tobacco use:                                        YES      8.) Statin use      YES       Insurance requirements:  1.  Patient has been seen in the clinic within the last 6 month  2. Diagnosis of KAREEM, managed as a Type 1  3. Has been testing their BGs 4x/day using her Dexcomg CGM  4. Uses an insulin pump  5. Requires frequent adjustments to their treatment regimen based on BGM and/or CGM testing results.

## 2020-10-15 NOTE — PROGRESS NOTES
"Chief Complaint   Patient presents with     Diabetes       Initial /72   Pulse 80   Resp 16   Ht 1.575 m (5' 2\")   Wt 73.9 kg (163 lb)   SpO2 96%   BMI 29.81 kg/m   Estimated body mass index is 29.81 kg/m  as calculated from the following:    Height as of this encounter: 1.575 m (5' 2\").    Weight as of this encounter: 73.9 kg (163 lb).  Medication Reconciliation: complete  Ginger Lernre LPN    "

## 2020-12-14 DIAGNOSIS — M81.0 OSTEOPOROSIS WITHOUT CURRENT PATHOLOGICAL FRACTURE, UNSPECIFIED OSTEOPOROSIS TYPE: Primary | ICD-10-CM

## 2020-12-14 RX ORDER — ALENDRONATE SODIUM 70 MG/1
TABLET ORAL
Qty: 12 TABLET | Refills: 3 | Status: SHIPPED | OUTPATIENT
Start: 2020-12-14 | End: 2021-08-03

## 2020-12-14 NOTE — TELEPHONE ENCOUNTER
FOSAMAX      Last Written Prescription Date:  7-8-2020  Last Fill Quantity: 12,   # refills: 3  Last Office Visit: 8-  Future Office visit:    Next 5 appointments (look out 90 days)    Jan 08, 2021  1:30 PM  (Arrive by 1:15 PM)  Office Visit with Nicolasa Guillen NP  [facility] ([facility] [place] ) [address]  [place] [address]  [phone]           Routing refill request to provider for review/approval because:

## 2020-12-30 ENCOUNTER — ALLIED HEALTH/NURSE VISIT (OUTPATIENT)
Dept: EDUCATION SERVICES | Facility: OTHER | Age: 75
End: 2020-12-30
Attending: NURSE PRACTITIONER
Payer: COMMERCIAL

## 2020-12-30 VITALS
RESPIRATION RATE: 16 BRPM | BODY MASS INDEX: 28.34 KG/M2 | HEART RATE: 69 BPM | WEIGHT: 154 LBS | HEIGHT: 62 IN | SYSTOLIC BLOOD PRESSURE: 147 MMHG | DIASTOLIC BLOOD PRESSURE: 76 MMHG

## 2020-12-30 DIAGNOSIS — E13.9 LADA (LATENT AUTOIMMUNE DIABETES IN ADULTS), MANAGED AS TYPE 1 (H): Primary | ICD-10-CM

## 2020-12-30 DIAGNOSIS — I10 BENIGN ESSENTIAL HYPERTENSION: ICD-10-CM

## 2020-12-30 PROCEDURE — G0463 HOSPITAL OUTPT CLINIC VISIT: HCPCS

## 2020-12-30 PROCEDURE — 99213 OFFICE O/P EST LOW 20 MIN: CPT | Performed by: NURSE PRACTITIONER

## 2020-12-30 PROCEDURE — 95251 CONT GLUC MNTR ANALYSIS I&R: CPT | Performed by: NURSE PRACTITIONER

## 2020-12-30 ASSESSMENT — PAIN SCALES - GENERAL: PAINLEVEL: EXTREME PAIN (8)

## 2020-12-30 ASSESSMENT — MIFFLIN-ST. JEOR: SCORE: 1156.79

## 2020-12-30 NOTE — PROGRESS NOTES
SUBJECTIVE:  Flora Acuna, 75 year old, female presents with the following Chief Complaint(s) with HPI to follow:  Chief Complaint   Patient presents with     Diabetes     Pt is in for a Fu. Pt has discoloration in feet and veins are more prominent. Pt has pain in feet, right is worse than left. Pt has been having diarrhea and nausea for 2 weeks. Sx start suddenly and last for 2 days and will stop for 5 days and will start again. Pt has a friend and sister have these sx as well.  Pt was tested for COVID at onset of sx and was negative.        Diabetes Follow-up      Patient is checking blood sugars: personal CGM.  Results:  She has been using her Dexcom for >90 days    Date: 12/17/2020 to 12/30/2020  Glucose management indicator: 8.8%    Average glucose: 230    Glucose range  Very low (<54): 0  low (<70): 0  In target range (): 33%  High (>180): 28%  Very high (>250): 39%         Symptoms of hypoglycemia (low blood sugar): none <70    Paresthesias (numbness or burning in feet) or sores: Yes--same; no sores    Diabetic eye exam within the last year: Yes    Breakfast eaten regularly: Yes    Patient counting carbs: Yes       HPI:  Flora's here today for the follow up regarding her KAREEM, managed as a Type 1    Lab Results   Component Value Date    A1C 7.2 07/01/2020    A1C 7.9 12/16/2019    A1C 7.5 09/11/2019    A1C 7.3 06/07/2019    A1C 7.3 04/19/2019       Current Diabetes medication:   1.  Insulin pump, with Novolog  ASA use: yes, 325 mg daily  Statin use: yes, simvastatin 40 mg daily      Bo's here today for an insulin pump and Dexcom download with possible adjust.  She reports the following:  BGs are high  She's not sure what's going on  Denies any issues with her insulin pump or Dexcom    Bo complains of the following:  States she's been having nausea with some diarrhea for the past 2 weeks  Reports both her sister (who lives in [place]) and another person who she knows at the apartments have  "had the same symptoms  Denies being in contact with these individuals at that same time  Denies any fevers or respiratory distress  Has been coughing occasionally at night.    Hasn't had a recent COVID test  She's also been \"winded\" more after showering, doing the dishes and bending over.        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), managed as type 1 (H)     H/O total knee replacement, left     Age-related osteoporosis without current pathological fracture     Rheumatoid arthritis (H)     Osteoporosis     Family history of melanoma     Family history of breast cancer in female     Cardiomegaly     H/O heart failure     Dysphonia     Prolonged QT interval       Past Medical History:   Diagnosis Date     Congestive heart failure, unspecified      Diabetes (H)      H/O rheumatoid arthritis      HLD (hyperlipidemia)      HTN (hypertension)      Myocardial infarction (H)      Uncomplicated asthma        Past Surgical History:   Procedure Laterality Date     APPENDECTOMY OPEN CHILD       AS TOTAL KNEE ARTHROPLASTY Right      CHOLECYSTECTOMY       COLONOSCOPY - HIM SCAN N/A 03/12/2009    per [facility]where documentation per [facility].      HYSTERECTOMY TOTAL ABDOMINAL       MASTECTOMY, BILATERAL Bilateral 02/26/1992     SHOULDER SURGERY Right      SHOULDER SURGERY Left        Family History   Problem Relation Age of Onset     Breast Cancer Maternal Aunt      Breast Cancer Maternal Aunt      Lung " Cancer Father        Social History     Tobacco Use     Smoking status: Never Smoker     Smokeless tobacco: Never Used   Substance Use Topics     Alcohol use: No     Alcohol/week: 0.0 standard drinks       Current Outpatient Medications   Medication Sig Dispense Refill     acetaminophen (TYLENOL) 325 MG tablet Take 650 mg by mouth       Acetone, Urine, Test (KETONE TEST) STRP Use as directed 50 strip 4     alendronate (FOSAMAX) 70 MG tablet Take 1 tablet by mouth once weekly. Take with water in the AM 30 minutes prior to eating. Avoid lying down 30 minutes after taking med. 12 tablet 3     aspirin (ASA) 325 MG EC tablet Take 325 mg by mouth daily       baclofen (LIORESAL) 10 MG tablet Take 10 mg by mouth 3 times daily       blood glucose (CONTOUR NEXT TEST) test strip Use 6 times a day with the insulin pump to help manage your diabetes 200 each 11     blood glucose (NO BRAND SPECIFIED) test strip by In Vitro route 4 times daily Use to test blood sugar 4 times daily or as directed.       Calcium-Vitamin D-Vitamin K (VIACTIV PO) Take 2 chew tab by mouth every morning       Continuous Blood Gluc  (DEXCOM G6 ) XX DIMITRIS 1 each continuous 1 Device 0     Continuous Blood Gluc Sensor (DEXCOM G5 MOB/G4 PLAT SENSOR) MISC 1 each continuous Change every 7 days 3 each 1     Continuous Blood Gluc Sensor (DEXCOM G6 SENSOR) XX MISC 1 each continuous To be changed every 10 days 3 each 11     Continuous Blood Gluc Transmit (DEXCOM G6 TRANSMITTER) XX MISC 1 each continuous To be changed every 3 months 1 each 3     furosemide (LASIX) 40 MG tablet TAKE ONE (1) TABLET BY MOUTH every morning and half tablet every afternoon. 135 tablet 1     glucagon (GLUCAGON EMERGENCY) 1 MG injection Inject 1 mg Subcutaneous once 1 mg 12     Insulin Aspart (INSULIN PUMP - OUTPATIENT)        insulin aspart (NOVOLOG VIAL) 100 UNITS/ML vial To be used with the insulin pump  TDD: 40 units 30 mL 3     INSULIN PUMP - OUTPATIENT Date last  updated:  2/4/15  Medtronic Minimed: Model 723  BASAL RATES and times:  12   AM (midnight): 0.9 units/hour    9    PM: 0.8 units/hour   Basal Pattern A:  Flora Acuna will use when N/A.  CARB RATIO and times:  12   AM (midnight): 13.0  4     PM: 10  Corection Factor (Sensitivity) and times:  12   AM (midnight): 55 mg/dL  BLOOD GLUCOSE TARGET and times:  12   AM (midnight): 100 - 150  7     AM:  100 - 120  9    PM:  100 - 150  Active Insulin Time:  3 hours  Sensor:  No  Carelink / Diasend username:  [email or web address]  Carelink / Diasend Password:  [email or web address]       Lancet Devices MISC        levothyroxine (SYNTHROID/LEVOTHROID) 75 MCG tablet Take 1 tablet (75 mcg) by mouth daily 90 tablet 3     metoprolol tartrate (LOPRESSOR) 50 MG tablet Take 1 tablet (50 mg) by mouth 2 times daily 180 tablet 3     nitroglycerin (NITROSTAT) 0.4 MG SL tablet Place 0.4 mg under the tongue       omeprazole (PRILOSEC) 40 MG DR capsule Take 1 capsule (40 mg) by mouth daily 90 capsule 3     potassium chloride ER (KLOR-CON M) 20 MEQ CR tablet TAKE 1  BY MOUTH TWICE DAILY 60 tablet 5     ramipril (ALTACE) 10 MG capsule Take 1 capsule (10 mg) by mouth daily 90 capsule 2     senna-docusate (SENOKOT-S;PERICOLACE) 8.6-50 MG per tablet Take 1 tablet by mouth At Bedtime        simvastatin (ZOCOR) 40 MG tablet Take 1 tablet (40 mg) by mouth At Bedtime 90 tablet 3     tamoxifen (NOLVADEX) 20 MG tablet Take 20 mg by mouth daily        VITAMIN D, CHOLECALCIFEROL, PO Take 5,000 Units by mouth daily         Allergies   Allergen Reactions     Contrast Dye Difficulty breathing     Gadolinium Derivatives      Other reaction(s): Difficulty in swallowing, Laryngeal spasm  Patient had an injection into rt. Shoulder capsule prior to MRI arthrogram.  Contained multihance gadobenate, omnipaque iohexol, and buffered lidocaine.       Ammonia      Cory-1 [Lidocaine Hcl]      Novocaine allergy       Codeine Sulfate      Food      Shrimp     Iodine-131 Swelling     Ct dye  "    Lidocaine      Nitrous Oxide      No Clinical Screening - See Comments Nausea and Vomiting and Other (See Comments)     (3/6/09)- N/V and Heart racing     Novocain [Procaine]      Penicillins      Tramadol      Morphine Palpitations       REVIEW OF SYSTEMS  Skin: negative  Eyes: negative  Ears/Nose/Throat: burnt voice and muscles from GERD  Respiratory: positive FRANCO--better; no cough; no hemoptysis; history of asthma  Cardiovascular: history of HF  Gastrointestinal: as noted above  Genitourinary: negative   Musculoskeletal: RA--worse; bilateral thumb joints are displaced per pt: hx of back pain, neck pain, arthritis and right shoulder   Neurologic: positive for numbness or tingling of feet--same  Psychiatric: negative  Hematologic/Lymphatic/Immunologic: history of breast cancer (left) x 2 (same spot)  Endocrine: positive for diabetes and thyroid disease     OBJECTIVE:  BP (!) 147/76   Pulse 69   Resp 16   Ht 1.575 m (5' 2\")   Wt 69.9 kg (154 lb)   BMI 28.17 kg/m    Constitutional: healthy, alert and no distress  Musculoskeletal: extremities normal- no gross deformities noted and gait normal  Skin: no suspicious lesions or rashes to visible skin  Psychiatric: mentation appears normal and affect normal/bright        LABS  No results found for any visits on 12/30/20.    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), managed as type 1 (H)  (primary encounter diagnosis)  Comment:   Noted Dexcom average and insulin pump information    Insulin pump information:   Average: 183  Basal/bolus ratio: 24.5 (87%)/3.8 (13%)   Testing: continuous    Hypoglycemia: none  Plan: GLUCOSE MONITOR, 72 HOUR, PHYS INTERP          Noted Bo hasn't been putting any of her BG for correction after 0900 on her insulin pump  Also, noted some missed boluses during carb consumption    Reminded her to do the following before each meal  Check the BG--enter into the insulin pump  Enter the amount of carbs " "consumed    If she's feeling like she's \"crashing\" by doing this, please let me know so I can adjust the insulin pump accordingly    (I10) Benign essential hypertension  Comment: noted  Plan:   Bo believes she has a BP cuff at the house  Encouraged her to take it daily and/or if she feels symptomatic (I.e. feeling \"winded\" after the dishes, showering or bending over)    If she doesn't have a BP cuff, she's aware to call as I will put in an order    Bo will talk to Nicolasa PEREZ NP on 1/8/2021 regarding her concerns of diarrhea and nausea.      Follow up  Download during next appt with Nicolasa    Call sooner if any issues develop    Patient Instructions     Continue working on healthy eating and moving as best as you can (start low and slow, work up to 30 min, 5x/week)    BG goals:  Fasting and before meals <130, >70  2 hour after eating <180    We only need 1/2 of these numbers to be within target then your A1c will be within target    Medication changes   Watch for low BGs.      Follow up   1 month    Call me sooner if any problems/concerns and/or questions develop including consistent low BGs <70 or consistent high BGs >200  [phone] (Marie, Unit Coordinator)    [phone] (Emeli, Nurse)        Time: 40 minutes  Barrier: none  Willingness to learn: accepting    Kerri NELSON [facility]  Diabetes and Wound Care    Cc: Nicolasa Guillen NP    With the electronic record, we can now more quickly and easily track our patient diabetic goals. Our diabetes clinical review is in progress and these are the indicators we are monitoring for good diabetes health.     1.) HbA1C less than 7 (measurement of your average blood sugars)  2.) Blood Pressure less than 140/80  3.) LDL less than 100 (bad cholesterol)  4.) HbA1C is checked in the last 6 months and below 7% (more frequently if not at goal or adjusting medications)  5.) LDL is checked in the last 12 months (more frequently if not at goal or adjusting medications)  6.) " Taking one baby aspirin daily (unless otherwise instructed)  7.) No tobacco use  8) Statin use     You have achieved 7 out of 8 of these and I am encouraging you to come in and get tuned up to achieve 8 out of 8!  Here is what you have achieved so far in my goals for you:  1.) HbA1C  less than 7:                              NO     Your last  HbA1C  Lab Results   Component Value Date    A1C 7.2 07/01/2020    A1C 7.9 12/16/2019    A1C 7.5 09/11/2019    A1C 7.3 06/07/2019    A1C 7.3 04/19/2019      2.) Blood Pressure less than 140/80:       NO      Your last    BP Readings from Last 1 Encounters:   12/30/20 (!) 147/76     3.) LDL less than 100:                              YES      Your last     LDL Cholesterol Calculated   Date Value Ref Range Status   07/01/2020 36 <100 mg/dL Final     Comment:     Desirable:       <100 mg/dl       4.) Checked HbA1C in the past 6 months: YES      5.) Checked LDL in the past 12 months:    YES    6.) Taking one aspirin daily:                       YES     7.) No tobacco use:                                        YES      8.) Statin use      YES       Insurance requirements:  1.  Patient has been seen in the clinic within the last 6 month  2. Diagnosis of KAREEM, managed as a Type 1 for >6 months  3. Has been testing their BGs 4x/day using her Dexcomg CGM for >90 days  4. Uses an insulin pump for >6 months  5. Requires frequent adjustments to their treatment regimen based on BGM and/or CGM testing results.

## 2020-12-30 NOTE — PROGRESS NOTES
"Chief Complaint   Patient presents with     Diabetes     Pt is in for a Fu. Pt has discoloration in feet and veins are more prominent. Pt has pain in feet, right is worse than left. Pt has been having diarrhea and nausea for 2 weeks. Sx start suddenly and last for 2 days and will stop for 5 days and will start again. Pt has a friend and sister have these sx as well.  Pt was tested for COVID at onset of sx and was negative.       Initial BP (!) 147/76   Pulse 69   Resp 16   Ht 1.575 m (5' 2\")   Wt 69.9 kg (154 lb)   BMI 28.17 kg/m   Estimated body mass index is 28.17 kg/m  as calculated from the following:    Height as of this encounter: 1.575 m (5' 2\").    Weight as of this encounter: 69.9 kg (154 lb).  Medication Reconciliation: complete  Ginger Lerner LPN    "

## 2020-12-31 NOTE — PATIENT INSTRUCTIONS
Continue working on healthy eating and moving as best as you can (start low and slow, work up to 30 min, 5x/week)    BG goals:  Fasting and before meals <130, >70  2 hour after eating <180    We only need 1/2 of these numbers to be within target then your A1c will be within target    Medication changes   Watch for low BGs.      Follow up   1 month    Call me sooner if any problems/concerns and/or questions develop including consistent low BGs <70 or consistent high BGs >200  [phone] (Marie, Unit Coordinator)    [phone] (Emeli, Nurse)

## 2021-01-05 ENCOUNTER — TELEPHONE (OUTPATIENT)
Dept: FAMILY MEDICINE | Facility: OTHER | Age: 76
End: 2021-01-05

## 2021-01-05 NOTE — TELEPHONE ENCOUNTER
Spoke with patient she was calling to clarify what her 1-8-2021 appointment was for . Informed patient that her appointment is a follow up for DM , HTN and Lipids

## 2021-01-05 NOTE — TELEPHONE ENCOUNTER
To: nurse to Nicolasa Guillen  Patient would like to discuss the appt she has set for 1/8 with Nicolasa Guillen, please call her

## 2021-01-26 NOTE — PROGRESS NOTES
"  Assessment & Plan     (I10) Essential hypertension  (primary encounter diagnosis)  Plan: Well controlled. Continue current medications. Encouraged daily exercise and a low sodium diet. Recommended checking BP's 2x/wk, call the clinic if consistantly s>140 or d>90. Follow up in 6 months.     (E78.49) Other hyperlipidemia  Plan: Tolerating statin. Will continue.     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 Well controlled. On asa. On statin. BP at goal. Eye exam scheduled. F/up 6 months.     (M81.0) Osteoporosis without current pathological fracture, unspecified osteoporosis type  Plan: Tolerating Fosamax. Will continue. Repeat Dexa-scan 5/2021.     (Z86.79) H/O heart failure  Comment: stable with lasix  Plan: Continue lasix.     (C50.912,  Z17.0) Malignant neoplasm of left breast in female, estrogen receptor positive, unspecified site of breast (H)  Plan: Follows with Dr. Snyder. Will continue to do so.     (J45.20) Mild intermittent asthma without complication  Comment: ACT 25.   Plan: General PFT Lab (Please always keep checked), Pulmonary Function Test        Controlled. Due for PFTs. Ordered. Will notify patient of the results when available and intervene accordingly.              BMI:   Estimated body mass index is 27.98 kg/m  as calculated from the following:    Height as of 12/30/20: 1.575 m (5' 2\").    Weight as of this encounter: 69.4 kg (153 lb).           Return in about 6 months (around 7/29/2021).    Nicolasa Guillen NP  [facility]     Bo is a 76 year old who presents to clinic today for the following health issues    HPI     Diabetes Follow-up      How often are you checking your blood sugar? Continuous meter-Dex-com    What concerns do you have today about your diabetes? None     Do you have any of these symptoms? (Select all that apply)  Redness, sores, or blisters on feet    Have you had a diabetic eye exam in " the last 12 months? Yes- Date of last eye exam: 2-2020,  Location: [facility]     She [facility] with the [facility] and has an insulin pump.    She denies chest pain, shortness of breath, dizziness, syncope, or palpitations.    Eye exam in 5/2019 completed at [facility] in [place]. Due again. Plans to est with [facility].     On asa 325 mg daily     A1C on 7/1/20 was 7.2.             Hyperlipidemia Follow-Up      Are you regularly taking any medication or supplement to lower your cholesterol?   Yes- simvastatin    Are you having muscle aches or other side effects that you think could be caused by your cholesterol lowering medication?  No     She denies chest pain, shortness of breath, dizziness, syncope, or palpitations.    On asa.      Hypertension Follow-up      Do you check your blood pressure regularly outside of the clinic? No     Are you following a low salt diet? Yes    Are your blood pressures ever more than 140 on the top number (systolic) OR more   than 90 on the bottom number (diastolic), for example 140/90? N/A, not checking her glucoses   -She currently is taking ramipril and metoprolol without side effects.   -Also on furosemide daily for lower leg swelling. Takes potassium along with this.   -As noted above, she denies chest pain, shortness of breath, dizziness, syncope, or palpitations.     BP Readings from Last 2 Encounters:   01/29/21 130/60   12/30/20 (!) 147/76     Hemoglobin A1C (%)   Date Value   07/01/2020 7.2 (H)   12/16/2019 7.9 (H)     LDL Cholesterol Calculated (mg/dL)   Date Value   07/01/2020 36   06/07/2019 77       Asthma Follow-Up    Was ACT completed today?    Yes    ACT Total Scores 1/29/2021   ACT TOTAL SCORE (Goal Greater than or Equal to 20) 25   In the past 12 months, how many times did you visit the emergency room for your asthma without being admitted to the hospital? 0   In the past 12 months, how many times were you hospitalized overnight because of your asthma? 0          How many days  per week do you miss taking your asthma controller medication?  I do not have an asthma controller medication    Please describe any recent triggers for your asthma: smoke and upper respiratory infections    Have you had any Emergency Room Visits, Urgent Care Visits, or Hospital Admissions since your last office visit?  No    No recent PFTs. Last done years ago in [facility]. Willing to complete. She feels fine and does not want an inhaler.      H/O recurrent breast cancer. Follows with Dr. Snyder. Last saw him in 11/2020. Note was reviewed. On tamoxifen. Will see him every 6 months.     H/O osteoporosis; DEXA last done in 5/2019, on Fosamax, tolerating, has taken since 5/2019. Will repeat in 5/2021.     H/O Heart Failure; echo last done in 9/2020, EF 60-65 percent, otherwise unremarkable, BNP normal for age in 8/2020,she today denies chest pain, shortness of breath, dizziness, orthopnea;     Review of Systems   As noted in the HPI.       Objective    /60   Pulse 68   Temp 97.6  F (36.4  C) (Tympanic)   Wt 69.4 kg (153 lb)   SpO2 99%   BMI 27.98 kg/m    Body mass index is 27.98 kg/m .  Physical Exam   GENERAL: healthy, alert and no distress  EYES: Eyes grossly normal to inspection, PERRL and conjunctivae and sclerae normal  HENT: ear canals and TM's normal, nose and mouth without ulcers or lesions  NECK: no adenopathy, no asymmetry, masses, or scars and thyroid normal to palpation  RESP: lungs clear to auscultation - no rales, rhonchi or wheezes  CV: regular rate and rhythm, no murmur, click or rub, bilateral trace peripheral edema noted and peripheral pulses strong  MS: no gross musculoskeletal defects noted, no edema  NEURO: Normal strength and tone, mentation intact and speech normal  PSYCH: mentation appears normal, affect normal/bright  Diabetic foot exam: normal DP and PT pulses, no trophic changes or ulcerative lesions and normal sensory exam    Results for orders placed or performed in visit  on 01/29/21 (from the past 24 hour(s))   Hemoglobin A1c   Result Value Ref Range    Hemoglobin A1C 6.9 (H) 0 - 5.6 %   Comprehensive metabolic panel (BMP + Alb, Alk Phos, ALT, AST, Total. Bili, TP)   Result Value Ref Range    Sodium 138 133 - 144 mmol/L    Potassium 3.8 3.4 - 5.3 mmol/L    Chloride 102 94 - 109 mmol/L    Carbon Dioxide 32 20 - 32 mmol/L    Anion Gap 4 3 - 14 mmol/L    Glucose 156 (H) 70 - 99 mg/dL    Urea Nitrogen 14 7 - 30 mg/dL    Creatinine 0.85 0.52 - 1.04 mg/dL    GFR Estimate 67 >60 mL/min/[1.73_m2]    GFR Estimate If Black 78 >60 mL/min/[1.73_m2]    Calcium 9.2 8.5 - 10.1 mg/dL    Bilirubin Total 0.8 0.2 - 1.3 mg/dL    Albumin 3.5 3.4 - 5.0 g/dL    Protein Total 7.7 6.8 - 8.8 g/dL    Alkaline Phosphatase 33 (L) 40 - 150 U/L    ALT 21 0 - 50 U/L    AST 16 0 - 45 U/L   Estimated Average Glucose   Result Value Ref Range    Estimated Average Glucose 151 mg/dL

## 2021-01-29 ENCOUNTER — APPOINTMENT (OUTPATIENT)
Dept: LAB | Facility: OTHER | Age: 76
End: 2021-01-29
Attending: NURSE PRACTITIONER
Payer: MEDICARE

## 2021-01-29 ENCOUNTER — OFFICE VISIT (OUTPATIENT)
Dept: FAMILY MEDICINE | Facility: OTHER | Age: 76
End: 2021-01-29
Attending: NURSE PRACTITIONER
Payer: COMMERCIAL

## 2021-01-29 VITALS
SYSTOLIC BLOOD PRESSURE: 130 MMHG | HEART RATE: 68 BPM | TEMPERATURE: 97.6 F | BODY MASS INDEX: 27.98 KG/M2 | WEIGHT: 153 LBS | OXYGEN SATURATION: 99 % | DIASTOLIC BLOOD PRESSURE: 60 MMHG

## 2021-01-29 DIAGNOSIS — E78.49 OTHER HYPERLIPIDEMIA: ICD-10-CM

## 2021-01-29 DIAGNOSIS — Z86.79 H/O HEART FAILURE: ICD-10-CM

## 2021-01-29 DIAGNOSIS — J45.20 MILD INTERMITTENT ASTHMA WITHOUT COMPLICATION: ICD-10-CM

## 2021-01-29 DIAGNOSIS — E13.9 LADA (LATENT AUTOIMMUNE DIABETES IN ADULTS), MANAGED AS TYPE 1 (H): ICD-10-CM

## 2021-01-29 DIAGNOSIS — I10 ESSENTIAL HYPERTENSION: Primary | ICD-10-CM

## 2021-01-29 DIAGNOSIS — M81.0 OSTEOPOROSIS WITHOUT CURRENT PATHOLOGICAL FRACTURE, UNSPECIFIED OSTEOPOROSIS TYPE: ICD-10-CM

## 2021-01-29 DIAGNOSIS — C50.912 MALIGNANT NEOPLASM OF LEFT BREAST IN FEMALE, ESTROGEN RECEPTOR POSITIVE, UNSPECIFIED SITE OF BREAST (H): ICD-10-CM

## 2021-01-29 DIAGNOSIS — Z17.0 MALIGNANT NEOPLASM OF LEFT BREAST IN FEMALE, ESTROGEN RECEPTOR POSITIVE, UNSPECIFIED SITE OF BREAST (H): ICD-10-CM

## 2021-01-29 LAB
ALBUMIN SERPL-MCNC: 3.5 G/DL (ref 3.4–5)
ALP SERPL-CCNC: 33 U/L (ref 40–150)
ALT SERPL W P-5'-P-CCNC: 21 U/L (ref 0–50)
ANION GAP SERPL CALCULATED.3IONS-SCNC: 4 MMOL/L (ref 3–14)
AST SERPL W P-5'-P-CCNC: 16 U/L (ref 0–45)
BILIRUB SERPL-MCNC: 0.8 MG/DL (ref 0.2–1.3)
BUN SERPL-MCNC: 14 MG/DL (ref 7–30)
CALCIUM SERPL-MCNC: 9.2 MG/DL (ref 8.5–10.1)
CHLORIDE SERPL-SCNC: 102 MMOL/L (ref 94–109)
CO2 SERPL-SCNC: 32 MMOL/L (ref 20–32)
CREAT SERPL-MCNC: 0.85 MG/DL (ref 0.52–1.04)
EST. AVERAGE GLUCOSE BLD GHB EST-MCNC: 151 MG/DL
GFR SERPL CREATININE-BSD FRML MDRD: 67 ML/MIN/{1.73_M2}
GLUCOSE SERPL-MCNC: 156 MG/DL (ref 70–99)
HBA1C MFR BLD: 6.9 % (ref 0–5.6)
POTASSIUM SERPL-SCNC: 3.8 MMOL/L (ref 3.4–5.3)
PROT SERPL-MCNC: 7.7 G/DL (ref 6.8–8.8)
SODIUM SERPL-SCNC: 138 MMOL/L (ref 133–144)

## 2021-01-29 PROCEDURE — G0463 HOSPITAL OUTPT CLINIC VISIT: HCPCS

## 2021-01-29 PROCEDURE — 999N001182 HC STATISTIC ESTIMATED AVERAGE GLUCOSE: Mod: ZL | Performed by: NURSE PRACTITIONER

## 2021-01-29 PROCEDURE — 83036 HEMOGLOBIN GLYCOSYLATED A1C: CPT | Mod: ZL | Performed by: NURSE PRACTITIONER

## 2021-01-29 PROCEDURE — 36415 COLL VENOUS BLD VENIPUNCTURE: CPT | Mod: ZL | Performed by: NURSE PRACTITIONER

## 2021-01-29 PROCEDURE — 80053 COMPREHEN METABOLIC PANEL: CPT | Mod: ZL | Performed by: NURSE PRACTITIONER

## 2021-01-29 PROCEDURE — 99214 OFFICE O/P EST MOD 30 MIN: CPT | Performed by: NURSE PRACTITIONER

## 2021-01-29 RX ORDER — BACLOFEN 20 MG/1
10 TABLET ORAL 3 TIMES DAILY
COMMUNITY
Start: 2020-12-14 | End: 2021-04-29

## 2021-01-29 RX ORDER — FUROSEMIDE 40 MG
TABLET ORAL
Qty: 135 TABLET | Refills: 1 | Status: SHIPPED | OUTPATIENT
Start: 2021-01-29 | End: 2021-10-03

## 2021-01-29 ASSESSMENT — ASTHMA QUESTIONNAIRES
QUESTION_2 LAST FOUR WEEKS HOW OFTEN HAVE YOU HAD SHORTNESS OF BREATH: NOT AT ALL
ACT_TOTALSCORE: 25
ACUTE_EXACERBATION_TODAY: NO
QUESTION_1 LAST FOUR WEEKS HOW MUCH OF THE TIME DID YOUR ASTHMA KEEP YOU FROM GETTING AS MUCH DONE AT WORK, SCHOOL OR AT HOME: NONE OF THE TIME
QUESTION_5 LAST FOUR WEEKS HOW WOULD YOU RATE YOUR ASTHMA CONTROL: COMPLETELY CONTROLLED
QUESTION_3 LAST FOUR WEEKS HOW OFTEN DID YOUR ASTHMA SYMPTOMS (WHEEZING, COUGHING, SHORTNESS OF BREATH, CHEST TIGHTNESS OR PAIN) WAKE YOU UP AT NIGHT OR EARLIER THAN USUAL IN THE MORNING: NOT AT ALL
QUESTION_4 LAST FOUR WEEKS HOW OFTEN HAVE YOU USED YOUR RESCUE INHALER OR NEBULIZER MEDICATION (SUCH AS ALBUTEROL): NOT AT ALL

## 2021-01-29 ASSESSMENT — PAIN SCALES - GENERAL: PAINLEVEL: NO PAIN (0)

## 2021-01-29 ASSESSMENT — PATIENT HEALTH QUESTIONNAIRE - PHQ9: SUM OF ALL RESPONSES TO PHQ QUESTIONS 1-9: 0

## 2021-01-29 NOTE — NURSING NOTE
"Chief Complaint   Patient presents with     Lipids     Hypertension     Diabetes       Initial /60   Pulse 68   Temp 97.6  F (36.4  C) (Tympanic)   Wt 69.4 kg (153 lb)   SpO2 99%   BMI 27.98 kg/m   Estimated body mass index is 27.98 kg/m  as calculated from the following:    Height as of 12/30/20: 1.575 m (5' 2\").    Weight as of this encounter: 69.4 kg (153 lb).  Medication Reconciliation: complete  Casandra Tanner MA  "

## 2021-01-29 NOTE — LETTER
My Asthma Action Plan    Name: Flora Acuna   YOB: 1945  Date: 1/27/2021   My doctor: Nicolasa Guillen NP   [facility]        My Rescue Medicine:   Albuterol inhaler (Proair/Ventolin/Proventil HFA)  2-4 puffs EVERY 4 HOURS as needed. Use a spacer if recommended by your provider.   My Asthma Severity:   Intermittent / Exercise Induced  Know your asthma triggers: exercise or sports and cold air            GREEN ZONE   Good Control    I feel good    No cough or wheeze    Can work, sleep and play without asthma symptoms       Take your asthma control medicine every day.     1. If exercise triggers your asthma, take your rescue medication    15 minutes before exercise or sports, and    During exercise if you have asthma symptoms  2. Spacer to use with inhaler: If you have a spacer, make sure to use it with your inhaler             YELLOW ZONE Getting Worse  I have ANY of these:    I do not feel good    Cough or wheeze    Chest feels tight    Wake up at night   1. Keep taking your Green Zone medications  2. Start taking your rescue medicine:    every 20 minutes for up to 1 hour. Then every 4 hours for 24-48 hours.  3. If you stay in the Yellow Zone for more than 12-24 hours, contact your doctor.  4. If you do not return to the Green Zone in 12-24 hours or you get worse, start taking your oral steroid medicine if prescribed by your provider.           RED ZONE Medical Alert - Get Help  I have ANY of these:    I feel awful    Medicine is not helping    Breathing getting harder    Trouble walking or talking    Nose opens wide to breathe       1. Take your rescue medicine NOW  2. If your provider has prescribed an oral steroid medicine, start taking it NOW  3. Call your doctor NOW  4. If you are still in the Red Zone after 20 minutes and you have not reached your doctor:    Take your rescue medicine again and    Call 911 or go to the emergency room right away    See your  regular doctor within 2 weeks of an Emergency Room or Urgent Care visit for follow-up treatment.          Annual Reminders:  Meet with Asthma Educator,  Flu Shot in the Fall, consider Pneumonia Vaccination for patients with asthma (aged 19 and older).    Pharmacy:    [facility] [address] [place] [id number] Pending sale to [facility]  [facility] - [place], [place] - [address]     Electronically signed by Nicolasa Guillen NP   Date: 01/27/21                    Asthma Triggers  How To Control Things That Make Your Asthma Worse    Triggers are things that make your asthma worse.  Look at the list below to help you find your triggers and   what you can do about them. You can help prevent asthma flare-ups by staying away from your triggers.      Trigger                                                          What you can do   Cigarette Smoke  Tobacco smoke can make asthma worse. Do not allow smoking in your home, car or around you.  Be sure no one smokes at a child s day care or school.  If you smoke, ask your health care provider for ways to help you quit.  Ask family members to quit too.  Ask your health care provider for a referral to Quit Plan to help you quit smoking, or call 5-960-872-PLAN.     Colds, Flu, Bronchitis  These are common triggers of asthma. Wash your hands often.  Don t touch your eyes, nose or mouth.  Get a flu shot every year.     Dust Mites  These are tiny bugs that live in cloth or carpet. They are too small to see. Wash sheets and blankets in hot water every week.   Encase pillows and mattress in dust mite proof covers.  Avoid having carpet if you can. If you have carpet, vacuum weekly.   Use a dust mask and HEPA vacuum.   Pollen and Outdoor Mold  Some people are allergic to trees, grass, or weed pollen, or molds. Try to keep your windows closed.  Limit time out doors when pollen count is high.   Ask you health care provider about taking medicine during allergy season.     Animal  Dander  Some people are allergic to skin flakes, urine or saliva from pets with fur or feathers. Keep pets with fur or feathers out of your home.    If you can t keep the pet outdoors, then keep the pet out of your bedroom.  Keep the bedroom door closed.  Keep pets off cloth furniture and away from stuffed toys.     Mice, Rats, and Cockroaches  Some people are allergic to the waste from these pests.   Cover food and garbage.  Clean up spills and food crumbs.  Store grease in the refrigerator.   Keep food out of the bedroom.   Indoor Mold  This can be a trigger if your home has high moisture. Fix leaking faucets, pipes, or other sources of water.   Clean moldy surfaces.  Dehumidify basement if it is damp and smelly.   Smoke, Strong Odors, and Sprays  These can reduce air quality. Stay away from strong odors and sprays, such as perfume, powder, hair spray, paints, smoke incense, paint, cleaning products, candles and new carpet.   Exercise or Sports  Some people with asthma have this trigger. Be active!  Ask your doctor about taking medicine before sports or exercise to prevent symptoms.    Warm up for 5-10 minutes before and after sports or exercise.     Other Triggers of Asthma  Cold air:  Cover your nose and mouth with a scarf.  Sometimes laughing or crying can be a trigger.  Some medicines and food can trigger asthma.

## 2021-01-30 ASSESSMENT — ASTHMA QUESTIONNAIRES: ACT_TOTALSCORE: 25

## 2021-02-03 ENCOUNTER — NURSE TRIAGE (OUTPATIENT)
Dept: FAMILY MEDICINE | Facility: OTHER | Age: 76
End: 2021-02-03

## 2021-02-03 NOTE — TELEPHONE ENCOUNTER
"Spoke with patient and she stated that she has been laying down as has felt better as long as she is staying still . Did state that she is feeling nauseated and having neck pain where its hard to turn her head.  Recommended urgent care to be evaluated and patient does not want to go into a car as she feels it will make her more nauseated. Also states that when she walks it feels like she  Is being \" propelled \" to the right side . As long as she is laying down and  Not moving she states that she does ok . She is scheduled for a COVID  vaccine tomorrow and is wondering if she should still get it ?  Patient did state that if she starts to get worse she will go in to be evaluated .   "

## 2021-02-03 NOTE — TELEPHONE ENCOUNTER
"Patient would like to see Wilmer today for an overbook.  Please call her and let her know.  [phone]    Additional Information    [1] Dizziness is main symptom AND [2] NO spinning sensation (i.e., vertigo)    [1] MODERATE dizziness (e.g., interferes with normal activities) AND [2] has NOT been evaluated by physician for this  (Exception: dizziness caused by heat exposure, sudden standing, or poor fluid intake)    Answer Assessment - Initial Assessment Questions  1. DESCRIPTION: \"Describe your dizziness.\"      Has to hang on to walls when walking, felt light she was falling off the bed when she was lying down.  Patient is very nauseas  2. VERTIGO: \"Do you feel like either you or the room is spinning or tilting?\"       Spinning and tilting  3. LIGHTHEADED: \"Do you feel lightheaded?\" (e.g., somewhat faint, woozy, weak upon standing)      Sometimes she feels faint  4. SEVERITY: \"How bad is it?\"  \"Can you walk?\"    - MILD - Feels unsteady but walking normally.    - MODERATE - Feels very unsteady when walking, but not falling; interferes with normal activities (e.g., school, work) .    - SEVERE - Unable to walk without falling (requires assistance).      moderate  5. ONSET:  \"When did the dizziness begin?\"      Yesterday 3 oclock  6. AGGRAVATING FACTORS: \"Does anything make it worse?\" (e.g., standing, change in head position)      Standing change head position  7. CAUSE: \"What do you think is causing the dizziness?\"      vertigo  8. RECURRENT SYMPTOM: \"Have you had dizziness before?\" If so, ask: \"When was the last time?\" \"What happened that time?\"      Yes same thing was vertigo a long time ago  9. OTHER SYMPTOMS: \"Do you have any other symptoms?\" (e.g., headache, weakness, numbness, vomiting, earache)      Nausea, headache, some weakness, stiff neck  10. PREGNANCY: \"Is there any chance you are pregnant?\" \"When was your last menstrual period?\"        no    Protocols used: DIZZINESS - VERTIGO-A-AH, DIZZINESS - " [id number]

## 2021-02-03 NOTE — TELEPHONE ENCOUNTER
Spoke with patient and informed her that per Nicolasa she should be fine with getting the COVID vaccine . She has been informed that if her symptoms  get worse she should be evaluated in urgent care.    today

## 2021-02-12 ENCOUNTER — TRANSFERRED RECORDS (OUTPATIENT)
Dept: HEALTH INFORMATION MANAGEMENT | Facility: CLINIC | Age: 76
End: 2021-02-12

## 2021-02-12 LAB — RETINOPATHY: NEGATIVE

## 2021-02-18 ENCOUNTER — HOSPITAL ENCOUNTER (OUTPATIENT)
Dept: RESPIRATORY THERAPY | Facility: HOSPITAL | Age: 76
Discharge: HOME | End: 2021-02-18
Attending: NURSE PRACTITIONER | Admitting: INTERNAL MEDICINE
Payer: MEDICARE

## 2021-02-18 DIAGNOSIS — J45.20 MILD INTERMITTENT ASTHMA WITHOUT COMPLICATION: ICD-10-CM

## 2021-02-18 LAB
COHGB MFR BLD: 1.3 % (ref 0–2)
HGB BLD-MCNC: 12.7 G/DL (ref 11.7–15.7)

## 2021-02-18 PROCEDURE — 85018 HEMOGLOBIN: CPT | Performed by: NURSE PRACTITIONER

## 2021-02-18 PROCEDURE — 94060 EVALUATION OF WHEEZING: CPT

## 2021-02-18 PROCEDURE — 36415 COLL VENOUS BLD VENIPUNCTURE: CPT | Performed by: NURSE PRACTITIONER

## 2021-02-18 PROCEDURE — 94726 PLETHYSMOGRAPHY LUNG VOLUMES: CPT | Mod: 26 | Performed by: INTERNAL MEDICINE

## 2021-02-18 PROCEDURE — 94640 AIRWAY INHALATION TREATMENT: CPT

## 2021-02-18 PROCEDURE — 250N000009 HC RX 250: Performed by: NURSE PRACTITIONER

## 2021-02-18 PROCEDURE — 94060 EVALUATION OF WHEEZING: CPT | Mod: 26 | Performed by: INTERNAL MEDICINE

## 2021-02-18 PROCEDURE — 94729 DIFFUSING CAPACITY: CPT

## 2021-02-18 PROCEDURE — 82375 ASSAY CARBOXYHB QUANT: CPT | Performed by: NURSE PRACTITIONER

## 2021-02-18 PROCEDURE — 94729 DIFFUSING CAPACITY: CPT | Mod: 26 | Performed by: INTERNAL MEDICINE

## 2021-02-18 PROCEDURE — 94726 PLETHYSMOGRAPHY LUNG VOLUMES: CPT

## 2021-02-18 RX ORDER — ALBUTEROL SULFATE 90 UG/1
2 AEROSOL, METERED RESPIRATORY (INHALATION) EVERY 6 HOURS PRN
Status: DISCONTINUED | OUTPATIENT
Start: 2021-02-18 | End: 2021-02-19 | Stop reason: HOSPADM

## 2021-02-18 RX ADMIN — ALBUTEROL SULFATE 2 PUFF: 90 AEROSOL, METERED RESPIRATORY (INHALATION) at 12:48

## 2021-02-24 ENCOUNTER — TELEPHONE (OUTPATIENT)
Dept: FAMILY MEDICINE | Facility: OTHER | Age: 76
End: 2021-02-24

## 2021-02-24 NOTE — PROGRESS NOTES
Bo is a 76 year old who is being evaluated via a billable telephone visit.      What phone number would you like to be contacted at? [phone]  How would you like to obtain your AVS? Mail a copy    Assessment & Plan     (J43.9) Pulmonary emphysema, unspecified emphysema type (H)  (primary encounter diagnosis)  Comment: recent PFTs with overinflation indicating emphysema; she currently does not use any inhalers, feels her shortness of breath is worsening  Plan: Will begin tiotropium (SPIRIVA RESPIMAT) 2.5 MCG/ACT Inhaler daily with PRN albuterol and she will f/u 4 weeks for reassessment. Sooner with new or worsening symptoms.     She notes that she did have pain between her shoulder blades that radiated to her chest several days ago. Pain has now subsided. Felt movement triggered this. She thinks it was muscular and refuses to go to the ER. Difficult to assess over the phone. Patient aware. She was made aware that if the pain returns, she needs to go to the ER. Otherwise, she will make a follow up with me in the clinic.     Nicolasa Guillen, ARSALAN  [facility] [place]    Subjective   Bo is a 76 year old who presents for the following health issues     HPI     Follow up PFTs:    Patient was seen on 1/29/21. Had diagnosis of asthma, but had never had PFTs.     PFTs were ordered and she follows up today. They did show minimal obstructive airway disease-emphysema like with a reversible component. TLC, RV, FRC, and RV-TLC was increased indicating overinflation. There was good response with bronchodilators.     She does not use any inhalers. Does occasionally get short of breath and has wheezes. Feels this is worsening. Often gets short of breath when taking. Also occasionally gets pain between her shoulder blades that will radiate to her chest. No associated nausea or vomiting. No fatigue. She feels movement triggered this pain. No fevers. Occasional dry cough. No nasal congestion. No abdominal pain. No  lower leg edema.     She has never smoked. 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 Both mother and father smoked around her.     No fevers.     H/O heart failure, echo last done in 9/2020. EF 60-65 percent.     Stress test done in 2011; negative inducible ischemia, did have fixed apical defect.     Review of Systems   As noted in the HPI.       Objective           Vitals:  No vitals were obtained today due to virtual visit.    Physical Exam   healthy, alert and no distress  PSYCH: Alert and oriented times 3; coherent speech, normal   rate and volume, able to articulate logical thoughts, able   to abstract reason, no tangential thoughts, no hallucinations   or delusions  Her affect is normal  RESP: No cough, no audible wheezing, able to talk in full sentences  Remainder of exam unable to be completed due to telephone visits            Phone call duration: 12 minutes

## 2021-02-25 ENCOUNTER — VIRTUAL VISIT (OUTPATIENT)
Dept: FAMILY MEDICINE | Facility: OTHER | Age: 76
End: 2021-02-25
Attending: NURSE PRACTITIONER
Payer: COMMERCIAL

## 2021-02-25 DIAGNOSIS — J43.9 PULMONARY EMPHYSEMA, UNSPECIFIED EMPHYSEMA TYPE (H): Primary | ICD-10-CM

## 2021-02-25 PROCEDURE — 99213 OFFICE O/P EST LOW 20 MIN: CPT | Mod: TEL | Performed by: NURSE PRACTITIONER

## 2021-02-25 PROCEDURE — G0463 HOSPITAL OUTPT CLINIC VISIT: HCPCS | Mod: 25,TEL

## 2021-02-25 RX ORDER — ALBUTEROL SULFATE 90 UG/1
2 AEROSOL, METERED RESPIRATORY (INHALATION) EVERY 6 HOURS
Qty: 1 INHALER | Refills: 0 | Status: SHIPPED | OUTPATIENT
Start: 2021-02-25 | End: 2023-02-13

## 2021-02-25 RX ORDER — NITROGLYCERIN 0.4 MG/1
0.4 TABLET SUBLINGUAL EVERY 5 MIN PRN
Qty: 30 TABLET | Refills: 0 | Status: CANCELLED | OUTPATIENT
Start: 2021-02-25

## 2021-02-25 NOTE — NURSING NOTE
"Chief Complaint   Patient presents with     Results     Spirometry results        Initial There were no vitals taken for this visit. Estimated body mass index is 27.98 kg/m  as calculated from the following:    Height as of 12/30/20: 1.575 m (5' 2\").    Weight as of 1/29/21: 69.4 kg (153 lb).  Medication Reconciliation: complete  Erendira Estrada LPN  "

## 2021-03-01 DIAGNOSIS — E13.9 LADA (LATENT AUTOIMMUNE DIABETES IN ADULTS), MANAGED AS TYPE 1 (H): ICD-10-CM

## 2021-03-01 RX ORDER — PROCHLORPERAZINE 25 MG/1
1 SUPPOSITORY RECTAL CONTINUOUS
Qty: 3 EACH | Refills: 5 | Status: SHIPPED | OUTPATIENT
Start: 2021-03-01 | End: 2021-09-06

## 2021-03-17 NOTE — PROGRESS NOTES
"    Assessment & Plan     FRANCO (dyspnea on exertion)  Intermittent chest pain  Patient with FRANCO and intermittent chest pain. Was seen for similar symptoms in 8/2020 and lab work and EKG ok. Referred to cardiology, but she felt this was unnecessary and never saw cards. Politely refusing EKG and lab work today. I would like to order a cardiac stress test, but patient notes that she can never have one of these due to past reaction. Will therefore again refer to cardiology. Seems to be cardiac related. If nothing is found, will consider adding another inhaler.     - XR Chest 2 Views; Future  - CARDIOLOGY EVAL ADULT REFERRAL    Pulmonary emphysema, unspecified emphysema type (H)  Slightly improved with Spiriva. Will continue. Currently not using her albuterol. Encouraged to use as needed.     Essential hypertension  BP well controlled. Continue current medications.     Other hyperlipidemia  On statin. Tolerating. Will continue. Controlled.     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 Controlled. Continue current medications.     H/O heart failure  Recent echo with normal EF.       Nicolasa Guillen NP  [facility] [place]    Katherin Soriano is a 76 year old who presents for the following health issues    HPI     COPD Follow-Up    Patient was seen with a virtual visit on 2/25/21 after having PFTs completed that showed \"overinflation indicating emphysema.\" Spiriva was started and she follows up today.     Today she notes that she continues to get short of breath with exertion, only improved slightly with the Spiriva. Shortness of breath typically only occurs when walking 20-30 feet or doing dishes. She notes that she lives in a 2 bedroom apartment and can't vacuum her entire apartment due to the shortness of breath. Some associated nausea. No vomiting. No fevers. No cough or cold like symptoms. She also has had intermittent chest pain. When it occurs, it typically " "radiates to right shoulder. No current lower leg swelling.       Overall, how are your COPD symptoms since your last clinic visit?  Better    How much fatigue or shortness of breath do you have when you are walking?  Same as usual    How much shortness of breath do you have when you are resting?  None    How often do you cough? Sometimes    Have you noticed any change in your sputum/phlegm?  No    Have you experienced a recent fever? No    Please describe how far you can walk without stopping to rest:  Less than 10 feet    How many flights of stairs are you able to walk up without stopping?  None    Have you had any Emergency Room Visits, Urgent Care Visits, or Hospital Admissions because of your COPD since your last office visit?  No     H/O heart failure, echo last done in 9/2020. EF 60-65 percent.      Stress test done in 2011; negative inducible ischemia, did have fixed apical defect. She notes that she can't have another stress test as her \"heart goes goofy.\" She was told to never have a stress test again. Done at [facility].     She does not have allergies. She does not think she has anxiety.     She does have DM and hyperlipidemia. A1C was 6.9 on 1/29/21. Taking statin.     She was seen for similar complaints in 8/2020. Lab work was done. BNP was normal for her age. D-dimer was normal. EKG unchanged.       History   Smoking Status     Never Smoker   Smokeless Tobacco     Never Used     No results found for: FEV1, YMA3HBI      Review of Systems   As noted in the HPI.       Objective    /58 (BP Location: Right arm, Patient Position: Chair, Cuff Size: Adult Regular)   Pulse 81   Temp 97.2  F (36.2  C) (Tympanic)   Ht 1.575 m (5' 2\")   Wt 69.4 kg (153 lb)   SpO2 98%   BMI 27.98 kg/m    Body mass index is 27.98 kg/m .  Physical Exam   GENERAL: healthy, alert and no distress  EYES: Eyes grossly normal to inspection, PERRL and conjunctivae and sclerae normal  HENT: ear canals and TM's normal, nose and " mouth without ulcers or lesions  NECK: no adenopathy, no asymmetry, masses, or scars and thyroid normal to palpation  RESP: lungs clear to auscultation - no rales, rhonchi or wheezes  CV: regular rate and rhythm, normal S1 S2, no S3 or S4, no murmur, click or rub, trace bilateral peripheral edema  ABDOMEN: soft, nontender, no masses and bowel sounds normal  NEURO: Normal strength and tone, mentation intact and speech normal  PSYCH: mentation appears normal, affect normal/bright    Results for orders placed or performed in visit on 03/19/21   XR Chest 2 Views     Status: None    Narrative    PROCEDURE:  XR CHEST 2 VW    HISTORY:  FRANCO (dyspnea on exertion).     COMPARISON:  None.    FINDINGS:   The cardiac silhouette is normal in size. The pulmonary vasculature is  normal.  The lungs are clear. No pleural effusion or pneumothorax.  There is a shoulder prosthesis in place on the left. Some surgical  clips are seen in the left chest wall. There is thoracolumbar  scoliosis.      Impression    IMPRESSION:  No acute cardiopulmonary disease.      SURINDER FOSTER MD

## 2021-03-19 ENCOUNTER — ANCILLARY PROCEDURE (OUTPATIENT)
Dept: GENERAL RADIOLOGY | Facility: OTHER | Age: 76
End: 2021-03-19
Attending: NURSE PRACTITIONER
Payer: MEDICARE

## 2021-03-19 ENCOUNTER — OFFICE VISIT (OUTPATIENT)
Dept: FAMILY MEDICINE | Facility: OTHER | Age: 76
End: 2021-03-19
Attending: NURSE PRACTITIONER
Payer: COMMERCIAL

## 2021-03-19 VITALS
DIASTOLIC BLOOD PRESSURE: 58 MMHG | HEART RATE: 81 BPM | TEMPERATURE: 97.2 F | OXYGEN SATURATION: 98 % | SYSTOLIC BLOOD PRESSURE: 100 MMHG | WEIGHT: 153 LBS | BODY MASS INDEX: 28.16 KG/M2 | HEIGHT: 62 IN

## 2021-03-19 DIAGNOSIS — E13.9 LADA (LATENT AUTOIMMUNE DIABETES IN ADULTS), MANAGED AS TYPE 1 (H): ICD-10-CM

## 2021-03-19 DIAGNOSIS — Z86.79 H/O HEART FAILURE: ICD-10-CM

## 2021-03-19 DIAGNOSIS — E78.49 OTHER HYPERLIPIDEMIA: ICD-10-CM

## 2021-03-19 DIAGNOSIS — R06.09 DOE (DYSPNEA ON EXERTION): Primary | ICD-10-CM

## 2021-03-19 DIAGNOSIS — R07.9 INTERMITTENT CHEST PAIN: ICD-10-CM

## 2021-03-19 DIAGNOSIS — I10 ESSENTIAL HYPERTENSION: ICD-10-CM

## 2021-03-19 DIAGNOSIS — R06.09 DOE (DYSPNEA ON EXERTION): ICD-10-CM

## 2021-03-19 DIAGNOSIS — J43.9 PULMONARY EMPHYSEMA, UNSPECIFIED EMPHYSEMA TYPE (H): ICD-10-CM

## 2021-03-19 PROCEDURE — 99214 OFFICE O/P EST MOD 30 MIN: CPT | Performed by: NURSE PRACTITIONER

## 2021-03-19 PROCEDURE — G0463 HOSPITAL OUTPT CLINIC VISIT: HCPCS | Mod: 25

## 2021-03-19 PROCEDURE — 71046 X-RAY EXAM CHEST 2 VIEWS: CPT | Mod: TC

## 2021-03-19 ASSESSMENT — MIFFLIN-ST. JEOR: SCORE: 1147.25

## 2021-03-19 ASSESSMENT — PAIN SCALES - GENERAL: PAINLEVEL: NO PAIN (0)

## 2021-03-19 NOTE — NURSING NOTE
"Chief Complaint   Patient presents with     Breathing Problem     follow up        Initial /58 (BP Location: Right arm, Patient Position: Chair, Cuff Size: Adult Regular)   Pulse 81   Temp 97.2  F (36.2  C) (Tympanic)   Ht 1.575 m (5' 2\")   Wt 69.4 kg (153 lb)   SpO2 98%   BMI 27.98 kg/m   Estimated body mass index is 27.98 kg/m  as calculated from the following:    Height as of this encounter: 1.575 m (5' 2\").    Weight as of this encounter: 69.4 kg (153 lb).  Medication Reconciliation: complete  Erendira Estrada LPN  "

## 2021-03-26 ENCOUNTER — TELEPHONE (OUTPATIENT)
Dept: FAMILY MEDICINE | Facility: OTHER | Age: 76
End: 2021-03-26

## 2021-03-26 DIAGNOSIS — J43.9 PULMONARY EMPHYSEMA, UNSPECIFIED EMPHYSEMA TYPE (H): ICD-10-CM

## 2021-03-26 NOTE — TELEPHONE ENCOUNTER
spiriva resp. 2.5 mcg      Last Written Prescription Date:  3-  Last Fill Quantity: 4G,   # refills: 3  Last Office Visit: 3-    DUPLICATE

## 2021-03-26 NOTE — TELEPHONE ENCOUNTER
Spoke with patient she is concerned as she picked up her Spiriva  inhaler on 3-2-2021. She was under the impression that the inhaler would last 3 months . Informed patient that it would only last 1 month doing 2 puffs a day . She is worried as its costing $ 30.00 - 40.00. After speaking with the pharmacist patient was made aware that there is no cheaper equivalent she can be switched . Patient would like to stay on the Spiriva as it has been helping.

## 2021-03-26 NOTE — TELEPHONE ENCOUNTER
Patient would like a call concerning her inhaler and it's cost. Patient will be home until 11:00 am.

## 2021-03-30 RX ORDER — TIOTROPIUM BROMIDE INHALATION SPRAY 3.12 UG/1
SPRAY, METERED RESPIRATORY (INHALATION)
Qty: 4 G | Refills: 0 | OUTPATIENT
Start: 2021-03-30

## 2021-04-13 ENCOUNTER — TELEPHONE (OUTPATIENT)
Dept: EDUCATION SERVICES | Facility: OTHER | Age: 76
End: 2021-04-13

## 2021-04-13 DIAGNOSIS — E13.9 LADA (LATENT AUTOIMMUNE DIABETES IN ADULTS), MANAGED AS TYPE 1 (H): Primary | ICD-10-CM

## 2021-04-13 DIAGNOSIS — Z79.4 TYPE 2 DIABETES MELLITUS WITH HYPERGLYCEMIA, WITH LONG-TERM CURRENT USE OF INSULIN (H): ICD-10-CM

## 2021-04-13 DIAGNOSIS — E11.65 TYPE 2 DIABETES MELLITUS WITH HYPERGLYCEMIA, WITH LONG-TERM CURRENT USE OF INSULIN (H): ICD-10-CM

## 2021-04-13 NOTE — TELEPHONE ENCOUNTER
1. Pt calls to schedule an appt, this was taken care of and is scheduled on 04/29/21.   2. Pt would like to talk to Kerri Elliott, she has met her deductible for the year and her insurance told her that her insulin cost would be 1/2 of previous cost. She is wondering if the cost of 3 months would be the same as 1 month? Do you have any idea? Can we send in Rx's and check cost?

## 2021-04-15 NOTE — TELEPHONE ENCOUNTER
Order sent for 3 months supply.     I asked the pharmacy to address if this would be a better cost savings for the patient.     Can you please call her that I sent the rx.     Thanks    Kerri NELSON P-BC  Diabetes [facility]

## 2021-04-29 ENCOUNTER — ALLIED HEALTH/NURSE VISIT (OUTPATIENT)
Dept: EDUCATION SERVICES | Facility: OTHER | Age: 76
End: 2021-04-29
Attending: NURSE PRACTITIONER
Payer: COMMERCIAL

## 2021-04-29 VITALS
HEART RATE: 76 BPM | BODY MASS INDEX: 27.79 KG/M2 | WEIGHT: 151 LBS | DIASTOLIC BLOOD PRESSURE: 52 MMHG | OXYGEN SATURATION: 98 % | RESPIRATION RATE: 16 BRPM | SYSTOLIC BLOOD PRESSURE: 122 MMHG | HEIGHT: 62 IN

## 2021-04-29 DIAGNOSIS — E13.9 LADA (LATENT AUTOIMMUNE DIABETES IN ADULTS), MANAGED AS TYPE 1 (H): Primary | ICD-10-CM

## 2021-04-29 PROCEDURE — G0463 HOSPITAL OUTPT CLINIC VISIT: HCPCS

## 2021-04-29 PROCEDURE — 99214 OFFICE O/P EST MOD 30 MIN: CPT | Mod: 25 | Performed by: NURSE PRACTITIONER

## 2021-04-29 PROCEDURE — 95251 CONT GLUC MNTR ANALYSIS I&R: CPT | Performed by: NURSE PRACTITIONER

## 2021-04-29 ASSESSMENT — MIFFLIN-ST. JEOR: SCORE: 1138.18

## 2021-04-29 ASSESSMENT — PAIN SCALES - GENERAL: PAINLEVEL: SEVERE PAIN (6)

## 2021-04-29 NOTE — PROGRESS NOTES
"Chief Complaint   Patient presents with     Diabetes       Initial Pulse 76   Resp 16   Ht 1.575 m (5' 2\")   Wt 68.5 kg (151 lb)   SpO2 98%   BMI 27.62 kg/m   Estimated body mass index is 27.62 kg/m  as calculated from the following:    Height as of this encounter: 1.575 m (5' 2\").    Weight as of this encounter: 68.5 kg (151 lb).  Medication Reconciliation: complete  Ginger Lerner LPN    "

## 2021-04-29 NOTE — PATIENT INSTRUCTIONS
Continue working on healthy eating and moving as best as you can (start low and slow, work up to 30 min, 5x/week)    BG goals:  Fasting and before meals <130, >70  2 hour after eating <180    We only need 1/2 of these numbers to be within target then your A1c will be within target    Medication changes   Start using the preset boluses as discussed    Follow up   paperwork    Call me sooner if any problems/concerns and/or questions develop including consistent low BGs <70 or consistent high BGs >200  [phone] (Marie, Unit Coordinator)    [phone] (Emeli, Nurse)

## 2021-04-29 NOTE — PROGRESS NOTES
SUBJECTIVE:  Flroa Acuna, 75 year old, female presents with the following Chief Complaint(s) with HPI to follow:  Chief Complaint   Patient presents with     Diabetes        Diabetes Follow-up      Patient is checking blood sugars: personal CGM.  Results:  She has been using her Dexcom for >90 days    Date: 4/16 to 4/29/21  Glucose management indicator: 8.5%    Average glucose: 218    Glucose range  Very low (<54): 0  low (<70): 0  In target range (): 34%  High (>180): 32%  Very high (>250): 34%      Symptoms of hypoglycemia (low blood sugar): Reports an incident of low BGs, none per download    Paresthesias (numbness or burning in feet) or sores: Yes--same; no sores    Diabetic eye exam within the last year: Yes    Breakfast eaten regularly: Yes    Patient counting carbs: Yes       HPI:  Flora's here today for the follow up regarding her KAREEM, managed as a Type 1    Lab Results   Component Value Date    A1C 6.9 01/29/2021    A1C 7.2 07/01/2020    A1C 7.9 12/16/2019    A1C 7.5 09/11/2019    A1C 7.3 06/07/2019     Current Diabetes medication:   1.  Insulin pump, with Novolog  ASA use: yes, 325 mg daily  Statin use: yes, simvastatin 40 mg daily    Bo's here today for an insulin pump and Dexcom download with possible adjust.    She reports the following:  BGs are still high    Denies any concerns with her insulin pump and/or Dexcom    Denies any changes with her health  Working with her PCP, Nicolasa Guillen NP      Patient Active Problem List   Diagnosis     CARDIOVASCULAR SCREENING; LDL GOAL LESS THAN 160     Personal history of malignant neoplasm of breast     ACP (advance care planning)     Hypothyroidism     Essential hypertension     Other hyperlipidemia     Malignant neoplasm of left breast in female, estrogen receptor positive (H)     S/P reverse total shoulder arthroplasty, right     Lumbar disc disease with radiculopathy     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), managed as type 1 (H)      H/O total knee replacement, left     Age-related osteoporosis without current pathological fracture     Osteoporosis     Family history of melanoma     Family history of breast cancer in female     Cardiomegaly     H/O heart failure     Dysphonia     Prolonged QT interval       Past Medical History:   Diagnosis Date     Congestive heart failure, unspecified      Diabetes (H)      H/O rheumatoid arthritis      HLD (hyperlipidemia)      HTN (hypertension)      Myocardial infarction (H)      Uncomplicated asthma        Past Surgical History:   Procedure Laterality Date     APPENDECTOMY OPEN CHILD       AS TOTAL KNEE ARTHROPLASTY Right      CHOLECYSTECTOMY       COLONOSCOPY - HIM SCAN N/A 03/12/2009    per Care Everywhere documentation per [facility].      HYSTERECTOMY TOTAL ABDOMINAL       MASTECTOMY, BILATERAL Bilateral 02/26/1992     SHOULDER SURGERY Right      SHOULDER SURGERY Left        Family History   Problem Relation Age of Onset     Breast Cancer Maternal Aunt      Breast Cancer Maternal Aunt      Lung Cancer Father        Social History     Tobacco Use     Smoking status: Never Smoker     Smokeless tobacco: Never Used   Substance Use Topics     Alcohol use: No     Alcohol/week: 0.0 standard drinks       Current Outpatient Medications   Medication Sig Dispense Refill     acetaminophen (TYLENOL) 325 MG tablet Take 650 mg by mouth       Acetone, Urine, Test (KETONE TEST) STRP Use as directed 50 strip 4     albuterol (PROAIR HFA/PROVENTIL HFA/VENTOLIN HFA) 108 (90 Base) MCG/ACT inhaler Inhale 2 puffs into the lungs every 6 hours 1 Inhaler 0     alendronate (FOSAMAX) 70 MG tablet Take 1 tablet by mouth once weekly. Take with water in the AM 30 minutes prior to eating. Avoid lying down 30 minutes after taking med. 12 tablet 3     aspirin (ASA) 325 MG EC tablet Take 325 mg by mouth daily       blood glucose (CONTOUR NEXT TEST) test strip Use 6 times a day with the insulin pump to help manage your diabetes 200  each 11     blood glucose (NO BRAND SPECIFIED) test strip by In Vitro route 4 times daily Use to test blood sugar 4 times daily or as directed.       Calcium-Vitamin D-Vitamin K (VIACTIV PO) Take 2 chew tab by mouth every morning       Continuous Blood Gluc  (DEXCOM G6 ) XX DIMITRIS 1 each continuous 1 Device 0     Continuous Blood Gluc Sensor (DEXCOM G5 MOB/G4 PLAT SENSOR) MISC 1 each continuous Change every 7 days 3 each 1     Continuous Blood Gluc Sensor (DEXCOM G6 SENSOR) MISC 1 each continuous To be changed every 10 days 3 each 5     Continuous Blood Gluc Transmit (DEXCOM G6 TRANSMITTER) MISC 1 each continuous To be changed every 3 months 1 each 1     furosemide (LASIX) 40 MG tablet TAKE ONE (1) TABLET BY MOUTH every morning and half tablet every afternoon. 135 tablet 1     glucagon (GLUCAGON EMERGENCY) 1 MG injection Inject 1 mg Subcutaneous once 1 mg 12     Insulin Aspart (INSULIN PUMP - OUTPATIENT)        insulin aspart (NOVOLOG VIAL) 100 UNITS/ML vial To be used with the insulin pump  TDD: 40 units 40 mL 1     INSULIN PUMP - OUTPATIENT Date last updated:  2/4/15  AIRVEND Minimed: Model 723  BASAL RATES and times:  12   AM (midnight): 0.9 units/hour    9    PM: 0.8 units/hour   Basal Pattern A:  Flora Acuna will use when N/A.  CARB RATIO and times:  12   AM (midnight): 13.0  4     PM: 10  Corection Factor (Sensitivity) and times:  12   AM (midnight): 55 mg/dL  BLOOD GLUCOSE TARGET and times:  12   AM (midnight): 100 - 150  7     AM:  100 - 120  9    PM:  100 - 150  Active Insulin Time:  3 hours  Sensor:  No  Carelink / Diasend username:  [email or web address]  Carelink / Diasend Password:  [email or web address]       Lancet Devices MISC        levothyroxine (SYNTHROID/LEVOTHROID) 75 MCG tablet Take 1 tablet (75 mcg) by mouth daily 90 tablet 3     metoprolol tartrate (LOPRESSOR) 50 MG tablet Take 1 tablet (50 mg) by mouth 2 times daily 180 tablet 3     nitroglycerin (NITROSTAT) 0.4 MG SL tablet Place 0.4 mg under  the tongue       omeprazole (PRILOSEC) 40 MG DR capsule Take 1 capsule (40 mg) by mouth daily 90 capsule 3     potassium chloride ER (KLOR-CON M) 20 MEQ CR tablet TAKE 1  BY MOUTH TWICE DAILY 60 tablet 4     ramipril (ALTACE) 10 MG capsule Take 1 capsule (10 mg) by mouth daily 90 capsule 2     senna-docusate (SENOKOT-S;PERICOLACE) 8.6-50 MG per tablet Take 1 tablet by mouth At Bedtime        simvastatin (ZOCOR) 40 MG tablet Take 1 tablet (40 mg) by mouth At Bedtime 90 tablet 3     tamoxifen (NOLVADEX) 20 MG tablet Take 20 mg by mouth daily        tiotropium (SPIRIVA RESPIMAT) 2.5 MCG/ACT inhaler Inhale 2 puffs into the lungs daily 4 g 3     tiZANidine (ZANAFLEX) 4 MG tablet 1/2 tab three times a day       VITAMIN D, CHOLECALCIFEROL, PO Take 5,000 Units by mouth daily         Allergies   Allergen Reactions     Contrast Dye Difficulty breathing     Gadolinium Derivatives      Other reaction(s): Difficulty in swallowing, Laryngeal spasm  Patient had an injection into rt. Shoulder capsule prior to MRI arthrogram.  Contained multihance gadobenate, omnipaque iohexol, and buffered lidocaine.       Ammonia      Cory-1 [Lidocaine Hcl]      Novocaine allergy       Codeine Sulfate      Food      Shrimp     Iodine-131 Swelling     Ct dye     Lidocaine      Nitrous Oxide      No Clinical Screening - See Comments Nausea and Vomiting and Other (See Comments)     (3/6/09)- N/V and Heart racing     Novocain [Procaine]      Penicillins      Tramadol      Morphine Palpitations       REVIEW OF SYSTEMS  Skin: negative  Eyes: negative  Ears/Nose/Throat: burnt voice and muscles from GERD  Respiratory: positive FRANCO--better; no cough; no hemoptysis; history of asthma  Cardiovascular: history of HF  Gastrointestinal: as noted above  Genitourinary: negative   Musculoskeletal: RA--worse; bilateral thumb joints are displaced per pt: hx of back pain, neck pain, arthritis and right shoulder   Neurologic: positive for numbness or tingling of  "feet--same  Psychiatric: negative  Hematologic/Lymphatic/Immunologic: history of breast cancer (left) x 2 (same spot)  Endocrine: positive for diabetes and thyroid disease     OBJECTIVE:  /52   Pulse 76   Resp 16   Ht 1.575 m (5' 2\")   Wt 68.5 kg (151 lb)   SpO2 98%   BMI 27.62 kg/m    Constitutional: healthy, alert and no distress  Musculoskeletal: extremities normal- no gross deformities noted and gait normal  Skin: no suspicious lesions or rashes to visible skin  Psychiatric: mentation appears normal and affect normal/bright        LABS  No results found for any visits on 04/29/21.    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   As stated above, describes an event of low BGs that wouldn't respond to her chocolate milk and food intake.    It alerted that she would be 55 in 20 minutes.    Unfortunately, this wasn't captured on this download.     Reviewed how to treat these low BGs--try regular pop or OJ (1/2 cup) instead of chocolate milk  Encouraged to keep adding protein to meals and snacks.   If continued low BGs--please let me know    As for her above target BGs, it's due to lack of carb bolus when eating.   Education focused on the change in her diabetes--she needs to add a carb bolus every time she consumed carbs.     Education focused on the preset bolus for breakfast, lunch, supper, and snack  Breakfast: OJ or chocolate milk with her pills--preset to 1.5 units  Lunch: sandwich and soup--preset to 1.7 units  Supper: biggest meal of the day--preset to 2.5 units  Snack: crackers--preset to 1 unit  Explained some is better than none    Follow up  Download during Dr. Corea's " appt    Call sooner if any issues develop    Patient Instructions     Continue working on healthy eating and moving as best as you can (start low and slow, work up to 30 min, 5x/week)    BG goals:  Fasting and before meals <130, >70  2 hour after eating <180    We only need 1/2 of these numbers to be within target then your A1c will be within target    Medication changes   Start using the preset boluses as discussed    Follow up   paperwork    Call me sooner if any problems/concerns and/or questions develop including consistent low BGs <70 or consistent high BGs >200  [phone] (Marie, Unit Coordinator)    [phone] (Emeli, Nurse)        Time: 35 minutes  Barrier: none  Willingness to learn: accepting    Kerri NELSON [facility]  Diabetes and Wound Care    Cc: Nicolasa Guillen NP    35 minutes was spent with patient.    All of  this time was spent on counseling patient regarding illness, medication and/or treatment options, coordinating further cares and follow ups that are needed along with resource material that will be helpful in the treatment of these issues.       With the electronic record, we can now more quickly and easily track our patient diabetic goals. Our diabetes clinical review is in progress and these are the indicators we are monitoring for good diabetes health.     1.) HbA1C less than 7 (measurement of your average blood sugars)  2.) Blood Pressure less than 140/80  3.) LDL less than 100 (bad cholesterol)  4.) HbA1C is checked in the last 6 months and below 7% (more frequently if not at goal or adjusting medications)  5.) LDL is checked in the last 12 months (more frequently if not at goal or adjusting medications)  6.) Taking one baby aspirin daily (unless otherwise instructed)  7.) No tobacco use  8) Statin use     You have achieved 8 out of 8 of these and I am encouraging you to come in and get tuned up to achieve 8 out of 8!  Here is what you have achieved so far in my goals for you:  1.)  HbA1C  less than 7:                              YES    Your last  HbA1C  Lab Results   Component Value Date    A1C 6.9 01/29/2021    A1C 7.2 07/01/2020    A1C 7.9 12/16/2019    A1C 7.5 09/11/2019    A1C 7.3 06/07/2019      2.) Blood Pressure less than 140/80:       YES     Your last    BP Readings from Last 1 Encounters:   04/29/21 122/52     3.) LDL less than 100:                              YES      Your last     LDL Cholesterol Calculated   Date Value Ref Range Status   07/01/2020 36 <100 mg/dL Final     Comment:     Desirable:       <100 mg/dl       4.) Checked HbA1C in the past 6 months: YES      5.) Checked LDL in the past 12 months:    YES    6.) Taking one aspirin daily:                       YES     7.) No tobacco use:                                        YES      8.) Statin use      YES       Insurance requirements:  1.  Patient has been seen in the clinic within the last 6 month  2. Diagnosis of KAREEM, managed as a Type 1 for >6 months  3. Has been testing their BGs 4x/day using her Dexcomg CGM for >90 days  4. Uses an insulin pump for >6 months  5. Requires frequent adjustments to their treatment regimen based on BGM and/or CGM testing results.

## 2021-05-04 DIAGNOSIS — Z79.4 TYPE 2 DIABETES MELLITUS WITH HYPERGLYCEMIA, WITH LONG-TERM CURRENT USE OF INSULIN (H): ICD-10-CM

## 2021-05-04 DIAGNOSIS — E11.65 TYPE 2 DIABETES MELLITUS WITH HYPERGLYCEMIA, WITH LONG-TERM CURRENT USE OF INSULIN (H): ICD-10-CM

## 2021-05-04 NOTE — PROGRESS NOTES
Emeli AUGUSTE LPN given paperwork to submit to the [employer] assistance program     Kerri NELSON [facility]  Diabetes and Wound Care

## 2021-05-05 ENCOUNTER — OFFICE VISIT (OUTPATIENT)
Dept: CARDIOLOGY | Facility: OTHER | Age: 76
End: 2021-05-05
Attending: NURSE PRACTITIONER
Payer: MEDICARE

## 2021-05-05 ENCOUNTER — ALLIED HEALTH/NURSE VISIT (OUTPATIENT)
Dept: EDUCATION SERVICES | Facility: OTHER | Age: 76
End: 2021-05-05
Attending: NURSE PRACTITIONER
Payer: MEDICARE

## 2021-05-05 VITALS
TEMPERATURE: 98.9 F | WEIGHT: 150 LBS | BODY MASS INDEX: 27.6 KG/M2 | DIASTOLIC BLOOD PRESSURE: 66 MMHG | HEIGHT: 62 IN | SYSTOLIC BLOOD PRESSURE: 122 MMHG | OXYGEN SATURATION: 98 % | HEART RATE: 83 BPM

## 2021-05-05 DIAGNOSIS — E11.65 TYPE 2 DIABETES MELLITUS WITH HYPERGLYCEMIA, WITH LONG-TERM CURRENT USE OF INSULIN (H): ICD-10-CM

## 2021-05-05 DIAGNOSIS — R94.31 PROLONGED QT INTERVAL: ICD-10-CM

## 2021-05-05 DIAGNOSIS — R07.9 INTERMITTENT CHEST PAIN: ICD-10-CM

## 2021-05-05 DIAGNOSIS — E78.2 MIXED HYPERLIPIDEMIA: ICD-10-CM

## 2021-05-05 DIAGNOSIS — I10 ESSENTIAL HYPERTENSION: ICD-10-CM

## 2021-05-05 DIAGNOSIS — E87.6 HYPOKALEMIA: ICD-10-CM

## 2021-05-05 DIAGNOSIS — E53.8 VITAMIN B12 DEFICIENCY (NON ANEMIC): ICD-10-CM

## 2021-05-05 DIAGNOSIS — R06.09 DOE (DYSPNEA ON EXERTION): Primary | ICD-10-CM

## 2021-05-05 DIAGNOSIS — E11.65 TYPE 2 DIABETES MELLITUS WITH HYPERGLYCEMIA, WITH LONG-TERM CURRENT USE OF INSULIN (H): Primary | ICD-10-CM

## 2021-05-05 DIAGNOSIS — J45.909 UNCOMPLICATED ASTHMA, UNSPECIFIED ASTHMA SEVERITY, UNSPECIFIED WHETHER PERSISTENT: ICD-10-CM

## 2021-05-05 DIAGNOSIS — I50.32 DIASTOLIC DYSFUNCTION WITH CHRONIC HEART FAILURE (H): ICD-10-CM

## 2021-05-05 DIAGNOSIS — Z79.4 TYPE 2 DIABETES MELLITUS WITH HYPERGLYCEMIA, WITH LONG-TERM CURRENT USE OF INSULIN (H): ICD-10-CM

## 2021-05-05 DIAGNOSIS — R60.0 LOWER EXTREMITY EDEMA: ICD-10-CM

## 2021-05-05 DIAGNOSIS — E03.9 HYPOTHYROIDISM, UNSPECIFIED TYPE: ICD-10-CM

## 2021-05-05 DIAGNOSIS — K76.0 FATTY LIVER: ICD-10-CM

## 2021-05-05 DIAGNOSIS — E55.9 VITAMIN D DEFICIENCY: ICD-10-CM

## 2021-05-05 DIAGNOSIS — Z79.4 TYPE 2 DIABETES MELLITUS WITH HYPERGLYCEMIA, WITH LONG-TERM CURRENT USE OF INSULIN (H): Primary | ICD-10-CM

## 2021-05-05 LAB
ALBUMIN SERPL-MCNC: 3.8 G/DL (ref 3.4–5)
ALP SERPL-CCNC: 35 U/L (ref 40–150)
ALT SERPL W P-5'-P-CCNC: 23 U/L (ref 0–50)
ANION GAP SERPL CALCULATED.3IONS-SCNC: 6 MMOL/L (ref 3–14)
AST SERPL W P-5'-P-CCNC: 15 U/L (ref 0–45)
BASE EXCESS BLDA CALC-SCNC: 4.4 MMOL/L
BASOPHILS # BLD AUTO: 0 10E9/L (ref 0–0.2)
BASOPHILS NFR BLD AUTO: 0.3 %
BILIRUB SERPL-MCNC: 0.8 MG/DL (ref 0.2–1.3)
BUN SERPL-MCNC: 13 MG/DL (ref 7–30)
CALCIUM SERPL-MCNC: 8.9 MG/DL (ref 8.5–10.1)
CHLORIDE SERPL-SCNC: 101 MMOL/L (ref 94–109)
CHOLEST SERPL-MCNC: 135 MG/DL
CO2 SERPL-SCNC: 30 MMOL/L (ref 20–32)
CREAT SERPL-MCNC: 0.76 MG/DL (ref 0.52–1.04)
CRP SERPL-MCNC: <2.9 MG/L (ref 0–8)
D DIMER PPP FEU-MCNC: 0.5 UG/ML FEU (ref 0–0.5)
DIFFERENTIAL METHOD BLD: ABNORMAL
EOSINOPHIL # BLD AUTO: 0.3 10E9/L (ref 0–0.7)
EOSINOPHIL NFR BLD AUTO: 4.3 %
ERYTHROCYTE [DISTWIDTH] IN BLOOD BY AUTOMATED COUNT: 12 % (ref 10–15)
GFR SERPL CREATININE-BSD FRML MDRD: 77 ML/MIN/{1.73_M2}
GLUCOSE SERPL-MCNC: 146 MG/DL (ref 70–99)
HCO3 BLD-SCNC: 28 MMOL/L (ref 21–28)
HCT VFR BLD AUTO: 38.5 % (ref 35–47)
HDLC SERPL-MCNC: 52 MG/DL
HGB BLD-MCNC: 12.7 G/DL (ref 11.7–15.7)
IMM GRANULOCYTES # BLD: 0 10E9/L (ref 0–0.4)
IMM GRANULOCYTES NFR BLD: 0.6 %
LDLC SERPL CALC-MCNC: 49 MG/DL
LYMPHOCYTES # BLD AUTO: 1.8 10E9/L (ref 0.8–5.3)
LYMPHOCYTES NFR BLD AUTO: 26.4 %
MCH RBC QN AUTO: 30.5 PG (ref 26.5–33)
MCHC RBC AUTO-ENTMCNC: 33 G/DL (ref 31.5–36.5)
MCV RBC AUTO: 92 FL (ref 78–100)
MONOCYTES # BLD AUTO: 0.5 10E9/L (ref 0–1.3)
MONOCYTES NFR BLD AUTO: 7 %
NEUTROPHILS # BLD AUTO: 4.1 10E9/L (ref 1.6–8.3)
NEUTROPHILS NFR BLD AUTO: 61.4 %
NONHDLC SERPL-MCNC: 83 MG/DL
NRBC # BLD AUTO: 0 10*3/UL
NRBC BLD AUTO-RTO: 0 /100
NT-PROBNP SERPL-MCNC: 661 PG/ML (ref 0–125)
O2/TOTAL GAS SETTING VFR VENT: ABNORMAL %
OXYHGB MFR BLD: 97 % (ref 92–100)
PCO2 BLD: 37 MM HG (ref 35–45)
PH BLD: 7.49 PH (ref 7.35–7.45)
PLATELET # BLD AUTO: 146 10E9/L (ref 150–450)
PO2 BLD: 96 MM HG (ref 80–105)
POTASSIUM SERPL-SCNC: 3.5 MMOL/L (ref 3.4–5.3)
PROT SERPL-MCNC: 8 G/DL (ref 6.8–8.8)
RBC # BLD AUTO: 4.17 10E12/L (ref 3.8–5.2)
SODIUM SERPL-SCNC: 137 MMOL/L (ref 133–144)
TRIGL SERPL-MCNC: 168 MG/DL
TROPONIN I SERPL-MCNC: <0.015 UG/L (ref 0–0.04)
TSH SERPL DL<=0.005 MIU/L-ACNC: 2.04 MU/L (ref 0.4–4)
WBC # BLD AUTO: 6.7 10E9/L (ref 4–11)

## 2021-05-05 PROCEDURE — 85379 FIBRIN DEGRADATION QUANT: CPT | Mod: ZL | Performed by: INTERNAL MEDICINE

## 2021-05-05 PROCEDURE — 82805 BLOOD GASES W/O2 SATURATION: CPT | Mod: ZL | Performed by: INTERNAL MEDICINE

## 2021-05-05 PROCEDURE — 84484 ASSAY OF TROPONIN QUANT: CPT | Mod: ZL | Performed by: INTERNAL MEDICINE

## 2021-05-05 PROCEDURE — 86140 C-REACTIVE PROTEIN: CPT | Mod: ZL | Performed by: INTERNAL MEDICINE

## 2021-05-05 PROCEDURE — G0463 HOSPITAL OUTPT CLINIC VISIT: HCPCS

## 2021-05-05 PROCEDURE — 82977 ASSAY OF GGT: CPT | Mod: ZL | Performed by: INTERNAL MEDICINE

## 2021-05-05 PROCEDURE — 86038 ANTINUCLEAR ANTIBODIES: CPT | Mod: ZL | Performed by: INTERNAL MEDICINE

## 2021-05-05 PROCEDURE — 80061 LIPID PANEL: CPT | Mod: ZL | Performed by: INTERNAL MEDICINE

## 2021-05-05 PROCEDURE — 82306 VITAMIN D 25 HYDROXY: CPT | Mod: ZL | Performed by: INTERNAL MEDICINE

## 2021-05-05 PROCEDURE — 85025 COMPLETE CBC W/AUTO DIFF WBC: CPT | Mod: ZL | Performed by: INTERNAL MEDICINE

## 2021-05-05 PROCEDURE — 36415 COLL VENOUS BLD VENIPUNCTURE: CPT | Mod: ZL | Performed by: INTERNAL MEDICINE

## 2021-05-05 PROCEDURE — 82607 VITAMIN B-12: CPT | Mod: ZL | Performed by: INTERNAL MEDICINE

## 2021-05-05 PROCEDURE — 84443 ASSAY THYROID STIM HORMONE: CPT | Mod: ZL | Performed by: INTERNAL MEDICINE

## 2021-05-05 PROCEDURE — 82746 ASSAY OF FOLIC ACID SERUM: CPT | Mod: ZL | Performed by: INTERNAL MEDICINE

## 2021-05-05 PROCEDURE — 80053 COMPREHEN METABOLIC PANEL: CPT | Mod: ZL | Performed by: INTERNAL MEDICINE

## 2021-05-05 PROCEDURE — 99205 OFFICE O/P NEW HI 60 MIN: CPT | Performed by: INTERNAL MEDICINE

## 2021-05-05 PROCEDURE — 83880 ASSAY OF NATRIURETIC PEPTIDE: CPT | Mod: ZL | Performed by: INTERNAL MEDICINE

## 2021-05-05 RX ORDER — RAMIPRIL 10 MG/1
10 CAPSULE ORAL 2 TIMES DAILY
Qty: 180 CAPSULE | Refills: 3 | Status: SHIPPED | OUTPATIENT
Start: 2021-05-05 | End: 2021-11-23

## 2021-05-05 ASSESSMENT — PAIN SCALES - GENERAL: PAINLEVEL: NO PAIN (0)

## 2021-05-05 ASSESSMENT — MIFFLIN-ST. JEOR: SCORE: 1133.65

## 2021-05-05 NOTE — LETTER
May 10, 2021      Flora Apariciocurt  [address]    [facility] [id number]        Dear ,    We are writing to inform you of your test results.    Here are your lab results.  Your vitamin D is really high at 94.  You could consider cutting back on your vitamin D levels.  Your vitamin B12 is on the low side of normal at 392.  You may consider taking vitamin B12 or vitamin B complex.  Your BNP continues to be elevated suggesting fluid overload or congestion.  Your glucose was elevated at 146.  Your triglycerides which is the sugary portion of your carbohydrates is elevated at 168.  This is caused by sugar and carbohydrates as well as diabetes.  You should cut back on your sugar and carbohydrate intake.  Your platelets which are the clotting portion of blood are mildly low at 146.  This should not cause any bleeding issues but if continues to get significantly lower needs to be evaluated.  Otherwise, your RIOS which is an inflammatory process, folate level, thyroid function, troponin which is a heart enzyme and can elevate with heart damage, electrolytes, kidney function, calcium level, protein levels, liver function, CRP which is a marker for inflammation, D-dimer when elevated suggest a blood clot and ABG which looks at your oxygen levels in your blood were normal.    Resulted Orders   Folate   Result Value Ref Range    Folate >100.0 >5.4 ng/mL   Blood gas arterial and oxyhgb   Result Value Ref Range    pH Arterial 7.49 (H) 7.35 - 7.45 pH    pCO2 Arterial 37 35 - 45 mm Hg    pO2 Arterial 96 80 - 105 mm Hg    Bicarbonate Arterial 28 21 - 28 mmol/L    FIO2 Unknown     Oxyhemoglobin Arterial 97 92 - 100 %    Base Excess Art 4.4 mmol/L      Comment:      Abnormal Result, Ref range: -9.0 to 1.8   Vitamin D Deficiency   Result Value Ref Range    Vitamin D Deficiency screening 94 (H) 20 - 75 ug/L      Comment:      Season, race, dietary intake, and treatment affect the concentration of   25-hydroxy-Vitamin  D. Values may decrease during winter months and increase   during summer months. Values 20-29 ug/L may indicate Vitamin D insufficiency   and values <20 ug/L may indicate Vitamin D deficiency.  Vitamin D determination is routinely performed by an immunoassay specific for   25 hydroxyvitamin D3.  If an individual is on vitamin D2 (ergocalciferol)   supplementation, please specify 25 OH vitamin D2 and D3 level determination by   LCMSMS test [id number].     Vitamin B12   Result Value Ref Range    Vitamin B12 391 193 - 986 pg/mL   TSH with free T4 reflex   Result Value Ref Range    TSH 2.04 0.40 - 4.00 mU/L   Troponin I   Result Value Ref Range    Troponin I ES <0.015 0.000 - 0.045 ug/L      Comment:      The 99th percentile for upper reference range is 0.045 ug/L.  Troponin values   in the range of 0.045 - 0.120 ug/L may be associated with risks of adverse   clinical events.     BNP-N terminal pro   Result Value Ref Range    N-Terminal Pro Bnp 661 (H) 0 - 125 pg/mL      Comment:         Reference range shown and results flagged as abnormal are for the outpatient,   non acute settings. Establishing a baseline value for each individual patient   is useful for follow-up.  Suggested inpatient cut points for confirming diagnosis of CHF in an acute   setting are:   >450 pg/mL (age 18 to less than 50)   >900 pg/mL (age 50 to less than 75)   >1800 pg/mL (75 yrs and older)  An inpatient or emergency department NT-proPBNP <300 pg/mL effectively rules   out acute CHF, with 99% negative predictive value.      Anti Nuclear [place] IgG by IFA with Reflex   Result Value Ref Range    RIOS interpretation Negative NEG^Negative      Comment:                                         Reference range:  <1:40  NEGATIVE  1:40 - 1:80  BORDERLINE POSITIVE  >1:80 POSITIVE     GGT   Result Value Ref Range    GGT 14 0 - 40 U/L   D dimer quantitative   Result Value Ref Range    D Dimer 0.5 0.0 - 0.50 ug/ml FEU      Comment:      This D-dimer assay is  intended for use in conjunction with a clinical pretest   probability assessment model to exclude pulmonary embolism (PE) and deep   venous thrombosis (DVT) in outpatients suspected of PE or DVT. The cut-off   value is 0.5 ug/mL FEU.     CRP, inflammation   Result Value Ref Range    CRP Inflammation <2.9 0.0 - 8.0 mg/L   CBC with platelets and differential   Result Value Ref Range    WBC 6.7 4.0 - 11.0 10e9/L    RBC Count 4.17 3.8 - 5.2 10e12/L    Hemoglobin 12.7 11.7 - 15.7 g/dL    Hematocrit 38.5 35.0 - 47.0 %    MCV 92 78 - 100 fl    MCH 30.5 26.5 - 33.0 pg    MCHC 33.0 31.5 - 36.5 g/dL    RDW 12.0 10.0 - 15.0 %    Platelet Count 146 (L) 150 - 450 10e9/L    Diff Method Automated Method     % Neutrophils 61.4 %    % Lymphocytes 26.4 %    % Monocytes 7.0 %    % Eosinophils 4.3 %    % Basophils 0.3 %    % Immature Granulocytes 0.6 %    Nucleated RBCs 0 0 /100    Absolute Neutrophil 4.1 1.6 - 8.3 10e9/L    Absolute Lymphocytes 1.8 0.8 - 5.3 10e9/L    Absolute Monocytes 0.5 0.0 - 1.3 10e9/L    Absolute Eosinophils 0.3 0.0 - 0.7 10e9/L    Absolute Basophils 0.0 0.0 - 0.2 10e9/L    Abs Immature Granulocytes 0.0 0 - 0.4 10e9/L    Absolute Nucleated RBC 0.0    Comprehensive metabolic panel   Result Value Ref Range    Sodium 137 133 - 144 mmol/L    Potassium 3.5 3.4 - 5.3 mmol/L    Chloride 101 94 - 109 mmol/L    Carbon Dioxide 30 20 - 32 mmol/L    Anion Gap 6 3 - 14 mmol/L    Glucose 146 (H) 70 - 99 mg/dL    Urea Nitrogen 13 7 - 30 mg/dL    Creatinine 0.76 0.52 - 1.04 mg/dL    GFR Estimate 77 >60 mL/min/[1.73_m2]      Comment:      Non  GFR Calc  Starting 12/18/2018, serum creatinine based estimated GFR (eGFR) will be   calculated using the Chronic Kidney Disease Epidemiology Collaboration   (CKD-EPI) equation.      GFR Estimate If Black 90 >60 mL/min/[1.73_m2]      Comment:       GFR Calc  Starting 12/18/2018, serum creatinine based estimated GFR (eGFR) will be   calculated using the  Chronic Kidney Disease Epidemiology Collaboration   (CKD-EPI) equation.      Calcium 8.9 8.5 - 10.1 mg/dL    Bilirubin Total 0.8 0.2 - 1.3 mg/dL    Albumin 3.8 3.4 - 5.0 g/dL    Protein Total 8.0 6.8 - 8.8 g/dL    Alkaline Phosphatase 35 (L) 40 - 150 U/L    ALT 23 0 - 50 U/L    AST 15 0 - 45 U/L   Lipid Profile (Chol, Trig, HDL, LDL calc)   Result Value Ref Range    Cholesterol 135 <200 mg/dL    Triglycerides 168 (H) <150 mg/dL      Comment:      Borderline high:  150-199 mg/dl  High:             200-499 mg/dl  Very high:       >499 mg/dl      HDL Cholesterol 52 >49 mg/dL    LDL Cholesterol Calculated 49 <100 mg/dL      Comment:      Desirable:       <100 mg/dl    Non HDL Cholesterol 83 <130 mg/dL       If you have any questions or concerns, please call the clinic at the number listed above.       Sincerely,      Jin Corea, DO

## 2021-05-05 NOTE — PROGRESS NOTES
[facility] HEART CARE   CARDIOLOGY CONSULT     Flora Acuna   1945  [id number]    Nicolasa Guillen     Chief Complaint   Patient presents with     New Patient     SOB with exertion          HPI:   Mrs. Acuna is a 76-year-old female who is being seen by cardiology for dyspnea exertion.  She has had this issue for many years.  She was seen by cardiology in 2015 and 2016 with work-up which included a cardiac catheterization.  She reports being dyspneic on exertion for most of her life.  Her symptoms are improved with inhalers.    Bo reports being dyspneic for many years.  Her shortness of breath has gotten worse over the last few months to years.  She states that the Spiriva has helped her shortness of breath significantly.  She does carry history of asthma.  She was seen by cardiology through [facility] in 2015 and 2016 for this very issue.  She reportedly had an angiogram in 2002 which was reportedly normal.  She had a repeat cath on 7/15/2015 [facility] with a 10% lesion to her LAD.  All remaining vessels were patent.  She carries a diagnosis of heart failure with preserved ejection fraction but more recently this issue was not identified.  Her cardiac catheterization 2015 also showed normal filling pressures.      She has noted that with vacuuming her house, detention through, she will be so significantly short of breath that she will use a fan to improve her air hunger.  As mentioned, she has essentially had 2 normal cardiac catheterizations.  She states she would not be able to do stress testing secondary to the uncomfortable feeling it creates.  Her echo from 5/8/2009 showed diastolic dysfunction grade 1.  This was not identified on her most recent echo from 9/17/2020.  She did not have a significant valvular or chamber abnormalities.  She does carry history of rheumatoid arthritis but states she was told most recently that she has osteoarthritis and not RA.  A normal chest x-ray on 3/19/2021.   PFT's on 2/18/2021 showed minimal COPD.  She states she was exposed to secondhand smoke by her father at a young age.  She herself has never used tobacco.    I believe the top possibilities for her shortness of breath would be a history of asthma, the beta-blocker she is on, the possibility of a DVT/PE, concern for cirrhosis as she was noted to have a fatty liver on imaging from 11/7/2007, PE, diastolic dysfunction, hypothyroidism, and potentially rhythm issues. With the exception of a prolonged QT, EKG normal on 5/5/2021.    She also carries diagnosis of diabetes.  Her A1c most recently was 6.9%. She has hyperlipidemia which is well controlled on Zocor 40 mg daily.      She has a history of hypothyroidism.  She has been on levothyroxine 75 mcg.  Her last TSH was on 6/7/2019 and was normal at 1.17.    She also has a history of prolonged QTC.  She has hypertension which has been controlled.    IMAGING RESULTS:   Echocardiogram on 9/17/2020:  No pericardial effusion is present.  Global and regional left ventricular function is normal with an EF of 60-65%.  Left ventricular diastolic function is normal.  The right ventricle is normal size.  Both atria appear normal.  Trace mitral insufficiency is present.  Aortic valve is normal in structure and function.  Trace tricuspid insufficiency is present.  Right ventricular systolic pressure is 13 mmHg above the right atrial pressure.  The pulmonic valve is normal.  The aorta root is normal.    Echo on 5/8/2009:     1. Normal left ventricular size with mild systolic functional impairment.    2. Evidence of grade 1 diastolic dysfunction.    3. Mild left atrial enlargement.    4. Trace physiologic amounts of mitral tricuspid and pulmonic insufficiency.    5. Trivial pericardial effusion.    Left heart cath on 7/15/2015 at [facility]:  DOMINANCE:  Right Dominant  LEFT MAIN:  is angiographically normal (0% Stenosis)  LEFT ANTERIOR DESCENDING :  Proximal Segment is  angiographically normal (0% Stenosis)  rest of vessel has luminal irregularities (10% Stenosis) with distal pruning.  DIAGONAL 1:  is angiographically normal (0% Stenosis)  CIRCUMFLEX:  and its branches are angiographically normal (0% Stenosis)  RIGHT CORONARY ARTERY:  is angiographically normal (0% Stenosis)  COLLATERALS:  No collateral flow  Normal LVEDP      CURRENT MEDICATIONS:   Prior to Admission medications    Medication Sig Start Date End Date Taking? Authorizing Provider   acetaminophen (TYLENOL) 325 MG tablet Take 650 mg by mouth 8/11/11   Reported, Patient   Acetone, Urine, Test (KETONE TEST) STRP Use as directed 1/19/15      albuterol (PROAIR HFA/PROVENTIL HFA/VENTOLIN HFA) 108 (90 Base) MCG/ACT inhaler Inhale 2 puffs into the lungs every 6 hours 2/25/21   Nicolasa Guillen, NP   alendronate (FOSAMAX) 70 MG tablet Take 1 tablet by mouth once weekly. Take with water in the AM 30 minutes prior to eating. Avoid lying down 30 minutes after taking med. 12/14/20   Nicolasa Guillen NP   aspirin (ASA) 325 MG EC tablet Take 325 mg by mouth daily    Reported, Patient   blood glucose (CONTOUR NEXT TEST) test strip Use 6 times a day with the insulin pump to help manage your diabetes 11/26/19   Kerri Elliott APRN CNP   blood glucose (NO BRAND SPECIFIED) test strip by In Vitro route 4 times daily Use to test blood sugar 4 times daily or as directed.    Reported, Patient   Calcium-Vitamin D-Vitamin K (VIACTIV PO) Take 2 chew tab by mouth every morning    Reported, Patient   Continuous Blood Gluc  (DEXCOM G6 ) XX DIMITRIS 1 each continuous 2/19/20   Kerri Elliott APRN CNP   Continuous Blood Gluc Sensor (DEXCOM G5 MOB/G4 PLAT SENSOR) MISC 1 each continuous Change every 7 days 2/25/20   Kerri Elliott APRN CNP   Continuous Blood Gluc Sensor (DEXCOM G6 SENSOR) MISC 1 each continuous To be changed every 10 days 3/1/21   Kerri Elliott APRN CNP   Continuous Blood Gluc Transmit (DEXCOM G6 TRANSMITTER) MISC  1 each continuous To be changed every 3 months 3/30/21   Kerri Elliott APRN CNP   furosemide (LASIX) 40 MG tablet TAKE ONE (1) TABLET BY MOUTH every morning and half tablet every afternoon. 1/29/21   Nicolasa Guillen NP   glucagon (GLUCAGON EMERGENCY) 1 MG injection Inject 1 mg Subcutaneous once 1/19/15   Salvador Mejia MD   Insulin Aspart (INSULIN PUMP - OUTPATIENT)     Reported, Patient   insulin aspart (NOVOLOG VIAL) 100 UNITS/ML vial To be used with the insulin pump  TDD: 80 units 5/4/21   Kerri Elliott APRN CNP   INSULIN PUMP - OUTPATIENT Date last updated:  2/4/15  MedEponym MinimSkytap: Model [id number]  BASAL RATES and times:  12   AM (midnight): 0.9 units/hour    9    PM: 0.8 units/hour   Basal Pattern A:  Flora Armand will use when N/A.  CARB RATIO and times:  12   AM (midnight): 13.0  4     PM: 10  Corection Factor (Sensitivity) and times:  12   AM (midnight): 55 mg/dL  BLOOD GLUCOSE TARGET and times:  12   AM (midnight): 100 - 150  7     AM:  100 - 120  9    PM:  100 - 150  Active Insulin Time:  3 hours  Sensor:  No  Carelink / Diasend username:  elias  Carelink / Diasend Password:  [email or web address]    Reported, Patient   Lancet Devices MISC  1/5/16   Reported, Patient   levothyroxine (SYNTHROID/LEVOTHROID) 75 MCG tablet Take 1 tablet (75 mcg) by mouth daily 7/8/20   Nicolasa Guillen NP   metoprolol tartrate (LOPRESSOR) 50 MG tablet Take 1 tablet (50 mg) by mouth 2 times daily 7/27/20   Nicolasa Guillen NP   nitroglycerin (NITROSTAT) 0.4 MG SL tablet Place 0.4 mg under the tongue 12/6/13   Reported, Patient   omeprazole (PRILOSEC) 40 MG DR capsule Take 1 capsule (40 mg) by mouth daily 8/27/20   Nicolasa Guillen NP   potassium chloride ER (KLOR-CON M) 20 MEQ CR tablet TAKE 1  BY MOUTH TWICE DAILY 1/4/21   Nicolasa Guillen NP   ramipril (ALTACE) 10 MG capsule Take 1 capsule (10 mg) by mouth daily 9/4/20   Nicolasa Guillen, NP   senna-docusate (SENOKOT-S;PERICOLACE) 8.6-50 MG per tablet Take 1 tablet by mouth  At Bedtime  8/25/17   Reported, Patient   simvastatin (ZOCOR) 40 MG tablet Take 1 tablet (40 mg) by mouth At Bedtime 7/27/20   Nicolasa Guillen, NP   tamoxifen (NOLVADEX) 20 MG tablet Take 20 mg by mouth daily  5/9/17   Reported, Patient   tiotropium (SPIRIVA RESPIMAT) 2.5 MCG/ACT inhaler Inhale 2 puffs into the lungs daily 3/30/21   Nicolasa Guillen, NP   tiZANidine (ZANAFLEX) 4 MG tablet 1/2 tab three times a day    Reported, Patient   VITAMIN D, CHOLECALCIFEROL, PO Take 5,000 Units by mouth daily    Reported, Patient       ALLERGIES:   Allergies   Allergen Reactions     Contrast Dye Difficulty breathing     Gadolinium Derivatives      Other reaction(s): Difficulty in swallowing, Laryngeal spasm  Patient had an injection into rt. Shoulder capsule prior to MRI arthrogram.  Contained multihance gadobenate, omnipaque iohexol, and buffered lidocaine.       Ammonia      Cory-1 [Lidocaine Hcl]      Novocaine allergy       Codeine Sulfate      Food      Shrimp     Iodine-131 Swelling     Ct dye     Lidocaine      Nitrous Oxide      No Clinical Screening - See Comments Nausea and Vomiting and Other (See Comments)     (3/6/09)- N/V and Heart racing     Novocain [Procaine]      Penicillins      Tramadol      Morphine Palpitations        PAST MEDICAL HISTORY:   Past Medical History:   Diagnosis Date     Congestive heart failure, unspecified      Diabetes (H)      H/O rheumatoid arthritis      HLD (hyperlipidemia)      HTN (hypertension)      Myocardial infarction (H)      Uncomplicated asthma         PAST SURGICAL HISTORY:   Past Surgical History:   Procedure Laterality Date     APPENDECTOMY OPEN CHILD       AS TOTAL KNEE ARTHROPLASTY Right      CHOLECYSTECTOMY       COLONOSCOPY - HIM SCAN N/A 03/12/2009    per Care Everywhere documentation per [facility].      HYSTERECTOMY TOTAL ABDOMINAL       MASTECTOMY, BILATERAL Bilateral 02/26/1992     SHOULDER SURGERY Right      SHOULDER SURGERY Left         FAMILY HISTORY:    Family History   Problem Relation Age of Onset     Breast Cancer Maternal Aunt      Breast Cancer Maternal Aunt      Lung Cancer Father         SOCIAL HISTORY:   Social History     Socioeconomic History     Marital status:      Spouse name: Not on file     Number of children: 2     Years of education: Not on file     Highest education level: Not on file   Occupational History     Occupation: [employer]     Employer: RETIRED   Social Needs     Financial resource strain: Not on file     Food insecurity     Worry: Not on file     Inability: Not on file     Transportation needs     Medical: Not on file     Non-medical: Not on file   Tobacco Use     Smoking status: Never Smoker     Smokeless tobacco: Never Used   Substance and Sexual Activity     Alcohol use: No     Alcohol/week: 0.0 standard drinks     Drug use: No     Sexual activity: Not on file   Lifestyle     Physical activity     Days per week: Not on file     Minutes per session: Not on file     Stress: Not on file   Relationships     Social connections     Talks on phone: Not on file     Gets together: Not on file     Attends Shinto service: Not on file     Active member of club or organization: Not on file     Attends meetings of clubs or organizations: Not on file     Relationship status: Not on file     Intimate partner violence     Fear of current or ex partner: Not on file     Emotionally abused: Not on file     Physically abused: Not on file     Forced sexual activity: Not on file   Other Topics Concern     Parent/sibling w/ CABG, MI or angioplasty before 65F 55M? Not Asked   Social History Narrative     Not on file          ROS:   CONSTITUTIONAL: No weight loss, fever, chills, admits to generalized weakness and fatigue.   HEENT: Eyes: No visual changes. Ears, Nose, Throat: No hearing loss, congestion or difficulty swallowing.   CARDIOVASCULAR: Occasional chest pain with chest pressure and chest discomfort. No palpitations but with mild lower  extremity edema.   RESPIRATORY: Significant shortness of breath with dyspnea upon exertion, no cough or sputum production.   GASTROINTESTINAL: No abdominal pain. No anorexia, nausea, vomiting or diarrhea.   NEUROLOGICAL: No headache, lightheadedness, dizziness, syncope, ataxia but admits to generalized weakness.   HEMATOLOGIC: No anemia, bleeding or bruising.   PSYCHIATRIC: No history of depression or anxiety.   ENDOCRINOLOGIC: No reports of sweating, cold or heat intolerance. No polyuria or polydipsia.   SKIN: No abnormal rashes or itching.       PHYSICAL EXAM:   GENERAL: The patient is a well-developed, well-nourished, in no apparent distress. Alert and oriented x3.   HEENT: Head is normocephalic and atraumatic. Eyes are symmetrical with normal visual tracking.  HEART: Irregular rate and rhythm, S1S2 present without murmur, rub or gallop.   LUNGS: Respirations regular and unlabored. Clear to auscultation.   EXTREMITIES: +1/4 peripheral edema present.   NEUROLOGIC: Alert and oriented X3.    SKIN: No jaundice. No rashes or visible skin lesions present.        LAB RESULTS:   No visits with results within 2 Month(s) from this visit.   Latest known visit with results is:   Transferred Records on 02/12/2021   Component Date Value Ref Range Status     RETINOPATHY 02/12/2021 NEGATIVE   Final          ASSESSMENT:       ICD-10-CM    1. FRANCO (dyspnea on exertion)  R06.00 D dimer quantitative     CRP, inflammation     BNP-N terminal pro     CBC with platelets and differential     Comprehensive metabolic panel     ramipril (ALTACE) 10 MG capsule     PULMONARY MEDICINE REFERRAL     GGT     V/Q Scan     Blood gas arterial and oxyhgb     Troponin I     Lipid Profile (Chol, Trig, HDL, LDL calc)     Anti Nuclear Latonia IgG by IFA with Reflex     TSH with free T4 reflex     Blood gas arterial and oxyhgb     Vitamin D Deficiency     Vitamin B12     TSH with free T4 reflex     Troponin I     BNP-N terminal pro     Anti Nuclear Latonia IgG by  IFA with Reflex     GGT     D dimer quantitative     CRP, inflammation     CBC with platelets and differential     Comprehensive metabolic panel     Lipid Profile (Chol, Trig, HDL, LDL calc)     CANCELED: TSH with free T4 reflex     CANCELED: Anti Nuclear Latonia IgG by IFA with Reflex     CANCELED: Lipid Profile (Chol, Trig, HDL, LDL calc)     CANCELED: Troponin I     CANCELED: GGT     CANCELED: D dimer quantitative     CANCELED: CRP, inflammation     CANCELED: BNP-N terminal pro     CANCELED: CBC with platelets and differential     CANCELED: Comprehensive metabolic panel   2. Intermittent chest pain  R07.9 D dimer quantitative     CRP, inflammation     V/Q Scan     Troponin I     Anti Nuclear Latonia IgG by IFA with Reflex     Vitamin D Deficiency     Vitamin B12     TSH with free T4 reflex     Troponin I     BNP-N terminal pro     Anti Nuclear Latonia IgG by IFA with Reflex     GGT     D dimer quantitative     CRP, inflammation     CBC with platelets and differential     Comprehensive metabolic panel     Lipid Profile (Chol, Trig, HDL, LDL calc)     CANCELED: Anti Nuclear Latonia IgG by IFA with Reflex     CANCELED: Troponin I     CANCELED: D dimer quantitative     CANCELED: CRP, inflammation   3. Essential hypertension  I10 ramipril (ALTACE) 10 MG capsule     Vitamin D Deficiency     Vitamin B12     TSH with free T4 reflex     Troponin I     BNP-N terminal pro     Anti Nuclear Latonia IgG by IFA with Reflex     GGT     D dimer quantitative     CRP, inflammation     CBC with platelets and differential     Comprehensive metabolic panel     Lipid Profile (Chol, Trig, HDL, LDL calc)   4. Prolonged QT interval  R94.31 Vitamin D Deficiency     Vitamin B12     TSH with free T4 reflex     Troponin I     BNP-N terminal pro     Anti Nuclear Latonia IgG by IFA with Reflex     GGT     D dimer quantitative     CRP, inflammation     CBC with platelets and differential     Comprehensive metabolic panel     Lipid Profile (Chol, Trig, HDL, LDL calc)    5. Asthma  J45.909 PULMONARY MEDICINE REFERRAL     V/Q Scan     Blood gas arterial and oxyhgb     Blood gas arterial and oxyhgb     Vitamin D Deficiency     Vitamin B12     TSH with free T4 reflex     Troponin I     BNP-N terminal pro     Anti Nuclear Latonia IgG by IFA with Reflex     GGT     D dimer quantitative     CRP, inflammation     CBC with platelets and differential     Comprehensive metabolic panel     Lipid Profile (Chol, Trig, HDL, LDL calc)   6. Fatty liver on 11/7/2007  K76.0 GGT     Lipid Profile (Chol, Trig, HDL, LDL calc)     Vitamin D Deficiency     Vitamin B12     TSH with free T4 reflex     Troponin I     BNP-N terminal pro     Anti Nuclear Latonia IgG by IFA with Reflex     GGT     D dimer quantitative     CRP, inflammation     CBC with platelets and differential     Comprehensive metabolic panel     Lipid Profile (Chol, Trig, HDL, LDL calc)     CANCELED: Lipid Profile (Chol, Trig, HDL, LDL calc)     CANCELED: GGT   7. Lower extremity edema  R60.0 BNP-N terminal pro     ramipril (ALTACE) 10 MG capsule     Vitamin D Deficiency     Vitamin B12     TSH with free T4 reflex     Troponin I     BNP-N terminal pro     Anti Nuclear Latonia IgG by IFA with Reflex     GGT     D dimer quantitative     CRP, inflammation     CBC with platelets and differential     Comprehensive metabolic panel     Lipid Profile (Chol, Trig, HDL, LDL calc)     CANCELED: BNP-N terminal pro   8. Diastolic dysfunction with chronic heart failure (H)  I50.32 BNP-N terminal pro     ramipril (ALTACE) 10 MG capsule     Vitamin D Deficiency     Vitamin B12     TSH with free T4 reflex     Troponin I     BNP-N terminal pro     Anti Nuclear Latonia IgG by IFA with Reflex     GGT     D dimer quantitative     CRP, inflammation     CBC with platelets and differential     Comprehensive metabolic panel     Lipid Profile (Chol, Trig, HDL, LDL calc)     CANCELED: BNP-N terminal pro   9. Type 2 diabetes mellitus with hyperglycemia, with long-term current use  of insulin (H)  E11.65 Comprehensive metabolic panel    Z79.4 ramipril (ALTACE) 10 MG capsule     Vitamin D Deficiency     Vitamin B12     TSH with free T4 reflex     Troponin I     BNP-N terminal pro     Anti Nuclear Latonia IgG by IFA with Reflex     GGT     D dimer quantitative     CRP, inflammation     CBC with platelets and differential     Comprehensive metabolic panel     Lipid Profile (Chol, Trig, HDL, LDL calc)     CANCELED: Comprehensive metabolic panel   10. Hypothyroidism, unspecified type  E03.9 CRP, inflammation     Comprehensive metabolic panel     TSH with free T4 reflex     Vitamin D Deficiency     Vitamin B12     TSH with free T4 reflex     Troponin I     BNP-N terminal pro     Anti Nuclear Latonia IgG by IFA with Reflex     GGT     D dimer quantitative     CRP, inflammation     CBC with platelets and differential     Comprehensive metabolic panel     Lipid Profile (Chol, Trig, HDL, LDL calc)     CANCELED: TSH with free T4 reflex     CANCELED: CRP, inflammation     CANCELED: Comprehensive metabolic panel   11. Mixed hyperlipidemia  E78.2 Lipid Profile (Chol, Trig, HDL, LDL calc)     TSH with free T4 reflex     Vitamin D Deficiency     Vitamin B12     TSH with free T4 reflex     Troponin I     BNP-N terminal pro     Anti Nuclear Latonia IgG by IFA with Reflex     GGT     D dimer quantitative     CRP, inflammation     CBC with platelets and differential     Comprehensive metabolic panel     Lipid Profile (Chol, Trig, HDL, LDL calc)     CANCELED: TSH with free T4 reflex     CANCELED: Lipid Profile (Chol, Trig, HDL, LDL calc)   12. Hypokalemia  E87.6 Comprehensive metabolic panel     ramipril (ALTACE) 10 MG capsule     Vitamin D Deficiency     Vitamin B12     TSH with free T4 reflex     Troponin I     BNP-N terminal pro     Anti Nuclear Latonia IgG by IFA with Reflex     GGT     D dimer quantitative     CRP, inflammation     CBC with platelets and differential     Comprehensive metabolic panel     Lipid Profile (Chol,  Trig, HDL, LDL calc)     CANCELED: Comprehensive metabolic panel   13. Vitamin D deficiency  E55.9 Vitamin D Deficiency     Vitamin B12     TSH with free T4 reflex     Troponin I     BNP-N terminal pro     Anti Nuclear Latonia IgG by IFA with Reflex     GGT     D dimer quantitative     CRP, inflammation     CBC with platelets and differential     Comprehensive metabolic panel     Lipid Profile (Chol, Trig, HDL, LDL calc)     CANCELED: Vitamin D Deficiency   14. Vitamin B12 deficiency (non anemic)  E53.8 Folate     Vitamin D Deficiency     Vitamin B12     TSH with free T4 reflex     Troponin I     BNP-N terminal pro     Anti Nuclear Latonia IgG by IFA with Reflex     GGT     D dimer quantitative     CRP, inflammation     CBC with platelets and differential     Comprehensive metabolic panel     Lipid Profile (Chol, Trig, HDL, LDL calc)     CANCELED: Vitamin B12         PLAN:   1.  Patient continues to bilateral with dyspnea on exertion.  She has had this problem for many years.  We discussed different possibilities.  We will plan for work-up as outlined below.  2.  We will plan for labs and work-up of her dyspnea with exertion.  We will plan for RIOS with history of RA, ABG with history of asthma, BNP as it was elevated previously, CBC looking for anemia, CMP looking at electrolytes and liver function as identified as having a fatty liver in the past, CRP looking for inflammation, D-dimer looking for a blood clot as she has increased swelling to one of her legs compared to the other, and GGT looking for liver issues for better understanding if she has cirrhosis or not.  3.  We will obtain a B12, vitamin D, and folate level as she has a history of vitamin D deficiency.  4.  We will have an ultrasound of the liver looking for cirrhosis.  5.  We will obtain an ultrasound of her legs as one leg is larger than the other looking for a blood clot.  If found of a blood clot in her leg, there is a higher probability that she would have  a PE or CTEPH.  6.  Metoprolol could be causing her shortness of breath.  She has been on this for many years.  We will cut the 50 mg tartrate twice a day in half to 25 mg tartrate twice a day for a week, then stop.  7.  To help control her blood pressure, and her history of diabetes, will increase ramipril from 10 mg daily to 10 mg twice a day.  8.  Related to shortness of breath and history of asthma, will obtain an ABG.  9.  With concerns for asthma and shortness of breath, will have her seen pulmonary.  I suspect her shortness of breath is more of a lung issue than heart.  10.  Related to shortness of breath and possible history of DVT, will obtain a VQ scan.  11.  With only 10% blockage on her cardiac catheterization 2015, will discontinue sublingual nitro as needed for chest pain.  12.  Follow-up after completion of testing.    Total time spent on day of visit, including review of tests, obtaining/reviewing separately obtained history, ordering medications/tests/procedures, communicating with PCP/consultants, and documenting in electronic medical record: 130 minutes.       Thank you for allowing me to participate in the care of your patient. Please do not hesitate to contact me if you have any questions.     Jin Corea, DO

## 2021-05-05 NOTE — PROGRESS NOTES
Pt came in for a CGM and insulin pump download today. This was completed and given to Kerri Elliott.    Ginger Lerner

## 2021-05-05 NOTE — LETTER
May 5, 2021      Flora Apariciocurt  [address]    [facility] [address]        Dear ,    We are writing to inform you of your test results.    Here are your lab results.  Your BNP continues to be elevated at 661.  This is a marker for heart failure/fluid overload, as we discussed in the clinic.  Your glucose/sugar was elevated at 146.  Your triglycerides which are the sugary portion of your cholesterol are also elevated.  Remainder of your cholesterol look good.  Your platelets are mildly low.  You would not need a transfusion related to this low level.  Otherwise, your thyroid function, troponin which when elevated can suggest heart damage, electrolytes, kidney function, calcium level,  bilirubin level, protein levels, liver function, lipid panel, CRP which is a marker for inflammation, D-dimer which when elevated can suggest a blood clot, blood counts and ABG which checks the oxygen and CO2 levels in your blood were all normal.    Resulted Orders   Blood gas arterial and oxyhgb   Result Value Ref Range    pH Arterial 7.49 (H) 7.35 - 7.45 pH    pCO2 Arterial 37 35 - 45 mm Hg    pO2 Arterial 96 80 - 105 mm Hg    Bicarbonate Arterial 28 21 - 28 mmol/L    FIO2 Unknown     Oxyhemoglobin Arterial 97 92 - 100 %    Base Excess Art 4.4 mmol/L      Comment:      Abnormal Result, Ref range: -9.0 to 1.8   TSH with free T4 reflex   Result Value Ref Range    TSH 2.04 0.40 - 4.00 mU/L   Troponin I   Result Value Ref Range    Troponin I ES <0.015 0.000 - 0.045 ug/L      Comment:      The 99th percentile for upper reference range is 0.045 ug/L.  Troponin values   in the range of 0.045 - 0.120 ug/L may be associated with risks of adverse   clinical events.     BNP-N terminal pro   Result Value Ref Range    N-Terminal Pro Bnp 661 (H) 0 - 125 pg/mL      Comment:         Reference range shown and results flagged as abnormal are for the outpatient,   non acute settings. Establishing a baseline value for each  individual patient   is useful for follow-up.  Suggested inpatient cut points for confirming diagnosis of CHF in an acute   setting are:   >450 pg/mL (age 18 to less than 50)   >900 pg/mL (age 50 to less than 75)   >1800 pg/mL (75 yrs and older)  An inpatient or emergency department NT-proPBNP <300 pg/mL effectively rules   out acute CHF, with 99% negative predictive value.      D dimer quantitative   Result Value Ref Range    D Dimer 0.5 0.0 - 0.50 ug/ml FEU      Comment:      This D-dimer assay is intended for use in conjunction with a clinical pretest   probability assessment model to exclude pulmonary embolism (PE) and deep   venous thrombosis (DVT) in outpatients suspected of PE or DVT. The cut-off   value is 0.5 ug/mL FEU.     CRP, inflammation   Result Value Ref Range    CRP Inflammation <2.9 0.0 - 8.0 mg/L   CBC with platelets and differential   Result Value Ref Range    WBC 6.7 4.0 - 11.0 10e9/L    RBC Count 4.17 3.8 - 5.2 10e12/L    Hemoglobin 12.7 11.7 - 15.7 g/dL    Hematocrit 38.5 35.0 - 47.0 %    MCV 92 78 - 100 fl    MCH 30.5 26.5 - 33.0 pg    MCHC 33.0 31.5 - 36.5 g/dL    RDW 12.0 10.0 - 15.0 %    Platelet Count 146 (L) 150 - 450 10e9/L    Diff Method Automated Method     % Neutrophils 61.4 %    % Lymphocytes 26.4 %    % Monocytes 7.0 %    % Eosinophils 4.3 %    % Basophils 0.3 %    % Immature Granulocytes 0.6 %    Nucleated RBCs 0 0 /100    Absolute Neutrophil 4.1 1.6 - 8.3 10e9/L    Absolute Lymphocytes 1.8 0.8 - 5.3 10e9/L    Absolute Monocytes 0.5 0.0 - 1.3 10e9/L    Absolute Eosinophils 0.3 0.0 - 0.7 10e9/L    Absolute Basophils 0.0 0.0 - 0.2 10e9/L    Abs Immature Granulocytes 0.0 0 - 0.4 10e9/L    Absolute Nucleated RBC 0.0    Comprehensive metabolic panel   Result Value Ref Range    Sodium 137 133 - 144 mmol/L    Potassium 3.5 3.4 - 5.3 mmol/L    Chloride 101 94 - 109 mmol/L    Carbon Dioxide 30 20 - 32 mmol/L    Anion Gap 6 3 - 14 mmol/L    Glucose 146 (H) 70 - 99 mg/dL    Urea Nitrogen 13 7  - 30 mg/dL    Creatinine 0.76 0.52 - 1.04 mg/dL    GFR Estimate 77 >60 mL/min/[1.73_m2]      Comment:      Non  GFR Calc  Starting 12/18/2018, serum creatinine based estimated GFR (eGFR) will be   calculated using the Chronic Kidney Disease Epidemiology Collaboration   (CKD-EPI) equation.      GFR Estimate If Black 90 >60 mL/min/[1.73_m2]      Comment:       GFR Calc  Starting 12/18/2018, serum creatinine based estimated GFR (eGFR) will be   calculated using the Chronic Kidney Disease Epidemiology Collaboration   (CKD-EPI) equation.      Calcium 8.9 8.5 - 10.1 mg/dL    Bilirubin Total 0.8 0.2 - 1.3 mg/dL    Albumin 3.8 3.4 - 5.0 g/dL    Protein Total 8.0 6.8 - 8.8 g/dL    Alkaline Phosphatase 35 (L) 40 - 150 U/L    ALT 23 0 - 50 U/L    AST 15 0 - 45 U/L   Lipid Profile (Chol, Trig, HDL, LDL calc)   Result Value Ref Range    Cholesterol 135 <200 mg/dL    Triglycerides 168 (H) <150 mg/dL      Comment:      Borderline high:  150-199 mg/dl  High:             200-499 mg/dl  Very high:       >499 mg/dl      HDL Cholesterol 52 >49 mg/dL    LDL Cholesterol Calculated 49 <100 mg/dL      Comment:      Desirable:       <100 mg/dl    Non HDL Cholesterol 83 <130 mg/dL       If you have any questions or concerns, please call the clinic at the number listed above.       Sincerely,      Jin Corea, DO

## 2021-05-06 LAB
FOLATE SERPL-MCNC: >100 NG/ML
GGT SERPL-CCNC: 14 U/L (ref 0–40)
VIT B12 SERPL-MCNC: 391 PG/ML (ref 193–986)

## 2021-05-07 ENCOUNTER — TELEPHONE (OUTPATIENT)
Dept: EDUCATION SERVICES | Facility: OTHER | Age: 76
End: 2021-05-07

## 2021-05-07 ENCOUNTER — HOSPITAL ENCOUNTER (OUTPATIENT)
Dept: NUCLEAR MEDICINE | Facility: OTHER | Age: 76
Setting detail: NUCLEAR MEDICINE
Discharge: HOME | End: 2021-05-07
Attending: INTERNAL MEDICINE | Admitting: INTERNAL MEDICINE
Payer: MEDICARE

## 2021-05-07 ENCOUNTER — HOSPITAL ENCOUNTER (OUTPATIENT)
Dept: GENERAL RADIOLOGY | Facility: OTHER | Age: 76
Discharge: HOME | End: 2021-05-07
Attending: INTERNAL MEDICINE | Admitting: INTERNAL MEDICINE
Payer: MEDICARE

## 2021-05-07 DIAGNOSIS — R07.9 INTERMITTENT CHEST PAIN: ICD-10-CM

## 2021-05-07 DIAGNOSIS — R06.09 DOE (DYSPNEA ON EXERTION): ICD-10-CM

## 2021-05-07 DIAGNOSIS — J45.909 UNCOMPLICATED ASTHMA, UNSPECIFIED ASTHMA SEVERITY, UNSPECIFIED WHETHER PERSISTENT: ICD-10-CM

## 2021-05-07 LAB
ANA SER QL IF: NEGATIVE
DEPRECATED CALCIDIOL+CALCIFEROL SERPL-MC: 94 UG/L (ref 20–75)

## 2021-05-07 PROCEDURE — 343N000001 HC RX 343: Performed by: INTERNAL MEDICINE

## 2021-05-07 PROCEDURE — A9567 TECHNETIUM TC-99M AEROSOL: HCPCS | Performed by: INTERNAL MEDICINE

## 2021-05-07 PROCEDURE — 272N000035 NM LUNG SCAN VENTILATION AND PERFUSION

## 2021-05-07 PROCEDURE — 78582 LUNG VENTILAT&PERFUS IMAGING: CPT

## 2021-05-07 PROCEDURE — A9540 TC99M MAA: HCPCS | Performed by: INTERNAL MEDICINE

## 2021-05-07 PROCEDURE — 71046 X-RAY EXAM CHEST 2 VIEWS: CPT

## 2021-05-07 RX ADMIN — KIT FOR THE PREPARATION OF TECHNETIUM TC 99M PENTETATE 30.4 MILLICURIE: 20 INJECTION, POWDER, LYOPHILIZED, FOR SOLUTION INTRAVENOUS; RESPIRATORY (INHALATION) at 13:15

## 2021-05-07 RX ADMIN — KIT FOR THE PREPARATION OF TECHNETIUM TC 99M ALBUMIN AGGREGATED 5.18 MILLICURIE: 2.5 INJECTION, POWDER, FOR SOLUTION INTRAVENOUS at 13:40

## 2021-05-07 NOTE — TELEPHONE ENCOUNTER
Phone call from Shereen at [facility], pt was denied because she has commercial insurance. They do not work with commercial insurances. They will work with Medicare B and D.

## 2021-05-11 ENCOUNTER — TELEPHONE (OUTPATIENT)
Dept: CARDIOLOGY | Facility: OTHER | Age: 76
End: 2021-05-11

## 2021-05-11 NOTE — TELEPHONE ENCOUNTER
Patient LM she feels she does not need to see pulmonary, she did express when will she follow up or continue to follow up with Dr. Corea, patient called back no answer LM she does not need to see pulmonary as that is her chooses and she can talk over that with Dr. Corea as she does have a follow up with him in July. Patient given call back number if she had any further questions.

## 2021-05-13 ENCOUNTER — TELEPHONE (OUTPATIENT)
Dept: CARDIOLOGY | Facility: OTHER | Age: 76
End: 2021-05-13

## 2021-05-13 NOTE — PROGRESS NOTES
CGM data as followed:    Date: 4/22 to 5/5/21  Glucose management indicator: 8.2%    Average glucose: 203    Glucose range  Very low (<54): 0  low (<70): 0  In target range (): 41%  High (>180): 36%  Very high (>250): 23%    Still noting missed opportunities of entering a bolus with meals/snack.     Keep working on this.     Follow up as discussed.     Kerri Elliott APRN FNP-BC  Diabetes and Wound Care

## 2021-05-14 ENCOUNTER — TELEPHONE (OUTPATIENT)
Dept: CARDIOLOGY | Facility: OTHER | Age: 76
End: 2021-05-14

## 2021-05-14 DIAGNOSIS — Z91.041 CONTRAST MEDIA ALLERGY: ICD-10-CM

## 2021-05-14 DIAGNOSIS — R06.09 DOE (DYSPNEA ON EXERTION): Primary | ICD-10-CM

## 2021-05-14 DIAGNOSIS — K76.0 FATTY LIVER: ICD-10-CM

## 2021-05-14 DIAGNOSIS — J45.909 UNCOMPLICATED ASTHMA, UNSPECIFIED ASTHMA SEVERITY, UNSPECIFIED WHETHER PERSISTENT: ICD-10-CM

## 2021-05-14 DIAGNOSIS — I50.32 DIASTOLIC DYSFUNCTION WITH CHRONIC HEART FAILURE (H): ICD-10-CM

## 2021-05-14 DIAGNOSIS — R60.0 LOWER EXTREMITY EDEMA: ICD-10-CM

## 2021-05-14 RX ORDER — PREDNISONE 20 MG/1
TABLET ORAL
Qty: 4 TABLET | Refills: 1 | Status: SHIPPED | OUTPATIENT
Start: 2021-05-14 | End: 2021-11-16

## 2021-05-14 RX ORDER — DIPHENHYDRAMINE HCL 50 MG
CAPSULE ORAL
Qty: 1 CAPSULE | Refills: 0 | Status: SHIPPED | OUTPATIENT
Start: 2021-05-14 | End: 2023-03-29

## 2021-05-14 NOTE — TELEPHONE ENCOUNTER
She is allergic to contrast and will need steroids prior to the study. I will send these to her pharmacy.       Dr. Corea

## 2021-05-14 NOTE — TELEPHONE ENCOUNTER
Lets do a CT scan of her abdomen to see if she has any fluid in her belly.   Order placed. Let me know if she has issues with this.     Dr. Corea    Message text

## 2021-05-14 NOTE — TELEPHONE ENCOUNTER
Will also get an echo to make sure her hrt function has not declined. Order placed.       Dr. Corea

## 2021-05-14 NOTE — TELEPHONE ENCOUNTER
Patients continues to have SOB and she called earlier last week stating she did not want to see pulmonary due to nothing was wrong with her lungs based on VQ scan. Patient advised referral was sent and this was Dr. Landon recommendations. Patient advised writer that her wt. on  5/11/2021 149lb, 5/12/2021 155lb, 5/13/2021 151lb. She states he abdomen is swollen. patient will be seeing you in July for follow up and can follow up with you on Pulmonary referral again. Let me know you thoughts.

## 2021-05-18 ENCOUNTER — TELEPHONE (OUTPATIENT)
Dept: CARDIOLOGY | Facility: OTHER | Age: 76
End: 2021-05-18

## 2021-05-26 ENCOUNTER — HOSPITAL ENCOUNTER (OUTPATIENT)
Dept: CT IMAGING | Facility: HOSPITAL | Age: 76
End: 2021-05-26
Attending: INTERNAL MEDICINE
Payer: MEDICARE

## 2021-05-26 ENCOUNTER — HOSPITAL ENCOUNTER (OUTPATIENT)
Dept: CARDIOLOGY | Facility: HOSPITAL | Age: 76
End: 2021-05-26
Attending: INTERNAL MEDICINE
Payer: MEDICARE

## 2021-05-26 DIAGNOSIS — R60.0 LOWER EXTREMITY EDEMA: ICD-10-CM

## 2021-05-26 DIAGNOSIS — I50.32 DIASTOLIC DYSFUNCTION WITH CHRONIC HEART FAILURE (H): ICD-10-CM

## 2021-05-26 DIAGNOSIS — R06.09 DOE (DYSPNEA ON EXERTION): ICD-10-CM

## 2021-05-26 DIAGNOSIS — J45.909 UNCOMPLICATED ASTHMA, UNSPECIFIED ASTHMA SEVERITY, UNSPECIFIED WHETHER PERSISTENT: ICD-10-CM

## 2021-05-26 DIAGNOSIS — K76.0 FATTY LIVER: ICD-10-CM

## 2021-05-26 PROCEDURE — 93306 TTE W/DOPPLER COMPLETE: CPT

## 2021-05-26 PROCEDURE — 93306 TTE W/DOPPLER COMPLETE: CPT | Mod: 26 | Performed by: INTERNAL MEDICINE

## 2021-05-26 PROCEDURE — 71250 CT THORAX DX C-: CPT

## 2021-06-08 ENCOUNTER — TRANSFERRED RECORDS (OUTPATIENT)
Dept: HEALTH INFORMATION MANAGEMENT | Facility: OTHER | Age: 76
End: 2021-06-08

## 2021-07-03 DIAGNOSIS — E03.9 HYPOTHYROIDISM, UNSPECIFIED TYPE: ICD-10-CM

## 2021-07-04 NOTE — PATIENT INSTRUCTIONS
BG goals:  Fasting and before meals <130, >80  2 hour after eating <180    We only need 1/2 of these numbers to be within target then your A1c will be within target    Medication changes   - pump adjustment today    Follow up   -3 months     Call me sooner if any problems/concerns and/or questions develop including consistent low BGs <70 or consistent high BGs >200  [phone] (Unit Coordinator)    [phone] (Nurse)   full weight-bearing

## 2021-07-06 RX ORDER — LEVOTHYROXINE SODIUM 75 UG/1
TABLET ORAL
Qty: 90 TABLET | Refills: 1 | Status: SHIPPED | OUTPATIENT
Start: 2021-07-06 | End: 2021-11-23

## 2021-07-15 ENCOUNTER — TELEPHONE (OUTPATIENT)
Dept: FAMILY MEDICINE | Facility: OTHER | Age: 76
End: 2021-07-15

## 2021-07-15 DIAGNOSIS — K04.7 DENTAL INFECTION: Primary | ICD-10-CM

## 2021-07-15 RX ORDER — CLINDAMYCIN HCL 300 MG
300 CAPSULE ORAL 3 TIMES DAILY
Qty: 15 CAPSULE | Refills: 0 | Status: SHIPPED | OUTPATIENT
Start: 2021-07-15 | End: 2021-07-20

## 2021-07-27 DIAGNOSIS — E78.49 OTHER HYPERLIPIDEMIA: ICD-10-CM

## 2021-07-27 RX ORDER — SIMVASTATIN 40 MG
40 TABLET ORAL AT BEDTIME
Qty: 90 TABLET | Refills: 3 | Status: SHIPPED | OUTPATIENT
Start: 2021-07-27 | End: 2022-08-01

## 2021-07-27 NOTE — TELEPHONE ENCOUNTER
Simvastatin 40mg    Last Written Prescription Date:  7/27/2020  Last Fill Quantity: 90 tablet,   # refills: 3  Last Office Visit: 3/19/2021  Future Office visit:    Next 5 appointments (look out 90 days)    Aug 18, 2021 11:00 AM  (Arrive by 10:45 AM)  Return Visit with Jin Corea DO  [facility] [place] ([facility] [place] ) [address]  [place] [address]  [phone]

## 2021-08-02 ENCOUNTER — TELEPHONE (OUTPATIENT)
Dept: FAMILY MEDICINE | Facility: OTHER | Age: 76
End: 2021-08-02

## 2021-08-02 NOTE — TELEPHONE ENCOUNTER
Patient called clinic with questions about her prescription Fosamax. Patient states she has had 2 infections of the gums in the past three weeks. Patient states dentist mention that the infections were affecting the bone.  Patient has been treated by dentist with antibiotics. Patient states she was talking to her pharmacist who stated Fosamax can cause these symptoms. Patient's contact information verified. Nurse informed patient PCP is out of the office today but will be back in the office tomorrow. Patient verbalized understanding.

## 2021-08-03 NOTE — TELEPHONE ENCOUNTER
Informed patient to stop the Fosamax. And recommendations for Vitamin C & D and weight bearing exercises. Medication list has been updated

## 2021-08-18 ENCOUNTER — OFFICE VISIT (OUTPATIENT)
Dept: CARDIOLOGY | Facility: OTHER | Age: 76
End: 2021-08-18
Attending: INTERNAL MEDICINE
Payer: COMMERCIAL

## 2021-08-18 VITALS
DIASTOLIC BLOOD PRESSURE: 63 MMHG | OXYGEN SATURATION: 98 % | HEIGHT: 63 IN | HEART RATE: 72 BPM | WEIGHT: 145 LBS | TEMPERATURE: 98.9 F | BODY MASS INDEX: 25.69 KG/M2 | SYSTOLIC BLOOD PRESSURE: 99 MMHG

## 2021-08-18 DIAGNOSIS — E55.9 VITAMIN D DEFICIENCY: ICD-10-CM

## 2021-08-18 DIAGNOSIS — J45.909 UNCOMPLICATED ASTHMA, UNSPECIFIED ASTHMA SEVERITY, UNSPECIFIED WHETHER PERSISTENT: ICD-10-CM

## 2021-08-18 DIAGNOSIS — R60.0 LOWER EXTREMITY EDEMA: ICD-10-CM

## 2021-08-18 DIAGNOSIS — R06.09 DOE (DYSPNEA ON EXERTION): Primary | ICD-10-CM

## 2021-08-18 DIAGNOSIS — E11.65 TYPE 2 DIABETES MELLITUS WITH HYPERGLYCEMIA, WITH LONG-TERM CURRENT USE OF INSULIN (H): ICD-10-CM

## 2021-08-18 DIAGNOSIS — Z79.4 TYPE 2 DIABETES MELLITUS WITH HYPERGLYCEMIA, WITH LONG-TERM CURRENT USE OF INSULIN (H): ICD-10-CM

## 2021-08-18 DIAGNOSIS — R94.31 PROLONGED QT INTERVAL: ICD-10-CM

## 2021-08-18 DIAGNOSIS — I10 ESSENTIAL HYPERTENSION: ICD-10-CM

## 2021-08-18 DIAGNOSIS — Z13.6 CARDIOVASCULAR SCREENING; LDL GOAL LESS THAN 160: ICD-10-CM

## 2021-08-18 DIAGNOSIS — I50.32 DIASTOLIC DYSFUNCTION WITH CHRONIC HEART FAILURE (H): ICD-10-CM

## 2021-08-18 DIAGNOSIS — E78.2 MIXED HYPERLIPIDEMIA: ICD-10-CM

## 2021-08-18 DIAGNOSIS — K76.0 FATTY LIVER: ICD-10-CM

## 2021-08-18 DIAGNOSIS — J44.9 COPD, MILD (H): ICD-10-CM

## 2021-08-18 DIAGNOSIS — E78.1 HYPERTRIGLYCERIDEMIA: ICD-10-CM

## 2021-08-18 PROCEDURE — G0463 HOSPITAL OUTPT CLINIC VISIT: HCPCS

## 2021-08-18 PROCEDURE — 99214 OFFICE O/P EST MOD 30 MIN: CPT | Performed by: INTERNAL MEDICINE

## 2021-08-18 ASSESSMENT — PAIN SCALES - GENERAL: PAINLEVEL: NO PAIN (0)

## 2021-08-18 ASSESSMENT — MIFFLIN-ST. JEOR: SCORE: 1126.85

## 2021-08-18 NOTE — NURSING NOTE
"Chief Complaint   Patient presents with     RECHECK       Initial BP 99/63   Pulse 72   Temp 98.9  F (37.2  C) (Tympanic)   Ht 1.6 m (5' 3\")   Wt 65.8 kg (145 lb)   SpO2 98%   BMI 25.69 kg/m   Estimated body mass index is 25.69 kg/m  as calculated from the following:    Height as of this encounter: 1.6 m (5' 3\").    Weight as of this encounter: 65.8 kg (145 lb).  Medication Reconciliation: complete  Helene Jean-Baptiste LPN    "

## 2021-08-18 NOTE — PATIENT INSTRUCTIONS
Thank you for allowing Dr. Corea and our  team to participate in your care. Please call our office at [phone] with scheduling questions or if you need to cancel or change your appointment. With any other questions or concerns you may call Helene cardiology nurse at [phone].       If you experience chest pain, chest pressure, chest tightness, shortness of breath, fainting, lightheadedness, nausea, vomiting, or other concerning symptoms, please report to the Emergency Department or call 911. These symptoms may be emergent, and best treated in the Emergency Department.    Follow up in 1 year

## 2021-08-18 NOTE — PROGRESS NOTES
[facility] HEART CARE   CARDIOLOGY PROGRESS NOTE     Chief Complaint   Patient presents with     RECHECK          Diagnosis:  1.  FRANCO 2/2 diastolic CHF/minimal COPD.  2.  Chest pain.  3.  HTN-controlled.  4.  Prolonged QT.  5.  Asthma.  6.  Fatty liver on 11/7/2007.  7.  Concern for diastolic dysfunction on 6/25/15 with lower ext edema with BNP of 661 on 5/5/21.   8.  DM-2.  9.  Hypothyroidism.  10.  Hyperlipidemia-controlled.  11.  Hypertriglyceridemia-uncontrolled.  12.  B12 deficiency.   13.  Minimal COPD on 2/18/21      Assessment/Plan:    1.  Dyspnea has resolved.  Patient believes her dyspnea was secondary to metoprolol.  She is also increased her inhalers metoprolol discontinued.  She is feeling a lot better and able to be more active.  2.  Reviewed labs, echocardiogram, CT scan of chest, abdomen, pelvis, V/Q scan, and PFT's.  3.  She recounts that she has seen pulmonary.  Pulmonary has told her she has both COPD and asthma.  4.  No changes to medications.  Continue on aspirin 325 mg daily, Lasix 40 mg in the morning and 1/2 tablet in the afternoon, ramipril 10 mg twice a day, and Zocor 40 mg at bedtime.  5.  Follow-up in 1 year or sooner if issues.      Interval history:  Bo is feeling much better.  She states she had discontinued metoprolol and doubled her inhalers, her shortness of breath has improved substantially.  Previously, she had a hard time walking from the dining room to the kitchen. She states washing x3 dishes, she would have to stop and rest secondary shortness of breath.  She would only make it half way around her building and would have to stop and rest.  Now she is able to walk around her building without issue.  She is able to do activities of daily living with significantly reduction in limitation.  She feels metoprolol was the biggest offender.  We reviewed her testing as outlined above.  She is very grateful.  She essentially has no additional concerns.      HPI:    Mrs. Acuna is being  seen by cardiology for dyspnea exertion.  She has had this issue for many years.  She was seen by cardiology in 2015 and 2016 with work-up which included a cardiac catheterization.  She reports being dyspneic on exertion for most of her life.  Her symptoms are improved with inhalers.     Bo reports being dyspneic for many years.  Her shortness of breath has gotten worse over the last few months to years.  She states that the Spiriva has helped her shortness of breath significantly.  She does carry history of asthma.  She was seen by cardiology through [facility] in 2015 and 2016 for this very issue.  She reportedly had an angiogram in 2002 which was reportedly normal.  She had a repeat cath on 7/15/2015 [facility] with a 10% lesion to her LAD.  All remaining vessels were patent.  She carries a diagnosis of heart failure with preserved ejection fraction but more recently this issue was not identified.  Her cardiac catheterization 2015 also showed normal filling pressures.       She has noted that with vacuuming her house, prison through, she will be so significantly short of breath that she will use a fan to improve her air hunger.  As mentioned, she has essentially had 2 normal cardiac catheterizations.  She states she would not be able to do stress testing secondary to the uncomfortable feeling it creates.  Her echo from 5/8/2009 showed diastolic dysfunction grade 1.  This was not identified on her most recent echo from 9/17/2020.  She did not have a significant valvular or chamber abnormalities.  She does carry history of rheumatoid arthritis but states she was told most recently that she has osteoarthritis and not RA.  A normal chest x-ray on 3/19/2021.  PFT's on 2/18/2021 showed minimal COPD.  She states she was exposed to secondhand smoke by her father at a young age.  She herself has never used tobacco.     I believe the top possibilities for her shortness of breath would be a history of asthma,  the beta-blocker she is on, the possibility of a DVT/PE, concern for cirrhosis as she was noted to have a fatty liver on imaging from 11/7/2007, PE, diastolic dysfunction, hypothyroidism, and potentially rhythm issues. With the exception of a prolonged QT, EKG normal on 5/5/2021.     She also carries diagnosis of diabetes.  Her A1c most recently was 6.9%. She has hyperlipidemia which is well controlled on Zocor 40 mg daily.       She has a history of hypothyroidism.  She has been on levothyroxine 75 mcg.  Her last TSH was on 6/7/2019 and was normal at 1.17.     She also has a history of prolonged QTC.  She has hypertension which has been controlled.      Relevant testing:  Echocardiogram 5/26/2021:  No pericardial effusion is present.  Global and regional left ventricular function is normal with an EF of 55-60%.  Left ventricular diastolic function is normal.  The right ventricle is normal size.  Global right ventricular function is normal.  Both atria appear normal.  Trace mitral insufficiency is present.  Aortic valve is normal in structure and function.  Trace tricuspid insufficiency is present.  Right ventricular systolic pressure is 8 mmHg above the right atrial pressure.  The pulmonic valve is normal.    Echocardiogram on 9/17/2020:  No pericardial effusion is present.  Global and regional left ventricular function is normal with an EF of 60-65%.  Left ventricular diastolic function is normal.  The right ventricle is normal size.  Both atria appear normal.  Trace mitral insufficiency is present.  Aortic valve is normal in structure and function.  Trace tricuspid insufficiency is present.  Right ventricular systolic pressure is 13 mmHg above the right atrial pressure.  The pulmonic valve is normal.  The aorta root is normal.     Echo on 5/8/2009:   1. Normal left ventricular size with mild systolic functional impairment.    2. Evidence of grade 1 diastolic dysfunction.    3. Mild left atrial enlargement.    4.  Trace physiologic amounts of mitral tricuspid and pulmonic insufficiency.    5. Trivial pericardial effusion.     Left heart cath on 7/15/2015 at [facility]:  DOMINANCE:  Right Dominant  LEFT MAIN:  is angiographically normal (0% Stenosis)  LEFT ANTERIOR DESCENDING :  Proximal Segment is angiographically normal (0% Stenosis)  rest of vessel has luminal irregularities (10% Stenosis) with distal pruning.  DIAGONAL 1:  is angiographically normal (0% Stenosis)  CIRCUMFLEX:  and its branches are angiographically normal (0% Stenosis)  RIGHT CORONARY ARTERY:  is angiographically normal (0% Stenosis)  COLLATERALS:  No collateral flow  Normal LVEDP        ICD-10-CM    1. FRANCO (dyspnea on exertion)  R06.00    2. Asthma  J45.909    3. COPD, mild (H)  J44.9    4. Diastolic dysfunction with chronic heart failure (H)  I50.32    5. Essential hypertension  I10    6. Type 2 diabetes mellitus with hyperglycemia, with long-term current use of insulin (H)  E11.65     Z79.4    7. Mixed hyperlipidemia  E78.2    8. Vitamin D deficiency  E55.9    9. Fatty liver on 11/7/2007  K76.0    10. Lower extremity edema  R60.0    11. CARDIOVASCULAR SCREENING; LDL GOAL LESS THAN 160  Z13.6    12. Prolonged QT interval  R94.31    13. Hypertriglyceridemia  E78.1        Past Medical History:   Diagnosis Date     Congestive heart failure, unspecified      Diabetes (H)      H/O rheumatoid arthritis      HLD (hyperlipidemia)      HTN (hypertension)      Myocardial infarction (H)      Uncomplicated asthma        Past Surgical History:   Procedure Laterality Date     APPENDECTOMY OPEN CHILD       AS TOTAL KNEE ARTHROPLASTY Right      CHOLECYSTECTOMY       COLONOSCOPY - HIM SCAN N/A 03/12/2009    per [facility]where documentation per [facility].      HYSTERECTOMY TOTAL ABDOMINAL       MASTECTOMY, BILATERAL Bilateral 02/26/1992     SHOULDER SURGERY Right      SHOULDER SURGERY Left        Allergies   Allergen Reactions     Contrast Dye Difficulty  breathing     Gadolinium Derivatives      Other reaction(s): Difficulty in swallowing, Laryngeal spasm  Patient had an injection into rt. Shoulder capsule prior to MRI arthrogram.  Contained multihance gadobenate, omnipaque iohexol, and buffered lidocaine.       Ammonia      Cory-1 [Lidocaine Hcl]      Novocaine allergy       Codeine Sulfate      Food      Shrimp     Fosamax [Alendronic Acid]      Dental infections     Iodine-131 Swelling     Ct dye     Lidocaine      Nitrous Oxide      No Clinical Screening - See Comments Nausea and Vomiting and Other (See Comments)     (3/6/09)- N/V and Heart racing     Novocain [Procaine]      Penicillins      Tramadol      Morphine Palpitations       Current Outpatient Medications   Medication Sig Dispense Refill     acetaminophen (TYLENOL) 325 MG tablet Take 650 mg by mouth       Acetone, Urine, Test (KETONE TEST) STRP Use as directed 50 strip 4     albuterol (PROAIR HFA/PROVENTIL HFA/VENTOLIN HFA) 108 (90 Base) MCG/ACT inhaler Inhale 2 puffs into the lungs every 6 hours 1 Inhaler 0     aspirin (ASA) 325 MG EC tablet Take 325 mg by mouth daily       blood glucose (CONTOUR NEXT TEST) test strip Use 6 times a day with the insulin pump to help manage your diabetes 200 each 11     blood glucose (NO BRAND SPECIFIED) test strip by In Vitro route 4 times daily Use to test blood sugar 4 times daily or as directed.       Calcium-Vitamin D-Vitamin K (VIACTIV PO) Take 2 chew tab by mouth every morning       Continuous Blood Gluc  (DEXCOM G6 ) XX DIMITRIS 1 each continuous 1 Device 0     Continuous Blood Gluc Sensor (DEXCOM G5 MOB/G4 PLAT SENSOR) MISC 1 each continuous Change every 7 days 3 each 1     Continuous Blood Gluc Sensor (DEXCOM G6 SENSOR) MISC 1 each continuous To be changed every 10 days 3 each 5     Continuous Blood Gluc Transmit (DEXCOM G6 TRANSMITTER) MISC 1 each continuous To be changed every 3 months 1 each 1     diphenhydrAMINE (BENADRYL) 50 MG capsule  Administer 30 min - 2 hours pre - IV contrast injection 1 capsule 0     EUTHYROX 75 MCG tablet Take 1 tablet by mouth once daily 90 tablet 1     furosemide (LASIX) 40 MG tablet TAKE ONE (1) TABLET BY MOUTH every morning and half tablet every afternoon. 135 tablet 1     glucagon (GLUCAGON EMERGENCY) 1 MG injection Inject 1 mg Subcutaneous once 1 mg 12     Insulin Aspart (INSULIN PUMP - OUTPATIENT)        insulin aspart (NOVOLOG VIAL) 100 UNITS/ML vial To be used with the insulin pump  TDD: 80 units 90 mL 1     INSULIN PUMP - OUTPATIENT Date last updated:  2/4/15  [email or web address] MinimLawKick: Model 723  BASAL RATES and times:  12   AM (midnight): 0.9 units/hour    9    PM: 0.8 units/hour   Basal Pattern A:  Flora Acuna will use when N/A.  CARB RATIO and times:  12   AM (midnight): 13.0  4     PM: 10  Corection Factor (Sensitivity) and times:  12   AM (midnight): 55 mg/dL  BLOOD GLUCOSE TARGET and times:  12   AM (midnight): 100 - 150  7     AM:  100 - 120  9    PM:  100 - 150  Active Insulin Time:  3 hours  Sensor:  No  Carelink / Diasend username:  [email or web address]  Carelink / Diasend Password:  [email or web address]       Lancet Devices MISC        omeprazole (PRILOSEC) 40 MG DR capsule Take 1 capsule (40 mg) by mouth daily 90 capsule 3     potassium chloride ER (KLOR-CON M) 20 MEQ CR tablet TAKE 1  BY MOUTH TWICE DAILY 60 tablet 4     predniSONE (DELTASONE) 20 MG tablet The night before and morning of the CT for contrast allergy. 4 tablet 1     ramipril (ALTACE) 10 MG capsule Take 1 capsule (10 mg) by mouth 2 times daily 180 capsule 3     senna-docusate (SENOKOT-S;PERICOLACE) 8.6-50 MG per tablet Take 1 tablet by mouth At Bedtime        simvastatin (ZOCOR) 40 MG tablet Take 1 tablet (40 mg) by mouth At Bedtime 90 tablet 3     tamoxifen (NOLVADEX) 20 MG tablet Take 20 mg by mouth daily        tiotropium (SPIRIVA RESPIMAT) 2.5 MCG/ACT inhaler Inhale 2 puffs into the lungs daily 4 g 3     tiZANidine (ZANAFLEX) 4 MG tablet 1/2 tab three times  a day       VITAMIN D, CHOLECALCIFEROL, PO Take 5,000 Units by mouth daily         Social History     Socioeconomic History     Marital status:      Spouse name: Not on file     Number of children: 2     Years of education: Not on file     Highest education level: Not on file   Occupational History     Occupation: [employer]     Employer: RETIRED   Tobacco Use     Smoking status: Never Smoker     Smokeless tobacco: Never Used   Substance and Sexual Activity     Alcohol use: No     Alcohol/week: 0.0 standard drinks     Drug use: No     Sexual activity: Not on file   Other Topics Concern     Parent/sibling w/ CABG, MI or angioplasty before 65F 55M? Not Asked   Social History Narrative     Not on file     Social Determinants of Health     Financial Resource Strain:      Difficulty of Paying Living Expenses:    Food Insecurity:      Worried About Running Out of Food in the Last Year:      Ran Out of Food in the Last Year:    Transportation Needs:      Lack of Transportation (Medical):      Lack of Transportation (Non-Medical):    Physical Activity:      Days of Exercise per Week:      Minutes of Exercise per Session:    Stress:      Feeling of Stress :    Social Connections:      Frequency of Communication with Friends and Family:      Frequency of Social Gatherings with Friends and Family:      Attends Islam Services:      Active Member of Clubs or Organizations:      Attends Club or Organization Meetings:      Marital Status:    Intimate Partner Violence:      Fear of Current or Ex-Partner:      Emotionally Abused:      Physically Abused:      Sexually Abused:        LAB RESULTS:   No visits with results within 2 Month(s) from this visit.   Latest known visit with results is:   Office Visit on 05/05/2021   Component Date Value Ref Range Status     Folate 05/05/2021 >100.0  >5.4 ng/mL Final     pH Arterial 05/05/2021 7.49* 7.35 - 7.45 pH Final     pCO2 Arterial 05/05/2021 37  35 - 45 mm Hg Final     pO2  Arterial 05/05/2021 96  80 - 105 mm Hg Final     Bicarbonate Arterial 05/05/2021 28  21 - 28 mmol/L Final     FIO2 05/05/2021 Unknown   Final     Oxyhemoglobin Arterial 05/05/2021 97  92 - 100 % Final     Base Excess Art 05/05/2021 4.4  mmol/L Final     Vitamin D Deficiency screening 05/05/2021 94* 20 - 75 ug/L Final     Vitamin B12 05/05/2021 391  193 - 986 pg/mL Final     TSH 05/05/2021 2.04  0.40 - 4.00 mU/L Final     Troponin I ES 05/05/2021 <0.015  0.000 - 0.045 ug/L Final     N-Terminal Pro Bnp 05/05/2021 661* 0 - 125 pg/mL Final     RIOS interpretation 05/05/2021 Negative  NEG^Negative Final     GGT 05/05/2021 14  0 - 40 U/L Final     D Dimer 05/05/2021 0.5  0.0 - 0.50 ug/ml FEU Final     CRP Inflammation 05/05/2021 <2.9  0.0 - 8.0 mg/L Final     WBC 05/05/2021 6.7  4.0 - 11.0 10e9/L Final     RBC Count 05/05/2021 4.17  3.8 - 5.2 10e12/L Final     Hemoglobin 05/05/2021 12.7  11.7 - 15.7 g/dL Final     Hematocrit 05/05/2021 38.5  35.0 - 47.0 % Final     MCV 05/05/2021 92  78 - 100 fl Final     MCH 05/05/2021 30.5  26.5 - 33.0 pg Final     MCHC 05/05/2021 33.0  31.5 - 36.5 g/dL Final     RDW 05/05/2021 12.0  10.0 - 15.0 % Final     Platelet Count 05/05/2021 146* 150 - 450 10e9/L Final     Diff Method 05/05/2021 Automated Method   Final     % Neutrophils 05/05/2021 61.4  % Final     % Lymphocytes 05/05/2021 26.4  % Final     % Monocytes 05/05/2021 7.0  % Final     % Eosinophils 05/05/2021 4.3  % Final     % Basophils 05/05/2021 0.3  % Final     % Immature Granulocytes 05/05/2021 0.6  % Final     Nucleated RBCs 05/05/2021 0  0 /100 Final     Absolute Neutrophil 05/05/2021 4.1  1.6 - 8.3 10e9/L Final     Absolute Lymphocytes 05/05/2021 1.8  0.8 - 5.3 10e9/L Final     Absolute Monocytes 05/05/2021 0.5  0.0 - 1.3 10e9/L Final     Absolute Eosinophils 05/05/2021 0.3  0.0 - 0.7 10e9/L Final     Absolute Basophils 05/05/2021 0.0  0.0 - 0.2 10e9/L Final     Abs Immature Granulocytes 05/05/2021 0.0  0 - 0.4 10e9/L Final  "    Absolute Nucleated RBC 05/05/2021 0.0   Final     Sodium 05/05/2021 137  133 - 144 mmol/L Final     Potassium 05/05/2021 3.5  3.4 - 5.3 mmol/L Final     Chloride 05/05/2021 101  94 - 109 mmol/L Final     Carbon Dioxide 05/05/2021 30  20 - 32 mmol/L Final     Anion Gap 05/05/2021 6  3 - 14 mmol/L Final     Glucose 05/05/2021 146* 70 - 99 mg/dL Final     Urea Nitrogen 05/05/2021 13  7 - 30 mg/dL Final     Creatinine 05/05/2021 0.76  0.52 - 1.04 mg/dL Final     GFR Estimate 05/05/2021 77  >60 mL/min/[1.73_m2] Final     GFR Estimate If Black 05/05/2021 90  >60 mL/min/[1.73_m2] Final     Calcium 05/05/2021 8.9  8.5 - 10.1 mg/dL Final     Bilirubin Total 05/05/2021 0.8  0.2 - 1.3 mg/dL Final     Albumin 05/05/2021 3.8  3.4 - 5.0 g/dL Final     Protein Total 05/05/2021 8.0  6.8 - 8.8 g/dL Final     Alkaline Phosphatase 05/05/2021 35* 40 - 150 U/L Final     ALT 05/05/2021 23  0 - 50 U/L Final     AST 05/05/2021 15  0 - 45 U/L Final     Cholesterol 05/05/2021 135  <200 mg/dL Final     Triglycerides 05/05/2021 168* <150 mg/dL Final     HDL Cholesterol 05/05/2021 52  >49 mg/dL Final     LDL Cholesterol Calculated 05/05/2021 49  <100 mg/dL Final     Non HDL Cholesterol 05/05/2021 83  <130 mg/dL Final        Review of systems: Negative except that which was noted in the HPI.    Physical examination:  BP 99/63   Pulse 72   Temp 98.9  F (37.2  C) (Tympanic)   Ht 1.6 m (5' 3\")   Wt 65.8 kg (145 lb)   SpO2 98%   BMI 25.69 kg/m      GENERAL APPEARANCE: healthy, alert and no distress  HEENT: no icterus, no xanthelasmas, normal pupil size and reaction, no cyanosis.  NECK: no adenopathy, no asymmetry, masses.  CHEST: lungs clear to auscultation - no rales, rhonchi or wheezes, no use of accessory muscles, no retractions, respirations are unlabored, normal respiratory rate  CARDIOVASCULAR: regular rhythm, normal S1 with physiologic split S2, no S3 or S4 and no murmur, click or rub  EXTREMITIES: no clubbing, cyanosis or " edema  NEURO: alert and oriented normal speech, and affect  VASC: No vascular bruits heard.  SKIN: no ecchymoses, no rashes        Thank you for allowing me to participate in the care of your patient. Please do not hesitate to contact me if you have any questions.     Jin Corea, DO

## 2021-09-02 DIAGNOSIS — E13.9 LADA (LATENT AUTOIMMUNE DIABETES IN ADULTS), MANAGED AS TYPE 1 (H): ICD-10-CM

## 2021-09-06 RX ORDER — PROCHLORPERAZINE 25 MG/1
1 SUPPOSITORY RECTAL CONTINUOUS
Qty: 3 EACH | Refills: 5 | Status: SHIPPED | OUTPATIENT
Start: 2021-09-06 | End: 2022-02-24

## 2021-10-01 ENCOUNTER — TELEPHONE (OUTPATIENT)
Dept: EDUCATION SERVICES | Facility: OTHER | Age: 76
End: 2021-10-01
Payer: COMMERCIAL

## 2021-10-01 DIAGNOSIS — Z79.4 TYPE 2 DIABETES MELLITUS WITH HYPERGLYCEMIA, WITH LONG-TERM CURRENT USE OF INSULIN (H): ICD-10-CM

## 2021-10-01 DIAGNOSIS — E11.65 TYPE 2 DIABETES MELLITUS WITH HYPERGLYCEMIA, WITH LONG-TERM CURRENT USE OF INSULIN (H): ICD-10-CM

## 2021-10-01 DIAGNOSIS — I10 ESSENTIAL HYPERTENSION: ICD-10-CM

## 2021-10-01 DIAGNOSIS — E13.9 LADA (LATENT AUTOIMMUNE DIABETES IN ADULTS), MANAGED AS TYPE 1 (H): ICD-10-CM

## 2021-10-01 DIAGNOSIS — Z91.199 FAILURE TO ATTEND APPOINTMENT: Primary | ICD-10-CM

## 2021-10-01 PROCEDURE — 10000001 PR ERRONEOUS ENCOUNTER--DISREGARD

## 2021-10-01 RX ORDER — PROCHLORPERAZINE 25 MG/1
1 SUPPOSITORY RECTAL CONTINUOUS
Qty: 1 EACH | Refills: 1 | Status: SHIPPED | OUTPATIENT
Start: 2021-10-01 | End: 2022-04-12

## 2021-10-03 RX ORDER — FUROSEMIDE 40 MG
TABLET ORAL
Qty: 135 TABLET | Refills: 0 | Status: SHIPPED | OUTPATIENT
Start: 2021-10-03 | End: 2021-11-23

## 2021-10-05 NOTE — PROGRESS NOTES
Patient requested an order for Dexcom transmitter.     This was sent on 10/1 by ARSALAN Dobbs FNP-BC  [facility]

## 2021-11-01 ENCOUNTER — TELEPHONE (OUTPATIENT)
Dept: FAMILY MEDICINE | Facility: OTHER | Age: 76
End: 2021-11-01

## 2021-11-01 DIAGNOSIS — E11.65 TYPE 2 DIABETES MELLITUS WITH HYPERGLYCEMIA, WITH LONG-TERM CURRENT USE OF INSULIN (H): Primary | ICD-10-CM

## 2021-11-01 DIAGNOSIS — Z79.4 TYPE 2 DIABETES MELLITUS WITH HYPERGLYCEMIA, WITH LONG-TERM CURRENT USE OF INSULIN (H): Primary | ICD-10-CM

## 2021-11-05 DIAGNOSIS — J43.9 PULMONARY EMPHYSEMA, UNSPECIFIED EMPHYSEMA TYPE (H): ICD-10-CM

## 2021-11-05 NOTE — TELEPHONE ENCOUNTER
Pt called, wants spiriva sent to [facility] as this may be cheaper for her. Pended. Please advise. Thank you!

## 2021-11-06 DIAGNOSIS — E87.6 HYPOKALEMIA: ICD-10-CM

## 2021-11-07 NOTE — TELEPHONE ENCOUNTER
MONICA MELCHOR      Last Written Prescription Date:  6-7-2021  Last Fill Quantity: 60,   # refills: 4  Last Office Visit: 3-  Future Office visit:    Next 5 appointments (look out 90 days)    Nov 23, 2021  1:30 PM  (Arrive by 1:15 PM)  SHORT with Nicolasa Guillen NP  [facility] ([facility] [place] ) [address]  [place] [address]  [phone]           Routing refill request to provider for review/approval because:  Drug not on the [facility], [facility] or [facility] refill protocol or controlled substance

## 2021-11-08 RX ORDER — POTASSIUM CHLORIDE 1500 MG/1
TABLET, EXTENDED RELEASE ORAL
Qty: 60 TABLET | Refills: 0 | Status: SHIPPED | OUTPATIENT
Start: 2021-11-08 | End: 2021-11-23

## 2021-11-16 ENCOUNTER — ALLIED HEALTH/NURSE VISIT (OUTPATIENT)
Dept: EDUCATION SERVICES | Facility: OTHER | Age: 76
End: 2021-11-16
Attending: NURSE PRACTITIONER
Payer: COMMERCIAL

## 2021-11-16 VITALS
HEART RATE: 85 BPM | BODY MASS INDEX: 26.42 KG/M2 | RESPIRATION RATE: 16 BRPM | HEIGHT: 62 IN | DIASTOLIC BLOOD PRESSURE: 62 MMHG | OXYGEN SATURATION: 97 % | SYSTOLIC BLOOD PRESSURE: 144 MMHG

## 2021-11-16 DIAGNOSIS — I10 BENIGN ESSENTIAL HYPERTENSION: ICD-10-CM

## 2021-11-16 DIAGNOSIS — E13.9 LADA (LATENT AUTOIMMUNE DIABETES IN ADULTS), MANAGED AS TYPE 1 (H): Primary | ICD-10-CM

## 2021-11-16 PROCEDURE — 95251 CONT GLUC MNTR ANALYSIS I&R: CPT | Performed by: NURSE PRACTITIONER

## 2021-11-16 PROCEDURE — G0463 HOSPITAL OUTPT CLINIC VISIT: HCPCS

## 2021-11-16 PROCEDURE — 99214 OFFICE O/P EST MOD 30 MIN: CPT | Performed by: NURSE PRACTITIONER

## 2021-11-16 ASSESSMENT — ANXIETY QUESTIONNAIRES
6. BECOMING EASILY ANNOYED OR IRRITABLE: NOT AT ALL
4. TROUBLE RELAXING: NOT AT ALL
GAD7 TOTAL SCORE: 0
3. WORRYING TOO MUCH ABOUT DIFFERENT THINGS: NOT AT ALL
5. BEING SO RESTLESS THAT IT IS HARD TO SIT STILL: NOT AT ALL
IF YOU CHECKED OFF ANY PROBLEMS ON THIS QUESTIONNAIRE, HOW DIFFICULT HAVE THESE PROBLEMS MADE IT FOR YOU TO DO YOUR WORK, TAKE CARE OF THINGS AT HOME, OR GET ALONG WITH OTHER PEOPLE: NOT DIFFICULT AT ALL
7. FEELING AFRAID AS IF SOMETHING AWFUL MIGHT HAPPEN: NOT AT ALL
1. FEELING NERVOUS, ANXIOUS, OR ON EDGE: NOT AT ALL
2. NOT BEING ABLE TO STOP OR CONTROL WORRYING: NOT AT ALL

## 2021-11-16 ASSESSMENT — PAIN SCALES - GENERAL: PAINLEVEL: SEVERE PAIN (6)

## 2021-11-16 NOTE — PROGRESS NOTES
"Chief Complaint   Patient presents with     Diabetes       Initial Resp 16  Estimated body mass index is 25.69 kg/m  as calculated from the following:    Height as of 8/18/21: 1.6 m (5' 3\").    Weight as of 8/18/21: 65.8 kg (145 lb).  Medication Reconciliation: complete  Ginger Lerner LPN    "

## 2021-11-16 NOTE — PROGRESS NOTES
"SUBJECTIVE:  Flora Acuna, 75 year old, female presents with the following Chief Complaint(s) with HPI to follow:  Chief Complaint   Patient presents with     Diabetes        Diabetes Follow-up      Patient is checking blood sugars: personal CGM.  Results:  She has been using her Dexcom for >90 days    Date: 10/20 to 11/2/21  Glucose management indicator: 7.7%    Average glucose: 185    Glucose range  Very low (<54): <1%  low (<70): <1%  In target range (): 49%  High (>180): 33%  Very high (>250): 17%    Date: 11/3 to 11/16/21  Glucose management indicator: 7.7%    Average glucose: 185    Glucose range  Very low (<54): <1%  low (<70): <1%  In target range (): 50%  High (>180): 33%  Very high (>250): 16%      Symptoms of hypoglycemia (low blood sugar): Reports an incident of low BGs, none per download    Paresthesias (numbness or burning in feet) or sores: Yes--same; no sores    Diabetic eye exam within the last year: Yes    Breakfast eaten regularly: Yes    Patient counting carbs: Yes       HPI:  Flora's here today for the follow up regarding her KAREEM, managed as a Type 1    Lab Results   Component Value Date    A1C 6.9 01/29/2021    A1C 7.2 07/01/2020    A1C 7.9 12/16/2019    A1C 7.5 09/11/2019    A1C 7.3 06/07/2019     Current Diabetes medication:   1.  Insulin pump, with Novolog  ASA use: yes, 325 mg daily  Statin use: yes, simvastatin 40 mg daily    Bo's here today for an insulin pump and Dexcom download with possible adjust.    She reports the following:  Issues with BGs \"crashing\"    Denies any concerns with her insulin pump and/or Dexcom    Has been exercising as followed:  6-8x/day for 6 minutes at a time     Down 5 lbs.    Wt Readings from Last 4 Encounters:   08/18/21 65.8 kg (145 lb)   05/05/21 68 kg (150 lb)   04/29/21 68.5 kg (151 lb)   03/19/21 69.4 kg (153 lb)     Food plan:  1st meal (9439-8890): coffee and 1/4 cinnamon roll  2nd meal: (1 pm)--open face sandwich  3rd meal (5-6 " pm) hot meal    Due for labs, but would like to wait until she see her PCP, Nicolasa Guillen NP      Patient Active Problem List   Diagnosis     CARDIOVASCULAR SCREENING; LDL GOAL LESS THAN 160     Personal history of malignant neoplasm of breast     ACP (advance care planning)     Hypothyroidism     Essential hypertension     Malignant neoplasm of left breast in female, estrogen receptor positive (H)     S/P reverse total shoulder arthroplasty, right     Lumbar disc disease with radiculopathy     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), managed as type 1 (H)     H/O total knee replacement, left     Age-related osteoporosis without current pathological fracture     Osteoporosis     Family history of melanoma     Family history of breast cancer in female     Dysphonia     Prolonged QT interval     FRANCO (dyspnea on exertion)     Intermittent chest pain     Asthma     Fatty liver on 11/7/2007     Lower extremity edema     Diastolic dysfunction with chronic heart failure (H)     Type 2 diabetes mellitus with hyperglycemia, with long-term current use of insulin (H)     Mixed hyperlipidemia     Hypokalemia     Vitamin D deficiency     Contrast media allergy     COPD, mild (H)     Hypertriglyceridemia       Past Medical History:   Diagnosis Date     Congestive heart failure, unspecified      Diabetes (H)      H/O rheumatoid arthritis      HLD (hyperlipidemia)      HTN (hypertension)      Myocardial infarction (H)      Uncomplicated asthma        Past Surgical History:   Procedure Laterality Date     APPENDECTOMY OPEN CHILD       AS TOTAL KNEE ARTHROPLASTY Right      CHOLECYSTECTOMY       COLONOSCOPY - HIM SCAN N/A 03/12/2009    per Care Everywhere documentation per [facility].      HYSTERECTOMY TOTAL ABDOMINAL       MASTECTOMY, BILATERAL Bilateral 02/26/1992     SHOULDER SURGERY Right      SHOULDER SURGERY Left        Family History   Problem Relation Age of Onset     Breast Cancer Maternal Aunt      Breast Cancer  Maternal Aunt      Lung Cancer Father        Social History     Tobacco Use     Smoking status: Never Smoker     Smokeless tobacco: Never Used   Substance Use Topics     Alcohol use: No     Alcohol/week: 0.0 standard drinks       Current Outpatient Medications   Medication Sig Dispense Refill     acetaminophen (TYLENOL) 325 MG tablet Take 650 mg by mouth       Acetone, Urine, Test (KETONE TEST) STRP Use as directed 50 strip 4     albuterol (PROAIR HFA/PROVENTIL HFA/VENTOLIN HFA) 108 (90 Base) MCG/ACT inhaler Inhale 2 puffs into the lungs every 6 hours 1 Inhaler 0     aspirin (ASA) 325 MG EC tablet Take 325 mg by mouth daily       blood glucose (CONTOUR NEXT TEST) test strip Use 6 times a day with the insulin pump to help manage your diabetes 200 each 11     blood glucose (NO BRAND SPECIFIED) test strip by In Vitro route 4 times daily Use to test blood sugar 4 times daily or as directed.       Calcium-Vitamin D-Vitamin K (VIACTIV PO) Take 2 chew tab by mouth every morning       Continuous Blood Gluc  (DEXCOM G6 ) XX DIMITRIS 1 each continuous 1 Device 0     Continuous Blood Gluc Sensor (DEXCOM G5 MOB/G4 PLAT SENSOR) MISC 1 each continuous Change every 7 days 3 each 1     Continuous Blood Gluc Sensor (DEXCOM G6 SENSOR) MISC 1 each continuous To be changed every 10 days 3 each 5     Continuous Blood Gluc Transmit (DEXCOM G6 TRANSMITTER) MISC 1 each continuous To be changed every 3 months 1 each 1     diphenhydrAMINE (BENADRYL) 50 MG capsule Administer 30 min - 2 hours pre - IV contrast injection 1 capsule 0     EUTHYROX 75 MCG tablet Take 1 tablet by mouth once daily 90 tablet 1     furosemide (LASIX) 40 MG tablet TAKE 1 TABLET BY MOUTH ONCE DAILY IN THE MORNING AND  HALF  A  TABLET  EVERY  AFTERNOON 135 tablet 0     glucagon (GLUCAGON EMERGENCY) 1 MG injection Inject 1 mg Subcutaneous once 1 mg 12     Insulin Aspart (INSULIN PUMP - OUTPATIENT)        insulin aspart (NOVOLOG VIAL) 100 UNITS/ML vial To be  used with the insulin pump  TDD: 80 units 90 mL 1     INSULIN PUMP - OUTPATIENT Date last updated:  2/4/15  Medtronic Minimed: Model 723  BASAL RATES and times:  12   AM (midnight): 0.9 units/hour    9    PM: 0.8 units/hour   Basal Pattern A:  Flora Acuna will use when N/A.  CARB RATIO and times:  12   AM (midnight): 13.0  4     PM: 10  Corection Factor (Sensitivity) and times:  12   AM (midnight): 55 mg/dL  BLOOD GLUCOSE TARGET and times:  12   AM (midnight): 100 - 150  7     AM:  100 - 120  9    PM:  100 - 150  Active Insulin Time:  3 hours  Sensor:  No  Carelink / Diasend username:  [email or web address]  Carelink / Diasend Password:  [email or web address]       Lancet Devices MISC        omeprazole (PRILOSEC) 40 MG DR capsule Take 1 capsule by mouth once daily 90 capsule 0     potassium chloride ER (KLOR-CON M) 20 MEQ CR tablet Take 1 tablet by mouth twice daily 60 tablet 0     ramipril (ALTACE) 10 MG capsule Take 1 capsule (10 mg) by mouth 2 times daily 180 capsule 3     senna-docusate (SENOKOT-S;PERICOLACE) 8.6-50 MG per tablet Take 1 tablet by mouth At Bedtime        simvastatin (ZOCOR) 40 MG tablet Take 1 tablet (40 mg) by mouth At Bedtime 90 tablet 3     tamoxifen (NOLVADEX) 20 MG tablet Take 20 mg by mouth daily        tiotropium (SPIRIVA RESPIMAT) 2.5 MCG/ACT inhaler Inhale 2 puffs into the lungs daily 12 g 3     tiZANidine (ZANAFLEX) 4 MG tablet 1/2 tab three times a day       VITAMIN D, CHOLECALCIFEROL, PO Take 5,000 Units by mouth daily         Allergies   Allergen Reactions     Contrast Dye Difficulty breathing     Gadolinium Derivatives      Other reaction(s): Difficulty in swallowing, Laryngeal spasm  Patient had an injection into rt. Shoulder capsule prior to MRI arthrogram.  Contained multihance gadobenate, omnipaque iohexol, and buffered lidocaine.       Ammonia      Cory-1 [Lidocaine Hcl]      Novocaine allergy       Codeine Sulfate      Food      Shrimp     Fosamax [Alendronic Acid]      Dental infections      "Iodine-131 Swelling     Ct dye     Lidocaine      Nitrous Oxide      No Clinical Screening - See Comments Nausea and Vomiting and Other (See Comments)     (3/6/09)- N/V and Heart racing     Novocain [Procaine]      Penicillins      Tramadol      Morphine Palpitations       REVIEW OF SYSTEMS  Skin: negative  Eyes: negative; exam up to date  Ears/Nose/Throat: burnt voice and muscles from GERD  Respiratory: positive FRANCO--better; no cough; no hemoptysis; history of asthma  Cardiovascular: stable; history of HF  Gastrointestinal: as noted above  Genitourinary: negative   Musculoskeletal: RA--okay; bilateral thumb joints are displaced per pt: hx of back pain, neck pain, arthritis and right shoulder   Neurologic: positive for numbness or tingling of feet--same  Psychiatric: negative  Hematologic/Lymphatic/Immunologic: history of breast cancer (left) x 2 (same spot)  Endocrine: positive for diabetes and thyroid disease     OBJECTIVE:  BP (!) 144/62   Pulse 85   Resp 16   Ht 1.578 m (5' 2.12\")   SpO2 97%   BMI 26.42 kg/m    Constitutional: healthy, alert and no distress  Musculoskeletal: extremities normal- no gross deformities noted and gait normal  Skin: no suspicious lesions or rashes to visible skin  Psychiatric: mentation appears normal and affect normal/bright        LABS  No results found for any visits on 11/16/21.    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    Discussed ways to prevent her \"crashes\" from exercising.      (I10) Benign essential hypertension  Comment: noted  Plan:   Keep taking your at home BP  If it continues to be higher, but let us know  Reviewed symptoms to return    Follow up  Download with Nicolasa Guillen NP  3 months    Call sooner if any issues develop    Patient Instructions     Continue working on healthy eating and moving as best as you can (start low and slow, work up to 30 min, 5x/week)    BG goals:  Fasting and before meals <130, >70  2 hour after eating <180    We only need 1/2 of these numbers to be within target then your A1c will be within target    Medication changes   Please let me know if you have continued low BGs.      Follow up   Download with Nicolasa Guillen NP  3 months    Call me sooner if any problems/concerns and/or questions develop including consistent low BGs <70 or consistent high BGs >200  [phone] (Marie, Unit Coordinator)    [phone] (Emeli, Nurse)        Time: 35 minutes  Barrier: none  Willingness to learn: accepting    Kerri Elliott [facility]  Diabetes and Wound Care    Cc: Nicolasa Guillen NP    35 minutes was spent with patient.    All of  this time was spent on counseling patient regarding illness, medication and/or treatment options, coordinating further cares and follow ups that are needed along with resource material that will be helpful in the treatment of these issues.       With the electronic record, we can now more quickly and easily track our patient diabetic goals. Our diabetes clinical review is in progress and these are the indicators we are monitoring for good diabetes health.     1.) HbA1C less than 7 (measurement of your average blood sugars)  2.) Blood Pressure less than 140/80  3.) LDL less than 100 (bad cholesterol)  4.) HbA1C is checked in the last 6 months and below 7% (more frequently if not at goal or adjusting medications)  5.) LDL is checked in the last 12 months (more " frequently if not at goal or adjusting medications)  6.) Taking one baby aspirin daily (unless otherwise instructed)  7.) No tobacco use  8) Statin use     You have achieved 7 out of 8 of these and I am encouraging you to come in and get tuned up to achieve 8 out of 8!  Here is what you have achieved so far in my goals for you:  1.) HbA1C  less than 7:                              YES    Your last  HbA1C  Lab Results   Component Value Date    A1C 6.9 01/29/2021    A1C 7.2 07/01/2020    A1C 7.9 12/16/2019    A1C 7.5 09/11/2019    A1C 7.3 06/07/2019      2.) Blood Pressure less than 140/80:       NO     Your last    BP Readings from Last 1 Encounters:   11/16/21 (!) 144/62     3.) LDL less than 100:                              YES      Your last     LDL Cholesterol Calculated   Date Value Ref Range Status   05/05/2021 49 <100 mg/dL Final     Comment:     Desirable:       <100 mg/dl       4.) Checked HbA1C in the past 6 months: YES      5.) Checked LDL in the past 12 months:    YES    6.) Taking one aspirin daily:                       YES     7.) No tobacco use:                                        YES      8.) Statin use      YES       Insurance requirements:  1.  Patient has been seen in the clinic within the last 6 month  2. Diagnosis of KAREEM, managed as a Type 1 for >6 months  3. Has been testing their BGs 4x/day using her Dexcomg CGM for >90 days  4. Uses an insulin pump for >6 months  5. Requires frequent adjustments to their treatment regimen based on BGM and/or CGM testing results.

## 2021-11-16 NOTE — PATIENT INSTRUCTIONS
Continue working on healthy eating and moving as best as you can (start low and slow, work up to 30 min, 5x/week)    BG goals:  Fasting and before meals <130, >70  2 hour after eating <180    We only need 1/2 of these numbers to be within target then your A1c will be within target    Medication changes   Please let me know if you have continued low BGs.      Follow up   Download with Nicolasa Guillen NP  3 months    Call me sooner if any problems/concerns and/or questions develop including consistent low BGs <70 or consistent high BGs >200  [phone] (Marie, Unit Coordinator)    [phone] (Emeli, Nurse)

## 2021-11-17 ASSESSMENT — ANXIETY QUESTIONNAIRES: GAD7 TOTAL SCORE: 0

## 2021-11-17 ASSESSMENT — PATIENT HEALTH QUESTIONNAIRE - PHQ9: SUM OF ALL RESPONSES TO PHQ QUESTIONS 1-9: 0

## 2021-11-21 NOTE — PROGRESS NOTES
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 Will notify patient of the results when available and intervene accordingly. On statin. On asa. BP at goal. Eye exam UTD. F/up 3-6 months, depending on A1C.     Essential hypertension  Well controlled. Continue current medications. CMP pending. Will notify patient of the results when available and intervene accordingly. Encouraged daily exercise and a low sodium diet. Recommended checking BP's 2x/wk, call the clinic if consistantly s>140 or d>90. Follow up in 6 months.     Hypothyroidism, unspecified type  TSH pending. Will notify patient of the results when available and intervene accordingly.     Other hyperlipidemia  Tolerating statin. Lipids and CMP pending. Will notify patient of the results when available and intervene accordingly.     Pulmonary emphysema, unspecified emphysema type (H)  Well controlled with Spiriva. Will continue.     Diastolic dysfunction with chronic heart failure (H)  On ACE and lasix. Stable. No shortness of breath. Will continue current medications.     Gastroesophageal reflux disease without esophagitis  Stable with omeprazole. Will continue.     - omeprazole (PRILOSEC) 40 MG DR capsule; Take 1 capsule by mouth once daily    Hypokalemia  Potassium pending. Will notify patient of the results when available and intervene accordingly.     Lower extremity edema  Stable with lasix. Unable to wear TEDs as she can't get them on herself. Will continue the lasix.     Neuropathy   Patient does complain of numbness and tingling in her feet and lower legs. Most likely has a diabetic neuropathy. Declines gabapentin. Will continue to monitor and will reassess in 6 months. Sooner with new or worsening symptoms.     BMI:   Estimated body mass index is 25.69 kg/m  as calculated from the following:    Height as of 11/16/21: 1.578 m (5' 2.12\").    Weight as of this encounter: 64 kg (141 lb).         No " follow-ups on file.    Nicolasa Guillen NP  [facility] - ANTONIO Soriano is a 76 year old who presents for the following health issues     HPI     Diabetes Follow-up    How often are you checking your blood sugar? Continuous glucose monitor  What time of day are you checking your blood sugars (select all that apply)?  always checking   Have you had any blood sugars above 200?  Yes, often around 240   Have you had any blood sugars below 70?  Yes, rarely in the 60    What symptoms do you notice when your blood sugar is low?  Shaky, Dizzy, Weak, and Blurred vision    What concerns do you have today about your diabetes? None     Do you have any of these symptoms? (Select all that apply)  Numbness in feet and Burning in feet     A1C was 6.9 in 1/2021.     On an insulin pump.    On asa.     Denies chest pain, shortness of breath, dizziness, syncope, or palpitations. No nausea or vomiting.     Hyperlipidemia Follow-Up      Are you regularly taking any medication or supplement to lower your cholesterol?   Yes- simvastatin 40 mg    Are you having muscle aches or other side effects that you think could be caused by your cholesterol lowering medication?  No   As noted above, denies chest pain, shortness of breath, dizziness, syncope, or palpitations.    Hypertension Follow-up      Do you check your blood pressure regularly outside of the clinic? Yes     Are you following a low salt diet? Yes    Are your blood pressures ever more than 140 on the top number (systolic) OR more   than 90 on the bottom number (diastolic), for example 140/90? No   As noted above, denies chest pain, shortness of breath, dizziness, syncope, or palpitations.   Taking ramipril 10 mg BID without side effects.   Also has diastolic heart failure, taking lasix 40 mg daily with potassium 20 mEq.     BP Readings from Last 2 Encounters:   11/23/21 104/52   11/16/21 (!) 144/62     Hemoglobin A1C (%)   Date Value   01/29/2021 6.9 (H)    07/01/2020 7.2 (H)     LDL Cholesterol Calculated (mg/dL)   Date Value   05/05/2021 49   07/01/2020 36       COPD Follow-Up    Overall, how are your COPD symptoms since your last clinic visit?  Better    How much fatigue or shortness of breath do you have when you are walking?  Less than usual    How much shortness of breath do you have when you are resting?  Less than usual    How often do you cough? Rarely    Have you noticed any change in your sputum/phlegm?  No white phlegm     Have you experienced a recent fever? No    Please describe how far you can walk without stopping to rest:  The length of 3-5 rooms    How many flights of stairs are you able to walk up without stopping?  None    Have you had any Emergency Room Visits, Urgent Care Visits, or Hospital Admissions because of your COPD since your last office visit?  No     Using Spiriva with as needed albuterol. Feels her breathing is well controlled.     History   Smoking Status     Never Smoker   Smokeless Tobacco     Never Used     No results found for: FEV1, [id number]    Hypothyroidism Follow-up      Since last visit, patient describes the following symptoms: Weight stable, no hair loss, no skin changes, no constipation, no loose stools    Review of Systems   As noted in the HPI.       Objective    /52 (BP Location: Right arm, Patient Position: Chair, Cuff Size: Adult Regular)   Pulse 86   Temp 97.6  F (36.4  C) (Tympanic)   Wt 64 kg (141 lb)   SpO2 98%   BMI 25.69 kg/m    Body mass index is 25.69 kg/m .  Physical Exam   GENERAL: healthy, alert and no distress  EYES: Eyes grossly normal to inspection, PERRL and conjunctivae and sclerae normal  HENT: ear canals and TM's normal, nose and mouth without ulcers or lesions  NECK: no adenopathy, no asymmetry, masses, or scars and thyroid normal to palpation  RESP: lungs clear to auscultation - no rales, rhonchi or wheezes  CV: regular rate and rhythm, no murmur, click or rub, bilateral 1+ peripheral  edema  ABDOMEN: soft, nontender, no masses and bowel sounds normal  MS: no gross musculoskeletal defects noted, no edema  NEURO: Normal strength and tone, mentation intact and speech normal  PSYCH: mentation appears normal, affect normal/bright  Diabetic foot exam: DP and PT pulses present, but diminished, no trophic changes or ulcerative lesions and normal sensory exam    Labs pending

## 2021-11-23 ENCOUNTER — OFFICE VISIT (OUTPATIENT)
Dept: FAMILY MEDICINE | Facility: OTHER | Age: 76
End: 2021-11-23
Attending: NURSE PRACTITIONER
Payer: MEDICARE

## 2021-11-23 ENCOUNTER — ALLIED HEALTH/NURSE VISIT (OUTPATIENT)
Dept: EDUCATION SERVICES | Facility: OTHER | Age: 76
End: 2021-11-23
Attending: NURSE PRACTITIONER
Payer: MEDICARE

## 2021-11-23 VITALS
BODY MASS INDEX: 25.69 KG/M2 | HEART RATE: 86 BPM | OXYGEN SATURATION: 98 % | TEMPERATURE: 97.6 F | DIASTOLIC BLOOD PRESSURE: 52 MMHG | WEIGHT: 141 LBS | SYSTOLIC BLOOD PRESSURE: 104 MMHG

## 2021-11-23 DIAGNOSIS — E03.9 HYPOTHYROIDISM, UNSPECIFIED TYPE: ICD-10-CM

## 2021-11-23 DIAGNOSIS — E87.6 HYPOKALEMIA: ICD-10-CM

## 2021-11-23 DIAGNOSIS — G62.9 NEUROPATHY: ICD-10-CM

## 2021-11-23 DIAGNOSIS — R60.0 LOWER EXTREMITY EDEMA: ICD-10-CM

## 2021-11-23 DIAGNOSIS — K21.9 GASTROESOPHAGEAL REFLUX DISEASE WITHOUT ESOPHAGITIS: ICD-10-CM

## 2021-11-23 DIAGNOSIS — Z79.4 TYPE 2 DIABETES MELLITUS WITH HYPERGLYCEMIA, WITH LONG-TERM CURRENT USE OF INSULIN (H): Primary | ICD-10-CM

## 2021-11-23 DIAGNOSIS — E78.49 OTHER HYPERLIPIDEMIA: ICD-10-CM

## 2021-11-23 DIAGNOSIS — I10 ESSENTIAL HYPERTENSION: ICD-10-CM

## 2021-11-23 DIAGNOSIS — E13.9 LADA (LATENT AUTOIMMUNE DIABETES IN ADULTS), MANAGED AS TYPE 1 (H): Primary | ICD-10-CM

## 2021-11-23 DIAGNOSIS — E11.65 TYPE 2 DIABETES MELLITUS WITH HYPERGLYCEMIA, WITH LONG-TERM CURRENT USE OF INSULIN (H): Primary | ICD-10-CM

## 2021-11-23 DIAGNOSIS — I50.32 DIASTOLIC DYSFUNCTION WITH CHRONIC HEART FAILURE (H): ICD-10-CM

## 2021-11-23 DIAGNOSIS — J43.9 PULMONARY EMPHYSEMA, UNSPECIFIED EMPHYSEMA TYPE (H): ICD-10-CM

## 2021-11-23 PROBLEM — R07.9 INTERMITTENT CHEST PAIN: Status: RESOLVED | Noted: 2021-05-05 | Resolved: 2021-11-23

## 2021-11-23 LAB
ALBUMIN SERPL-MCNC: 3.4 G/DL (ref 3.4–5)
ALP SERPL-CCNC: 34 U/L (ref 40–150)
ALT SERPL W P-5'-P-CCNC: 21 U/L (ref 0–50)
ANION GAP SERPL CALCULATED.3IONS-SCNC: 7 MMOL/L (ref 3–14)
AST SERPL W P-5'-P-CCNC: 15 U/L (ref 0–45)
BILIRUB SERPL-MCNC: 0.6 MG/DL (ref 0.2–1.3)
BUN SERPL-MCNC: 12 MG/DL (ref 7–30)
CALCIUM SERPL-MCNC: 8.7 MG/DL (ref 8.5–10.1)
CHLORIDE BLD-SCNC: 101 MMOL/L (ref 94–109)
CHOLEST SERPL-MCNC: 148 MG/DL
CO2 SERPL-SCNC: 30 MMOL/L (ref 20–32)
CREAT SERPL-MCNC: 0.76 MG/DL (ref 0.52–1.04)
CREAT UR-MCNC: 32 MG/DL
EST. AVERAGE GLUCOSE BLD GHB EST-MCNC: 157 MG/DL
FASTING STATUS PATIENT QL REPORTED: NO
GFR SERPL CREATININE-BSD FRML MDRD: 78 ML/MIN/1.73M2
GLUCOSE BLD-MCNC: 124 MG/DL (ref 70–99)
HBA1C MFR BLD: 7.1 % (ref 0–5.6)
HDLC SERPL-MCNC: 50 MG/DL
LDLC SERPL CALC-MCNC: 47 MG/DL
MICROALBUMIN UR-MCNC: 6 MG/L
MICROALBUMIN/CREAT UR: 18.75 MG/G CR (ref 0–25)
NONHDLC SERPL-MCNC: 98 MG/DL
POTASSIUM BLD-SCNC: 3.7 MMOL/L (ref 3.4–5.3)
PROT SERPL-MCNC: 7.5 G/DL (ref 6.8–8.8)
SODIUM SERPL-SCNC: 138 MMOL/L (ref 133–144)
TRIGL SERPL-MCNC: 257 MG/DL
TSH SERPL DL<=0.005 MIU/L-ACNC: 1.02 MU/L (ref 0.4–4)

## 2021-11-23 PROCEDURE — 80061 LIPID PANEL: CPT | Mod: ZL | Performed by: NURSE PRACTITIONER

## 2021-11-23 PROCEDURE — G0463 HOSPITAL OUTPT CLINIC VISIT: HCPCS | Mod: 25

## 2021-11-23 PROCEDURE — 82043 UR ALBUMIN QUANTITATIVE: CPT | Mod: ZL | Performed by: NURSE PRACTITIONER

## 2021-11-23 PROCEDURE — 83036 HEMOGLOBIN GLYCOSYLATED A1C: CPT | Mod: ZL | Performed by: NURSE PRACTITIONER

## 2021-11-23 PROCEDURE — 80053 COMPREHEN METABOLIC PANEL: CPT | Mod: ZL | Performed by: NURSE PRACTITIONER

## 2021-11-23 PROCEDURE — 91300 PR COVID VAC PFIZER DIL RECON 30 MCG/0.3 ML IM: CPT

## 2021-11-23 PROCEDURE — 84443 ASSAY THYROID STIM HORMONE: CPT | Mod: ZL | Performed by: NURSE PRACTITIONER

## 2021-11-23 PROCEDURE — 99207 PR NO CHARGE NURSE ONLY: CPT

## 2021-11-23 PROCEDURE — 99214 OFFICE O/P EST MOD 30 MIN: CPT | Performed by: NURSE PRACTITIONER

## 2021-11-23 PROCEDURE — 36415 COLL VENOUS BLD VENIPUNCTURE: CPT | Mod: ZL | Performed by: NURSE PRACTITIONER

## 2021-11-23 RX ORDER — POTASSIUM CHLORIDE 1500 MG/1
20 TABLET, EXTENDED RELEASE ORAL 2 TIMES DAILY
Qty: 180 TABLET | Refills: 1 | Status: SHIPPED | OUTPATIENT
Start: 2021-11-23 | End: 2022-06-06

## 2021-11-23 RX ORDER — LEVOTHYROXINE SODIUM 75 UG/1
75 TABLET ORAL DAILY
Qty: 90 TABLET | Refills: 1 | Status: SHIPPED | OUTPATIENT
Start: 2021-11-23 | End: 2022-07-08

## 2021-11-23 RX ORDER — OMEPRAZOLE 40 MG/1
CAPSULE, DELAYED RELEASE ORAL
Qty: 90 CAPSULE | Refills: 3 | Status: SHIPPED | OUTPATIENT
Start: 2021-11-23 | End: 2022-12-05

## 2021-11-23 RX ORDER — FUROSEMIDE 40 MG
TABLET ORAL
Qty: 135 TABLET | Refills: 1 | Status: SHIPPED | OUTPATIENT
Start: 2021-11-23 | End: 2022-06-22

## 2021-11-23 RX ORDER — TAMOXIFEN CITRATE 20 MG/1
20 TABLET ORAL DAILY
Qty: 30 TABLET | Refills: 0 | Status: CANCELLED | OUTPATIENT
Start: 2021-11-23

## 2021-11-23 RX ORDER — RAMIPRIL 10 MG/1
10 CAPSULE ORAL 2 TIMES DAILY
Qty: 180 CAPSULE | Refills: 3 | Status: SHIPPED | OUTPATIENT
Start: 2021-11-23 | End: 2022-12-20

## 2021-11-23 ASSESSMENT — PAIN SCALES - GENERAL: PAINLEVEL: NO PAIN (0)

## 2021-11-23 NOTE — NURSING NOTE
"Chief Complaint   Patient presents with     Recheck Medication       Initial /52 (BP Location: Right arm, Patient Position: Chair, Cuff Size: Adult Regular)   Pulse 86   Temp 97.6  F (36.4  C) (Tympanic)   Wt 64 kg (141 lb)   SpO2 98%   BMI 25.69 kg/m   Estimated body mass index is 25.69 kg/m  as calculated from the following:    Height as of 11/16/21: 1.578 m (5' 2.12\").    Weight as of this encounter: 64 kg (141 lb).  Medication Reconciliation: complete  Erendira Estrada LPN  "

## 2021-11-23 NOTE — PROGRESS NOTES
Patient presented to clinic to have her meter downloaded.  Meter was successfully downloaded and a copy was given to the patient.  A copy was also placed on Kerri Elliott's desk for her to review.  DENVER TANG LPN

## 2021-11-30 ENCOUNTER — TELEPHONE (OUTPATIENT)
Dept: FAMILY MEDICINE | Facility: OTHER | Age: 76
End: 2021-11-30
Payer: COMMERCIAL

## 2021-11-30 NOTE — TELEPHONE ENCOUNTER
9:55 AM    Reason for Call: Phone Call    Description: Patient called and states that she has questions regarding lab results and labs that were done. Please call patient for further discussion.     Was an appointment offered for this call? No  If yes : Appointment type              Date    Preferred method for responding to this message: Telephone Call  What is your phone number ? [phone]    If we cannot reach you directly, may we leave a detailed response at the number you provided? Yes    Can this message wait until your PCP/provider returns, if available today? Not applicable, provider is in today    Bess Patrick

## 2021-11-30 NOTE — PROGRESS NOTES
"Bo stopped by for a pump download, which is as followed:    Date: 11/9 to 11/23/21  Glucose management indicator: n/a    Average glucose: 206    Glucose range  Very low (<54): 0  low (<70): 0  In target range (): 41%  High (>180): 31%  Very high (>250): 28%    Bo is always concerned about \"crashing\".   I don't see any low BGs.      Keep working on adding bolus before consuming carbs.     Kerri Elliott APRN FNP-BC  Diabetes and Wound Care            "

## 2021-12-27 ENCOUNTER — NURSE TRIAGE (OUTPATIENT)
Dept: FAMILY MEDICINE | Facility: OTHER | Age: 76
End: 2021-12-27
Payer: COMMERCIAL

## 2021-12-27 DIAGNOSIS — Z20.822 EXPOSURE TO 2019 NOVEL CORONAVIRUS: Primary | ICD-10-CM

## 2021-12-27 NOTE — TELEPHONE ENCOUNTER
Fully vaccinated will schedule tomorrow.    Annie Villela RN    Reason for Disposition    [1] CLOSE CONTACT COVID-19 EXPOSURE within last 14 days AND [2] NO symptoms    Additional Information    Negative: COVID-19 lab test positive    Negative: [1] Lives with someone known to have influenza (flu test positive) AND [2] flu-like symptoms (e.g., cough, runny nose, sore throat, SOB; with or without fever)    Negative: [1] Symptoms of COVID-19 (e.g., cough, fever, SOB, or others) AND [2] HCP diagnosed COVID-19 based on symptoms    Negative: [1] Symptoms of COVID-19 (e.g., cough, fever, SOB, or others) AND [2] lives in an area with community spread    Negative: [1] Symptoms of COVID-19 (e.g., cough, fever, SOB, or others) AND [2] within 14 days of EXPOSURE (close contact) with diagnosed or suspected COVID-19 patient    Negative: [1] Symptoms of COVID-19 (e.g., cough, fever, SOB, or others) AND [2] within 14 days of travel from high-risk area for COVID-19 community spread (identified by CDC)    Negative: [1] Difficulty breathing (shortness of breath) occurs AND [2] onset > 14 days after COVID-19 EXPOSURE (Close Contact)    Negative: [1] Dry cough occurs AND [2] onset > 14 days after COVID-19 EXPOSURE    Negative: [1] Wet cough (i.e., white-yellow, yellow, green, or cassidy colored sputum) AND [2] onset > 14 days after COVID-19 EXPOSURE    Negative: [1] Common cold symptoms AND [2] onset > 14 days after COVID-19 EXPOSURE    Negative: COVID-19 vaccine reaction suspected (e.g., fever, headache, muscle aches) occurring during days 1-3 after getting vaccine    Negative: COVID-19 vaccine, questions about    Negative: [1] CLOSE CONTACT COVID-19 EXPOSURE within last 14 days AND [2] needs COVID-19 lab test to return to work AND [3] NO symptoms    Negative: [1] CLOSE CONTACT COVID-19 EXPOSURE within last 14 days AND [2] exposed person is a  (e.g., police or paramedic) AND [3] NO symptoms    Negative: [1] CLOSE CONTACT  "COVID-19 EXPOSURE within last 14 days AND [2] exposed person is a healthcare worker who was NOT using all recommended personal protective equipment (e.g., a respirator-N95 mask, eye protection, gloves, and gown) AND [3] NO symptoms    Negative: [1] Living or working in a correctional facility, long-term care facility, or shelter (i.e., congregate setting; densely populated) AND [2] where an outbreak has occurred AND [3] NO symptoms    Negative: [1] Has NOT completed COVID-19 vaccine series AND [2] was at a large indoor or outdoor event (e.g., concert, festival, rally, wedding) or been in crowded indoor setting AND [3] within last 14 days    Negative: [1] COVID-19 EXPOSURE AND [2] 15 or more days ago AND [3] NO symptoms    Negative: [1] Living in area with community spread (identified by local PHD) BUT [2] NO symptoms    Negative: [1] Travel from area with community spread (identified by CDC) AND [2] within last 14 days BUT [3] NO symptoms    Negative: [1] No COVID-19 EXPOSURE BUT [2] living with someone who was exposed and who has no symptoms of COVID-19    Negative: [1] Caller concerned that exposure to COVID-19 occurred BUT [2] does not meet COVID-19 EXPOSURE criteria from CDC    Negative: COVID-19 Testing, questions about    Negative: COVID-19 Prevention and Healthy Living, questions about    Negative: COVID -19 Disease, questions about    Answer Assessment - Initial Assessment Questions  1. COVID-19 EXPOSURE: \"Please describe how you were exposed to someone with a COVID-19 infection.\"      granddaughter  2. PLACE of CONTACT: \"Where were you when you were exposed to COVID-19?\" (e.g., home, school, medical waiting room; which city?)      Sitting next to on plane ,ate by, hugged and kissed the whole erin  3. TYPE of CONTACT: \"How much contact was there?\" (e.g., sitting next to, live in same house, work in same office, same building)      See above  4. DURATION of CONTACT: \"How long were you in contact with the " "COVID-19 patient?\" (e.g., a few seconds, passed by person, a few minutes, 15 minutes or longer, live with the patient)      Many days  5. MASK: \"Were you wearing a mask?\" \"Was the other person wearing a mask?\" Note: wearing a mask reduces the risk of an otherwise close contact.      no  6. DATE of CONTACT: \"When did you have contact with a COVID-19 patient?\" (e.g., how many days ago)      All week last week but last contact was 12.24.2021 12.18.2021 thru 12.24.2021  7. COMMUNITY SPREAD: \"Are there lots of cases of COVID-19 (community spread) where you live?\" (See public health department website, if unsure)        yes  8. SYMPTOMS: \"Do you have any symptoms?\" (e.g., fever, cough, breathing difficulty, loss of taste or smell)      no  9. PREGNANCY OR POSTPARTUM: \"Is there any chance you are pregnant?\" \"When was your last menstrual period?\" \"Did you deliver in the last 2 weeks?\"      na  10. HIGH RISK: \"Do you have any heart or lung problems?\" \"Do you have a weak immune system?\" (e.g., heart failure, COPD, asthma, HIV positive, chemotherapy, renal failure, diabetes mellitus, sickle cell anemia, obesity)        CHF,DM,asthma, remission for cancer  11. TRAVEL: \"Have you traveled out of the country recently?\" If Yes, ask: \"When and where?\" Also ask about out-of-state travel, since the [facility] has identified some high-risk cities for community spread in the . Note: Travel becomes less relevant if there is widespread community transmission where the patient lives.        Out to state [place]    Protocols used: CORONAVIRUS (COVID-19) EXPOSURE-A-AH 8.25.2021      "

## 2021-12-28 ENCOUNTER — OFFICE VISIT (OUTPATIENT)
Dept: FAMILY MEDICINE | Facility: OTHER | Age: 76
End: 2021-12-28
Attending: NURSE PRACTITIONER
Payer: MEDICARE

## 2021-12-28 DIAGNOSIS — Z20.822 EXPOSURE TO 2019 NOVEL CORONAVIRUS: ICD-10-CM

## 2021-12-28 PROCEDURE — U0005 INFEC AGEN DETEC AMPLI PROBE: HCPCS | Mod: ZL

## 2021-12-29 LAB — SARS-COV-2 RNA RESP QL NAA+PROBE: NEGATIVE

## 2022-02-02 DIAGNOSIS — J43.9 PULMONARY EMPHYSEMA, UNSPECIFIED EMPHYSEMA TYPE (H): ICD-10-CM

## 2022-02-03 RX ORDER — TIOTROPIUM BROMIDE INHALATION SPRAY 3.12 UG/1
SPRAY, METERED RESPIRATORY (INHALATION)
Qty: 4 G | Refills: 0 | Status: SHIPPED | OUTPATIENT
Start: 2022-02-03 | End: 2022-03-16

## 2022-02-03 NOTE — TELEPHONE ENCOUNTER
Spiriva Respimat 2.5 MCG/ACT Inhalation Aerosol Solution     Last Written Prescription Date:  11/5/21  Last Fill Quantity: 12 g,   # refills: 3  Last Office Visit: 11/23/21  Future Office visit:       Routing refill request to provider for review/approval because:    Asthma Maintenance Inhalers - Anticholinergics Failed     Asthma control assessment score within normal limits in last 6 months    ACT Total Scores 1/29/2021   ACT TOTAL SCORE (Goal Greater than or Equal to 20) 25   In the past 12 months, how many times did you visit the emergency room for your asthma without being admitted to the hospital? 0   In the past 12 months, how many times were you hospitalized overnight because of your asthma? 0     Patient reports she did not receive script in November from [facility].     Requesting to send to [facility]

## 2022-02-17 ENCOUNTER — ALLIED HEALTH/NURSE VISIT (OUTPATIENT)
Dept: EDUCATION SERVICES | Facility: OTHER | Age: 77
End: 2022-02-17
Attending: NURSE PRACTITIONER
Payer: COMMERCIAL

## 2022-02-17 ENCOUNTER — MEDICAL CORRESPONDENCE (OUTPATIENT)
Dept: HEALTH INFORMATION MANAGEMENT | Facility: CLINIC | Age: 77
End: 2022-02-17

## 2022-02-17 VITALS
DIASTOLIC BLOOD PRESSURE: 68 MMHG | HEART RATE: 80 BPM | TEMPERATURE: 98.6 F | BODY MASS INDEX: 25.76 KG/M2 | HEIGHT: 62 IN | OXYGEN SATURATION: 95 % | SYSTOLIC BLOOD PRESSURE: 124 MMHG | WEIGHT: 140 LBS

## 2022-02-17 DIAGNOSIS — E13.9 LADA (LATENT AUTOIMMUNE DIABETES IN ADULTS), MANAGED AS TYPE 1 (H): Primary | ICD-10-CM

## 2022-02-17 PROCEDURE — 99214 OFFICE O/P EST MOD 30 MIN: CPT | Mod: 25 | Performed by: NURSE PRACTITIONER

## 2022-02-17 PROCEDURE — 95251 CONT GLUC MNTR ANALYSIS I&R: CPT | Performed by: NURSE PRACTITIONER

## 2022-02-17 PROCEDURE — G0463 HOSPITAL OUTPT CLINIC VISIT: HCPCS

## 2022-02-17 RX ORDER — TIOTROPIUM BROMIDE 18 UG/1
2 CAPSULE ORAL; RESPIRATORY (INHALATION) EVERY MORNING
COMMUNITY
End: 2023-03-29

## 2022-02-17 RX ORDER — TAMOXIFEN CITRATE 20 MG/1
20 TABLET ORAL EVERY MORNING
COMMUNITY
Start: 2021-12-14

## 2022-02-17 ASSESSMENT — PAIN SCALES - GENERAL: PAINLEVEL: NO PAIN (0)

## 2022-02-17 NOTE — PATIENT INSTRUCTIONS
Continue working on healthy eating and moving as best as you can (start low and slow, work up to 30 min, 5x/week)    BG goals:  Fasting and before meals <130, >70  2 hour after eating <180    We only need 1/2 of these numbers to be within target then your A1c will be within target    Medication changes   1.  Jardiance  10 mg daily  Just let me know if you were able to pick this up    Watch for low blood glucose due to changes    Follow up   1 month    Call me sooner if any problems/concerns and/or questions develop including consistent low BGs <70 or consistent high BGs >200  [phone] (Marie, Unit Coordinator)    [phone] (Emeli, Nurse)    Things discussed:  1. When you consume carbs, please use your presets before consuming  2.  Omnipod Dash--Pat (the rep) will be calling to discuss details on this  3.  Check your pharmacy about the Jardiance cost  4.  [facility] might be calling about your insulin too.

## 2022-02-17 NOTE — PROGRESS NOTES
Paperwork completed and submitted for insulin pump supplies.     Kerri NELSON FNP-BC  Diabetes and Wound Care

## 2022-02-17 NOTE — PROGRESS NOTES
"SUBJECTIVE:  Flora Acuna, 77 year old, female presents with the following Chief Complaint(s) with HPI to follow:  Chief Complaint   Patient presents with     Diabetes Education        Diabetes Follow-up      Patient is checking blood sugars: personal CGM.  Results:  She has been using her Dexcom for >90 days    Date: 1/21 to 2/3/22  Glucose management indicator: 8.2%    Average glucose: 203    Glucose range  Very low (<54): 0  low (<70): <1%  In target range (): 40%   High (>180): 34%  Very high (>250): 25%    Date: 2/4 to 2/17/22  Glucose management indicator: 8.2%    Average glucose: 202    Glucose range  Very low (<54): 0  low (<70): 0  In target range (): 43%  High (>180): 32%  Very high (>250): 25%      Symptoms of hypoglycemia (low blood sugar): Reports \"crashes\", <1% below 70 per CGM the past month    Paresthesias (numbness or burning in feet) or sores: Yes--same; no sores    Diabetic eye exam within the last year: DUE    Breakfast eaten regularly: sometimes    Patient counting carbs: Yes       HPI:  Flora's here today for the follow up regarding her KAREEM, managed as a Type 1    Lab Results   Component Value Date    A1C 7.1 11/23/2021    A1C 6.9 01/29/2021    A1C 7.2 07/01/2020    A1C 7.9 12/16/2019    A1C 7.5 09/11/2019    A1C 7.3 06/07/2019     Current Diabetes medication:   1.  Insulin pump, with Novolog  ASA use: yes, 325 mg daily  Statin use: yes, simvastatin 40 mg daily    Bo's here today for an insulin pump and Dexcom download with possible insulin adjust.    Reports the following:  Issues with BGs \"crashing\"    Denies any issues with her insulin pump  Denies any issues with her Dexcom    She's out of insulin in her insulin pump.   Will fill it when she gets back to her house    Weight trend:  Wt Readings from Last 4 Encounters:   02/17/22 63.5 kg (140 lb)   11/23/21 64 kg (141 lb)   08/18/21 65.8 kg (145 lb)   05/05/21 68 kg (150 lb)     Denies any new health concerns.  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), managed as type 1 (H)     H/O total knee replacement, left     Age-related osteoporosis without current pathological fracture     Osteoporosis     Family history of melanoma     Family history of breast cancer in female     Dysphonia     Prolonged QT interval     FRANCO (dyspnea on exertion)     Asthma     Fatty liver on 11/7/2007     Lower extremity edema     Diastolic dysfunction with chronic heart failure (H)     Type 2 diabetes mellitus with hyperglycemia, with long-term current use of insulin (H)     Mixed hyperlipidemia     Hypokalemia     Vitamin D deficiency     Contrast media allergy     COPD, mild (H)     Hypertriglyceridemia       Past Medical History:   Diagnosis Date     Congestive heart failure, unspecified      Diabetes (H)      H/O rheumatoid arthritis      HLD (hyperlipidemia)      HTN (hypertension)      Myocardial infarction (H)      Uncomplicated asthma        Past Surgical History:   Procedure Laterality Date     APPENDECTOMY OPEN CHILD       AS TOTAL KNEE ARTHROPLASTY Right      CHOLECYSTECTOMY       COLONOSCOPY - HIM SCAN N/A 03/12/2009    per Care Everywhere documentation per [facility].      HYSTERECTOMY TOTAL ABDOMINAL       MASTECTOMY, BILATERAL Bilateral 02/26/1992     SHOULDER SURGERY Right      SHOULDER SURGERY Left        Family History   Problem Relation Age of Onset     Breast Cancer Maternal Aunt      Breast Cancer Maternal Aunt      Lung Cancer Father        Social History     Tobacco Use     Smoking status: Never Smoker     Smokeless  tobacco: Never Used   Substance Use Topics     Alcohol use: No     Alcohol/week: 0.0 standard drinks       Current Outpatient Medications   Medication Sig Dispense Refill     acetaminophen (TYLENOL) 325 MG tablet Take 650 mg by mouth       Acetone, Urine, Test (KETONE TEST) STRP Use as directed 50 strip 4     albuterol (PROAIR HFA/PROVENTIL HFA/VENTOLIN HFA) 108 (90 Base) MCG/ACT inhaler Inhale 2 puffs into the lungs every 6 hours 1 Inhaler 0     aspirin (ASA) 325 MG EC tablet Take 325 mg by mouth daily       blood glucose (CONTOUR NEXT TEST) test strip Use 6 times a day with the insulin pump to help manage your diabetes 200 each 11     blood glucose (NO BRAND SPECIFIED) test strip by In Vitro route 4 times daily Use to test blood sugar 4 times daily or as directed.       Calcium-Vitamin D-Vitamin K (VIACTIV PO) Take 2 chew tab by mouth every morning       Continuous Blood Gluc  (DEXCOM G6 ) XX IDMITRIS 1 each continuous 1 Device 0     Continuous Blood Gluc Sensor (DEXCOM G5 MOB/G4 PLAT SENSOR) MISC 1 each continuous Change every 7 days 3 each 1     Continuous Blood Gluc Sensor (DEXCOM G6 SENSOR) MISC 1 each continuous To be changed every 10 days 3 each 5     Continuous Blood Gluc Transmit (DEXCOM G6 TRANSMITTER) MISC 1 each continuous To be changed every 3 months 1 each 1     diphenhydrAMINE (BENADRYL) 50 MG capsule Administer 30 min - 2 hours pre - IV contrast injection 1 capsule 0     furosemide (LASIX) 40 MG tablet TAKE 1 TABLET BY MOUTH ONCE DAILY IN THE MORNING AND  HALF  A  TABLET  EVERY  AFTERNOON 135 tablet 1     glucagon (GLUCAGON EMERGENCY) 1 MG injection Inject 1 mg Subcutaneous once 1 mg 12     Insulin Aspart (INSULIN PUMP - OUTPATIENT)        insulin aspart (NOVOLOG VIAL) 100 UNITS/ML vial To be used with the insulin pump  TDD: 80 units 90 mL 1     INSULIN PUMP - OUTPATIENT Date last updated:  2/4/15  [employer]: Model 723  BASAL RATES and times:  12   AM (midnight): 0.9 units/hour     9    PM: 0.8 units/hour   Basal Pattern A:  Flora Acuna will use when N/A.  CARB RATIO and times:  12   AM (midnight): 13.0  4     PM: 10  Corection Factor (Sensitivity) and times:  12   AM (midnight): 55 mg/dL  BLOOD GLUCOSE TARGET and times:  12   AM (midnight): 100 - 150  7     AM:  100 - 120  9    PM:  100 - 150  Active Insulin Time:  3 hours  Sensor:  No  Carelink / Diasend username:  [email or web address]  Carelink / Diasend Password:  [email or web address]       levothyroxine (EUTHYROX) 75 MCG tablet Take 1 tablet (75 mcg) by mouth daily 90 tablet 1     omeprazole (PRILOSEC) 40 MG DR capsule Take 1 capsule by mouth once daily 90 capsule 3     potassium chloride ER (KLOR-CON M) 20 MEQ CR tablet Take 1 tablet (20 mEq) by mouth 2 times daily 180 tablet 1     ramipril (ALTACE) 10 MG capsule Take 1 capsule (10 mg) by mouth 2 times daily 180 capsule 3     senna-docusate (SENOKOT-S;PERICOLACE) 8.6-50 MG per tablet Take 1 tablet by mouth At Bedtime        simvastatin (ZOCOR) 40 MG tablet Take 1 tablet (40 mg) by mouth At Bedtime 90 tablet 3     SPIRIVA RESPIMAT 2.5 MCG/ACT inhaler INHALE 2 SPRAY(S) BY MOUTH ONCE DAILY 4 g 0     tamoxifen (NOLVADEX) 20 MG tablet Take 20 mg by mouth every morning       tiotropium (SPIRIVA) 18 MCG inhaled capsule Inhale 2 puffs into the lungs every morning       tiZANidine (ZANAFLEX) 4 MG tablet 1/2 tab three times a day       VITAMIN D, CHOLECALCIFEROL, PO Take 5,000 Units by mouth daily         Allergies   Allergen Reactions     Contrast Dye Difficulty breathing     Gadolinium Derivatives      Other reaction(s): Difficulty in swallowing, Laryngeal spasm  Patient had an injection into rt. Shoulder capsule prior to MRI arthrogram.  Contained multihance gadobenate, omnipaque iohexol, and buffered lidocaine.       Ammonia      Cory-1 [Lidocaine Hcl]      Novocaine allergy       Codeine Sulfate      Food      Shrimp     Fosamax [Alendronic Acid]      Dental infections     Iodine-131 Swelling     Ct dye      "Lidocaine      Nitrous Oxide      No Clinical Screening - See Comments Nausea and Vomiting and Other (See Comments)     (3/6/09)- N/V and Heart racing     Novocain [Procaine]      Penicillins      Tramadol      Morphine Palpitations       REVIEW OF SYSTEMS  Skin: negative  Eyes: negative; DUE  Ears/Nose/Throat: hx of \"burnt\" voice and muscles from GERD  Respiratory: positive FRANCO--better; no cough; no hemoptysis; history of asthma  Cardiovascular: stable; history of HF  Gastrointestinal: as noted above  Genitourinary: negative   Musculoskeletal: RA--okay; bilateral thumb joints are displaced per pt: hx of back pain, neck pain, arthritis and right shoulder   Neurologic: positive for numbness or tingling of feet--same  Psychiatric: negative  Hematologic/Lymphatic/Immunologic: history of breast cancer (left) x 2 (same spot)  Endocrine: positive for diabetes and thyroid disease     OBJECTIVE:  /68 (BP Location: Right arm)   Pulse 80   Temp 98.6  F (37  C) (Tympanic)   Ht 1.575 m (5' 2\")   Wt 63.5 kg (140 lb)   SpO2 95%   BMI 25.61 kg/m    Constitutional: alert and no distress  Respiratory:  Good diaphragmatic excursion.  Musculoskeletal: extremities normal- no gross deformities noted and gait normal  Skin: no suspicious lesions or rashes to visible skin  Psychiatric: mentation appears normal and affect normal/bright      LABS  Results for orders placed or performed in visit on 02/17/22   GLUCOSE MONITOR, 72 HOUR, PHYS INTERP     Status: None    Narrative    Date: 1/21 to 2/3/22  Glucose management indicator: 8.2%    Average glucose: 203    Glucose range  Very low (<54): 0  low (<70): <1%  In target range (): 40%   High (>180): 34%  Very high (>250): 25%    Date: 2/4 to 2/17/22  Glucose management indicator: 8.2%    Average glucose: 202    Glucose range  Very low (<54): 0  low (<70): 0  In target range (): 43%  High (>180): 32%  Very high (>250): 25%       ASSESSMENT / PLAN:  (E13.9) KAREEM (latent " "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  SG average around that time >100.    Then SG's spike to 250 and fluctuate above target for the remainder of the day.      She occasionally uses her preset bolus feature of her insulin pump.      Adjusted her basal as followed:  0000 0.9  0200 0.875    Education focused on adding a bolus anytime she consumes carbs.     We did discuss starting Jardiance but this was discontinued due to potential cost    We also talked about changing her to the Omnipod Dash and eventually the Omnipod 5 as this would help keep her mind at ease if the pump would self adjust when her BGs are lower.    She would like to do this.       Follow up  1 month    Call sooner if any issues develop    Patient Instructions     Continue working on healthy eating and moving as best as you can (start low and slow, work up to 30 min, 5x/week)    BG goals:  Fasting and before meals <130, >70  2 hour after eating <180    We only need 1/2 of these numbers to be within target then your A1c will be within target    Medication changes   1.  Jardiance  10 mg daily  Just let me know if you were able to pick this up    Watch for low blood glucose due to changes    Follow up   1 month    Call me sooner if any problems/concerns and/or questions develop including consistent low BGs <70 or consistent high BGs >200  [phone] (Marie, Unit Coordinator)    [phone] (Emeli, " Nurse)    Things discussed:  1. When you consume carbs, please use your presets before consuming  2.  Omnipod Dash--Pat (the rep) will be calling to discuss details on this  3.  Check your pharmacy about the Jardiance cost  4.  [facility] might be calling about your insulin too.        Time: 35 minutes  Barrier: none  Willingness to learn: accepting    Kerri NELSON [facility]  Diabetes and Wound Care    Cc: Nicolasa Guillen NP    35 minutes was spent with patient.    All of  this time was spent on counseling patient regarding illness, medication and/or treatment options, coordinating further cares and follow ups that are needed along with resource material that will be helpful in the treatment of these issues.       With the electronic record, we can now more quickly and easily track our patient diabetic goals. Our diabetes clinical review is in progress and these are the indicators we are monitoring for good diabetes health.     1.) HbA1C less than 7 (measurement of your average blood sugars)  2.) Blood Pressure less than 140/80  3.) LDL less than 100 (bad cholesterol)  4.) HbA1C is checked in the last 6 months and below 7% (more frequently if not at goal or adjusting medications)  5.) LDL is checked in the last 12 months (more frequently if not at goal or adjusting medications)  6.) Taking one baby aspirin daily (unless otherwise instructed)  7.) No tobacco use  8) Statin use     You have achieved 8 out of 8 of these and I am encouraging you to come in and get tuned up to achieve 8 out of 8!  Here is what you have achieved so far in my goals for you:  1.) HbA1C  less than 7:                              YES--good for age    Your last  HbA1C  Lab Results   Component Value Date    A1C 7.1 11/23/2021    A1C 6.9 01/29/2021    A1C 7.2 07/01/2020    A1C 7.9 12/16/2019    A1C 7.5 09/11/2019    A1C 7.3 06/07/2019      2.) Blood Pressure less than 140/80:       YES     Your last    BP Readings from Last 1  Encounters:   02/17/22 124/68     3.) LDL less than 100:                              YES      Your last     LDL Cholesterol Calculated   Date Value Ref Range Status   11/23/2021 47 <=100 mg/dL Final   05/05/2021 49 <100 mg/dL Final     Comment:     Desirable:       <100 mg/dl       4.) Checked HbA1C in the past 6 months: YES      5.) Checked LDL in the past 12 months:    YES    6.) Taking one aspirin daily:                       YES     7.) No tobacco use:                                        YES      8.) Statin use      YES       Insurance requirements:  1.  Patient has been seen in the clinic within the last 6 month  2. Diagnosis of KAREEM, managed as a Type 1 for >6 months  3. Has been testing their BGs 4x/day using her Dexcomg CGM for >90 days  4. Uses an insulin pump for >6 months  5. Requires frequent adjustments to their treatment regimen based on BGM and/or CGM testing results.

## 2022-02-17 NOTE — PROGRESS NOTES
"Chief Complaint   Patient presents with     Diabetes Education       Initial Ht 1.575 m (5' 2\")   Wt 63.5 kg (140 lb)   BMI 25.61 kg/m   Estimated body mass index is 25.61 kg/m  as calculated from the following:    Height as of this encounter: 1.575 m (5' 2\").    Weight as of this encounter: 63.5 kg (140 lb).  Medication Reconciliation: complete  Helene Jean-Baptiste LPN    "

## 2022-02-22 DIAGNOSIS — E13.9 LADA (LATENT AUTOIMMUNE DIABETES IN ADULTS), MANAGED AS TYPE 1 (H): ICD-10-CM

## 2022-02-22 NOTE — TELEPHONE ENCOUNTER
Continuous Blood Gluc Sensor (DEXCOM G6 SENSOR) MISC  Last Written Prescription Date:  9/6/2021  Last Fill Quantity: 3,   # refills: 5  Last Office Visit: 2/17/2022  Future Office visit:       Routing refill request to provider for review/approval because:

## 2022-02-24 ENCOUNTER — MEDICAL CORRESPONDENCE (OUTPATIENT)
Dept: HEALTH INFORMATION MANAGEMENT | Facility: CLINIC | Age: 77
End: 2022-02-24

## 2022-02-24 DIAGNOSIS — E13.9 LADA (LATENT AUTOIMMUNE DIABETES IN ADULTS), MANAGED AS TYPE 1 (H): Primary | ICD-10-CM

## 2022-02-24 RX ORDER — INSULIN PUMP CONTROLLER
1 EACH MISCELLANEOUS
Qty: 15 EACH | Refills: 5 | Status: SHIPPED | OUTPATIENT
Start: 2022-02-24 | End: 2022-03-31

## 2022-02-24 RX ORDER — PROCHLORPERAZINE 25 MG/1
1 SUPPOSITORY RECTAL CONTINUOUS
Qty: 3 EACH | Refills: 5 | Status: SHIPPED | OUTPATIENT
Start: 2022-02-24 | End: 2022-08-04

## 2022-02-24 NOTE — PROGRESS NOTES
Order for omnipod dash pods sent to [facility] per Pat's (Omnipod rep) per patient's request    Kerri Elliott APRN FNP-BC  Diabetes and Wound Care          
Yes

## 2022-02-28 ENCOUNTER — MEDICAL CORRESPONDENCE (OUTPATIENT)
Dept: HEALTH INFORMATION MANAGEMENT | Facility: CLINIC | Age: 77
End: 2022-02-28

## 2022-03-08 ENCOUNTER — MEDICAL CORRESPONDENCE (OUTPATIENT)
Dept: HEALTH INFORMATION MANAGEMENT | Facility: CLINIC | Age: 77
End: 2022-03-08

## 2022-03-16 DIAGNOSIS — J43.9 PULMONARY EMPHYSEMA, UNSPECIFIED EMPHYSEMA TYPE (H): ICD-10-CM

## 2022-03-16 RX ORDER — TIOTROPIUM BROMIDE INHALATION SPRAY 3.12 UG/1
SPRAY, METERED RESPIRATORY (INHALATION)
Qty: 4 G | Refills: 0 | Status: SHIPPED | OUTPATIENT
Start: 2022-03-16 | End: 2022-04-21

## 2022-03-16 NOTE — TELEPHONE ENCOUNTER
spiriva      Last Written Prescription Date:  2/3/22  Last Fill Quantity: 4 g,   # refills: 0  Last Office Visit: 11/23/21  Future Office visit:

## 2022-03-31 ENCOUNTER — ALLIED HEALTH/NURSE VISIT (OUTPATIENT)
Dept: EDUCATION SERVICES | Facility: OTHER | Age: 77
End: 2022-03-31
Attending: NURSE PRACTITIONER
Payer: COMMERCIAL

## 2022-03-31 VITALS
WEIGHT: 140 LBS | DIASTOLIC BLOOD PRESSURE: 60 MMHG | HEART RATE: 71 BPM | HEIGHT: 63 IN | BODY MASS INDEX: 24.8 KG/M2 | SYSTOLIC BLOOD PRESSURE: 112 MMHG | OXYGEN SATURATION: 98 %

## 2022-03-31 DIAGNOSIS — E13.9 LADA (LATENT AUTOIMMUNE DIABETES IN ADULTS), MANAGED AS TYPE 1 (H): Primary | ICD-10-CM

## 2022-03-31 PROCEDURE — 99213 OFFICE O/P EST LOW 20 MIN: CPT | Mod: 25 | Performed by: NURSE PRACTITIONER

## 2022-03-31 PROCEDURE — 95251 CONT GLUC MNTR ANALYSIS I&R: CPT | Performed by: NURSE PRACTITIONER

## 2022-03-31 PROCEDURE — G0463 HOSPITAL OUTPT CLINIC VISIT: HCPCS

## 2022-03-31 RX ORDER — INSULIN PUMP CONTROLLER
1 EACH MISCELLANEOUS
Qty: 15 EACH | Refills: 5 | Status: SHIPPED | OUTPATIENT
Start: 2022-03-31 | End: 2022-08-04 | Stop reason: ALTCHOICE

## 2022-03-31 ASSESSMENT — PAIN SCALES - GENERAL: PAINLEVEL: NO PAIN (0)

## 2022-03-31 NOTE — PROGRESS NOTES
"SUBJECTIVE:  Flora Acuna, 77 year old, female presents with the following Chief Complaint(s) with HPI to follow:  Chief Complaint   Patient presents with     Diabetes        Diabetes Follow-up      Patient is checking blood sugars: personal CGM.  Results:  She has been using her Dexcom for >90 days    Date: 3/4 to 3/17/22  Glucose management indicator: 8.4%    Average glucose: 211    Glucose range  Very low (<54): 0  low (<70): 0  In target range (): 37%  High (>180): 34%  Very high (>250): 29%    Date: 3/18 to 3/31/22  Glucose management indicator: 8.8%    Average glucose: 228    Glucose range  Very low (<54): 0  low (<70): 0  In target range (): 27%  High (>180): 37%  Very high (>250): 36%      Symptoms of hypoglycemia (low blood sugar): no \"crashes\",     Paresthesias (numbness or burning in feet) or sores: Yes--same; no sores    Diabetic eye exam within the last year: DUE    Breakfast eaten regularly: sometimes    Patient counting carbs: Yes       HPI:  Flora's here today for the follow up regarding her KAREEM, managed as a Type 1    Lab Results   Component Value Date    A1C 7.1 11/23/2021    A1C 6.9 01/29/2021    A1C 7.2 07/01/2020    A1C 7.9 12/16/2019    A1C 7.5 09/11/2019    A1C 7.3 06/07/2019     Current Diabetes medication:   1.  Insulin pump, with Novolog  ASA use: yes, 325 mg daily  Statin use: yes, simvastatin 40 mg daily    Bo's here today for an insulin pump and Dexcom download with possible insulin adjust.    Reports the following:  Denies any issues with her insulin pump  Denies any issues with her Dexcom    Denies any crashes   Reports BGs are higher    Weight trend:  Wt Readings from Last 4 Encounters:   03/31/22 63.5 kg (140 lb)   02/17/22 63.5 kg (140 lb)   11/23/21 64 kg (141 lb)   08/18/21 65.8 kg (145 lb)     Denies any new health concerns.      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), managed as type 1 (H)     H/O total knee replacement, left     Age-related osteoporosis without current pathological fracture     Osteoporosis     Family history of melanoma     Family history of breast cancer in female     Dysphonia     Prolonged QT interval     FRANCO (dyspnea on exertion)     Asthma     Fatty liver on 11/7/2007     Lower extremity edema     Diastolic dysfunction with chronic heart failure (H)     Type 2 diabetes mellitus with hyperglycemia, with long-term current use of insulin (H)     Mixed hyperlipidemia     Hypokalemia     Vitamin D deficiency     Contrast media allergy     COPD, mild (H)     Hypertriglyceridemia       Past Medical History:   Diagnosis Date     Congestive heart failure, unspecified      Diabetes (H)      H/O rheumatoid arthritis      HLD (hyperlipidemia)      HTN (hypertension)      Myocardial infarction (H)      Uncomplicated asthma        Past Surgical History:   Procedure Laterality Date     APPENDECTOMY OPEN CHILD       AS TOTAL KNEE ARTHROPLASTY Right      CHOLECYSTECTOMY       COLONOSCOPY - HIM SCAN N/A 03/12/2009    per Care Everywhere documentation per [facility].      HYSTERECTOMY TOTAL ABDOMINAL       MASTECTOMY, BILATERAL Bilateral 02/26/1992     SHOULDER SURGERY Right      SHOULDER SURGERY Left        Family History   Problem Relation Age of Onset     Breast Cancer Maternal Aunt      Breast Cancer Maternal Aunt      Lung Cancer Father        Social History     Tobacco Use     Smoking status: Never Smoker     Smokeless tobacco: Never Used   Substance Use Topics     Alcohol use: No     Alcohol/week: 0.0 standard drinks       Current Outpatient  Medications   Medication Sig Dispense Refill     acetaminophen (TYLENOL) 325 MG tablet Take 650 mg by mouth       Acetone, Urine, Test (KETONE TEST) STRP Use as directed 50 strip 4     albuterol (PROAIR HFA/PROVENTIL HFA/VENTOLIN HFA) 108 (90 Base) MCG/ACT inhaler Inhale 2 puffs into the lungs every 6 hours 1 Inhaler 0     aspirin (ASA) 325 MG EC tablet Take 325 mg by mouth daily       blood glucose (CONTOUR NEXT TEST) test strip Use 6 times a day with the insulin pump to help manage your diabetes 200 each 11     blood glucose (NO BRAND SPECIFIED) test strip by In Vitro route 4 times daily Use to test blood sugar 4 times daily or as directed.       Calcium-Vitamin D-Vitamin K (VIACTIV PO) Take 2 chew tab by mouth every morning       Continuous Blood Gluc  (DEXCOM G6 ) XX DIMITRIS 1 each continuous 1 Device 0     Continuous Blood Gluc Sensor (DEXCOM G5 MOB/G4 PLAT SENSOR) MISC 1 each continuous Change every 7 days 3 each 1     Continuous Blood Gluc Sensor (DEXCOM G6 SENSOR) MISC 1 each continuous To be changed every 10 days 3 each 5     Continuous Blood Gluc Transmit (DEXCOM G6 TRANSMITTER) MISC 1 each continuous To be changed every 3 months 1 each 1     diphenhydrAMINE (BENADRYL) 50 MG capsule Administer 30 min - 2 hours pre - IV contrast injection 1 capsule 0     furosemide (LASIX) 40 MG tablet TAKE 1 TABLET BY MOUTH ONCE DAILY IN THE MORNING AND  HALF  A  TABLET  EVERY  AFTERNOON 135 tablet 1     glucagon (GLUCAGON EMERGENCY) 1 MG injection Inject 1 mg Subcutaneous once 1 mg 12     Insulin Aspart (INSULIN PUMP - OUTPATIENT)        insulin aspart (NOVOLOG VIAL) 100 UNITS/ML vial To be used with the insulin pump  TDD: 80 units 90 mL 1     Insulin Disposable Pump (OMNIPOD DASH 5 PACK PODS) MISC 1 each every 48 hours 15 each 5     INSULIN PUMP - OUTPATIENT Date last updated:  2/4/15  [employer]: Model 723  BASAL RATES and times:  12   AM (midnight): 0.9 units/hour    9    PM: 0.8 units/hour   Basal  Pattern A:  Flora Acuna will use when N/A.  CARB RATIO and times:  12   AM (midnight): 13.0  4     PM: 10  Corection Factor (Sensitivity) and times:  12   AM (midnight): 55 mg/dL  BLOOD GLUCOSE TARGET and times:  12   AM (midnight): 100 - 150  7     AM:  100 - 120  9    PM:  100 - 150  Active Insulin Time:  3 hours  Sensor:  No  Carelink / Diasend username:  [email or web address]  Carelink / Diasend Password:  [email or web address]       levothyroxine (EUTHYROX) 75 MCG tablet Take 1 tablet (75 mcg) by mouth daily 90 tablet 1     omeprazole (PRILOSEC) 40 MG DR capsule Take 1 capsule by mouth once daily 90 capsule 3     potassium chloride ER (KLOR-CON M) 20 MEQ CR tablet Take 1 tablet (20 mEq) by mouth 2 times daily 180 tablet 1     ramipril (ALTACE) 10 MG capsule Take 1 capsule (10 mg) by mouth 2 times daily 180 capsule 3     senna-docusate (SENOKOT-S;PERICOLACE) 8.6-50 MG per tablet Take 1 tablet by mouth At Bedtime        simvastatin (ZOCOR) 40 MG tablet Take 1 tablet (40 mg) by mouth At Bedtime 90 tablet 3     SPIRIVA RESPIMAT 2.5 MCG/ACT inhaler INHALE 2 SPRAY(S) BY MOUTH ONCE DAILY 4 g 0     tamoxifen (NOLVADEX) 20 MG tablet Take 20 mg by mouth every morning       tiotropium (SPIRIVA) 18 MCG inhaled capsule Inhale 2 puffs into the lungs every morning       tiZANidine (ZANAFLEX) 4 MG tablet 1/2 tab three times a day       VITAMIN D, CHOLECALCIFEROL, PO Take 5,000 Units by mouth daily         Allergies   Allergen Reactions     Contrast Dye Difficulty breathing     Gadolinium Derivatives      Other reaction(s): Difficulty in swallowing, Laryngeal spasm  Patient had an injection into rt. Shoulder capsule prior to MRI arthrogram.  Contained multihance gadobenate, omnipaque iohexol, and buffered lidocaine.       Ammonia      Cory-1 [Lidocaine Hcl]      Novocaine allergy       Codeine Sulfate      Food      Shrimp     Fosamax [Alendronic Acid]      Dental infections     Iodine-131 Swelling     Ct dye     Lidocaine      Nitrous Oxide       "No Clinical Screening - See Comments Nausea and Vomiting and Other (See Comments)     (3/6/09)- N/V and Heart racing     Novocain [Procaine]      Penicillins      Tramadol      Morphine Palpitations       REVIEW OF SYSTEMS  Skin: negative  Eyes: negative; DUE  Ears/Nose/Throat: hx of \"burnt\" voice and muscles from GERD  Respiratory: positive FRANCO--better; no cough; no hemoptysis; history of asthma  Cardiovascular: stable; history of HF  Gastrointestinal: as noted above  Genitourinary: negative   Musculoskeletal: RA--okay; bilateral thumb joints are displaced per pt: hx of back pain, neck pain, arthritis and right shoulder   Neurologic: positive for numbness or tingling of feet--same  Psychiatric: negative  Hematologic/Lymphatic/Immunologic: history of breast cancer (left) x 2 (same spot)  Endocrine: positive for diabetes and thyroid disease     OBJECTIVE:  /60 (BP Location: Right arm, Patient Position: Chair, Cuff Size: Adult Regular)   Pulse 71   Ht 1.6 m (5' 3\")   Wt 63.5 kg (140 lb)   SpO2 98%   BMI 24.80 kg/m    Constitutional: alert and no distress  Respiratory:  Good diaphragmatic excursion.  Musculoskeletal: extremities normal- no gross deformities noted and gait normal  Skin: no suspicious lesions or rashes to visible skin  Psychiatric: mentation appears normal and affect normal/bright      LABS  Results for orders placed or performed in visit on 03/31/22   GLUCOSE MONITOR, 72 HOUR, PHYS INTERP     Status: None    Narrative    Date: 3/4 to 3/17/22  Glucose management indicator: 8.4%    Average glucose: 211    Glucose range  Very low (<54): 0  low (<70): 0  In target range (): 37%  High (>180): 34%  Very high (>250): 29%    Date: 3/18 to 3/31/22  Glucose management indicator: 8.8%    Average glucose: 228    Glucose range  Very low (<54): 0  low (<70): 0  In target range (): 27%  High (>180): 37%  Very high (>250): 36%       ASSESSMENT / PLAN:  (E13.9) KAREEM (latent autoimmune diabetes in " adults), managed as type 1 (H)  (primary encounter diagnosis)  Comment: noted insulin pump and CGM information    Basal: 14.8 (62%)  Bolus: 9.1 (38%)  TDD: 24 units    Plan: GLUCOSE MONITOR, 72 HOUR, PHYS INTERP, Insulin         Disposable Pump (OMNIPOD DASH 5 PACK PODS) MISC          Asking if her Omnipod dash pods can be sent to [facility].   She has a $35 co-pay every month.      Adjusted her basal as followed:   0000 0.6  0830 0.7  1800 0.65          CR: 13 (was 15.5)    Other insulin pump settings:  ISF: 60  Target: 100, correct >120    Continue to bolus before carbs     Follow up  1 week telephone follow up  Download with Nicolasa Guillen NP    Call sooner if any issues develop    Patient Instructions     Continue working on healthy eating and moving as best as you can (start low and slow, work up to 30 min, 5x/week)    BG goals:  Fasting and before meals <130, >70  2 hour after eating <180    We only need 1/2 of these numbers to be within target then your A1c will be within target    Medication changes   Watch for low BGs with recent adjustments.      Follow up   May    Call me sooner if any problems/concerns and/or questions develop including consistent low BGs <70 or consistent high BGs >200  [phone] (Marie, Unit Coordinator)    [phone] (Emeli, Nurse)          Time: 30 minutes  Barrier: none  Willingness to learn: accepting    Kerri NELSON [facility]  Diabetes and Wound Care    Cc: Nicolasa Guillen NP      With the electronic record, we can now more quickly and easily track our patient diabetic goals. Our diabetes clinical review is in progress and these are the indicators we are monitoring for good diabetes health.     1.) HbA1C less than 7 (measurement of your average blood sugars)  2.) Blood Pressure less than 140/80  3.) LDL less than 100 (bad cholesterol)  4.) HbA1C is checked in the last 6 months and below 7% (more frequently if not at goal or adjusting medications)  5.) LDL is checked in the last  12 months (more frequently if not at goal or adjusting medications)  6.) Taking one baby aspirin daily (unless otherwise instructed)  7.) No tobacco use  8) Statin use     You have achieved 8 out of 8 of these and I am encouraging you to come in and get tuned up to achieve 8 out of 8!  Here is what you have achieved so far in my goals for you:  1.) HbA1C  less than 7:                              YES--good for age    Your last  HbA1C  Lab Results   Component Value Date    A1C 7.1 11/23/2021    A1C 6.9 01/29/2021    A1C 7.2 07/01/2020    A1C 7.9 12/16/2019    A1C 7.5 09/11/2019    A1C 7.3 06/07/2019      2.) Blood Pressure less than 140/80:       YES     Your last    BP Readings from Last 1 Encounters:   03/31/22 112/60     3.) LDL less than 100:                              YES      Your last     LDL Cholesterol Calculated   Date Value Ref Range Status   11/23/2021 47 <=100 mg/dL Final   05/05/2021 49 <100 mg/dL Final     Comment:     Desirable:       <100 mg/dl       4.) Checked HbA1C in the past 6 months: YES      5.) Checked LDL in the past 12 months:    YES    6.) Taking one aspirin daily:                       YES     7.) No tobacco use:                                        YES      8.) Statin use      YES       Insurance requirements:  1.  Patient has been seen in the clinic within the last 6 month  2. Diagnosis of KAREEM, managed as a Type 1 for >6 months  3. Has been testing their BGs 4x/day using her Dexcomg CGM for >90 days  4. Uses an insulin pump for >6 months  5. Requires frequent adjustments to their treatment regimen based on BGM and/or CGM testing results.

## 2022-03-31 NOTE — PROGRESS NOTES
"Chief Complaint   Patient presents with     Diabetes       Initial /60 (BP Location: Right arm, Patient Position: Chair, Cuff Size: Adult Regular)   Pulse 71   Ht 1.6 m (5' 3\")   Wt 63.5 kg (140 lb)   SpO2 98%   BMI 24.80 kg/m   Estimated body mass index is 24.8 kg/m  as calculated from the following:    Height as of this encounter: 1.6 m (5' 3\").    Weight as of this encounter: 63.5 kg (140 lb).  Medication Reconciliation: complete  ROSA MARR LPN    "

## 2022-03-31 NOTE — PATIENT INSTRUCTIONS
Continue working on healthy eating and moving as best as you can (start low and slow, work up to 30 min, 5x/week)    BG goals:  Fasting and before meals <130, >70  2 hour after eating <180    We only need 1/2 of these numbers to be within target then your A1c will be within target    Medication changes   Watch for low BGs with recent adjustments.      Follow up   May    Call me sooner if any problems/concerns and/or questions develop including consistent low BGs <70 or consistent high BGs >200  [phone] (Marie, Unit Coordinator)    [phone] (Emeli, Nurse)

## 2022-04-08 DIAGNOSIS — E13.9 LADA (LATENT AUTOIMMUNE DIABETES IN ADULTS), MANAGED AS TYPE 1 (H): ICD-10-CM

## 2022-04-11 NOTE — TELEPHONE ENCOUNTER
dexacom      Last Written Prescription Date:  2/24/22  Last Fill Quantity: 3,   # refills: 5  Last Office Visit: 3/31/22  Future Office visit:    Next 5 appointments (look out 90 days)    May 17, 2022  2:45 PM  (Arrive by 2:30 PM)  Nurse Only with Hc Dm Nurse  [facility] [place] ([facility] [place] [facility] [address] [place] [address]  [phone]   May 17, 2022  2:50 PM  (Arrive by 2:35 PM)  SHORT with Nicolasa Guillen NP  [facility] [place] ([facility] [place] ) [address] [place] [address]  [phone]

## 2022-04-12 RX ORDER — PROCHLORPERAZINE 25 MG/1
1 SUPPOSITORY RECTAL CONTINUOUS
Qty: 1 EACH | Refills: 1 | Status: SHIPPED | OUTPATIENT
Start: 2022-04-12 | End: 2022-09-29

## 2022-04-21 DIAGNOSIS — J43.9 PULMONARY EMPHYSEMA, UNSPECIFIED EMPHYSEMA TYPE (H): ICD-10-CM

## 2022-04-21 RX ORDER — TIOTROPIUM BROMIDE INHALATION SPRAY 3.12 UG/1
SPRAY, METERED RESPIRATORY (INHALATION)
Qty: 4 G | Refills: 1 | Status: SHIPPED | OUTPATIENT
Start: 2022-04-21 | End: 2022-07-15

## 2022-04-21 NOTE — TELEPHONE ENCOUNTER
Pt called requesting Spiriva be signed today as she has a ride into town  Today and pt reports she is out of her medication. Refill signed.

## 2022-04-30 NOTE — TELEPHONE ENCOUNTER
"Patient is getting her tooth pulled on Monday due to an infection and the dentist would like her started on an antibiotic today. Patient uses [facility] for pharmacy. Patient also states that she has to have a \"pre med\" prior to any dental work. Will the antibiotic cover her for this as well?   PCP out. Please advise, thank you.    Patient's phone number is [phone]  "
[employer]

## 2022-05-11 ENCOUNTER — TELEPHONE (OUTPATIENT)
Dept: FAMILY MEDICINE | Facility: OTHER | Age: 77
End: 2022-05-11
Payer: COMMERCIAL

## 2022-05-11 ENCOUNTER — NURSE TRIAGE (OUTPATIENT)
Dept: FAMILY MEDICINE | Facility: OTHER | Age: 77
End: 2022-05-11
Payer: COMMERCIAL

## 2022-05-11 NOTE — TELEPHONE ENCOUNTER
11:59 AM    Reason for Call: OVERBOOK    Patient is having the following symptoms: Can't move her right leg and wants to be seen today. There was a triage message that was sent earlier today on this.       The patient is requesting an appointment  with Nicolasa Guillen today. Has been going on since Sunday and wants to be seen today or tomorrow.    Was an appointment offered for this call? No  If yes : Appointment type              Date    Preferred method for responding to this message: Telephone Call  What is your phone number ? [phone]    If we cannot reach you directly, may we leave a detailed response at the number you provided? No    Can this message wait until your PCP/provider returns, if unavailable today? No,     Justine Jenkins

## 2022-05-11 NOTE — TELEPHONE ENCOUNTER
Spoke with patient earlier.  An appointment has been made for Friday at 7:40 . Patient has also been offered urgent care as Nicolasa has no availability today or tomorrow .

## 2022-05-11 NOTE — TELEPHONE ENCOUNTER
"This Patient has requested an appointment for - 5/11/22 or 5/12/22    Patient is having the following symptoms - RT knee Pain, increasing with stairs and ambulation (weight bearing) pain increases when ambulating distance.    Patient has been having these symptoms for the following Duration:5/8/22    Please contact the patient at the following phone number [phone]      RT knee giving out on patient when walking down the stairs on 5/8/22. Hx of TKA -RT. Patient reports knee and hip pain with weight bearing.       Reason for Disposition    MODERATE pain (e.g., symptoms interfere with work or school, limping) and present > 3 days    Additional Information    Negative: Sounds like a life-threatening emergency to the triager    Negative: Followed a knee injury    Negative: Swollen knee joint and fever    Negative: Thigh or calf pain and only 1 side and present > 1 hour    Negative: Thigh or calf swelling and only 1 side    Negative: Patient sounds very sick or weak to the triager    Negative: Can't move swollen joint at all    Negative: SEVERE pain (e.g., excruciating, unable to walk)    Negative: Very swollen joint    Negative: Painful rash with multiple small blisters grouped together (i.e., dermatomal distribution or 'band' or 'stripe')    Negative: Looks like a boil, infected sore, deep ulcer, or other infected rash (spreading redness, pus)    Answer Assessment - Initial Assessment Questions  1. LOCATION and RADIATION: \"Where is the pain located?\"       Right knee     2. QUALITY: \"What does the pain feel like?\"  (e.g., sharp, dull, aching, burning)      Sharp    3. SEVERITY: \"How bad is the pain?\" \"What does it keep you from doing?\"   (Scale 1-10; or mild, moderate, severe)    -  MILD (1-3): doesn't interfere with normal activities     -  MODERATE (4-7): interferes with normal activities (e.g., work or school) or awakens from sleep, limping     -  SEVERE (8-10): excruciating pain, unable to do any normal " "activities, unable to walk      5/10 with weight bearing- increases when walking distance    4. ONSET: \"When did the pain start?\" \"Does it come and go, or is it there all the time?\"      5/8/22    5. RECURRENT: \"Have you had this pain before?\" If so, ask: \"When, and what happened then?\"      No     6. SETTING: \"Has there been any recent work, exercise or other activity that involved that part of the body?\"       No     7. AGGRAVATING FACTORS: \"What makes the knee pain worse?\" (e.g., walking, climbing stairs, running)      Stairs and ambulating (distance increases pain), getting in and out of bed    8. ASSOCIATED SYMPTOMS: \"Is there any swelling or redness of the knee?\"      No     9. OTHER SYMPTOMS: \"Do you have any other symptoms?\" (e.g., chest pain, difficulty breathing, fever, calf pain)      No     10. PREGNANCY: \"Is there any chance you are pregnant?\" \"When was your last menstrual period?\"        No    Protocols used: KNEE PAIN-A-OH      "

## 2022-05-12 PROBLEM — Z79.4 TYPE 2 DIABETES MELLITUS WITH HYPERGLYCEMIA, WITH LONG-TERM CURRENT USE OF INSULIN (H): Status: RESOLVED | Noted: 2021-05-05 | Resolved: 2022-05-12

## 2022-05-12 PROBLEM — R06.09 DOE (DYSPNEA ON EXERTION): Status: RESOLVED | Noted: 2021-05-05 | Resolved: 2022-05-12

## 2022-05-12 PROBLEM — E11.65 TYPE 2 DIABETES MELLITUS WITH HYPERGLYCEMIA, WITH LONG-TERM CURRENT USE OF INSULIN (H): Status: RESOLVED | Noted: 2021-05-05 | Resolved: 2022-05-12

## 2022-05-12 PROBLEM — R60.0 LOWER EXTREMITY EDEMA: Status: RESOLVED | Noted: 2021-05-05 | Resolved: 2022-05-12

## 2022-05-12 PROBLEM — E78.1 HYPERTRIGLYCERIDEMIA: Status: RESOLVED | Noted: 2021-08-18 | Resolved: 2022-05-12

## 2022-05-12 PROBLEM — J45.909 UNCOMPLICATED ASTHMA, UNSPECIFIED ASTHMA SEVERITY, UNSPECIFIED WHETHER PERSISTENT: Status: RESOLVED | Noted: 2021-05-05 | Resolved: 2022-05-12

## 2022-05-12 NOTE — PROGRESS NOTES
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 Controlled. She will [facility]. BP at goal. On statin. Encouraged to schedule eye exam. See me back 6 months. Sooner with new or worsening symptoms.     Mixed hyperlipidemia  Tolerating statin. Will continue. AST and ALT normal today.     - Comprehensive metabolic panel (BMP + Alb, Alk Phos, ALT, AST, Total. Bili, TP)    Diastolic dysfunction with chronic heart failure (H)  Lower leg swelling controlled. Continue lasix.     Hypokalemia  Potassium normal. Continue supplement.     Essential hypertension  Well controlled. Continue current medications. Encouraged daily exercise and a low sodium diet. Recommended checking BP's 2x/wk, call the clinic if consistantly s>140 or d>90. Follow up in 6 months.     Acute pain of right knee  - XR Knee Right 3 Views (Clinic Performed)-unremarkable. Declines PT or an MRI at this time. Wants to first see ortho. Referral placed.     Hip pain, right  - XR Hip Right 2-3 Views (Clinic Performed)-Declines PT or an MRI at this time. Wants to first see ortho. Referral placed.     Irregular heart beat  EKG normal sinus rhythm. Unchanged from previous EKGs. Blood work normal. Will return with new or worsening symptoms.     - EKG 12-lead complete w/read - (Clinic Performed)  - TSH with free T4 reflex  - CBC with platelets and differential  - Magnesium    Pulmonary emphysema, unspecified emphysema type (H)  Stable. Continue current inhalers.     Malignant neoplasm of left breast in female, estrogen receptor positive, unspecified site of breast (H)  Follows with oncology.       Ordering of each unique test  Prescription drug management  40 minutes spent on the date of the encounter doing chart review, interpretation of tests, patient visit and documentation        BMI:   Estimated body mass index is 25.61 kg/m  as calculated from the following:    Height as of this  "encounter: 1.575 m (5' 2\").    Weight as of this encounter: 63.5 kg (140 lb).       No follow-ups on file.    Nicolasa Guillen NP  [facility] - ANTONIO Soriano is a 77 year old who presents for the following health issues     HPI     Concern - Right Knee and Hip Pain  Onset:  2 WEEKS   Description: RIGHT HIP AND KNEE PAIN; PAIN STARTS IN RIGHT KNEE AND RADIATES TO RIGHT GROIN  Intensity: 7/10  Progression of Symptoms:  same  Accompanying Signs & Symptoms: NONE; NO ERYTHEMA OR SWELLING, no fevers  Previous history of similar problem: history of Right Total Knee, HAD A FALL 2 WEEKS  Precipitating factors:        Worsened by: walking long distances   Alleviating factors:        Improved by: SLEEPS ON A HEATED MATTRESS PAD   Therapies tried and outcome: HEATED MATTRESS PAD    She has seen rheumatology in the past and does not have rheumatoid arthritis.      Diabetes Follow-up    How often are you checking your blood sugar? Continuous glucose - OMNIPOD monitor  What time of day are you checking your blood sugars (select all that apply)?  ALL TIMES   Have you had any blood sugars above 200?  Yes, 429-just once   Have you had any blood sugars below 70?  Yes, 60-just once    What symptoms do you notice when your blood sugar is low?  Shaky, Dizzy, Weak and Lethargy    What concerns do you have today about your diabetes? None     Do you have any of these symptoms? (Select all that apply)  No numbness or tingling in feet.  No redness, sores or blisters on feet.  No complaints of excessive thirst.  No reports of blurry vision.  No significant changes to weight.    Have you had a diabetic eye exam in the last 12 months? Yes- Date of last eye exam: 2021 ,  Location: [facility]      A1C was 7.1 on 11/23/21.    [facility].    Omnipod    On asa.     Denies chest pain, shortness of breath, dizziness, syncope, or palpitations. No nausea or vomiting.     Diabetic Foot Screen:  Any complaints of " increased pain or numbness ? No  Is there a foot ulcer now or a history of foot ulcer? No  Does the foot have an abnormal shape? No  Are the nails thick, too long or ingrown? No  Are there any redness or open areas? No         Sensation Testing done at all points on the diagram with monofilament     Right Foot: Sensation Normal at all points  Left Foot: Sensation Normal at all points     Risk Category: 0- No loss of protective sensation  Performed by Nicolasa Guillen CNP       Hyperlipidemia Follow-Up      Are you regularly taking any medication or supplement to lower your cholesterol?   Yes- simvastatin    Are you having muscle aches or other side effects that you think could be caused by your cholesterol lowering medication?  No     Denies chest pain, shortness of breath, dizziness, syncope, or palpitations.     Hypertension Follow-up      Do you check your blood pressure regularly outside of the clinic? Yes     Are you following a low salt diet? Yes    Are your blood pressures ever more than 140 on the top number (systolic) OR more   than 90 on the bottom number (diastolic), for example 140/90? No   As noted above, denies chest pain, shortness of breath, dizziness, syncope, or palpitations.   Taking ramipril 10 mg BID without side effects.   Also has diastolic heart failure, taking lasix 40 mg daily with potassium 20 mEq.     COPD Follow-Up    Overall, how are your COPD symptoms since your last clinic visit?  No change- some days are better than other     How much fatigue or shortness of breath do you have when you are walking?  Same as usual    How much shortness of breath do you have when you are resting?  None    How often do you cough? Rarely    Have you noticed any change in your sputum/phlegm?  No    Have you experienced a recent fever? No    Please describe how far you can walk without stopping to rest:  The length of 3-5 rooms    How many flights of stairs are you able to walk up without stopping?   "None    Have you had any Emergency Room Visits, Urgent Care Visits, or Hospital Admissions because of your COPD since your last office visit?  No     She does not smoke.     Using Spiriva with PRN albuterol. Feels her breathing is controlled. Very rarely uses the albuterol.    Humidity is a trigger.     History   Smoking Status     Never Smoker   Smokeless Tobacco     Never Used     No results found for: FEV1, [id number]         BP Readings from Last 2 Encounters:   05/13/22 124/60   03/31/22 112/60     Hemoglobin A1C POCT (%)   Date Value   01/29/2021 6.9 (H)   07/01/2020 7.2 (H)     Hemoglobin A1C (%)   Date Value   11/23/2021 7.1 (H)     LDL Cholesterol Calculated (mg/dL)   Date Value   11/23/2021 47   05/05/2021 49   07/01/2020 36         Review of Systems   Constitutional, HEENT, cardiovascular, pulmonary, gi and gu systems are negative, except as otherwise noted.      Objective    /60 (BP Location: Right arm, Patient Position: Chair, Cuff Size: Adult Regular)   Pulse 76   Temp 98.8  F (37.1  C) (Tympanic)   Ht 1.575 m (5' 2\")   Wt 63.5 kg (140 lb)   SpO2 98%   BMI 25.61 kg/m    Body mass index is 25.61 kg/m .  Physical Exam   GENERAL: healthy, alert and no distress  EYES: Eyes grossly normal to inspection, PERRL and conjunctivae and sclerae normal  HENT: ear canals and TM's normal, nose and mouth without ulcers or lesions  NECK: no adenopathy, no asymmetry, masses, or scars and thyroid normal to palpation  RESP: lungs clear to auscultation - no rales, rhonchi or wheezes  CV: heart rate irregular, no murmur, click or rub, no peripheral edema and peripheral pulses strong  NEURO: Normal strength and tone, mentation intact and speech normal  PSYCH: mentation appears normal, affect normal/bright  Diabetic foot exam: normal DP and PT pulses, no trophic changes or ulcerative lesions and normal sensory exam  RIGHT KNEE: Skin intact. No erythema, edema, or ecchymosis. Some pain with palpation along her medial " joint line. No pain with flexion or extension. Some pain with varus and valgus stress. No instability noted. DP and PT pulse present. Normal sensation.   RIGHT HIP: Skin intact. No erythema, edema, or ecchymosis. Some pain with palpation over trochanteric bursa. Pain with adduction, abduction, flexion and extension. Also has pain with internal and external rotation.     Results for orders placed or performed in visit on 05/13/22   XR Knee Right 3 Views (Clinic Performed)     Status: None    Narrative    PROCEDURE:  XR KNEE RIGHT 3 VIEWS    HISTORY: right knee pain, h/o total knee; Acute pain of right knee    COMPARISON:  None.    TECHNIQUE:  3 views of the right knee were obtained.    FINDINGS:  There is a knee prosthesis in place. The prosthetic  elements appear well seated. The distal femur proximal tibia and  fibula are otherwise intact.       Impression    IMPRESSION: Knee prosthesis in place      SURINDER FOSTER MD         SYSTEM ID:  [id number]   XR Hip Right 2-3 Views (Clinic Performed)     Status: None    Narrative    PROCEDURE:  XR HIP RIGHT 2-3 VIEWS    HISTORY: Hip pain, right    COMPARISON:  None.    TECHNIQUE:  2 views of the right hip were obtained.    FINDINGS:  No fracture or dislocation is identified. The joint spaces  are preserved.        Impression    IMPRESSION: Normal right hip      SURINDRE FOSTER MD         SYSTEM ID:  [id number]   Hemoglobin A1c     Status: Abnormal   Result Value Ref Range    Estimated Average Glucose 160 mg/dL    Hemoglobin A1C 7.2 (H) 0.0 - 5.6 %   Comprehensive metabolic panel (BMP + Alb, Alk Phos, ALT, AST, Total. Bili, TP)     Status: Abnormal   Result Value Ref Range    Sodium 137 133 - 144 mmol/L    Potassium 3.7 3.4 - 5.3 mmol/L    Chloride 100 94 - 109 mmol/L    Carbon Dioxide (CO2) 30 20 - 32 mmol/L    Anion Gap 7 3 - 14 mmol/L    Urea Nitrogen 19 7 - 30 mg/dL    Creatinine 0.75 0.52 - 1.04 mg/dL    Calcium 9.4 8.5 - 10.1 mg/dL    Glucose 156 (H) 70 - 99 mg/dL     Alkaline Phosphatase 34 (L) 40 - 150 U/L    AST 18 0 - 45 U/L    ALT 21 0 - 50 U/L    Protein Total 7.7 6.8 - 8.8 g/dL    Albumin 3.5 3.4 - 5.0 g/dL    Bilirubin Total 1.0 0.2 - 1.3 mg/dL    GFR Estimate 83 >60 mL/min/1.73m2   TSH with free T4 reflex     Status: Normal   Result Value Ref Range    TSH 1.64 0.40 - 4.00 mU/L   Magnesium     Status: Normal   Result Value Ref Range    Magnesium 2.1 1.6 - 2.3 mg/dL   CBC with platelets and differential     Status: Abnormal   Result Value Ref Range    WBC Count 7.7 4.0 - 11.0 10e3/uL    RBC Count 4.19 3.80 - 5.20 10e6/uL    Hemoglobin 13.0 11.7 - 15.7 g/dL    Hematocrit 39.1 35.0 - 47.0 %    MCV 93 78 - 100 fL    MCH 31.0 26.5 - 33.0 pg    MCHC 33.2 31.5 - 36.5 g/dL    RDW 12.1 10.0 - 15.0 %    Platelet Count 145 (L) 150 - 450 10e3/uL    % Neutrophils 62 %    % Lymphocytes 23 %    % Monocytes 7 %    % Eosinophils 6 %    % Basophils 1 %    % Immature Granulocytes 1 %    NRBCs per 100 WBC 0 <1 /100    Absolute Neutrophils 4.9 1.6 - 8.3 10e3/uL    Absolute Lymphocytes 1.7 0.8 - 5.3 10e3/uL    Absolute Monocytes 0.6 0.0 - 1.3 10e3/uL    Absolute Eosinophils 0.4 0.0 - 0.7 10e3/uL    Absolute Basophils 0.1 0.0 - 0.2 10e3/uL    Absolute Immature Granulocytes 0.0 <=0.4 10e3/uL    Absolute NRBCs 0.0 10e3/uL   CBC with platelets and differential     Status: Abnormal    Narrative    The following orders were created for panel order CBC with platelets and differential.  Procedure                               Abnormality         Status                     ---------                               -----------         ------                     CBC with platelets and d...[[id number]]  Abnormal            Final result                 Please view results for these tests on the individual orders.

## 2022-05-13 ENCOUNTER — ANCILLARY PROCEDURE (OUTPATIENT)
Dept: GENERAL RADIOLOGY | Facility: OTHER | Age: 77
End: 2022-05-13
Attending: NURSE PRACTITIONER
Payer: MEDICARE

## 2022-05-13 ENCOUNTER — OFFICE VISIT (OUTPATIENT)
Dept: FAMILY MEDICINE | Facility: OTHER | Age: 77
End: 2022-05-13
Attending: NURSE PRACTITIONER
Payer: MEDICARE

## 2022-05-13 VITALS
BODY MASS INDEX: 25.76 KG/M2 | DIASTOLIC BLOOD PRESSURE: 60 MMHG | OXYGEN SATURATION: 98 % | HEART RATE: 76 BPM | WEIGHT: 140 LBS | SYSTOLIC BLOOD PRESSURE: 124 MMHG | TEMPERATURE: 98.8 F | HEIGHT: 62 IN

## 2022-05-13 DIAGNOSIS — I50.32 DIASTOLIC DYSFUNCTION WITH CHRONIC HEART FAILURE (H): ICD-10-CM

## 2022-05-13 DIAGNOSIS — C50.912 MALIGNANT NEOPLASM OF LEFT BREAST IN FEMALE, ESTROGEN RECEPTOR POSITIVE, UNSPECIFIED SITE OF BREAST (H): ICD-10-CM

## 2022-05-13 DIAGNOSIS — Z17.0 MALIGNANT NEOPLASM OF LEFT BREAST IN FEMALE, ESTROGEN RECEPTOR POSITIVE, UNSPECIFIED SITE OF BREAST (H): ICD-10-CM

## 2022-05-13 DIAGNOSIS — M25.561 ACUTE PAIN OF RIGHT KNEE: ICD-10-CM

## 2022-05-13 DIAGNOSIS — E13.9 LADA (LATENT AUTOIMMUNE DIABETES IN ADULTS), MANAGED AS TYPE 1 (H): Primary | ICD-10-CM

## 2022-05-13 DIAGNOSIS — I49.9 IRREGULAR HEART BEAT: ICD-10-CM

## 2022-05-13 DIAGNOSIS — M25.551 HIP PAIN, RIGHT: ICD-10-CM

## 2022-05-13 DIAGNOSIS — I10 ESSENTIAL HYPERTENSION: ICD-10-CM

## 2022-05-13 DIAGNOSIS — J43.9 PULMONARY EMPHYSEMA, UNSPECIFIED EMPHYSEMA TYPE (H): ICD-10-CM

## 2022-05-13 DIAGNOSIS — E78.2 MIXED HYPERLIPIDEMIA: ICD-10-CM

## 2022-05-13 DIAGNOSIS — E87.6 HYPOKALEMIA: ICD-10-CM

## 2022-05-13 LAB
ALBUMIN SERPL-MCNC: 3.5 G/DL (ref 3.4–5)
ALP SERPL-CCNC: 34 U/L (ref 40–150)
ALT SERPL W P-5'-P-CCNC: 21 U/L (ref 0–50)
ANION GAP SERPL CALCULATED.3IONS-SCNC: 7 MMOL/L (ref 3–14)
AST SERPL W P-5'-P-CCNC: 18 U/L (ref 0–45)
BASOPHILS # BLD AUTO: 0.1 10E3/UL (ref 0–0.2)
BASOPHILS NFR BLD AUTO: 1 %
BILIRUB SERPL-MCNC: 1 MG/DL (ref 0.2–1.3)
BUN SERPL-MCNC: 19 MG/DL (ref 7–30)
CALCIUM SERPL-MCNC: 9.4 MG/DL (ref 8.5–10.1)
CHLORIDE BLD-SCNC: 100 MMOL/L (ref 94–109)
CO2 SERPL-SCNC: 30 MMOL/L (ref 20–32)
CREAT SERPL-MCNC: 0.75 MG/DL (ref 0.52–1.04)
EOSINOPHIL # BLD AUTO: 0.4 10E3/UL (ref 0–0.7)
EOSINOPHIL NFR BLD AUTO: 6 %
ERYTHROCYTE [DISTWIDTH] IN BLOOD BY AUTOMATED COUNT: 12.1 % (ref 10–15)
EST. AVERAGE GLUCOSE BLD GHB EST-MCNC: 160 MG/DL
GFR SERPL CREATININE-BSD FRML MDRD: 83 ML/MIN/1.73M2
GLUCOSE BLD-MCNC: 156 MG/DL (ref 70–99)
HBA1C MFR BLD: 7.2 % (ref 0–5.6)
HCT VFR BLD AUTO: 39.1 % (ref 35–47)
HGB BLD-MCNC: 13 G/DL (ref 11.7–15.7)
IMM GRANULOCYTES # BLD: 0 10E3/UL
IMM GRANULOCYTES NFR BLD: 1 %
LYMPHOCYTES # BLD AUTO: 1.7 10E3/UL (ref 0.8–5.3)
LYMPHOCYTES NFR BLD AUTO: 23 %
MAGNESIUM SERPL-MCNC: 2.1 MG/DL (ref 1.6–2.3)
MCH RBC QN AUTO: 31 PG (ref 26.5–33)
MCHC RBC AUTO-ENTMCNC: 33.2 G/DL (ref 31.5–36.5)
MCV RBC AUTO: 93 FL (ref 78–100)
MONOCYTES # BLD AUTO: 0.6 10E3/UL (ref 0–1.3)
MONOCYTES NFR BLD AUTO: 7 %
NEUTROPHILS # BLD AUTO: 4.9 10E3/UL (ref 1.6–8.3)
NEUTROPHILS NFR BLD AUTO: 62 %
NRBC # BLD AUTO: 0 10E3/UL
NRBC BLD AUTO-RTO: 0 /100
PLATELET # BLD AUTO: 145 10E3/UL (ref 150–450)
POTASSIUM BLD-SCNC: 3.7 MMOL/L (ref 3.4–5.3)
PROT SERPL-MCNC: 7.7 G/DL (ref 6.8–8.8)
RBC # BLD AUTO: 4.19 10E6/UL (ref 3.8–5.2)
SODIUM SERPL-SCNC: 137 MMOL/L (ref 133–144)
TSH SERPL DL<=0.005 MIU/L-ACNC: 1.64 MU/L (ref 0.4–4)
WBC # BLD AUTO: 7.7 10E3/UL (ref 4–11)

## 2022-05-13 PROCEDURE — 84443 ASSAY THYROID STIM HORMONE: CPT | Mod: ZL | Performed by: NURSE PRACTITIONER

## 2022-05-13 PROCEDURE — 99215 OFFICE O/P EST HI 40 MIN: CPT | Performed by: NURSE PRACTITIONER

## 2022-05-13 PROCEDURE — 73562 X-RAY EXAM OF KNEE 3: CPT | Mod: TC,RT

## 2022-05-13 PROCEDURE — 36415 COLL VENOUS BLD VENIPUNCTURE: CPT | Mod: ZL | Performed by: NURSE PRACTITIONER

## 2022-05-13 PROCEDURE — 84132 ASSAY OF SERUM POTASSIUM: CPT | Mod: ZL | Performed by: NURSE PRACTITIONER

## 2022-05-13 PROCEDURE — 83036 HEMOGLOBIN GLYCOSYLATED A1C: CPT | Mod: ZL | Performed by: NURSE PRACTITIONER

## 2022-05-13 PROCEDURE — 93010 ELECTROCARDIOGRAM REPORT: CPT | Mod: 77 | Performed by: INTERNAL MEDICINE

## 2022-05-13 PROCEDURE — G0463 HOSPITAL OUTPT CLINIC VISIT: HCPCS | Mod: 25

## 2022-05-13 PROCEDURE — 85018 HEMOGLOBIN: CPT | Mod: ZL | Performed by: NURSE PRACTITIONER

## 2022-05-13 PROCEDURE — 83735 ASSAY OF MAGNESIUM: CPT | Mod: ZL | Performed by: NURSE PRACTITIONER

## 2022-05-13 PROCEDURE — 73502 X-RAY EXAM HIP UNI 2-3 VIEWS: CPT | Mod: TC

## 2022-05-13 PROCEDURE — 93005 ELECTROCARDIOGRAM TRACING: CPT | Performed by: NURSE PRACTITIONER

## 2022-05-13 ASSESSMENT — PAIN SCALES - GENERAL: PAINLEVEL: SEVERE PAIN (7)

## 2022-05-13 NOTE — NURSING NOTE
"Chief Complaint   Patient presents with     Hip Pain     RIGHT X 2 WEEKS        Leg Pain       Initial /60 (BP Location: Right arm, Patient Position: Chair, Cuff Size: Adult Regular)   Pulse 76   Temp 98.8  F (37.1  C) (Tympanic)   Ht 1.575 m (5' 2\")   Wt 63.5 kg (140 lb)   SpO2 98%   BMI 25.61 kg/m   Estimated body mass index is 25.61 kg/m  as calculated from the following:    Height as of this encounter: 1.575 m (5' 2\").    Weight as of this encounter: 63.5 kg (140 lb).  Medication Reconciliation: complete  Erendira Estrada LPN  "

## 2022-05-24 ENCOUNTER — TRANSFERRED RECORDS (OUTPATIENT)
Dept: HEALTH INFORMATION MANAGEMENT | Facility: CLINIC | Age: 77
End: 2022-05-24

## 2022-05-24 ENCOUNTER — MEDICAL CORRESPONDENCE (OUTPATIENT)
Dept: MRI IMAGING | Facility: HOSPITAL | Age: 77
End: 2022-05-24
Payer: COMMERCIAL

## 2022-05-31 ENCOUNTER — IMMUNIZATION (OUTPATIENT)
Dept: FAMILY MEDICINE | Facility: OTHER | Age: 77
End: 2022-05-31
Attending: NURSE PRACTITIONER
Payer: MEDICARE

## 2022-05-31 PROCEDURE — 91305 COVID-19,PF,PFIZER (12+ YRS): CPT

## 2022-06-05 DIAGNOSIS — E87.6 HYPOKALEMIA: ICD-10-CM

## 2022-06-06 RX ORDER — POTASSIUM CHLORIDE 1500 MG/1
TABLET, EXTENDED RELEASE ORAL
Qty: 180 TABLET | Refills: 0 | Status: SHIPPED | OUTPATIENT
Start: 2022-06-06 | End: 2022-08-29

## 2022-06-06 NOTE — TELEPHONE ENCOUNTER
Potassium Chloride ER      Last Written Prescription Date:  11/23/2021  Last Fill Quantity: 180,   # refills: 1  Last Office Visit: 5/13/2022  Future Office visit:    Next 5 appointments (look out 90 days)    Aug 22, 2022  9:00 AM  (Arrive by 8:45 AM)  Return Visit with Jin Corea DO  [facility] [place] ([facility] [place] ) [address]  [place] [address]  [phone]

## 2022-06-07 DIAGNOSIS — E13.9 LADA (LATENT AUTOIMMUNE DIABETES IN ADULTS), MANAGED AS TYPE 1 (H): ICD-10-CM

## 2022-06-07 NOTE — TELEPHONE ENCOUNTER
She is asking for test strips.      Patient calling and her insurance will not cover her Novolog insulin.    She is currently using Novolog insulin with an Omnipod..    She has one full vial left and this will last this week and maybe part of next week.    Insurance said to try Humalog or a generic Novolog.    Please advise on the above.    Pharmacy is [facility]    .Please call back [phone]      Annie Villela RN

## 2022-06-09 ENCOUNTER — TELEPHONE (OUTPATIENT)
Dept: FAMILY MEDICINE | Facility: OTHER | Age: 77
End: 2022-06-09

## 2022-06-09 ENCOUNTER — HOSPITAL ENCOUNTER (OUTPATIENT)
Dept: MRI IMAGING | Facility: HOSPITAL | Age: 77
Discharge: HOME | End: 2022-06-09
Attending: ORTHOPAEDIC SURGERY | Admitting: ORTHOPAEDIC SURGERY
Payer: MEDICARE

## 2022-06-09 DIAGNOSIS — R52 PAIN: ICD-10-CM

## 2022-06-09 DIAGNOSIS — E13.9 LADA (LATENT AUTOIMMUNE DIABETES IN ADULTS), MANAGED AS TYPE 1 (H): Primary | ICD-10-CM

## 2022-06-09 DIAGNOSIS — M54.16 RIGHT LUMBAR RADICULOPATHY: ICD-10-CM

## 2022-06-09 DIAGNOSIS — R53.1 WEAKNESS: ICD-10-CM

## 2022-06-09 PROCEDURE — 72148 MRI LUMBAR SPINE W/O DYE: CPT

## 2022-06-09 RX ORDER — INSULIN LISPRO 100 [IU]/ML
INJECTION, SOLUTION INTRAVENOUS; SUBCUTANEOUS
Qty: 30 ML | Refills: 3 | Status: SHIPPED | OUTPATIENT
Start: 2022-06-09 | End: 2022-08-04

## 2022-06-09 NOTE — TELEPHONE ENCOUNTER
Patient called stating her insurance will not cover the Novolog anymore and to please send Humalog or a generic insulin.  She want's to pick it up this afternoon at the pharmacy.      insulin aspart (NOVOLOG VIAL) 100 UNITS/ML vial 90 mL 1 11/16/2021  No   Sig: To be used with the insulin pump  TDD: 80 units   Patient not taking: Reported on 5/13/2022        Sent to pharmacy as: Insulin Aspart 100 UNIT/ML Subcutaneous Solution (NovoLOG VIAL)   Class: E-Prescribe   Order: [id number]   E-Prescribing Status: Receipt confirmed by pharmacy (11/16/2021 11:10 AM CST)

## 2022-06-22 DIAGNOSIS — I10 ESSENTIAL HYPERTENSION: ICD-10-CM

## 2022-06-22 RX ORDER — FUROSEMIDE 40 MG
TABLET ORAL
Qty: 135 TABLET | Refills: 0 | Status: SHIPPED | OUTPATIENT
Start: 2022-06-22 | End: 2022-09-19

## 2022-06-22 NOTE — TELEPHONE ENCOUNTER
Lasix  Last Written Prescription Date: 11/23/21  Last Fill Quantity: 135 # of Refills: 1  Last Office Visit: 5/13/22

## 2022-06-27 ENCOUNTER — TRANSFERRED RECORDS (OUTPATIENT)
Dept: HEALTH INFORMATION MANAGEMENT | Facility: CLINIC | Age: 77
End: 2022-06-27

## 2022-07-06 DIAGNOSIS — E03.9 HYPOTHYROIDISM, UNSPECIFIED TYPE: ICD-10-CM

## 2022-07-08 RX ORDER — LEVOTHYROXINE SODIUM 75 UG/1
TABLET ORAL
Qty: 90 TABLET | Refills: 0 | Status: SHIPPED | OUTPATIENT
Start: 2022-07-08 | End: 2022-09-28

## 2022-07-08 NOTE — TELEPHONE ENCOUNTER
synthyroid      Last Written Prescription Date:  11/23/21  Last Fill Quantity: 90,   # refills: 1  Last Office Visit: 5/13/22  Future Office visit:    Next 5 appointments (look out 90 days)    Aug 22, 2022  9:00 AM  (Arrive by 8:45 AM)  Return Visit with Jin Corea DO  [facility] [place] ([facility] [place] ) [address]  [place] [address]  [phone]

## 2022-07-11 ENCOUNTER — MEDICAL CORRESPONDENCE (OUTPATIENT)
Dept: HEALTH INFORMATION MANAGEMENT | Facility: HOSPITAL | Age: 77
End: 2022-07-11

## 2022-07-14 DIAGNOSIS — J43.9 PULMONARY EMPHYSEMA, UNSPECIFIED EMPHYSEMA TYPE (H): ICD-10-CM

## 2022-07-15 RX ORDER — TIOTROPIUM BROMIDE INHALATION SPRAY 3.12 UG/1
SPRAY, METERED RESPIRATORY (INHALATION)
Qty: 4 G | Refills: 0 | Status: SHIPPED | OUTPATIENT
Start: 2022-07-15 | End: 2022-09-23

## 2022-07-15 NOTE — TELEPHONE ENCOUNTER
spiriva 2.5 mcg      Last Written Prescription Date:  4-21-22  Last Fill Quantity: 4G,   # refills: 1  Last Office Visit: 5-13-22  Future Office visit:    Next 5 appointments (look out 90 days)    Aug 22, 2022  9:00 AM  (Arrive by 8:45 AM)  Return Visit with Jin Corea DO  [facility] [place] ([facility] [place] ) [address]  [place] [address]  [phone]

## 2022-07-18 ENCOUNTER — TELEPHONE (OUTPATIENT)
Dept: EDUCATION SERVICES | Facility: OTHER | Age: 77
End: 2022-07-18

## 2022-07-18 NOTE — TELEPHONE ENCOUNTER
Kerri Elliott, APRN CNP sent to Heather Adame LPN  Caller: Unspecified (Today, 10:54 AM)  Please tell her that she can come in tomorrow (7/19) at 3:30.   (Can you please add her to my schedule for an insulin pump download and set up)    Left message to arrive at 315 tomorrow and she will be seen then.

## 2022-07-18 NOTE — TELEPHONE ENCOUNTER
Patient calls stating she got her new pump in the mail needs all the settings put on it. When can she see you about this? Nurse only or a regular appt?

## 2022-07-29 ENCOUNTER — TELEPHONE (OUTPATIENT)
Dept: EDUCATION SERVICES | Facility: OTHER | Age: 77
End: 2022-07-29

## 2022-07-29 DIAGNOSIS — E78.49 OTHER HYPERLIPIDEMIA: ICD-10-CM

## 2022-07-29 NOTE — TELEPHONE ENCOUNTER
I called the pt we received a 90 day records request from [employer]. The pt has not been in within the last 90 days. I left her a message to call me on 07/26/22 and did not get a return call. I called the pt again today. She has 3 days of her pump supplies left and her appt is 08/17/22. Do you have any pump supplies, or if not can we get her in sooner?

## 2022-08-01 RX ORDER — SIMVASTATIN 40 MG
TABLET ORAL
Qty: 90 TABLET | Refills: 0 | Status: SHIPPED | OUTPATIENT
Start: 2022-08-01 | End: 2022-11-04

## 2022-08-01 NOTE — TELEPHONE ENCOUNTER
Zocor       Last Written Prescription Date:  7/27/21  Last Fill Quantity: 90,   # refills: 3  Last Office Visit: 5/13/22  Future Office visit:    Next 5 appointments (look out 90 days)    Aug 22, 2022  9:00 AM  (Arrive by 8:45 AM)  Return Visit with Jin Corea DO  [facility] [place] ([facility] [place] ) [address]  [place] [address]  [phone]

## 2022-08-01 NOTE — TELEPHONE ENCOUNTER
I called the pt to notify her that Kerri has some supplies for her. She states her supplies are being delivered on 08/02/22 so she does not need them.

## 2022-08-04 ENCOUNTER — ALLIED HEALTH/NURSE VISIT (OUTPATIENT)
Dept: EDUCATION SERVICES | Facility: OTHER | Age: 77
End: 2022-08-04
Attending: NURSE PRACTITIONER
Payer: COMMERCIAL

## 2022-08-04 VITALS
BODY MASS INDEX: 25.52 KG/M2 | RESPIRATION RATE: 16 BRPM | HEART RATE: 76 BPM | HEIGHT: 63 IN | SYSTOLIC BLOOD PRESSURE: 121 MMHG | DIASTOLIC BLOOD PRESSURE: 65 MMHG | OXYGEN SATURATION: 96 % | WEIGHT: 144 LBS

## 2022-08-04 DIAGNOSIS — E13.9 LADA (LATENT AUTOIMMUNE DIABETES IN ADULTS), MANAGED AS TYPE 1 (H): ICD-10-CM

## 2022-08-04 PROCEDURE — 99213 OFFICE O/P EST LOW 20 MIN: CPT | Mod: 25 | Performed by: NURSE PRACTITIONER

## 2022-08-04 PROCEDURE — G0463 HOSPITAL OUTPT CLINIC VISIT: HCPCS

## 2022-08-04 PROCEDURE — 95251 CONT GLUC MNTR ANALYSIS I&R: CPT | Performed by: NURSE PRACTITIONER

## 2022-08-04 RX ORDER — PROCHLORPERAZINE 25 MG/1
1 SUPPOSITORY RECTAL CONTINUOUS
Qty: 3 EACH | Refills: 5 | Status: SHIPPED | OUTPATIENT
Start: 2022-08-04 | End: 2023-02-01

## 2022-08-04 RX ORDER — INSULIN LISPRO 100 [IU]/ML
INJECTION, SOLUTION INTRAVENOUS; SUBCUTANEOUS
Qty: 30 ML | Refills: 3 | Status: SHIPPED | OUTPATIENT
Start: 2022-08-04 | End: 2023-05-04

## 2022-08-04 RX ORDER — TAMOXIFEN CITRATE 20 MG/1
20 TABLET ORAL DAILY
COMMUNITY
Start: 2022-06-14 | End: 2022-08-04

## 2022-08-04 RX ORDER — BLOOD-GLUCOSE SENSOR
1 EACH MISCELLANEOUS CONTINUOUS
Qty: 3 EACH | Refills: 1 | Status: CANCELLED | OUTPATIENT
Start: 2022-08-04

## 2022-08-04 ASSESSMENT — PAIN SCALES - GENERAL: PAINLEVEL: MODERATE PAIN (4)

## 2022-08-04 NOTE — PROGRESS NOTES
"SUBJECTIVE:  Flora Acuna, 77 year old, female presents with the following Chief Complaint(s) with HPI to follow:  Chief Complaint   Patient presents with     Diabetes        Diabetes Follow-up      Patient is checking blood sugars: personal CGM.  Results:  She has been using her Dexcom for >90 days    Date: 7/8 to 7/21/22  Glucose management indicator: 8.9%    Average glucose: 233    Glucose range  Very low (<54): 0  low (<70): 0  In target range (): 29%  High (>180): 30%  Very high (>250): 41%    Date: 7/22 to 8/4/22  Glucose management indicator: 8.1%    Average glucose: 199    Glucose range  Very low (<54): 0  low (<70): <1%  In target range (): 42%  High (>180): 37%  Very high (>250): 20%      Symptoms of hypoglycemia (low blood sugar): no \"crashes\",     Paresthesias (numbness or burning in feet) or sores: Yes--same; no sores    Diabetic eye exam within the last year: DUE    Breakfast eaten regularly: sometimes    Patient counting carbs: Yes       HPI:  Flora's here today for the follow up regarding her KAREEM, managed as a Type 1    Lab Results   Component Value Date    A1C 7.2 05/13/2022    A1C 7.1 11/23/2021    A1C 6.9 01/29/2021    A1C 7.2 07/01/2020    A1C 7.9 12/16/2019    A1C 7.5 09/11/2019    A1C 7.3 06/07/2019     Current Diabetes medication:   1.  Insulin pump (Minimed 630G), with Humalog  ASA use: yes  Statin use: yes    Bo's here today for an insulin pump and Dexcom download with possible insulin adjust.    Reports the following:  Had to stop the Omnipod Dash as it threw her into the \"donut\" hole quicker.      Restarted her Medtronic MiniMed 630G  No issues.      Denies any issues with her Dexcom G6    Weight trend:  Wt Readings from Last 4 Encounters:   08/04/22 65.3 kg (144 lb)   05/13/22 63.5 kg (140 lb)   03/31/22 63.5 kg (140 lb)   02/17/22 63.5 kg (140 lb)     Reports issues with her chronic back pain  Working with Dr. 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), managed as type 1 (H)     H/O total knee replacement, left     Age-related osteoporosis without current pathological fracture     Osteoporosis     Family history of melanoma     Family history of breast cancer in female     Dysphonia     Prolonged QT interval     Fatty liver on 11/7/2007     Diastolic dysfunction with chronic heart failure (H)     Mixed hyperlipidemia     Hypokalemia     Vitamin D deficiency     Contrast media allergy     COPD, mild (H)       Past Medical History:   Diagnosis Date     Congestive heart failure, unspecified      Diabetes (H)      H/O rheumatoid arthritis      HLD (hyperlipidemia)      HTN (hypertension)      Myocardial infarction (H)      Uncomplicated asthma        Past Surgical History:   Procedure Laterality Date     APPENDECTOMY OPEN CHILD       AS TOTAL KNEE ARTHROPLASTY Right      CHOLECYSTECTOMY       COLONOSCOPY - HIM SCAN N/A 03/12/2009    per Care Everywhere documentation per [facility].      HYSTERECTOMY TOTAL ABDOMINAL       MASTECTOMY, BILATERAL Bilateral 02/26/1992     SHOULDER SURGERY Right      SHOULDER SURGERY Left        Family History   Problem Relation Age of Onset     Breast Cancer Maternal Aunt      Breast Cancer Maternal Aunt      Lung Cancer Father        Social History     Tobacco Use     Smoking status: Never Smoker     Smokeless tobacco: Never Used   Substance Use Topics     Alcohol use: No     Alcohol/week: 0.0 standard drinks       Current Outpatient Medications   Medication Sig Dispense Refill     acetaminophen (TYLENOL) 325 MG tablet Take 650 mg by mouth        Acetone, Urine, Test (KETONE TEST) STRP Use as directed 50 strip 4     albuterol (PROAIR HFA/PROVENTIL HFA/VENTOLIN HFA) 108 (90 Base) MCG/ACT inhaler Inhale 2 puffs into the lungs every 6 hours 1 Inhaler 0     aspirin (ASA) 325 MG EC tablet Take 325 mg by mouth daily       blood glucose (CONTOUR NEXT TEST) test strip Use 6 times a day with the insulin pump to help manage your diabetes 200 strip 0     blood glucose (NO BRAND SPECIFIED) test strip by In Vitro route 4 times daily Use to test blood sugar 4 times daily or as directed.       Calcium-Vitamin D-Vitamin K (VIACTIV PO) Take 2 chew tab by mouth every morning       Continuous Blood Gluc  (DEXCOM G6 ) XX DIMITRIS 1 each continuous 1 Device 0     Continuous Blood Gluc Sensor (DEXCOM G6 SENSOR) MISC 1 each continuous To be changed every 10 days 3 each 5     Continuous Blood Gluc Transmit (DEXCOM G6 TRANSMITTER) MISC 1 each continuous To be changed every 3 months 1 each 1     diphenhydrAMINE (BENADRYL) 50 MG capsule Administer 30 min - 2 hours pre - IV contrast injection 1 capsule 0     EUTHYROX 75 MCG tablet Take 1 tablet by mouth once daily 90 tablet 0     furosemide (LASIX) 40 MG tablet TAKE 1 TABLET BY MOUTH ONCE DAILY IN THE MORNING AND 1/2 (ONE-HALF) TABLET IN AFTERNOON 135 tablet 0     glucagon 1 MG kit Inject 1 mg Subcutaneous once 1 mg 12     Insulin Aspart (INSULIN PUMP - OUTPATIENT)        insulin lispro (HUMALOG VIAL) 100 UNIT/ML vial To be used with the insulin pump TDD: 80 units 30 mL 3     INSULIN PUMP - OUTPATIENT Date last updated:  2/4/15  Neterion: Model 723  BASAL RATES and times:  12   AM (midnight): 0.9 units/hour    9    PM: 0.8 units/hour   Basal Pattern A:  Flora Acuna will use when N/A.  CARB RATIO and times:  12   AM (midnight): 13.0  4     PM: 10  Corection Factor (Sensitivity) and times:  12   AM (midnight): 55 mg/dL  BLOOD GLUCOSE TARGET and times:  12   AM (midnight): 100 - 150  7     AM:  100 -  "120  9    PM:  100 - 150  Active Insulin Time:  3 hours  Sensor:  No  Carelink / Diasend username:  [email or web address]  Carelink / Diasend Password:  [email or web address]       omeprazole (PRILOSEC) 40 MG DR capsule Take 1 capsule by mouth once daily 90 capsule 3     potassium chloride ER (KLOR-CON M) 20 MEQ CR tablet Take 1 tablet by mouth twice daily 180 tablet 0     ramipril (ALTACE) 10 MG capsule Take 1 capsule (10 mg) by mouth 2 times daily 180 capsule 3     senna-docusate (SENOKOT-S;PERICOLACE) 8.6-50 MG per tablet Take 1 tablet by mouth At Bedtime        simvastatin (ZOCOR) 40 MG tablet TAKE 1 TABLET BY MOUTH AT BEDTIME 90 tablet 0     SPIRIVA RESPIMAT 2.5 MCG/ACT inhaler INHALE 2 SPRAYS BY MOUTH ONCE DAILY 4 g 0     tamoxifen (NOLVADEX) 20 MG tablet Take 20 mg by mouth every morning       tiotropium (SPIRIVA) 18 MCG inhaled capsule Inhale 2 puffs into the lungs every morning       VITAMIN D, CHOLECALCIFEROL, PO Take 5,000 Units by mouth daily         Allergies   Allergen Reactions     Contrast Dye Difficulty breathing     Gadolinium Derivatives      Other reaction(s): Difficulty in swallowing, Laryngeal spasm  Patient had an injection into rt. Shoulder capsule prior to MRI arthrogram.  Contained multihance gadobenate, omnipaque iohexol, and buffered lidocaine.       Ammonia      Cory-1 [Lidocaine Hcl]      Novocaine allergy       Codeine Sulfate      Food      Shrimp     Fosamax [Alendronic Acid]      Dental infections     Iodine-131 Swelling     Ct dye     Lidocaine      Nitrous Oxide      No Clinical Screening - See Comments Nausea and Vomiting and Other (See Comments)     (3/6/09)- N/V and Heart racing     Novocain [Procaine]      Penicillins      Tramadol      Morphine Palpitations       REVIEW OF SYSTEMS  Skin: negative  Eyes: negative; DUE  Ears/Nose/Throat: hx of \"burnt\" voice and muscles from GERD  Respiratory: positive FRANCO--better; no cough; no hemoptysis; history of asthma  Cardiovascular: stable; history of " "HF  Gastrointestinal: as noted above  Genitourinary: negative   Musculoskeletal: + back pain--worse; hx of RA; bilateral thumb joints are displaced per pt: hx of back pain, neck pain, arthritis and right shoulder   Neurologic: positive for numbness or tingling of feet--same  Psychiatric: negative  Hematologic/Lymphatic/Immunologic: history of breast cancer (left) x 2 (same spot)  Endocrine: positive for diabetes and thyroid disease     OBJECTIVE:  /65   Pulse 76   Resp 16   Ht 1.6 m (5' 3\")   Wt 65.3 kg (144 lb)   SpO2 96%   BMI 25.51 kg/m    Constitutional: alert and no distress  Respiratory:  Good diaphragmatic excursion.  Musculoskeletal: extremities normal- no gross deformities noted; using a rolling walker   Skin: no suspicious lesions or rashes to visible skin  Psychiatric: mentation appears normal and affect normal/bright    LABS  No results found for any visits on 08/04/22.    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   Refused the need for preset boluses    Changed the following:  CR: 9  ISF: 50  Target:   0000 100-140    Insulin pump settings:  Updated: 8/4/22  Insulin pump: Minimed 630G  Basal:  0000 0.95  0200 0.9  0500 0.875  0900 1  1200 1.1  2200 1   Total: 24.1  CR: 9  ISF: 50  Target: 100-140  Active insulin: 4    Friendly reminder:  Keep adding bolus BEFORE consuming carbs  Make sure to rotate sites     Follow up  Nurse download when you see Dr. Corea on 8/22/22     Call sooner if any issues develop    Patient Instructions   Continue " working on healthy eating and moving as best as you can (start low and slow, work up to 30 min, 5x/week)    BG goals:  Fasting and before meals <130, >70  2 hour after eating <180    We only need 1/2 of these numbers to be within target then your A1c will be within target    Medication changes   Watch for low BGs with recent adjustments.      Please let me know if there any issues with getting your insulin  If they can't bill it under Medicare, part B, we will try for the [employer] assistance program.      Follow up  Nurse only download when you see Dr. Corea on August 22nd    Call me sooner if any problems/concerns and/or questions develop including consistent low BGs <70 or consistent high BGs >200  [phone] (Marie, Unit Coordinator)    [phone] (Emeli, Nurse)          Time: 30 minutes  Barrier: none  Willingness to learn: accepting    Kerri NELSON [facility]  Diabetes and Wound Care    Cc: Nicolasa Guillen NP    With the electronic record, we can now more quickly and easily track our patient diabetic goals. Our diabetes clinical review is in progress and these are the indicators we are monitoring for good diabetes health.     1.) HbA1C less than 7 (measurement of your average blood sugars)  2.) Blood Pressure less than 140/80  3.) LDL less than 100 (bad cholesterol)  4.) HbA1C is checked in the last 6 months and below 7% (more frequently if not at goal or adjusting medications)  5.) LDL is checked in the last 12 months (more frequently if not at goal or adjusting medications)  6.) Taking one baby aspirin daily (unless otherwise instructed)  7.) No tobacco use  8) Statin use     You have achieved 8 out of 8 of these and I am encouraging you to come in and get tuned up to achieve 8 out of 8!  Here is what you have achieved so far in my goals for you:  1.) HbA1C  less than 7:                              YES--good for age    Your last  HbA1C  Lab Results   Component Value Date    A1C 7.2 05/13/2022    A1C 7.1  11/23/2021    A1C 6.9 01/29/2021    A1C 7.2 07/01/2020    A1C 7.9 12/16/2019    A1C 7.5 09/11/2019    A1C 7.3 06/07/2019      2.) Blood Pressure less than 140/80:       YES     Your last    BP Readings from Last 1 Encounters:   08/04/22 121/65     3.) LDL less than 100:                              YES      Your last     LDL Cholesterol Calculated   Date Value Ref Range Status   11/23/2021 47 <=100 mg/dL Final   05/05/2021 49 <100 mg/dL Final     Comment:     Desirable:       <100 mg/dl       4.) Checked HbA1C in the past 6 months: YES      5.) Checked LDL in the past 12 months:    YES    6.) Taking one aspirin daily:                       YES     7.) No tobacco use:                                        YES      8.) Statin use      YES       Insurance requirements:  1.  Patient has been seen in the clinic within the last 6 month  2. Diagnosis of KAREEM, managed as a Type 1 for >6 months  3. Has been testing their BGs 4x/day using her Dexcomg CGM for >90 days  4. Uses an insulin pump for >6 months  5. Requires frequent adjustments to their treatment regimen based on BGM and/or CGM testing results.

## 2022-08-04 NOTE — PROGRESS NOTES
"Chief Complaint   Patient presents with     Diabetes       Initial Pulse 76   Resp 16   Ht 1.6 m (5' 3\")   Wt 65.3 kg (144 lb)   SpO2 96%   BMI 25.51 kg/m   Estimated body mass index is 25.51 kg/m  as calculated from the following:    Height as of this encounter: 1.6 m (5' 3\").    Weight as of this encounter: 65.3 kg (144 lb).  Medication Reconciliation: complete  Ginger Lerner LPN    "

## 2022-08-04 NOTE — PATIENT INSTRUCTIONS
Continue working on healthy eating and moving as best as you can (start low and slow, work up to 30 min, 5x/week)    BG goals:  Fasting and before meals <130, >70  2 hour after eating <180    We only need 1/2 of these numbers to be within target then your A1c will be within target    Medication changes   Watch for low BGs with recent adjustments.      Please let me know if there any issues with getting your insulin  If they can't bill it under Medicare, part B, we will try for the [employer] assistance program.      Follow up  Nurse only download when you see Dr. Corea on August 22nd    Call me sooner if any problems/concerns and/or questions develop including consistent low BGs <70 or consistent high BGs >200  [phone] (Marie, Unit Coordinator)    [phone] (Emeli, Nurse)

## 2022-08-22 ENCOUNTER — OFFICE VISIT (OUTPATIENT)
Dept: CARDIOLOGY | Facility: OTHER | Age: 77
End: 2022-08-22
Attending: INTERNAL MEDICINE
Payer: COMMERCIAL

## 2022-08-22 ENCOUNTER — ALLIED HEALTH/NURSE VISIT (OUTPATIENT)
Dept: EDUCATION SERVICES | Facility: OTHER | Age: 77
End: 2022-08-22
Attending: NURSE PRACTITIONER
Payer: MEDICARE

## 2022-08-22 VITALS
TEMPERATURE: 98.7 F | WEIGHT: 144 LBS | DIASTOLIC BLOOD PRESSURE: 76 MMHG | OXYGEN SATURATION: 97 % | BODY MASS INDEX: 25.52 KG/M2 | SYSTOLIC BLOOD PRESSURE: 139 MMHG | HEIGHT: 63 IN | HEART RATE: 75 BPM

## 2022-08-22 DIAGNOSIS — E13.9 LADA (LATENT AUTOIMMUNE DIABETES IN ADULTS), MANAGED AS TYPE 1 (H): Primary | ICD-10-CM

## 2022-08-22 DIAGNOSIS — I10 ESSENTIAL HYPERTENSION: ICD-10-CM

## 2022-08-22 DIAGNOSIS — I50.32 DIASTOLIC DYSFUNCTION WITH CHRONIC HEART FAILURE (H): Primary | ICD-10-CM

## 2022-08-22 PROCEDURE — G0463 HOSPITAL OUTPT CLINIC VISIT: HCPCS

## 2022-08-22 PROCEDURE — 99214 OFFICE O/P EST MOD 30 MIN: CPT | Performed by: INTERNAL MEDICINE

## 2022-08-22 ASSESSMENT — PAIN SCALES - GENERAL: PAINLEVEL: NO PAIN (0)

## 2022-08-22 NOTE — NURSING NOTE
"Chief Complaint   Patient presents with     Follow Up       Initial /76 (BP Location: Right arm)   Pulse 75   Temp 98.7  F (37.1  C) (Tympanic)   Ht 1.6 m (5' 3\")   Wt 65.3 kg (144 lb)   SpO2 97%   BMI 25.51 kg/m   Estimated body mass index is 25.51 kg/m  as calculated from the following:    Height as of this encounter: 1.6 m (5' 3\").    Weight as of this encounter: 65.3 kg (144 lb).  Medication Reconciliation: complete  Helene Jean-Baptiste LPN    "

## 2022-08-22 NOTE — PROGRESS NOTES
[facility] HEART CARE   CARDIOLOGY PROGRESS NOTE     Chief Complaint   Patient presents with     Follow Up          Diagnosis:  1.  FRANCO 2/2 diastolic CHF/minimal COPD/Asthma.  2.  Chest pain. Cardiac cath on 7/15/15-minimal dz.   3.  HTN-controlled.  4.  Prolonged QT.  5.  Asthma.  6.  Fatty liver on 11/7/2007.  7.  Concern for diastolic dysfunction on 6/25/15 with lower ext edema with BNP of 661 on 5/5/21.   8.  DM-2-controlled.  9.  Hypothyroidism.  10.  Hyperlipidemia-controlled.  11.  Hypertriglyceridemia-uncontrolled.  12.  B12 deficiency at 391 on 5/5/21.   13.  COPD-minimal on 2/18/21.  14.  Elevated BNP at 582 on 8/28/20.      Assessment/Plan:    1.  FRANCO: Secondary to asthma.  Improved greatly on Spiriva.  It is also helped that metoprolol has been removed.  Patient overall doing well.  2.  CAD: Resolved. Findings discussed. 10%/luminal irregularities in the LAD.  Rest of the vessels had no appreciable disease.  3.  Hypertension: Controlled.  Blood pressure at home has been in the 120/60-70s.  No changes.  4.  DM-2: Stable.  Managed by primary.  5.  Hyperlipidemia: Continue on Zocor 40 mg daily.  6.  B12 deficiency.  391 on 5/5/2021.  Suggest that she take B12/vitamin B complex over-the-counter.  7.  Follow-up 1 year or sooner if issues.    Interval history:  Bo is feeling much better.  She reports with the stopping of metoprolol and starting on Spiriva, her shortness of breath has improved significantly. She is happy to have a confident diagnosis as a cause of her symptoms.  It appears that her symptoms are largely from asthma/COPD.  Patient is aware.  Blood pressure has been stable.  She checks her blood pressure at home and her blood pressure has been in the 120s/60s-70s.  She has not had any chest pain or chest tightness.  We reviewed her cardiac catheterization also outlined above.  She does get peripheral edema.  She is currently on Lasix 40 mg in the morning and 20 mg in the afternoon.  She over  consumes on fluids.  She drinks copious amounts of fluids.  She does watch her salt intake.  We had a very thorough discussion requesting that she reduce fluid consumption.  If she reduces a fluid consumption, she may be able to get off less diuretics.  Cholesterol has been controlled on Crestor.  B12 is low at 391 and it was recommended that she take a B12 complex over-the-counter.      HPI:    Mrs. Acuna is being seen by cardiology for dyspnea exertion.  She has had this issue for many years.  She was seen by cardiology in 2015 and 2016 with work-up which included a cardiac catheterization.  She reports being dyspneic on exertion for most of her life.  Her symptoms are improved with inhalers.     Bo reports being dyspneic for many years.  Her shortness of breath has gotten worse over the last few months to years.  She states that the Spiriva has helped her shortness of breath significantly.  She does carry history of asthma.  She was seen by cardiology through [facility] in 2015 and 2016 for this very issue.  She reportedly had an angiogram in 2002 which was reportedly normal.  She had a repeat cath on 7/15/2015 [facility] with a 10% lesion to her LAD.  All remaining vessels were patent.  She carries a diagnosis of heart failure with preserved ejection fraction but more recently this issue was not identified.  Her cardiac catheterization 2015 also showed normal filling pressures.       She has noted that with vacuuming her house, alf through, she will be so significantly short of breath that she will use a fan to improve her air hunger.  As mentioned, she has essentially had 2 normal cardiac catheterizations.  She states she would not be able to do stress testing secondary to the uncomfortable feeling it creates.  Her echo from 5/8/2009 showed diastolic dysfunction grade 1.  This was not identified on her most recent echo from 9/17/2020.  She did not have a significant valvular or chamber  abnormalities.  She does carry history of rheumatoid arthritis but states she was told most recently that she has osteoarthritis and not RA.  A normal chest x-ray on 3/19/2021.  PFT's on 2/18/2021 showed minimal COPD.  She states she was exposed to secondhand smoke by her father at a young age.  She herself has never used tobacco.     I believe the top possibilities for her shortness of breath would be a history of asthma, the beta-blocker she is on, the possibility of a DVT/PE, concern for cirrhosis as she was noted to have a fatty liver on imaging from 11/7/2007, PE, diastolic dysfunction, hypothyroidism, and potentially rhythm issues. With the exception of a prolonged QT, EKG normal on 5/5/2021.     She also carries diagnosis of diabetes.  Her A1c most recently was 6.9%. She has hyperlipidemia which is well controlled on Zocor 40 mg daily.       She has a history of hypothyroidism.  She has been on levothyroxine 75 mcg.  Her last TSH was on 6/7/2019 and was normal at 1.17.     She also has a history of prolonged QTC.  She has hypertension which has been controlled.      Relevant testing:  V/Q scan on 5/7/21:  The ventilation study demonstrates mildly diminished lateral  ventilation particularly at the apices.  The perfusion study demonstrates normal symmetric perfusion without  small, moderate or large defect. A shoulder prosthesis results in mild artifact.    Echocardiogram 5/26/2021:  No pericardial effusion is present.  Global and regional left ventricular function is normal with an EF of 55-60%.  Left ventricular diastolic function is normal.  The right ventricle is normal size.  Global right ventricular function is normal.  Both atria appear normal.  Trace mitral insufficiency is present.  Aortic valve is normal in structure and function.  Trace tricuspid insufficiency is present.  Right ventricular systolic pressure is 8 mmHg above the right atrial pressure.  The pulmonic valve is normal.    Echocardiogram  on 9/17/2020:  No pericardial effusion is present.  Global and regional left ventricular function is normal with an EF of 60-65%.  Left ventricular diastolic function is normal.  The right ventricle is normal size.  Both atria appear normal.  Trace mitral insufficiency is present.  Aortic valve is normal in structure and function.  Trace tricuspid insufficiency is present.  Right ventricular systolic pressure is 13 mmHg above the right atrial pressure.  The pulmonic valve is normal.  The aorta root is normal.     Echo on 5/8/2009:   1. Normal left ventricular size with mild systolic functional impairment.    2. Evidence of grade 1 diastolic dysfunction.    3. Mild left atrial enlargement.    4. Trace physiologic amounts of mitral tricuspid and pulmonic insufficiency.    5. Trivial pericardial effusion.     Left heart cath on 7/15/2015 at [facility]:  DOMINANCE:  Right Dominant  LEFT MAIN:  is angiographically normal (0% Stenosis)  LEFT ANTERIOR DESCENDING :  Proximal Segment is angiographically normal (0% Stenosis)  rest of vessel has luminal irregularities (10% Stenosis) with distal pruning.  DIAGONAL 1:  is angiographically normal (0% Stenosis)  CIRCUMFLEX:  and its branches are angiographically normal (0% Stenosis)  RIGHT CORONARY ARTERY:  is angiographically normal (0% Stenosis)  COLLATERALS:  No collateral flow  Normal LVEDP      No diagnosis found.    Past Medical History:   Diagnosis Date     Congestive heart failure, unspecified      Diabetes (H)      H/O rheumatoid arthritis      HLD (hyperlipidemia)      HTN (hypertension)      Myocardial infarction (H)      Uncomplicated asthma        Past Surgical History:   Procedure Laterality Date     APPENDECTOMY OPEN CHILD       AS TOTAL KNEE ARTHROPLASTY Right      CHOLECYSTECTOMY       COLONOSCOPY - HIM SCAN N/A 03/12/2009    per Care Everywhere documentation per [facility].      HYSTERECTOMY TOTAL ABDOMINAL       MASTECTOMY, BILATERAL Bilateral  02/26/1992     SHOULDER SURGERY Right      SHOULDER SURGERY Left        Allergies   Allergen Reactions     Contrast Dye Difficulty breathing     Gadolinium Derivatives      Other reaction(s): Difficulty in swallowing, Laryngeal spasm  Patient had an injection into rt. Shoulder capsule prior to MRI arthrogram.  Contained multihance gadobenate, omnipaque iohexol, and buffered lidocaine.       Ammonia      Cory-1 [Lidocaine Hcl]      Novocaine allergy       Codeine Sulfate      Food      Shrimp     Fosamax [Alendronic Acid]      Dental infections     Iodine-131 Swelling     Ct dye     Lidocaine      Nitrous Oxide      No Clinical Screening - See Comments Nausea and Vomiting and Other (See Comments)     (3/6/09)- N/V and Heart racing     Novocain [Procaine]      Penicillins      Tramadol      Morphine Palpitations       Current Outpatient Medications   Medication Sig Dispense Refill     acetaminophen (TYLENOL) 325 MG tablet Take 650 mg by mouth       Acetone, Urine, Test (KETONE TEST) STRP Use as directed 50 strip 4     albuterol (PROAIR HFA/PROVENTIL HFA/VENTOLIN HFA) 108 (90 Base) MCG/ACT inhaler Inhale 2 puffs into the lungs every 6 hours 1 Inhaler 0     aspirin (ASA) 325 MG EC tablet Take 325 mg by mouth daily       blood glucose (CONTOUR NEXT TEST) test strip Use 6 times a day with the insulin pump to help manage your diabetes 200 strip 0     blood glucose (NO BRAND SPECIFIED) test strip by In Vitro route 4 times daily Use to test blood sugar 4 times daily or as directed.       Calcium-Vitamin D-Vitamin K (VIACTIV PO) Take 2 chew tab by mouth every morning       Continuous Blood Gluc  (DEXCOM G6 ) XX DIMITRIS 1 each continuous 1 Device 0     Continuous Blood Gluc Sensor (DEXCOM G6 SENSOR) MISC 1 each continuous To be changed every 10 days 3 each 5     Continuous Blood Gluc Transmit (DEXCOM G6 TRANSMITTER) MISC 1 each continuous To be changed every 3 months 1 each 1     diphenhydrAMINE (BENADRYL) 50 MG  capsule Administer 30 min - 2 hours pre - IV contrast injection 1 capsule 0     EUTHYROX 75 MCG tablet Take 1 tablet by mouth once daily 90 tablet 0     furosemide (LASIX) 40 MG tablet TAKE 1 TABLET BY MOUTH ONCE DAILY IN THE MORNING AND 1/2 (ONE-HALF) TABLET IN AFTERNOON 135 tablet 0     glucagon 1 MG kit Inject 1 mg Subcutaneous once 1 mg 12     Insulin Aspart (INSULIN PUMP - OUTPATIENT)        insulin lispro (HUMALOG VIAL) 100 UNIT/ML vial To be used with the insulin pump TDD: 80 units 30 mL 3     INSULIN PUMP - OUTPATIENT Insulin pump settings: Updated: 8/4/22 Insulin pump: Minimed 630G Basal: 0000 0.95 0200 0.9 0500 0.875 0900 1 1200 1.1 2200 1  Total: 24.1 CR: 9 ISF: 50 Target: 100-140 Active insulin: 4       omeprazole (PRILOSEC) 40 MG DR capsule Take 1 capsule by mouth once daily 90 capsule 3     potassium chloride ER (KLOR-CON M) 20 MEQ CR tablet Take 1 tablet by mouth twice daily 180 tablet 0     ramipril (ALTACE) 10 MG capsule Take 1 capsule (10 mg) by mouth 2 times daily 180 capsule 3     senna-docusate (SENOKOT-S;PERICOLACE) 8.6-50 MG per tablet Take 1 tablet by mouth At Bedtime        simvastatin (ZOCOR) 40 MG tablet TAKE 1 TABLET BY MOUTH AT BEDTIME 90 tablet 0     SPIRIVA RESPIMAT 2.5 MCG/ACT inhaler INHALE 2 SPRAYS BY MOUTH ONCE DAILY 4 g 0     tamoxifen (NOLVADEX) 20 MG tablet Take 20 mg by mouth every morning       tiotropium (SPIRIVA) 18 MCG inhaled capsule Inhale 2 puffs into the lungs every morning       VITAMIN D, CHOLECALCIFEROL, PO Take 5,000 Units by mouth daily         Social History     Socioeconomic History     Marital status:      Spouse name: Not on file     Number of children: 2     Years of education: Not on file     Highest education level: Not on file   Occupational History     Occupation: [employer]     Employer: RETIRED   Tobacco Use     Smoking status: Never Smoker     Smokeless tobacco: Never Used   Substance and Sexual Activity     Alcohol use: No     Alcohol/week: 0.0  standard drinks     Drug use: No     Sexual activity: Not on file   Other Topics Concern     Parent/sibling w/ CABG, MI or angioplasty before 65F 55M? Not Asked   Social History Narrative     Not on file     Social Determinants of Health     Financial Resource Strain: Not on file   Food Insecurity: Not on file   Transportation Needs: Not on file   Physical Activity: Not on file   Stress: Not on file   Social Connections: Not on file   Intimate Partner Violence: Not on file   Housing Stability: Not on file       LAB RESULTS:   No visits with results within 2 Month(s) from this visit.   Latest known visit with results is:   Office Visit on 05/05/2021   Component Date Value Ref Range Status     Folate 05/05/2021 >100.0  >5.4 ng/mL Final     pH Arterial 05/05/2021 7.49* 7.35 - 7.45 pH Final     pCO2 Arterial 05/05/2021 37  35 - 45 mm Hg Final     pO2 Arterial 05/05/2021 96  80 - 105 mm Hg Final     Bicarbonate Arterial 05/05/2021 28  21 - 28 mmol/L Final     FIO2 05/05/2021 Unknown   Final     Oxyhemoglobin Arterial 05/05/2021 97  92 - 100 % Final     Base Excess Art 05/05/2021 4.4  mmol/L Final     Vitamin D Deficiency screening 05/05/2021 94* 20 - 75 ug/L Final     Vitamin B12 05/05/2021 391  193 - 986 pg/mL Final     TSH 05/05/2021 2.04  0.40 - 4.00 mU/L Final     Troponin I ES 05/05/2021 <0.015  0.000 - 0.045 ug/L Final     N-Terminal Pro Bnp 05/05/2021 661* 0 - 125 pg/mL Final     RIOS interpretation 05/05/2021 Negative  NEG^Negative Final     GGT 05/05/2021 14  0 - 40 U/L Final     D Dimer 05/05/2021 0.5  0.0 - 0.50 ug/ml FEU Final     CRP Inflammation 05/05/2021 <2.9  0.0 - 8.0 mg/L Final     WBC 05/05/2021 6.7  4.0 - 11.0 10e9/L Final     RBC Count 05/05/2021 4.17  3.8 - 5.2 10e12/L Final     Hemoglobin 05/05/2021 12.7  11.7 - 15.7 g/dL Final     Hematocrit 05/05/2021 38.5  35.0 - 47.0 % Final     MCV 05/05/2021 92  78 - 100 fl Final     MCH 05/05/2021 30.5  26.5 - 33.0 pg Final     MCHC 05/05/2021 33.0  31.5 -  36.5 g/dL Final     RDW 05/05/2021 12.0  10.0 - 15.0 % Final     Platelet Count 05/05/2021 146* 150 - 450 10e9/L Final     Diff Method 05/05/2021 Automated Method   Final     % Neutrophils 05/05/2021 61.4  % Final     % Lymphocytes 05/05/2021 26.4  % Final     % Monocytes 05/05/2021 7.0  % Final     % Eosinophils 05/05/2021 4.3  % Final     % Basophils 05/05/2021 0.3  % Final     % Immature Granulocytes 05/05/2021 0.6  % Final     Nucleated RBCs 05/05/2021 0  0 /100 Final     Absolute Neutrophil 05/05/2021 4.1  1.6 - 8.3 10e9/L Final     Absolute Lymphocytes 05/05/2021 1.8  0.8 - 5.3 10e9/L Final     Absolute Monocytes 05/05/2021 0.5  0.0 - 1.3 10e9/L Final     Absolute Eosinophils 05/05/2021 0.3  0.0 - 0.7 10e9/L Final     Absolute Basophils 05/05/2021 0.0  0.0 - 0.2 10e9/L Final     Abs Immature Granulocytes 05/05/2021 0.0  0 - 0.4 10e9/L Final     Absolute Nucleated RBC 05/05/2021 0.0   Final     Sodium 05/05/2021 137  133 - 144 mmol/L Final     Potassium 05/05/2021 3.5  3.4 - 5.3 mmol/L Final     Chloride 05/05/2021 101  94 - 109 mmol/L Final     Carbon Dioxide 05/05/2021 30  20 - 32 mmol/L Final     Anion Gap 05/05/2021 6  3 - 14 mmol/L Final     Glucose 05/05/2021 146* 70 - 99 mg/dL Final     Urea Nitrogen 05/05/2021 13  7 - 30 mg/dL Final     Creatinine 05/05/2021 0.76  0.52 - 1.04 mg/dL Final     GFR Estimate 05/05/2021 77  >60 mL/min/[1.73_m2] Final     GFR Estimate If Black 05/05/2021 90  >60 mL/min/[1.73_m2] Final     Calcium 05/05/2021 8.9  8.5 - 10.1 mg/dL Final     Bilirubin Total 05/05/2021 0.8  0.2 - 1.3 mg/dL Final     Albumin 05/05/2021 3.8  3.4 - 5.0 g/dL Final     Protein Total 05/05/2021 8.0  6.8 - 8.8 g/dL Final     Alkaline Phosphatase 05/05/2021 35* 40 - 150 U/L Final     ALT 05/05/2021 23  0 - 50 U/L Final     AST 05/05/2021 15  0 - 45 U/L Final     Cholesterol 05/05/2021 135  <200 mg/dL Final     Triglycerides 05/05/2021 168* <150 mg/dL Final     HDL Cholesterol 05/05/2021 52  >49 mg/dL  "Final     LDL Cholesterol Calculated 05/05/2021 49  <100 mg/dL Final     Non HDL Cholesterol 05/05/2021 83  <130 mg/dL Final        Review of systems: Negative except that which was noted in the HPI.    Physical examination:  /76 (BP Location: Right arm)   Pulse 75   Temp 98.7  F (37.1  C) (Tympanic)   Ht 1.6 m (5' 3\")   Wt 65.3 kg (144 lb)   SpO2 97%   BMI 25.51 kg/m      GENERAL APPEARANCE: healthy, alert and no distress  HEENT: no icterus, no xanthelasmas, normal pupil size and reaction, no cyanosis.  NECK: no adenopathy, no asymmetry, masses.  CHEST: lungs clear to auscultation - no rales, rhonchi or wheezes, no use of accessory muscles, no retractions, respirations are unlabored, normal respiratory rate  CARDIOVASCULAR: regular rhythm, normal S1 with physiologic split S2, no S3 or S4 and no murmur, click or rub  EXTREMITIES: no clubbing, cyanosis or edema  NEURO: alert and oriented normal speech, and affect  VASC: No vascular bruits heard.  SKIN: no ecchymoses, no rashes.    Total time spent on day of visit, including review of tests, obtaining/reviewing separately obtained history, ordering medications/tests/procedures, communicating with PCP/consultants, and documenting in electronic medical record: 35 minutes.           Thank you for allowing me to participate in the care of your patient. Please do not hesitate to contact me if you have any questions.     Jin Corea, DO          "

## 2022-08-22 NOTE — PATIENT INSTRUCTIONS
Thank you for allowing Dr. Corea and our  team to participate in your care. Please call our office at [phone] with scheduling questions or if you need to cancel or change your appointment. With any other questions or concerns you may call Helene cardiology nurse at [phone].       If you experience chest pain, chest pressure, chest tightness, shortness of breath, fainting, lightheadedness, nausea, vomiting, or other concerning symptoms, please report to the Emergency Department or call 911. These symptoms may be emergent, and best treated in the Emergency Department.    1 year follow up

## 2022-08-22 NOTE — PROGRESS NOTES
"Stopped by for a download of her CGM   Seeing Dr. Corea today    CGM data as followed:    Date: 7/26 to 8/8/22  Glucose management indicator: 7.9%    Average glucose: 192    Glucose range  Very low (<54): 0  low (<70): <1%  In target range (): 45%  High (>180): 37%  Very high (>250): 17%    Date: 8/9 to 8/22/22  Glucose management indicator: 8.5%    Average glucose: 217    Glucose range  Very low (<54): 0  low (<70): 0  In target range (): 35%  High (>180): 34%  Very high (>250): 31%    Tells me that she keeps \"crashing\" at night.    Last night, she did have an SG in the 80s after a correction.     I did adjust her ISF from 50 to 55    We did talk about her post-prandial spikes.   She tends to enter her carb bolus during or after eating.     Encouraged her to start entering the bolus before (a little is better than none) consuming carbs.     Kerri [facility]-BC  Diabetes and Wound Care        "

## 2022-08-27 DIAGNOSIS — E87.6 HYPOKALEMIA: ICD-10-CM

## 2022-08-29 RX ORDER — POTASSIUM CHLORIDE 1500 MG/1
TABLET, EXTENDED RELEASE ORAL
Qty: 180 TABLET | Refills: 0 | Status: SHIPPED | OUTPATIENT
Start: 2022-08-29 | End: 2022-12-05

## 2022-09-19 DIAGNOSIS — I10 ESSENTIAL HYPERTENSION: ICD-10-CM

## 2022-09-19 RX ORDER — FUROSEMIDE 40 MG
TABLET ORAL
Qty: 135 TABLET | Refills: 0 | Status: SHIPPED | OUTPATIENT
Start: 2022-09-19 | End: 2022-12-20

## 2022-09-19 NOTE — TELEPHONE ENCOUNTER
Furosemide 40 MG  Last Written Prescription Date:  06/22/22  Last Fill Quantity: 135,   # refills: 0  Last Office Visit: 05/13/22  Future Office visit:    Next 5 appointments (look out 90 days)    Nov 14, 2022  2:30 PM  (Arrive by 2:15 PM)  Office Visit with Nicolasa Guillen NP  [facility] [place] ([facility] [place] [facility] [place] ) [address]  [place] [address]  [phone]           Routing refill request to provider for review/approval because:  Phone call

## 2022-09-26 ENCOUNTER — NURSE TRIAGE (OUTPATIENT)
Dept: FAMILY MEDICINE | Facility: OTHER | Age: 77
End: 2022-09-26

## 2022-09-26 ENCOUNTER — HOSPITAL ENCOUNTER (EMERGENCY)
Facility: HOSPITAL | Age: 77
Discharge: HOME | End: 2022-09-26
Attending: NURSE PRACTITIONER | Admitting: NURSE PRACTITIONER
Payer: MEDICARE

## 2022-09-26 VITALS
TEMPERATURE: 98.1 F | DIASTOLIC BLOOD PRESSURE: 68 MMHG | SYSTOLIC BLOOD PRESSURE: 113 MMHG | HEART RATE: 77 BPM | OXYGEN SATURATION: 97 % | RESPIRATION RATE: 16 BRPM

## 2022-09-26 DIAGNOSIS — T22.012A: Primary | ICD-10-CM

## 2022-09-26 DIAGNOSIS — T22.012A BURN OF LEFT FOREARM, UNSPECIFIED BURN DEGREE, INITIAL ENCOUNTER: ICD-10-CM

## 2022-09-26 DIAGNOSIS — E03.9 HYPOTHYROIDISM, UNSPECIFIED TYPE: ICD-10-CM

## 2022-09-26 PROCEDURE — G0463 HOSPITAL OUTPT CLINIC VISIT: HCPCS

## 2022-09-26 PROCEDURE — 99213 OFFICE O/P EST LOW 20 MIN: CPT | Performed by: NURSE PRACTITIONER

## 2022-09-26 RX ORDER — MUPIROCIN CALCIUM 20 MG/G
CREAM TOPICAL 3 TIMES DAILY
Qty: 60 G | Refills: 0 | Status: SHIPPED | OUTPATIENT
Start: 2022-09-26 | End: 2023-05-04

## 2022-09-26 ASSESSMENT — ENCOUNTER SYMPTOMS
MYALGIAS: 1
WOUND: 1
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
## 2022-09-26 NOTE — DISCHARGE INSTRUCTIONS
Keep the area clean and dry. Apply the cream as prescribed. Cover with gauze dressing.     Tylenol as needed for pain. Apply cold packs 15 minutes on/off.     Follow up with your doctor if no improvement in symptoms.    Return to emergency department for worsening or concerning symptoms.
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
## 2022-09-28 DIAGNOSIS — E13.9 LADA (LATENT AUTOIMMUNE DIABETES IN ADULTS), MANAGED AS TYPE 1 (H): ICD-10-CM

## 2022-09-28 RX ORDER — LEVOTHYROXINE SODIUM 75 UG/1
TABLET ORAL
Qty: 90 TABLET | Refills: 0 | Status: SHIPPED | OUTPATIENT
Start: 2022-09-28 | End: 2022-12-20

## 2022-09-29 RX ORDER — PROCHLORPERAZINE 25 MG/1
1 SUPPOSITORY RECTAL CONTINUOUS
Qty: 1 EACH | Refills: 1 | Status: SHIPPED | OUTPATIENT
Start: 2022-09-29 | End: 2022-10-04

## 2022-09-29 NOTE — TELEPHONE ENCOUNTER
Dexcom transmitter      Last Written Prescription Date:  04/12/22  Last Fill Quantity: 1,   # refills: 1  Last Office Visit: 08/04/22  Future Office visit:    Next 5 appointments (look out 90 days)    Nov 14, 2022  2:30 PM  (Arrive by 2:15 PM)  Office Visit with Nicolasa Guillen NP  [facility] [place] ([facility] [place] ) [address]  [place] [address]  [phone]

## 2022-09-30 DIAGNOSIS — E13.9 LADA (LATENT AUTOIMMUNE DIABETES IN ADULTS), MANAGED AS TYPE 1 (H): ICD-10-CM

## 2022-09-30 NOTE — TELEPHONE ENCOUNTER
PRESCRIPTIONS MUST BE WRITTEN THIS WAY TO MAKE THEM MEDICARE COMPLIANT.     Dexcom G6 Transmitter  SIG:  Change q 90 days  QTY: 1  Refill:  1     -Prescription must be written after the clinical note date and will only be able to be used for 6 months from the date of the clinical notes. (We will be requesting new clinical notes and prescriptions every 6 months to meet Medicare Guidelines.)     All Documents (including clinical notes) MUST be signed by the same doctor.     Please contact us at [phone] (this number is for clinics only) with any questions. Patients may call us at [phone].        Thank you,  [facility]

## 2022-10-04 RX ORDER — PROCHLORPERAZINE 25 MG/1
1 SUPPOSITORY RECTAL CONTINUOUS
Qty: 1 EACH | Refills: 1 | Status: SHIPPED | OUTPATIENT
Start: 2022-10-04 | End: 2023-06-12

## 2022-10-13 ENCOUNTER — TRANSFERRED RECORDS (OUTPATIENT)
Dept: HEALTH INFORMATION MANAGEMENT | Facility: CLINIC | Age: 77
End: 2022-10-13

## 2022-10-13 LAB — RETINOPATHY: NEGATIVE

## 2022-11-03 DIAGNOSIS — E78.49 OTHER HYPERLIPIDEMIA: ICD-10-CM

## 2022-11-04 RX ORDER — SIMVASTATIN 40 MG
TABLET ORAL
Qty: 90 TABLET | Refills: 0 | Status: SHIPPED | OUTPATIENT
Start: 2022-11-04 | End: 2022-12-20

## 2022-11-04 NOTE — TELEPHONE ENCOUNTER
Simvastatin 40 mg  Last Written Prescription Date:  8/1/22  Last Fill Quantity: 90,   # refills: 0  Last Office Visit: 8/22/22  Future Office visit:    Next 5 appointments (look out 90 days)    Nov 14, 2022  2:30 PM  (Arrive by 2:15 PM)  Office Visit with Nicolasa Guillen NP  [facility] [place] ([facility] [place] ) [address]  [place] [address]  [phone]           Routing refill request to provider for review/approval because:

## 2022-11-17 ENCOUNTER — TELEPHONE (OUTPATIENT)
Dept: FAMILY MEDICINE | Facility: OTHER | Age: 77
End: 2022-11-17

## 2022-11-17 ENCOUNTER — ALLIED HEALTH/NURSE VISIT (OUTPATIENT)
Dept: EDUCATION SERVICES | Facility: OTHER | Age: 77
End: 2022-11-17
Attending: NURSE PRACTITIONER
Payer: COMMERCIAL

## 2022-11-17 VITALS
HEIGHT: 63 IN | DIASTOLIC BLOOD PRESSURE: 73 MMHG | HEART RATE: 75 BPM | RESPIRATION RATE: 16 BRPM | BODY MASS INDEX: 25.87 KG/M2 | OXYGEN SATURATION: 97 % | WEIGHT: 146 LBS | SYSTOLIC BLOOD PRESSURE: 110 MMHG

## 2022-11-17 DIAGNOSIS — E13.9 LADA (LATENT AUTOIMMUNE DIABETES IN ADULTS), MANAGED AS TYPE 1 (H): Primary | ICD-10-CM

## 2022-11-17 PROCEDURE — 95251 CONT GLUC MNTR ANALYSIS I&R: CPT | Performed by: NURSE PRACTITIONER

## 2022-11-17 PROCEDURE — 99213 OFFICE O/P EST LOW 20 MIN: CPT | Mod: 25 | Performed by: NURSE PRACTITIONER

## 2022-11-17 PROCEDURE — G0463 HOSPITAL OUTPT CLINIC VISIT: HCPCS

## 2022-11-17 ASSESSMENT — PAIN SCALES - GENERAL: PAINLEVEL: MODERATE PAIN (5)

## 2022-11-17 NOTE — PROGRESS NOTES
"SUBJECTIVE:  Flora Acuna, 77 year old, female presents with the following Chief Complaint(s) with HPI to follow:  Chief Complaint   Patient presents with     Diabetes        Diabetes Follow-up      Patient is checking blood sugars: personal CGM.  Results:  She has been using her Dexcom for >90 days    Date: 10/21 to 11/3/22  Glucose management indicator: 8.6%    Average glucose: 219    Glucose range  Very low (<54): 0  low (<70): 0  In target range (): 37%  High (>180): 32%  Very high (>250): 31%    Date: 11/4 to 11/17/22  Glucose management indicator: 8.2%    Average glucose: 204    Glucose range  Very low (<54): 1%  low (<70): <1%  In target range (): 41%  High (>180): 31%  Very high (>250): 26%      Symptoms of hypoglycemia (low blood sugar): no \"crashes\",     Paresthesias (numbness or burning in feet) or sores: Yes--same; no sores    Diabetic eye exam within the last year: DUE    Breakfast eaten regularly: sometimes    Patient counting carbs: Yes       HPI:  Flora's here today for the follow up regarding her KAREEM, managed as a Type 1    Lab Results   Component Value Date    A1C 7.2 05/13/2022    A1C 7.1 11/23/2021    A1C 6.9 01/29/2021    A1C 7.2 07/01/2020    A1C 7.9 12/16/2019    A1C 7.5 09/11/2019    A1C 7.3 06/07/2019     Current Diabetes medication:   1.  Insulin pump (Minimed 630G), with Humalog  ASA use: yes  Statin use: yes    Bo's here today for an insulin pump and Dexcom download with possible insulin adjust.    Reports the following:  No issues with her personal CGM  No issues with her insulin pump      States she's been having BG \"crashes \" in the middle of the night.    Denies any BGS less than 70    Bo has been using medical cannabis topical, which has been helping her back/joint pain  She's been able to exercise more.       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), managed as type 1 (H)     H/O total knee replacement, left     Age-related osteoporosis without current pathological fracture     Osteoporosis     Family history of melanoma     Family history of breast cancer in female     Dysphonia     Prolonged QT interval     Fatty liver on 11/7/2007     Diastolic dysfunction with chronic heart failure (H)     Mixed hyperlipidemia     Hypokalemia     Vitamin D deficiency     Contrast media allergy     COPD, mild (H)       Past Medical History:   Diagnosis Date     Congestive heart failure, unspecified      Diabetes (H)      H/O rheumatoid arthritis      HLD (hyperlipidemia)      HTN (hypertension)      Myocardial infarction (H)      Uncomplicated asthma        Past Surgical History:   Procedure Laterality Date     APPENDECTOMY OPEN CHILD       AS TOTAL KNEE ARTHROPLASTY Right      CHOLECYSTECTOMY       COLONOSCOPY - HIM SCAN N/A 03/12/2009    per Care Everywhere documentation per [facility].      HYSTERECTOMY TOTAL ABDOMINAL       MASTECTOMY, BILATERAL Bilateral 02/26/1992     SHOULDER SURGERY Right      SHOULDER SURGERY Left        Family History   Problem Relation Age of Onset     Breast Cancer Maternal Aunt      Breast Cancer Maternal Aunt      Lung Cancer Father        Social History     Tobacco Use     Smoking status: Never     Smokeless tobacco: Never   Substance Use Topics     Alcohol use: No     Alcohol/week: 0.0 standard drinks       Current Outpatient Medications   Medication Sig Dispense Refill      acetaminophen (TYLENOL) 325 MG tablet Take 650 mg by mouth       Acetone, Urine, Test (KETONE TEST) STRP Use as directed 50 strip 4     albuterol (PROAIR HFA/PROVENTIL HFA/VENTOLIN HFA) 108 (90 Base) MCG/ACT inhaler Inhale 2 puffs into the lungs every 6 hours 1 Inhaler 0     aspirin (ASA) 325 MG EC tablet Take 325 mg by mouth daily       blood glucose (CONTOUR NEXT TEST) test strip Use 6 times a day with the insulin pump to help manage your diabetes 200 strip 0     blood glucose (NO BRAND SPECIFIED) test strip by In Vitro route 4 times daily Use to test blood sugar 4 times daily or as directed.       Calcium-Vitamin D-Vitamin K (VIACTIV PO) Take 2 chew tab by mouth every morning       Continuous Blood Gluc  (DEXCOM G6 ) XX DIMITRIS 1 each continuous 1 Device 0     Continuous Blood Gluc Sensor (DEXCOM G6 SENSOR) MISC 1 each continuous To be changed every 10 days 3 each 5     Continuous Blood Gluc Transmit (DEXCOM G6 TRANSMITTER) MISC 1 each continuous To be changed every 90 days 1 each 1     diphenhydrAMINE (BENADRYL) 50 MG capsule Administer 30 min - 2 hours pre - IV contrast injection 1 capsule 0     furosemide (LASIX) 40 MG tablet TAKE 1 TABLET BY MOUTH ONCE DAILY IN THE MORNING AND 1/2 (ONE-HALF) TABLET IN AFTERNOON 135 tablet 0     glucagon 1 MG kit Inject 1 mg Subcutaneous once 1 mg 12     Insulin Aspart (INSULIN PUMP - OUTPATIENT)        insulin lispro (HUMALOG VIAL) 100 UNIT/ML vial To be used with the insulin pump TDD: 80 units 30 mL 3     INSULIN PUMP - OUTPATIENT Insulin pump settings: Updated: 8/4/22 Insulin pump: Minimed 630G Basal: 0000 0.95 0200 0.9 0500 0.875 0900 1 1200 1.1 2200 1  Total: 24.1 CR: 9 ISF: 50 Target: 100-140 Active insulin: 4       levothyroxine (SYNTHROID/LEVOTHROID) 75 MCG tablet Take 1 tablet by mouth once daily 90 tablet 0     mupirocin (BACTROBAN) 2 % external cream Apply topically 3 times daily 60 g 0     omeprazole (PRILOSEC) 40 MG DR capsule Take 1 capsule by mouth  "once daily 90 capsule 3     potassium chloride ER (KLOR-CON M) 20 MEQ CR tablet TAKE 1  BY MOUTH TWICE DAILY 180 tablet 0     ramipril (ALTACE) 10 MG capsule Take 1 capsule (10 mg) by mouth 2 times daily 180 capsule 3     senna-docusate (SENOKOT-S;PERICOLACE) 8.6-50 MG per tablet Take 1 tablet by mouth At Bedtime        simvastatin (ZOCOR) 40 MG tablet TAKE 1 TABLET BY MOUTH AT BEDTIME 90 tablet 0     SPIRIVA RESPIMAT 2.5 MCG/ACT inhaler INHALE 2 PUFFS BY MOUTH ONCE DAILY 4 g 4     tamoxifen (NOLVADEX) 20 MG tablet Take 20 mg by mouth every morning       tiotropium (SPIRIVA) 18 MCG inhaled capsule Inhale 2 puffs into the lungs every morning       VITAMIN D, CHOLECALCIFEROL, PO Take 5,000 Units by mouth daily         Allergies   Allergen Reactions     Contrast Dye Difficulty breathing     Gadolinium Derivatives      Other reaction(s): Difficulty in swallowing, Laryngeal spasm  Patient had an injection into rt. Shoulder capsule prior to MRI arthrogram.  Contained multihance gadobenate, omnipaque iohexol, and buffered lidocaine.       Ammonia      Cory-1 [Lidocaine Hcl]      Novocaine allergy       Codeine Sulfate      Food      Shrimp     Fosamax [Alendronic Acid]      Dental infections     Iodine-131 Swelling     Ct dye     Lidocaine      Nitrous Oxide      No Clinical Screening - See Comments Nausea and Vomiting and Other (See Comments)     (3/6/09)- N/V and Heart racing     Novocain [Procaine]      Penicillins      Tramadol      Morphine Palpitations       REVIEW OF SYSTEMS  Skin: negative  Eyes: negative; DUE  Ears/Nose/Throat: hx of \"burnt\" voice and muscles from GERD  Respiratory: positive FRANCO--better; no cough; no hemoptysis; history of asthma  Cardiovascular: stable; history of HF  Gastrointestinal: as noted above  Genitourinary: negative   Musculoskeletal: + back pain--better; hx of RA; bilateral thumb joints are displaced per pt: hx of back pain, neck pain, arthritis and right shoulder   Neurologic: " "positive for numbness or tingling of feet--same  Psychiatric: negative  Hematologic/Lymphatic/Immunologic: history of breast cancer (left) x 2 (same spot)  Endocrine: positive for diabetes and thyroid disease     OBJECTIVE:  /73   Pulse 75   Resp 16   Ht 1.6 m (5' 3\")   Wt 66.2 kg (146 lb)   SpO2 97%   BMI 25.86 kg/m    Constitutional: alert and no distress  Respiratory:  Good diaphragmatic excursion.  Musculoskeletal: extremities normal- no gross deformities noted; using a rolling walker   Skin: no suspicious lesions or rashes to visible skin  Psychiatric: mentation appears normal and affect normal/bright    LABS  Results for orders placed or performed in visit on 11/17/22   GLUCOSE MONITOR, 72 HOUR, PHYS INTERP     Status: None    Narrative    Date: 10/21 to 11/3/22  Glucose management indicator: 8.6%    Average glucose: 219    Glucose range  Very low (<54): 0  low (<70): 0  In target range (): 37%  High (>180): 32%  Very high (>250): 31%    Date: 11/4 to 11/17/22  Glucose management indicator: 8.2%    Average glucose: 204    Glucose range  Very low (<54): 1%  low (<70): <1%  In target range (): 41%  High (>180): 31%  Very high (>250): 26%       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), managed as type 1 (H)  (primary encounter diagnosis)  Comment: noted CGM data   Plan: GLUCOSE MONITOR, 72 HOUR, PHYS INTERP          Will adjust her insulin pump as followed:    Insulin pump settings:  Updated: 11/17/22  Insulin pump: Minimed 630G  Basal:  0000 0.9 (new)  0200 0.85 (new)  0500 0.825 (new)  0900 1  1200 1.1  2200 1   Total: 23.65  CR: 9  ISF:   0000 60 (new)  0700 55 (new)   Target: 100-140  Active insulin: 4    Friendly reminder:  Keep adding bolus BEFORE consuming carbs  Make sure to rotate sites     Follow up  As discussed    Call sooner if any issues develop    Patient Instructions   Continue working on healthy eating and moving as best as you can (start low " and slow, work up to 30 min, 5x/week)    BG goals:  Fasting and before meals <130, >70  2 hour after eating <180    We only need 1/2 of these numbers to be within target then your A1c will be within target    Medication changes   Watch for continued low BGs with recent adjustments.        Follow up  Nurse download in December     Call me sooner if any problems/concerns and/or questions develop including consistent low BGs <70 or consistent high BGs >200  [phone] (Marie, Unit Coordinator)    [phone] (Emeli, Nurse)          Time: 30 minutes  Barrier: none  Willingness to learn: accepting    Kerri NELSON [facility] [place]  Diabetes and Wound Care    Cc: Nicolasa Guillen NP    With the electronic record, we can now more quickly and easily track our patient diabetic goals. Our diabetes clinical review is in progress and these are the indicators we are monitoring for good diabetes health.     1.) HbA1C less than 7 (measurement of your average blood sugars)  2.) Blood Pressure less than 140/80  3.) LDL less than 100 (bad cholesterol)  4.) HbA1C is checked in the last 6 months and below 7% (more frequently if not at goal or adjusting medications)  5.) LDL is checked in the last 12 months (more frequently if not at goal or adjusting medications)  6.) Taking one baby aspirin daily (unless otherwise instructed)  7.) No tobacco use  8) Statin use     You have achieved 8 out of 8 of these and I am encouraging you to come in and get tuned up to achieve 8 out of 8!  Here is what you have achieved so far in my goals for you:  1.) HbA1C  less than 7:                              YES--good for age    Your last  HbA1C  Lab Results   Component Value Date    A1C 7.2 05/13/2022    A1C 7.1 11/23/2021    A1C 6.9 01/29/2021    A1C 7.2 07/01/2020    A1C 7.9 12/16/2019    A1C 7.5 09/11/2019    A1C 7.3 06/07/2019      2.) Blood Pressure less than 140/80:       YES     Your last    BP Readings from Last 1 Encounters:   11/17/22 110/73     3.)  LDL less than 100:                              YES      Your last     LDL Cholesterol Calculated   Date Value Ref Range Status   11/23/2021 47 <=100 mg/dL Final   05/05/2021 49 <100 mg/dL Final     Comment:     Desirable:       <100 mg/dl       4.) Checked HbA1C in the past 6 months: YES      5.) Checked LDL in the past 12 months:    YES    6.) Taking one aspirin daily:                       YES     7.) No tobacco use:                                        YES      8.) Statin use      YES       Insurance requirements:  1.  Patient has been seen in the clinic within the last 6 month  2. Diagnosis of KAREEM, managed as a Type 1 for >6 months  3. Has been testing their BGs 4x/day using her Dexcomg CGM for >90 days  4. Uses an insulin pump for >6 months  5. Requires frequent adjustments to their treatment regimen based on BGM and/or CGM testing results.

## 2022-11-22 ENCOUNTER — MEDICAL CORRESPONDENCE (OUTPATIENT)
Dept: HEALTH INFORMATION MANAGEMENT | Facility: HOSPITAL | Age: 77
End: 2022-11-22

## 2022-11-30 NOTE — MR AVS SNAPSHOT
After Visit Summary   5/5/2017    Flora Acuna    MRN: [id number]           Patient Information     Date Of Birth          1945        Visit Information        Provider Department      5/5/2017 10:30 AM [facility]        Today's Diagnoses     Type 2 diabetes mellitus with hyperglycemia, with long-term current use of insulin (H)    -  1      Care Instructions      Continue working on healthy eating and moving (start low and slow, work up to 30 min, 5x/week)    BG goals:  Fasting and before meals <130, >80  2 hour after eating <180    We only need 1/2 of these numbers to be within target then your A1c will be within target    Medication changes   Watch for low BGs with recent adjustments    Please let me know if you have continue low blood sugars       Follow up   2 weeks pump download    Check his BGs at least 4 times a day.     Use your dual wave with supper     Please add the correction to pump when BGs >150.       It's okay to add only 50% of carbs before consuming a meal    Call me sooner if any problems/concerns and/or questions develop including consistent low BGs <70 or consistent high BGs >200  [phone] (Justine, Unit Coordinator)    [phone] (Ruth Nurse)  [phone] (Kerri's cell)          Follow-ups after your visit        Your next 10 appointments already scheduled     Jonnie 15, 2017 10:15 AM CDT   (Arrive by 10:00 AM)   Nurse Only with Hc Dm Nurse   [facility] [place] ([facility])    [address]  [place] [address] [phone]              Who to contact     If you have questions or need follow up information about today's clinic visit or your schedule please contact [facility] directly at [phone].  Normal or non-critical lab and imaging results will be communicated to you by MyChart, letter or phone within 4 business days after the clinic has received the results. If you do not hear from us  case reviewed with resident  SOB   CHF  lvef=40%  AS/ TR  ckd   bumex drip  af/rvr   apoorva GINA clot  cardioversion aborted  cardiology goal is to keep HR<200 bpm  (conifirmed by resident)   "within 7 days, please contact the clinic through [employer] or phone. If you have a critical or abnormal lab result, we will notify you by phone as soon as possible.  Submit refill requests through [employer] or call your pharmacy and they will forward the refill request to us. Please allow 3 business days for your refill to be completed.          Additional Information About Your Visit        [employer] Information     [employer] lets you send messages to your doctor, view your test results, renew your prescriptions, schedule appointments and more. To sign up, go to [email or web address] . Click on \"Log in\" on the left side of the screen, which will take you to the Welcome page. Then click on \"Sign up Now\" on the right side of the page.     You will be asked to enter the access code listed below, as well as some personal information. Please follow the directions to create your username and password.     Your access code is: [email or web address]  Expires: 2017  2:42 PM     Your access code will  in 90 days. If you need help or a new code, please call your [facility] [facility] clinic or [phone].        Care EveryWhere ID     This is your Care EveryWhere ID. This could be used by other organizations to access your [facility] [facility] medical records  [id number]        Your Vitals Were     Pulse Height Pulse Oximetry BMI (Body Mass Index)          64 5' 3\" (1.6 m) 96% 27.28 kg/m2         Blood Pressure from Last 3 Encounters:   17 121/66   17 118/60   16 102/62    Weight from Last 3 Encounters:   17 154 lb (69.9 kg)   17 154 lb 6.4 oz (70 kg)   16 155 lb 4.8 oz (70.4 kg)              Today, you had the following     No orders found for display       Primary Care Provider Office Phone # Fax #    Salvador Mejia [phone]       [facility] [address] [facility] [address]        Thank you!     Thank you for choosing [facility]  for your care. Our goal is always to provide " you with excellent care. Hearing back from our patients is one way we can continue to improve our services. Please take a few minutes to complete the written survey that you may receive in the mail after your visit with us. Thank you!             Your Updated Medication List - Protect others around you: Learn how to safely use, store and throw away your medicines at www.disposemymeds.org.          This list is accurate as of: 5/5/17 11:59 PM.  Always use your most recent med list.                   Brand Name Dispense Instructions for use    acetaminophen 325 MG tablet    TYLENOL     Take 650 mg by mouth       acyclovir 5 % ointment    ZOVIRAX    5 g    Apply topically 5 times daily       albuterol 108 (90 BASE) MCG/ACT Inhaler    PROAIR HFA/PROVENTIL HFA/VENTOLIN HFA     Inhale 2 puffs into the lungs       anastrozole 1 MG tablet    ARIMIDEX         baclofen 10 MG tablet    LIORESAL     2 tabs in the morning, 1 tab mid day and 2 tabs at night.       CATHY CONTOUR NEXT test strip   Generic drug:  blood glucose monitoring      Test blood sugars four times per day.  Type 2 Diabetes 250.00       calcium carbonate 500 MG tablet    OS-BUDDY 500 mg [place]. Ca     Take 500 mg by mouth daily       clindamycin 300 MG capsule    CLEOCIN     Take 300 mg by mouth as needed (Take 3 capsules by mouth before dental procedure.)       famotidine 20 MG tablet    PEPCID         furosemide 40 MG tablet    LASIX     TAKE 1 TABLET BY MOUTH DAILY       GLUCAGON EMERGENCY 1 MG kit   Generic drug:  glucagon     1 mg    Inject 1 mg Subcutaneous once       * INSULIN PUMP - OUTPATIENT          * insulin aspart 100 UNITS/ML injection    NovoLOG VIAL    20 mL    To be used with the insulin pump  TDD: 40 units       insulin pump infusion      Date last updated:  2/4/15 [email or web address] MinimApse: Model 723 BASAL RATES and times: 12   AM (midnight): 0.9 units/hour   9    PM: 0.8 units/hour  Basal Pattern A:  Flora Acuna will use when N/A. CARB RATIO and  times: 12   AM (midnight): 13.0 4     PM: 10 Corection Factor (Sensitivity) and times: 12   AM (midnight): 55 mg/dL BLOOD GLUCOSE TARGET and times: 12   AM (midnight): 100 - 150 7     AM:  100 - 120 9    PM:  100 - 150  Active Insulin Time:  3 hours Sensor:  No Carelink / Diasend username:  elias Carelink / Diasend Password:  [email or web address]       Ketone Test Strp     50 strip    Use as directed       Lancet Devices Misc          levothyroxine 75 MCG tablet    SYNTHROID/LEVOTHROID     TAKE 1 TABLET BY MOUTH DAILY       LIFESCAN FINEPOINT LANCETS Misc      TEST FOUR TIMES DAILY       * metoprolol 50 MG tablet    LOPRESSOR         * metoprolol 50 MG tablet    LOPRESSOR     TAKE 1 TABLET BY MOUTH TWICE DAILY       NITROSTAT 0.4 MG sublingual tablet   Generic drug:  nitroglycerin      Place 0.4 mg under the tongue       potassium chloride SA 10 MEQ CR tablet    K-DUR/KLOR-CON M     TAKE 2 TABLETS BY MOUTH DAILY       priLOSEC 20 MG CR capsule   Generic drug:  omeprazole      Take 20 mg by mouth       * RAMIPRIL PO      Take 10 mg by mouth daily       * ramipril 10 MG capsule    ALTACE     TAKE 1 CAPSULE BY MOUTH EVERY DAY       * SIMVASTATIN PO      Take 40 mg by mouth At Bedtime       * simvastatin 40 MG tablet    ZOCOR     TAKE 1 TABLET BY MOUTH AT BEDTIME       * VITAMIN D (CHOLECALCIFEROL) PO      Take 5,000 Units by mouth daily       * cholecalciferol 5000 UNITS Tabs tablet    vitamin D3     Take 5,000 mg by mouth       * Notice:  This list has 10 medication(s) that are the same as other medications prescribed for you. Read the directions carefully, and ask your doctor or other care provider to review them with you.

## 2022-12-03 DIAGNOSIS — E87.6 HYPOKALEMIA: ICD-10-CM

## 2022-12-03 DIAGNOSIS — K21.9 GASTROESOPHAGEAL REFLUX DISEASE WITHOUT ESOPHAGITIS: ICD-10-CM

## 2022-12-05 RX ORDER — OMEPRAZOLE 40 MG/1
CAPSULE, DELAYED RELEASE ORAL
Qty: 90 CAPSULE | Refills: 0 | Status: SHIPPED | OUTPATIENT
Start: 2022-12-05 | End: 2022-12-20

## 2022-12-05 RX ORDER — POTASSIUM CHLORIDE 1500 MG/1
TABLET, EXTENDED RELEASE ORAL
Qty: 180 TABLET | Refills: 0 | Status: SHIPPED | OUTPATIENT
Start: 2022-12-05 | End: 2022-12-20

## 2022-12-05 NOTE — TELEPHONE ENCOUNTER
omeprazole      Last Written Prescription Date:  11/23/21  Last Fill Quantity: 90,   # refills: 3  Last Office Visit: 8/22/22  Future Office visit:    Next 5 appointments (look out 90 days)    Dec 20, 2022  2:30 PM  (Arrive by 2:15 PM)  SHORT with Nicolasa Guillen NP  [facility] [address]  [place] [address]  [phone]           Routing refill request to provider for review/approval because:

## 2022-12-18 NOTE — PROGRESS NOTES
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 Has been having highs and lows. She is working with diabetic ed. Managing her carbs and staying active. A1C pending. Will notify patient of the results when available and intervene accordingly. On statin. Eye exam UTD.     - Hemoglobin A1c  - Comprehensive metabolic panel (BMP + Alb, Alk Phos, ALT, AST, Total. Bili, TP)  - Albumin Random Urine Quantitative with Creat Ratio    Essential hypertension  Blood pressure is good today, well controlled.     - ramipril (ALTACE) 10 MG capsule; Take 1 capsule (10 mg) by mouth 2 times daily  - furosemide (LASIX) 40 MG tablet; TAKE 1 TABLET BY MOUTH ONCE DAILY IN THE MORNING AND 1/2 (ONE-HALF) TABLET IN AFTERNOON    Mixed hyperlipidemia  Tolerating statin. Will continue. AST and ALT normal today.     - Lipid Profile (Chol, Trig, HDL, LDL calc)  - Comprehensive metabolic panel (BMP + Alb, Alk Phos, ALT, AST, Total. Bili, TP)  - simvastatin (ZOCOR) 40 MG tablet; Take 1 tablet (40 mg) by mouth At Bedtime    COPD, mild (H)  COPD has improved with the spiriva. She is staying active, walking everyday with a walker.   - CBC with platelets and differential    Thrombocytopenia (H)  History of low platelets, platelet count normal today. She has had no problems with bleeding.      Dizziness  She has been having positional dizzy spells. Orthostatic blood pressures unremarkabnle. She is going to get a carotid ultrasound and EKG to evaluate symptoms.  Will notify patient of the results when available and intervene accordingly.     - US Carotid Bilateral; Future  - Adult Leadless EKG Monitor 8 to 14 Days; Future  - TSH with free T4 reflex    Diastolic dysfunction with chronic heart failure (H)  Controlled with lasix and ramipirl.   - ramipril (ALTACE) 10 MG capsule; Take 1 capsule (10 mg) by mouth 2 times daily    Hypokalemia  She is on lasix and is  "taking potassium supplements.Checking potassium levels today, will continue to monitor.   - ramipril (ALTACE) 10 MG capsule; Take 1 capsule (10 mg) by mouth 2 times daily  - potassium chloride ER (KLOR-CON M) 20 MEQ CR tablet; TAKE 1  BY MOUTH TWICE DAILY Strength: 20 mEq    Lower extremity edema  Lower extremities are swollen bilaterally. Tolerating lasix and believes it is helping.   - ramipril (ALTACE) 10 MG capsule; Take 1 capsule (10 mg) by mouth 2 times daily    Hypothyroidism, unspecified type  Taking synthroid every evening. Rechecking levels today.   - TSH with free T4 reflex  - levothyroxine (SYNTHROID/LEVOTHROID) 75 MCG tablet; Take 1 tablet (75 mcg) by mouth daily      Gastroesophageal reflux disease without esophagitis  Well controlled with omeprazole.   - omeprazole (PRILOSEC) 40 MG DR capsule; Take 1 capsule (40 mg) by mouth daily    Urinary Frequency/Dysuria  Will culture urine. Will notify patient of the results when available and intervene accordingly. If normal, will consider pelvic floor therapy.     Ordering of each unique test  Prescription drug management       BMI:   Estimated body mass index is 25.69 kg/m  as calculated from the following:    Height as of this encounter: 1.6 m (5' 3\").    Weight as of this encounter: 65.8 kg (145 lb).       See Patient Instructions    No follow-ups on file.  ZANE Pickard  [facility]      I was present with the nurse practitioner student who participated in the service and in the documentation of the note. I have verified the history and personally performed the physical exam and medical decision making. I agree with the assessment and plan of care as documented in the note.       Nicolasa Guillen NP  [facility] - ANTONIO Soriano is a 77 year old, presenting for the following health issues:  Diabetes, Hypertension, and Lipids      HPI     Diabetes Follow-up    How often are you checking your blood sugar? " Continuous glucose monitor  What time of day are you checking your blood sugars (select all that apply)?  Not applicable  Have you had any blood sugars above 200?  Yes   Have you had any blood sugars below 70?  Yes, [facility]  What symptoms do you notice when your blood sugar is low?  Shaky, Dizzy, Blurred vision and Other: SOB    What concerns do you have today about your diabetes? Other: glucose monitor readings off - has been off by 100 multiple times      Do you have any of these symptoms? (Select all that apply)  Burning in feet     A1C was 7.2 on 5/13/22.     [facility]. Was last seen by Kerri on 11/17/22. Note was reviewed.     Has the Omnipod    On asa.     Denies chest pain, shortness of breath, syncope, or palpitations. No nausea or vomiting. Occasionally dizziness with position changes. Orthostatics are stable today.     She does take lasix 40 mg due to diastolic heart failure.     Due for the influenza and Covid booster. UTD on influenza. Would like covid booster     Platelets have been slightly low in the past, will recheck today. No bleeding episodes.     Hyperlipidemia Follow-Up      Are you regularly taking any medication or supplement to lower your cholesterol?   Yes- simvastatin    Are you having muscle aches or other side effects that you think could be caused by your cholesterol lowering medication?  No     Denies chest pain or palpitations.     Hypertension Follow-up      Do you check your blood pressure regularly outside of the clinic? Yes     Are you following a low salt diet? No    Are your blood pressures ever more than 140 on the top number (systolic) OR more   than 90 on the bottom number (diastolic), for example 140/90? No   As noted above, denies chest pain or palpiations. Has shortness of breath, dizziness with low blood sugars    Taking ramipril 10 mg BID without side effects.   Also has diastolic heart failure, taking lasix 40 mg daily with potassium 20  mEq.     Some urinary frequency with dysuria. No hematuria. Symptoms present for months.     BP Readings from Last 2 Encounters:   12/20/22 120/57   11/17/22 110/73     Hemoglobin A1C (%)   Date Value   05/13/2022 7.2 (H)   11/23/2021 7.1 (H)   01/29/2021 6.9 (H)   07/01/2020 7.2 (H)     LDL Cholesterol Calculated (mg/dL)   Date Value   11/23/2021 47   05/05/2021 49   07/01/2020 36       COPD Follow-Up    Overall, how are your COPD symptoms since your last clinic visit?  Better    How much fatigue or shortness of breath do you have when you are walking?  Less than usual    How much shortness of breath do you have when you are resting?  None    How often do you cough? Sometimes    Have you noticed any change in your sputum/phlegm?  No    Have you experienced a recent fever? No    Please describe how far you can walk without stopping to rest:  Less than 1 block, needs walker.     How many flights of stairs are you able to walk up without stopping?  None    Have you had any Emergency Room Visits, Urgent Care Visits, or Hospital Admissions because of your COPD since your last office visit?  No     She does not smoke.     Using Spiriva with PRN albuterol. Feels her breathing is controlled. Very rarely uses the albuterol.    Humidity is a trigger.     History   Smoking Status     Never   Smokeless Tobacco     Never     No results found for: FEV1, NCN2YDC       CONSTITUTIONAL:NEGATIVE for fever, chills, change in weight  INTEGUMENTARY/SKIN: NEGATIVE for worrisome rashes, moles or lesions  EYES: blurry vision when glucose is out of range  RESP:POSITIVE for cough and shortness of breath. Inhaler helps with the shortness of breath  CV: NEGATIVE for chest pain, palpitations. Peripheral edema  GI: NEGATIVE for nausea, abdominal pain, heartburn, or change in bowel habits  : Dysuria, frequency.   NEURO: numbness and tingling in feet.   ENDOCRINE: NEGATIVE for temperature intolerance, skin/hair changes      Objective    BP  "120/57 (BP Location: Right arm, Patient Position: Sitting, Cuff Size: Adult Regular)   Pulse 81   Temp 98.9  F (37.2  C) (Tympanic)   Ht 1.6 m (5' 3\")   Wt 65.8 kg (145 lb)   SpO2 98%   BMI 25.69 kg/m    Body mass index is 25.69 kg/m .  Physical Exam   GENERAL: healthy, alert and no distress  EYES: Eyes grossly normal to inspection, PERRL and conjunctivae and sclerae normal  HENT: ear canals and TM's normal, nose and mouth without ulcers or lesions  NECK: no adenopathy, no asymmetry, masses, or scars and thyroid normal to palpation  RESP: some inspiratory wheezes - no rales or rhonchi  CV: regular rate and rhythm, normal S1 S2, no S3 or S4, no murmur, click or rub, no peripheral edema and peripheral pulses strong  ABDOMEN: soft, nontender, no hepatosplenomegaly, no masses and bowel sounds normal  MS: no gross musculoskeletal defects noted, no edema. Foot exam showed intact sensation, no ulcerations or sores.   SKIN: no suspicious lesions or rashes  NEURO: Normal strength and tone, mentation intact and speech normal  PSYCH: mentation appears normal, affect normal/bright    Results for orders placed or performed in visit on 12/20/22 (from the past 24 hour(s))   Lipid Profile (Chol, Trig, HDL, LDL calc)   Result Value Ref Range    Cholesterol 178 <200 mg/dL    Triglycerides 176 (H) <150 mg/dL    Direct Measure HDL 63 >=50 mg/dL    LDL Cholesterol Calculated 80 <=100 mg/dL    Non HDL Cholesterol 115 <130 mg/dL    Narrative    Cholesterol  Desirable:  <200 mg/dL    Triglycerides  Normal:  Less than 150 mg/dL  Borderline High:  150-199 mg/dL  High:  200-499 mg/dL  Very High:  Greater than or equal to 500 mg/dL    Direct Measure HDL  Female:  Greater than or equal to 50 mg/dL   Male:  Greater than or equal to 40 mg/dL    LDL Cholesterol  Desirable:  <100mg/dL  Above Desirable:  100-129 mg/dL   Borderline High:  130-159 mg/dL   High:  160-189 mg/dL   Very High:  >= 190 mg/dL    Non HDL Cholesterol  Desirable:  130 " mg/dL  Above Desirable:  130-159 mg/dL  Borderline High:  160-189 mg/dL  High:  190-219 mg/dL  Very High:  Greater than or equal to 220 mg/dL   Comprehensive metabolic panel (BMP + Alb, Alk Phos, ALT, AST, Total. Bili, TP)   Result Value Ref Range    Sodium 135 (L) 136 - 145 mmol/L    Potassium 4.0 3.4 - 5.3 mmol/L    Chloride 97 (L) 98 - 107 mmol/L    Carbon Dioxide (CO2) 26 22 - 29 mmol/L    Anion Gap 12 7 - 15 mmol/L    Urea Nitrogen 21.7 8.0 - 23.0 mg/dL    Creatinine 0.84 0.51 - 0.95 mg/dL    Calcium 9.2 8.8 - 10.2 mg/dL    Glucose 240 (H) 70 - 99 mg/dL    Alkaline Phosphatase 41 35 - 104 U/L    AST 26 10 - 35 U/L    ALT 19 10 - 35 U/L    Protein Total 7.6 6.4 - 8.3 g/dL    Albumin 4.2 3.5 - 5.2 g/dL    Bilirubin Total 1.0 <=1.2 mg/dL    GFR Estimate 72 >60 mL/min/1.73m2   CBC with platelets and differential    Narrative    The following orders were created for panel order CBC with platelets and differential.  Procedure                               Abnormality         Status                     ---------                               -----------         ------                     CBC with platelets and d...[[id number]]                      Final result                 Please view results for these tests on the individual orders.   CBC with platelets and differential   Result Value Ref Range    WBC Count 8.1 4.0 - 11.0 10e3/uL    RBC Count 4.33 3.80 - 5.20 10e6/uL    Hemoglobin 13.0 11.7 - 15.7 g/dL    Hematocrit 39.5 35.0 - 47.0 %    MCV 91 78 - 100 fL    MCH 30.0 26.5 - 33.0 pg    MCHC 32.9 31.5 - 36.5 g/dL    RDW 12.0 10.0 - 15.0 %    Platelet Count 174 150 - 450 10e3/uL    % Neutrophils 60 %    % Lymphocytes 28 %    % Monocytes 6 %    % Eosinophils 4 %    % Basophils 1 %    % Immature Granulocytes 1 %    NRBCs per 100 WBC 0 <1 /100    Absolute Neutrophils 4.9 1.6 - 8.3 10e3/uL    Absolute Lymphocytes 2.2 0.8 - 5.3 10e3/uL    Absolute Monocytes 0.5 0.0 - 1.3 10e3/uL    Absolute Eosinophils 0.4 0.0 - 0.7  10e3/uL    Absolute Basophils 0.1 0.0 - 0.2 10e3/uL    Absolute Immature Granulocytes 0.0 <=0.4 10e3/uL    Absolute NRBCs 0.0 10e3/uL

## 2022-12-20 ENCOUNTER — OFFICE VISIT (OUTPATIENT)
Dept: FAMILY MEDICINE | Facility: OTHER | Age: 77
End: 2022-12-20
Attending: NURSE PRACTITIONER
Payer: MEDICARE

## 2022-12-20 VITALS
OXYGEN SATURATION: 98 % | DIASTOLIC BLOOD PRESSURE: 57 MMHG | HEART RATE: 81 BPM | BODY MASS INDEX: 25.69 KG/M2 | SYSTOLIC BLOOD PRESSURE: 120 MMHG | WEIGHT: 145 LBS | TEMPERATURE: 98.9 F | HEIGHT: 63 IN

## 2022-12-20 DIAGNOSIS — R30.0 DYSURIA: ICD-10-CM

## 2022-12-20 DIAGNOSIS — J44.9 COPD, MILD (H): ICD-10-CM

## 2022-12-20 DIAGNOSIS — D69.6 THROMBOCYTOPENIA (H): ICD-10-CM

## 2022-12-20 DIAGNOSIS — E78.2 MIXED HYPERLIPIDEMIA: ICD-10-CM

## 2022-12-20 DIAGNOSIS — I10 ESSENTIAL HYPERTENSION: ICD-10-CM

## 2022-12-20 DIAGNOSIS — R42 DIZZINESS: ICD-10-CM

## 2022-12-20 DIAGNOSIS — E03.8 OTHER SPECIFIED HYPOTHYROIDISM: ICD-10-CM

## 2022-12-20 DIAGNOSIS — K21.9 GASTROESOPHAGEAL REFLUX DISEASE WITHOUT ESOPHAGITIS: ICD-10-CM

## 2022-12-20 DIAGNOSIS — E13.9 LADA (LATENT AUTOIMMUNE DIABETES IN ADULTS), MANAGED AS TYPE 1 (H): Primary | ICD-10-CM

## 2022-12-20 DIAGNOSIS — R35.0 URINARY FREQUENCY: ICD-10-CM

## 2022-12-20 DIAGNOSIS — E87.6 HYPOKALEMIA: ICD-10-CM

## 2022-12-20 DIAGNOSIS — I50.32 DIASTOLIC DYSFUNCTION WITH CHRONIC HEART FAILURE (H): ICD-10-CM

## 2022-12-20 LAB
ALBUMIN SERPL BCG-MCNC: 4.2 G/DL (ref 3.5–5.2)
ALP SERPL-CCNC: 41 U/L (ref 35–104)
ALT SERPL W P-5'-P-CCNC: 19 U/L (ref 10–35)
ANION GAP SERPL CALCULATED.3IONS-SCNC: 12 MMOL/L (ref 7–15)
AST SERPL W P-5'-P-CCNC: 26 U/L (ref 10–35)
BASOPHILS # BLD AUTO: 0.1 10E3/UL (ref 0–0.2)
BASOPHILS NFR BLD AUTO: 1 %
BILIRUB SERPL-MCNC: 1 MG/DL
BUN SERPL-MCNC: 21.7 MG/DL (ref 8–23)
CALCIUM SERPL-MCNC: 9.2 MG/DL (ref 8.8–10.2)
CHLORIDE SERPL-SCNC: 97 MMOL/L (ref 98–107)
CHOLEST SERPL-MCNC: 178 MG/DL
CREAT SERPL-MCNC: 0.84 MG/DL (ref 0.51–0.95)
CREAT UR-MCNC: 58.6 MG/DL
DEPRECATED HCO3 PLAS-SCNC: 26 MMOL/L (ref 22–29)
EOSINOPHIL # BLD AUTO: 0.4 10E3/UL (ref 0–0.7)
EOSINOPHIL NFR BLD AUTO: 4 %
ERYTHROCYTE [DISTWIDTH] IN BLOOD BY AUTOMATED COUNT: 12 % (ref 10–15)
EST. AVERAGE GLUCOSE BLD GHB EST-MCNC: 160 MG/DL
GFR SERPL CREATININE-BSD FRML MDRD: 72 ML/MIN/1.73M2
GLUCOSE SERPL-MCNC: 240 MG/DL (ref 70–99)
HBA1C MFR BLD: 7.2 %
HCT VFR BLD AUTO: 39.5 % (ref 35–47)
HDLC SERPL-MCNC: 63 MG/DL
HGB BLD-MCNC: 13 G/DL (ref 11.7–15.7)
IMM GRANULOCYTES # BLD: 0 10E3/UL
IMM GRANULOCYTES NFR BLD: 1 %
LDLC SERPL CALC-MCNC: 80 MG/DL
LYMPHOCYTES # BLD AUTO: 2.2 10E3/UL (ref 0.8–5.3)
LYMPHOCYTES NFR BLD AUTO: 28 %
MCH RBC QN AUTO: 30 PG (ref 26.5–33)
MCHC RBC AUTO-ENTMCNC: 32.9 G/DL (ref 31.5–36.5)
MCV RBC AUTO: 91 FL (ref 78–100)
MICROALBUMIN UR-MCNC: <12 MG/L
MICROALBUMIN/CREAT UR: NORMAL MG/G{CREAT}
MONOCYTES # BLD AUTO: 0.5 10E3/UL (ref 0–1.3)
MONOCYTES NFR BLD AUTO: 6 %
NEUTROPHILS # BLD AUTO: 4.9 10E3/UL (ref 1.6–8.3)
NEUTROPHILS NFR BLD AUTO: 60 %
NONHDLC SERPL-MCNC: 115 MG/DL
NRBC # BLD AUTO: 0 10E3/UL
NRBC BLD AUTO-RTO: 0 /100
PLATELET # BLD AUTO: 174 10E3/UL (ref 150–450)
POTASSIUM SERPL-SCNC: 4 MMOL/L (ref 3.4–5.3)
PROT SERPL-MCNC: 7.6 G/DL (ref 6.4–8.3)
RBC # BLD AUTO: 4.33 10E6/UL (ref 3.8–5.2)
SODIUM SERPL-SCNC: 135 MMOL/L (ref 136–145)
TRIGL SERPL-MCNC: 176 MG/DL
TSH SERPL DL<=0.005 MIU/L-ACNC: 2.41 UIU/ML (ref 0.3–4.2)
WBC # BLD AUTO: 8.1 10E3/UL (ref 4–11)

## 2022-12-20 PROCEDURE — 83036 HEMOGLOBIN GLYCOSYLATED A1C: CPT | Mod: ZL | Performed by: NURSE PRACTITIONER

## 2022-12-20 PROCEDURE — 0124A COVID-19 VACCINE BIVALENT BOOSTER 12+ (PFIZER): CPT

## 2022-12-20 PROCEDURE — 82043 UR ALBUMIN QUANTITATIVE: CPT | Mod: ZL | Performed by: NURSE PRACTITIONER

## 2022-12-20 PROCEDURE — 84443 ASSAY THYROID STIM HORMONE: CPT | Mod: ZL | Performed by: NURSE PRACTITIONER

## 2022-12-20 PROCEDURE — 36415 COLL VENOUS BLD VENIPUNCTURE: CPT | Mod: ZL | Performed by: NURSE PRACTITIONER

## 2022-12-20 PROCEDURE — G0463 HOSPITAL OUTPT CLINIC VISIT: HCPCS

## 2022-12-20 PROCEDURE — 82570 ASSAY OF URINE CREATININE: CPT | Mod: ZL | Performed by: NURSE PRACTITIONER

## 2022-12-20 PROCEDURE — 80061 LIPID PANEL: CPT | Mod: ZL | Performed by: NURSE PRACTITIONER

## 2022-12-20 PROCEDURE — G0463 HOSPITAL OUTPT CLINIC VISIT: HCPCS | Mod: 25

## 2022-12-20 PROCEDURE — 80053 COMPREHEN METABOLIC PANEL: CPT | Mod: ZL | Performed by: NURSE PRACTITIONER

## 2022-12-20 PROCEDURE — 87086 URINE CULTURE/COLONY COUNT: CPT | Mod: ZL

## 2022-12-20 PROCEDURE — 99214 OFFICE O/P EST MOD 30 MIN: CPT | Performed by: NURSE PRACTITIONER

## 2022-12-20 PROCEDURE — 85025 COMPLETE CBC W/AUTO DIFF WBC: CPT | Mod: ZL | Performed by: NURSE PRACTITIONER

## 2022-12-20 RX ORDER — POTASSIUM CHLORIDE 1500 MG/1
TABLET, EXTENDED RELEASE ORAL
Qty: 180 TABLET | Refills: 0 | Status: SHIPPED | OUTPATIENT
Start: 2022-12-20 | End: 2023-06-01

## 2022-12-20 RX ORDER — RAMIPRIL 10 MG/1
10 CAPSULE ORAL 2 TIMES DAILY
Qty: 180 CAPSULE | Refills: 3 | Status: SHIPPED | OUTPATIENT
Start: 2022-12-20 | End: 2023-11-20

## 2022-12-20 RX ORDER — OMEPRAZOLE 40 MG/1
40 CAPSULE, DELAYED RELEASE ORAL DAILY
Qty: 90 CAPSULE | Refills: 0 | Status: SHIPPED | OUTPATIENT
Start: 2022-12-20 | End: 2023-05-31

## 2022-12-20 RX ORDER — LEVOTHYROXINE SODIUM 75 UG/1
75 TABLET ORAL DAILY
Qty: 90 TABLET | Refills: 3 | Status: SHIPPED | OUTPATIENT
Start: 2022-12-20 | End: 2023-03-29

## 2022-12-20 RX ORDER — FUROSEMIDE 40 MG
TABLET ORAL
Qty: 135 TABLET | Refills: 3 | Status: SHIPPED | OUTPATIENT
Start: 2022-12-20 | End: 2023-09-11

## 2022-12-20 RX ORDER — TIOTROPIUM BROMIDE INHALATION SPRAY 3.12 UG/1
SPRAY, METERED RESPIRATORY (INHALATION)
Qty: 4 G | Refills: 4 | Status: SHIPPED | OUTPATIENT
Start: 2022-12-20 | End: 2024-04-08

## 2022-12-20 RX ORDER — SIMVASTATIN 40 MG
40 TABLET ORAL AT BEDTIME
Qty: 90 TABLET | Refills: 1 | Status: SHIPPED | OUTPATIENT
Start: 2022-12-20 | End: 2023-08-31

## 2022-12-20 ASSESSMENT — PAIN SCALES - GENERAL: PAINLEVEL: NO PAIN (0)

## 2022-12-20 NOTE — PROGRESS NOTES
ORTHOSTATICS BLOOD PRESSURES ON RIGHT ARM    Sittin/76     P: 79    Standin/75     P: 88    Layin/82     P: 82      Tanya Joseph LPN

## 2022-12-22 LAB — BACTERIA UR CULT: NO GROWTH

## 2023-01-03 ENCOUNTER — HOSPITAL ENCOUNTER (OUTPATIENT)
Dept: CARDIOLOGY | Facility: HOSPITAL | Age: 78
Discharge: HOME | End: 2023-01-03
Attending: NURSE PRACTITIONER
Payer: MEDICARE

## 2023-01-03 ENCOUNTER — HOSPITAL ENCOUNTER (OUTPATIENT)
Dept: ULTRASOUND IMAGING | Facility: HOSPITAL | Age: 78
Discharge: HOME | End: 2023-01-03
Attending: NURSE PRACTITIONER
Payer: MEDICARE

## 2023-01-03 DIAGNOSIS — R42 DIZZINESS: ICD-10-CM

## 2023-01-03 PROCEDURE — 93246 EXT ECG>7D<15D RECORDING: CPT

## 2023-01-03 PROCEDURE — 93244 EXT ECG>48HR<7D REV&INTERPJ: CPT | Performed by: INTERNAL MEDICINE

## 2023-01-03 PROCEDURE — 93880 EXTRACRANIAL BILAT STUDY: CPT

## 2023-01-03 NOTE — PATIENT INSTRUCTIONS
Zio patch placed on patient in outpatient appointment today. Patient was instructed to wear patch for 14 days. Skin was prepped and patch was placed following [employer] guidelines .     Patient was instructed to push button when symptomatic and fill out diary. Patient was instructed not to submerse in water and no swimming, saunas, or hot tubs. Showers may be taken keeping back towards the water. If the area DOES become wet pat it dry with a cloth. If skin irritation occurs patient was instructed to remove patch and contact [employer].    Patient understands we do not receive results until they mail patch back inside box. Patient also instructed to contact [employer] with any questions [phone].    Patient had no further questions and agreed with plan. Patient was sent home with the box, information pamphlet and booklet to augie any incidents in.

## 2023-01-09 ENCOUNTER — TELEPHONE (OUTPATIENT)
Dept: FAMILY MEDICINE | Facility: OTHER | Age: 78
End: 2023-01-09

## 2023-01-09 NOTE — TELEPHONE ENCOUNTER
Pt called stating is having allergic reaction to the adhesive from the heart monitor; there were issues when it was placed, per pt states was not staying on and giving false readings. They sent her a new one she is wondering if there is another adhesive or something that can be used. States she is feeling good and if you feel the monitor is not needed she would be ok with that

## 2023-01-10 ENCOUNTER — MEDICAL CORRESPONDENCE (OUTPATIENT)
Dept: HEALTH INFORMATION MANAGEMENT | Facility: HOSPITAL | Age: 78
End: 2023-01-10

## 2023-01-30 ENCOUNTER — OFFICE VISIT (OUTPATIENT)
Dept: CARDIOLOGY | Facility: OTHER | Age: 78
End: 2023-01-30
Attending: INTERNAL MEDICINE
Payer: COMMERCIAL

## 2023-01-30 VITALS
HEART RATE: 70 BPM | DIASTOLIC BLOOD PRESSURE: 78 MMHG | TEMPERATURE: 98 F | BODY MASS INDEX: 25.52 KG/M2 | SYSTOLIC BLOOD PRESSURE: 185 MMHG | OXYGEN SATURATION: 98 % | HEIGHT: 63 IN | WEIGHT: 144 LBS

## 2023-01-30 DIAGNOSIS — I48.0 PAROXYSMAL ATRIAL FIBRILLATION (H): Primary | ICD-10-CM

## 2023-01-30 PROCEDURE — 99213 OFFICE O/P EST LOW 20 MIN: CPT | Performed by: INTERNAL MEDICINE

## 2023-01-30 PROCEDURE — G0463 HOSPITAL OUTPT CLINIC VISIT: HCPCS

## 2023-01-30 ASSESSMENT — PAIN SCALES - GENERAL: PAINLEVEL: NO PAIN (0)

## 2023-01-30 NOTE — PROGRESS NOTES
[facility]   CARDIOLOGY PROGRESS NOTE     Chief Complaint   Patient presents with     Follow Up          Diagnosis:  1.  FRANCO 2/2 diastolic CHF/minimal COPD/Asthma.  2.  Chest pain. Cardiac cath on 7/15/15-minimal dz.   3.  HTN-controlled.  4.  Prolonged QT.  5.  Asthma.  6.  Fatty liver on 11/7/2007.  7.  CHF-diastolic grade 1 on 1/19/15. Normal on 5/26/21. Lower ext edema with BNP of 661 on 5/5/21.   8.  DM-2-controlled.  9.  Hypothyroidism.  10.  Hyperlipidemia-controlled.  11.  Hypertriglyceridemia-uncontrolled.  12.  B12 deficiency at 391 on 5/5/21.   13.  COPD-minimal on 2/18/21.  14.  Elevated BNP at 582 on 8/28/20.  15.  Short runs of A. fib 1/3/2023.  Eliquis started on 1/30/2023.        Assessment/Plan:    1.  FRANCO: Secondary to asthma.  Improved greatly on Spiriva.  It is also helped that metoprolol has been removed.  Patient overall doing well.  2.  CAD: Resolved. Findings discussed. 10%/luminal irregularities in the LAD.  Rest of the vessels had no appreciable disease.  3.  Hypertension: Controlled.  Blood pressure at home has been in the 120/60-70's.  No changes.  4.  DM-2: Stable.  Managed by primary.  5.  Hyperlipidemia: Continue on Zocor 40 mg daily.  6.  A. fib: New on 1/3/2023.  Being that she has short runs.  It is likely that these will progress to longer runs.  Start Eliquis 5 mg twice a day.  7.  Follow-up on 8/23/2023 as planned.      Interval history:  Bo is being seen in follow-up at the request of her PCP.  He was having random episodes of dizziness and underwent an ultrasound of carotid arteries and a Zio patch.  Her Zio patch showed 10 runs of SVT lasting up to 12.8 seconds with a maximum 150 bpm.  She has runs of SVT which were suspicious for A. fib.  Findings discussed.  Short runs, are hard to know what to do.  They do not necessarily require anticoagulation but with time these short runs can become long runs which puts her at risk for stroke.  After discussion, she is  agreeable to starting Eliquis 5 mg twice a day.  She has been having some positional dizziness which I do not believe is related to A. fib.  She has no other issues or concerns.  She will follow-up in August as planned.      HPI:    Mrs. Acuna is being seen by cardiology for dyspnea exertion.  She has had this issue for many years.  She was seen by cardiology in 2015 and 2016 with work-up which included a cardiac catheterization.  She reports being dyspneic on exertion for most of her life.  Her symptoms are improved with inhalers.     Bo reports being dyspneic for many years.  Her shortness of breath has gotten worse over the last few months to years.  She states that the Spiriva has helped her shortness of breath significantly.  She does carry history of asthma.  She was seen by cardiology through [facility] in 2015 and 2016 for this very issue.  She reportedly had an angiogram in 2002 which was reportedly normal.  She had a repeat cath on 7/15/2015 [facility] with a 10% lesion to her LAD.  All remaining vessels were patent.  She carries a diagnosis of heart failure with preserved ejection fraction but more recently this issue was not identified.  Her cardiac catheterization 2015 also showed normal filling pressures.       She has noted that with vacuuming her house, half-way through, she will be so significantly short of breath that she will use a fan to improve her air hunger.  As mentioned, she has essentially had 2 normal cardiac catheterizations.  She states she would not be able to do stress testing secondary to the uncomfortable feeling it creates.  Her echo from 5/8/2009 showed diastolic dysfunction grade 1.  This was not identified on her most recent echo from 9/17/2020.  She did not have a significant valvular or chamber abnormalities.  She does carry history of rheumatoid arthritis but states she was told most recently that she has osteoarthritis and not RA.  A normal chest x-ray on  3/19/2021.  PFT's on 2/18/2021 showed minimal COPD.  She states she was exposed to secondhand smoke by her father at a young age.  She herself has never used tobacco.     I believe the top possibilities for her shortness of breath would be a history of asthma, the beta-blocker she is on, the possibility of a DVT/PE, concern for cirrhosis as she was noted to have a fatty liver on imaging from 11/7/2007, PE, diastolic dysfunction, hypothyroidism, and potentially rhythm issues. With the exception of a prolonged QT, EKG normal on 5/5/2021.     She also carries diagnosis of diabetes.  Her A1c most recently was 6.9%. She has hyperlipidemia which is well controlled on Zocor 40 mg daily.       She has a history of hypothyroidism.  She has been on levothyroxine 75 mcg.  Her last TSH was on 6/7/2019 and was normal at 1.17.     She also has a history of prolonged QTC.  She has hypertension which has been controlled.      Relevant testing:  V/Q scan on 5/7/21:  The ventilation study demonstrates mildly diminished lateral  ventilation particularly at the apices.  The perfusion study demonstrates normal symmetric perfusion without  small, moderate or large defect. A shoulder prosthesis results in mild artifact.    Echocardiogram 5/26/2021:  No pericardial effusion is present.  Global and regional left ventricular function is normal with an EF of 55-60%.  Left ventricular diastolic function is normal.  The right ventricle is normal size.  Global right ventricular function is normal.  Both atria appear normal.  Trace mitral insufficiency is present.  Aortic valve is normal in structure and function.  Trace tricuspid insufficiency is present.  Right ventricular systolic pressure is 8 mmHg above the right atrial pressure.  The pulmonic valve is normal.    Echocardiogram on 9/17/2020:  No pericardial effusion is present.  Global and regional left ventricular function is normal with an EF of 60-65%.  Left ventricular diastolic function  is normal.  The right ventricle is normal size.  Both atria appear normal.  Trace mitral insufficiency is present.  Aortic valve is normal in structure and function.  Trace tricuspid insufficiency is present.  Right ventricular systolic pressure is 13 mmHg above the right atrial pressure.  The pulmonic valve is normal.  The aorta root is normal.     Echo on 5/8/2009:   1. Normal left ventricular size with mild systolic functional impairment.    2. Evidence of grade 1 diastolic dysfunction.    3. Mild left atrial enlargement.    4. Trace physiologic amounts of mitral tricuspid and pulmonic insufficiency.    5. Trivial pericardial effusion.     Left heart cath on 7/15/2015 at [facility]:  DOMINANCE:  Right Dominant  LEFT MAIN:  is angiographically normal (0% Stenosis)  LEFT ANTERIOR DESCENDING :  Proximal Segment is angiographically normal (0% Stenosis)  rest of vessel has luminal irregularities (10% Stenosis) with distal pruning.  DIAGONAL 1:  is angiographically normal (0% Stenosis)  CIRCUMFLEX:  and its branches are angiographically normal (0% Stenosis)  RIGHT CORONARY ARTERY:  is angiographically normal (0% Stenosis)  COLLATERALS:  No collateral flow  Normal LVEDP        ICD-10-CM    1. Paroxysmal atrial fibrillation (H)  I48.0 apixaban ANTICOAGULANT (ELIQUIS ANTICOAGULANT) 5 MG tablet          Past Medical History:   Diagnosis Date     Congestive heart failure, unspecified      Diabetes (H)      H/O rheumatoid arthritis      HLD (hyperlipidemia)      HTN (hypertension)      Myocardial infarction (H)      Uncomplicated asthma        Past Surgical History:   Procedure Laterality Date     APPENDECTOMY OPEN CHILD       AS TOTAL KNEE ARTHROPLASTY Right      CHOLECYSTECTOMY       COLONOSCOPY - HIM SCAN N/A 03/12/2009    per Care Everywhere documentation per [facility].      HYSTERECTOMY TOTAL ABDOMINAL       MASTECTOMY, BILATERAL Bilateral 02/26/1992     SHOULDER SURGERY Right      SHOULDER SURGERY Left         Allergies   Allergen Reactions     Contrast Dye Difficulty breathing     Gadolinium Derivatives      Other reaction(s): Difficulty in swallowing, Laryngeal spasm  Patient had an injection into rt. Shoulder capsule prior to MRI arthrogram.  Contained multihance gadobenate, omnipaque iohexol, and buffered lidocaine.       Ammonia      Cory-1 [Lidocaine Hcl]      Novocaine allergy       Codeine Sulfate      Food      Shrimp     Fosamax [Alendronic Acid]      Dental infections     Iodine-131 Swelling     Ct dye     Lidocaine      Nitrous Oxide      No Clinical Screening - See Comments Nausea and Vomiting and Other (See Comments)     (3/6/09)- N/V and Heart racing     Novocain [Procaine]      Penicillins      Tramadol      Morphine Palpitations       Current Outpatient Medications   Medication Sig Dispense Refill     acetaminophen (TYLENOL) 325 MG tablet Take 650 mg by mouth       Acetone, Urine, Test (KETONE TEST) STRP Use as directed 50 strip 4     albuterol (PROAIR HFA/PROVENTIL HFA/VENTOLIN HFA) 108 (90 Base) MCG/ACT inhaler Inhale 2 puffs into the lungs every 6 hours 1 Inhaler 0     apixaban ANTICOAGULANT (ELIQUIS ANTICOAGULANT) 5 MG tablet Take 1 tablet (5 mg) by mouth 2 times daily 60 tablet 11     blood glucose (CONTOUR NEXT TEST) test strip Use 6 times a day with the insulin pump to help manage your diabetes 200 strip 0     blood glucose (NO BRAND SPECIFIED) test strip by In Vitro route 4 times daily Use to test blood sugar 4 times daily or as directed.       Calcium-Vitamin D-Vitamin K (VIACTIV PO) Take 2 chew tab by mouth every morning       Continuous Blood Gluc  (DEXCOM G6 ) XX DIMITRIS 1 each continuous 1 Device 0     Continuous Blood Gluc Sensor (DEXCOM G6 SENSOR) MISC 1 each continuous To be changed every 10 days 3 each 5     Continuous Blood Gluc Transmit (DEXCOM G6 TRANSMITTER) MISC 1 each continuous To be changed every 90 days 1 each 1     diphenhydrAMINE (BENADRYL) 50 MG capsule  Administer 30 min - 2 hours pre - IV contrast injection 1 capsule 0     furosemide (LASIX) 40 MG tablet TAKE 1 TABLET BY MOUTH ONCE DAILY IN THE MORNING AND 1/2 (ONE-HALF) TABLET IN AFTERNOON 135 tablet 3     glucagon 1 MG kit Inject 1 mg Subcutaneous once 1 mg 12     Insulin Aspart (INSULIN PUMP - OUTPATIENT)        insulin lispro (HUMALOG VIAL) 100 UNIT/ML vial To be used with the insulin pump TDD: 80 units 30 mL 3     INSULIN PUMP - OUTPATIENT Insulin pump settings: Updated: 11/17/22 Insulin pump: Minimed 630G Basal: 0000 0.9 (new) 0200 0.85 (new) 0500 0.825 (new) 0900 1 1200 1.1 2200 1  Total: 23.65 CR: 9 ISF:  0000 60 (new) 0700 55 (new)  Target: 100-140 Active insulin: 4       levothyroxine (SYNTHROID/LEVOTHROID) 75 MCG tablet Take 1 tablet (75 mcg) by mouth daily 90 tablet 3     mupirocin (BACTROBAN) 2 % external cream Apply topically 3 times daily 60 g 0     omeprazole (PRILOSEC) 40 MG DR capsule Take 1 capsule (40 mg) by mouth daily 90 capsule 0     potassium chloride ER (KLOR-CON M) 20 MEQ CR tablet TAKE 1  BY MOUTH TWICE DAILY Strength: 20 mEq 180 tablet 0     ramipril (ALTACE) 10 MG capsule Take 1 capsule (10 mg) by mouth 2 times daily 180 capsule 3     senna-docusate (SENOKOT-S;PERICOLACE) 8.6-50 MG per tablet Take 1 tablet by mouth At Bedtime        simvastatin (ZOCOR) 40 MG tablet Take 1 tablet (40 mg) by mouth At Bedtime 90 tablet 1     tamoxifen (NOLVADEX) 20 MG tablet Take 20 mg by mouth every morning       tiotropium (SPIRIVA RESPIMAT) 2.5 MCG/ACT inhaler INHALE 2 PUFFS BY MOUTH ONCE DAILY Strength: 2.5 MCG/ACT 4 g 4     tiotropium (SPIRIVA) 18 MCG inhaled capsule Inhale 2 puffs into the lungs every morning       VITAMIN D, CHOLECALCIFEROL, PO Take 5,000 Units by mouth daily         Social History     Socioeconomic History     Marital status:      Spouse name: Not on file     Number of children: 2     Years of education: Not on file     Highest education level: Not on file   Occupational  History     Occupation: [employer]     Employer: RETIRED   Tobacco Use     Smoking status: Never     Smokeless tobacco: Never   Substance and Sexual Activity     Alcohol use: No     Alcohol/week: 0.0 standard drinks     Drug use: No     Sexual activity: Not on file   Other Topics Concern     Parent/sibling w/ CABG, MI or angioplasty before 65F 55M? Not Asked   Social History Narrative     Not on file     Social Determinants of Health     Financial Resource Strain: Not on file   Food Insecurity: Not on file   Transportation Needs: Not on file   Physical Activity: Not on file   Stress: Not on file   Social Connections: Not on file   Intimate Partner Violence: Not on file   Housing Stability: Not on file       LAB RESULTS:   No visits with results within 2 Month(s) from this visit.   Latest known visit with results is:   Office Visit on 05/05/2021   Component Date Value Ref Range Status     Folate 05/05/2021 >100.0  >5.4 ng/mL Final     pH Arterial 05/05/2021 7.49* 7.35 - 7.45 pH Final     pCO2 Arterial 05/05/2021 37  35 - 45 mm Hg Final     pO2 Arterial 05/05/2021 96  80 - 105 mm Hg Final     Bicarbonate Arterial 05/05/2021 28  21 - 28 mmol/L Final     FIO2 05/05/2021 Unknown   Final     Oxyhemoglobin Arterial 05/05/2021 97  92 - 100 % Final     Base Excess Art 05/05/2021 4.4  mmol/L Final     Vitamin D Deficiency screening 05/05/2021 94* 20 - 75 ug/L Final     Vitamin B12 05/05/2021 391  193 - 986 pg/mL Final     TSH 05/05/2021 2.04  0.40 - 4.00 mU/L Final     Troponin I ES 05/05/2021 <0.015  0.000 - 0.045 ug/L Final     N-Terminal Pro Bnp 05/05/2021 661* 0 - 125 pg/mL Final     RIOS interpretation 05/05/2021 Negative  NEG^Negative Final     GGT 05/05/2021 14  0 - 40 U/L Final     D Dimer 05/05/2021 0.5  0.0 - 0.50 ug/ml FEU Final     CRP Inflammation 05/05/2021 <2.9  0.0 - 8.0 mg/L Final     WBC 05/05/2021 6.7  4.0 - 11.0 10e9/L Final     RBC Count 05/05/2021 4.17  3.8 - 5.2 10e12/L Final     Hemoglobin 05/05/2021  12.7  11.7 - 15.7 g/dL Final     Hematocrit 05/05/2021 38.5  35.0 - 47.0 % Final     MCV 05/05/2021 92  78 - 100 fl Final     MCH 05/05/2021 30.5  26.5 - 33.0 pg Final     MCHC 05/05/2021 33.0  31.5 - 36.5 g/dL Final     RDW 05/05/2021 12.0  10.0 - 15.0 % Final     Platelet Count 05/05/2021 146* 150 - 450 10e9/L Final     Diff Method 05/05/2021 Automated Method   Final     % Neutrophils 05/05/2021 61.4  % Final     % Lymphocytes 05/05/2021 26.4  % Final     % Monocytes 05/05/2021 7.0  % Final     % Eosinophils 05/05/2021 4.3  % Final     % Basophils 05/05/2021 0.3  % Final     % Immature Granulocytes 05/05/2021 0.6  % Final     Nucleated RBCs 05/05/2021 0  0 /100 Final     Absolute Neutrophil 05/05/2021 4.1  1.6 - 8.3 10e9/L Final     Absolute Lymphocytes 05/05/2021 1.8  0.8 - 5.3 10e9/L Final     Absolute Monocytes 05/05/2021 0.5  0.0 - 1.3 10e9/L Final     Absolute Eosinophils 05/05/2021 0.3  0.0 - 0.7 10e9/L Final     Absolute Basophils 05/05/2021 0.0  0.0 - 0.2 10e9/L Final     Abs Immature Granulocytes 05/05/2021 0.0  0 - 0.4 10e9/L Final     Absolute Nucleated RBC 05/05/2021 0.0   Final     Sodium 05/05/2021 137  133 - 144 mmol/L Final     Potassium 05/05/2021 3.5  3.4 - 5.3 mmol/L Final     Chloride 05/05/2021 101  94 - 109 mmol/L Final     Carbon Dioxide 05/05/2021 30  20 - 32 mmol/L Final     Anion Gap 05/05/2021 6  3 - 14 mmol/L Final     Glucose 05/05/2021 146* 70 - 99 mg/dL Final     Urea Nitrogen 05/05/2021 13  7 - 30 mg/dL Final     Creatinine 05/05/2021 0.76  0.52 - 1.04 mg/dL Final     GFR Estimate 05/05/2021 77  >60 mL/min/[1.73_m2] Final     GFR Estimate If Black 05/05/2021 90  >60 mL/min/[1.73_m2] Final     Calcium 05/05/2021 8.9  8.5 - 10.1 mg/dL Final     Bilirubin Total 05/05/2021 0.8  0.2 - 1.3 mg/dL Final     Albumin 05/05/2021 3.8  3.4 - 5.0 g/dL Final     Protein Total 05/05/2021 8.0  6.8 - 8.8 g/dL Final     Alkaline Phosphatase 05/05/2021 35* 40 - 150 U/L Final     ALT 05/05/2021 23  0 -  "50 U/L Final     AST 05/05/2021 15  0 - 45 U/L Final     Cholesterol 05/05/2021 135  <200 mg/dL Final     Triglycerides 05/05/2021 168* <150 mg/dL Final     HDL Cholesterol 05/05/2021 52  >49 mg/dL Final     LDL Cholesterol Calculated 05/05/2021 49  <100 mg/dL Final     Non HDL Cholesterol 05/05/2021 83  <130 mg/dL Final        Review of systems: Negative except that which was noted in the HPI.    Physical examination:  BP (!) 185/78   Pulse 70   Temp 98  F (36.7  C) (Tympanic)   Ht 1.6 m (5' 3\")   Wt 65.3 kg (144 lb)   SpO2 98%   BMI 25.51 kg/m      GENERAL APPEARANCE: healthy, alert and no distress  HEENT: no icterus, no xanthelasmas, normal pupil size and reaction, no cyanosis.  NECK: no adenopathy, no asymmetry, masses.  CHEST: lungs clear to auscultation - no rales, rhonchi or wheezes, no use of accessory muscles, no retractions, respirations are unlabored, normal respiratory rate  CARDIOVASCULAR: regular rhythm, normal S1 with physiologic split S2, no S3 or S4 and no murmur, click or rub  EXTREMITIES: no clubbing, cyanosis or edema  NEURO: alert and oriented normal speech, and affect  VASC: No vascular bruits heard.  SKIN: no ecchymoses, no rashes.    Total time spent on day of visit, including review of tests, obtaining/reviewing separately obtained history, ordering medications/tests/procedures, communicating with PCP/consultants, and documenting in electronic medical record: 35 minutes.           Thank you for allowing me to participate in the care of your patient. Please do not hesitate to contact me if you have any questions.     Jin Corea, DO          "

## 2023-01-30 NOTE — PATIENT INSTRUCTIONS
Thank you for allowing Dr. Corea and our  team to participate in your care. Please call our office at [phone] with scheduling questions or if you need to cancel or change your appointment. With any other questions or concerns you may call [facility] cardiology nurse at [phone].       If you experience chest pain, chest pressure, chest tightness, shortness of breath, fainting, lightheadedness, nausea, vomiting, or other concerning symptoms, please report to the Emergency Department or call 911. These symptoms may be emergent, and best treated in the Emergency Department.      Start apixaban ANTICOAGULANT (ELIQUIS ANTICOAGULANT) 5 MG tablet twice daily.

## 2023-02-01 DIAGNOSIS — E13.9 LADA (LATENT AUTOIMMUNE DIABETES IN ADULTS), MANAGED AS TYPE 1 (H): ICD-10-CM

## 2023-02-01 RX ORDER — PROCHLORPERAZINE 25 MG/1
1 SUPPOSITORY RECTAL CONTINUOUS
Qty: 3 EACH | Refills: 5 | Status: SHIPPED | OUTPATIENT
Start: 2023-02-01 | End: 2023-08-03

## 2023-02-01 NOTE — TELEPHONE ENCOUNTER
Dexcom      Last Written Prescription Date:  08/04/22  Last Fill Quantity: 3,   # refills: 5  Last Office Visit: 11/17/22  Future Office visit:    Next 5 appointments (look out 90 days)    Mar 21, 2023  2:50 PM  (Arrive by 2:35 PM)  Office Visit with Nicolasa Guillen NP  [facility] [place] ([facility] [place] ) [address]  [place] [address]  [phone]

## 2023-02-07 ENCOUNTER — TELEPHONE (OUTPATIENT)
Dept: FAMILY MEDICINE | Facility: OTHER | Age: 78
End: 2023-02-07

## 2023-02-07 NOTE — TELEPHONE ENCOUNTER
Patient calling regarding eliquis.    Patient reports starting eliquis on 2/1 and has the following symptoms: shortness of breath with talking, rest and exertion, very tired, and dizzy.    Patient reports dizziness has been present prior starting eliquis.    Patient is wanting to know if these symptoms are side effects to medication.  Please advise, thank you.

## 2023-02-07 NOTE — TELEPHONE ENCOUNTER
Jin Corea, DO  You 33 minutes ago (2:19 PM)     DB  It is unlikely that the Eliquis is causing her symptoms.  It is more likely that she is in A-fib.  It may not be a bad idea to see if she was willing to come in and have a EKG to see if she truly is in A-fib or not.  I worry about discontinuing or holding the Eliquis as it would put her at high risk for stroke.  We could try different medication such as Xarelto to see if the symptoms go away but I am still concerned that her symptoms related to A-fib and not the medication.  Let me know if there are other thoughts or concerns.     Dr. Dickey

## 2023-02-07 NOTE — TELEPHONE ENCOUNTER
Patient willing to come in for EKG and is scheduled tomorrow morning.  Order pended for approval.   Thank you.

## 2023-02-08 ENCOUNTER — ALLIED HEALTH/NURSE VISIT (OUTPATIENT)
Dept: CARDIOLOGY | Facility: OTHER | Age: 78
End: 2023-02-08
Attending: NURSE PRACTITIONER
Payer: COMMERCIAL

## 2023-02-08 VITALS — SYSTOLIC BLOOD PRESSURE: 122 MMHG | DIASTOLIC BLOOD PRESSURE: 58 MMHG

## 2023-02-08 DIAGNOSIS — I50.32 DIASTOLIC DYSFUNCTION WITH CHRONIC HEART FAILURE (H): ICD-10-CM

## 2023-02-08 DIAGNOSIS — E03.8 OTHER SPECIFIED HYPOTHYROIDISM: ICD-10-CM

## 2023-02-08 DIAGNOSIS — E87.6 HYPOKALEMIA: ICD-10-CM

## 2023-02-08 DIAGNOSIS — J44.9 COPD, MILD (H): Primary | ICD-10-CM

## 2023-02-08 DIAGNOSIS — E78.2 MIXED HYPERLIPIDEMIA: ICD-10-CM

## 2023-02-08 DIAGNOSIS — I48.0 PAROXYSMAL ATRIAL FIBRILLATION (H): Primary | ICD-10-CM

## 2023-02-08 DIAGNOSIS — R06.09 DOE (DYSPNEA ON EXERTION): ICD-10-CM

## 2023-02-08 PROCEDURE — 99207 PR NO CHARGE NURSE ONLY: CPT

## 2023-02-08 NOTE — PROGRESS NOTES
Outreach to patient regarding message below.   Patient verbalized understanding. Appt made for Monday 2-13-23 with chest xray and labs prior to discuss SOB and stress test.

## 2023-02-08 NOTE — PROGRESS NOTES
Patient here for EKG per Dr. Corea and triage notes from yesterday 2/7/23.     Per Dr. Corea patient not in a-fib.     She reports that she has shortness of breath all the time, not feeling any palpitations right now.   states she walks with her walker about 4 hours a day and is fine but she becomes short of breath when she is walking and talking at the same time.     Patient advised that Dr. Corea will review chart/EKG/symtpoms and she will be called with any recommendations.     Verbalized understanding.

## 2023-02-08 NOTE — PROGRESS NOTES
Jin Corea, DO  Twa, Lamar BIRD, RN  Lets try to schedule her for a follow-up.  I have also ordered a chest x-ray, echocardiogram, and labs.  If she is not having any chest pain or chest tightness, she should have a stress test.  She can either do the labs and chest x-ray prior to her follow-up appointment or she can come in at her convenience to have these done.     Dr. Corea

## 2023-02-10 NOTE — PROGRESS NOTES
[facility] HEART [facility]   CARDIOLOGY PROGRESS NOTE     Chief Complaint   Patient presents with     Follow Up          Diagnosis:  1.  FRANCO 2/2 diastolic CHF/minimal COPD/Asthma.  2.  Chest pain. Cardiac cath on 7/15/15-minimal dz.   3.  HTN-controlled.  4.  Prolonged QT.  5.  Asthma.  6.  Fatty liver on 11/7/2007.  7.  CHF-diastolic grade 1 on 1/19/15. Normal on 5/26/21. Lower ext edema with BNP of 661 on 5/5/21.   8.  DM-2-controlled.  9.  Hypothyroidism.  10.  Hyperlipidemia-controlled.  11.  Hypertriglyceridemia-uncontrolled.  12.  B12 deficiency at 391 on 5/5/21.   13.  COPD-minimal on 2/18/21.  14.  Elevated BNP at 582 on 8/28/20.  15.  Short runs of A. fib 1/3/2023.  Eliquis started on 1/30/2023.        Assessment/Plan:    1.  FRANCO: Secondary to asthma.  She has been struggling.  Has been taking albuterol inconsistently.  She has concerned about cost of a long-acting beta agonist/steroid.  Will be given a prescription for prednisone 20 mg for 5 days.  I will refill her albuterol.  She will be given a prescription for Symbicort in hopes that this is covered by her insurance.  She will need to follow-up with her primary related to her shortness of breath   2.  CAD: Resolved. Findings discussed. 10%/luminal irregularities in the LAD.  Rest of the vessels had no appreciable disease.  3.  Hypertension: Uncontrolled.  Blood pressure today 175/80.  I believe her blood pressure is elevated secondary to respiratory issues as its been controlled in the past.  No changes.  4.  DM-2: Stable.  Managed by primary.  5.  Hyperlipidemia: Continue on Zocor 40 mg daily.  6.  A. fib: New on 1/3/2023.  Being that she has short runs.  It is likely that these will progress to longer runs.  Continue Eliquis.  Would consider diltiazem or metoprolol in the future history of asthma.  7.  Follow-up in 6 weeks or sooner if issues.      Interval history:  Bo is being seen in follow-up.  She continues to be dyspneic on exertion.  She had  contacted the clinic.  I believe her shortness of breath is from asthma versus COPD.  She has rare A-fib and I do not believe this is the cause.  Also, diastolic dysfunction is in the differential.  BMP was normal today.  She did have labs done today.  She has been using Spiriva and occasional albuterol secondary to cost.  She states she cannot use albuterol as she is too short of breath to use inhaler.  I suggested a short course of steroids, refill of the albuterol, and a long-acting corticosteroid with beta agonist.  I sent a prescription for Symbicort.  She is concerned about cost.  I suggest that she follow-up with her primary.  She is due for an echocardiogram.  Reviewed chest x-ray today which is unremarkable.  We also reviewed her labs today.      HPI:    Mrs. Acuna is being seen by cardiology for dyspnea exertion.  She has had this issue for many years.  She was seen by cardiology in 2015 and 2016 with work-up which included a cardiac catheterization.  She reports being dyspneic on exertion for most of her life.  Her symptoms are improved with inhalers.     Bo reports being dyspneic for many years.  Her shortness of breath has gotten worse over the last few months to years.  She states that the Spiriva has helped her shortness of breath significantly.  She does carry history of asthma.  She was seen by cardiology through [facility] in 2015 and 2016 for this very issue.  She reportedly had an angiogram in 2002 which was reportedly normal.  She had a repeat cath on 7/15/2015 [facility] with a 10% lesion to her LAD.  All remaining vessels were patent.  She carries a diagnosis of heart failure with preserved ejection fraction but more recently this issue was not identified.  Her cardiac catheterization 2015 also showed normal filling pressures.       She has noted that with vacuuming her house, intermediate through, she will be so significantly short of breath that she will use a fan to improve her air  hunger.  As mentioned, she has essentially had 2 normal cardiac catheterizations.  She states she would not be able to do stress testing secondary to the uncomfortable feeling it creates.  Her echo from 5/8/2009 showed diastolic dysfunction grade 1.  This was not identified on her most recent echo from 9/17/2020.  She did not have a significant valvular or chamber abnormalities.  She does carry history of rheumatoid arthritis but states she was told most recently that she has osteoarthritis and not RA.  A normal chest x-ray on 3/19/2021.  PFT's on 2/18/2021 showed minimal COPD.  She states she was exposed to secondhand smoke by her father at a young age.  She herself has never used tobacco.     I believe the top possibilities for her shortness of breath would be a history of asthma, the beta-blocker she is on, the possibility of a DVT/PE, concern for cirrhosis as she was noted to have a fatty liver on imaging from 11/7/2007, PE, diastolic dysfunction, hypothyroidism, and potentially rhythm issues. With the exception of a prolonged QT, EKG normal on 5/5/2021.     She also carries diagnosis of diabetes.  Her A1c most recently was 6.9%. She has hyperlipidemia which is well controlled on Zocor 40 mg daily.       She has a history of hypothyroidism.  She has been on levothyroxine 75 mcg.  Her last TSH was on 6/7/2019 and was normal at 1.17.     She also has a history of prolonged QTC.  She has hypertension which has been controlled.      Relevant testing:  Zio patch on 1/3/2023:  Worn for 3 days and 7 hr's.  After removing artifact, total time was 2 days and 22 hr's. Placed on 1/3/2023 at 2:27 PM and completed on 1/6/2023 at 9:13 PM.  Underlying rhythm was sinus.  Hrt rate ranged from 50 bpm, maximum heart rate of 150 bmp, averaging 73 bmp.  No significant bradycardia, pauses, Mobitz type II or 3rd degree heart block.  No atrial fibrillation on this study.  x3 triggered events and x2 diary entries.  These corresponded to  "SVE, VE, and sinus rhythm.  x0 runs of VT.    x10 runs of SVT lasting up to 12.8 sec's with a maximum heart rate of 150 bmp.  These short bursts of \"SVT\" are suspicious for A. fib  Rare, <1% of PAC's, atrial couplets, atrial triplets, ventricular couplets, and ventricular triplets.  PVC's at 1.6%.  + episodes of ventricular bigeminy lasting up to 4.6 sec's.  + episodes of ventricular trigeminy lasting up to 20.5 sec's.    US carotid arteries on 1/3/2023:  No hemodynamically significant stenoses are identified at  either carotid bifurcation    V/Q scan on 5/7/21:  The ventilation study demonstrates mildly diminished lateral  ventilation particularly at the apices.  The perfusion study demonstrates normal symmetric perfusion without  small, moderate or large defect. A shoulder prosthesis results in mild artifact.    Echocardiogram 5/26/2021:  No pericardial effusion is present.  Global and regional left ventricular function is normal with an EF of 55-60%.  Left ventricular diastolic function is normal.  The right ventricle is normal size.  Global right ventricular function is normal.  Both atria appear normal.  Trace mitral insufficiency is present.  Aortic valve is normal in structure and function.  Trace tricuspid insufficiency is present.  Right ventricular systolic pressure is 8 mmHg above the right atrial pressure.  The pulmonic valve is normal.    Echocardiogram on 9/17/2020:  No pericardial effusion is present.  Global and regional left ventricular function is normal with an EF of 60-65%.  Left ventricular diastolic function is normal.  The right ventricle is normal size.  Both atria appear normal.  Trace mitral insufficiency is present.  Aortic valve is normal in structure and function.  Trace tricuspid insufficiency is present.  Right ventricular systolic pressure is 13 mmHg above the right atrial pressure.  The pulmonic valve is normal.  The aorta root is normal.     Echo on 5/8/2009:   1. Normal left " ventricular size with mild systolic functional impairment.    2. Evidence of grade 1 diastolic dysfunction.    3. Mild left atrial enlargement.    4. Trace physiologic amounts of mitral tricuspid and pulmonic insufficiency.    5. Trivial pericardial effusion.     Left heart cath on 7/15/2015 at [facility]:  DOMINANCE:  Right Dominant  LEFT MAIN:  is angiographically normal (0% Stenosis)  LEFT ANTERIOR DESCENDING :  Proximal Segment is angiographically normal (0% Stenosis)  rest of vessel has luminal irregularities (10% Stenosis) with distal pruning.  DIAGONAL 1:  is angiographically normal (0% Stenosis)  CIRCUMFLEX:  and its branches are angiographically normal (0% Stenosis)  RIGHT CORONARY ARTERY:  is angiographically normal (0% Stenosis)  COLLATERALS:  No collateral flow  Normal LVEDP        ICD-10-CM    1. COPD, mild (H)  J44.9 predniSONE (DELTASONE) 20 MG tablet     albuterol (PROAIR HFA/PROVENTIL HFA/VENTOLIN HFA) 108 (90 Base) MCG/ACT inhaler     budesonide-formoterol (SYMBICORT) 160-4.5 MCG/ACT Inhaler      2. Pulmonary emphysema, unspecified emphysema type (H)  J43.9 predniSONE (DELTASONE) 20 MG tablet     albuterol (PROAIR HFA/PROVENTIL HFA/VENTOLIN HFA) 108 (90 Base) MCG/ACT inhaler     budesonide-formoterol (SYMBICORT) 160-4.5 MCG/ACT Inhaler      3. Asthma, unspecified asthma severity, unspecified whether complicated, unspecified whether persistent  J45.909 predniSONE (DELTASONE) 20 MG tablet     albuterol (PROAIR HFA/PROVENTIL HFA/VENTOLIN HFA) 108 (90 Base) MCG/ACT inhaler     budesonide-formoterol (SYMBICORT) 160-4.5 MCG/ACT Inhaler          Past Medical History:   Diagnosis Date     Congestive heart failure, unspecified      Diabetes (H)      H/O rheumatoid arthritis      HLD (hyperlipidemia)      HTN (hypertension)      Myocardial infarction (H)      Uncomplicated asthma        Past Surgical History:   Procedure Laterality Date     APPENDECTOMY OPEN CHILD       AS TOTAL KNEE ARTHROPLASTY Right       CHOLECYSTECTOMY       COLONOSCOPY - HIM SCAN N/A 03/12/2009    per [facility] Everywhere documentation per [facility].      HYSTERECTOMY TOTAL ABDOMINAL       MASTECTOMY, BILATERAL Bilateral 02/26/1992     SHOULDER SURGERY Right      SHOULDER SURGERY Left        Allergies   Allergen Reactions     Contrast Dye Difficulty breathing     Gadolinium Derivatives      Other reaction(s): Difficulty in swallowing, Laryngeal spasm  Patient had an injection into rt. Shoulder capsule prior to MRI arthrogram.  Contained multihance gadobenate, omnipaque iohexol, and buffered lidocaine.       Ammonia      Cory-1 [Lidocaine Hcl]      Novocaine allergy       Codeine Sulfate      Food      Shrimp     Fosamax [Alendronic Acid]      Dental infections     Iodine-131 Swelling     Ct dye     Lidocaine      Nitrous Oxide      No Clinical Screening - See Comments Nausea and Vomiting and Other (See Comments)     (3/6/09)- N/V and Heart racing     Novocain [Procaine]      Penicillins      Tramadol      Morphine Palpitations       Current Outpatient Medications   Medication Sig Dispense Refill     acetaminophen (TYLENOL) 325 MG tablet Take 650 mg by mouth       Acetone, Urine, Test (KETONE TEST) STRP Use as directed 50 strip 4     albuterol (PROAIR HFA/PROVENTIL HFA/VENTOLIN HFA) 108 (90 Base) MCG/ACT inhaler Inhale 1-2 puffs into the lungs every 4 hours as needed for shortness of breath, wheezing or cough 18 g 3     apixaban ANTICOAGULANT (ELIQUIS ANTICOAGULANT) 5 MG tablet Take 1 tablet (5 mg) by mouth 2 times daily 60 tablet 11     blood glucose (CONTOUR NEXT TEST) test strip Use 6 times a day with the insulin pump to help manage your diabetes 200 strip 0     blood glucose (NO BRAND SPECIFIED) test strip by In Vitro route 4 times daily Use to test blood sugar 4 times daily or as directed.       budesonide-formoterol (SYMBICORT) 160-4.5 MCG/ACT Inhaler Inhale 2 puffs into the lungs 2 times daily 10.2 g 4     Calcium-Vitamin D-Vitamin K  (VIACTIV PO) Take 2 chew tab by mouth every morning       Continuous Blood Gluc  (DEXCOM G6 ) XX DIMITRIS 1 each continuous 1 Device 0     Continuous Blood Gluc Sensor (DEXCOM G6 SENSOR) MISC 1 each continuous To be changed every 10 days 3 each 5     Continuous Blood Gluc Transmit (DEXCOM G6 TRANSMITTER) MISC 1 each continuous To be changed every 90 days 1 each 1     diphenhydrAMINE (BENADRYL) 50 MG capsule Administer 30 min - 2 hours pre - IV contrast injection 1 capsule 0     furosemide (LASIX) 40 MG tablet TAKE 1 TABLET BY MOUTH ONCE DAILY IN THE MORNING AND 1/2 (ONE-HALF) TABLET IN AFTERNOON 135 tablet 3     glucagon 1 MG kit Inject 1 mg Subcutaneous once 1 mg 12     Insulin Aspart (INSULIN PUMP - OUTPATIENT)        insulin lispro (HUMALOG VIAL) 100 UNIT/ML vial To be used with the insulin pump TDD: 80 units 30 mL 3     INSULIN PUMP - OUTPATIENT Insulin pump settings: Updated: 11/17/22 Insulin pump: Minimed 630G Basal: 0000 0.9 (new) 0200 0.85 (new) 0500 0.825 (new) 0900 1 1200 1.1 2200 1  Total: 23.65 CR: 9 ISF:  0000 60 (new) 0700 55 (new)  Target: 100-140 Active insulin: 4       levothyroxine (SYNTHROID/LEVOTHROID) 75 MCG tablet Take 1 tablet (75 mcg) by mouth daily 90 tablet 3     mupirocin (BACTROBAN) 2 % external cream Apply topically 3 times daily 60 g 0     omeprazole (PRILOSEC) 40 MG DR capsule Take 1 capsule (40 mg) by mouth daily 90 capsule 0     potassium chloride ER (KLOR-CON M) 20 MEQ CR tablet TAKE 1  BY MOUTH TWICE DAILY Strength: 20 mEq 180 tablet 0     predniSONE (DELTASONE) 20 MG tablet Take 1 tablet (20 mg) by mouth daily 5 tablet 0     ramipril (ALTACE) 10 MG capsule Take 1 capsule (10 mg) by mouth 2 times daily 180 capsule 3     senna-docusate (SENOKOT-S;PERICOLACE) 8.6-50 MG per tablet Take 1 tablet by mouth At Bedtime        simvastatin (ZOCOR) 40 MG tablet Take 1 tablet (40 mg) by mouth At Bedtime 90 tablet 1     tamoxifen (NOLVADEX) 20 MG tablet Take 20 mg by mouth every  morning       tiotropium (SPIRIVA RESPIMAT) 2.5 MCG/ACT inhaler INHALE 2 PUFFS BY MOUTH ONCE DAILY Strength: 2.5 MCG/ACT 4 g 4     tiotropium (SPIRIVA) 18 MCG inhaled capsule Inhale 2 puffs into the lungs every morning       VITAMIN D, CHOLECALCIFEROL, PO Take 5,000 Units by mouth daily         Social History     Socioeconomic History     Marital status:      Spouse name: Not on file     Number of children: 2     Years of education: Not on file     Highest education level: Not on file   Occupational History     Occupation: [employer]     Employer: RETIRED   Tobacco Use     Smoking status: Never     Smokeless tobacco: Never   Substance and Sexual Activity     Alcohol use: No     Alcohol/week: 0.0 standard drinks     Drug use: No     Sexual activity: Not on file   Other Topics Concern     Parent/sibling w/ CABG, MI or angioplasty before 65F 55M? Not Asked   Social History Narrative     Not on file     Social Determinants of Health     Financial Resource Strain: Not on file   Food Insecurity: Not on file   Transportation Needs: Not on file   Physical Activity: Not on file   Stress: Not on file   Social Connections: Not on file   Intimate Partner Violence: Not on file   Housing Stability: Not on file       LAB RESULTS:   No visits with results within 2 Month(s) from this visit.   Latest known visit with results is:   Office Visit on 05/05/2021   Component Date Value Ref Range Status     Folate 05/05/2021 >100.0  >5.4 ng/mL Final     pH Arterial 05/05/2021 7.49* 7.35 - 7.45 pH Final     pCO2 Arterial 05/05/2021 37  35 - 45 mm Hg Final     pO2 Arterial 05/05/2021 96  80 - 105 mm Hg Final     Bicarbonate Arterial 05/05/2021 28  21 - 28 mmol/L Final     FIO2 05/05/2021 Unknown   Final     Oxyhemoglobin Arterial 05/05/2021 97  92 - 100 % Final     Base Excess Art 05/05/2021 4.4  mmol/L Final     Vitamin D Deficiency screening 05/05/2021 94* 20 - 75 ug/L Final     Vitamin B12 05/05/2021 391  193 - 986 pg/mL Final      TSH 05/05/2021 2.04  0.40 - 4.00 mU/L Final     Troponin I ES 05/05/2021 <0.015  0.000 - 0.045 ug/L Final     N-Terminal Pro Bnp 05/05/2021 661* 0 - 125 pg/mL Final     RIOS interpretation 05/05/2021 Negative  NEG^Negative Final     GGT 05/05/2021 14  0 - 40 U/L Final     D Dimer 05/05/2021 0.5  0.0 - 0.50 ug/ml FEU Final     CRP Inflammation 05/05/2021 <2.9  0.0 - 8.0 mg/L Final     WBC 05/05/2021 6.7  4.0 - 11.0 10e9/L Final     RBC Count 05/05/2021 4.17  3.8 - 5.2 10e12/L Final     Hemoglobin 05/05/2021 12.7  11.7 - 15.7 g/dL Final     Hematocrit 05/05/2021 38.5  35.0 - 47.0 % Final     MCV 05/05/2021 92  78 - 100 fl Final     MCH 05/05/2021 30.5  26.5 - 33.0 pg Final     MCHC 05/05/2021 33.0  31.5 - 36.5 g/dL Final     RDW 05/05/2021 12.0  10.0 - 15.0 % Final     Platelet Count 05/05/2021 146* 150 - 450 10e9/L Final     Diff Method 05/05/2021 Automated Method   Final     % Neutrophils 05/05/2021 61.4  % Final     % Lymphocytes 05/05/2021 26.4  % Final     % Monocytes 05/05/2021 7.0  % Final     % Eosinophils 05/05/2021 4.3  % Final     % Basophils 05/05/2021 0.3  % Final     % Immature Granulocytes 05/05/2021 0.6  % Final     Nucleated RBCs 05/05/2021 0  0 /100 Final     Absolute Neutrophil 05/05/2021 4.1  1.6 - 8.3 10e9/L Final     Absolute Lymphocytes 05/05/2021 1.8  0.8 - 5.3 10e9/L Final     Absolute Monocytes 05/05/2021 0.5  0.0 - 1.3 10e9/L Final     Absolute Eosinophils 05/05/2021 0.3  0.0 - 0.7 10e9/L Final     Absolute Basophils 05/05/2021 0.0  0.0 - 0.2 10e9/L Final     Abs Immature Granulocytes 05/05/2021 0.0  0 - 0.4 10e9/L Final     Absolute Nucleated RBC 05/05/2021 0.0   Final     Sodium 05/05/2021 137  133 - 144 mmol/L Final     Potassium 05/05/2021 3.5  3.4 - 5.3 mmol/L Final     Chloride 05/05/2021 101  94 - 109 mmol/L Final     Carbon Dioxide 05/05/2021 30  20 - 32 mmol/L Final     Anion Gap 05/05/2021 6  3 - 14 mmol/L Final     Glucose 05/05/2021 146* 70 - 99 mg/dL Final     Urea Nitrogen  "05/05/2021 13  7 - 30 mg/dL Final     Creatinine 05/05/2021 0.76  0.52 - 1.04 mg/dL Final     GFR Estimate 05/05/2021 77  >60 mL/min/[1.73_m2] Final     GFR Estimate If Black 05/05/2021 90  >60 mL/min/[1.73_m2] Final     Calcium 05/05/2021 8.9  8.5 - 10.1 mg/dL Final     Bilirubin Total 05/05/2021 0.8  0.2 - 1.3 mg/dL Final     Albumin 05/05/2021 3.8  3.4 - 5.0 g/dL Final     Protein Total 05/05/2021 8.0  6.8 - 8.8 g/dL Final     Alkaline Phosphatase 05/05/2021 35* 40 - 150 U/L Final     ALT 05/05/2021 23  0 - 50 U/L Final     AST 05/05/2021 15  0 - 45 U/L Final     Cholesterol 05/05/2021 135  <200 mg/dL Final     Triglycerides 05/05/2021 168* <150 mg/dL Final     HDL Cholesterol 05/05/2021 52  >49 mg/dL Final     LDL Cholesterol Calculated 05/05/2021 49  <100 mg/dL Final     Non HDL Cholesterol 05/05/2021 83  <130 mg/dL Final        Review of systems: Negative except that which was noted in the HPI.    Physical examination:  BP (!) 175/80   Pulse 94   Temp 99.6  F (37.6  C) (Tympanic)   Ht 1.6 m (5' 3\")   Wt 65.3 kg (144 lb)   SpO2 98%   BMI 25.51 kg/m      GENERAL APPEARANCE: healthy, alert and no distress  CHEST: lungs clear to auscultation - no rales, rhonchi or wheezes, no use of accessory muscles, no retractions, respirations are unlabored, normal respiratory rate  CARDIOVASCULAR: regular rhythm, normal S1 with physiologic split S2, no S3 or S4 and no murmur, click or rub  EXTREMITIES: no clubbing, cyanosis or edema      Total time spent on day of visit, including review of tests, obtaining/reviewing separately obtained history, ordering medications/tests/procedures, communicating with PCP/consultants, and documenting in electronic medical record: 30 minutes.           Thank you for allowing me to participate in the care of your patient. Please do not hesitate to contact me if you have any questions.     Jin Corea, DO          "

## 2023-02-13 ENCOUNTER — LAB (OUTPATIENT)
Dept: LAB | Facility: OTHER | Age: 78
End: 2023-02-13
Payer: MEDICARE

## 2023-02-13 ENCOUNTER — ANCILLARY PROCEDURE (OUTPATIENT)
Dept: GENERAL RADIOLOGY | Facility: OTHER | Age: 78
End: 2023-02-13
Attending: INTERNAL MEDICINE
Payer: MEDICARE

## 2023-02-13 ENCOUNTER — OFFICE VISIT (OUTPATIENT)
Dept: CARDIOLOGY | Facility: OTHER | Age: 78
End: 2023-02-13
Attending: INTERNAL MEDICINE
Payer: MEDICARE

## 2023-02-13 VITALS
HEART RATE: 94 BPM | TEMPERATURE: 99.6 F | BODY MASS INDEX: 25.52 KG/M2 | HEIGHT: 63 IN | SYSTOLIC BLOOD PRESSURE: 175 MMHG | OXYGEN SATURATION: 98 % | DIASTOLIC BLOOD PRESSURE: 80 MMHG | WEIGHT: 144 LBS

## 2023-02-13 DIAGNOSIS — I50.32 DIASTOLIC DYSFUNCTION WITH CHRONIC HEART FAILURE (H): ICD-10-CM

## 2023-02-13 DIAGNOSIS — E78.2 MIXED HYPERLIPIDEMIA: ICD-10-CM

## 2023-02-13 DIAGNOSIS — R06.09 DOE (DYSPNEA ON EXERTION): ICD-10-CM

## 2023-02-13 DIAGNOSIS — J44.9 COPD, MILD (H): Primary | ICD-10-CM

## 2023-02-13 DIAGNOSIS — J44.9 COPD, MILD (H): ICD-10-CM

## 2023-02-13 DIAGNOSIS — J43.9 PULMONARY EMPHYSEMA, UNSPECIFIED EMPHYSEMA TYPE (H): ICD-10-CM

## 2023-02-13 DIAGNOSIS — J45.909 ASTHMA, UNSPECIFIED ASTHMA SEVERITY, UNSPECIFIED WHETHER COMPLICATED, UNSPECIFIED WHETHER PERSISTENT: ICD-10-CM

## 2023-02-13 DIAGNOSIS — E03.8 OTHER SPECIFIED HYPOTHYROIDISM: ICD-10-CM

## 2023-02-13 DIAGNOSIS — E87.6 HYPOKALEMIA: ICD-10-CM

## 2023-02-13 LAB
ALBUMIN SERPL BCG-MCNC: 4 G/DL (ref 3.5–5.2)
ALP SERPL-CCNC: 37 U/L (ref 35–104)
ALT SERPL W P-5'-P-CCNC: 14 U/L (ref 10–35)
ANION GAP SERPL CALCULATED.3IONS-SCNC: 10 MMOL/L (ref 7–15)
AST SERPL W P-5'-P-CCNC: 19 U/L (ref 10–35)
BILIRUB SERPL-MCNC: 0.6 MG/DL
BUN SERPL-MCNC: 14.4 MG/DL (ref 8–23)
CALCIUM SERPL-MCNC: 9.6 MG/DL (ref 8.8–10.2)
CHLORIDE SERPL-SCNC: 98 MMOL/L (ref 98–107)
CHOLEST SERPL-MCNC: 147 MG/DL
CREAT SERPL-MCNC: 0.8 MG/DL (ref 0.51–0.95)
D DIMER PPP FEU-MCNC: <0.3 UG/ML FEU (ref 0–0.5)
DEPRECATED HCO3 PLAS-SCNC: 28 MMOL/L (ref 22–29)
ERYTHROCYTE [DISTWIDTH] IN BLOOD BY AUTOMATED COUNT: 12.2 % (ref 10–15)
GFR SERPL CREATININE-BSD FRML MDRD: 76 ML/MIN/1.73M2
GLUCOSE SERPL-MCNC: 124 MG/DL (ref 70–99)
HCT VFR BLD AUTO: 37.6 % (ref 35–47)
HDLC SERPL-MCNC: 62 MG/DL
HGB BLD-MCNC: 12.5 G/DL (ref 11.7–15.7)
LDLC SERPL CALC-MCNC: 56 MG/DL
MAGNESIUM SERPL-MCNC: 2.1 MG/DL (ref 1.7–2.3)
MCH RBC QN AUTO: 30.6 PG (ref 26.5–33)
MCHC RBC AUTO-ENTMCNC: 33.2 G/DL (ref 31.5–36.5)
MCV RBC AUTO: 92 FL (ref 78–100)
NONHDLC SERPL-MCNC: 85 MG/DL
NT-PROBNP SERPL-MCNC: 801 PG/ML (ref 0–1800)
PLATELET # BLD AUTO: 145 10E3/UL (ref 150–450)
POTASSIUM SERPL-SCNC: 4.1 MMOL/L (ref 3.4–5.3)
PROT SERPL-MCNC: 7.4 G/DL (ref 6.4–8.3)
RBC # BLD AUTO: 4.09 10E6/UL (ref 3.8–5.2)
SODIUM SERPL-SCNC: 136 MMOL/L (ref 136–145)
TRIGL SERPL-MCNC: 144 MG/DL
TROPONIN T SERPL HS-MCNC: 14 NG/L
TSH SERPL DL<=0.005 MIU/L-ACNC: 2.77 UIU/ML (ref 0.3–4.2)
WBC # BLD AUTO: 8.4 10E3/UL (ref 4–11)

## 2023-02-13 PROCEDURE — 36415 COLL VENOUS BLD VENIPUNCTURE: CPT | Mod: ZL

## 2023-02-13 PROCEDURE — 83735 ASSAY OF MAGNESIUM: CPT | Mod: ZL

## 2023-02-13 PROCEDURE — 85027 COMPLETE CBC AUTOMATED: CPT | Mod: ZL

## 2023-02-13 PROCEDURE — G0463 HOSPITAL OUTPT CLINIC VISIT: HCPCS | Mod: 25

## 2023-02-13 PROCEDURE — 99214 OFFICE O/P EST MOD 30 MIN: CPT | Performed by: INTERNAL MEDICINE

## 2023-02-13 PROCEDURE — 80053 COMPREHEN METABOLIC PANEL: CPT | Mod: ZL

## 2023-02-13 PROCEDURE — 84443 ASSAY THYROID STIM HORMONE: CPT | Mod: ZL

## 2023-02-13 PROCEDURE — G0463 HOSPITAL OUTPT CLINIC VISIT: HCPCS

## 2023-02-13 PROCEDURE — 80061 LIPID PANEL: CPT | Mod: ZL

## 2023-02-13 PROCEDURE — 85379 FIBRIN DEGRADATION QUANT: CPT | Mod: ZL

## 2023-02-13 PROCEDURE — 84484 ASSAY OF TROPONIN QUANT: CPT | Mod: ZL

## 2023-02-13 PROCEDURE — 83880 ASSAY OF NATRIURETIC PEPTIDE: CPT | Mod: ZL

## 2023-02-13 PROCEDURE — 71046 X-RAY EXAM CHEST 2 VIEWS: CPT | Mod: TC

## 2023-02-13 RX ORDER — BUDESONIDE AND FORMOTEROL FUMARATE DIHYDRATE 160; 4.5 UG/1; UG/1
2 AEROSOL RESPIRATORY (INHALATION) 2 TIMES DAILY
Qty: 10.2 G | Refills: 4 | Status: SHIPPED | OUTPATIENT
Start: 2023-02-13 | End: 2023-05-30

## 2023-02-13 RX ORDER — PREDNISONE 20 MG/1
20 TABLET ORAL DAILY
Qty: 5 TABLET | Refills: 0 | Status: SHIPPED | OUTPATIENT
Start: 2023-02-13 | End: 2023-08-30

## 2023-02-13 RX ORDER — ALBUTEROL SULFATE 90 UG/1
1-2 AEROSOL, METERED RESPIRATORY (INHALATION) EVERY 4 HOURS PRN
Qty: 18 G | Refills: 3 | Status: SHIPPED | OUTPATIENT
Start: 2023-02-13 | End: 2024-05-01

## 2023-02-13 ASSESSMENT — PAIN SCALES - GENERAL: PAINLEVEL: NO PAIN (0)

## 2023-02-13 NOTE — PATIENT INSTRUCTIONS
Thank you for allowing Dr. Corea and our  team to participate in your care. Please call our office at [phone] with scheduling questions or if you need to cancel or change your appointment. With any other questions or concerns you may call [facility] cardiology nurse at [phone].       If you experience chest pain, chest pressure, chest tightness, shortness of breath, fainting, lightheadedness, nausea, vomiting, or other concerning symptoms, please report to the Emergency Department or call 911. These symptoms may be emergent, and best treated in the Emergency Department.

## 2023-02-27 ENCOUNTER — HOSPITAL ENCOUNTER (OUTPATIENT)
Dept: CARDIOLOGY | Facility: HOSPITAL | Age: 78
Discharge: HOME | End: 2023-02-27
Attending: INTERNAL MEDICINE | Admitting: INTERNAL MEDICINE
Payer: MEDICARE

## 2023-02-27 DIAGNOSIS — I50.32 DIASTOLIC DYSFUNCTION WITH CHRONIC HEART FAILURE (H): ICD-10-CM

## 2023-02-27 DIAGNOSIS — R06.09 DOE (DYSPNEA ON EXERTION): ICD-10-CM

## 2023-02-27 LAB — LVEF ECHO: NORMAL

## 2023-02-27 PROCEDURE — 93306 TTE W/DOPPLER COMPLETE: CPT
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
## 2023-03-20 PROBLEM — E87.6 DRUG-INDUCED HYPOKALEMIA: Status: ACTIVE | Noted: 2021-05-05

## 2023-03-20 PROBLEM — T50.905A DRUG-INDUCED HYPOKALEMIA: Status: ACTIVE | Noted: 2021-05-05

## 2023-03-21 ENCOUNTER — OFFICE VISIT (OUTPATIENT)
Dept: FAMILY MEDICINE | Facility: OTHER | Age: 78
End: 2023-03-21
Attending: NURSE PRACTITIONER
Payer: COMMERCIAL

## 2023-03-21 VITALS
RESPIRATION RATE: 16 BRPM | TEMPERATURE: 98.9 F | HEART RATE: 93 BPM | BODY MASS INDEX: 25.69 KG/M2 | OXYGEN SATURATION: 98 % | WEIGHT: 145 LBS | SYSTOLIC BLOOD PRESSURE: 122 MMHG | DIASTOLIC BLOOD PRESSURE: 65 MMHG

## 2023-03-21 DIAGNOSIS — I10 ESSENTIAL HYPERTENSION: ICD-10-CM

## 2023-03-21 DIAGNOSIS — E87.6 DRUG-INDUCED HYPOKALEMIA: ICD-10-CM

## 2023-03-21 DIAGNOSIS — Z78.0 ASYMPTOMATIC POSTMENOPAUSAL ESTROGEN DEFICIENCY: ICD-10-CM

## 2023-03-21 DIAGNOSIS — T50.905A DRUG-INDUCED HYPOKALEMIA: ICD-10-CM

## 2023-03-21 DIAGNOSIS — J43.9 PULMONARY EMPHYSEMA, UNSPECIFIED EMPHYSEMA TYPE (H): ICD-10-CM

## 2023-03-21 DIAGNOSIS — Z17.0 MALIGNANT NEOPLASM OF NIPPLE OF LEFT BREAST IN FEMALE, ESTROGEN RECEPTOR POSITIVE (H): ICD-10-CM

## 2023-03-21 DIAGNOSIS — E13.9 LADA (LATENT AUTOIMMUNE DIABETES IN ADULTS), MANAGED AS TYPE 1 (H): Primary | ICD-10-CM

## 2023-03-21 DIAGNOSIS — R21 RASH: ICD-10-CM

## 2023-03-21 DIAGNOSIS — C50.012 MALIGNANT NEOPLASM OF NIPPLE OF LEFT BREAST IN FEMALE, ESTROGEN RECEPTOR POSITIVE (H): ICD-10-CM

## 2023-03-21 DIAGNOSIS — I48.0 PAROXYSMAL ATRIAL FIBRILLATION (H): ICD-10-CM

## 2023-03-21 DIAGNOSIS — R42 VERTIGO: ICD-10-CM

## 2023-03-21 DIAGNOSIS — I50.32 DIASTOLIC DYSFUNCTION WITH CHRONIC HEART FAILURE (H): ICD-10-CM

## 2023-03-21 PROBLEM — D69.6 THROMBOCYTOPENIA (H): Status: ACTIVE | Noted: 2023-03-21

## 2023-03-21 LAB
EST. AVERAGE GLUCOSE BLD GHB EST-MCNC: 169 MG/DL
HBA1C MFR BLD: 7.5 %

## 2023-03-21 PROCEDURE — 36415 COLL VENOUS BLD VENIPUNCTURE: CPT | Mod: ZL | Performed by: NURSE PRACTITIONER

## 2023-03-21 PROCEDURE — 99214 OFFICE O/P EST MOD 30 MIN: CPT | Performed by: NURSE PRACTITIONER

## 2023-03-21 PROCEDURE — G0463 HOSPITAL OUTPT CLINIC VISIT: HCPCS

## 2023-03-21 PROCEDURE — 83036 HEMOGLOBIN GLYCOSYLATED A1C: CPT | Mod: ZL | Performed by: NURSE PRACTITIONER

## 2023-03-21 RX ORDER — PERMETHRIN 50 MG/G
CREAM TOPICAL
Qty: 60 G | Refills: 1 | Status: SHIPPED | OUTPATIENT
Start: 2023-03-21 | End: 2023-03-29

## 2023-03-21 RX ORDER — TRIAMCINOLONE ACETONIDE 1 MG/G
OINTMENT TOPICAL 2 TIMES DAILY
Qty: 80 G | Refills: 0 | Status: SHIPPED | OUTPATIENT
Start: 2023-03-21 | End: 2024-05-01

## 2023-03-21 ASSESSMENT — PAIN SCALES - GENERAL: PAINLEVEL: NO PAIN (0)

## 2023-03-23 ENCOUNTER — HOSPITAL ENCOUNTER (OUTPATIENT)
Dept: BONE DENSITY | Facility: HOSPITAL | Age: 78
Discharge: HOME | End: 2023-03-23
Attending: NURSE PRACTITIONER | Admitting: NURSE PRACTITIONER
Payer: MEDICARE

## 2023-03-23 DIAGNOSIS — Z78.0 ASYMPTOMATIC POSTMENOPAUSAL ESTROGEN DEFICIENCY: ICD-10-CM

## 2023-03-23 PROCEDURE — 77080 DXA BONE DENSITY AXIAL: CPT

## 2023-03-24 ENCOUNTER — TELEPHONE (OUTPATIENT)
Dept: FAMILY MEDICINE | Facility: OTHER | Age: 78
End: 2023-03-24

## 2023-03-24 NOTE — TELEPHONE ENCOUNTER
----- Message from Nicolasa Guillen NP sent at 3/23/2023  3:23 PM CDT -----  Notify pt. Osteopenia seen. Risk of fracture is high. Please set up phone visit to discuss. Ok to use SDS

## 2023-03-27 NOTE — PROGRESS NOTES
Bo is a 78 year old who is being evaluated via a billable telephone visit.      What phone number would you like to be contacted at? [phone]  How would you like to obtain your AVS? Mail a copy  Distant Location (provider location):  On-site    Assessment & Plan     Osteopenia, unspecified location  Recent dexa-scan did show osteopenia and her risk of a major fracture was greater than 20 percent and her hip fracture risk was greater than 3 percent.     She has used osteopenia in the past, but it sounds like it caused osteonecrosis of her jaw.     Declines to try anything else.     Will be as active as possible and consume adequate calcium and Vit D.    Does not smoke or drink alcohol.     Will recheck a dexa-scan in 2 years. If worsens, will consider referral to endocrinology.     Paroxysmal atrial fibrillation (H)  Follows with cardiology. Due for follow-up. Will continue Eliquis for now.     - apixaban ANTICOAGULANT (ELIQUIS ANTICOAGULANT) 5 MG tablet; Take 1 tablet (5 mg) by mouth 2 times daily    Rash  Patient with persistent pruritic rash. Kenalog cream did help, but she has been using it several times per day. She was told that she can't use this long-term.     When seen last, it almost appeared like a scabies and permethrin was ordered. She does not feel she has scabies and never used the cream.    Will therefore send to derm. Referral placed.      - Adult Dermatology Referral; Future    Hypothyroidism, unspecified type  Needing refill of Synthroid.     - levothyroxine (SYNTHROID/LEVOTHROID) 75 MCG tablet; Take 1 tablet (75 mcg) by mouth daily    Nicolasa Guillen NP  [facility] - [place]    Subjective   Bo is a 78 year old, presenting for the following health issues:  Results (Dexa Scan)      HPI     Osteopenia  Recent dexa-scan showed osteopenia.     FRAX Score at hip is:  11  %     FRAX Score for major osteoporotic fracture is:  21  %     She has taken fosamax in the past, but stopped.  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     Normal kidney function. Does not smoke or drink alcohol.     Does not participate in weight bearing exercises. Trouble with joints. Is walking 4 miles per day.     Obtains adequate calcium and Vit D.     Rash  Patient has persistent rash. Present for several weeks. Rash erythematous and pruritic. Pruritis worsens at night. Located on right lower forearm, bilateral upper back, and chest. She was recently seen for this and it almost appeared like a scabies. Premethrin ordered. She never used this as she does not think it is scabies. She also tells me that she hired an  who did not feel she had scabies. Did use Kenalog cream and felt it helped with the pruritis. Requesting a refill. Using many times during the day.       Review of Systems   Constitutional, HEENT, cardiovascular, pulmonary, gi and gu systems are negative, except as otherwise noted.      Objective           Vitals:  No vitals were obtained today due to virtual visit.    Physical Exam   healthy, alert and no distress  PSYCH: Alert and oriented times 3; coherent speech, normal   rate and volume, able to articulate logical thoughts, able   to abstract reason, no tangential thoughts, no hallucinations   or delusions  Her affect is normal  RESP: No cough, no audible wheezing, able to talk in full sentences  Remainder of exam unable to be completed due to telephone visits    Procedure:  DX HIP/PELVIS/SPINE     History: Female, age 78 years; Asymptomatic postmenopausal estrogen  deficiency     Comparison:  5/9/2019                                                                      IMPRESSION: Osteopenia.     Hip:  0.709 g/cm2. T score: -1.9. Percent change compared to previous:  -0.1 %     Lumbar spine:  1.119 g/cm2. T score: 0.6. Percent change compared to  previous: 10.6 %     FRAX Score at hip is:  11  %     FRAX Score for major osteoporotic fracture is:  21  %           Only the lowest category is reported  for each patient per guidelines  of the International Society for Clinical Densitometry.     See attached DXA images and FRAX report for further details.     WHO DIAGNOSTIC GUIDELINES FOR BONE MASS MEASUREMENT:     Normal                        T-Score at or above -1.0     Osteopenia                T-Score between -1.0 and -2.5     Osteoporosis            T-Score at or below -2.5     Providers should consider medical therapies when 10 year probability  of hip fracture is greater than or equal to 3%, or 10 year probability  of major osteoporosis related fracture is greater than or equal to  20%.     ** Exam performed on GE Lunar Prodigy Advance        ** Exam performed on GE Lunar Prodigy Advance     BRIDGER SERRA MD           Phone call duration: 15 minutes

## 2023-03-29 ENCOUNTER — VIRTUAL VISIT (OUTPATIENT)
Dept: FAMILY MEDICINE | Facility: OTHER | Age: 78
End: 2023-03-29
Attending: NURSE PRACTITIONER
Payer: COMMERCIAL

## 2023-03-29 DIAGNOSIS — I48.0 PAROXYSMAL ATRIAL FIBRILLATION (H): ICD-10-CM

## 2023-03-29 DIAGNOSIS — R21 RASH: ICD-10-CM

## 2023-03-29 DIAGNOSIS — E03.9 HYPOTHYROIDISM, UNSPECIFIED TYPE: ICD-10-CM

## 2023-03-29 DIAGNOSIS — M85.80 OSTEOPENIA, UNSPECIFIED LOCATION: Primary | ICD-10-CM

## 2023-03-29 PROCEDURE — G0463 HOSPITAL OUTPT CLINIC VISIT: HCPCS | Mod: 25,TEL

## 2023-03-29 PROCEDURE — 99214 OFFICE O/P EST MOD 30 MIN: CPT | Mod: TEL | Performed by: NURSE PRACTITIONER

## 2023-03-29 RX ORDER — TRIAMCINOLONE ACETONIDE 1 MG/G
OINTMENT TOPICAL 2 TIMES DAILY
Qty: 80 G | Refills: 0 | Status: CANCELLED | OUTPATIENT
Start: 2023-03-29

## 2023-03-29 RX ORDER — LEVOTHYROXINE SODIUM 75 UG/1
75 TABLET ORAL DAILY
Qty: 90 TABLET | Refills: 3 | Status: SHIPPED | OUTPATIENT
Start: 2023-03-29 | End: 2024-04-05

## 2023-03-31 ENCOUNTER — NURSE TRIAGE (OUTPATIENT)
Dept: FAMILY MEDICINE | Facility: OTHER | Age: 78
End: 2023-03-31

## 2023-03-31 ENCOUNTER — OFFICE VISIT (OUTPATIENT)
Dept: FAMILY MEDICINE | Facility: OTHER | Age: 78
End: 2023-03-31
Attending: NURSE PRACTITIONER
Payer: MEDICARE

## 2023-03-31 ENCOUNTER — TELEPHONE (OUTPATIENT)
Dept: FAMILY MEDICINE | Facility: OTHER | Age: 78
End: 2023-03-31

## 2023-03-31 VITALS
OXYGEN SATURATION: 96 % | DIASTOLIC BLOOD PRESSURE: 64 MMHG | SYSTOLIC BLOOD PRESSURE: 107 MMHG | HEART RATE: 80 BPM | TEMPERATURE: 98.6 F | WEIGHT: 144 LBS | BODY MASS INDEX: 25.51 KG/M2

## 2023-03-31 DIAGNOSIS — J03.90 ACUTE TONSILLITIS, UNSPECIFIED ETIOLOGY: Primary | ICD-10-CM

## 2023-03-31 LAB — GROUP A STREP BY PCR: NOT DETECTED

## 2023-03-31 PROCEDURE — 99213 OFFICE O/P EST LOW 20 MIN: CPT | Performed by: NURSE PRACTITIONER

## 2023-03-31 PROCEDURE — 87651 STREP A DNA AMP PROBE: CPT | Mod: ZL | Performed by: NURSE PRACTITIONER

## 2023-03-31 PROCEDURE — G0463 HOSPITAL OUTPT CLINIC VISIT: HCPCS

## 2023-03-31 RX ORDER — CLARITHROMYCIN 250 MG/1
250 TABLET, FILM COATED ORAL 2 TIMES DAILY
Qty: 20 TABLET | Refills: 0 | Status: SHIPPED | OUTPATIENT
Start: 2023-03-31 | End: 2023-04-10

## 2023-03-31 ASSESSMENT — PAIN SCALES - GENERAL: PAINLEVEL: MODERATE PAIN (5)

## 2023-03-31 NOTE — TELEPHONE ENCOUNTER
Patient Call  (Newest Message First)  Lisa Jean-Baptiste APRN CNP  You 3 minutes ago (3:21 PM)     NM  Concerns for prolonged QT with azithromycin - Bo was told to hold simvastatin while on antibiotic and 2 weeks after completing antibiotic     Lisa NELSON FNP-BC   Family Nurse Practitioner

## 2023-03-31 NOTE — TELEPHONE ENCOUNTER
Called informed pharmacy of message below from provider.    Left message for patient to call clinic back to inform of update.

## 2023-03-31 NOTE — TELEPHONE ENCOUNTER
"Disposition: See in Office today    Patient called with moderate sore throat. Patient reports white on right side of tonsil. Patient has history of diabetes.    Patient scheduled to see covering provider today in [place] [facility].   Writer informed patient to be seen in UC/ED with any new or worsening symptoms. Patient verbalized understanding.    Reason for Disposition    Diabetes mellitus or weak immune system (e.g., HIV positive, cancer chemo, splenectomy, organ transplant, chronic steroids)    Additional Information    Negative: SEVERE difficulty breathing (e.g., struggling for each breath, speaks in single words)    Negative: Sounds like a life-threatening emergency to the triager    Negative: Throat culture results, call about    Negative: Productive cough is main symptom    Negative: Runny nose is main symptom    Negative: Drooling or spitting out saliva (because can't swallow)    Negative: Unable to open mouth completely    Negative: Drinking very little and has signs of dehydration (e.g., no urine > 12 hours, very dry mouth, very lightheaded)    Negative: Patient sounds very sick or weak to the triager    Negative: Difficulty breathing (per caller) but not severe    Negative: Fever > 103 F (39.4 C)    Negative: Refuses to drink anything for > 12 hours    Negative: SEVERE sore throat pain    Negative: Pus on tonsils (back of throat) and swollen neck lymph nodes ('glands')    Negative: Earache also present    Negative: Widespread rash (especially chest and abdomen)    Negative: History of rheumatic fever    Answer Assessment - Initial Assessment Questions  1. ONSET: \"When did the throat start hurting?\" (Hours or days ago)       Last night  2. SEVERITY: \"How bad is the sore throat?\" (Scale 1-10; mild, moderate or severe)    - MILD (1-3):  doesn't interfere with eating or normal activities    - MODERATE (4-7): interferes with eating some solids and normal activities    - SEVERE (8-10):  excruciating pain, " "interferes with most normal activities    - SEVERE DYSPHAGIA: can't swallow liquids, drooling      moderate  3. STREP EXPOSURE: \"Has there been any exposure to strep within the past week?\" If Yes, ask: \"What type of contact occurred?\"       no  4.  VIRAL SYMPTOMS: \"Are there any symptoms of a cold, such as a runny nose, cough, hoarse voice or red eyes?\"       Hoarse voice  5. FEVER: \"Do you have a fever?\" If Yes, ask: \"What is your temperature, how was it measured, and when did it start?\"      no  6. PUS ON THE TONSILS: \"Is there pus on the tonsils in the back of your throat?\"      Yes on right side  7. OTHER SYMPTOMS: \"Do you have any other symptoms?\" (e.g., difficulty breathing, headache, rash)      no  8. PREGNANCY: \"Is there any chance you are pregnant?\" \"When was your last menstrual period?\"      no    Protocols used: SORE THROAT-A-OH      "

## 2023-03-31 NOTE — TELEPHONE ENCOUNTER
[facility] pharmacy called in regards to drug- drug interaction between Clarithromycin  250 MG and Simvastatin. Pharmacist states Azithromycin has a less severe interaction. Pharmacist also states patient could hold simvastatin for 2 weeks.    Prescribing provider to advise.    [facility] number:  [phone]

## 2023-03-31 NOTE — TELEPHONE ENCOUNTER
Patient returned call to the clinic  writer confirmed RX is ready for p/u  No further concerns at calls end

## 2023-03-31 NOTE — PROGRESS NOTES
Assessment & Plan     Acute tonsillitis, unspecified etiology  Negative strep.  Discussed with Bo her physical exam and her symptoms at this time. Decided to treat.  She has a penicillin allergy and at risk for prolonged QT - so discussed alternative treatment.  She was encouraged to stop statin while on antibiotic and restart about 2 weeks after completing treatment.     - Group A Streptococcus PCR Throat Swab ([place] ONLY)  - clarithromycin (BIAXIN) 250 MG tablet; Take 1 tablet (250 mg) by mouth 2 times daily for 10 days    Ordering of each unique test  I spent a total of 23 minutes on the day of the visit.   Time spent by me doing chart review, history and exam, documentation and further activities per the note       See Patient Instructions    No follow-ups on file.    LESLIE [facility] - [place]    Subjective   Bo is a 78 year old, presenting for the following health issues:  Pharyngitis  No flowsheet data found.  HPI       Acute Illness  Acute illness concerns: sore throat  Onset/Duration: last night  Symptoms:  Fever: No  Chills/Sweats: No  Headache (location?): No  Sinus Pressure: No  Conjunctivitis:  No  Ear Pain: no  Rhinorrhea: No  Congestion: No  Sore Throat: YES  Cough: YES  Wheeze: No  Decreased Appetite: No  Nausea: No  Vomiting: No  Diarrhea: No  Dysuria/Freq.: No  Dysuria or Hematuria: No  Fatigue/Achiness: YES  Sick/Strep Exposure: No  Therapies tried and outcome: None  Feels like it is hard to swallow       Review of Systems   CONSTITUTIONAL: NEGATIVE for fever, chills, change in weight  INTEGUMENTARY/SKIN: NEGATIVE for worrisome rashes, moles or lesions  EYES: NEGATIVE for vision changes or irritation  ENT/MOUTH: sore throat  RESP:Hx asthma  CV: NEGATIVE for chest pain, palpitations or peripheral edema  GI: NEGATIVE for nausea, abdominal pain, heartburn, or change in bowel habits  : denies dysuria   NEURO: fatigue       Objective    /64 (BP  Location: Right arm, Patient Position: Sitting, Cuff Size: Adult Regular)   Pulse 80   Temp 98.6  F (37  C) (Tympanic)   Wt 65.3 kg (144 lb)   SpO2 96%   BMI 25.51 kg/m    Body mass index is 25.51 kg/m .  Physical Exam   GENERAL: alert and elderly  HENT: normal cephalic/atraumatic, ear canals and TM's normal, nose and mouth without ulcers or lesions, oropharynx clear, oral mucous membranes moist, tonsillar erythema and tonsillar exudate  NECK: no adenopathy, no asymmetry, masses, or scars and thyroid normal to palpation  RESP: wheezing throughout   CV: regular rate and rhythm, normal S1 S2, no S3 or S4, no murmur, click or rub, no peripheral edema and peripheral pulses strong  ABDOMEN: soft, nontender, no hepatosplenomegaly, no masses and bowel sounds normal    Results for orders placed or performed in visit on 03/31/23   Group A Streptococcus PCR Throat Swab ([place] ONLY)     Status: Normal    Specimen: Throat; Swab   Result Value Ref Range    Group A strep by PCR Not Detected Not Detected    Narrative    The Xpert Xpress Strep A test, performed on the Prepared Response  Instrument Systems, is a rapid, qualitative in vitro diagnostic test for the detection of Streptococcus pyogenes (Group A ß-hemolytic Streptococcus, Strep A) in throat swab specimens from patients with signs and symptoms of pharyngitis. The Xpert Xpress Strep A test can be used as an aid in the diagnosis of Group A Streptococcal pharyngitis. The assay is not intended to monitor treatment for Group A Streptococcus infections. The Xpert Xpress Strep A test utilizes an automated real-time polymerase chain reaction (PCR) to detect Streptococcus pyogenes DNA.

## 2023-04-19 NOTE — PROGRESS NOTES
Received a message that she couldn't make it to her appt today as she doesn't have a ride.      Denies any acute concerns.     Has an appt with her PCP, Nicolasa Guillen NP on May 2nd.    Will download her pump and CGM at that time.      A1c trend  Lab Results   Component Value Date    A1C 7.5 03/21/2023    A1C 7.2 12/20/2022    A1C 7.2 05/13/2022    A1C 7.1 11/23/2021    A1C 6.9 01/29/2021    A1C 7.2 07/01/2020    A1C 7.9 12/16/2019    A1C 7.5 09/11/2019    A1C 7.3 06/07/2019         Kerri NELSON [facility]-BC  Diabetes and Wound Care

## 2023-05-04 ENCOUNTER — ALLIED HEALTH/NURSE VISIT (OUTPATIENT)
Dept: EDUCATION SERVICES | Facility: OTHER | Age: 78
End: 2023-05-04
Attending: NURSE PRACTITIONER
Payer: COMMERCIAL

## 2023-05-04 ENCOUNTER — HOSPITAL ENCOUNTER (OUTPATIENT)
Dept: PHYSICAL THERAPY | Facility: HOSPITAL | Age: 78
Setting detail: THERAPIES SERIES
Discharge: HOME | End: 2023-05-04
Attending: NURSE PRACTITIONER
Payer: MEDICARE

## 2023-05-04 VITALS
RESPIRATION RATE: 16 BRPM | OXYGEN SATURATION: 95 % | SYSTOLIC BLOOD PRESSURE: 101 MMHG | DIASTOLIC BLOOD PRESSURE: 67 MMHG | BODY MASS INDEX: 25.51 KG/M2 | HEIGHT: 63 IN | HEART RATE: 84 BPM

## 2023-05-04 DIAGNOSIS — E13.9 LADA (LATENT AUTOIMMUNE DIABETES IN ADULTS), MANAGED AS TYPE 1 (H): ICD-10-CM

## 2023-05-04 DIAGNOSIS — L30.9 ECZEMA, UNSPECIFIED TYPE: Primary | ICD-10-CM

## 2023-05-04 DIAGNOSIS — H81.11 BENIGN PAROXYSMAL POSITIONAL VERTIGO, RIGHT: Primary | ICD-10-CM

## 2023-05-04 DIAGNOSIS — R42 VERTIGO: ICD-10-CM

## 2023-05-04 PROCEDURE — 99213 OFFICE O/P EST LOW 20 MIN: CPT | Performed by: NURSE PRACTITIONER

## 2023-05-04 PROCEDURE — 97162 PT EVAL MOD COMPLEX 30 MIN: CPT | Mod: GP

## 2023-05-04 PROCEDURE — G0463 HOSPITAL OUTPT CLINIC VISIT: HCPCS | Mod: 25

## 2023-05-04 PROCEDURE — 95992 CANALITH REPOSITIONING PROC: CPT | Mod: GP

## 2023-05-04 PROCEDURE — G0463 HOSPITAL OUTPT CLINIC VISIT: HCPCS

## 2023-05-04 PROCEDURE — 95251 CONT GLUC MNTR ANALYSIS I&R: CPT | Performed by: NURSE PRACTITIONER

## 2023-05-04 RX ORDER — INSULIN LISPRO 100 [IU]/ML
INJECTION, SOLUTION INTRAVENOUS; SUBCUTANEOUS
Qty: 30 ML | Refills: 3 | Status: SHIPPED | OUTPATIENT
Start: 2023-05-04 | End: 2023-11-06

## 2023-05-04 RX ORDER — TRIAMCINOLONE ACETONIDE 1 MG/G
CREAM TOPICAL 2 TIMES DAILY
Qty: 454 G | Refills: 3 | Status: SHIPPED | OUTPATIENT
Start: 2023-05-04 | End: 2024-05-01

## 2023-05-04 ASSESSMENT — ANXIETY QUESTIONNAIRES
3. WORRYING TOO MUCH ABOUT DIFFERENT THINGS: SEVERAL DAYS
2. NOT BEING ABLE TO STOP OR CONTROL WORRYING: SEVERAL DAYS
1. FEELING NERVOUS, ANXIOUS, OR ON EDGE: SEVERAL DAYS
5. BEING SO RESTLESS THAT IT IS HARD TO SIT STILL: SEVERAL DAYS
GAD7 TOTAL SCORE: 6
6. BECOMING EASILY ANNOYED OR IRRITABLE: SEVERAL DAYS
4. TROUBLE RELAXING: SEVERAL DAYS
IF YOU CHECKED OFF ANY PROBLEMS ON THIS QUESTIONNAIRE, HOW DIFFICULT HAVE THESE PROBLEMS MADE IT FOR YOU TO DO YOUR WORK, TAKE CARE OF THINGS AT HOME, OR GET ALONG WITH OTHER PEOPLE: SOMEWHAT DIFFICULT
7. FEELING AFRAID AS IF SOMETHING AWFUL MIGHT HAPPEN: NOT AT ALL
GAD7 TOTAL SCORE: 6

## 2023-05-04 ASSESSMENT — PATIENT HEALTH QUESTIONNAIRE - PHQ9: SUM OF ALL RESPONSES TO PHQ QUESTIONS 1-9: 4

## 2023-05-04 ASSESSMENT — PAIN SCALES - GENERAL: PAINLEVEL: NO PAIN (0)

## 2023-05-04 NOTE — PROGRESS NOTES
"SUBJECTIVE:  Flora Acuna, 78 year old, female presents with the following Chief Complaint(s) with HPI to follow:  Chief Complaint   Patient presents with     Diabetes        Diabetes Follow-up      Patient is checking blood sugars: personal CGM.  Results:  She has been using her Dexcom for >90 days    Date: 4/7 to 4/20/23  Glucose management indicator: 8.8%    Average glucose: 229    Glucose range  Very low (<54): 0  low (<70): 0  In target range (): 34%  High (>180): 27%  Very high (>250): 39%    Date: 4/21 to 5/4/23  Glucose management indicator: 8.9%    Average glucose: 236    Glucose range  Very low (<54): 0  low (<70): 0  In target range (): 30%  High (>180): 27%  Very high (>250): 43%      Symptoms of hypoglycemia (low blood sugar): no \"crashes\",     Paresthesias (numbness or burning in feet) or sores: Yes--same; no sores    Diabetic eye exam within the last year: DUE    Breakfast eaten regularly: sometimes    Patient counting carbs: Yes       HPI:  Flora's here today for the follow up regarding her KAREEM, managed as a Type 1    A1c trend:  Lab Results   Component Value Date    A1C 7.5 03/21/2023    A1C 7.2 12/20/2022    A1C 7.2 05/13/2022    A1C 7.1 11/23/2021    A1C 6.9 01/29/2021    A1C 7.2 07/01/2020    A1C 7.9 12/16/2019    A1C 7.5 09/11/2019    A1C 7.3 06/07/2019     Current Diabetes medication:   1.  Insulin pump (Minimed 630G), with Humalog  ASA use: yes  Statin use: yes    oB's here today for an insulin pump and Dexcom download with possible insulin adjust.    Reports the following:  No issues with her personal CGM  No issues with her insulin pump      BGs have been everywhere.      Bo reports the following:  Issues with a skin rash  Located on both arms, neck and chest  Described as itchy  Started 1 week after she was sick last month  No new soaps and/or lotions  Nobody else at her apartments has a known rash  Did receive triamcinolone ointment but ran out of it.  It " helped  Cold water helps  Did note that 3 smaller lesions did have clear fluid     Has an appointment on 5/10/23 for the dermatologist      Weight trend:  Wt Readings from Last 4 Encounters:   03/31/23 65.3 kg (144 lb)   03/21/23 65.8 kg (145 lb)   02/13/23 65.3 kg (144 lb)   01/30/23 65.3 kg (144 lb)           Patient Active Problem List   Diagnosis     CARDIOVASCULAR SCREENING; LDL GOAL LESS THAN 160     Personal history of malignant neoplasm of breast     ACP (advance care planning)     Hypothyroidism     Essential hypertension     Malignant neoplasm of left breast in female, estrogen receptor positive (H)     S/P reverse total shoulder arthroplasty, right     Lumbar disc disease with radiculopathy     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), managed as type 1 (H)     H/O total knee replacement, left     Age-related osteoporosis without current pathological fracture     Osteoporosis     Family history of melanoma     Family history of breast cancer in female     Dysphonia     Prolonged QT interval     Fatty liver on 11/7/2007     Diastolic dysfunction with chronic heart failure (H)     Mixed hyperlipidemia     Drug-induced hypokalemia     Vitamin D deficiency     Contrast media allergy     COPD, mild (H)     Pulmonary emphysema, unspecified emphysema type (H)     Asthma, unspecified asthma severity, unspecified whether complicated, unspecified whether persistent     Thrombocytopenia (H)     Abnormal EMG     Cardiomegaly     Incisional hernia       Past Medical History:   Diagnosis Date     Congestive heart failure, unspecified      Diabetes (H)      H/O rheumatoid arthritis      HLD (hyperlipidemia)      HTN (hypertension)      Myocardial infarction (H)      Uncomplicated asthma        Past Surgical History:   Procedure Laterality Date     APPENDECTOMY OPEN CHILD       AS TOTAL KNEE ARTHROPLASTY Right      CHOLECYSTECTOMY       COLONOSCOPY - HIM SCAN N/A 03/12/2009    per Care Everywhere documentation per  [facility].      HYSTERECTOMY TOTAL ABDOMINAL       MASTECTOMY, BILATERAL Bilateral 02/26/1992     SHOULDER SURGERY Right      SHOULDER SURGERY Left        Family History   Problem Relation Age of Onset     Breast Cancer Maternal Aunt      Breast Cancer Maternal Aunt      Lung Cancer Father        Social History     Tobacco Use     Smoking status: Never     Smokeless tobacco: Never   Vaping Use     Vaping status: Never Used   Substance Use Topics     Alcohol use: No     Alcohol/week: 0.0 standard drinks of alcohol       Current Outpatient Medications   Medication Sig Dispense Refill     Acetone, Urine, Test (KETONE TEST) STRP Use as directed 50 strip 4     albuterol (PROAIR HFA/PROVENTIL HFA/VENTOLIN HFA) 108 (90 Base) MCG/ACT inhaler Inhale 1-2 puffs into the lungs every 4 hours as needed for shortness of breath, wheezing or cough 18 g 3     apixaban ANTICOAGULANT (ELIQUIS ANTICOAGULANT) 5 MG tablet Take 1 tablet (5 mg) by mouth 2 times daily 60 tablet 0     blood glucose (CONTOUR NEXT TEST) test strip Use 6 times a day with the insulin pump to help manage your diabetes 200 strip 0     blood glucose (NO BRAND SPECIFIED) test strip by In Vitro route 4 times daily Use to test blood sugar 4 times daily or as directed.       budesonide-formoterol (SYMBICORT) 160-4.5 MCG/ACT Inhaler Inhale 2 puffs into the lungs 2 times daily 10.2 g 4     Calcium-Vitamin D-Vitamin K (VIACTIV PO) Take 2 chew tab by mouth every morning       Continuous Blood Gluc  (DEXCOM G6 ) XX DIMITRIS 1 each continuous 1 Device 0     Continuous Blood Gluc Sensor (DEXCOM G6 SENSOR) MISC 1 each continuous To be changed every 10 days 3 each 5     Continuous Blood Gluc Transmit (DEXCOM G6 TRANSMITTER) MISC 1 each continuous To be changed every 90 days 1 each 1     furosemide (LASIX) 40 MG tablet TAKE 1 TABLET BY MOUTH ONCE DAILY IN THE MORNING AND 1/2 (ONE-HALF) TABLET IN AFTERNOON 135 tablet 3     Insulin Aspart (INSULIN PUMP -  OUTPATIENT)        insulin lispro (HUMALOG VIAL) 100 UNIT/ML vial To be used with the insulin pump TDD: 80 units 30 mL 3     INSULIN PUMP - OUTPATIENT Insulin pump settings: Updated: 11/17/22 Insulin pump: Minimed 630G Basal: 0000 0.9 (new) 0200 0.85 (new) 0500 0.825 (new) 0900 1 1200 1.1 2200 1  Total: 23.65 CR: 9 ISF:  0000 60 (new) 0700 55 (new)  Target: 100-140 Active insulin: 4       levothyroxine (SYNTHROID/LEVOTHROID) 75 MCG tablet Take 1 tablet (75 mcg) by mouth daily 90 tablet 3     omeprazole (PRILOSEC) 40 MG DR capsule Take 1 capsule (40 mg) by mouth daily 90 capsule 0     potassium chloride ER (KLOR-CON M) 20 MEQ CR tablet TAKE 1  BY MOUTH TWICE DAILY Strength: 20 mEq 180 tablet 0     predniSONE (DELTASONE) 20 MG tablet Take 1 tablet (20 mg) by mouth daily 5 tablet 0     ramipril (ALTACE) 10 MG capsule Take 1 capsule (10 mg) by mouth 2 times daily 180 capsule 3     senna-docusate (SENOKOT-S;PERICOLACE) 8.6-50 MG per tablet Take 1 tablet by mouth At Bedtime        simvastatin (ZOCOR) 40 MG tablet Take 1 tablet (40 mg) by mouth At Bedtime 90 tablet 1     tamoxifen (NOLVADEX) 20 MG tablet Take 20 mg by mouth every morning       tiotropium (SPIRIVA RESPIMAT) 2.5 MCG/ACT inhaler INHALE 2 PUFFS BY MOUTH ONCE DAILY Strength: 2.5 MCG/ACT 4 g 4     tiZANidine (ZANAFLEX) 4 MG tablet Take a 1/2 tablet by mouth in the morning, 1/2 tablet mid day and 1 full tablet at bedtime, can take an extra 1/2 tablet as needed during the night for breakthrough symptoms up to 4 times per week.       triamcinolone (KENALOG) 0.1 % external cream Apply topically 2 times daily 454 g 3     triamcinolone (KENALOG) 0.1 % external ointment Apply topically 2 times daily 80 g 0     VITAMIN D, CHOLECALCIFEROL, PO Take 5,000 Units by mouth daily         Allergies   Allergen Reactions     Contrast Dye Difficulty breathing     Gadolinium Derivatives      Other reaction(s): Difficulty in swallowing, Laryngeal spasm  Patient had an injection into  "rt. Shoulder capsule prior to MRI arthrogram.  Contained multihance gadobenate, omnipaque iohexol, and buffered lidocaine.       Ammonia      Cory-1 [Lidocaine Hcl]      Novocaine allergy       Codeine Sulfate      Food      Shrimp     Fosamax [Alendronate]      Dental infections     Iodine-131 Swelling     Ct dye     Lidocaine      Nitrous Oxide      No Clinical Screening - See Comments Nausea and Vomiting and Other (See Comments)     (3/6/09)- N/V and Heart racing     Novocain [Procaine]      Penicillins      Tramadol      Morphine Palpitations       REVIEW OF SYSTEMS  Skin: as stated above  Eyes: negative; DUE  Ears/Nose/Throat: hx of \"burnt\" voice and muscles from GERD  Respiratory: positive FRANCO--better; no cough; no hemoptysis; history of asthma  Cardiovascular: stable; history of HF  Gastrointestinal: as noted above  Genitourinary: negative   Musculoskeletal: + back pain--better; hx of RA; bilateral thumb joints are displaced per pt: hx of back pain, neck pain, arthritis and right shoulder   Neurologic: positive for numbness or tingling of feet--same  Psychiatric: negative  Hematologic/Lymphatic/Immunologic: history of breast cancer (left) x 2 (same spot)  Endocrine: positive for diabetes and thyroid disease     OBJECTIVE:  /67   Pulse 84   Resp 16   Ht 1.6 m (5' 3\")   SpO2 95%   BMI 25.51 kg/m    Constitutional: alert and no distress  Respiratory:  Good diaphragmatic excursion.  Musculoskeletal: extremities normal- no gross deformities noted; using a rolling walker   Skin: pink, raised lesions with scattered excoriated areas, no blisters noted, no hive-like welts.  Rash is located on both arms, chest, and upper neck  Psychiatric: mentation appears normal and affect normal/bright    LABS  No results found for any visits on 05/04/23.    ASSESSMENT / PLAN:  (L30.9) Eczema, unspecified type  (primary encounter diagnosis)  Comment: noted  Plan: triamcinolone (KENALOG) 0.1 % external cream        "   Recommended the following:  Skin regime:  Use your triamcinolone cream twice a day, avoid face. 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  Time of off--am and pm are switched    Insulin pump settings:  Updated: 5/4/23  Insulin pump: Minimed 630G  Basal:  0000 0.9   0200 0.85   0500 0.825   0900 1  1200 1.1  2200 1   Total: 23.65  CR: 9  ISF:   0000 60   0700 55    Target: 100-140  Active insulin: 4    Plan: insulin lispro (HUMALOG VIAL) 100 UNIT/ML vial,        GLUCOSE MONITOR, 72 HOUR, PHYS INTERP          Time changed     Keep working on the following:  Keep adding bolus BEFORE consuming carbs  Make sure to rotate sites     Follow up  As discussed    Call sooner if any issues develop    Patient Instructions   Skin regime:  Use your triamcinolone cream twice a day, avoid face. PM dose--2 hours before bed  Try to  non-brand Cerave   I would also recommend an anti-histamine (but not benadryl)--Allegra, Clartin or Zyrtec (non-drowsy)  Avoid soap to rash area  Keep your appointment with the Dermatologist        Time: 30 minutes  Barrier: none  Willingness to learn: accepting    Kerri NELSON [facility]  Diabetes and Wound Care    Cc: Nicolasa Guillen NP    With the electronic record, we can now more quickly and easily track our patient diabetic goals. Our diabetes clinical review is in progress and these are the indicators we are monitoring for good diabetes health.     1.) HbA1C less than 7 (measurement of your average blood sugars)  2.) Blood Pressure less than 140/80  3.) LDL less than 100 (bad cholesterol)  4.) HbA1C is checked in the last 6 months and below 7% (more frequently if not at  goal or adjusting medications)  5.) LDL is checked in the last 12 months (more frequently if not at goal or adjusting medications)  6.) Taking one baby aspirin daily (unless otherwise instructed)  7.) No tobacco use  8) Statin use     You have achieved 8 out of 8 of these and I am encouraging you to come in and get tuned up to achieve 8 out of 8!  Here is what you have achieved so far in my goals for you:  1.) HbA1C  less than 7:                              YES--good for age    Your last  HbA1C  Lab Results   Component Value Date    A1C 7.5 03/21/2023    A1C 7.2 12/20/2022    A1C 7.2 05/13/2022    A1C 7.1 11/23/2021    A1C 6.9 01/29/2021    A1C 7.2 07/01/2020    A1C 7.9 12/16/2019    A1C 7.5 09/11/2019    A1C 7.3 06/07/2019      2.) Blood Pressure less than 140/80:       YES     Your last    BP Readings from Last 1 Encounters:   05/04/23 101/67     3.) LDL less than 100:                              YES      Your last     LDL Cholesterol Calculated   Date Value Ref Range Status   02/13/2023 56 <=100 mg/dL Final   05/05/2021 49 <100 mg/dL Final     Comment:     Desirable:       <100 mg/dl       4.) Checked HbA1C in the past 6 months: YES      5.) Checked LDL in the past 12 months:    YES    6.) Taking one aspirin daily:                       YES     7.) No tobacco use:                                        YES      8.) Statin use      YES       Insurance requirements:  1.  Patient has been seen in the clinic within the last 6 month  2. Diagnosis of KAREEM, managed as a Type 1 for >6 months  3. Has been testing their BGs >4x/day using her Dexcom CGM for >90 days  4. Uses an insulin pump for >6 months  5. Requires frequent adjustments to their treatment regimen based on BGM and/or CGM testing results.          Addendum:  Patient has been testing their BGs >4x/day using her personal Dexcom G6

## 2023-05-04 NOTE — PATIENT INSTRUCTIONS
Skin regime:  Use your triamcinolone cream twice a day, avoid face. PM dose--2 hours before bed  Try to  non-brand Cerave   I would also recommend an anti-histamine (but not benadryl)--Allegra, Clartin or Zyrtec (non-drowsy)  Avoid soap to rash area  Keep your appointment with the Dermatologist

## 2023-05-08 NOTE — PROGRESS NOTES
"   05/04/23 1500   Quick Adds   Quick Adds Certification;Vestibular Eval   Type of Visit Initial OP PT Evaluation   General Information   Start of Care Date 05/04/23   Referring Physician Nicolasa Guillen NP   Orders Evaluate and Treat as Indicated   Order Date 03/21/23   Medical Diagnosis Vertigo; BPPV   Onset of illness/injury or Date of Surgery 11/04/22  (Pt reports approx 6 months ago)   Precautions/Limitations   (Insulin pump; no BP on LUE)   Surgical/Medical history reviewed Yes;Other (see commments)   Pertinent history of current vestibular problem (include personal factors and/or comorbidities that impact the POC)    (None)   Pertinent history of current problem (include personal factors and/or comorbidities that impact the POC) Pt reports approx 6 months ago she woke up one morning and felt a big \"whoosh\" in her head became very dizzy (more light headedness) and she passed out on to the floor - she woke up a short while later.  Did not go to the ER, but followed up with her doctor later on and found to have A-fib.  Pt reports intermittent dizziness (not vertigo) where she reports vison becomes blurry and that makes her dizzy/more dysequilibrium.  Pt reports this happens with exertion and she does note palpitations with this as well.  Pt reports noticing this as well if her BG becomes sig high or low.  Pt also reports this with raising her HOB up or sometimes sit->sup - again a \"whooshy\" feeling, not vertigo.  Pt also reports this with forward bending or getting up out of car as well.  (Med Hx: Osteoporosis; COPD; HTN; paroxysmal A-fib (on Eliquis - follows w/ cardiology); h/o RA; lumbar disc dz; breast cx w/ B mastectomy (radical L); R TKA; R TSA; KAREEM (managed as Type1))   Pertinent Visual History  Blurry vision at times as noted above; bilat cataracts - pt states not bad and follows with an ophthalmologist.  Pt later reported having a \"lazy\" R eye as well as astygmatysm.   Prior level of functional mobility " Transfers;Ambulation;ADL   Transfers Ind   Ambulation Ind   ADL Ind   Prior level of function comment Pt Ind with all ADLs/IADLs except does not drive d/t her diabetes - lots of friends willing to provide transport   Diagnostic Tests   (Recent blood work unremarkable. Recent carotid US unremarkable - Per 3/21/23 visit note with Nicolasa Guillen NP)   Current Community Support Family/friend caregiver   Patient role/Employment history Retired   Living environment Apartment/condo   Home/Community Accessibility Comments No concerns   Current Assistive Devices   (Uses 4WW for walking long distances for exercise for energy conservation.)   Patient/Family Goals Statement Reduce dizziness as able.   Fall Risk Screen   Fall screen completed by PT   Have you fallen 2 or more times in the past year? No   Have you fallen and had an injury in the past year? Yes  (Only 1 fall in the last year, as described above, when pt passed out.)   Is patient a fall risk? No   Abuse Screen (yes response referral indicated)   Feels Unsafe at Home or Work/School no   Feels Threatened by Someone no   Does Anyone Try to Keep You From Having Contact with Others or Doing Things Outside Your Home? no   Physical Signs of Abuse Present no   Patient needs abuse support services and resources No   System Outcome Measures   Outcome Measures BPPV   Dizziness Handicap Inventory (score out of 100) A decrease in score by 17.18 or greater indicates a clinically significant change in symptoms. 32   Pain   Patient currently in pain No  (No c/o pain at rest, but later pt c/o LBP with Isis-Hallpike testing to where had to modify)   Vitals Signs   Heart Rate 83   SpO2 97   Blood Pressure 138/70  (in sitting)   Cognitive Status Examination   Orientation orientation to person, place and time   Level of Consciousness alert   Follows Commands and Answers Questions 100% of the time   Personal Safety and Judgment intact   Memory intact   Observation   Observation Pt arrived  "accompanied by herself and exhibited no signs of acute distress   Posture   Posture Forward head position;Protracted shoulders;Kyphosis  (Mod/sig accentuated thoracic kyphosis and reduced lumbar lordosis)   Gait   Gait Comments Pt amb Ind in the clinic with step through pattern and no unsteadiness   Cervicogenic Screen   Neck ROM Mildly limited in all directions, but no pain and no symptom provocation   Oculomotor Exam   Smooth Pursuit Normal   Smooth Pursuit Comment   (Pt had difficulty keeping eyes focused initially d/t her astygmatysm and \"lazy\" R eye, but did eventually follow consistently smoothly.)   Saccades Normal   VOR Normal   VOR Cancellation Normal   Rapid Head Thrust Normal   Convergence Testing Normal   Infrared Goggle Exam or Frenzel Lense Exam   Vestibular Suppressant in Last 24 Hours? No   Exam completed with Frenzel Lenses   Spontaneous Nystagmus Negative   Gaze Evoked Nystagmus Negative   Head Shake Horizontal Nystagmus Negative   Berwind-Hallpike (right) Other  (Positive 2 separate times with significant \"whooshing\" symptom of approx 5\" delay and lasting 10\" - difficult to assess nystagmus as pt kept blinking and re-focusing even with Frenzels on.  Pt also reported mod \"whooshing\" upon return to sitting.)   Berwind-Hallpike (right) comments Due to pt c/o significant low back pain in flat supine, had to modify Isis-Hallpike and roll tests to having small bolster under knees and leaned head back over 2 thick pillows under pt's upper back versus over end of table.   Berwind-Hallpike (Left) Negative   [facility] Supine Roll Test (Right) Negative   [facility] Supine Roll Test (Left) Negative   BPPV Canal(s) R Posterior   BPPV Type Canalithasis   Other Infrared Goggle Exam or Frenzel Lense Exam Comments Hyperventilation test = negative   Planned Therapy Interventions   Planned Therapy Interventions balance training;neuromuscular re-education;visual perception;other (see comments)   Planned Therapy Interventions Comment Epley " "maneuver   Clinical Impression   Criteria for Skilled Therapeutic Interventions Met yes, treatment indicated   PT Diagnosis BPPV   Influenced by the following impairments Vertigo/\"whooshing\" with certain head movements/positioning; balance; dysequilibrium   Functional limitations due to impairments Bed mobility, ambulation, and ADLs at times   Clinical Presentation Evolving/Changing   Clinical Presentation Rationale Clinical judgment   Clinical Decision Making (Complexity) Moderate complexity   Therapy Frequency 1 time/week   Predicted Duration of Therapy Intervention (days/wks) 5 weeks   Risk & Benefits of therapy have been explained Yes   Patient, Family & other staff in agreement with plan of care Yes   Clinical Impression Comments Pt is a 79 y/o female w/ multiple whom reports dizziness/dysequilibrium with increased exertion which she attributes to her A-fib and a similar symptom with excessively high/low BG.  She also reports a somewhat distinct other symptom of \"whooshing\" (denies vertigo when educated on what vertigo entails) typically with sit<->sup, forward bending, or getting out of car.  These symptoms along with signs of consistently positive/symptomatic R Isis-Hallpike (nystagmus difficult to discern d/t blinking) are most consistent with R posterior canal BPPV.  Pt would benefit from skilled PT services to address these impairments in order to help improve function and reduce fall risk.   Education Assessment   Preferred Learning Style Demonstration   GOALS   PT Eval Goals 1;2   Goal 1   Goal Identifier STG1   Goal Description Pt will report going at least 1 week without a \"whooshing\" event and improved confidence in related tasks.   Target Date 05/18/23   Goal 2   Goal Identifier LTG1   Goal Description Pt will improve DHI score to at most 15% for improved quality of life.   Target Date 06/08/23   Total Evaluation Time   PT Eval, Moderate Complexity Minutes (43608) 50   Therapy Certification "   Certification date from 05/04/23   Certification date to 06/08/23   Medical Diagnosis Vertigo; BPPV   Certification I certify the need for these services furnished under this plan of treatment and while under my care.  (Physician co-signature of this document indicates review and certification of the therapy plan).   I certify the need for these services furnished under this plan of treatment and while under my care. (Physician co-signature of this document indicates review and certification of the therapy plan).

## 2023-05-10 ENCOUNTER — TRANSFERRED RECORDS (OUTPATIENT)
Dept: HEALTH INFORMATION MANAGEMENT | Facility: CLINIC | Age: 78
End: 2023-05-10

## 2023-05-11 ENCOUNTER — ALLIED HEALTH/NURSE VISIT (OUTPATIENT)
Dept: EDUCATION SERVICES | Facility: OTHER | Age: 78
End: 2023-05-11
Attending: NURSE PRACTITIONER
Payer: COMMERCIAL

## 2023-05-11 ENCOUNTER — HOSPITAL ENCOUNTER (OUTPATIENT)
Dept: PHYSICAL THERAPY | Facility: HOSPITAL | Age: 78
Setting detail: THERAPIES SERIES
Discharge: HOME | End: 2023-05-11
Attending: NURSE PRACTITIONER
Payer: MEDICARE

## 2023-05-11 DIAGNOSIS — E13.9 LADA (LATENT AUTOIMMUNE DIABETES IN ADULTS), MANAGED AS TYPE 1 (H): Primary | ICD-10-CM

## 2023-05-11 PROCEDURE — 97112 NEUROMUSCULAR REEDUCATION: CPT | Mod: GP

## 2023-05-11 NOTE — PROGRESS NOTES
Pt came in for a Dexcom and insulin pump download today. This was completed and given to Kerri Elliott.    Ginger Lerner

## 2023-05-12 ENCOUNTER — TELEPHONE (OUTPATIENT)
Dept: FAMILY MEDICINE | Facility: OTHER | Age: 78
End: 2023-05-12

## 2023-05-12 NOTE — PROGRESS NOTES
Noted download.     Plan:  No adjustments.    There's no patterns.    Encouraged Bo to work on the timing of her carb boluses      Will see her next month.     Kerri Elliott APRN FNP-BC  Diabetes and Wound Care

## 2023-05-12 NOTE — TELEPHONE ENCOUNTER
9:32 AM    Reason for Call: Phone Call    Description: Patient called in stating that she stopped taking it was her symbacort because it was giving her a burning rash. The dermatologist told her she should call in and let you know that she had stopped taking it. The rash went away once she stopped taking it. Please call patient back.    Was an appointment offered for this call? No  If yes : Appointment type              Date    Preferred method for responding to this message: Telephone Call  What is your phone number ? [phone]    If we cannot reach you directly, may we leave a detailed response at the number you provided? Yes    Can this message wait until your PCP/provider returns, if available today? Not applicable, provider in clinic    Samantha Orozco

## 2023-05-15 NOTE — TELEPHONE ENCOUNTER
Lvm for pt to call back to schedule appt with karlee for breathing-per provider ok to schedule on a same day appt

## 2023-05-24 NOTE — PROGRESS NOTES
Assessment & Plan     Pulmonary emphysema, unspecified emphysema type (H)  Controlled without Symbicort. Feels the Sprivia works well. Will continue and reassess in 3 months, sooner with new or worsening symptoms.     Diastolic dysfunction with chronic heart failure (H)  Stable. Continue lastix with follow-up with cardiology.       Return in about 3 months (around 8/30/2023).    Nicolasa Guillen NP  [facility] - [place] is a 78 year old, presenting for the following health issues:  COPD      HPI     COPD Follow-Up    Overall, how are your COPD symptoms since your last clinic visit?  No change    How much fatigue or shortness of breath do you have when you are walking?  Same as usual    How much shortness of breath do you have when you are resting?  None    How often do you cough? Sometimes    Have you noticed any change in your sputum/phlegm?  No    Have you experienced a recent fever? No    Please describe how far you can walk without stopping to rest:  Walks 3-4 miles daily with walker     How many flights of stairs are you able to walk up without stopping?  None    Have you had any Emergency Room Visits, Urgent Care Visits, or Hospital Admissions because of your COPD since your last office visit?  No     Currently using Spriva without side effects. Was also on Symbicort, but she had to stop this. Was causing a rash. Feels well. No increased shortness of breath since stopping the Symbicort.     She does not smoke.     PFTs done in 2021; emphysema was seen.     She also has congestive heart failure with diastolic dysfunction. Recent BNP was normal. Follows with cardiology. Taking lasix 40 mg in the morning and 20 mg in the evening. No increased lower leg swelling.       History   Smoking Status     Never   Smokeless Tobacco     Never     No results found for: FEV1, [id number]          Review of Systems   Constitutional, HEENT, cardiovascular, pulmonary, gi and gu systems are  negative, except as otherwise noted.      Objective    /52 (BP Location: Right arm, Patient Position: Chair, Cuff Size: Adult Regular)   Pulse 70   Temp 98.6  F (37  C) (Tympanic)   Resp 16   Wt 66.2 kg (146 lb)   SpO2 98%   BMI 25.86 kg/m    Body mass index is 25.86 kg/m .  Physical Exam   GENERAL: healthy, alert and no distress  EYES: Eyes grossly normal to inspection, PERRL and conjunctivae and sclerae normal  HENT: ear canals and TM's normal, nose and mouth without ulcers or lesions  NECK: no adenopathy, no asymmetry, masses, or scars and thyroid normal to palpation  RESP: lungs clear to auscultation - no rales, rhonchi or wheezes  CV: regular rate and rhythm, normal S1 S2, no S3 or S4, no murmur, click or rub, no peripheral edema and peripheral pulses strong  ABDOMEN: soft, nontender, no hepatosplenomegaly, no masses and bowel sounds normal  NEURO: Normal strength and tone, mentation intact and speech normal  PSYCH: mentation appears normal, affect normal/bright

## 2023-05-29 DIAGNOSIS — E87.6 HYPOKALEMIA: ICD-10-CM

## 2023-05-29 DIAGNOSIS — K21.9 GASTROESOPHAGEAL REFLUX DISEASE WITHOUT ESOPHAGITIS: ICD-10-CM

## 2023-05-30 ENCOUNTER — OFFICE VISIT (OUTPATIENT)
Dept: FAMILY MEDICINE | Facility: OTHER | Age: 78
End: 2023-05-30
Attending: NURSE PRACTITIONER
Payer: COMMERCIAL

## 2023-05-30 VITALS
OXYGEN SATURATION: 98 % | WEIGHT: 146 LBS | DIASTOLIC BLOOD PRESSURE: 52 MMHG | TEMPERATURE: 98.6 F | RESPIRATION RATE: 16 BRPM | BODY MASS INDEX: 25.86 KG/M2 | SYSTOLIC BLOOD PRESSURE: 100 MMHG | HEART RATE: 70 BPM

## 2023-05-30 DIAGNOSIS — J43.9 PULMONARY EMPHYSEMA, UNSPECIFIED EMPHYSEMA TYPE (H): Primary | ICD-10-CM

## 2023-05-30 DIAGNOSIS — I50.32 DIASTOLIC DYSFUNCTION WITH CHRONIC HEART FAILURE (H): ICD-10-CM

## 2023-05-30 PROCEDURE — 99213 OFFICE O/P EST LOW 20 MIN: CPT | Performed by: NURSE PRACTITIONER

## 2023-05-30 PROCEDURE — G0463 HOSPITAL OUTPT CLINIC VISIT: HCPCS

## 2023-05-30 ASSESSMENT — PAIN SCALES - GENERAL: PAINLEVEL: NO PAIN (0)

## 2023-05-31 RX ORDER — OMEPRAZOLE 40 MG/1
CAPSULE, DELAYED RELEASE ORAL
Qty: 90 CAPSULE | Refills: 0 | Status: SHIPPED | OUTPATIENT
Start: 2023-05-31 | End: 2023-08-31

## 2023-05-31 NOTE — TELEPHONE ENCOUNTER
Omeprazole      Last Written Prescription Date:  12/20/22  Last Fill Quantity: 90,   # refills: 0  Last Office Visit: 5/30/23  Future Office visit:    Next 5 appointments (look out 90 days)    Aug 23, 2023  9:00 AM  (Arrive by 8:45 AM)  Return Visit with Jin Corea DO  [facility] [place] ([facility] [place] ) [address] [place] [address]  [phone]

## 2023-06-01 RX ORDER — POTASSIUM CHLORIDE 1500 MG/1
TABLET, EXTENDED RELEASE ORAL
Qty: 180 TABLET | Refills: 3 | Status: SHIPPED | OUTPATIENT
Start: 2023-06-01 | End: 2024-06-12

## 2023-06-09 DIAGNOSIS — E13.9 LADA (LATENT AUTOIMMUNE DIABETES IN ADULTS), MANAGED AS TYPE 1 (H): ICD-10-CM

## 2023-06-12 RX ORDER — PROCHLORPERAZINE 25 MG/1
SUPPOSITORY RECTAL
Qty: 1 EACH | Refills: 1 | Status: SHIPPED | OUTPATIENT
Start: 2023-06-12 | End: 2024-01-29

## 2023-06-12 NOTE — TELEPHONE ENCOUNTER
Dexcom 6 Transmitter      Last Written Prescription Date:  3.31.23  Last Fill Quantity: #1,   # refills: 0  Last Office Visit: 5.30.23  Future Office visit:    Next 5 appointments (look out 90 days)    Aug 23, 2023  9:00 AM  (Arrive by 8:45 AM)  Return Visit with Jin Corea DO  [facility] [place] ([facility] [place] ) [address] [facility] [facility] [address] [place] [address]  [phone]   Aug 31, 2023  3:00 PM  (Arrive by 2:45 PM)  SHORT with Nicolasa Guillen NP  [facility] [place] ([facility] [place] ) [address]  [place] [address]  [phone]           Routing refill request to provider for review/approval because:  Drug not on the [facility], P or [facility] refill protocol or controlled substance

## 2023-06-28 ENCOUNTER — MEDICAL CORRESPONDENCE (OUTPATIENT)
Dept: HEALTH INFORMATION MANAGEMENT | Facility: CLINIC | Age: 78
End: 2023-06-28

## 2023-07-05 ENCOUNTER — MEDICAL CORRESPONDENCE (OUTPATIENT)
Dept: HEALTH INFORMATION MANAGEMENT | Facility: CLINIC | Age: 78
End: 2023-07-05

## 2023-08-03 DIAGNOSIS — E13.9 LADA (LATENT AUTOIMMUNE DIABETES IN ADULTS), MANAGED AS TYPE 1 (H): ICD-10-CM

## 2023-08-03 RX ORDER — PROCHLORPERAZINE 25 MG/1
SUPPOSITORY RECTAL
Qty: 3 EACH | Refills: 5 | Status: SHIPPED | OUTPATIENT
Start: 2023-08-03 | End: 2024-04-22

## 2023-08-03 NOTE — TELEPHONE ENCOUNTER
Dexcom G6      Last Written Prescription Date:  02/01/23  Last Fill Quantity: 3,   # refills: 5  Last Office Visit: 05/04/23  Future Office visit:    Next 5 appointments (look out 90 days)      Aug 23, 2023  9:00 AM  (Arrive by 8:45 AM)  Return Visit with Jin Corea DO  [facility] [place] ([facility] [place] ) [address]  [place] [address]  [phone]     Aug 31, 2023  3:00 PM  (Arrive by 2:45 PM)  SHORT with Nicolasa Guillen NP  [facility] [place] ([facility] [place] ) [address]  [place] [address]  [phone]

## 2023-08-09 ENCOUNTER — TELEPHONE (OUTPATIENT)
Dept: CARDIOLOGY | Facility: OTHER | Age: 78
End: 2023-08-09

## 2023-08-28 ENCOUNTER — MEDICAL CORRESPONDENCE (OUTPATIENT)
Dept: HEALTH INFORMATION MANAGEMENT | Facility: HOSPITAL | Age: 78
End: 2023-08-28

## 2023-08-29 DIAGNOSIS — E78.2 MIXED HYPERLIPIDEMIA: Primary | ICD-10-CM

## 2023-08-29 DIAGNOSIS — K21.9 GASTROESOPHAGEAL REFLUX DISEASE WITHOUT ESOPHAGITIS: ICD-10-CM

## 2023-08-30 NOTE — PROGRESS NOTES
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 On statin. BP at goal. Eye exam UTD. Will see her back in 6 months, sooner with new or worsening symptoms.     Mixed hyperlipidemia  On statin. Tolerating. Lipids well controlled in 2/2023.     Thrombocytopenia (H)  Mild. Will recheck CBC today. Will notify patient of the results when available and intervene accordingly. Most likely from the tamoxifen.     Essential hypertension  Well controlled. Continue current medications. Encouraged daily exercise and a low sodium diet. Recommended checking BP's 2x/wk, call the clinic if consistantly s>140 or d>90. Follow up in 6 months.     Diastolic dysfunction with chronic heart failure (H)  Stable. Continue lasix and follow-up with cardiology.     Pulmonary emphysema, unspecified emphysema type (H)  Asthma, unspecified asthma severity, unspecified whether complicated, unspecified whether persistent  Breathing controlled. Continue current inhalers and reassess in 6 months.     956}     BMI:   Estimated body mass index is 25.51 kg/m  as calculated from the following:    Height as of this encounter: 1.6 m (5' 3\").    Weight as of this encounter: 65.3 kg (144 lb).           Nicolasa Guillen NP  [facility] - [place] is a 78 year old, presenting for the following health issues:  Diabetes, Hypertension, Lipids, and Heart Failure      HPI     Diabetes Follow-up    How often are you checking your blood sugar? Continuous glucose monitor  What time of day are you checking your blood sugars (select all that apply)?  Continuous reading  Have you had any blood sugars above 200?  Yes   Have you had any blood sugars below 70?  Yes - rare  What symptoms do you notice when your blood sugar is low?  Shaky, Blurred vision, and Thirsty, hot,   What concerns do you have today about your diabetes? None, Just spoke with diabetic education   Do you have any of " these symptoms? (Select all that apply)  Excessive thirst and Blurry vision  A1C was 7.5 on 3/21/23.   On asa.   Denies chest pain, shortness of breath, dizziness, syncope, or palpitations. No nausea or vomiting. Feeling well.       Atrial Fibrillation Follow-up    Symptoms: no recent chest pain, significant palpitations, dizziness/lightheadedness, dyspnea, or increased peripheral edema.  Stroke prevention: DOAC (Eliquis, Xarelto, Pradaxa)  No bleeding issues.         3/21/2023     2:28 PM 3/31/2023    10:22 AM 5/4/2023    12:49 PM 5/30/2023     9:37 AM 8/31/2023     2:07 PM   Date   Pulse 93 80 84 70 74     Current        She does take lasix 40 mg due to diastolic heart failure. No lower leg swelling.         Hyperlipidemia Follow-Up    Are you regularly taking any medication or supplement to lower your cholesterol?   Yes- simvastatin  Are you having muscle aches or other side effects that you think could be caused by your cholesterol lowering medication?  No    Hypertension Follow-up    Do you check your blood pressure regularly outside of the clinic? Yes   Are you following a low salt diet? Yes  Are your blood pressures ever more than 140 on the top number (systolic) OR more   than 90 on the bottom number (diastolic), for example 140/90? No  -Taking ramipril without side effects.   -Also takes lasix 40 mg daily with potassium supplements for diastolic heart failure. Feels this is stable. No lower leg swelling.    BP Readings from Last 2 Encounters:   08/31/23 134/64   05/30/23 100/52     Hemoglobin A1C (%)   Date Value   03/21/2023 7.5 (H)   12/20/2022 7.2 (H)   01/29/2021 6.9 (H)   07/01/2020 7.2 (H)     LDL Cholesterol Calculated (mg/dL)   Date Value   02/13/2023 56   12/20/2022 80   05/05/2021 49   07/01/2020 36     Platelets intermittently slightly low. On tamoxifen for a h/o breast cancer. No bleeding issues.     Heart Failure Follow-up  Are you experiencing any shortness of breath? No  Are you experiencing  "any swelling in your legs or feet?  No  Are you using more pillows than usual? No  Do you cough at night?  No   Do you check your weight daily?  Yes  Have you had a weight change recently?  No  Are you having any of the following side effects from your medications? (Select all that apply)  The patient does not report symptoms of dizziness, fatigue, cough, swelling, or slow heart beat.  Since your last visit, how many times have you gone to the cardiologist, urgent care, emergency room, or hospital because of your heart failure?   None        Review of Systems   Constitutional, HEENT, cardiovascular, pulmonary, gi and gu systems are negative, except as otherwise noted.      Objective    /64   Pulse 74   Temp 98.2  F (36.8  C) (Tympanic)   Resp 18   Ht 1.6 m (5' 3\")   Wt 65.3 kg (144 lb)   SpO2 96%   BMI 25.51 kg/m    Body mass index is 25.51 kg/m .  Physical Exam   GENERAL: healthy, alert and no distress  EYES: Eyes grossly normal to inspection, PERRL and conjunctivae and sclerae normal  HENT: ear canals and TM's normal, nose and mouth without ulcers or lesions  NECK: no adenopathy, no asymmetry, masses, or scars and thyroid normal to palpation  RESP: lungs clear to auscultation - no rales, rhonchi or wheezes  CV: regular rate and rhythm, no murmur, bilateral 1+ peripheral edema and peripheral pulses strong  NEURO: Normal strength and tone, mentation intact and speech normal  PSYCH: mentation appears normal, affect normal/bright  Diabetic foot exam: normal DP and PT pulses, no trophic changes or ulcerative lesions, and normal sensory exam, pes planus present    Labs in process                  "

## 2023-08-31 ENCOUNTER — OFFICE VISIT (OUTPATIENT)
Dept: FAMILY MEDICINE | Facility: OTHER | Age: 78
End: 2023-08-31
Attending: NURSE PRACTITIONER
Payer: COMMERCIAL

## 2023-08-31 VITALS
BODY MASS INDEX: 25.52 KG/M2 | TEMPERATURE: 98.2 F | OXYGEN SATURATION: 96 % | WEIGHT: 144 LBS | HEIGHT: 63 IN | RESPIRATION RATE: 18 BRPM | SYSTOLIC BLOOD PRESSURE: 126 MMHG | DIASTOLIC BLOOD PRESSURE: 56 MMHG | HEART RATE: 74 BPM

## 2023-08-31 DIAGNOSIS — I10 ESSENTIAL HYPERTENSION: ICD-10-CM

## 2023-08-31 DIAGNOSIS — J43.9 PULMONARY EMPHYSEMA, UNSPECIFIED EMPHYSEMA TYPE (H): ICD-10-CM

## 2023-08-31 DIAGNOSIS — E78.2 MIXED HYPERLIPIDEMIA: ICD-10-CM

## 2023-08-31 DIAGNOSIS — D69.6 THROMBOCYTOPENIA (H): ICD-10-CM

## 2023-08-31 DIAGNOSIS — J45.909 ASTHMA, UNSPECIFIED ASTHMA SEVERITY, UNSPECIFIED WHETHER COMPLICATED, UNSPECIFIED WHETHER PERSISTENT: ICD-10-CM

## 2023-08-31 DIAGNOSIS — E13.9 LADA (LATENT AUTOIMMUNE DIABETES IN ADULTS), MANAGED AS TYPE 1 (H): Primary | ICD-10-CM

## 2023-08-31 DIAGNOSIS — E87.1 HYPONATREMIA: Primary | ICD-10-CM

## 2023-08-31 DIAGNOSIS — I50.32 DIASTOLIC DYSFUNCTION WITH CHRONIC HEART FAILURE (H): ICD-10-CM

## 2023-08-31 LAB
ANION GAP SERPL CALCULATED.3IONS-SCNC: 10 MMOL/L (ref 7–15)
BASOPHILS # BLD AUTO: 0.1 10E3/UL (ref 0–0.2)
BASOPHILS NFR BLD AUTO: 1 %
BUN SERPL-MCNC: 12.6 MG/DL (ref 8–23)
CALCIUM SERPL-MCNC: 9.3 MG/DL (ref 8.8–10.2)
CHLORIDE SERPL-SCNC: 96 MMOL/L (ref 98–107)
CREAT SERPL-MCNC: 0.78 MG/DL (ref 0.51–0.95)
CREAT UR-MCNC: 41.6 MG/DL
DEPRECATED HCO3 PLAS-SCNC: 27 MMOL/L (ref 22–29)
EOSINOPHIL # BLD AUTO: 0.6 10E3/UL (ref 0–0.7)
EOSINOPHIL NFR BLD AUTO: 7 %
ERYTHROCYTE [DISTWIDTH] IN BLOOD BY AUTOMATED COUNT: 12.5 % (ref 10–15)
EST. AVERAGE GLUCOSE BLD GHB EST-MCNC: 160 MG/DL
GFR SERPL CREATININE-BSD FRML MDRD: 78 ML/MIN/1.73M2
GLUCOSE SERPL-MCNC: 162 MG/DL (ref 70–99)
HBA1C MFR BLD: 7.2 %
HCT VFR BLD AUTO: 35.4 % (ref 35–47)
HGB BLD-MCNC: 11.9 G/DL (ref 11.7–15.7)
IMM GRANULOCYTES # BLD: 0 10E3/UL
IMM GRANULOCYTES NFR BLD: 1 %
LYMPHOCYTES # BLD AUTO: 2.5 10E3/UL (ref 0.8–5.3)
LYMPHOCYTES NFR BLD AUTO: 31 %
MCH RBC QN AUTO: 31.2 PG (ref 26.5–33)
MCHC RBC AUTO-ENTMCNC: 33.6 G/DL (ref 31.5–36.5)
MCV RBC AUTO: 93 FL (ref 78–100)
MICROALBUMIN UR-MCNC: <12 MG/L
MICROALBUMIN/CREAT UR: NORMAL MG/G{CREAT}
MONOCYTES # BLD AUTO: 0.7 10E3/UL (ref 0–1.3)
MONOCYTES NFR BLD AUTO: 8 %
NEUTROPHILS # BLD AUTO: 4.3 10E3/UL (ref 1.6–8.3)
NEUTROPHILS NFR BLD AUTO: 52 %
NRBC # BLD AUTO: 0 10E3/UL
NRBC BLD AUTO-RTO: 0 /100
PLATELET # BLD AUTO: 162 10E3/UL (ref 150–450)
POTASSIUM SERPL-SCNC: 3.6 MMOL/L (ref 3.4–5.3)
RBC # BLD AUTO: 3.81 10E6/UL (ref 3.8–5.2)
SODIUM SERPL-SCNC: 133 MMOL/L (ref 136–145)
WBC # BLD AUTO: 8.2 10E3/UL (ref 4–11)

## 2023-08-31 PROCEDURE — 85004 AUTOMATED DIFF WBC COUNT: CPT | Mod: ZL | Performed by: NURSE PRACTITIONER

## 2023-08-31 PROCEDURE — G0463 HOSPITAL OUTPT CLINIC VISIT: HCPCS

## 2023-08-31 PROCEDURE — 83036 HEMOGLOBIN GLYCOSYLATED A1C: CPT | Mod: ZL | Performed by: NURSE PRACTITIONER

## 2023-08-31 PROCEDURE — 99214 OFFICE O/P EST MOD 30 MIN: CPT | Performed by: NURSE PRACTITIONER

## 2023-08-31 PROCEDURE — 82570 ASSAY OF URINE CREATININE: CPT | Mod: ZL | Performed by: NURSE PRACTITIONER

## 2023-08-31 PROCEDURE — 36415 COLL VENOUS BLD VENIPUNCTURE: CPT | Mod: ZL | Performed by: NURSE PRACTITIONER

## 2023-08-31 PROCEDURE — 80048 BASIC METABOLIC PNL TOTAL CA: CPT | Mod: ZL | Performed by: NURSE PRACTITIONER

## 2023-08-31 RX ORDER — SIMVASTATIN 40 MG
40 TABLET ORAL AT BEDTIME
Qty: 90 TABLET | Refills: 0 | Status: SHIPPED | OUTPATIENT
Start: 2023-08-31 | End: 2023-12-04

## 2023-08-31 RX ORDER — OMEPRAZOLE 40 MG/1
CAPSULE, DELAYED RELEASE ORAL
Qty: 90 CAPSULE | Refills: 0 | Status: SHIPPED | OUTPATIENT
Start: 2023-08-31 | End: 2023-12-01

## 2023-08-31 ASSESSMENT — PAIN SCALES - GENERAL: PAINLEVEL: NO PAIN (0)

## 2023-08-31 NOTE — TELEPHONE ENCOUNTER
Simvastatin       Last Written Prescription Date:  12/20/22  Last Fill Quantity: 90,   # refills: 1  Last Office Visit: 05/30/23  Future Office visit:    Next 5 appointments (look out 90 days)      Aug 31, 2023  3:00 PM  (Arrive by 2:45 PM)  SHORT with Nicolasa Guillen NP  [facility] [place] ([facility] [place] ) [address]  [place] [address]  [phone]     Sep 06, 2023  4:00 PM  (Arrive by 3:45 PM)  Return Visit with Jin Corea DO  [facility] [place] ([facility] [place] ) [address]  [place] [address]  [phone]             Routing refill request to provider for review/approval because:      Omeprazole       Last Written Prescription Date:  05/31/23  Last Fill Quantity: 90,   # refills: 0  Last Office Visit: 05/30/23  Future Office visit:    Next 5 appointments (look out 90 days)      Aug 31, 2023  3:00 PM  (Arrive by 2:45 PM)  SHORT with Nicolasa Guillen NP  [facility] [place] ([facility] [place] ) [address]  [place] [address]  [phone]     Sep 06, 2023  4:00 PM  (Arrive by 3:45 PM)  Return Visit with Jin Corea DO  [facility] [place] ([facility] [place] ) [address]  [place] [address]  [phone]             Routing refill request to provider for review/approval because:

## 2023-09-06 ENCOUNTER — OFFICE VISIT (OUTPATIENT)
Dept: CARDIOLOGY | Facility: OTHER | Age: 78
End: 2023-09-06
Attending: INTERNAL MEDICINE
Payer: MEDICARE

## 2023-09-06 ENCOUNTER — LAB (OUTPATIENT)
Dept: LAB | Facility: OTHER | Age: 78
End: 2023-09-06
Payer: COMMERCIAL

## 2023-09-06 VITALS
WEIGHT: 144 LBS | TEMPERATURE: 97.2 F | HEART RATE: 83 BPM | HEIGHT: 63 IN | DIASTOLIC BLOOD PRESSURE: 68 MMHG | OXYGEN SATURATION: 99 % | SYSTOLIC BLOOD PRESSURE: 128 MMHG | BODY MASS INDEX: 25.52 KG/M2

## 2023-09-06 DIAGNOSIS — J43.9 PULMONARY EMPHYSEMA, UNSPECIFIED EMPHYSEMA TYPE (H): ICD-10-CM

## 2023-09-06 DIAGNOSIS — R06.09 DOE (DYSPNEA ON EXERTION): ICD-10-CM

## 2023-09-06 DIAGNOSIS — J44.9 COPD, MILD (H): Primary | ICD-10-CM

## 2023-09-06 DIAGNOSIS — E87.1 HYPONATREMIA: ICD-10-CM

## 2023-09-06 DIAGNOSIS — I50.32 DIASTOLIC DYSFUNCTION WITH CHRONIC HEART FAILURE (H): ICD-10-CM

## 2023-09-06 DIAGNOSIS — R94.31 PROLONGED QT INTERVAL: ICD-10-CM

## 2023-09-06 DIAGNOSIS — E78.2 MIXED HYPERLIPIDEMIA: ICD-10-CM

## 2023-09-06 DIAGNOSIS — J45.909 ASTHMA, UNSPECIFIED ASTHMA SEVERITY, UNSPECIFIED WHETHER COMPLICATED, UNSPECIFIED WHETHER PERSISTENT: ICD-10-CM

## 2023-09-06 DIAGNOSIS — I10 ESSENTIAL HYPERTENSION: ICD-10-CM

## 2023-09-06 LAB
ANION GAP SERPL CALCULATED.3IONS-SCNC: 11 MMOL/L (ref 7–15)
BUN SERPL-MCNC: 13 MG/DL (ref 8–23)
CALCIUM SERPL-MCNC: 8.9 MG/DL (ref 8.8–10.2)
CHLORIDE SERPL-SCNC: 99 MMOL/L (ref 98–107)
CREAT SERPL-MCNC: 0.79 MG/DL (ref 0.51–0.95)
DEPRECATED HCO3 PLAS-SCNC: 26 MMOL/L (ref 22–29)
EGFRCR SERPLBLD CKD-EPI 2021: 77 ML/MIN/1.73M2
GLUCOSE SERPL-MCNC: 209 MG/DL (ref 70–99)
POTASSIUM SERPL-SCNC: 4.4 MMOL/L (ref 3.4–5.3)
SODIUM SERPL-SCNC: 136 MMOL/L (ref 136–145)

## 2023-09-06 PROCEDURE — 99213 OFFICE O/P EST LOW 20 MIN: CPT | Performed by: INTERNAL MEDICINE

## 2023-09-06 PROCEDURE — 82310 ASSAY OF CALCIUM: CPT | Mod: ZL

## 2023-09-06 PROCEDURE — G0463 HOSPITAL OUTPT CLINIC VISIT: HCPCS | Performed by: INTERNAL MEDICINE

## 2023-09-06 PROCEDURE — 36415 COLL VENOUS BLD VENIPUNCTURE: CPT | Mod: ZL

## 2023-09-06 PROCEDURE — 82947 ASSAY GLUCOSE BLOOD QUANT: CPT | Mod: ZL

## 2023-09-06 ASSESSMENT — PAIN SCALES - GENERAL: PAINLEVEL: NO PAIN (0)

## 2023-09-06 NOTE — PROGRESS NOTES
[facility]   CARDIOLOGY PROGRESS NOTE     Chief Complaint   Patient presents with    Heart Problem     1 year follow up           Diagnosis:  1.  FRANCO 2/2 diastolic CHF/minimal COPD/Asthma.  2.  Chest pain. Cardiac cath on 7/15/15-minimal dz.   3.  HTN-controlled.  4.  Prolonged QT.  5.  Asthma.  6.  Fatty liver on 11/7/2007.  7.  CHF-diastolic grade 1 on 1/19/15. Normal on 5/26/21. Lower ext edema with BNP of 661 on 5/5/21.   8.  DM-2-controlled.  9.  Hypothyroidism.  10.  Hyperlipidemia-controlled.  11.  Hypertriglyceridemia-uncontrolled.  12.  B12 deficiency at 391 on 5/5/21.   13.  COPD-minimal on 2/18/21.  14.  Elevated BNP at 582 on 8/28/20.  15.  Short runs of A. fib 1/3/2023.  Eliquis started on 1/30/2023.        Assessment/Plan:    1.  FRANCO: Secondary to asthma.  Resolved now that she is on Spiriva.  No concerns.  Follow-up with primary.  2.  CAD: Not having any chest pain or anginal symptoms.  Had a cardiac cath on 7/15/2015.  Found to have minimal disease. 10%/luminal irregularities in the LAD.  Rest of the vessels had no appreciable disease.  Not having chest pain or chest tightness.  Continue medical management.  3.  Hypertension: Controlled.  Pressure today 128/68.  Continue Lasix and ramipril.  4.  DM-2: Stable.  Managed by primary.  Has been walking.  5.  Hyperlipidemia: Continue on Zocor 40 mg daily.  6.  A. fib: New on 1/3/2023.  Being that she has short runs.  It is likely that these will progress to longer runs.  Continue Eliquis.  Would consider diltiazem over metoprolol in the future history of asthma.  No issues and no palpitations.  7.  Follow-up in 6 weeks or sooner with issues.      Interval history:  See above.      HPI:    Mrs. Acuna is being seen by cardiology for dyspnea exertion.  She has had this issue for many years.  She was seen by cardiology in 2015 and 2016 with work-up which included a cardiac catheterization.  She reports being dyspneic on exertion for most of her  life.  Her symptoms are improved with inhalers.     Bo reports being dyspneic for many years.  Her shortness of breath has gotten worse over the last few months to years.  She states that the Spiriva has helped her shortness of breath significantly.  She does carry history of asthma.  She was seen by cardiology through [facility] in 2015 and 2016 for this very issue.  She reportedly had an angiogram in 2002 which was reportedly normal.  She had a repeat cath on 7/15/2015 [facility] with a 10% lesion to her LAD.  All remaining vessels were patent.  She carries a diagnosis of heart failure with preserved ejection fraction but more recently this issue was not identified.  Her cardiac catheterization 2015 also showed normal filling pressures.       She has noted that with vacuuming her house, intermediate through, she will be so significantly short of breath that she will use a fan to improve her air hunger.  As mentioned, she has essentially had 2 normal cardiac catheterizations.  She states she would not be able to do stress testing secondary to the uncomfortable feeling it creates.  Her echo from 5/8/2009 showed diastolic dysfunction grade 1.  This was not identified on her most recent echo from 9/17/2020.  She did not have a significant valvular or chamber abnormalities.  She does carry history of rheumatoid arthritis but states she was told most recently that she has osteoarthritis and not RA.  A normal chest x-ray on 3/19/2021.  PFT's on 2/18/2021 showed minimal COPD.  She states she was exposed to secondhand smoke by her father at a young age.  She herself has never used tobacco.     I believe the top possibilities for her shortness of breath would be a history of asthma, the beta-blocker she is on, the possibility of a DVT/PE, concern for cirrhosis as she was noted to have a fatty liver on imaging from 11/7/2007, PE, diastolic dysfunction, hypothyroidism, and potentially rhythm issues. With the exception  "of a prolonged QT, EKG normal on 5/5/2021.     She also carries diagnosis of diabetes.  Her A1c most recently was 6.9%. She has hyperlipidemia which is well controlled on Zocor 40 mg daily.       She has a history of hypothyroidism.  She has been on levothyroxine 75 mcg.  Her last TSH was on 6/7/2019 and was normal at 1.17.     She also has a history of prolonged QTC.  She has hypertension which has been controlled.      Relevant testing:  Echocardiogram on 2/27/2023:  Global and regional left ventricular function is normal with an EF of 55-60%.  Left ventricular diastolic function is indeterminate.  Right ventricular function, chamber size, wall motion, and thickness are  normal.  Both atria appear normal.  Pulmonary artery systolic pressure cannot be assessed.  The inferior vena cava is normal.  No pericardial effusion is present.    Zio patch on 1/3/2023:  Worn for 3 days and 7 hr's.  After removing artifact, total time was 2 days and 22 hr's. Placed on 1/3/2023 at 2:27 PM and completed on 1/6/2023 at 9:13 PM.  Underlying rhythm was sinus.  Hrt rate ranged from 50 bpm, maximum heart rate of 150 bmp, averaging 73 bmp.  No significant bradycardia, pauses, Mobitz type II or 3rd degree heart block.  No atrial fibrillation on this study.  x3 triggered events and x2 diary entries.  These corresponded to SVE, VE, and sinus rhythm.  x0 runs of VT.    x10 runs of SVT lasting up to 12.8 sec's with a maximum heart rate of 150 bmp.  These short bursts of \"SVT\" are suspicious for A. fib  Rare, <1% of PAC's, atrial couplets, atrial triplets, ventricular couplets, and ventricular triplets.  PVC's at 1.6%.  + episodes of ventricular bigeminy lasting up to 4.6 sec's.  + episodes of ventricular trigeminy lasting up to 20.5 sec's.    US carotid arteries on 1/3/2023:  No hemodynamically significant stenoses are identified at  either carotid bifurcation    V/Q scan on 5/7/21:  The ventilation study demonstrates mildly diminished " lateral  ventilation particularly at the apices.  The perfusion study demonstrates normal symmetric perfusion without  small, moderate or large defect. A shoulder prosthesis results in mild artifact.    Echocardiogram 5/26/2021:  No pericardial effusion is present.  Global and regional left ventricular function is normal with an EF of 55-60%.  Left ventricular diastolic function is normal.  The right ventricle is normal size.  Global right ventricular function is normal.  Both atria appear normal.  Trace mitral insufficiency is present.  Aortic valve is normal in structure and function.  Trace tricuspid insufficiency is present.  Right ventricular systolic pressure is 8 mmHg above the right atrial pressure.  The pulmonic valve is normal.    Echocardiogram on 9/17/2020:  No pericardial effusion is present.  Global and regional left ventricular function is normal with an EF of 60-65%.  Left ventricular diastolic function is normal.  The right ventricle is normal size.  Both atria appear normal.  Trace mitral insufficiency is present.  Aortic valve is normal in structure and function.  Trace tricuspid insufficiency is present.  Right ventricular systolic pressure is 13 mmHg above the right atrial pressure.  The pulmonic valve is normal.  The aorta root is normal.     Echo on 5/8/2009:   1. Normal left ventricular size with mild systolic functional impairment.    2. Evidence of grade 1 diastolic dysfunction.    3. Mild left atrial enlargement.    4. Trace physiologic amounts of mitral tricuspid and pulmonic insufficiency.    5. Trivial pericardial effusion.     Left heart cath on 7/15/2015 at [facility]:  DOMINANCE:  Right Dominant  LEFT MAIN:  is angiographically normal (0% Stenosis)  LEFT ANTERIOR DESCENDING :  Proximal Segment is angiographically normal (0% Stenosis)  rest of vessel has luminal irregularities (10% Stenosis) with distal pruning.  DIAGONAL 1:  is angiographically normal (0%  Stenosis)  CIRCUMFLEX:  and its branches are angiographically normal (0% Stenosis)  RIGHT CORONARY ARTERY:  is angiographically normal (0% Stenosis)  COLLATERALS:  No collateral flow  Normal LVEDP        ICD-10-CM    1. COPD, mild (H)  J44.9       2. Asthma, unspecified asthma severity, unspecified whether complicated, unspecified whether persistent  J45.909       3. Pulmonary emphysema, unspecified emphysema type (H)  J43.9       4. FRANCO (dyspnea on exertion)  R06.09       5. Mixed hyperlipidemia  E78.2       6. Diastolic dysfunction with chronic heart failure (H)  I50.32       7. Prolonged QT interval  R94.31       8. Essential hypertension  I10             Past Medical History:   Diagnosis Date    Congestive heart failure, unspecified     Diabetes (H)     H/O rheumatoid arthritis     HLD (hyperlipidemia)     HTN (hypertension)     Myocardial infarction (H)     Uncomplicated asthma        Past Surgical History:   Procedure Laterality Date    APPENDECTOMY OPEN CHILD      AS TOTAL KNEE ARTHROPLASTY Right     CHOLECYSTECTOMY      COLONOSCOPY - HIM SCAN N/A 03/12/2009    per Care Everywhere documentation per [facility].     HYSTERECTOMY TOTAL ABDOMINAL      MASTECTOMY, BILATERAL Bilateral 02/26/1992    SHOULDER SURGERY Right     SHOULDER SURGERY Left        Allergies   Allergen Reactions    Contrast Dye Difficulty breathing    Gadolinium Derivatives      Other reaction(s): Difficulty in swallowing, Laryngeal spasm  Patient had an injection into rt. Shoulder capsule prior to MRI arthrogram.  Contained multihance gadobenate, omnipaque iohexol, and buffered lidocaine.      Ammonia     Cory-1 [Lidocaine Hcl]      Novocaine allergy      Codeine Sulfate     Food      Shrimp    Fosamax [Alendronate]      Dental infections    Iodine-131 Swelling     Ct dye    Lidocaine     Nitrous Oxide     No Clinical Screening - See Comments Nausea and Vomiting and Other (See Comments)     (3/6/09)- N/V and Heart racing    Novocain  [Procaine]     Penicillins     Symbicort [Budesonide-Formoterol Fumarate]     Tramadol     Morphine Palpitations       Current Outpatient Medications   Medication Sig Dispense Refill    Acetone, Urine, Test (KETONE TEST) STRP Use as directed 50 strip 4    albuterol (PROAIR HFA/PROVENTIL HFA/VENTOLIN HFA) 108 (90 Base) MCG/ACT inhaler Inhale 1-2 puffs into the lungs every 4 hours as needed for shortness of breath, wheezing or cough 18 g 3    apixaban ANTICOAGULANT (ELIQUIS ANTICOAGULANT) 5 MG tablet Take 1 tablet (5 mg) by mouth 2 times daily 60 tablet 0    blood glucose (CONTOUR NEXT TEST) test strip Use 6 times a day with the insulin pump to help manage your diabetes 200 strip 0    blood glucose (NO BRAND SPECIFIED) test strip by In Vitro route 4 times daily Use to test blood sugar 4 times daily or as directed.      Calcium-Vitamin D-Vitamin K (VIACTIV PO) Take 2 chew tab by mouth every morning      Continuous Blood Gluc  (DEXCOM G6 ) XX DIMITRIS 1 each continuous 1 Device 0    Continuous Blood Gluc Sensor (DEXCOM G6 SENSOR) MISC CHANGE EVERY 10 DAYS 3 each 5    Continuous Blood Gluc Transmit (DEXCOM G6 TRANSMITTER) MISC CHANGE EVERY 90 DAYS 1 each 1    furosemide (LASIX) 40 MG tablet TAKE 1 TABLET BY MOUTH ONCE DAILY IN THE MORNING AND 1/2 (ONE-HALF) TABLET IN AFTERNOON 135 tablet 3    insulin lispro (HUMALOG VIAL) 100 UNIT/ML vial To be used with the insulin pump TDD: 80 units 30 mL 3    INSULIN PUMP - OUTPATIENT Insulin pump settings: Updated: 5/4/23 Insulin pump: Minimed 630G Basal: 0000 0.9  0200 0.85  0500 0.825  0900 1 1200 1.1 2200 1  Total: 23.65 CR: 9 ISF:  0000 60  0700 55   Target: 100-140 Active insulin: 4      levothyroxine (SYNTHROID/LEVOTHROID) 75 MCG tablet Take 1 tablet (75 mcg) by mouth daily 90 tablet 3    omeprazole (PRILOSEC) 40 MG DR capsule Take 1 capsule by mouth once daily 90 capsule 0    potassium chloride ER (KLOR-CON M) 20 MEQ CR tablet Take 1 tablet by mouth twice daily 180  tablet 3    ramipril (ALTACE) 10 MG capsule Take 1 capsule (10 mg) by mouth 2 times daily 180 capsule 3    senna-docusate (SENOKOT-S;PERICOLACE) 8.6-50 MG per tablet Take 1 tablet by mouth At Bedtime       simvastatin (ZOCOR) 40 MG tablet TAKE 1 TABLET BY MOUTH AT BEDTIME 90 tablet 0    tamoxifen (NOLVADEX) 20 MG tablet Take 20 mg by mouth every morning      tiotropium (SPIRIVA RESPIMAT) 2.5 MCG/ACT inhaler INHALE 2 PUFFS BY MOUTH ONCE DAILY Strength: 2.5 MCG/ACT 4 g 4    tiZANidine (ZANAFLEX) 4 MG tablet Take a 1/2 tablet by mouth in the morning, 1/2 tablet mid day and 1 full tablet at bedtime, can take an extra 1/2 tablet as needed during the night for breakthrough symptoms up to 4 times per week.      triamcinolone (KENALOG) 0.1 % external cream Apply topically 2 times daily 454 g 3    triamcinolone (KENALOG) 0.1 % external ointment Apply topically 2 times daily 80 g 0    VITAMIN D, CHOLECALCIFEROL, PO Take 5,000 Units by mouth daily         Social History     Socioeconomic History    Marital status:      Spouse name: Not on file    Number of children: 2    Years of education: Not on file    Highest education level: Not on file   Occupational History    Occupation: [employer]     Employer: RETIRED   Tobacco Use    Smoking status: Never    Smokeless tobacco: Never   Vaping Use    Vaping Use: Never used   Substance and Sexual Activity    Alcohol use: No     Alcohol/week: 0.0 standard drinks of alcohol    Drug use: No    Sexual activity: Not on file   Other Topics Concern    Parent/sibling w/ CABG, MI or angioplasty before 65F 55M? Not Asked   Social History Narrative    Not on file     Social Determinants of Health     Financial Resource Strain: Not on file   Food Insecurity: Not on file   Transportation Needs: Not on file   Physical Activity: Not on file   Stress: Not on file   Social Connections: Not on file   Intimate Partner Violence: Not on file   Housing Stability: Not on file       LAB RESULTS:   No  visits with results within 2 Month(s) from this visit.   Latest known visit with results is:   Office Visit on 05/05/2021   Component Date Value Ref Range Status    Folate 05/05/2021 >100.0  >5.4 ng/mL Final    pH Arterial 05/05/2021 7.49* 7.35 - 7.45 pH Final    pCO2 Arterial 05/05/2021 37  35 - 45 mm Hg Final    pO2 Arterial 05/05/2021 96  80 - 105 mm Hg Final    Bicarbonate Arterial 05/05/2021 28  21 - 28 mmol/L Final    FIO2 05/05/2021 Unknown   Final    Oxyhemoglobin Arterial 05/05/2021 97  92 - 100 % Final    Base Excess Art 05/05/2021 4.4  mmol/L Final    Vitamin D Deficiency screening 05/05/2021 94* 20 - 75 ug/L Final    Vitamin B12 05/05/2021 391  193 - 986 pg/mL Final    TSH 05/05/2021 2.04  0.40 - 4.00 mU/L Final    Troponin I ES 05/05/2021 <0.015  0.000 - 0.045 ug/L Final    N-Terminal Pro Bnp 05/05/2021 661* 0 - 125 pg/mL Final    RIOS interpretation 05/05/2021 Negative  NEG^Negative Final    GGT 05/05/2021 14  0 - 40 U/L Final    D Dimer 05/05/2021 0.5  0.0 - 0.50 ug/ml FEU Final    CRP Inflammation 05/05/2021 <2.9  0.0 - 8.0 mg/L Final    WBC 05/05/2021 6.7  4.0 - 11.0 10e9/L Final    RBC Count 05/05/2021 4.17  3.8 - 5.2 10e12/L Final    Hemoglobin 05/05/2021 12.7  11.7 - 15.7 g/dL Final    Hematocrit 05/05/2021 38.5  35.0 - 47.0 % Final    MCV 05/05/2021 92  78 - 100 fl Final    MCH 05/05/2021 30.5  26.5 - 33.0 pg Final    MCHC 05/05/2021 33.0  31.5 - 36.5 g/dL Final    RDW 05/05/2021 12.0  10.0 - 15.0 % Final    Platelet Count 05/05/2021 146* 150 - 450 10e9/L Final    Diff Method 05/05/2021 Automated Method   Final    % Neutrophils 05/05/2021 61.4  % Final    % Lymphocytes 05/05/2021 26.4  % Final    % Monocytes 05/05/2021 7.0  % Final    % Eosinophils 05/05/2021 4.3  % Final    % Basophils 05/05/2021 0.3  % Final    % Immature Granulocytes 05/05/2021 0.6  % Final    Nucleated RBCs 05/05/2021 0  0 /100 Final    Absolute Neutrophil 05/05/2021 4.1  1.6 - 8.3 10e9/L Final    Absolute Lymphocytes  "05/05/2021 1.8  0.8 - 5.3 10e9/L Final    Absolute Monocytes 05/05/2021 0.5  0.0 - 1.3 10e9/L Final    Absolute Eosinophils 05/05/2021 0.3  0.0 - 0.7 10e9/L Final    Absolute Basophils 05/05/2021 0.0  0.0 - 0.2 10e9/L Final    Abs Immature Granulocytes 05/05/2021 0.0  0 - 0.4 10e9/L Final    Absolute Nucleated RBC 05/05/2021 0.0   Final    Sodium 05/05/2021 137  133 - 144 mmol/L Final    Potassium 05/05/2021 3.5  3.4 - 5.3 mmol/L Final    Chloride 05/05/2021 101  94 - 109 mmol/L Final    Carbon Dioxide 05/05/2021 30  20 - 32 mmol/L Final    Anion Gap 05/05/2021 6  3 - 14 mmol/L Final    Glucose 05/05/2021 146* 70 - 99 mg/dL Final    Urea Nitrogen 05/05/2021 13  7 - 30 mg/dL Final    Creatinine 05/05/2021 0.76  0.52 - 1.04 mg/dL Final    GFR Estimate 05/05/2021 77  >60 mL/min/[1.73_m2] Final    GFR Estimate If Black 05/05/2021 90  >60 mL/min/[1.73_m2] Final    Calcium 05/05/2021 8.9  8.5 - 10.1 mg/dL Final    Bilirubin Total 05/05/2021 0.8  0.2 - 1.3 mg/dL Final    Albumin 05/05/2021 3.8  3.4 - 5.0 g/dL Final    Protein Total 05/05/2021 8.0  6.8 - 8.8 g/dL Final    Alkaline Phosphatase 05/05/2021 35* 40 - 150 U/L Final    ALT 05/05/2021 23  0 - 50 U/L Final    AST 05/05/2021 15  0 - 45 U/L Final    Cholesterol 05/05/2021 135  <200 mg/dL Final    Triglycerides 05/05/2021 168* <150 mg/dL Final    HDL Cholesterol 05/05/2021 52  >49 mg/dL Final    LDL Cholesterol Calculated 05/05/2021 49  <100 mg/dL Final    Non HDL Cholesterol 05/05/2021 83  <130 mg/dL Final        Review of systems: Negative except that which was noted in the HPI.    Physical examination:  /68   Pulse 83   Temp 97.2  F (36.2  C) (Tympanic)   Ht 1.6 m (5' 3\")   Wt 65.3 kg (144 lb)   SpO2 99%   BMI 25.51 kg/m      GENERAL APPEARANCE: healthy, alert and no distress  CHEST: lungs clear to auscultation.  Reduced breath sounds bilaterally.  CARDIOVASCULAR: regular rhythm, normal S1 with physiologic split S2, no S3 or S4 and no murmur, click or " rub  EXTREMITIES: no clubbing, cyanosis with scant peripheral edema      Total time spent on day of visit, including review of tests, obtaining/reviewing separately obtained history, ordering medications/tests/procedures, communicating with PCP/consultants, and documenting in electronic medical record: 21 minutes.           Thank you for allowing me to participate in the care of your patient. Please do not hesitate to contact me if you have any questions.     Jin Corea, DO

## 2023-09-06 NOTE — PATIENT INSTRUCTIONS
Thank you for allowing Dr. Corea and our  team to participate in your care. Please call our office at [phone] with scheduling questions or if you need to cancel or change your appointment. With any other questions or concerns you may call [facility] cardiology nurse at [phone].       If you experience chest pain, chest pressure, chest tightness, shortness of breath, fainting, lightheadedness, nausea, vomiting, or other concerning symptoms, please report to the Emergency Department or call 911. These symptoms may be emergent, and best treated in the Emergency Department.    Follow up in 1 year.

## 2023-09-10 DIAGNOSIS — I10 ESSENTIAL HYPERTENSION: ICD-10-CM

## 2023-09-11 ENCOUNTER — TELEPHONE (OUTPATIENT)
Dept: FAMILY MEDICINE | Facility: OTHER | Age: 78
End: 2023-09-11

## 2023-09-11 DIAGNOSIS — E13.9 LADA (LATENT AUTOIMMUNE DIABETES IN ADULTS), MANAGED AS TYPE 1 (H): ICD-10-CM

## 2023-09-11 DIAGNOSIS — I10 ESSENTIAL HYPERTENSION: ICD-10-CM

## 2023-09-11 RX ORDER — FUROSEMIDE 40 MG
TABLET ORAL
Qty: 135 TABLET | Refills: 3 | Status: SHIPPED | OUTPATIENT
Start: 2023-09-11 | End: 2024-08-28

## 2023-09-11 NOTE — TELEPHONE ENCOUNTER
Blood glucose test strips      Last Written Prescription Date:  6/7/2022  Last Fill Quantity: 200 strip,   # refills: 0  Last Office Visit: 08/31/2023  Future Office visit:       Routing refill request to provider for review/approval because:  Drug not on the [facility], [facility] or [facility] refill protocol or controlled substance     Furosemide 40 Mg      Last Written Prescription Date:  12/20/2022  Last Fill Quantity: 135 tablet,   # refills: 3  Last Office Visit: 08/31/2023  Future Office visit:       Routing refill request to provider for review/approval because:  Drug not on the [facility], [facility] or [facility] refill protocol or controlled substance

## 2023-09-11 NOTE — TELEPHONE ENCOUNTER
Reason for call:  Medication      Have you contacted your pharmacy? Yes   If patient has contacted Pharmacy and it has been over 72hrs, continue to #2  Medication  Control next test strips for diabetes,furosemide also  What Pharmacy do you use? [facility]      (Please note that the turn-around-time for prescriptions is 72 business hours; I am sending your request at this time. SEND TO  [facility]  )

## 2023-09-14 RX ORDER — FUROSEMIDE 40 MG
TABLET ORAL
Qty: 135 TABLET | Refills: 0 | OUTPATIENT
Start: 2023-09-14

## 2023-10-11 ENCOUNTER — TELEPHONE (OUTPATIENT)
Dept: EDUCATION SERVICES | Facility: OTHER | Age: 78
End: 2023-10-11

## 2023-10-11 NOTE — TELEPHONE ENCOUNTER
Pt called she got a message on her  that it is time to change her sensor. It is not due to be changed. She looked under the edges and her kin is red and raised and had burning sensation yesterday. Jose advised her to let you know.

## 2023-10-24 ENCOUNTER — TELEPHONE (OUTPATIENT)
Dept: EDUCATION SERVICES | Facility: OTHER | Age: 78
End: 2023-10-24

## 2023-10-24 NOTE — TELEPHONE ENCOUNTER
Pt called she is getting a rash from her sensor and would like to be seen. Called pt and scheduled an appt.

## 2023-10-25 ENCOUNTER — ALLIED HEALTH/NURSE VISIT (OUTPATIENT)
Dept: EDUCATION SERVICES | Facility: OTHER | Age: 78
End: 2023-10-25
Attending: NURSE PRACTITIONER
Payer: COMMERCIAL

## 2023-10-25 VITALS
DIASTOLIC BLOOD PRESSURE: 60 MMHG | BODY MASS INDEX: 25.69 KG/M2 | HEART RATE: 85 BPM | RESPIRATION RATE: 16 BRPM | WEIGHT: 145 LBS | OXYGEN SATURATION: 98 % | SYSTOLIC BLOOD PRESSURE: 116 MMHG

## 2023-10-25 DIAGNOSIS — E13.9 LADA (LATENT AUTOIMMUNE DIABETES IN ADULTS), MANAGED AS TYPE 1 (H): Primary | ICD-10-CM

## 2023-10-25 DIAGNOSIS — R21 RASH: ICD-10-CM

## 2023-10-25 PROCEDURE — 99214 OFFICE O/P EST MOD 30 MIN: CPT | Performed by: NURSE PRACTITIONER

## 2023-10-25 PROCEDURE — 95251 CONT GLUC MNTR ANALYSIS I&R: CPT | Performed by: NURSE PRACTITIONER

## 2023-10-25 PROCEDURE — G0463 HOSPITAL OUTPT CLINIC VISIT: HCPCS

## 2023-10-25 ASSESSMENT — PAIN SCALES - GENERAL: PAINLEVEL: EXTREME PAIN (8)

## 2023-10-30 NOTE — PROGRESS NOTES
SUBJECTIVE:  Flora Acuna, 78 year old, female presents with the following Chief Complaint(s) with HPI to follow:  Chief Complaint   Patient presents with    Diabetes        Diabetes Follow-up    Patient is checking blood sugars: personal CGM + BG checks >4x/day Results:    Date: 10/12 to 10/25/23  Glucose management indicator: n/a    Average glucose: 168 +/- 47    Glucose range  Very low (<54): 0  low (<70): 0  In target range (): 64%  High (>180): 31%  Very high (>250): 5%    Symptoms of hypoglycemia (low blood sugar): rare  Paresthesias (numbness or burning in feet) or sores: Yes--same; no sores  Diabetic eye exam within the last year: DUE  Breakfast eaten regularly: sometimes  Patient counting carbs: Yes       HPI:  Flora's here today for the follow up regarding her KAREEM, managed as a Type 1    A1c trend:  Lab Results   Component Value Date    A1C 7.2 08/31/2023    A1C 7.5 03/21/2023    A1C 7.2 12/20/2022    A1C 7.2 05/13/2022    A1C 7.1 11/23/2021    A1C 6.9 01/29/2021    A1C 7.2 07/01/2020    A1C 7.9 12/16/2019    A1C 7.5 09/11/2019    A1C 7.3 06/07/2019     Current Diabetes medication:   1.  Insulin pump (Minimed 780G), with Humalog  ASA use: yes  Statin use: yes    Bo's here today for an insulin pump and Dexcom download with possible insulin adjust.    Reports the following:  No issues with her insulin pump      Bo reports the following:  Issues with a skin rash  Believes it's caused by her sensor--not the tape, but the actual sensor in her skin   Located on both arms, neck and chest  Described as itchy  Has had a similar rash in the past (prior to the new sensor)   Believes it was caused by some heart monitoring test.    Saw [facility] dermatology  Hasn't has the rash biopsied.       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), managed as type 1 (H)    H/O total knee replacement, left    Age-related osteoporosis without current pathological fracture    Osteoporosis    Family history of melanoma    Family history of breast cancer in female    Dysphonia    Prolonged QT interval    FRANCO (dyspnea on exertion)    Fatty liver on 11/7/2007    Diastolic dysfunction with chronic heart failure (H)    Mixed hyperlipidemia    Drug-induced hypokalemia    Vitamin D deficiency    Contrast media allergy    COPD, mild (H)    Pulmonary emphysema, unspecified emphysema type (H)    Asthma, unspecified asthma severity, unspecified whether complicated, unspecified whether persistent    Thrombocytopenia (H24)    Abnormal EMG    Cardiomegaly    Incisional hernia       Past Medical History:   Diagnosis Date    Congestive heart failure, unspecified     Diabetes (H)     H/O rheumatoid arthritis     HLD (hyperlipidemia)     HTN (hypertension)     Myocardial infarction (H)     Uncomplicated asthma        Past Surgical History:   Procedure Laterality Date    APPENDECTOMY OPEN CHILD      AS TOTAL KNEE ARTHROPLASTY Right     CHOLECYSTECTOMY      COLONOSCOPY - HIM SCAN N/A 03/12/2009    per Care Everywhere documentation per [facility].     HYSTERECTOMY TOTAL ABDOMINAL      MASTECTOMY, BILATERAL Bilateral 02/26/1992    SHOULDER SURGERY Right     SHOULDER SURGERY Left        Family History   Problem Relation Age of Onset    Breast Cancer Maternal Aunt     Breast Cancer Maternal Aunt     Lung Cancer Father        Social History     Tobacco Use    Smoking status: Never    Smokeless  tobacco: Never   Substance Use Topics    Alcohol use: No     Alcohol/week: 0.0 standard drinks of alcohol       Current Outpatient Medications   Medication Sig Dispense Refill    Acetone, Urine, Test (KETONE TEST) STRP Use as directed 50 strip 4    albuterol (PROAIR HFA/PROVENTIL HFA/VENTOLIN HFA) 108 (90 Base) MCG/ACT inhaler Inhale 1-2 puffs into the lungs every 4 hours as needed for shortness of breath, wheezing or cough 18 g 3    apixaban ANTICOAGULANT (ELIQUIS ANTICOAGULANT) 5 MG tablet Take 1 tablet (5 mg) by mouth 2 times daily 60 tablet 0    blood glucose (CONTOUR NEXT TEST) test strip Use 6 times a day with the insulin pump to help manage your diabetes 200 strip 0    blood glucose (NO BRAND SPECIFIED) test strip by In Vitro route 4 times daily Use to test blood sugar 4 times daily or as directed.      Calcium-Vitamin D-Vitamin K (VIACTIV PO) Take 2 chew tab by mouth every morning      Continuous Blood Gluc  (DEXCOM G6 ) XX DIMITRIS 1 each continuous 1 Device 0    Continuous Blood Gluc Sensor (DEXCOM G6 SENSOR) MISC CHANGE EVERY 10 DAYS 3 each 5    Continuous Blood Gluc Transmit (DEXCOM G6 TRANSMITTER) MISC CHANGE EVERY 90 DAYS 1 each 1    furosemide (LASIX) 40 MG tablet TAKE 1 TABLET BY MOUTH ONCE DAILY IN THE MORNING AND 1/2 (ONE-HALF) TABLET IN AFTERNOON 135 tablet 3    insulin lispro (HUMALOG VIAL) 100 UNIT/ML vial To be used with the insulin pump TDD: 80 units 30 mL 3    INSULIN PUMP - OUTPATIENT Insulin pump settings: Updated: 5/4/23 Insulin pump: Minimed 630G Basal: 0000 0.9  0200 0.85  0500 0.825  0900 1 1200 1.1 2200 1  Total: 23.65 CR: 9 ISF:  0000 60  0700 55   Target: 100-140 Active insulin: 4      levothyroxine (SYNTHROID/LEVOTHROID) 75 MCG tablet Take 1 tablet (75 mcg) by mouth daily 90 tablet 3    omeprazole (PRILOSEC) 40 MG DR capsule Take 1 capsule by mouth once daily 90 capsule 0    potassium chloride ER (KLOR-CON M) 20 MEQ CR tablet Take 1 tablet by mouth twice daily 180 tablet  "3    ramipril (ALTACE) 10 MG capsule Take 1 capsule (10 mg) by mouth 2 times daily 180 capsule 3    senna-docusate (SENOKOT-S;PERICOLACE) 8.6-50 MG per tablet Take 1 tablet by mouth At Bedtime       simvastatin (ZOCOR) 40 MG tablet TAKE 1 TABLET BY MOUTH AT BEDTIME 90 tablet 0    tamoxifen (NOLVADEX) 20 MG tablet Take 20 mg by mouth every morning      tiotropium (SPIRIVA RESPIMAT) 2.5 MCG/ACT inhaler INHALE 2 PUFFS BY MOUTH ONCE DAILY Strength: 2.5 MCG/ACT 4 g 4    tiZANidine (ZANAFLEX) 4 MG tablet Take a 1/2 tablet by mouth in the morning, 1/2 tablet mid day and 1 full tablet at bedtime, can take an extra 1/2 tablet as needed during the night for breakthrough symptoms up to 4 times per week.      triamcinolone (KENALOG) 0.1 % external cream Apply topically 2 times daily 454 g 3    triamcinolone (KENALOG) 0.1 % external ointment Apply topically 2 times daily 80 g 0    VITAMIN D, CHOLECALCIFEROL, PO Take 5,000 Units by mouth daily         Allergies   Allergen Reactions    Contrast Dye Difficulty breathing    Gadolinium Derivatives      Other reaction(s): Difficulty in swallowing, Laryngeal spasm  Patient had an injection into rt. Shoulder capsule prior to MRI arthrogram.  Contained multihance gadobenate, omnipaque iohexol, and buffered lidocaine.      Ammonia     Cory-1 [Lidocaine Hcl]      Novocaine allergy      Codeine Sulfate     Food      Shrimp    Fosamax [Alendronate]      Dental infections    Iodine-131 Swelling     Ct dye    Lidocaine     Nitrous Oxide     No Clinical Screening - See Comments Nausea and Vomiting and Other (See Comments)     (3/6/09)- N/V and Heart racing    Novocain [Procaine]     Penicillins     Symbicort [Budesonide-Formoterol Fumarate]     Tramadol     Morphine Palpitations       REVIEW OF SYSTEMS  Skin: as stated above  Eyes: negative; DUE  Ears/Nose/Throat: hx of \"burnt\" voice and muscles from GERD  Respiratory: No SOB or FRANCO; no cough; no hemoptysis; history of asthma  Cardiovascular: " negative; history of HF  Gastrointestinal: negative  Genitourinary: negative   Musculoskeletal: hx of back pain; hx of RA, neck pain, arthritis and right shoulder   Neurologic: positive for numbness or tingling of feet--same  Psychiatric: negative  Hematologic/Lymphatic/Immunologic: history of breast cancer (left) x 2 (same spot)  Endocrine: positive for diabetes and thyroid disease     OBJECTIVE:  /60   Pulse 85   Resp 16   Wt 65.8 kg (145 lb)   SpO2 98%   BMI 25.69 kg/m    Constitutional: alert and no distress  Respiratory:  Good diaphragmatic excursion.  Musculoskeletal: extremities normal- no gross deformities noted; using a rolling walker   Skin: pink, raised lesions with scattered excoriated areas, no blisters noted, no hive-like welts.  Rash is located on both arms, chest, and upper neck  Psychiatric: mentation appears normal and affect normal/bright    LABS  No results found for any visits on 10/25/23.    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     She wants another CGM but I would like her to get a skin biopsy and allergy testing first (the more she's exposed, the worse the reaction)  Keep checking her BGs as prescribed.      We did talk about ways to prevent reactions from the tape part of her sensor  Bo feels this isn't due to the tape  Gave her samples of skin tac, underlaying patches  We talked about skin patch test    Will have her get an appointment with Nicolasa Guillen NP    Keep working on the following:  Keep adding bolus BEFORE consuming  carbs  Make sure to rotate sites of her insertion sets    (R21) Rash  Comment: noted  Plan:   She's already established with [facility]  Encourage her to call them asap and get this rash biopsied.     I'm not convinced it's due to the sensor part of the sensor  We did talk about doing a patch test  Encouraged her to see her PCP, Nicolasa Guillen NP and discuss allergy testing.     If rash gets worse and/or other symptoms develop, please be evaluated sooner.       Follow up  As discussed    Call sooner if any issues develop    Patient Instructions   Continue working on healthy eating and moving as best as you can (start low and slow, work up to 30 min, 5x/week)    BG goals:  Fasting and before meals <130, >70  2 hour after eating <180    We only need 1/2 of these numbers to be within target then your A1c will be within target    Medication changes   Keep working on the timing of your carb bolus    Follow up  Call your dermatologist asap for biopsy   Keep your scheduled appointment with your PCP, Nicolasa Guillen NP    Call me sooner if any problems/concerns and/or questions develop including consistent low BGs <70 or consistent high BGs >200  [phone] (Marie, Unit Coordinator)    [phone] (Emeli, Nurse)    Skin reaction to sensor:  Try using the products given  If able to a skin patch test with the sensor wire  If no reaction, okay to use   Keep me in the loop.      Time: 30 minutes + 5 min CGM review  Barrier: none  Willingness to learn: accepting    Kerri NELSON [facility]  Diabetes and Wound Care    Cc: Nicolasa Guillen NP    30 minutes was spent with patient.    All of this time was spent on counseling patient regarding illness, medication and/or treatment options, coordinating further cares and follow ups that are needed along with resource material that will be helpful in the treatment of these issues.       With the electronic record, we can now more quickly and easily track our patient diabetic  goals. Our diabetes clinical review is in progress and these are the indicators we are monitoring for good diabetes health.     1.) HbA1C less than 7 (measurement of your average blood sugars)  2.) Blood Pressure less than 140/80  3.) LDL less than 100 (bad cholesterol)  4.) HbA1C is checked in the last 6 months and below 7% (more frequently if not at goal or adjusting medications)  5.) LDL is checked in the last 12 months (more frequently if not at goal or adjusting medications)  6.) Taking one baby aspirin daily (unless otherwise instructed)  7.) No tobacco use  8) Statin use     You have achieved 8 out of 8 of these and I am encouraging you to come in and get tuned up to achieve 8 out of 8!  Here is what you have achieved so far in my goals for you:  1.) HbA1C  less than 7:                              YES--good for age    Your last  HbA1C  Lab Results   Component Value Date    A1C 7.5 03/21/2023    A1C 7.2 12/20/2022    A1C 7.2 05/13/2022    A1C 7.1 11/23/2021    A1C 6.9 01/29/2021    A1C 7.2 07/01/2020    A1C 7.9 12/16/2019    A1C 7.5 09/11/2019    A1C 7.3 06/07/2019      2.) Blood Pressure less than 140/80:       YES     Your last    BP Readings from Last 1 Encounters:   10/25/23 116/60     3.) LDL less than 100:                              YES      Your last     LDL Cholesterol Calculated   Date Value Ref Range Status   02/13/2023 56 <=100 mg/dL Final   05/05/2021 49 <100 mg/dL Final     Comment:     Desirable:       <100 mg/dl       4.) Checked HbA1C in the past 6 months: YES      5.) Checked LDL in the past 12 months:    YES    6.) Taking one aspirin daily:                       YES     7.) No tobacco use:                                        YES      8.) Statin use      YES       Insurance requirements:   Patient has been seen in the clinic within the last 6 month  Diagnosis of KAREEM, managed as a Type 1 for >6 months  Has been testing their BGs  Uses an insulin pump for >6 months  Requires frequent  adjustments to their treatment regimen based on BGM and/or CGM testing results.

## 2023-10-30 NOTE — PATIENT INSTRUCTIONS
Continue working on healthy eating and moving as best as you can (start low and slow, work up to 30 min, 5x/week)    BG goals:  Fasting and before meals <130, >70  2 hour after eating <180    We only need 1/2 of these numbers to be within target then your A1c will be within target    Medication changes   Keep working on the timing of your carb bolus    Follow up  Call your dermatologist asap for biopsy   Keep your scheduled appointment with your PCP, Nicolasa Guillen NP    Call me sooner if any problems/concerns and/or questions develop including consistent low BGs <70 or consistent high BGs >200  [phone] (Marie, Unit Coordinator)    [phone] (Emeli, Nurse)    Skin reaction to sensor:  Try using the products given  If able to a skin patch test with the sensor wire  If no reaction, okay to use   Keep me in the loop.

## 2023-11-06 DIAGNOSIS — E13.9 LADA (LATENT AUTOIMMUNE DIABETES IN ADULTS), MANAGED AS TYPE 1 (H): ICD-10-CM

## 2023-11-06 NOTE — TELEPHONE ENCOUNTER
HUMALOG      Last Written Prescription Date:  5-4-23  Last Fill Quantity: 30ML,   # refills: 3  Last Office Visit: 5-30-23  Future Office visit:    Next 5 appointments (look out 90 days)      Nov 14, 2023  2:30 PM  (Arrive by 2:15 PM)  SHORT with Nicolasa Guillen NP  [facility] ([facility] [place] ) [address]  [place] [address]  [phone]             Routing refill request to provider for review/approval because:  Drug not on the [facility], [facility] or [facility] refill protocol or controlled substance

## 2023-11-07 RX ORDER — INSULIN LISPRO 100 [IU]/ML
INJECTION, SOLUTION INTRAVENOUS; SUBCUTANEOUS
Qty: 30 ML | Refills: 3 | Status: SHIPPED | OUTPATIENT
Start: 2023-11-07

## 2023-11-13 NOTE — PROGRESS NOTES
"  Assessment & Plan     Rash  Allergic reaction, initial encounter  Rash is improving. Feeling much better. Almost healed. No longer using the kenalog cream.     She saw derm and they told her it was from the sensor. A biopsy was not done.     Will avoid using the sensors. Will also discuss with Kerri Elliott.         Nicolasa Guillen NP  [facility] - ANTONIO Soriano is a 78 year old, presenting for the following health issues:  Derm Problem (Rash)      HPI     Rash  Onset/Duration: About a month  Description  Location: Neck, bilateral arms, and back  Character: round, raised, painful, burning, red  Itching: severe  Intensity:  severe  Progression of Symptoms: slowly improving-feeling much better  Accompanying signs and symptoms:   Fever: No  Body aches or joint pain: No  Sore throat symptoms: No  Recent cold symptoms: No  History:           Previous episodes of similar rash: Had a heart monitor previously about 4 months prior, same issue happened.   New exposures:  Monitors, heart and diabetic sensor  Recent travel: No  Exposure to similar rash: No  Precipitating or alleviating factors: Placment of monitors  Therapies tried and outcome: topical steroid - TMC/Kenalog    Patient saw dermatology for the rash and they felt it was due to her diabetic sensor. She has since stopped using the sensor and the rash has improved significantly. A biopsy was not done.     Review of Systems   Constitutional, HEENT, cardiovascular, pulmonary, gi and gu systems are negative, except as otherwise noted.      Objective    /62   Pulse 74   Temp 98.1  F (36.7  C) (Tympanic)   Resp 18   Ht 1.6 m (5' 3\")   Wt 67.1 kg (148 lb)   SpO2 97%   BMI 26.22 kg/m    Body mass index is 26.22 kg/m .  Physical Exam   GENERAL: healthy, alert and no distress  NEURO: Normal strength and tone, mentation intact and speech normal  PSYCH: mentation appears normal, affect normal/bright  SKIN: healing erythematous, raised " lesions with scattered excoriated areas, no blisters noted, no hive-like welts.  Rash is located on both arms and chest. No lesions on back.

## 2023-11-14 ENCOUNTER — TELEPHONE (OUTPATIENT)
Dept: EDUCATION SERVICES | Facility: OTHER | Age: 78
End: 2023-11-14

## 2023-11-14 ENCOUNTER — OFFICE VISIT (OUTPATIENT)
Dept: FAMILY MEDICINE | Facility: OTHER | Age: 78
End: 2023-11-14
Attending: NURSE PRACTITIONER
Payer: COMMERCIAL

## 2023-11-14 VITALS
HEART RATE: 74 BPM | DIASTOLIC BLOOD PRESSURE: 62 MMHG | OXYGEN SATURATION: 97 % | RESPIRATION RATE: 18 BRPM | WEIGHT: 148 LBS | HEIGHT: 63 IN | TEMPERATURE: 98.1 F | BODY MASS INDEX: 26.22 KG/M2 | SYSTOLIC BLOOD PRESSURE: 118 MMHG

## 2023-11-14 DIAGNOSIS — R21 RASH: Primary | ICD-10-CM

## 2023-11-14 DIAGNOSIS — T78.40XA ALLERGIC REACTION, INITIAL ENCOUNTER: ICD-10-CM

## 2023-11-14 DIAGNOSIS — E13.9 LADA (LATENT AUTOIMMUNE DIABETES IN ADULTS), MANAGED AS TYPE 1 (H): ICD-10-CM

## 2023-11-14 PROCEDURE — 99213 OFFICE O/P EST LOW 20 MIN: CPT | Performed by: NURSE PRACTITIONER

## 2023-11-14 PROCEDURE — G0463 HOSPITAL OUTPT CLINIC VISIT: HCPCS

## 2023-11-14 RX ORDER — PROCHLORPERAZINE 25 MG/1
1 SUPPOSITORY RECTAL CONTINUOUS
Qty: 1 EACH | Refills: 0 | Status: CANCELLED | OUTPATIENT
Start: 2023-11-14

## 2023-11-14 ASSESSMENT — PAIN SCALES - GENERAL: PAINLEVEL: MILD PAIN (2)

## 2023-11-14 NOTE — TELEPHONE ENCOUNTER
Pt is in to see Nicolasa Guillen today and is having problems getting her Dexcom supplies. I called [facility] they spoke to the pt in 09/23 and she told them she is no longer using the Dexcom. I called the pt and notified she she needs to call [facility] and restart her Rx. Pt needs a . She has never had one in the past. Please send in an Rx for this.

## 2023-11-15 DIAGNOSIS — E13.9 LADA (LATENT AUTOIMMUNE DIABETES IN ADULTS), MANAGED AS TYPE 1 (H): ICD-10-CM

## 2023-11-15 RX ORDER — PROCHLORPERAZINE 25 MG/1
1 SUPPOSITORY RECTAL CONTINUOUS
Qty: 1 EACH | Refills: 0 | Status: SHIPPED | OUTPATIENT
Start: 2023-11-15

## 2023-11-20 ENCOUNTER — TELEPHONE (OUTPATIENT)
Dept: EDUCATION SERVICES | Facility: OTHER | Age: 78
End: 2023-11-20

## 2023-11-20 DIAGNOSIS — I10 ESSENTIAL HYPERTENSION: ICD-10-CM

## 2023-11-20 DIAGNOSIS — I50.32 DIASTOLIC DYSFUNCTION WITH CHRONIC HEART FAILURE (H): ICD-10-CM

## 2023-11-20 DIAGNOSIS — E87.6 HYPOKALEMIA: ICD-10-CM

## 2023-11-20 NOTE — TELEPHONE ENCOUNTER
"Pt calls she got her Dexcom transmitter and sensor, she needs a . She would like to know if \"pump will go according to sensor?\" Should she schedule an appt , or wait until she has everything?  "

## 2023-11-21 RX ORDER — RAMIPRIL 10 MG/1
10 CAPSULE ORAL 2 TIMES DAILY
Qty: 180 CAPSULE | Refills: 2 | Status: SHIPPED | OUTPATIENT
Start: 2023-11-21 | End: 2024-08-19

## 2023-11-22 NOTE — TELEPHONE ENCOUNTER
" was ordered on 11/15/23.    No, this CGM doesn't \"talk\" to her current insulin pump.    She will have to add her BG insulin the insulin pump.      Emeli, could you please call her with this.       Thank you    Kerri NELSON [facility]-BC  Diabetes and Wound Care      "

## 2023-11-28 NOTE — TELEPHONE ENCOUNTER
I spoke to the pt she needs help setting the sensor on the Dexcom on the galilea. She states there is no where for her to enter the serial numbers. Appt scheduled with KANDICE Curran for assistance.

## 2023-11-29 DIAGNOSIS — K21.9 GASTROESOPHAGEAL REFLUX DISEASE WITHOUT ESOPHAGITIS: ICD-10-CM

## 2023-11-30 ENCOUNTER — TELEPHONE (OUTPATIENT)
Dept: FAMILY MEDICINE | Facility: OTHER | Age: 78
End: 2023-11-30

## 2023-11-30 NOTE — TELEPHONE ENCOUNTER
2:59 PM    Reason for Call: OVERBOOK    Patient is having the following symptoms: PRE-OP - 12-19-23/ BOTH EYES CATARACT SURGERY/ [facility]/ DR ARECHIGA      The patient is requesting an appointment for ASAP BEFORE 12-19-23 with SAKSHI MURDOCK.    Was an appointment offered for this call? No  If yes : Appointment type              Date    Preferred method for responding to this message: Telephone Call  What is your phone number ? [phone]     If we cannot reach you directly, may we leave a detailed response at the number you provided? Yes    Can this message wait until your PCP/provider returns, if unavailable today? Maeve Orozco

## 2023-11-30 NOTE — TELEPHONE ENCOUNTER
Omeprazole (PRILOSEC) 40 mg DR cap       Last Written Prescription Date:  8/31/23  Last Fill Quantity: 90,   # refills: 0  Last Office Visit: 11/14/23  Future Office visit:    Next 5 appointments (look out 90 days)      Feb 28, 2024  3:00 PM  (Arrive by 2:45 PM)  SHORT with Nicolasa Guillen NP  [facility] [place] ([facility] [place] ) [address]  [place] [address]  [phone]

## 2023-12-01 DIAGNOSIS — E78.2 MIXED HYPERLIPIDEMIA: ICD-10-CM

## 2023-12-01 RX ORDER — OMEPRAZOLE 40 MG/1
CAPSULE, DELAYED RELEASE ORAL
Qty: 90 CAPSULE | Refills: 0 | Status: SHIPPED | OUTPATIENT
Start: 2023-12-01 | End: 2024-03-05

## 2023-12-04 RX ORDER — SIMVASTATIN 40 MG
40 TABLET ORAL AT BEDTIME
Qty: 90 TABLET | Refills: 0 | Status: SHIPPED | OUTPATIENT
Start: 2023-12-04 | End: 2024-02-28

## 2023-12-04 NOTE — TELEPHONE ENCOUNTER
Simvastatin      Last Written Prescription Date:  8/31/23  Last Fill Quantity: 90,   # refills: 0  Last Office Visit: 11/14/23  Future Office visit:    Next 5 appointments (look out 90 days)      Dec 11, 2023 10:30 AM  (Arrive by 10:15 AM)  Pre-Op physical with Nicolasa Guillen NP  [facility] [place] ([facility] [place] ) [address] [address]  [phone]     Feb 28, 2024  3:00 PM  (Arrive by 2:45 PM)  SHORT with Nicolasa Guillen NP  [facility] [place] ([facility] [place] ) [address]  [place] [address]  [phone]             Routing refill request to provider for review/approval because:

## 2023-12-05 ENCOUNTER — APPOINTMENT (OUTPATIENT)
Dept: CT IMAGING | Facility: HOSPITAL | Age: 78
End: 2023-12-05
Attending: NURSE PRACTITIONER
Payer: MEDICARE

## 2023-12-05 ENCOUNTER — NURSE TRIAGE (OUTPATIENT)
Dept: FAMILY MEDICINE | Facility: OTHER | Age: 78
End: 2023-12-05

## 2023-12-05 ENCOUNTER — HOSPITAL ENCOUNTER (EMERGENCY)
Facility: HOSPITAL | Age: 78
Discharge: HOME | End: 2023-12-05
Attending: NURSE PRACTITIONER | Admitting: NURSE PRACTITIONER
Payer: MEDICARE

## 2023-12-05 VITALS
RESPIRATION RATE: 16 BRPM | HEIGHT: 63 IN | WEIGHT: 142 LBS | DIASTOLIC BLOOD PRESSURE: 77 MMHG | OXYGEN SATURATION: 95 % | SYSTOLIC BLOOD PRESSURE: 152 MMHG | TEMPERATURE: 98.1 F | HEART RATE: 95 BPM | BODY MASS INDEX: 25.16 KG/M2

## 2023-12-05 DIAGNOSIS — M25.511 ACUTE PAIN OF BOTH SHOULDERS: ICD-10-CM

## 2023-12-05 DIAGNOSIS — M54.2 CERVICAL PAIN (NECK): ICD-10-CM

## 2023-12-05 DIAGNOSIS — M25.512 ACUTE PAIN OF BOTH SHOULDERS: ICD-10-CM

## 2023-12-05 LAB
ANION GAP SERPL CALCULATED.3IONS-SCNC: 13 MMOL/L (ref 7–15)
BUN SERPL-MCNC: 13.3 MG/DL (ref 8–23)
CALCIUM SERPL-MCNC: 9.4 MG/DL (ref 8.8–10.2)
CHLORIDE SERPL-SCNC: 96 MMOL/L (ref 98–107)
CREAT SERPL-MCNC: 0.73 MG/DL (ref 0.51–0.95)
DEPRECATED HCO3 PLAS-SCNC: 26 MMOL/L (ref 22–29)
EGFRCR SERPLBLD CKD-EPI 2021: 85 ML/MIN/1.73M2
GLUCOSE SERPL-MCNC: 154 MG/DL (ref 70–99)
HOLD SPECIMEN: NORMAL
POTASSIUM SERPL-SCNC: 3.6 MMOL/L (ref 3.4–5.3)
SODIUM SERPL-SCNC: 135 MMOL/L (ref 135–145)
TROPONIN T SERPL HS-MCNC: 11 NG/L

## 2023-12-05 PROCEDURE — G0463 HOSPITAL OUTPT CLINIC VISIT: HCPCS | Mod: 25

## 2023-12-05 PROCEDURE — 71250 CT THORAX DX C-: CPT | Mod: MG

## 2023-12-05 PROCEDURE — 84484 ASSAY OF TROPONIN QUANT: CPT | Performed by: NURSE PRACTITIONER

## 2023-12-05 PROCEDURE — 250N000013 HC RX MED GY IP 250 OP 250 PS 637: Performed by: NURSE PRACTITIONER

## 2023-12-05 PROCEDURE — 99213 OFFICE O/P EST LOW 20 MIN: CPT | Performed by: NURSE PRACTITIONER

## 2023-12-05 PROCEDURE — 80048 BASIC METABOLIC PNL TOTAL CA: CPT | Performed by: NURSE PRACTITIONER

## 2023-12-05 PROCEDURE — G1010 CDSM STANSON: HCPCS

## 2023-12-05 PROCEDURE — 36415 COLL VENOUS BLD VENIPUNCTURE: CPT | Performed by: NURSE PRACTITIONER

## 2023-12-05 RX ORDER — ACETAMINOPHEN 325 MG/1
650 TABLET ORAL ONCE
Status: COMPLETED | OUTPATIENT
Start: 2023-12-05 | End: 2023-12-05

## 2023-12-05 RX ORDER — LORAZEPAM 0.5 MG/1
0.5 TABLET ORAL ONCE
Status: COMPLETED | OUTPATIENT
Start: 2023-12-05 | End: 2023-12-05

## 2023-12-05 RX ADMIN — LORAZEPAM 0.5 MG: 0.5 TABLET ORAL at 13:14

## 2023-12-05 RX ADMIN — ACETAMINOPHEN 650 MG: 325 TABLET, FILM COATED ORAL at 13:12

## 2023-12-05 ASSESSMENT — ENCOUNTER SYMPTOMS
CHILLS: 1
VOMITING: 0
FATIGUE: 1
PHOTOPHOBIA: 1
NECK PAIN: 1
APPETITE CHANGE: 1
NAUSEA: 0
ACTIVITY CHANGE: 1
SHORTNESS OF BREATH: 0
HEADACHES: 1
NECK STIFFNESS: 1
NUMBNESS: 0
DIARRHEA: 0
FEVER: 0

## 2023-12-05 ASSESSMENT — ACTIVITIES OF DAILY LIVING (ADL): ADLS_ACUITY_SCORE: 35

## 2023-12-05 NOTE — DISCHARGE INSTRUCTIONS
Ice to affected area 20 minutes every hour as needed for comfort. After 48 hours you can apply heat. . Tylenol 650 to 1000 mg every four to six hours as needed (not to exceed more than 4000 mg in a 24 hour period). May use interchangeably. Suggest medicating around the clock for the next 24-48 hours. . Follow up with primary provider as needed   May use Man camacho or [facility]

## 2023-12-05 NOTE — TELEPHONE ENCOUNTER
"  Patient is going to     Reason for Disposition    Patient wants to be seen    Answer Assessment - Initial Assessment Questions  1. ONSET: \"When did the pain begin?\"       Saturday night  2. LOCATION: \"Where does it hurt?\"       Back of neck  3. PATTERN \"Does the pain come and go, or has it been constant since it started?\"       constant  4. SEVERITY: \"How bad is the pain?\"  (Scale 1-10; or mild, moderate, severe)    - NO PAIN (0): no pain or only slight stiffness     - MILD (1-3): doesn't interfere with normal activities     - MODERATE (4-7): interferes with normal activities or awakens from sleep     - SEVERE (8-10):  excruciating pain, unable to do any normal activities       moderate  5. RADIATION: \"Does the pain go anywhere else, shoot into your arms?\"      Both sides of her neck and when she coughs it hurts  6. CORD SYMPTOMS: \"Any weakness or numbness of the arms or legs?\"      no  7. CAUSE: \"What do you think is causing the neck pain?\"      unknown  8. NECK OVERUSE: \"Any recent activities that involved turning or twisting the neck?\"      no  9. OTHER SYMPTOMS: \"Do you have any other symptoms?\" (e.g., headache, fever, chest pain, difficulty breathing, neck swelling)      If she moves her neck she can feel a headache coming on.  States the discs where protruding in her upper neck and pulsating.  Patient can't sit upright , she has been in bed since Saturday  10. PREGNANCY: \"Is there any chance you are pregnant?\" \"When was your last menstrual period?\"        no    Protocols used: Neck Pain or Vxibffaog-H-VC    "

## 2023-12-05 NOTE — ED PROVIDER NOTES
History     Chief Complaint   Patient presents with    Shoulder Pain    Neck Pain     HPI  Flora Acuna is a 78 year old female who presents what she states is bilateral neck pain that radiates into both of her shoulders and bilateral anterior chest.  Accompanied with chills, fatigue, headache, light sensitivity and hard time focusing with her eyes.  Is taking acetaminophen every 6 hours and this does help to decrease her discomfort.  Denies previous neck pain.  Having cataract surgery scheduled for this month.  Non-smoker.  Had fifth COVID vaccination December, 2022.  And influenza vaccine October, 2023.  History of breast cancer in her 40s and again in her 60s after having a bilateral, radical mastectomy.  Denies fever, nausea, vomiting, diarrhea, chest pain, shortness of breath, and numbness and tingling in her arms.    Neck Pain    Duration: three days  Description:  Location: bilateral  Radiation: into the right neck, into the right shoulder, into the left neck, nto the left shoulder, and into her anterior chest  Intensity:  10/10 sharp constant  Accompanying signs and symptoms: Decreased appetite, chills, fatigue, light sensitivity, headaches, and difficult time focusing.  History (similar episodes/previous evaluation): None  Precipitating or alleviating factors: laying in bed  Therapies tried and outcome: acetaminophen     Allergies:  Allergies   Allergen Reactions    Contrast Dye Difficulty breathing    Gadolinium Derivatives      Other reaction(s): Difficulty in swallowing, Laryngeal spasm  Patient had an injection into rt. Shoulder capsule prior to MRI arthrogram.  Contained multihance gadobenate, omnipaque iohexol, and buffered lidocaine.      Ammonia     Cory-1 [Lidocaine Hcl]      Novocaine allergy      Codeine Sulfate     Food      Shrimp    Fosamax [Alendronate]      Dental infections    Iodine-131 Swelling     Ct dye    Lidocaine     Nitrous Oxide     No Clinical Screening - See Comments  Nausea and Vomiting and Other (See Comments)     (3/6/09)- N/V and Heart racing    Novocain [Procaine]     Penicillins     Symbicort [Budesonide-Formoterol Fumarate]     Tramadol     Morphine Palpitations       Problem List:    Patient Active Problem List    Diagnosis Date Noted    Thrombocytopenia (H24) 03/21/2023     Priority: Medium    Pulmonary emphysema, unspecified emphysema type (H) 02/13/2023     Priority: Medium    Asthma, unspecified asthma severity, unspecified whether complicated, unspecified whether persistent 02/13/2023     Priority: Medium    COPD, mild (H) 08/18/2021     Priority: Medium    Contrast media allergy 05/14/2021     Priority: Medium    FRANCO (dyspnea on exertion) 05/05/2021     Priority: Medium    Fatty liver on 11/7/2007 05/05/2021     Priority: Medium    Diastolic dysfunction with chronic heart failure (H) 05/05/2021     Priority: Medium    Mixed hyperlipidemia 05/05/2021     Priority: Medium    Drug-induced hypokalemia 05/05/2021     Priority: Medium    Vitamin D deficiency 05/05/2021     Priority: Medium    Prolonged QT interval 08/28/2020     Priority: Medium    Dysphonia 06/03/2019     Priority: Medium    Hypothyroidism 05/07/2019     Priority: Medium    Essential hypertension 05/07/2019     Priority: Medium    Malignant neoplasm of left breast in female, estrogen receptor positive (H) 05/07/2019     Priority: Medium     Follows with Dr. 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  Flora Acuna has no plan or code status on file. Discussed available resources and provided with information. Confirmed code status reflects current choices pending further ACP discussions.  Confirmed/documented legally designated decision makers.  Added by Ruth Velasquez          Personal history of malignant neoplasm of breast 11/23/2015     Priority: Medium    Family history of melanoma 11/21/2015     Priority: Medium    Family history of breast cancer in female 11/21/2015     Priority: Medium    Abnormal EMG 02/12/2013     Priority: Medium     Formatting of this note might be different from the original.  possible findings suggestive of myopathy in muscles that could be consistent with inclusion body myopathy, repeat EMG normal.      CARDIOVASCULAR SCREENING; LDL GOAL LESS THAN 160 10/05/2012     Priority: Medium    Osteoporosis 01/03/2012     Priority: Medium     Overview:   IMO Update      Cardiomegaly 05/08/2009     Priority: Medium     Formatting of this note might be different from the original.  TTE w/Doppler      Incisional hernia 05/04/2009     Priority: Medium     Formatting of this note might be different from the original.  IMO Update 10/11          Past Medical History:    Past Medical History:   Diagnosis Date    Congestive heart failure, unspecified     Diabetes (H)     H/O rheumatoid arthritis     HLD (hyperlipidemia)     HTN (hypertension)     Myocardial infarction (H)     Uncomplicated asthma        Past Surgical History:    Past Surgical History:   Procedure Laterality Date    APPENDECTOMY OPEN CHILD      AS TOTAL KNEE ARTHROPLASTY Right     CHOLECYSTECTOMY      COLONOSCOPY - HIM SCAN N/A 03/12/2009    per Care Everywhere documentation per [facility].     HYSTERECTOMY TOTAL ABDOMINAL      MASTECTOMY, BILATERAL Bilateral 02/26/1992    SHOULDER SURGERY Right     SHOULDER SURGERY Left        Family History:    Family History    Problem Relation Age of Onset    Breast Cancer Maternal Aunt     Breast Cancer Maternal Aunt     Lung Cancer Father        Social History:  Marital Status:   [5]  Social History     Tobacco Use    Smoking status: Never    Smokeless tobacco: Never   Vaping Use    Vaping Use: Never used   Substance Use Topics    Alcohol use: No     Alcohol/week: 0.0 standard drinks of alcohol    Drug use: No        Medications:    Acetone, Urine, Test (KETONE TEST) STRP  albuterol (PROAIR HFA/PROVENTIL HFA/VENTOLIN HFA) 108 (90 Base) MCG/ACT inhaler  apixaban ANTICOAGULANT (ELIQUIS ANTICOAGULANT) 5 MG tablet  blood glucose (NO BRAND SPECIFIED) test strip  Calcium-Vitamin D-Vitamin K (VIACTIV PO)  Continuous Blood Gluc  (DEXCOM G6 ) DIMITRIS  Continuous Blood Gluc Sensor (DEXCOM G6 SENSOR) MISC  Continuous Blood Gluc Transmit (DEXCOM G6 TRANSMITTER) MISC  furosemide (LASIX) 40 MG tablet  insulin lispro (HUMALOG VIAL) 100 UNIT/ML vial  levothyroxine (SYNTHROID/LEVOTHROID) 75 MCG tablet  omeprazole (PRILOSEC) 40 MG DR capsule  potassium chloride ER (KLOR-CON M) 20 MEQ CR tablet  ramipril (ALTACE) 10 MG capsule  senna-docusate (SENOKOT-S;PERICOLACE) 8.6-50 MG per tablet  simvastatin (ZOCOR) 40 MG tablet  tamoxifen (NOLVADEX) 20 MG tablet  tiotropium (SPIRIVA RESPIMAT) 2.5 MCG/ACT inhaler  tiZANidine (ZANAFLEX) 4 MG tablet  triamcinolone (KENALOG) 0.1 % external cream  triamcinolone (KENALOG) 0.1 % external ointment  VITAMIN D, CHOLECALCIFEROL, PO  blood glucose (CONTOUR NEXT TEST) test strip  INSULIN PUMP - OUTPATIENT          Review of Systems   Constitutional:  Positive for activity change, appetite change, chills and fatigue. Negative for fever.   Eyes:  Positive for photophobia and visual disturbance (hard time focusing).        Eyes hurt   cataract surgery scheduled this month   Respiratory:  Negative for shortness of breath.    Cardiovascular:  Negative for chest pain (neck pain radiates into her anterior  "chest).   Gastrointestinal:  Negative for diarrhea, nausea and vomiting.   Musculoskeletal:  Positive for neck pain and neck stiffness.   Neurological:  Positive for headaches. Negative for numbness.       Physical Exam   BP: 152/77  Pulse: 95  Temp: 98.1  F (36.7  C)  Resp: 16  Height: 160 cm (5' 3\")  Weight: 64.4 kg (142 lb)  SpO2: 95 %      Physical Exam  Vitals and nursing note reviewed.   Constitutional:       General: She is in acute distress (moderate to severe).      Appearance: She is normal weight.   HENT:      Right Ear: Tympanic membrane and ear canal normal.      Left Ear: Tympanic membrane and ear canal normal.      Nose: Nose normal.      Mouth/Throat:      Mouth: Mucous membranes are moist.   Eyes:      Conjunctiva/sclera: Conjunctivae normal.   Cardiovascular:      Rate and Rhythm: Normal rate. Rhythm irregular.      Heart sounds: Normal heart sounds. No murmur heard.  Pulmonary:      Effort: Pulmonary effort is normal. No respiratory distress.      Breath sounds: Normal breath sounds. No wheezing or rales.   Musculoskeletal:         General: Swelling and tenderness present.      Right shoulder: Swelling present. No bony tenderness. Decreased range of motion.      Left shoulder: No bony tenderness. Decreased range of motion.      Right upper arm: Normal.      Left upper arm: Normal.        Arms:       Cervical back: Tenderness (over c-6 and c-7) present.      Comments: Right posterior shoulder has moderate swelling over  the trapezius and supraspinatus muscle. Is super tender over this region, C6 and C7 and anterior chest excluding the clavicle. Left shoulder has similar tenderness with palpation but not to the extent of the right. No swelling over left posterior shoulder. No ecchymosis or erythema. Minimal tenderness over shoulder joints. Full Flexion of both arms. Abduction to 100 degrees, pain right shoulder with internal rotation but almost complete ROM (slightly decrease on right). Cannot " perform external rotation of shoulders/arms.   Lymphadenopathy:      Cervical: No cervical adenopathy.   Skin:     General: Skin is warm and dry.      Findings: No bruising or erythema.   Neurological:      Mental Status: She is alert and oriented to person, place, and time.   Psychiatric:         Behavior: Behavior normal.         ED Course                 Procedures             Results for orders placed or performed during the hospital encounter of 12/05/23 (from the past 24 hour(s))   Basic metabolic panel   Result Value Ref Range    Sodium 135 135 - 145 mmol/L    Potassium 3.6 3.4 - 5.3 mmol/L    Chloride 96 (L) 98 - 107 mmol/L    Carbon Dioxide (CO2) 26 22 - 29 mmol/L    Anion Gap 13 7 - 15 mmol/L    Urea Nitrogen 13.3 8.0 - 23.0 mg/dL    Creatinine 0.73 0.51 - 0.95 mg/dL    GFR Estimate 85 >60 mL/min/1.73m2    Calcium 9.4 8.8 - 10.2 mg/dL    Glucose 154 (H) 70 - 99 mg/dL   Troponin T, High Sensitivity   Result Value Ref Range    Troponin T, High Sensitivity 11 <=14 ng/L   Extra Tube    Narrative    The following orders were created for panel order Extra Tube.  Procedure                               Abnormality         Status                     ---------                               -----------         ------                     Extra Blue Top Tube[[id number]]                              Final result               Extra Red Top Tube[[id number]]                               Final result               Extra Purple Top Tube[[id number]]                            Final result               Extra Heparinized Syringe[[id number]]                        Final result                 Please view results for these tests on the individual orders.   Extra Blue Top Tube   Result Value Ref Range    Hold Specimen JIC    Extra Red Top Tube   Result Value Ref Range    Hold Specimen JIC    Extra Purple Top Tube   Result Value Ref Range    Hold Specimen JIC    Extra Heparinized Syringe   Result Value Ref Range    Hold Specimen OK     Chest CT w/o contrast    Narrative    EXAMINATION: CT CHEST W/O CONTRAST, 12/5/2023 2:26 PM    HISTORY: new upper shoulder pain and pain over inferior cervical  spine. hx of breast cancer in her 42 and again in her 62 after radical  mastectomy    COMPARISON: Chest radiograph 2/13/2023; CT chest abdomen pelvis  5/26/2021    TECHNIQUE:  Imaging protocol: Computed tomography images of the chest without  contrast.   Acquisition: This CT exam was performed using one or more the  following dose reduction techniques: automated exposure control,  adjustment of the mA and/or kV according to patient size, and/or  iterative reconstruction technique.  Processing: 3D rendering on independent workstation using Maximum  Intensity Projection (MIP) was performed and archived to PACS. 3D  reconstructions are interpreted and reported by supervising  radiologist.    FINDINGS:    CHEST:  LUNGS: No suspicious pulmonary nodules. Scattered discoid and  subpleural atelectasis. No acute airspace opacities.  PLEURA: Within normal limits.  VESSELS: Atherosclerotic calcification of the aorta without aneurysmal  dilation.  HEART: Within normal limits.  LYMPH NODES: There are no pathologically enlarged lymph nodes.  THYROID: No thyroid nodules.    BONES AND SOFT TISSUES:  No suspicious osseous lesions. Postsurgical changes of right shoulder  arthroplasty with streak artifact limiting evaluation of surrounding  structures. Post surgical changes of previous bilateral mastectomy,  left axillary lymph node dissection. No evidence of local recurrence  or metastatic disease. Degenerative periarticular changes.    UPPER ABDOMEN:  Limited evaluation of the upper abdomen demonstrates no acute  parenchymal abnormalities, nonobstructive bowel gas pattern, and no  free fluid or free air.       Impression    IMPRESSION:  No acute abnormality in the chest.    KELSY GARCIA MD         SYSTEM ID:  [id number]       Medications   acetaminophen (TYLENOL) tablet  650 mg (650 mg Oral $Given 12/5/23 1312)   LORazepam (ATIVAN) tablet 0.5 mg (0.5 mg Oral $Given 12/5/23 1314)       Assessments & Plan (with Medical Decision Making)     I have reviewed the nursing notes.    I have reviewed the findings, diagnosis, plan and need for follow up with the patient.  (M25.511,  M25.512) Acute pain of both shoulders  (M54.2) Cervical pain (neck)  Comment: 78 year old female who presents what she states is bilateral neck pain that radiates into both of her shoulders and bilateral anterior chest.  Accompanied with chills, fatigue, headache, light sensitivity and hard time focusing with her eyes.  Is taking acetaminophen every 6 hours and this does help to decrease her discomfort.  Denies previous neck pain.  Having cataract surgery scheduled for this month.  Non-smoker.  Had fifth COVID vaccination December, 2022.  And influenza vaccine October, 2023.  History of breast cancer in her 40s and again in her 60s after having a bilateral, radical mastectomy.  Type I diabetic.  Denies fever, nausea, vomiting, diarrhea, chest pain, shortness of breath, and numbness and tingling in her arms.    MDM:irregular heart tones. Lungs CTA  Right posterior shoulder has moderate swelling over  the trapezius and supraspinatus muscle. Is super tender over this region, C6 and C7 and anterior chest excluding the clavicle. Left shoulder has similar tenderness with palpation but not to the extent of the right. No swelling over left posterior shoulder. No ecchymosis or erythema. Minimal tenderness over shoulder joints. Full Flexion of both arms. Abduction to 100 degrees, pain right shoulder with internal rotation but almost complete ROM (slightly decrease on right). Cannot perform external rotation of shoulders/arms.    BMP negative  Troponin negative    Acetaminophen 650 mg given po.    Unable to lay flat to complete EKG or CT  Refused to have EKG completed.    Ativan 0.5 mg given po did help her to relax to be  able to complete CT scan of chest. She denies it provided pain relief.  CT scan of chest per radiology:No acute abnormality in the chest     Plan: Cervical exercises provided.  Ice to affected area 20 minutes every hour as needed for comfort. After 48 hours you can apply heat. . Tylenol 650 to 1000 mg every four to six hours as needed (not to exceed more than 4000 mg in a 24 hour period). May use interchangeably. Suggest medicating around the clock for the next 24-48 hours. . Follow up with primary provider as needed   May use [facility] or [facility]  These discharge instructions and medications were reviewed with her and understanding verbalized.    This document was prepared using a combination of typing and voice generated software.  While every attempt was made for accuracy, spelling and grammatical errors may exist.    Discharge Medication List as of 12/5/2023  2:41 PM          Final diagnoses:   Cervical pain (neck)   Acute pain of both shoulders       12/5/2023   [facility]         Kristen Bell, CNP  12/05/23 [id number]

## 2023-12-05 NOTE — ED TRIAGE NOTES
Patient presents to Urgent Care for right neck and shoulder pain that started last around 10pm last night. Patient has 2 disks at the top of her neck that has a lot of pressure. Patient has 5 bulging discs in her back.  Patient has been taking Acetaphemeticin every 6 hours since 10pm

## 2023-12-15 ENCOUNTER — OFFICE VISIT (OUTPATIENT)
Dept: FAMILY MEDICINE | Facility: OTHER | Age: 78
End: 2023-12-15
Attending: STUDENT IN AN ORGANIZED HEALTH CARE EDUCATION/TRAINING PROGRAM
Payer: MEDICARE

## 2023-12-15 VITALS
SYSTOLIC BLOOD PRESSURE: 140 MMHG | OXYGEN SATURATION: 98 % | HEART RATE: 80 BPM | BODY MASS INDEX: 25.87 KG/M2 | HEIGHT: 63 IN | TEMPERATURE: 99 F | DIASTOLIC BLOOD PRESSURE: 60 MMHG | WEIGHT: 146 LBS

## 2023-12-15 DIAGNOSIS — E13.9 LADA (LATENT AUTOIMMUNE DIABETES IN ADULTS), MANAGED AS TYPE 1 (H): ICD-10-CM

## 2023-12-15 DIAGNOSIS — Z01.818 PRE-OP EXAM: Primary | ICD-10-CM

## 2023-12-15 DIAGNOSIS — I10 ESSENTIAL HYPERTENSION: ICD-10-CM

## 2023-12-15 DIAGNOSIS — R53.83 OTHER FATIGUE: ICD-10-CM

## 2023-12-15 DIAGNOSIS — E78.2 MIXED HYPERLIPIDEMIA: ICD-10-CM

## 2023-12-15 DIAGNOSIS — H25.9 AGE-RELATED CATARACT OF BOTH EYES, UNSPECIFIED AGE-RELATED CATARACT TYPE: ICD-10-CM

## 2023-12-15 DIAGNOSIS — I48.0 PAROXYSMAL ATRIAL FIBRILLATION (H): ICD-10-CM

## 2023-12-15 DIAGNOSIS — I50.32 DIASTOLIC DYSFUNCTION WITH CHRONIC HEART FAILURE (H): ICD-10-CM

## 2023-12-15 DIAGNOSIS — J44.9 COPD, MILD (H): ICD-10-CM

## 2023-12-15 LAB
ANION GAP SERPL CALCULATED.3IONS-SCNC: 10 MMOL/L (ref 7–15)
BASOPHILS # BLD AUTO: 0 10E3/UL (ref 0–0.2)
BASOPHILS NFR BLD AUTO: 0 %
BUN SERPL-MCNC: 12.3 MG/DL (ref 8–23)
CALCIUM SERPL-MCNC: 9.3 MG/DL (ref 8.8–10.2)
CHLORIDE SERPL-SCNC: 98 MMOL/L (ref 98–107)
CREAT SERPL-MCNC: 0.67 MG/DL (ref 0.51–0.95)
DEPRECATED HCO3 PLAS-SCNC: 28 MMOL/L (ref 22–29)
EGFRCR SERPLBLD CKD-EPI 2021: 90 ML/MIN/1.73M2
EOSINOPHIL # BLD AUTO: 0.3 10E3/UL (ref 0–0.7)
EOSINOPHIL NFR BLD AUTO: 3 %
ERYTHROCYTE [DISTWIDTH] IN BLOOD BY AUTOMATED COUNT: 12.1 % (ref 10–15)
EST. AVERAGE GLUCOSE BLD GHB EST-MCNC: 157 MG/DL
FLUAV RNA SPEC QL NAA+PROBE: NEGATIVE
FLUBV RNA RESP QL NAA+PROBE: NEGATIVE
GLUCOSE SERPL-MCNC: 97 MG/DL (ref 70–99)
HBA1C MFR BLD: 7.1 %
HCT VFR BLD AUTO: 35.8 % (ref 35–47)
HGB BLD-MCNC: 12 G/DL (ref 11.7–15.7)
IMM GRANULOCYTES # BLD: 0.1 10E3/UL
IMM GRANULOCYTES NFR BLD: 1 %
LYMPHOCYTES # BLD AUTO: 1.8 10E3/UL (ref 0.8–5.3)
LYMPHOCYTES NFR BLD AUTO: 19 %
MCH RBC QN AUTO: 31.3 PG (ref 26.5–33)
MCHC RBC AUTO-ENTMCNC: 33.5 G/DL (ref 31.5–36.5)
MCV RBC AUTO: 93 FL (ref 78–100)
MONOCYTES # BLD AUTO: 0.7 10E3/UL (ref 0–1.3)
MONOCYTES NFR BLD AUTO: 7 %
NEUTROPHILS # BLD AUTO: 6.8 10E3/UL (ref 1.6–8.3)
NEUTROPHILS NFR BLD AUTO: 70 %
NRBC # BLD AUTO: 0 10E3/UL
NRBC BLD AUTO-RTO: 0 /100
PLATELET # BLD AUTO: 179 10E3/UL (ref 150–450)
POTASSIUM SERPL-SCNC: 3.9 MMOL/L (ref 3.4–5.3)
RBC # BLD AUTO: 3.84 10E6/UL (ref 3.8–5.2)
RSV RNA SPEC NAA+PROBE: NEGATIVE
SARS-COV-2 RNA RESP QL NAA+PROBE: NEGATIVE
SODIUM SERPL-SCNC: 136 MMOL/L (ref 135–145)
WBC # BLD AUTO: 9.7 10E3/UL (ref 4–11)

## 2023-12-15 PROCEDURE — 87637 SARSCOV2&INF A&B&RSV AMP PRB: CPT | Mod: ZL | Performed by: STUDENT IN AN ORGANIZED HEALTH CARE EDUCATION/TRAINING PROGRAM

## 2023-12-15 PROCEDURE — 36415 COLL VENOUS BLD VENIPUNCTURE: CPT | Mod: ZL | Performed by: STUDENT IN AN ORGANIZED HEALTH CARE EDUCATION/TRAINING PROGRAM

## 2023-12-15 PROCEDURE — 85025 COMPLETE CBC W/AUTO DIFF WBC: CPT | Mod: ZL | Performed by: STUDENT IN AN ORGANIZED HEALTH CARE EDUCATION/TRAINING PROGRAM

## 2023-12-15 PROCEDURE — 99214 OFFICE O/P EST MOD 30 MIN: CPT | Performed by: STUDENT IN AN ORGANIZED HEALTH CARE EDUCATION/TRAINING PROGRAM

## 2023-12-15 PROCEDURE — G0463 HOSPITAL OUTPT CLINIC VISIT: HCPCS | Performed by: STUDENT IN AN ORGANIZED HEALTH CARE EDUCATION/TRAINING PROGRAM

## 2023-12-15 PROCEDURE — 83036 HEMOGLOBIN GLYCOSYLATED A1C: CPT | Mod: ZL | Performed by: STUDENT IN AN ORGANIZED HEALTH CARE EDUCATION/TRAINING PROGRAM

## 2023-12-15 PROCEDURE — 80048 BASIC METABOLIC PNL TOTAL CA: CPT | Mod: ZL | Performed by: STUDENT IN AN ORGANIZED HEALTH CARE EDUCATION/TRAINING PROGRAM

## 2023-12-15 ASSESSMENT — PAIN SCALES - GENERAL: PAINLEVEL: NO PAIN (0)

## 2023-12-15 NOTE — PROGRESS NOTES
[facility] [place]  [address]  [place] [address]  Phone: [phone]  Primary Provider: Nicolasa Guillen  Pre-op Performing Provider: DIANA JIMENEZ    PREOPERATIVE EVALUATION:  Today's date: 12/15/2023    Bo is a 78 year old, presenting for the following:  Pre-Op Exam        12/15/2023     1:18 PM   Additional Questions   Roomed by Bess LEE LPN   Accompanied by self     Surgical Information:  Surgery/Procedure: Cataract   Surgery Location: [facility]  Surgeon: Dr. Wick  Surgery Date: 12/19/23  Time of Surgery: TBD  Where patient plans to recover: At home with family  Fax number for surgical facility: Note does not need to be faxed, will be available electronically in Epic.    Assessment & Plan     The proposed surgical procedure is considered LOW risk.    Pre-op exam  Medically optimized.    - Hemoglobin A1c; Future  - CBC with platelets and differential; Future  - Basic metabolic panel; Future  - Basic metabolic panel  - CBC with platelets and differential  - Hemoglobin A1c    Age-related cataract of both eyes, unspecified age-related cataract type  Indication for surgery.  First surgery planned on 12/19 (right) and second surgery planned on 12/27 (left)    Other fatigue  Has been nauseous and feeling very fatigued the last 2 days.  We will run multiplex to rule out COVID  - Symptomatic Influenza A/B, RSV, & SARS-CoV2 PCR (COVID-19); Future  - Symptomatic Influenza A/B, RSV, & SARS-CoV2 PCR (COVID-19) Nose    Essential hypertension  BP slightly elevated in clinic today  BP otherwise well controlled.    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    On insulin pump  Most recent A1c 3 months ago and stable at 7.2    Paroxysmal atrial fibrillation (H)  On anticoagulation. Due to low risk of bleeding for this surgery, we will have her continue her anticoagulation    COPD, mild (H)  Stable, asymptomatic    Mixed  hyperlipidemia  stable    Diastolic dysfunction with chronic heart failure (H)  Stable, euvolemic         - No identified additional risk factors other than previously addressed    Antiplatelet or Anticoagulation Medication Instructions:   - Patient is on no antiplatelet or anticoagulation medications.    Additional Medication Instructions:  Patient is to take all scheduled medications on the day of surgery    RECOMMENDATION:  APPROVAL GIVEN to proceed with proposed procedure, without further diagnostic evaluation.            Subjective       HPI related to upcoming procedure: Ms. Acuna is here for pre-op clearance in anticipation of her cataract surgery      Health Care Directive:  Patient does not have a Health Care Directive or Living Will: Discussed advance care planning with patient; however, patient declined at this time.    Preoperative Review of :   reviewed - no record of controlled substances prescribed.      Status of Chronic Conditions:  See problem list for active medical problems.  Problems all longstanding and stable, except as noted/documented.  See ROS for pertinent symptoms related to these conditions.    Review of Systems  CONSTITUTIONAL: NEGATIVE for fever, chills, change in weight  INTEGUMENTARY/SKIN: NEGATIVE for worrisome rashes, moles or lesions  EYES: NEGATIVE for vision changes or irritation  ENT/MOUTH: NEGATIVE for ear, mouth and throat problems  RESP: NEGATIVE for significant cough or SOB  CV: NEGATIVE for chest pain, palpitations or peripheral edema  GI: NEGATIVE for nausea, abdominal pain, heartburn, or change in bowel habits  : NEGATIVE for frequency, dysuria, or hematuria  MUSCULOSKELETAL: NEGATIVE for significant arthralgias or myalgia  NEURO: NEGATIVE for weakness, dizziness or paresthesias  ENDOCRINE: NEGATIVE for temperature intolerance, skin/hair changes  HEME: NEGATIVE for bleeding problems  PSYCHIATRIC: NEGATIVE for changes in mood or affect    Patient Active Problem  List    Diagnosis Date Noted    Thrombocytopenia (H24) 03/21/2023     Priority: Medium    Pulmonary emphysema, unspecified emphysema type (H) 02/13/2023     Priority: Medium    Asthma, unspecified asthma severity, unspecified whether complicated, unspecified whether persistent 02/13/2023     Priority: Medium    COPD, mild (H) 08/18/2021     Priority: Medium    Contrast media allergy 05/14/2021     Priority: Medium    FRANCO (dyspnea on exertion) 05/05/2021     Priority: Medium    Fatty liver on 11/7/2007 05/05/2021     Priority: Medium    Diastolic dysfunction with chronic heart failure (H) 05/05/2021     Priority: Medium    Mixed hyperlipidemia 05/05/2021     Priority: Medium    Drug-induced hypokalemia 05/05/2021     Priority: Medium    Vitamin D deficiency 05/05/2021     Priority: Medium    Prolonged QT interval 08/28/2020     Priority: Medium    Dysphonia 06/03/2019     Priority: Medium    Hypothyroidism 05/07/2019     Priority: Medium    Essential hypertension 05/07/2019     Priority: Medium    Malignant neoplasm of left breast in female, estrogen receptor positive (H) 05/07/2019     Priority: Medium     Follows with Dr. 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  Flora Acuna has no plan or code status on file. Discussed available resources and provided with information. Confirmed code status reflects current choices pending further ACP  discussions.  Confirmed/documented legally designated decision makers.  Added by Ruth Velasquez          Personal history of malignant neoplasm of breast 11/23/2015     Priority: Medium    Family history of melanoma 11/21/2015     Priority: Medium    Family history of breast cancer in female 11/21/2015     Priority: Medium    Abnormal EMG 02/12/2013     Priority: Medium     Formatting of this note might be different from the original.  possible findings suggestive of myopathy in muscles that could be consistent with inclusion body myopathy, repeat EMG normal.      CARDIOVASCULAR SCREENING; LDL GOAL LESS THAN 160 10/05/2012     Priority: Medium    Osteoporosis 01/03/2012     Priority: Medium     Overview:   IMO Update      Cardiomegaly 05/08/2009     Priority: Medium     Formatting of this note might be different from the original.  TTE w/Doppler      Incisional hernia 05/04/2009     Priority: Medium     Formatting of this note might be different from the original.  IMO Update 10/11        Past Medical History:   Diagnosis Date    Congestive heart failure, unspecified     Diabetes (H)     H/O rheumatoid arthritis     HLD (hyperlipidemia)     HTN (hypertension)     Myocardial infarction (H)     Uncomplicated asthma      Past Surgical History:   Procedure Laterality Date    APPENDECTOMY OPEN CHILD      AS TOTAL KNEE ARTHROPLASTY Right     CHOLECYSTECTOMY      COLONOSCOPY - HIM SCAN N/A 03/12/2009    per Care Everywhere documentation per [facility].     HYSTERECTOMY TOTAL ABDOMINAL      MASTECTOMY, BILATERAL Bilateral 02/26/1992    SHOULDER SURGERY Right     SHOULDER SURGERY Left      Current Outpatient Medications   Medication Sig Dispense Refill    Acetone, Urine, Test (KETONE TEST) STRP Use as directed 50 strip 4    albuterol (PROAIR HFA/PROVENTIL HFA/VENTOLIN HFA) 108 (90 Base) MCG/ACT inhaler Inhale 1-2 puffs into the lungs every 4 hours as needed for shortness of breath, wheezing or cough 18 g 3    apixaban  ANTICOAGULANT (ELIQUIS ANTICOAGULANT) 5 MG tablet Take 1 tablet (5 mg) by mouth 2 times daily 60 tablet 0    blood glucose (CONTOUR NEXT TEST) test strip Use 6 times a day with the insulin pump to help manage your diabetes 200 strip 0    blood glucose (NO BRAND SPECIFIED) test strip by In Vitro route 4 times daily Use to test blood sugar 4 times daily or as directed.      Calcium-Vitamin D-Vitamin K (VIACTIV PO) Take 2 chew tab by mouth every morning      Continuous Blood Gluc  (DEXCOM G6 ) DIMITRIS 1 each continuous 1 each 0    Continuous Blood Gluc Sensor (DEXCOM G6 SENSOR) MISC CHANGE EVERY 10 DAYS 3 each 5    Continuous Blood Gluc Transmit (DEXCOM G6 TRANSMITTER) MISC CHANGE EVERY 90 DAYS 1 each 1    furosemide (LASIX) 40 MG tablet TAKE 1 TABLET BY MOUTH ONCE DAILY IN THE MORNING AND 1/2 (ONE-HALF) TABLET IN AFTERNOON 135 tablet 3    insulin lispro (HUMALOG VIAL) 100 UNIT/ML vial To be used with the insulin pump TDD: 80 units 30 mL 3    INSULIN PUMP - OUTPATIENT Updated: 10/25/2023  Insulin pump: Minimed 780G  Basal:  0000 0.9   0200 0.85   0500 0.825   0900 1  1200 1.1  2200 1   Total: 23.65  CR: 9  ISF:   0000 60   0700 55    Target: 100-140  Active insulin: 2      levothyroxine (SYNTHROID/LEVOTHROID) 75 MCG tablet Take 1 tablet (75 mcg) by mouth daily 90 tablet 3    omeprazole (PRILOSEC) 40 MG DR capsule Take 1 capsule by mouth once daily 90 capsule 0    potassium chloride ER (KLOR-CON M) 20 MEQ CR tablet Take 1 tablet by mouth twice daily 180 tablet 3    ramipril (ALTACE) 10 MG capsule Take 1 capsule (10 mg) by mouth 2 times daily 180 capsule 2    senna-docusate (SENOKOT-S;PERICOLACE) 8.6-50 MG per tablet Take 1 tablet by mouth At Bedtime       simvastatin (ZOCOR) 40 MG tablet TAKE 1 TABLET BY MOUTH AT BEDTIME 90 tablet 0    tamoxifen (NOLVADEX) 20 MG tablet Take 20 mg by mouth every morning      tiotropium (SPIRIVA RESPIMAT) 2.5 MCG/ACT inhaler INHALE 2 PUFFS BY MOUTH ONCE DAILY Strength: 2.5  "MCG/ACT 4 g 4    tiZANidine (ZANAFLEX) 4 MG tablet Take a 1/2 tablet by mouth in the morning, 1/2 tablet mid day and 1 full tablet at bedtime, can take an extra 1/2 tablet as needed during the night for breakthrough symptoms up to 4 times per week.      triamcinolone (KENALOG) 0.1 % external cream Apply topically 2 times daily 454 g 3    triamcinolone (KENALOG) 0.1 % external ointment Apply topically 2 times daily 80 g 0    VITAMIN D, CHOLECALCIFEROL, PO Take 5,000 Units by mouth daily         Allergies   Allergen Reactions    Contrast Dye Difficulty breathing    Gadolinium Derivatives      Other reaction(s): Difficulty in swallowing, Laryngeal spasm  Patient had an injection into rt. Shoulder capsule prior to MRI arthrogram.  Contained multihance gadobenate, omnipaque iohexol, and buffered lidocaine.      Ammonia     Cory-1 [Lidocaine Hcl]      Novocaine allergy      Codeine Sulfate     Food      Shrimp    Fosamax [Alendronate]      Dental infections    Iodine-131 Swelling     Ct dye    Lidocaine     Nitrous Oxide     No Clinical Screening - See Comments Nausea and Vomiting and Other (See Comments)     (3/6/09)- N/V and Heart racing    Novocain [Procaine]     Penicillins     Symbicort [Budesonide-Formoterol Fumarate]     Tramadol     Morphine Palpitations        Social History     Tobacco Use    Smoking status: Never     Passive exposure: Never    Smokeless tobacco: Never   Substance Use Topics    Alcohol use: No     Alcohol/week: 0.0 standard drinks of alcohol     Family History   Problem Relation Age of Onset    Breast Cancer Maternal Aunt     Breast Cancer Maternal Aunt     Lung Cancer Father      History   Drug Use No         Objective     BP (!) 140/60 (BP Location: Left arm, Patient Position: Sitting, Cuff Size: Adult Regular)   Pulse 80   Temp 99  F (37.2  C) (Tympanic)   Ht 1.6 m (5' 3\")   Wt 66.2 kg (146 lb)   SpO2 98%   BMI 25.86 kg/m      Physical Exam  GENERAL APPEARANCE: healthy, alert and no " distress  HENT: ear canals and TM's normal and nose and mouth without ulcers or lesions  RESP: lungs clear to auscultation - no rales, rhonchi or wheezes  CV: regular rate and rhythm, normal S1 S2, no S3 or S4 and no murmur, click or rub   ABDOMEN: soft, nontender, no HSM or masses and bowel sounds normal  NEURO: Normal strength and tone, sensory exam grossly normal, mentation intact and speech normal    Recent Labs   Lab Test 12/05/23  1353 09/06/23  1503 08/31/23  1445 03/21/23  1521 02/13/23  1519   HGB  --   --  11.9  --  12.5   PLT  --   --  162  --  145*    136 133*  --  136   POTASSIUM 3.6 4.4 3.6  --  4.1   CR 0.73 0.79 0.78  --  0.80   A1C  --   --  7.2* 7.5*  --         Diagnostics:  Labs pending at this time.  Results will be reviewed when available.   No EKG required for low risk surgery (cataract, skin procedure, breast biopsy, etc).    Revised Cardiac Risk Index (RCRI):  The patient has the following serious cardiovascular risks for perioperative complications:   - Diabetes Mellitus (on Insulin) = 1 point     RCRI Interpretation: 1 point: Class II (low risk - 0.9% complication rate)         Signed Electronically by: Vivienne Gandhi MD  Copy of this evaluation report is provided to requesting physician.

## 2023-12-18 ENCOUNTER — TELEPHONE (OUTPATIENT)
Dept: FAMILY MEDICINE | Facility: OTHER | Age: 78
End: 2023-12-18

## 2024-01-03 ENCOUNTER — TRANSFERRED RECORDS (OUTPATIENT)
Dept: HEALTH INFORMATION MANAGEMENT | Facility: CLINIC | Age: 79
End: 2024-01-03
Payer: COMMERCIAL

## 2024-01-04 ENCOUNTER — MEDICAL CORRESPONDENCE (OUTPATIENT)
Dept: HEALTH INFORMATION MANAGEMENT | Facility: HOSPITAL | Age: 79
End: 2024-01-04

## 2024-01-11 ENCOUNTER — TELEPHONE (OUTPATIENT)
Dept: FAMILY MEDICINE | Facility: OTHER | Age: 79
End: 2024-01-11

## 2024-01-11 DIAGNOSIS — E13.9 LADA (LATENT AUTOIMMUNE DIABETES IN ADULTS), MANAGED AS TYPE 1 (H): Primary | ICD-10-CM

## 2024-01-22 ENCOUNTER — TRANSFERRED RECORDS (OUTPATIENT)
Dept: HEALTH INFORMATION MANAGEMENT | Facility: CLINIC | Age: 79
End: 2024-01-22
Payer: COMMERCIAL

## 2024-01-22 LAB — RETINOPATHY: NORMAL

## 2024-01-25 ENCOUNTER — ALLIED HEALTH/NURSE VISIT (OUTPATIENT)
Dept: EDUCATION SERVICES | Facility: OTHER | Age: 79
End: 2024-01-25
Attending: NURSE PRACTITIONER
Payer: COMMERCIAL

## 2024-01-25 VITALS
HEART RATE: 82 BPM | OXYGEN SATURATION: 98 % | RESPIRATION RATE: 16 BRPM | SYSTOLIC BLOOD PRESSURE: 138 MMHG | DIASTOLIC BLOOD PRESSURE: 72 MMHG

## 2024-01-25 DIAGNOSIS — E13.9 LADA (LATENT AUTOIMMUNE DIABETES IN ADULTS), MANAGED AS TYPE 1 (H): Primary | ICD-10-CM

## 2024-01-25 PROCEDURE — G0463 HOSPITAL OUTPT CLINIC VISIT: HCPCS

## 2024-01-25 PROCEDURE — 99213 OFFICE O/P EST LOW 20 MIN: CPT | Performed by: NURSE PRACTITIONER

## 2024-01-25 RX ORDER — NICOTINE POLACRILEX 4 MG
15 LOZENGE BUCCAL
Qty: 113 G | Refills: 4 | Status: SHIPPED | OUTPATIENT
Start: 2024-01-25 | End: 2024-05-28

## 2024-01-25 ASSESSMENT — PAIN SCALES - GENERAL: PAINLEVEL: EXTREME PAIN (8)

## 2024-01-25 NOTE — PROGRESS NOTES
"SUBJECTIVE:  Flora Acuna, 79 year old, female presents with the following Chief Complaint(s) with HPI to follow:  Chief Complaint   Patient presents with    Diabetes        Diabetes Follow-up    Patient is checking blood sugars: personal CGM (Dexcom G6) Results:    Not sharing data    Symptoms of hypoglycemia (low blood sugar): yes  Paresthesias (numbness or burning in feet) or sores: Yes--same; no sores  Diabetic eye exam within the last year: DUE  Breakfast eaten regularly: sometimes  Patient counting carbs: Yes       HPI:  Flora's here today for the follow up regarding her KAREEM, managed as a Type 1    A1c trend:  Lab Results   Component Value Date    A1C 7.1 12/15/2023    A1C 7.2 08/31/2023    A1C 7.5 03/21/2023    A1C 7.2 12/20/2022    A1C 7.2 05/13/2022    A1C 6.9 01/29/2021    A1C 7.2 07/01/2020    A1C 7.9 12/16/2019    A1C 7.5 09/11/2019    A1C 7.3 06/07/2019     Current Diabetes medication:   1.  Insulin pump (Minimed 780G), with Humalog  ASA use: yes  Statin use: yes    Bo reports the following:  Experiencing \"a lot of crashes\"     No issues with the insulin pump  No issues with the Dexcom G6    No new changes to her health    Weight trend:  Wt Readings from Last 4 Encounters:   12/15/23 66.2 kg (146 lb)   12/05/23 64.4 kg (142 lb)   11/14/23 67.1 kg (148 lb)   10/25/23 65.8 kg (145 lb)       Patient Active Problem List   Diagnosis    CARDIOVASCULAR SCREENING; LDL GOAL LESS THAN 160    Personal history of malignant neoplasm of breast    ACP (advance care planning)    Hypothyroidism    Essential hypertension    Malignant neoplasm of left breast in female, estrogen receptor positive (H)    S/P reverse total shoulder arthroplasty, right    Lumbar disc disease with radiculopathy    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), managed as type 1 (H)    H/O total knee replacement, left    Age-related osteoporosis without current pathological fracture    Osteoporosis    Family history of melanoma    " Family history of breast cancer in female    Dysphonia    Prolonged QT interval    FRANCO (dyspnea on exertion)    Fatty liver on 11/7/2007    Diastolic dysfunction with chronic heart failure (H)    Mixed hyperlipidemia    Drug-induced hypokalemia    Vitamin D deficiency    Contrast media allergy    COPD, mild (H)    Pulmonary emphysema, unspecified emphysema type (H)    Asthma, unspecified asthma severity, unspecified whether complicated, unspecified whether persistent    Thrombocytopenia (H24)    Abnormal EMG    Cardiomegaly    Incisional hernia       Past Medical History:   Diagnosis Date    Congestive heart failure, unspecified     Diabetes (H)     H/O rheumatoid arthritis     HLD (hyperlipidemia)     HTN (hypertension)     Myocardial infarction (H)     Uncomplicated asthma        Past Surgical History:   Procedure Laterality Date    APPENDECTOMY OPEN CHILD      AS TOTAL KNEE ARTHROPLASTY Right     CHOLECYSTECTOMY      COLONOSCOPY - HIM SCAN N/A 03/12/2009    per Care Everywhere documentation per [facility].     HYSTERECTOMY TOTAL ABDOMINAL      MASTECTOMY, BILATERAL Bilateral 02/26/1992    SHOULDER SURGERY Right     SHOULDER SURGERY Left        Family History   Problem Relation Age of Onset    Breast Cancer Maternal Aunt     Breast Cancer Maternal Aunt     Lung Cancer Father        Social History     Tobacco Use    Smoking status: Never     Passive exposure: Never    Smokeless tobacco: Never   Substance Use Topics    Alcohol use: No     Alcohol/week: 0.0 standard drinks of alcohol       Current Outpatient Medications   Medication Sig Dispense Refill    Acetone, Urine, Test (KETONE TEST) STRP Use as directed 50 strip 4    albuterol (PROAIR HFA/PROVENTIL HFA/VENTOLIN HFA) 108 (90 Base) MCG/ACT inhaler Inhale 1-2 puffs into the lungs every 4 hours as needed for shortness of breath, wheezing or cough 18 g 3    apixaban ANTICOAGULANT (ELIQUIS ANTICOAGULANT) 5 MG tablet Take 1 tablet (5 mg) by mouth 2 times daily  60 tablet 0    blood glucose (CONTOUR NEXT TEST) test strip Use 6 times a day with the insulin pump to help manage your diabetes 200 strip 0    blood glucose (NO BRAND SPECIFIED) test strip by In Vitro route 4 times daily Use to test blood sugar 4 times daily or as directed.      Calcium-Vitamin D-Vitamin K (VIACTIV PO) Take 2 chew tab by mouth every morning      Continuous Blood Gluc  (DEXCOM G6 ) DIMITRIS 1 each continuous 1 each 0    Continuous Blood Gluc Sensor (DEXCOM G6 SENSOR) MISC CHANGE EVERY 10 DAYS 3 each 5    furosemide (LASIX) 40 MG tablet TAKE 1 TABLET BY MOUTH ONCE DAILY IN THE MORNING AND 1/2 (ONE-HALF) TABLET IN AFTERNOON 135 tablet 3    Glucagon (GVOKE HYPOPEN) 1 MG/0.2ML pen Inject the contents of 1 device under the skin into lower abdomen, outer thigh, or outer upper arm as needed for hypoglycemia. If no response after 15 minutes, additional 1 mg dose from a new device may be injected while waiting for emergency assistance. 0.4 mL 4    glucose (RELION GLUCOSE) 40 % (400 mg/mL) gel Take 15 g by mouth every hour as needed for low blood sugar 113 g 4    insulin lispro (HUMALOG VIAL) 100 UNIT/ML vial To be used with the insulin pump TDD: 80 units 30 mL 3    INSULIN PUMP - OUTPATIENT Insulin pump settings:  Updated: 1/25/24  Insulin pump: Minimed 780G  Basal:  0000 0.9   0200 0.85   0500 0.825   0900 1  1200 1.1  2200 1   Total: 23.65  CR: (new) 11  ISF:   0000 (new) 60   Target: 100-140  Active insulin: (new) 4 hours 30 minutes      levothyroxine (SYNTHROID/LEVOTHROID) 75 MCG tablet Take 1 tablet (75 mcg) by mouth daily 90 tablet 3    omeprazole (PRILOSEC) 40 MG DR capsule Take 1 capsule by mouth once daily 90 capsule 0    potassium chloride ER (KLOR-CON M) 20 MEQ CR tablet Take 1 tablet by mouth twice daily 180 tablet 3    ramipril (ALTACE) 10 MG capsule Take 1 capsule (10 mg) by mouth 2 times daily 180 capsule 2    senna-docusate (SENOKOT-S;PERICOLACE) 8.6-50 MG per tablet Take 1 tablet  "by mouth At Bedtime       simvastatin (ZOCOR) 40 MG tablet TAKE 1 TABLET BY MOUTH AT BEDTIME 90 tablet 0    tamoxifen (NOLVADEX) 20 MG tablet Take 20 mg by mouth every morning      tiotropium (SPIRIVA RESPIMAT) 2.5 MCG/ACT inhaler INHALE 2 PUFFS BY MOUTH ONCE DAILY Strength: 2.5 MCG/ACT 4 g 4    tiZANidine (ZANAFLEX) 4 MG tablet Take a 1/2 tablet by mouth in the morning, 1/2 tablet mid day and 1 full tablet at bedtime, can take an extra 1/2 tablet as needed during the night for breakthrough symptoms up to 4 times per week.      triamcinolone (KENALOG) 0.1 % external cream Apply topically 2 times daily 454 g 3    triamcinolone (KENALOG) 0.1 % external ointment Apply topically 2 times daily 80 g 0    VITAMIN D, CHOLECALCIFEROL, PO Take 5,000 Units by mouth daily      Continuous Blood Gluc Transmit (DEXCOM G6 TRANSMITTER) MISC CHANGE EVERY 90 DAYS 1 each 1    Insulin Disposable Pump (OMNIPOD 5 G6 INTRO, GEN 5,) KIT 1 each every 48 hours 1 kit 0    Insulin Disposable Pump (OMNIPOD 5 G6 POD, GEN 5,) MISC 1 each every 3 days 10 each 5       Allergies   Allergen Reactions    Contrast Dye Difficulty breathing    Gadolinium Derivatives      Other reaction(s): Difficulty in swallowing, Laryngeal spasm  Patient had an injection into rt. Shoulder capsule prior to MRI arthrogram.  Contained multihance gadobenate, omnipaque iohexol, and buffered lidocaine.      Ammonia     Cory-1 [Lidocaine Hcl]      Novocaine allergy      Codeine Sulfate     Food      Shrimp    Fosamax [Alendronate]      Dental infections    Iodine-131 Swelling     Ct dye    Lidocaine     Nitrous Oxide     No Clinical Screening - See Comments Nausea and Vomiting and Other (See Comments)     (3/6/09)- N/V and Heart racing    Novocain [Procaine]     Penicillins     Symbicort [Budesonide-Formoterol Fumarate]     Tramadol     Morphine Palpitations       REVIEW OF SYSTEMS  Skin: hx of skin reaction  Eyes: negative; DUE  Ears/Nose/Throat: hx of \"burnt\" voice and " muscles from GERD  Respiratory: No SOB or FRANCO; no cough; no hemoptysis; history of asthma  Cardiovascular: negative; history of HF  Gastrointestinal: negative  Genitourinary: negative   Musculoskeletal: hx of back pain; hx of RA, neck pain, arthritis and right shoulder   Neurologic: positive for numbness or tingling of feet--same  Psychiatric: negative  Hematologic/Lymphatic/Immunologic: history of breast cancer (left) x 2 (same spot)  Endocrine: positive for diabetes and thyroid disease     OBJECTIVE:  /72   Pulse 82   Resp 16   SpO2 98%   Constitutional: alert and no distress  Respiratory:  Good diaphragmatic excursion.  Musculoskeletal: extremities normal- no gross deformities noted; using a rolling walker   Skin: no visible rash and/or suspicious lesions to visible  Psychiatric: mentation appears normal and affect normal/bright    LABS  No results found for any visits on 01/25/24.    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), managed as type 1 (H)  (primary encounter diagnosis)  Comment: noted insulin pump download    Insulin pump settings:  Updated: 1/25/24  Insulin pump: Minimed 780G  Basal:  0000 0.9   0200 0.85   0500 0.825   0900 1  1200 1.1  2200 1   Total: 23.65  CR: changed to 11  ISF:   0000 changed to  60   Target: 100-140  Active insulin:  changed to 4 hours 30 minutes    Plan: glucose (RELION GLUCOSE) 40 % (400 mg/mL) gel,         Glucagon (GVOKE HYPOPEN) 1 MG/0.2ML pen          Changed a few things on the insulin pump:  CR  ISF  Active insulin time    We did talk about other automated insulin delivery insulin pump  Omnipod (when she was on it) set her into the donut hole montrell  Asked if she could please call [employer] financial department to determine if she qualifies  Keep me in the loop    Ordered sent for glucose gel     Follow up  As discussed    Call sooner if any issues develop    Patient Instructions   Continue working on healthy eating and moving as  best as you can (start low and slow, work up to 30 min, 5x/week)    BG goals:  Fasting and before meals <130, >70  2 hour after eating <180    We only need 1/2 of these numbers to be within target then your A1c will be within target    Medication changes   Keep working on the timing of your carb bolus    Follow up  Please keep me in the loop regarding the Omnipod financial     Call me sooner if any problems/concerns and/or questions develop including consistent low BGs <70 or consistent high BGs >200  [phone] (Marie, Unit Coordinator)    [phone] (Emeli, Nurse)      Time: 25 minutes  Barrier: none  Willingness to learn: accepting    Kerri NELSON FN-BC  Diabetes and Wound Care    Cc: Nicolasa Guillen NP        With the electronic record, we can now more quickly and easily track our patient diabetic goals. Our diabetes clinical review is in progress and these are the indicators we are monitoring for good diabetes health.     1.) HbA1C less than 7 (measurement of your average blood sugars)  2.) Blood Pressure less than 140/80  3.) LDL less than 100 (bad cholesterol)  4.) HbA1C is checked in the last 6 months and below 7% (more frequently if not at goal or adjusting medications)  5.) LDL is checked in the last 12 months (more frequently if not at goal or adjusting medications)  6.) Taking one baby aspirin daily (unless otherwise instructed)  7.) No tobacco use  8) Statin use     You have achieved 7 out of 8 of these and I am encouraging you to come in and get tuned up to achieve 8 out of 8!  Here is what you have achieved so far in my goals for you:  1.) HbA1C  less than 7:                              YES--good for age    Your last  HbA1C  Lab Results   Component Value Date    A1C 7.1 12/15/2023    A1C 7.2 08/31/2023    A1C 7.5 03/21/2023    A1C 7.2 12/20/2022    A1C 7.2 05/13/2022    A1C 6.9 01/29/2021    A1C 7.2 07/01/2020    A1C 7.9 12/16/2019    A1C 7.5 09/11/2019    A1C 7.3 06/07/2019      2.) Blood  Pressure less than 140/80:       YES     Your last    BP Readings from Last 1 Encounters:   01/25/24 138/72     3.) LDL less than 100:                              YES      Your last     LDL Cholesterol Calculated   Date Value Ref Range Status   02/13/2023 56 <=100 mg/dL Final   05/05/2021 49 <100 mg/dL Final     Comment:     Desirable:       <100 mg/dl       4.) Checked HbA1C in the past 6 months: YES      5.) Checked LDL in the past 12 months:    YES    6.) Taking one aspirin daily:                       YES     7.) No tobacco use:                                        YES      8.) Statin use      YES       Insurance requirements:   Patient has been seen in the clinic within the last 6 month  Diagnosis of KAREEM, managed as a Type 1 for >6 months  Has been testing their BGs  Uses an insulin pump for >6 months  Requires frequent adjustments to their treatment regimen based on BGM and/or CGM testing results.

## 2024-01-29 ENCOUNTER — TELEPHONE (OUTPATIENT)
Dept: EDUCATION SERVICES | Facility: OTHER | Age: 79
End: 2024-01-29

## 2024-01-29 DIAGNOSIS — E13.9 LADA (LATENT AUTOIMMUNE DIABETES IN ADULTS), MANAGED AS TYPE 1 (H): ICD-10-CM

## 2024-01-29 RX ORDER — PROCHLORPERAZINE 25 MG/1
SUPPOSITORY RECTAL
Qty: 1 EACH | Refills: 1 | Status: SHIPPED | OUTPATIENT
Start: 2024-01-29 | End: 2024-08-28

## 2024-01-29 NOTE — PATIENT INSTRUCTIONS
Continue working on healthy eating and moving as best as you can (start low and slow, work up to 30 min, 5x/week)    BG goals:  Fasting and before meals <130, >70  2 hour after eating <180    We only need 1/2 of these numbers to be within target then your A1c will be within target    Medication changes   Keep working on the timing of your carb bolus    Follow up  Please keep me in the loop regarding the Omnipod financial     Call me sooner if any problems/concerns and/or questions develop including consistent low BGs <70 or consistent high BGs >200  [phone] (Marie, Unit Coordinator)    [phone] (Emeli, Nurse)

## 2024-01-29 NOTE — TELEPHONE ENCOUNTER
Pt called she can go on the Omnipod. Please send Rx to [facility]. Her cost will be $35 which she can manage.

## 2024-01-30 DIAGNOSIS — E13.9 LADA (LATENT AUTOIMMUNE DIABETES IN ADULTS), MANAGED AS TYPE 1 (H): Primary | ICD-10-CM

## 2024-01-30 RX ORDER — INSULIN PMP CART,AUT,G6/7,CNTR
1 EACH SUBCUTANEOUS
Qty: 1 KIT | Refills: 0 | Status: SHIPPED | OUTPATIENT
Start: 2024-01-30 | End: 2024-07-03 | Stop reason: SINTOL

## 2024-01-30 RX ORDER — INSULIN PMP CART,AUT,G6/7,CNTR
1 EACH SUBCUTANEOUS
Qty: 10 EACH | Refills: 5 | Status: SHIPPED | OUTPATIENT
Start: 2024-01-30 | End: 2024-07-03 | Stop reason: SINTOL

## 2024-01-30 NOTE — PROGRESS NOTES
Order sent per request.    Patient does qualify for the Omnipod financial assistance program    Kerri NELSON [facility]-BC  Diabetes and Wound Care

## 2024-02-12 DIAGNOSIS — I48.0 PAROXYSMAL ATRIAL FIBRILLATION (H): ICD-10-CM

## 2024-02-12 RX ORDER — APIXABAN 5 MG/1
5 TABLET, FILM COATED ORAL 2 TIMES DAILY
Qty: 60 TABLET | Refills: 0 | Status: SHIPPED | OUTPATIENT
Start: 2024-02-12 | End: 2024-02-22

## 2024-02-12 NOTE — TELEPHONE ENCOUNTER
Disp Refills Start End MISBAH   apixaban ANTICOAGULANT (ELIQUIS ANTICOAGULANT) 5 MG tablet 60 tablet 0 3/29/2023 -- No     Last Office Visit: 12/15/2023  Future Office visit:    Next 5 appointments (look out 90 days)      Feb 28, 2024  3:00 PM  (Arrive by 2:45 PM)  SHORT with Nicolasa Guillen NP  [facility] [place] ([facility] [place] ) [address]  [place] [address]  [phone]             Routing refill request to provider for review/approval because:

## 2024-02-22 DIAGNOSIS — I48.0 PAROXYSMAL ATRIAL FIBRILLATION (H): ICD-10-CM

## 2024-02-22 NOTE — TELEPHONE ENCOUNTER
ELIQUIS ANTICOAGULANT 5 MG tablet       Last Written Prescription Date:  2/12/24  Last Fill Quantity: 60,   # refills: 0  Last Office Visit: 12/15/23  Future Office visit:    Next 5 appointments (look out 90 days)      Feb 28, 2024  3:00 PM  (Arrive by 2:45 PM)  SHORT with Nicolasa Guillen NP  [facility] ([facility] [place] ) [address]  [place] [address]  [phone]     May 14, 2024  2:00 PM  (Arrive by 1:45 PM)  SHORT with Nicolasa Guillen NP  [facility] ([facility] [place] ) [address]  [place] [address]  [phone]             Routing refill request to provider for review/approval because:

## 2024-02-22 NOTE — TELEPHONE ENCOUNTER
Reason for call:  Medication    New pharmacy  Have you contacted your pharmacy? No   If patient has contacted Pharmacy and it has been over 72hrs, continue to #2  Medication ELIQUIS ANTICOAGULANT 5 MG tablet generic please give 180 tablets 3 months because takes 2 week to get more cost effective this way.   What Pharmacy do you use? [facility] fax [phone]       (Please note that the turn-around-time for prescriptions is 72 business hours; I am sending your request at this time. SEND TO  [facility]  )

## 2024-02-27 RX ORDER — BROMFENAC 1.03 MG/ML
SOLUTION/ DROPS OPHTHALMIC
COMMUNITY
Start: 2023-12-22 | End: 2024-05-01

## 2024-02-27 RX ORDER — CLARITHROMYCIN 500 MG
TABLET ORAL
COMMUNITY
Start: 2023-03-31 | End: 2024-02-28

## 2024-02-27 RX ORDER — PREDNISOLONE ACETATE 10 MG/ML
SUSPENSION/ DROPS OPHTHALMIC
COMMUNITY
Start: 2023-12-01 | End: 2024-05-01

## 2024-02-27 RX ORDER — MOXIFLOXACIN 5 MG/ML
SOLUTION/ DROPS OPHTHALMIC
COMMUNITY
Start: 2023-12-20 | End: 2024-05-01

## 2024-02-27 NOTE — PROGRESS NOTES
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 Does often experience highs >200 and lows <70. Followed by diabetes center. Discussed adding protein to meals as they are often high carb/low protein. On statin. BP at goal. Eye exam UTD. Will see her back in 3 months to re-check A1c, sooner with new or worsening symptoms.      - Comprehensive metabolic panel (BMP + Alb, Alk Phos, ALT, AST, Total. Bili, TP)    Paroxysmal atrial fibrillation (H)  In a-fib in clinic today. Does have intermittent chest pain, otherwise asymptomatic. This is not new for her. Recommended follow up with Dr. Corea in Cardiology. Declines. She notes that she is \"fine.\" Symptoms have not changed since seeing him in 9/2023.     Mixed hyperlipidemia  On statin. Tolerating. Will continue. Lipids controlled. AST and ALT normal.     - Lipid Profile (Chol, Trig, HDL, LDL calc)  - simvastatin (ZOCOR) 40 MG tablet  Dispense: 90 tablet; Refill: 3    Essential hypertension  Well controlled. Continue current medications. Encouraged daily exercise and a low sodium diet. Recommended checking BP's 2x/wk, call the clinic if consistantly s>140 or d>90. Follow up in 6 months     Diastolic dysfunction with chronic heart failure (H)  Stable. Continue lasix and follow-up with cardiology.      COPD, mild (H)  Breathing controlled. Continue current inhalers and reassess in 3 months.      Malignant neoplasm of left breast in female, estrogen receptor positive, unspecified site of breast (H)  Stable. Continue f/up with oncology. On Tamoxifen life-long.     Bilateral hearing loss, unspecified hearing loss type  Patient is hard of hearing despite use of hearing aids. Will send to audiology for further evaluation.    - Adult Audiology  Referral    Hypothyroidism, unspecified type  Labs in process. Will notify patient of results and intervene accordingly.    - TSH with free T4 reflex    Chest " "Pain  Patient has had the same pain of year. Declined further work-up or to see cardiology. She noted that she \"was fine.\" Does not feel it was her heart. Patient is of sound mind and can make her own decisions. Will return with new or worsening symptoms.     BMI  Estimated body mass index is 26.57 kg/m  as calculated from the following:    Height as of this encounter: 1.6 m (5' 3\").    Weight as of this encounter: 68 kg (150 lb).     Return in about 3 months (around 5/28/2024).    Subjective   Bo is a 79 year old, presenting for the following health issues:  Diabetes, Lipids, Hypertension, and afib        12/15/2023     1:18 PM   Additional Questions   Roomed by Bess LEE LPN   Accompanied by self     HPI     Diabetes Follow-up    How often are you checking your blood sugar? Four or more times daily  Blood sugar testing frequency justification:  Adjustment of medication(s)  What time of day are you checking your blood sugars (select all that apply)?  Before meals and After meals  Have you had any blood sugars above 200?  Yes - on dexcom  Have you had any blood sugars below 70?  Yes - on dexcom  What symptoms do you notice when your blood sugar is low?  Shaky, Dizzy, Weak, and Blurred vision  What concerns do you have today about your diabetes? None   Do you have any of these symptoms? (Select all that apply)  Numbness in feet  A1C was 7.1 on 12/15/23.   Denies shortness of breath, dizziness, syncope, or palpitations. No nausea or vomiting. Feeling well. Intermittent chest pain for years. Has not changed. Has seen Dr. Corea for these symptoms. Unable to have stress test due to allergy. Also declines-she states that she \"is fine.\" Declines further work-up.     Hypothyroidism Follow-up    Since last visit, patient describes the following symptoms: Weight stable, no hair loss, no skin changes, no constipation, no loose stools    Breast Cancer; no new breast masses. Taking tamoxifen-on life-long. Follows with Dr." "Lillian.     Hard of hearing; wears hearing aids, still has trouble hearing.       BP Readings from Last 2 Encounters:   02/28/24 130/60   01/25/24 138/72     Hemoglobin A1C (%)   Date Value   12/15/2023 7.1 (H)   08/31/2023 7.2 (H)   01/29/2021 6.9 (H)   07/01/2020 7.2 (H)     LDL Cholesterol Calculated (mg/dL)   Date Value   02/13/2023 56   12/20/2022 80   05/05/2021 49   07/01/2020 36       Hyperlipidemia Follow-Up    Are you regularly taking any medication or supplement to lower your cholesterol?   Yes- simvastatin 40mg   Are you having muscle aches or other side effects that you think could be caused by your cholesterol lowering medication?  Yes- sometimes legs and hands  but doing fine    Hypertension Follow-up    Do you check your blood pressure regularly outside of the clinic? Yes   Are you following a low salt diet? Yes  Are your blood pressures ever more than 140 on the top number (systolic) OR more   than 90 on the bottom number (diastolic), for example 140/90? No  -Taking ramipril without side effects.   -Also takes lasix 40 mg daily with potassium supplements for diastolic heart failure. Feels this is stable. Moderate left leg swelling but this is normal for her.     Atrial Fibrillation Follow-up    Symptoms: no recent chest pain, significant palpitations, dizziness/lightheadedness, dyspnea, or increased peripheral edema.  Stroke prevention: DOAC (Eliquis, Xarelto, Pradaxa)        11/14/2023     2:11 PM 12/5/2023    12:11 PM 12/15/2023     1:23 PM 1/25/2024    12:53 PM 2/28/2024     2:38 PM   Date   Pulse 74 95 80 82 87     Review of Systems  Constitutional, HEENT, cardiovascular, pulmonary, gi and gu systems are negative, except as otherwise noted.      Objective    /60 (BP Location: Left arm, Patient Position: Sitting, Cuff Size: Adult Regular)   Pulse 87   Temp 99.1  F (37.3  C) (Tympanic)   Ht 1.6 m (5' 3\")   Wt 68 kg (150 lb)   SpO2 98%   BMI 26.57 kg/m    Body mass index is 26.57 " kg/m .    Physical Exam   GENERAL: alert and no distress  EYES: Eyes grossly normal to inspection, PERRL and conjunctivae and sclerae normal  RESP: lungs clear to auscultation - no rales, rhonchi or wheezes  CV: irregularly irregular rhythm. normal S1 S2, no S3 or S4, no murmur, click or rub, no peripheral edema  MS: no gross musculoskeletal defects noted, no edema  SKIN: no suspicious lesions or rashes  NEURO: Normal strength and tone, mentation intact and speech normal  PSYCH: mentation appears normal, affect normal/bright  Diabetic foot exam: normal DP and PT pulses, no trophic changes or ulcerative lesions, and normal sensory exam. pes planus present     Results for orders placed or performed in visit on 02/28/24 (from the past 24 hour(s))   Comprehensive metabolic panel (BMP + Alb, Alk Phos, ALT, AST, Total. Bili, TP)   Result Value Ref [facility]    Sodium 137 135 - 145 mmol/L    Potassium 3.6 3.4 - 5.3 mmol/L    Carbon Dioxide (CO2) 27 22 - 29 mmol/L    Anion Gap 11 7 - 15 mmol/L    Urea Nitrogen 10.1 8.0 - 23.0 mg/dL    Creatinine 0.74 0.51 - 0.95 mg/dL    GFR Estimate 83 >60 mL/min/1.73m2    Calcium 8.7 (L) 8.8 - 10.2 mg/dL    Chloride 99 98 - 107 mmol/L    Glucose 141 (H) 70 - 99 mg/dL    Alkaline Phosphatase 42 40 - 150 U/L    AST 38 0 - 45 U/L    ALT 17 0 - 50 U/L    Protein Total 7.5 6.4 - 8.3 g/dL    Albumin 4.0 3.5 - 5.2 g/dL    Bilirubin Total 0.7 <=1.2 mg/dL   Lipid Profile (Chol, Trig, HDL, LDL calc)   Result Value Ref [facility]    Cholesterol 152 <200 mg/dL    Triglycerides 156 (H) <150 mg/dL    Direct Measure HDL 66 >=50 mg/dL    LDL Cholesterol Calculated 55 <=100 mg/dL    Non HDL Cholesterol 86 <130 mg/dL    Patient Fasting > 8hrs? Yes     Narrative    Cholesterol  Desirable:  <200 mg/dL    Triglycerides  Normal:  Less than 150 mg/dL  Borderline High:  150-199 mg/dL  High:  200-499 mg/dL  Very High:  Greater than or equal to 500 mg/dL    Direct Measure HDL  Female:  Greater than or equal to 50 mg/dL    Male:  Greater than or equal to 40 mg/dL    LDL Cholesterol  Desirable:  <100mg/dL  Above Desirable:  100-129 mg/dL   Borderline High:  130-159 mg/dL   High:  160-189 mg/dL   Very High:  >= 190 mg/dL    Non HDL Cholesterol  Desirable:  130 mg/dL  Above Desirable:  130-159 mg/dL  Borderline High:  160-189 mg/dL  High:  190-219 mg/dL  Very High:  Greater than or equal to 220 mg/dL         ALISON Hollingsworth-S2     I was present with the nurse practitioner student who participated in the service and in the documentation of the note. I have verified the history and personally performed the physical exam and medical decision making. I agree with the assessment and plan of care as documented in the note.     Nicolasa Guillen, CNP

## 2024-02-28 ENCOUNTER — OFFICE VISIT (OUTPATIENT)
Dept: FAMILY MEDICINE | Facility: OTHER | Age: 79
End: 2024-02-28
Attending: NURSE PRACTITIONER
Payer: MEDICARE

## 2024-02-28 ENCOUNTER — ALLIED HEALTH/NURSE VISIT (OUTPATIENT)
Dept: EDUCATION SERVICES | Facility: OTHER | Age: 79
End: 2024-02-28
Attending: NURSE PRACTITIONER
Payer: COMMERCIAL

## 2024-02-28 VITALS
SYSTOLIC BLOOD PRESSURE: 130 MMHG | DIASTOLIC BLOOD PRESSURE: 60 MMHG | OXYGEN SATURATION: 98 % | HEIGHT: 63 IN | WEIGHT: 150 LBS | HEART RATE: 87 BPM | BODY MASS INDEX: 26.58 KG/M2 | TEMPERATURE: 99.1 F

## 2024-02-28 DIAGNOSIS — I48.0 PAROXYSMAL ATRIAL FIBRILLATION (H): ICD-10-CM

## 2024-02-28 DIAGNOSIS — H91.93 BILATERAL HEARING LOSS, UNSPECIFIED HEARING LOSS TYPE: ICD-10-CM

## 2024-02-28 DIAGNOSIS — C50.912 MALIGNANT NEOPLASM OF LEFT BREAST IN FEMALE, ESTROGEN RECEPTOR POSITIVE, UNSPECIFIED SITE OF BREAST (H): ICD-10-CM

## 2024-02-28 DIAGNOSIS — E13.9 LADA (LATENT AUTOIMMUNE DIABETES IN ADULTS), MANAGED AS TYPE 1 (H): Primary | ICD-10-CM

## 2024-02-28 DIAGNOSIS — Z17.0 MALIGNANT NEOPLASM OF LEFT BREAST IN FEMALE, ESTROGEN RECEPTOR POSITIVE, UNSPECIFIED SITE OF BREAST (H): ICD-10-CM

## 2024-02-28 DIAGNOSIS — E03.9 HYPOTHYROIDISM, UNSPECIFIED TYPE: ICD-10-CM

## 2024-02-28 DIAGNOSIS — I50.32 DIASTOLIC DYSFUNCTION WITH CHRONIC HEART FAILURE (H): ICD-10-CM

## 2024-02-28 DIAGNOSIS — I10 ESSENTIAL HYPERTENSION: ICD-10-CM

## 2024-02-28 DIAGNOSIS — J44.9 COPD, MILD (H): ICD-10-CM

## 2024-02-28 DIAGNOSIS — R07.9 CHEST PAIN, UNSPECIFIED TYPE: ICD-10-CM

## 2024-02-28 DIAGNOSIS — E78.2 MIXED HYPERLIPIDEMIA: ICD-10-CM

## 2024-02-28 PROBLEM — D69.6 THROMBOCYTOPENIA (H): Status: RESOLVED | Noted: 2023-03-21 | Resolved: 2024-02-28

## 2024-02-28 LAB
ALBUMIN SERPL BCG-MCNC: 4 G/DL (ref 3.5–5.2)
ALP SERPL-CCNC: 42 U/L (ref 40–150)
ALT SERPL W P-5'-P-CCNC: 17 U/L (ref 0–50)
ANION GAP SERPL CALCULATED.3IONS-SCNC: 11 MMOL/L (ref 7–15)
AST SERPL W P-5'-P-CCNC: 38 U/L (ref 0–45)
BILIRUB SERPL-MCNC: 0.7 MG/DL
BUN SERPL-MCNC: 10.1 MG/DL (ref 8–23)
CALCIUM SERPL-MCNC: 8.7 MG/DL (ref 8.8–10.2)
CHLORIDE SERPL-SCNC: 99 MMOL/L (ref 98–107)
CHOLEST SERPL-MCNC: 152 MG/DL
CREAT SERPL-MCNC: 0.74 MG/DL (ref 0.51–0.95)
DEPRECATED HCO3 PLAS-SCNC: 27 MMOL/L (ref 22–29)
EGFRCR SERPLBLD CKD-EPI 2021: 83 ML/MIN/1.73M2
FASTING STATUS PATIENT QL REPORTED: YES
GLUCOSE SERPL-MCNC: 141 MG/DL (ref 70–99)
HDLC SERPL-MCNC: 66 MG/DL
LDLC SERPL CALC-MCNC: 55 MG/DL
NONHDLC SERPL-MCNC: 86 MG/DL
POTASSIUM SERPL-SCNC: 3.6 MMOL/L (ref 3.4–5.3)
PROT SERPL-MCNC: 7.5 G/DL (ref 6.4–8.3)
SODIUM SERPL-SCNC: 137 MMOL/L (ref 135–145)
TRIGL SERPL-MCNC: 156 MG/DL
TSH SERPL DL<=0.005 MIU/L-ACNC: 1.84 UIU/ML (ref 0.3–4.2)

## 2024-02-28 PROCEDURE — G0463 HOSPITAL OUTPT CLINIC VISIT: HCPCS

## 2024-02-28 PROCEDURE — 84443 ASSAY THYROID STIM HORMONE: CPT | Mod: ZL | Performed by: NURSE PRACTITIONER

## 2024-02-28 PROCEDURE — 99214 OFFICE O/P EST MOD 30 MIN: CPT | Performed by: NURSE PRACTITIONER

## 2024-02-28 PROCEDURE — 36415 COLL VENOUS BLD VENIPUNCTURE: CPT | Mod: ZL | Performed by: NURSE PRACTITIONER

## 2024-02-28 PROCEDURE — 80061 LIPID PANEL: CPT | Mod: ZL | Performed by: NURSE PRACTITIONER

## 2024-02-28 PROCEDURE — 82040 ASSAY OF SERUM ALBUMIN: CPT | Mod: ZL | Performed by: NURSE PRACTITIONER

## 2024-02-28 RX ORDER — SIMVASTATIN 40 MG
40 TABLET ORAL AT BEDTIME
Qty: 90 TABLET | Refills: 3 | Status: SHIPPED | OUTPATIENT
Start: 2024-02-28 | End: 2024-09-11

## 2024-02-28 ASSESSMENT — PAIN SCALES - GENERAL: PAINLEVEL: NO PAIN (0)

## 2024-02-28 NOTE — PROGRESS NOTES
Pt came in for a Insulin pump download today. Download was completed and given to Kerri Elliott.    Ginger Lerner

## 2024-03-05 DIAGNOSIS — K21.9 GASTROESOPHAGEAL REFLUX DISEASE WITHOUT ESOPHAGITIS: ICD-10-CM

## 2024-03-05 RX ORDER — OMEPRAZOLE 40 MG/1
CAPSULE, DELAYED RELEASE ORAL
Qty: 90 CAPSULE | Refills: 3 | Status: SHIPPED | OUTPATIENT
Start: 2024-03-05

## 2024-03-18 ENCOUNTER — TELEPHONE (OUTPATIENT)
Dept: CARDIOLOGY | Facility: OTHER | Age: 79
End: 2024-03-18

## 2024-03-18 DIAGNOSIS — I48.0 PAROXYSMAL ATRIAL FIBRILLATION (H): ICD-10-CM

## 2024-03-18 NOTE — TELEPHONE ENCOUNTER
apixaban ANTICOAGULANT (ELIQUIS ANTICOAGULANT) 5 MG tablet 60 tablet 0 2/23/2024   Last seen in cardiology 09/06/2023    Future Office visit:    Next 5 appointments (look out 90 days)      May 14, 2024  2:00 PM  (Arrive by 1:45 PM)  SHORT with Nicolasa Guillen NP  [facility] [place] ([facility] [place] [facility] [address] [address]  [phone]     May 28, 2024  3:00 PM  (Arrive by 2:45 PM)  SHORT with Nicolasa Guillen NP  [facility] [place] ([facility] [place] ) [address] [address]  [phone]             Routing refill request to provider for review/approval because:

## 2024-03-18 NOTE — TELEPHONE ENCOUNTER
Reason for call:  Medication      Have you contacted your pharmacy? Yes   If patient has contacted Pharmacy and it has been over 72hrs, continue to #2  Medication: apixaban (Eliquis)   What Pharmacy do you use? [facility]  Patient needing the original prescription faxed to [facility] at [phone] to keep it on Jan's file at the pharmacy and she is requesting this to be a 90 day supply because it takes 2 to 4 weeks to get.   Phone for Jan [phone]       (Please note that the turn-around-time for prescriptions is 72 business hours; I am sending your request at this time. SEND TO  [facility]  )

## 2024-04-08 DIAGNOSIS — J43.9 PULMONARY EMPHYSEMA, UNSPECIFIED EMPHYSEMA TYPE (H): Primary | ICD-10-CM

## 2024-04-08 RX ORDER — TIOTROPIUM BROMIDE INHALATION SPRAY 3.12 UG/1
SPRAY, METERED RESPIRATORY (INHALATION)
Qty: 4 G | Refills: 4 | Status: SHIPPED | OUTPATIENT
Start: 2024-04-08 | End: 2024-05-28

## 2024-04-08 NOTE — TELEPHONE ENCOUNTER
Disp Refills Start End MISBAH   tiotropium (SPIRIVA RESPIMAT) 2.5 MCG/ACT inhaler 4 g 4 12/20/2022 -- No     Last Office Visit: 02/28/2024  Future Office visit:    Next 5 appointments (look out 90 days)      May 14, 2024  2:00 PM  (Arrive by 1:40 PM)  Provider Visit with Nicolasa Guillen NP  [facility] [place] ([facility] [place] ) [address]  [place] [address]  [phone]     May 28, 2024  3:00 PM  (Arrive by 2:45 PM)  Provider Visit with Nicolasa Guillen NP  [facility] [place] ([facility] [place] ) [address]  [place] [address]  [phone]             Routing refill request to provider for review/approval because:

## 2024-04-22 ENCOUNTER — NURSE TRIAGE (OUTPATIENT)
Dept: CARE COORDINATION | Facility: OTHER | Age: 79
End: 2024-04-22

## 2024-04-22 ENCOUNTER — TRANSFERRED RECORDS (OUTPATIENT)
Dept: HEALTH INFORMATION MANAGEMENT | Facility: CLINIC | Age: 79
End: 2024-04-22
Payer: COMMERCIAL

## 2024-04-22 ENCOUNTER — TELEPHONE (OUTPATIENT)
Dept: FAMILY MEDICINE | Facility: OTHER | Age: 79
End: 2024-04-22

## 2024-04-22 ENCOUNTER — HOSPITAL ENCOUNTER (EMERGENCY)
Facility: HOSPITAL | Age: 79
Discharge: ANOTHER HOSPITAL | End: 2024-04-22
Attending: PHYSICIAN ASSISTANT | Admitting: PHYSICIAN ASSISTANT
Payer: MEDICARE

## 2024-04-22 ENCOUNTER — APPOINTMENT (OUTPATIENT)
Dept: GENERAL RADIOLOGY | Facility: HOSPITAL | Age: 79
End: 2024-04-22
Attending: STUDENT IN AN ORGANIZED HEALTH CARE EDUCATION/TRAINING PROGRAM
Payer: MEDICARE

## 2024-04-22 VITALS
OXYGEN SATURATION: 95 % | RESPIRATION RATE: 19 BRPM | SYSTOLIC BLOOD PRESSURE: 128 MMHG | DIASTOLIC BLOOD PRESSURE: 80 MMHG | TEMPERATURE: 99 F | HEART RATE: 115 BPM | BODY MASS INDEX: 26.58 KG/M2 | HEIGHT: 63 IN | WEIGHT: 150 LBS

## 2024-04-22 DIAGNOSIS — I21.4 NSTEMI (NON-ST ELEVATED MYOCARDIAL INFARCTION) (H): ICD-10-CM

## 2024-04-22 DIAGNOSIS — E13.9 LADA (LATENT AUTOIMMUNE DIABETES IN ADULTS), MANAGED AS TYPE 1 (H): ICD-10-CM

## 2024-04-22 LAB
ANION GAP SERPL CALCULATED.3IONS-SCNC: 8 MMOL/L (ref 7–15)
BASOPHILS # BLD AUTO: 0 10E3/UL (ref 0–0.2)
BASOPHILS NFR BLD AUTO: 1 %
BUN SERPL-MCNC: 12.5 MG/DL (ref 8–23)
CALCIUM SERPL-MCNC: 9.6 MG/DL (ref 8.8–10.2)
CHLORIDE SERPL-SCNC: 98 MMOL/L (ref 98–107)
CREAT SERPL-MCNC: 0.76 MG/DL (ref 0.51–0.95)
D DIMER PPP FEU-MCNC: <0.3 UG/ML FEU (ref 0–0.5)
DEPRECATED HCO3 PLAS-SCNC: 29 MMOL/L (ref 22–29)
EGFRCR SERPLBLD CKD-EPI 2021: 80 ML/MIN/1.73M2
EOSINOPHIL # BLD AUTO: 0.4 10E3/UL (ref 0–0.7)
EOSINOPHIL NFR BLD AUTO: 5 %
ERYTHROCYTE [DISTWIDTH] IN BLOOD BY AUTOMATED COUNT: 12.3 % (ref 10–15)
GLUCOSE SERPL-MCNC: 154 MG/DL (ref 70–99)
HCT VFR BLD AUTO: 38 % (ref 35–47)
HGB BLD-MCNC: 12.7 G/DL (ref 11.7–15.7)
HOLD SPECIMEN: NORMAL
IMM GRANULOCYTES # BLD: 0 10E3/UL
IMM GRANULOCYTES NFR BLD: 0 %
LYMPHOCYTES # BLD AUTO: 2.1 10E3/UL (ref 0.8–5.3)
LYMPHOCYTES NFR BLD AUTO: 27 %
MCH RBC QN AUTO: 30.7 PG (ref 26.5–33)
MCHC RBC AUTO-ENTMCNC: 33.4 G/DL (ref 31.5–36.5)
MCV RBC AUTO: 92 FL (ref 78–100)
MONOCYTES # BLD AUTO: 0.5 10E3/UL (ref 0–1.3)
MONOCYTES NFR BLD AUTO: 6 %
NEUTROPHILS # BLD AUTO: 4.6 10E3/UL (ref 1.6–8.3)
NEUTROPHILS NFR BLD AUTO: 60 %
NRBC # BLD AUTO: 0 10E3/UL
NRBC BLD AUTO-RTO: 0 /100
NT-PROBNP SERPL-MCNC: 505 PG/ML (ref 0–1800)
PLATELET # BLD AUTO: 147 10E3/UL (ref 150–450)
POTASSIUM SERPL-SCNC: 4.1 MMOL/L (ref 3.4–5.3)
RBC # BLD AUTO: 4.14 10E6/UL (ref 3.8–5.2)
SODIUM SERPL-SCNC: 135 MMOL/L (ref 135–145)
TROPONIN T SERPL HS-MCNC: 194 NG/L
TROPONIN T SERPL HS-MCNC: 82 NG/L
WBC # BLD AUTO: 7.7 10E3/UL (ref 4–11)

## 2024-04-22 PROCEDURE — 250N000013 HC RX MED GY IP 250 OP 250 PS 637: Performed by: PHYSICIAN ASSISTANT

## 2024-04-22 PROCEDURE — 80048 BASIC METABOLIC PNL TOTAL CA: CPT | Performed by: PHYSICIAN ASSISTANT

## 2024-04-22 PROCEDURE — 71046 X-RAY EXAM CHEST 2 VIEWS: CPT

## 2024-04-22 PROCEDURE — 85025 COMPLETE CBC W/AUTO DIFF WBC: CPT | Performed by: PHYSICIAN ASSISTANT

## 2024-04-22 PROCEDURE — 250N000011 HC RX IP 250 OP 636: Performed by: PHYSICIAN ASSISTANT

## 2024-04-22 PROCEDURE — 85379 FIBRIN DEGRADATION QUANT: CPT | Performed by: PHYSICIAN ASSISTANT

## 2024-04-22 PROCEDURE — 36415 COLL VENOUS BLD VENIPUNCTURE: CPT | Performed by: STUDENT IN AN ORGANIZED HEALTH CARE EDUCATION/TRAINING PROGRAM

## 2024-04-22 PROCEDURE — 93010 ELECTROCARDIOGRAM REPORT: CPT | Performed by: INTERNAL MEDICINE

## 2024-04-22 PROCEDURE — 84484 ASSAY OF TROPONIN QUANT: CPT | Performed by: PHYSICIAN ASSISTANT

## 2024-04-22 PROCEDURE — 85004 AUTOMATED DIFF WBC COUNT: CPT | Performed by: STUDENT IN AN ORGANIZED HEALTH CARE EDUCATION/TRAINING PROGRAM

## 2024-04-22 PROCEDURE — 36415 COLL VENOUS BLD VENIPUNCTURE: CPT | Performed by: PHYSICIAN ASSISTANT

## 2024-04-22 PROCEDURE — 99285 EMERGENCY DEPT VISIT HI MDM: CPT | Performed by: PHYSICIAN ASSISTANT

## 2024-04-22 PROCEDURE — 82374 ASSAY BLOOD CARBON DIOXIDE: CPT | Performed by: STUDENT IN AN ORGANIZED HEALTH CARE EDUCATION/TRAINING PROGRAM

## 2024-04-22 PROCEDURE — 96365 THER/PROPH/DIAG IV INF INIT: CPT

## 2024-04-22 PROCEDURE — 99285 EMERGENCY DEPT VISIT HI MDM: CPT | Mod: 25

## 2024-04-22 PROCEDURE — 93005 ELECTROCARDIOGRAM TRACING: CPT | Mod: 76

## 2024-04-22 PROCEDURE — 83880 ASSAY OF NATRIURETIC PEPTIDE: CPT | Performed by: PHYSICIAN ASSISTANT

## 2024-04-22 PROCEDURE — 96366 THER/PROPH/DIAG IV INF ADDON: CPT

## 2024-04-22 RX ORDER — PROCHLORPERAZINE 25 MG/1
SUPPOSITORY RECTAL
Qty: 3 EACH | Refills: 5 | Status: SHIPPED | OUTPATIENT
Start: 2024-04-22 | End: 2024-07-01

## 2024-04-22 RX ORDER — HEPARIN SODIUM 10000 [USP'U]/100ML
0-5000 INJECTION, SOLUTION INTRAVENOUS CONTINUOUS
Status: DISCONTINUED | OUTPATIENT
Start: 2024-04-22 | End: 2024-04-22 | Stop reason: HOSPADM

## 2024-04-22 RX ORDER — ASPIRIN 81 MG/1
324 TABLET, CHEWABLE ORAL ONCE
Status: COMPLETED | OUTPATIENT
Start: 2024-04-22 | End: 2024-04-22

## 2024-04-22 RX ADMIN — HEPARIN SODIUM 800 UNITS/HR: 10000 INJECTION, SOLUTION INTRAVENOUS at 17:14

## 2024-04-22 RX ADMIN — ASPIRIN 81 MG CHEWABLE TABLET 324 MG: 81 TABLET CHEWABLE at 16:14

## 2024-04-22 ASSESSMENT — ACTIVITIES OF DAILY LIVING (ADL)
ADLS_ACUITY_SCORE: 35

## 2024-04-22 ASSESSMENT — COLUMBIA-SUICIDE SEVERITY RATING SCALE - C-SSRS
2. HAVE YOU ACTUALLY HAD ANY THOUGHTS OF KILLING YOURSELF IN THE PAST MONTH?: NO
1. IN THE PAST MONTH, HAVE YOU WISHED YOU WERE DEAD OR WISHED YOU COULD GO TO SLEEP AND NOT WAKE UP?: NO
6. HAVE YOU EVER DONE ANYTHING, STARTED TO DO ANYTHING, OR PREPARED TO DO ANYTHING TO END YOUR LIFE?: NO

## 2024-04-22 ASSESSMENT — ENCOUNTER SYMPTOMS
FEVER: 0
BRUISES/BLEEDS EASILY: 1
BACK PAIN: 0

## 2024-04-22 NOTE — ED PROVIDER NOTES
History     Chief Complaint   Patient presents with    Shortness of Breath     The history is provided by the patient.     Flora Acuna is a 79 year old female who presented to the emergency department ambulatory for evaluation of an approximate 3-month history of dyspnea with exertion.  The patient also does endorse some mild left anterior chest pressure with exertion.  On arrival to the emergency department she is entirely asymptomatic.  He denies any significant cough.  She has no shortness of breath or chest pain.  She reports paroxysmal atrial fibrillation for which she takes Eliquis.  No falls.  She has had a remote heart catheterization without intervention.  Sees a local cardiologist.    Allergies:  Allergies   Allergen Reactions    Contrast Dye Difficulty breathing    Gadolinium Derivatives      Other reaction(s): Difficulty in swallowing, Laryngeal spasm  Patient had an injection into rt. Shoulder capsule prior to MRI arthrogram.  Contained multihance gadobenate, omnipaque iohexol, and buffered lidocaine.      Ammonia     Cory-1 [Lidocaine Hcl]      Novocaine allergy      Codeine Sulfate     Food      Shrimp    Fosamax [Alendronate]      Dental infections    Iodine-131 Swelling     Ct dye    Lidocaine     Nitrous Oxide     No Clinical Screening - See Comments Nausea and Vomiting and Other (See Comments)     (3/6/09)- N/V and Heart racing    Novocain [Procaine]     Penicillins     Symbicort [Budesonide-Formoterol Fumarate]     Tramadol     Morphine Palpitations       Problem List:    Patient Active Problem List    Diagnosis Date Noted    Paroxysmal atrial fibrillation (H) 03/18/2024     Priority: Medium    Pulmonary emphysema, unspecified emphysema type (H) 02/13/2023     Priority: Medium    Asthma, unspecified asthma severity, unspecified whether complicated, unspecified whether persistent 02/13/2023     Priority: Medium    COPD, mild (H) 08/18/2021     Priority: Medium    Contrast media allergy  05/14/2021     Priority: Medium    FRANCO (dyspnea on exertion) 05/05/2021     Priority: Medium    Fatty liver on 11/7/2007 05/05/2021     Priority: Medium    Diastolic dysfunction with chronic heart failure (H) 05/05/2021     Priority: Medium    Mixed hyperlipidemia 05/05/2021     Priority: Medium    Drug-induced hypokalemia 05/05/2021     Priority: Medium    Vitamin D deficiency 05/05/2021     Priority: Medium    Prolonged QT interval 08/28/2020     Priority: Medium    Dysphonia 06/03/2019     Priority: Medium    Hypothyroidism 05/07/2019     Priority: Medium    Essential hypertension 05/07/2019     Priority: Medium    Malignant neoplasm of left breast in female, estrogen receptor positive (H) 05/07/2019     Priority: Medium     Follows with Dr. 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  Flora Acuna has no plan or code status on file. Discussed available resources and provided with information. Confirmed code status reflects current choices pending further ACP discussions.  Confirmed/documented legally designated decision makers.  Added by Ruth Velasquez          Personal history of malignant neoplasm of breast 11/23/2015     Priority: Medium    Family history of melanoma 11/21/2015     Priority: Medium    Family history of breast cancer in female 11/21/2015     Priority: Medium    Abnormal EMG 02/12/2013     Priority: Medium     Formatting of this  note might be different from the original.  possible findings suggestive of myopathy in muscles that could be consistent with inclusion body myopathy, repeat EMG normal.      CARDIOVASCULAR SCREENING; LDL GOAL LESS THAN 160 10/05/2012     Priority: Medium    Osteoporosis 01/03/2012     Priority: Medium     Overview:   IMO Update      Cardiomegaly 05/08/2009     Priority: Medium     Formatting of this note might be different from the original.  TTE w/Doppler      Incisional hernia 05/04/2009     Priority: Medium     Formatting of this note might be different from the original.  IMO Update 10/11          Past Medical History:    Past Medical History:   Diagnosis Date    Congestive heart failure, unspecified     Diabetes (H)     H/O rheumatoid arthritis     HLD (hyperlipidemia)     HTN (hypertension)     Myocardial infarction (H)     Uncomplicated asthma        Past Surgical History:    Past Surgical History:   Procedure Laterality Date    APPENDECTOMY OPEN CHILD      AS TOTAL KNEE ARTHROPLASTY Right     CHOLECYSTECTOMY      COLONOSCOPY - HIM SCAN N/A 03/12/2009    per Care Everywhere documentation per [facility].     HYSTERECTOMY TOTAL ABDOMINAL      MASTECTOMY, BILATERAL Bilateral 02/26/1992    SHOULDER SURGERY Right     SHOULDER SURGERY Left        Family History:    Family History   Problem Relation Age of Onset    Breast Cancer Maternal Aunt     Breast Cancer Maternal Aunt     Lung Cancer Father        Social History:  Marital Status:   [5]  Social History     Tobacco Use    Smoking status: Never     Passive exposure: Never    Smokeless tobacco: Never   Vaping Use    Vaping status: Never Used   Substance Use Topics    Alcohol use: No     Alcohol/week: 0.0 standard drinks of alcohol    Drug use: No        Medications:    Acetone, Urine, Test (KETONE TEST) STRP  albuterol (PROAIR HFA/PROVENTIL HFA/VENTOLIN HFA) 108 (90 Base) MCG/ACT inhaler  apixaban ANTICOAGULANT (ELIQUIS ANTICOAGULANT) 5 MG  "tablet  blood glucose (CONTOUR NEXT TEST) test strip  blood glucose (NO BRAND SPECIFIED) test strip  bromfenac (BROMDAY) 0.09 % ophthalmic solution  Calcium-Vitamin D-Vitamin K (VIACTIV PO)  Continuous Blood Gluc  (DEXCOM G6 ) DIMITRIS  Continuous Blood Gluc Transmit (DEXCOM G6 TRANSMITTER) MISC  Continuous Glucose Sensor (DEXCOM G6 SENSOR) MISC  furosemide (LASIX) 40 MG tablet  Glucagon (GVOKE HYPOPEN) 1 MG/0.2ML pen  glucose (RELION GLUCOSE) 40 % (400 mg/mL) gel  Insulin Disposable Pump (OMNIPOD 5 G6 INTRO, GEN 5,) KIT  Insulin Disposable Pump (OMNIPOD 5 G6 POD, GEN 5,) MISC  insulin lispro (HUMALOG VIAL) 100 UNIT/ML vial  INSULIN PUMP - OUTPATIENT  levothyroxine (SYNTHROID/LEVOTHROID) 75 MCG tablet  moxifloxacin (VIGAMOX) 0.5 % ophthalmic solution  omeprazole (PRILOSEC) 40 MG DR capsule  potassium chloride ER (KLOR-CON M) 20 MEQ CR tablet  prednisoLONE acetate (PRED FORTE) 1 % ophthalmic suspension  ramipril (ALTACE) 10 MG capsule  senna-docusate (SENOKOT-S;PERICOLACE) 8.6-50 MG per tablet  simvastatin (ZOCOR) 40 MG tablet  tamoxifen (NOLVADEX) 20 MG tablet  tiotropium (SPIRIVA RESPIMAT) 2.5 MCG/ACT inhaler  tiZANidine (ZANAFLEX) 4 MG tablet  triamcinolone (KENALOG) 0.1 % external cream  triamcinolone (KENALOG) 0.1 % external ointment  VITAMIN D, CHOLECALCIFEROL, PO          Review of Systems   Constitutional:  Negative for fever.   Respiratory:          See HPI   Cardiovascular:         See HPI   Genitourinary: Negative.    Musculoskeletal:  Negative for back pain.   Hematological:  Bruises/bleeds easily.       Physical Exam   BP: (!) 179/117  Pulse: 116  Temp: 99  F (37.2  C)  Resp: 18  Height: 160 cm (5' 3\")  Weight: 68 kg (150 lb)  SpO2: 98 %      Physical Exam  Vitals and nursing note reviewed.   Constitutional:       General: She is not in acute distress.     Appearance: Normal appearance. She is normal weight. She is not ill-appearing, toxic-appearing or diaphoretic.      Comments: Smiling and " talkative 79-year-old female found semireclined in no distress   Cardiovascular:      Rate and Rhythm: Regular rhythm. Tachycardia present.   Pulmonary:      Effort: Pulmonary effort is normal.      Breath sounds: Normal breath sounds.   Musculoskeletal:      Right lower leg: No edema.      Left lower leg: No edema.   Skin:     General: Skin is warm and dry.      Capillary Refill: Capillary refill takes less than 2 seconds.   Neurological:      General: No focal deficit present.      Mental Status: She is alert and oriented to person, place, and time.         ED Course     ED Course as of 04/22/24 1703   Mon Apr 22, 2024   1555 My independent review of the EKG shows a sinus tachycardia at a rate of 102.  Normal WY interval.  Normal QRS duration.  Normal QTc.  Normal axis.  Nonspecific T wave abnormalities mostly involving lead I as well as leads V5 and V6.  Also involving aVL.  Comparison to previous EKG shows previous worsening T wave abnormalities that seem to improved.  No ST concerns.   1605 Troponin T, High Sensitivity(!): 82   1626 Troponin T, High Sensitivity(!!): 194   1626 Awaiting return call from [facility].     Procedures              Critical Care time:  none               Results for orders placed or performed during the hospital encounter of 04/22/24 (from the past 24 hour(s))   EKG 12-lead, tracing only   Result Value Ref Range    Systolic Blood Pressure  mmHg    Diastolic Blood Pressure  mmHg    Ventricular Rate 102 BPM    Atrial Rate 102 BPM    WY Interval 164 ms    QRS Duration 98 ms     ms    QTc 458 ms    P Axis 60 degrees    R AXIS -28 degrees    T Axis 103 degrees    Interpretation ECG       Sinus tachycardia  Minimal voltage criteria for LVH, may be normal variant ( Matt product )  Nonspecific T wave abnormality  Abnormal ECG  No previous ECGs available     [place] Draw    Narrative    The following orders were created for panel order [place] Draw.  Procedure                                Abnormality         Status                     ---------                               -----------         ------                     Extra Blue Top Tube[[id number]]                              Final result               Extra Red Top Tube[[id number]]                               Final result               Extra Green Top (Lithium...[[id number]]                      Final result               Extra Purple Top Tube[[id number]]                                                         Please view results for these tests on the individual orders.   CBC with Platelets & Differential    Narrative    The following orders were created for panel order CBC with Platelets & Differential.  Procedure                               Abnormality         Status                     ---------                               -----------         ------                     CBC with platelets and d...[[id number]]  Abnormal            Final result                 Please view results for these tests on the individual orders.   Basic metabolic panel   Result Value Ref Range    Sodium 135 135 - 145 mmol/L    Potassium 4.1 3.4 - 5.3 mmol/L    Chloride 98 98 - 107 mmol/L    Carbon Dioxide (CO2) 29 22 - 29 mmol/L    Anion Gap 8 7 - 15 mmol/L    Urea Nitrogen 12.5 8.0 - 23.0 mg/dL    Creatinine 0.76 0.51 - 0.95 mg/dL    GFR Estimate 80 >60 mL/min/1.73m2    Calcium 9.6 8.8 - 10.2 mg/dL    Glucose 154 (H) 70 - 99 mg/dL   Extra Blue Top Tube   Result Value Ref Range    Hold Specimen JIC    Extra Red Top Tube   Result Value Ref Range    Hold Specimen JIC    Extra Green Top (Lithium Heparin) Tube   Result Value Ref Range    Hold Specimen JIC    CBC with platelets and differential   Result Value Ref Range    WBC Count 7.7 4.0 - 11.0 10e3/uL    RBC Count 4.14 3.80 - 5.20 10e6/uL    Hemoglobin 12.7 11.7 - 15.7 g/dL    Hematocrit 38.0 35.0 - 47.0 %    MCV 92 78 - 100 fL    MCH 30.7 26.5 - 33.0 pg    MCHC 33.4 31.5 - 36.5 g/dL    RDW 12.3 10.0 - 15.0 %     Platelet Count 147 (L) 150 - 450 10e3/uL    % Neutrophils 60 %    % Lymphocytes 27 %    % Monocytes 6 %    % Eosinophils 5 %    % Basophils 1 %    % Immature Granulocytes 0 %    NRBCs per 100 WBC 0 <1 /100    Absolute Neutrophils 4.6 1.6 - 8.3 10e3/uL    Absolute Lymphocytes 2.1 0.8 - 5.3 10e3/uL    Absolute Monocytes 0.5 0.0 - 1.3 10e3/uL    Absolute Eosinophils 0.4 0.0 - 0.7 10e3/uL    Absolute Basophils 0.0 0.0 - 0.2 10e3/uL    Absolute Immature Granulocytes 0.0 <=0.4 10e3/uL    Absolute NRBCs 0.0 10e3/uL   Nt probnp inpatient (BNP)   Result Value Ref Range    N terminal Pro BNP Inpatient 505 0 - 1,800 pg/mL   Troponin T, High Sensitivity   Result Value Ref Range    Troponin T, High Sensitivity 82 (H) <=14 ng/L   D dimer quantitative   Result Value Ref Range    D-Dimer Quantitative <0.30 0.00 - 0.50 ug/mL FEU    Narrative    This D-dimer assay is intended for use in conjunction with a clinical pretest probability assessment model to exclude pulmonary embolism (PE) and deep venous thrombosis (DVT) in outpatients suspected of PE or DVT. The cut-off value is 0.50 ug/mL FEU.    For patients 50 years of age or older, the application of age-adjusted cut-off values for D-Dimer may increase the specificity without significant effect on sensitivity. The literature suggested calculation age adjusted cut-off in ug/L = age in years x 10 ug/L. The results in this laboratory are reported as ug/mL rather than ug/L. The calculation for age adjusted cut off in ug/mL= age in years x 0.01 ug/mL. For example, the cut off for a 76 year old male is 76 x 0.01 ug/mL = 0.76 ug/mL (760 ug/L).    M Seema et al. Age adjusted D-dimer cut-off levels to rule out pulmonary embolism: The ADJUST-PE Study. ALETHEA 2014;311:9189-6849.; KENNY De Leon et al. Diagnostic accuracy of conventional or age adjusted D-dimer cutoff values in older patients with suspected venous thromboembolism. Systemic review and meta-analysis. BMJ 2013:346:f2492.   Troponin  T, High Sensitivity   Result Value Ref Range    Troponin T, High Sensitivity 194 (HH) <=14 ng/L   XR Chest 2 Views    Narrative    PROCEDURE:  XR CHEST 2 VIEWS    HISTORY: sob, .    COMPARISON:  12/5/2023    FINDINGS:  The cardiomediastinal contours are stable. The trachea is midline.  There is calcific aortic atherosclerosis.  Trace fluid is seen along the minor fissure. No focal consolidation or  pneumothorax.    No suspicious osseous lesion or subdiaphragmatic free air.      Impression    IMPRESSION:      No or discrete consolidation.      CANELO GARRISON MD         SYSTEM ID:  [id number]       Medications   heparin 25,000 units in 0.45% NaCl 250 mL ANTICOAGULANT infusion (has no administration in time range)   aspirin (ASA) chewable tablet 324 mg (324 mg Oral $Given 4/22/24 2083)       Assessments & Plan (with Medical Decision Making)   79-year-old female who presents to the emergency department with worsening exertional shortness of breath and sensations of chest pressure.  Asymptomatic at rest.  Anticoagulated on Eliquis with previous dosage last night.  Sinus rhythm on the EKG today.  Feels at her baseline.  Initial troponin was elevated 82.  Delta shows significant rise.  Discussed case with Dr. Holland at [facility] in [place], who graciously accepted her care.  Recommendations were to start heparin given that her previous Eliquis was last night.  She did receive aspirin in the emergency department.  Transport will be via ground ALS in stable condition.    This document was prepared using a combination of typing and voice generated software.  While every attempt was made for accuracy, spelling and grammatical errors may exist.     I have reviewed the nursing notes.    I have reviewed the findings, diagnosis, plan and need for follow up with the patient.           Medical Decision Making  The patient's presentation was of moderate complexity (an undiagnosed new problem with uncertain diagnosis).    The  patient's evaluation involved:  ordering and/or review of 3+ test(s) in this encounter (multiple labs, EKG, and x-ray)    The patient's management necessitated moderate risk (prescription drug management including medications given in the ED) and high risk (a decision regarding hospitalization).        New Prescriptions    No medications on file       Final diagnoses:   NSTEMI (non-ST elevated myocardial infarction) (H)       4/22/2024   [facility]       Taylor Velazquez PA-C  04/22/24 8177

## 2024-04-22 NOTE — TELEPHONE ENCOUNTER
"Patient called with original complaints of palpitations. Patient states palpitations have been intermittent for the past couple months. Patient reports new intermittent shortness of breath with activity that has increased in frequency in the past couple weeks. During phone call patient reports blood pressure of 231/115 with pulse of 87. Five mins later blood pressure was 171/100 with pulse of 106.   Writer informed patient to call 911 to be transported to ED for evaluations. Patient verbalized understanding.      Reason for Disposition   New or worsened shortness of breath with activity (dyspnea on exertion)    Additional Information   Negative: Passed out (i.e., lost consciousness, collapsed and was not responding)   Negative: Shock suspected (e.g., cold/pale/clammy skin, too weak to stand, low BP, rapid pulse)   Negative: Difficult to awaken or acting confused (e.g., disoriented, slurred speech)   Negative: Visible sweat on face or sweat dripping down face   Negative: Unable to walk, or can only walk with assistance (e.g., requires support)   Negative: Received SHOCK from implantable cardiac defibrillator and has persisting symptoms (i.e., palpitations, lightheadedness)   Negative: Dizziness, lightheadedness, or weakness and heart beating very rapidly (e.g., > 140 / minute)   Negative: Dizziness, lightheadedness, or weakness and heart beating very slowly (e.g., < 50 / minute)   Negative: Sounds like a life-threatening emergency to the triager   Negative: Chest pain   Negative: Heart beating very slowly (e.g., < 50 / minute)  (Exception: Athlete and heart rate normal for caller.)   Negative: Heart beating very rapidly (e.g., > 140 / minute) and present now  (Exception: During exercise.)   Negative: Dizziness, lightheadedness, or weakness   Negative: Difficulty breathing    Answer Assessment - Initial Assessment Questions  1. DESCRIPTION: \"Please describe your heart rate or heartbeat that you are having\" (e.g., " "fast/slow, regular/irregular, skipped or extra beats, \"palpitations\")      Patient feels palpitations. Patient feels \"the fluttering\"  2. ONSET: \"When did it start?\" (Minutes, hours or days)       Intermittently for the last three months. Last episode was this morning.  3. DURATION: \"How long does it last\" (e.g., seconds, minutes, hours)      unsure  4. PATTERN \"Does it come and go, or has it been constant since it started?\"  \"Does it get worse with exertion?\"   \"Are you feeling it now?\"      intermittent  5. TAP: \"Using your hand, can you tap out what you are feeling on a chair or table in front of you, so that I can hear?\" (Note: not all patients can do this)        unable  6. HEART RATE: \"Can you tell me your heart rate?\" \"How many beats in 15 seconds?\"  (Note: not all patients can do this)        unable  7. RECURRENT SYMPTOM: \"Have you ever had this before?\" If Yes, ask: \"When was the last time?\" and \"What happened that time?\"       yes  8. CAUSE: \"What do you think is causing the palpitations?\"      Patient believes she is in A-fib  9. CARDIAC HISTORY: \"Do you have any history of heart disease?\" (e.g., heart attack, angina, bypass surgery, angioplasty, arrhythmia)       A-fib  10. OTHER SYMPTOMS: \"Do you have any other symptoms?\" (e.g., dizziness, chest pain, sweating, difficulty breathing)        Intermittent difficulty breathing and dizziness  11. PREGNANCY: \"Is there any chance you are pregnant?\" \"When was your last menstrual period?\"        no    Protocols used: Heart Rate and Heartbeat [id number]    "

## 2024-04-22 NOTE — TELEPHONE ENCOUNTER
Dexcom sensor      Last Written Prescription Date:  08/03/23  Last Fill Quantity: 3,   # refills: 5  Last Office Visit: 02/28/24  Future Office visit:    Next 5 appointments (look out 90 days)      May 14, 2024  2:00 PM  (Arrive by 1:40 PM)  Provider Visit with Nicolasa Guillen NP  [facility] [place] ([facility] [place] ) [address]  [place] [address]  [phone]     May 28, 2024  3:00 PM  (Arrive by 2:45 PM)  Provider Visit with Nicolasa Guillen NP  [facility] [place] ([facility] [place] ) [address]  [place] [address]  [phone]

## 2024-04-22 NOTE — TELEPHONE ENCOUNTER
Symptom or reason needing to speak to RN: Breathing troubles     Best number to return call: [phone]      Best time to return call: anytime today

## 2024-04-22 NOTE — ED NOTES
Patient ambulated to room 3, did become short of breath and felt funny. Denies chest pain. SPO2 99%. Changed into gown and call light within reach.    Patient states she has been short of breath for 3 months off and on. States she quit her Spiriva because it was making her go into the donut hole and she just could not afford it. She report that she had a little chest pain in the center of her chest that is now gone. Reports she feels her A-fib has been acting up off and on as she can feel her heart racing.

## 2024-04-22 NOTE — ED TRIAGE NOTES
Pt presents with c/o SOB for the past 3 months, pt has asthma, emphysema, and A-fib. Pt attempted to go to the clinic today and was told to come to the ER.

## 2024-04-23 ENCOUNTER — TRANSFERRED RECORDS (OUTPATIENT)
Dept: HEALTH INFORMATION MANAGEMENT | Facility: CLINIC | Age: 79
End: 2024-04-23
Payer: COMMERCIAL

## 2024-04-23 LAB
ATRIAL RATE - MUSE: 102 BPM
ATRIAL RATE - MUSE: 120 BPM
DIASTOLIC BLOOD PRESSURE - MUSE: NORMAL MMHG
DIASTOLIC BLOOD PRESSURE - MUSE: NORMAL MMHG
EJECTION FRACTION: 26.2 %
EJECTION FRACTION: 26.2 %
INTERPRETATION ECG - MUSE: NORMAL
INTERPRETATION ECG - MUSE: NORMAL
P AXIS - MUSE: 52 DEGREES
P AXIS - MUSE: 60 DEGREES
PR INTERVAL - MUSE: 164 MS
PR INTERVAL - MUSE: 176 MS
QRS DURATION - MUSE: 96 MS
QRS DURATION - MUSE: 98 MS
QT - MUSE: 352 MS
QT - MUSE: 374 MS
QTC - MUSE: 458 MS
QTC - MUSE: 528 MS
R AXIS - MUSE: -28 DEGREES
R AXIS - MUSE: -39 DEGREES
SYSTOLIC BLOOD PRESSURE - MUSE: NORMAL MMHG
SYSTOLIC BLOOD PRESSURE - MUSE: NORMAL MMHG
T AXIS - MUSE: 103 DEGREES
T AXIS - MUSE: 111 DEGREES
VENTRICULAR RATE- MUSE: 102 BPM
VENTRICULAR RATE- MUSE: 120 BPM

## 2024-04-24 ENCOUNTER — TRANSFERRED RECORDS (OUTPATIENT)
Dept: HEALTH INFORMATION MANAGEMENT | Facility: CLINIC | Age: 79
End: 2024-04-24
Payer: COMMERCIAL

## 2024-04-24 ENCOUNTER — TELEPHONE (OUTPATIENT)
Dept: FAMILY MEDICINE | Facility: OTHER | Age: 79
End: 2024-04-24

## 2024-04-24 NOTE — TELEPHONE ENCOUNTER
1:07 PM    Reason for Call: OVERBOOK/HOSPITAL FOLLOW UP    Patient is needing a hospital follow up    Hospital Follow Up/[facility]/04/24/2024 discharge/NSTEMI, afib     The patient is requesting an appointment for 1 week from discharge date with Jim Guillen.    Was an appointment offered for this call? No  If yes : Appointment type              Date    Preferred method for responding to this message: Telephone Call  What is your phone number ?[phone]    If we cannot reach you directly, may we leave a detailed response at the number you provided? Yes    Can this message wait until your PCP/provider returns, if unavailable today? Not applicable    Nusrat Willingham

## 2024-04-30 ENCOUNTER — TELEPHONE (OUTPATIENT)
Dept: FAMILY MEDICINE | Facility: OTHER | Age: 79
End: 2024-04-30

## 2024-04-30 ENCOUNTER — APPOINTMENT (OUTPATIENT)
Dept: GENERAL RADIOLOGY | Facility: HOSPITAL | Age: 79
End: 2024-04-30
Attending: NURSE PRACTITIONER
Payer: MEDICARE

## 2024-04-30 ENCOUNTER — HOSPITAL ENCOUNTER (EMERGENCY)
Facility: HOSPITAL | Age: 79
Discharge: HOME | End: 2024-04-30
Attending: NURSE PRACTITIONER | Admitting: NURSE PRACTITIONER
Payer: MEDICARE

## 2024-04-30 VITALS
OXYGEN SATURATION: 96 % | RESPIRATION RATE: 16 BRPM | HEART RATE: 75 BPM | SYSTOLIC BLOOD PRESSURE: 145 MMHG | DIASTOLIC BLOOD PRESSURE: 92 MMHG | TEMPERATURE: 98.5 F

## 2024-04-30 DIAGNOSIS — R05.1 ACUTE COUGH: ICD-10-CM

## 2024-04-30 DIAGNOSIS — J02.9 SORE THROAT: ICD-10-CM

## 2024-04-30 LAB
ALBUMIN SERPL BCG-MCNC: 4.1 G/DL (ref 3.5–5.2)
ALP SERPL-CCNC: 38 U/L (ref 40–150)
ALT SERPL W P-5'-P-CCNC: 19 U/L (ref 0–50)
ANION GAP SERPL CALCULATED.3IONS-SCNC: 11 MMOL/L (ref 7–15)
AST SERPL W P-5'-P-CCNC: 29 U/L (ref 0–45)
BASOPHILS # BLD AUTO: 0.1 10E3/UL (ref 0–0.2)
BASOPHILS NFR BLD AUTO: 1 %
BILIRUB SERPL-MCNC: 1.2 MG/DL
BUN SERPL-MCNC: 16.7 MG/DL (ref 8–23)
CALCIUM SERPL-MCNC: 9.5 MG/DL (ref 8.8–10.2)
CHLORIDE SERPL-SCNC: 93 MMOL/L (ref 98–107)
CREAT SERPL-MCNC: 0.84 MG/DL (ref 0.51–0.95)
DEPRECATED HCO3 PLAS-SCNC: 29 MMOL/L (ref 22–29)
EGFRCR SERPLBLD CKD-EPI 2021: 71 ML/MIN/1.73M2
EOSINOPHIL # BLD AUTO: 0.4 10E3/UL (ref 0–0.7)
EOSINOPHIL NFR BLD AUTO: 5 %
ERYTHROCYTE [DISTWIDTH] IN BLOOD BY AUTOMATED COUNT: 12.5 % (ref 10–15)
FLUAV RNA SPEC QL NAA+PROBE: NEGATIVE
FLUBV RNA RESP QL NAA+PROBE: NEGATIVE
GLUCOSE SERPL-MCNC: 195 MG/DL (ref 70–99)
GROUP A STREP BY PCR: NOT DETECTED
HCT VFR BLD AUTO: 39.1 % (ref 35–47)
HGB BLD-MCNC: 13.3 G/DL (ref 11.7–15.7)
HOLD SPECIMEN: NORMAL
IMM GRANULOCYTES # BLD: 0.1 10E3/UL
IMM GRANULOCYTES NFR BLD: 1 %
LYMPHOCYTES # BLD AUTO: 2.2 10E3/UL (ref 0.8–5.3)
LYMPHOCYTES NFR BLD AUTO: 25 %
MCH RBC QN AUTO: 30.9 PG (ref 26.5–33)
MCHC RBC AUTO-ENTMCNC: 34 G/DL (ref 31.5–36.5)
MCV RBC AUTO: 91 FL (ref 78–100)
MONOCYTES # BLD AUTO: 0.7 10E3/UL (ref 0–1.3)
MONOCYTES NFR BLD AUTO: 8 %
NEUTROPHILS # BLD AUTO: 5.4 10E3/UL (ref 1.6–8.3)
NEUTROPHILS NFR BLD AUTO: 61 %
NRBC # BLD AUTO: 0 10E3/UL
NRBC BLD AUTO-RTO: 0 /100
PLATELET # BLD AUTO: 156 10E3/UL (ref 150–450)
POTASSIUM SERPL-SCNC: 4.1 MMOL/L (ref 3.4–5.3)
PROT SERPL-MCNC: 7.6 G/DL (ref 6.4–8.3)
RBC # BLD AUTO: 4.3 10E6/UL (ref 3.8–5.2)
RSV RNA SPEC NAA+PROBE: NEGATIVE
SARS-COV-2 RNA RESP QL NAA+PROBE: NEGATIVE
SODIUM SERPL-SCNC: 133 MMOL/L (ref 135–145)
WBC # BLD AUTO: 8.8 10E3/UL (ref 4–11)

## 2024-04-30 PROCEDURE — 87637 SARSCOV2&INF A&B&RSV AMP PRB: CPT | Performed by: STUDENT IN AN ORGANIZED HEALTH CARE EDUCATION/TRAINING PROGRAM

## 2024-04-30 PROCEDURE — 99284 EMERGENCY DEPT VISIT MOD MDM: CPT | Mod: 25

## 2024-04-30 PROCEDURE — 36415 COLL VENOUS BLD VENIPUNCTURE: CPT | Performed by: NURSE PRACTITIONER

## 2024-04-30 PROCEDURE — 71046 X-RAY EXAM CHEST 2 VIEWS: CPT

## 2024-04-30 PROCEDURE — 87651 STREP A DNA AMP PROBE: CPT | Performed by: NURSE PRACTITIONER

## 2024-04-30 PROCEDURE — 85041 AUTOMATED RBC COUNT: CPT | Performed by: NURSE PRACTITIONER

## 2024-04-30 PROCEDURE — 80053 COMPREHEN METABOLIC PANEL: CPT | Performed by: NURSE PRACTITIONER

## 2024-04-30 PROCEDURE — 87637 SARSCOV2&INF A&B&RSV AMP PRB: CPT | Performed by: NURSE PRACTITIONER

## 2024-04-30 PROCEDURE — 99284 EMERGENCY DEPT VISIT MOD MDM: CPT | Performed by: NURSE PRACTITIONER

## 2024-04-30 ASSESSMENT — ENCOUNTER SYMPTOMS
MUSCULOSKELETAL NEGATIVE: 1
WHEEZING: 0
GASTROINTESTINAL NEGATIVE: 1
PSYCHIATRIC NEGATIVE: 1
ENDOCRINE NEGATIVE: 1
CONSTITUTIONAL NEGATIVE: 1
COUGH: 1
STRIDOR: 0
HEMATOLOGIC/LYMPHATIC NEGATIVE: 1
SHORTNESS OF BREATH: 0
SORE THROAT: 1
EYES NEGATIVE: 1
NEUROLOGICAL NEGATIVE: 1
ALLERGIC/IMMUNOLOGIC NEGATIVE: 1
CARDIOVASCULAR NEGATIVE: 1

## 2024-04-30 ASSESSMENT — COLUMBIA-SUICIDE SEVERITY RATING SCALE - C-SSRS
6. HAVE YOU EVER DONE ANYTHING, STARTED TO DO ANYTHING, OR PREPARED TO DO ANYTHING TO END YOUR LIFE?: NO
2. HAVE YOU ACTUALLY HAD ANY THOUGHTS OF KILLING YOURSELF IN THE PAST MONTH?: NO
1. IN THE PAST MONTH, HAVE YOU WISHED YOU WERE DEAD OR WISHED YOU COULD GO TO SLEEP AND NOT WAKE UP?: NO

## 2024-04-30 ASSESSMENT — ACTIVITIES OF DAILY LIVING (ADL): ADLS_ACUITY_SCORE: 35

## 2024-04-30 NOTE — TELEPHONE ENCOUNTER
I know we can not reach out to the friend. But is is something that care coordination can reach to?

## 2024-04-30 NOTE — ED NOTES
Patient reports sore throat and dry cough for about a week. Patient recently hospitalized, she was unsure if she was intubated.

## 2024-04-30 NOTE — DISCHARGE INSTRUCTIONS
Sore throat:   If you have a sore throat, it is best to use warm saltwater gargles every 3-4 hours as needed.  The saltwater gargle needs to touch the area that is sore, so you may gag on it while performing this.  If you are not able to tolerate gargling, you do have the option to drink warm tea with honey.  Some medications can be used but are not as effective, but still can be used.  These include Chloraseptic spray or Cepacol lozenges.       Hydrate:   During this time it is important to keep well hydrated with water, juices, or low calorie sports drinks.  Using 1/2 strength G2, Gatorade Zero, or PowerAde Zero is best choice (mix half and half with water).  DO NOT use caffeinated or alcoholic beverages for hydration, as these actually dehydrate you.         Follow-up with your primary care provider for reevaluation.  Contact your primary care provider if you have any questions or concerns.  Do not hesitate to return to the ER if any new or worsening symptoms.     Please read the attached instructions (if any).  They highlight more specific treatments and interventions for you at home.              Thank you for letting me participate in your care and wish you a fast and uneventful recovery,    Clifton NELSON CNP    Do not hesitate to contact me with questions or concerns.  [email or web address]

## 2024-04-30 NOTE — PROGRESS NOTES
[facility] HEART CARE   CARDIOLOGY PROGRESS NOTE     Chief Complaint   Patient presents with    Follow Up          Diagnosis:  1.  FRANCO.  -Diastolic CHF/minimal COPD/Asthma.  2.  Chest pain.    -Cardiac cath on 4/22/24-no dz, [facility].   -Cardiac cath on 7/15/15-minimal dz.   3.  HTN-controlled.  4.  Prolonged QT.  5.  Asthma.  6.  Fatty liver on 11/7/2007.  7.  CHF.   -26% on 4/23/2024 at [facility].    -Normal on 5/26/21.   -Lower ext edema with BNP of 661 on 5/5/21.   -Diastolic grade 1 on 1/19/15.8.  DM-2-controlled.  9.  Hypothyroidism.  10.  Hyperlipidemia-controlled.  11.  Hypertriglyceridemia-uncontrolled.  12.  B12 deficiency.  -391 on 5/5/21.   13.  COPD-minimal on 2/18/21.  14.  Elevated BNP at 582 on 8/28/20.  15.  A. Fib.    -Eliquis started on 1/30/2023.  -Short runs on 1/3/2023.  16.  NSTEMI on 4/22/24   -[facility]-Normal cornaries, 4/23/2024.    -Taksubo or A-fib causing the increased rate.         Assessment/Plan:    1.  NSTEMI: Type II.  Seen in ER on 4/22/2024 for a 3-month history of dyspnea on exertion with left anterior precordial pressure with exertion.  Found to have an increasing troponin.  Initially, troponin was at at 82, increasing to 194.  Concern for NSTEMI.  Referred to [facility].  Underwent angiogram on 4/23/2024.  Found of normal coronaries.  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  Discharged from the hospital.  Was to follow-up with [facility] cardiology.  Has a follow-up here.  Is currently on metoprolol 25 mg XL daily.  Continue risk management with medical treatment.  Discussed the importance of activity and proper diet.   2.  A-fib with RVR: Patient declined EKG today.  EKG on 4/22/2024 showed A-fib with a rate of 120 bpm.  Currently on Eliquis 5 mg twice a day.  Will increase metoprolol to 50 mg once daily for rate control.  Discussed A-fib using a heart model in the room.  Discussed rate control, antiarrhythmics, cardioversion,  and possibility of ablation.  I think she would benefit from a cardioversion.  Patient would like to think about it.  For now continue with Eliquis and metoprolol possible increases of metoprolol in the future.  3.  CAD: History of cardiac catheterization most recent 4/22/2024.  Has a history of minimal disease.  No disease identified on cath at [facility] on 4/22/2024.  4.  Hypertension: Controlled.  Blood pressure today 110/58.  Continue on Lasix 40 mg once daily metoprolol 50 mg XL daily, and ramipril 10 mg twice a day.  5.  CHF: Now with a severely depressed EF at 26% on 4/23/2024 on echocardiogram through [facility].  Likely rate induced with uncontrolled A-fib with RVR.  Has been having symptoms for 3 months and likely has been A-fib for that long, if not longer.  Suggested an echocardiogram in 3 months.  Currently on ramipril and metoprolol.  Suggested Entresto/SGLT2 inhibitor.  Patient declined additional adjustments to medications.  The cost of medications for her is very high.  She gets her medications through [facility].  She was given the names these meds on her after visit summary.  She will let us know if they are affordable.  She will check on prices of meds for [facility].  6.  Hyperlipidemia: Controlled.  Continue simvastatin 40 mg daily.    7.  Follow-up in 3 months.  Echo ordered today.  Metoprolol increased to 50 mg XL daily from 25 mg XL daily.      Interval history:  See above.      HPI:    Mrs. Acuna is being seen by cardiology for dyspnea exertion.  She has had this issue for many years.  She was seen by cardiology in 2015 and 2016 with work-up which included a cardiac catheterization.  She reports being dyspneic on exertion for most of her life.  Her symptoms are improved with inhalers.     Bo reports being dyspneic for many years.  Her shortness of breath has gotten worse over the last few months to years.  She states that the Spiriva has helped her shortness of breath significantly.  She does  carry history of asthma.  She was seen by cardiology through [facility] in 2015 and 2016 for this very issue.  She reportedly had an angiogram in 2002 which was reportedly normal.  She had a repeat cath on 7/15/2015 [facility] with a 10% lesion to her LAD.  All remaining vessels were patent.  She carries a diagnosis of heart failure with preserved ejection fraction but more recently this issue was not identified.  Her cardiac catheterization 2015 also showed normal filling pressures.       She has noted that with vacuuming her house, [facility] through, she will be so significantly short of breath that she will use a fan to improve her air hunger.  As mentioned, she has essentially had 2 normal cardiac catheterizations.  She states she would not be able to do stress testing secondary to the uncomfortable feeling it creates.  Her echo from 5/8/2009 showed diastolic dysfunction grade 1.  This was not identified on her most recent echo from 9/17/2020.  She did not have a significant valvular or chamber abnormalities.  She does carry history of rheumatoid arthritis but states she was told most recently that she has osteoarthritis and not RA.  A normal chest x-ray on 3/19/2021.  PFT's on 2/18/2021 showed minimal COPD.  She states she was exposed to secondhand smoke by her father at a young age.  She herself has never used tobacco.     I believe the top possibilities for her shortness of breath would be a history of asthma, the beta-blocker she is on, the possibility of a DVT/PE, concern for cirrhosis as she was noted to have a fatty liver on imaging from 11/7/2007, PE, diastolic dysfunction, hypothyroidism, and potentially rhythm issues. With the exception of a prolonged QT, EKG normal on 5/5/2021.     She also carries diagnosis of diabetes.  Her A1c most recently was 6.9%. She has hyperlipidemia which is well controlled on Zocor 40 mg daily.       She has a history of hypothyroidism.  She has been on  "levothyroxine 75 mcg.  Her last TSH was on 6/7/2019 and was normal at 1.17.     She also has a history of prolonged QTC.  She has hypertension which has been controlled.      Relevant testing:  Echocardiogram on 2/27/2023:  Global and regional left ventricular function is normal with an EF of 55-60%.  Left ventricular diastolic function is indeterminate.  Right ventricular function, chamber size, wall motion, and thickness are  normal.  Both atria appear normal.  Pulmonary artery systolic pressure cannot be assessed.  The inferior vena cava is normal.  No pericardial effusion is present.    Zio patch on 1/3/2023:  Worn for 3 days and 7 hr's.  After removing artifact, total time was 2 days and 22 hr's. Placed on 1/3/2023 at 2:27 PM and completed on 1/6/2023 at 9:13 PM.  Underlying rhythm was sinus.  Hrt rate ranged from 50 bpm, maximum heart rate of 150 bmp, averaging 73 bmp.  No significant bradycardia, pauses, Mobitz type II or 3rd degree heart block.  No atrial fibrillation on this study.  x3 triggered events and x2 diary entries.  These corresponded to SVE, VE, and sinus rhythm.  x0 runs of VT.    x10 runs of SVT lasting up to 12.8 sec's with a maximum heart rate of 150 bmp.  These short bursts of \"SVT\" are suspicious for A. fib  Rare, <1% of PAC's, atrial couplets, atrial triplets, ventricular couplets, and ventricular triplets.  PVC's at 1.6%.  + episodes of ventricular bigeminy lasting up to 4.6 sec's.  + episodes of ventricular trigeminy lasting up to 20.5 sec's.    US carotid arteries on 1/3/2023:  No hemodynamically significant stenoses are identified at  either carotid bifurcation    V/Q scan on 5/7/21:  The ventilation study demonstrates mildly diminished lateral  ventilation particularly at the apices.  The perfusion study demonstrates normal symmetric perfusion without  small, moderate or large defect. A shoulder prosthesis results in mild artifact.    Echocardiogram 5/26/2021:  No pericardial effusion " is present.  Global and regional left ventricular function is normal with an EF of 55-60%.  Left ventricular diastolic function is normal.  The right ventricle is normal size.  Global right ventricular function is normal.  Both atria appear normal.  Trace mitral insufficiency is present.  Aortic valve is normal in structure and function.  Trace tricuspid insufficiency is present.  Right ventricular systolic pressure is 8 mmHg above the right atrial pressure.  The pulmonic valve is normal.    Echocardiogram on 9/17/2020:  No pericardial effusion is present.  Global and regional left ventricular function is normal with an EF of 60-65%.  Left ventricular diastolic function is normal.  The right ventricle is normal size.  Both atria appear normal.  Trace mitral insufficiency is present.  Aortic valve is normal in structure and function.  Trace tricuspid insufficiency is present.  Right ventricular systolic pressure is 13 mmHg above the right atrial pressure.  The pulmonic valve is normal.  The aorta root is normal.     Echo on 5/8/2009:   1. Normal left ventricular size with mild systolic functional impairment.    2. Evidence of grade 1 diastolic dysfunction.    3. Mild left atrial enlargement.    4. Trace physiologic amounts of mitral tricuspid and pulmonic insufficiency.    5. Trivial pericardial effusion.     Left heart cath on 7/15/2015 at :  DOMINANCE:  Right Dominant  LEFT MAIN:  is angiographically normal (0% Stenosis)  LEFT ANTERIOR DESCENDING :  Proximal Segment is angiographically normal (0% Stenosis)  rest of vessel has luminal irregularities (10% Stenosis) with distal pruning.  DIAGONAL 1:  is angiographically normal (0% Stenosis)  CIRCUMFLEX:  and its branches are angiographically normal (0% Stenosis)  RIGHT CORONARY ARTERY:  is angiographically normal (0% Stenosis)  COLLATERALS:  No collateral flow  Normal LVEDP        ICD-10-CM    1. Atrial fibrillation with rapid ventricular response (H)   I48.91       2. FRANCO (dyspnea on exertion)  R06.09 Echocardiogram Complete      3. Chronic systolic heart failure (H)  I50.22       4. Paroxysmal atrial fibrillation (H)  I48.0 metoprolol succinate ER (TOPROL XL) 50 MG 24 hr tablet     CANCELED: EKG 12-lead complete w/read - (Clinic Performed)      5. Essential hypertension  I10 metoprolol succinate ER (TOPROL XL) 50 MG 24 hr tablet     Echocardiogram Complete      6. Mixed hyperlipidemia  E78.2       7. Diastolic dysfunction with chronic heart failure (H)  I50.32 metoprolol succinate ER (TOPROL XL) 50 MG 24 hr tablet     Echocardiogram Complete      8. Pulmonary emphysema, unspecified emphysema type (H)  J43.9 Echocardiogram Complete      9. Cardiomegaly  I51.7 metoprolol succinate ER (TOPROL XL) 50 MG 24 hr tablet     Echocardiogram Complete      10. COPD, mild (H)  J44.9               Past Medical History:   Diagnosis Date    Congestive heart failure, unspecified     Diabetes (H)     H/O rheumatoid arthritis     HLD (hyperlipidemia)     HTN (hypertension)     Myocardial infarction (H)     Uncomplicated asthma        Past Surgical History:   Procedure Laterality Date    APPENDECTOMY OPEN CHILD      AS TOTAL KNEE ARTHROPLASTY Right     CHOLECYSTECTOMY      COLONOSCOPY - HIM SCAN N/A 03/12/2009    per Care Everywhere documentation per [facility].     HYSTERECTOMY TOTAL ABDOMINAL      MASTECTOMY, BILATERAL Bilateral 02/26/1992    SHOULDER SURGERY Right     SHOULDER SURGERY Left        Allergies   Allergen Reactions    Contrast Dye Difficulty breathing    Gadolinium Derivatives      Other reaction(s): Difficulty in swallowing, Laryngeal spasm  Patient had an injection into rt. Shoulder capsule prior to MRI arthrogram.  Contained multihance gadobenate, omnipaque iohexol, and buffered lidocaine.      Ammonia     Cory-1 [Lidocaine Hcl]      Novocaine allergy      Codeine Sulfate     Food      Shrimp    Fosamax [Alendronate]      Dental infections    Iodine-131  Swelling     Ct dye    Lidocaine     Nitrous Oxide     No Clinical Screening - See Comments Nausea and Vomiting and Other (See Comments)     (3/6/09)- N/V and Heart racing    Novocain [Procaine]     Penicillins     Symbicort [Budesonide-Formoterol Fumarate]     Tramadol     Morphine Palpitations       Current Outpatient Medications   Medication Sig Dispense Refill    Acetone, Urine, Test (KETONE TEST) STRP Use as directed 50 strip 4    apixaban ANTICOAGULANT (ELIQUIS ANTICOAGULANT) 5 MG tablet Take 1 tablet (5 mg) by mouth 2 times daily Take 1 tablet by mouth twice daily 180 tablet 3    blood glucose (CONTOUR NEXT TEST) test strip Use 6 times a day with the insulin pump to help manage your diabetes 200 strip 0    Continuous Blood Gluc  (DEXCOM G6 ) DIMITRIS 1 each continuous 1 each 0    Continuous Blood Gluc Transmit (DEXCOM G6 TRANSMITTER) MISC CHANGE EVERY 90 DAYS 1 each 1    Continuous Glucose Sensor (DEXCOM G6 SENSOR) MISC CHANGE EVERY 10 DAYS 3 each 5    furosemide (LASIX) 40 MG tablet TAKE 1 TABLET BY MOUTH ONCE DAILY IN THE MORNING AND 1/2 (ONE-HALF) TABLET IN AFTERNOON 135 tablet 3    Glucagon (GVOKE HYPOPEN) 1 MG/0.2ML pen Inject the contents of 1 device under the skin into lower abdomen, outer thigh, or outer upper arm as needed for hypoglycemia. If no response after 15 minutes, additional 1 mg dose from a new device may be injected while waiting for emergency assistance. 0.4 mL 4    glucose (RELION GLUCOSE) 40 % (400 mg/mL) gel Take 15 g by mouth every hour as needed for low blood sugar 113 g 4    Insulin Disposable Pump (OMNIPOD 5 G6 INTRO, GEN 5,) KIT 1 each every 48 hours 1 kit 0    Insulin Disposable Pump (OMNIPOD 5 G6 POD, GEN 5,) MISC 1 each every 3 days 10 each 5    insulin lispro (HUMALOG VIAL) 100 UNIT/ML vial To be used with the insulin pump TDD: 80 units 30 mL 3    INSULIN PUMP - OUTPATIENT Insulin pump settings:  Updated: 1/25/24  Insulin pump: Minimed 780G  Basal:  0000 0.9   0200  0.85   0500 0.825   0900 1  1200 1.1  2200 1   Total: 23.65  CR: (new) 11  ISF:   0000 (new) 60   Target: 100-140  Active insulin: (new) 4 hours 30 minutes      levothyroxine (SYNTHROID/LEVOTHROID) 75 MCG tablet Take 1 tablet by mouth once daily 90 tablet 2    metoprolol succinate ER (TOPROL XL) 50 MG 24 hr tablet Take 1 tablet (50 mg) by mouth daily 90 tablet 3    omeprazole (PRILOSEC) 40 MG DR capsule Take 1 capsule by mouth once daily 90 capsule 3    potassium chloride ER (KLOR-CON M) 20 MEQ CR tablet Take 1 tablet by mouth twice daily 180 tablet 3    ramipril (ALTACE) 10 MG capsule Take 1 capsule (10 mg) by mouth 2 times daily 180 capsule 2    senna-docusate (SENOKOT-S;PERICOLACE) 8.6-50 MG per tablet Take 1 tablet by mouth At Bedtime       simvastatin (ZOCOR) 40 MG tablet Take 1 tablet (40 mg) by mouth at bedtime 90 tablet 3    tamoxifen (NOLVADEX) 20 MG tablet Take 20 mg by mouth every morning      tiotropium (SPIRIVA RESPIMAT) 2.5 MCG/ACT inhaler INHALE 2 PUFFS BY MOUTH ONCE DAILY Strength: 2.5 MCG/ACT 4 g 4    tiZANidine (ZANAFLEX) 4 MG tablet Take a 1/2 tablet by mouth in the morning, 1/2 tablet mid day and 1 full tablet at bedtime, can take an extra 1/2 tablet as needed during the night for breakthrough symptoms up to 4 times per week.      blood glucose (NO BRAND SPECIFIED) test strip by In Vitro route 4 times daily Use to test blood sugar 4 times daily or as directed. (Patient not taking: Reported on 5/1/2024)      Calcium-Vitamin D-Vitamin K (VIACTIV PO) Take 2 chew tab by mouth every morning         Social History     Socioeconomic History    Marital status:      Spouse name: Not on file    Number of children: 2    Years of education: Not on file    Highest education level: Not on file   Occupational History    Occupation: [email or web address]     Employer: RETIRED   Tobacco Use    Smoking status: Never     Passive exposure: Never    Smokeless tobacco: Never   Vaping Use    Vaping status: Never Used    Substance and Sexual Activity    Alcohol use: No     Alcohol/week: 0.0 standard drinks of alcohol    Drug use: No    Sexual activity: Not on file   Other Topics Concern    Parent/sibling w/ CABG, MI or angioplasty before 65F 55M? Not Asked   Social History Narrative    Not on file     Social Determinants of Health     Financial Resource Strain: Low Risk  (2/28/2024)    Financial Resource Strain     Within the past 12 months, have you or your family members you live with been unable to get utilities (heat, electricity) when it was really needed?: No   Food Insecurity: Low Risk  (2/28/2024)    Food Insecurity     Within the past 12 months, did you worry that your food would run out before you got money to buy more?: No     Within the past 12 months, did the food you bought just not last and you didn t have money to get more?: No   Transportation Needs: Low Risk  (2/28/2024)    Transportation Needs     Within the past 12 months, has lack of transportation kept you from medical appointments, getting your medicines, non-medical meetings or appointments, work, or from getting things that you need?: No   Physical Activity: Not on file   Stress: Not on file   Social Connections: Not on file   Interpersonal Safety: Low Risk  (12/15/2023)    Interpersonal Safety     Do you feel physically and emotionally safe where you currently live?: Yes     Within the past 12 months, have you been hit, slapped, kicked or otherwise physically hurt by someone?: No     Within the past 12 months, have you been humiliated or emotionally abused in other ways by your partner or ex-partner?: No   Housing Stability: Low Risk  (2/28/2024)    Housing Stability     Do you have housing? : Yes     Are you worried about losing your housing?: No       LAB RESULTS:   No visits with results within 2 Month(s) from this visit.   Latest known visit with results is:   Office Visit on 05/05/2021   Component Date Value Ref Range Status    Folate 05/05/2021  >100.0  >5.4 ng/mL Final    pH Arterial 05/05/2021 7.49* 7.35 - 7.45 pH Final    pCO2 Arterial 05/05/2021 37  35 - 45 mm Hg Final    pO2 Arterial 05/05/2021 96  80 - 105 mm Hg Final    Bicarbonate Arterial 05/05/2021 28  21 - 28 mmol/L Final    FIO2 05/05/2021 Unknown   Final    Oxyhemoglobin Arterial 05/05/2021 97  92 - 100 % Final    Base Excess Art 05/05/2021 4.4  mmol/L Final    Vitamin D Deficiency screening 05/05/2021 94* 20 - 75 ug/L Final    Vitamin B12 05/05/2021 391  193 - 986 pg/mL Final    TSH 05/05/2021 2.04  0.40 - 4.00 mU/L Final    Troponin I ES 05/05/2021 <0.015  0.000 - 0.045 ug/L Final    N-Terminal Pro Bnp 05/05/2021 661* 0 - 125 pg/mL Final    RIOS interpretation 05/05/2021 Negative  NEG^Negative Final    GGT 05/05/2021 14  0 - 40 U/L Final    D Dimer 05/05/2021 0.5  0.0 - 0.50 ug/ml FEU Final    CRP Inflammation 05/05/2021 <2.9  0.0 - 8.0 mg/L Final    WBC 05/05/2021 6.7  4.0 - 11.0 10e9/L Final    RBC Count 05/05/2021 4.17  3.8 - 5.2 10e12/L Final    Hemoglobin 05/05/2021 12.7  11.7 - 15.7 g/dL Final    Hematocrit 05/05/2021 38.5  35.0 - 47.0 % Final    MCV 05/05/2021 92  78 - 100 fl Final    MCH 05/05/2021 30.5  26.5 - 33.0 pg Final    MCHC 05/05/2021 33.0  31.5 - 36.5 g/dL Final    RDW 05/05/2021 12.0  10.0 - 15.0 % Final    Platelet Count 05/05/2021 146* 150 - 450 10e9/L Final    Diff Method 05/05/2021 Automated Method   Final    % Neutrophils 05/05/2021 61.4  % Final    % Lymphocytes 05/05/2021 26.4  % Final    % Monocytes 05/05/2021 7.0  % Final    % Eosinophils 05/05/2021 4.3  % Final    % Basophils 05/05/2021 0.3  % Final    % Immature Granulocytes 05/05/2021 0.6  % Final    Nucleated RBCs 05/05/2021 0  0 /100 Final    Absolute Neutrophil 05/05/2021 4.1  1.6 - 8.3 10e9/L Final    Absolute Lymphocytes 05/05/2021 1.8  0.8 - 5.3 10e9/L Final    Absolute Monocytes 05/05/2021 0.5  0.0 - 1.3 10e9/L Final    Absolute Eosinophils 05/05/2021 0.3  0.0 - 0.7 10e9/L Final    Absolute Basophils  "05/05/2021 0.0  0.0 - 0.2 10e9/L Final    Abs Immature Granulocytes 05/05/2021 0.0  0 - 0.4 10e9/L Final    Absolute Nucleated RBC 05/05/2021 0.0   Final    Sodium 05/05/2021 137  133 - 144 mmol/L Final    Potassium 05/05/2021 3.5  3.4 - 5.3 mmol/L Final    Chloride 05/05/2021 101  94 - 109 mmol/L Final    Carbon Dioxide 05/05/2021 30  20 - 32 mmol/L Final    Anion Gap 05/05/2021 6  3 - 14 mmol/L Final    Glucose 05/05/2021 146* 70 - 99 mg/dL Final    Urea Nitrogen 05/05/2021 13  7 - 30 mg/dL Final    Creatinine 05/05/2021 0.76  0.52 - 1.04 mg/dL Final    GFR Estimate 05/05/2021 77  >60 mL/min/[1.73_m2] Final    GFR Estimate If Black 05/05/2021 90  >60 mL/min/[1.73_m2] Final    Calcium 05/05/2021 8.9  8.5 - 10.1 mg/dL Final    Bilirubin Total 05/05/2021 0.8  0.2 - 1.3 mg/dL Final    Albumin 05/05/2021 3.8  3.4 - 5.0 g/dL Final    Protein Total 05/05/2021 8.0  6.8 - 8.8 g/dL Final    Alkaline Phosphatase 05/05/2021 35* 40 - 150 U/L Final    ALT 05/05/2021 23  0 - 50 U/L Final    AST 05/05/2021 15  0 - 45 U/L Final    Cholesterol 05/05/2021 135  <200 mg/dL Final    Triglycerides 05/05/2021 168* <150 mg/dL Final    HDL Cholesterol 05/05/2021 52  >49 mg/dL Final    LDL Cholesterol Calculated 05/05/2021 49  <100 mg/dL Final    Non HDL Cholesterol 05/05/2021 83  <130 mg/dL Final        Review of systems: Negative except that which was noted in the HPI.    Physical examination:  /58 (BP Location: Right arm, Patient Position: Chair, Cuff Size: Adult Regular)   Pulse 70   Ht 1.6 m (5' 3\")   Wt 68 kg (150 lb)   SpO2 98%   BMI 26.57 kg/m      GENERAL APPEARANCE: healthy, alert and no distress  CHEST: lungs clear to auscultation.  Reduced breath sounds bilaterally.  CARDIOVASCULAR: Irregular rate irregular rhythm, normal S1 with physiologic split S2, no S3 or S4 and no murmur, click or rub  EXTREMITIES: no clubbing, cyanosis with mild peripheral edema      Total time spent on day of visit, including review of tests, " obtaining/reviewing separately obtained history, ordering medications/tests/procedures, communicating with PCP/consultants, and documenting in electronic medical record: 44 minutes.           Thank you for allowing me to participate in the care of your patient. Please do not hesitate to contact me if you have any questions.     Jin Corea, DO

## 2024-04-30 NOTE — ED PROVIDER NOTES
"  History     Chief Complaint   Patient presents with    Cough    Pharyngitis    Shortness of Breath    Sweats     HPI  Flora Acuna is a 79 year old individual with history of hypothyroidism, hypertension, lumbar disc disease with radiculopathy, osteoporosis, systolic dysfunction with chronic heart failure, patient COPD/pulmonary emphysema, asthma, cardiomegaly, paroxysmal atrial fibrillation, comes into the ER with complaints of cough and sore throat.  Patient just was discharged from [facility] after \"heart attack and heart failure\".  States since then she has been having a very sore throat and cough.  States that she also has \"brain fog\".  Comes in for this reason.  No fever or chills.  No shortness of breath reported.  No wheezing or stridor.  No sensation of throat or tongue swelling.  No weakness reported.  No dizziness or lightheadedness.    Allergies:  Allergies   Allergen Reactions    Contrast Dye Difficulty breathing    Gadolinium Derivatives      Other reaction(s): Difficulty in swallowing, Laryngeal spasm  Patient had an injection into rt. Shoulder capsule prior to MRI arthrogram.  Contained multihance gadobenate, omnipaque iohexol, and buffered lidocaine.      Ammonia     Cory-1 [Lidocaine Hcl]      Novocaine allergy      Codeine Sulfate     Food      Shrimp    Fosamax [Alendronate]      Dental infections    Iodine-131 Swelling     Ct dye    Lidocaine     Nitrous Oxide     No Clinical Screening - See Comments Nausea and Vomiting and Other (See Comments)     (3/6/09)- N/V and Heart racing    Novocain [Procaine]     Penicillins     Symbicort [Budesonide-Formoterol Fumarate]     Tramadol     Morphine Palpitations       Problem List:    Patient Active Problem List    Diagnosis Date Noted    Paroxysmal atrial fibrillation (H) 03/18/2024     Priority: Medium    Pulmonary emphysema, unspecified emphysema type (H) 02/13/2023     Priority: Medium    Asthma, unspecified asthma severity, unspecified whether " complicated, unspecified whether persistent 02/13/2023     Priority: Medium    COPD, mild (H) 08/18/2021     Priority: Medium    Contrast media allergy 05/14/2021     Priority: Medium    FRANCO (dyspnea on exertion) 05/05/2021     Priority: Medium    Fatty liver on 11/7/2007 05/05/2021     Priority: Medium    Diastolic dysfunction with chronic heart failure (H) 05/05/2021     Priority: Medium    Mixed hyperlipidemia 05/05/2021     Priority: Medium    Drug-induced hypokalemia 05/05/2021     Priority: Medium    Vitamin D deficiency 05/05/2021     Priority: Medium    Prolonged QT interval 08/28/2020     Priority: Medium    Dysphonia 06/03/2019     Priority: Medium    Hypothyroidism 05/07/2019     Priority: Medium    Essential hypertension 05/07/2019     Priority: Medium    Malignant neoplasm of left breast in female, estrogen receptor positive (H) 05/07/2019     Priority: Medium     Follows with Dr. 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  Flora Acuna has no plan or code status on file. Discussed available resources and provided with information. Confirmed code status reflects current choices pending further ACP discussions.  Confirmed/documented legally designated decision makers.  Added by Ruth Velasquez          Personal history of malignant neoplasm of breast 11/23/2015     Priority: Medium    Family history of melanoma 11/21/2015     Priority:  Medium    Family history of breast cancer in female 11/21/2015     Priority: Medium    Abnormal EMG 02/12/2013     Priority: Medium     Formatting of this note might be different from the original.  possible findings suggestive of myopathy in muscles that could be consistent with inclusion body myopathy, repeat EMG normal.      CARDIOVASCULAR SCREENING; LDL GOAL LESS THAN 160 10/05/2012     Priority: Medium    Osteoporosis 01/03/2012     Priority: Medium     Overview:   IMO Update      Cardiomegaly 05/08/2009     Priority: Medium     Formatting of this note might be different from the original.  TTE w/Doppler      Incisional hernia 05/04/2009     Priority: Medium     Formatting of this note might be different from the original.  IMO Update 10/11          Past Medical History:    Past Medical History:   Diagnosis Date    Congestive heart failure, unspecified     Diabetes (H)     H/O rheumatoid arthritis     HLD (hyperlipidemia)     HTN (hypertension)     Myocardial infarction (H)     Uncomplicated asthma        Past Surgical History:    Past Surgical History:   Procedure Laterality Date    APPENDECTOMY OPEN CHILD      AS TOTAL KNEE ARTHROPLASTY Right     CHOLECYSTECTOMY      COLONOSCOPY - HIM SCAN N/A 03/12/2009    per Care Everywhere documentation per [facility].     HYSTERECTOMY TOTAL ABDOMINAL      MASTECTOMY, BILATERAL Bilateral 02/26/1992    SHOULDER SURGERY Right     SHOULDER SURGERY Left        Family History:    Family History   Problem Relation Age of Onset    Breast Cancer Maternal Aunt     Breast Cancer Maternal Aunt     Lung Cancer Father        Social History:  Marital Status:   [5]  Social History     Tobacco Use    Smoking status: Never     Passive exposure: Never    Smokeless tobacco: Never   Vaping Use    Vaping status: Never Used   Substance Use Topics    Alcohol use: No     Alcohol/week: 0.0 standard drinks of alcohol    Drug use: No        Medications:    Acetone, Urine, Test (KETONE  TEST) STRP  albuterol (PROAIR HFA/PROVENTIL HFA/VENTOLIN HFA) 108 (90 Base) MCG/ACT inhaler  apixaban ANTICOAGULANT (ELIQUIS ANTICOAGULANT) 5 MG tablet  blood glucose (CONTOUR NEXT TEST) test strip  blood glucose (NO BRAND SPECIFIED) test strip  bromfenac (BROMDAY) 0.09 % ophthalmic solution  Calcium-Vitamin D-Vitamin K (VIACTIV PO)  Continuous Blood Gluc  (DEXCOM G6 ) DIMITRIS  Continuous Blood Gluc Transmit (DEXCOM G6 TRANSMITTER) MISC  Continuous Glucose Sensor (DEXCOM G6 SENSOR) MISC  furosemide (LASIX) 40 MG tablet  Glucagon (GVOKE HYPOPEN) 1 MG/0.2ML pen  glucose (RELION GLUCOSE) 40 % (400 mg/mL) gel  Insulin Disposable Pump (OMNIPOD 5 G6 INTRO, GEN 5,) KIT  Insulin Disposable Pump (OMNIPOD 5 G6 POD, GEN 5,) MISC  insulin lispro (HUMALOG VIAL) 100 UNIT/ML vial  INSULIN PUMP - OUTPATIENT  levothyroxine (SYNTHROID/LEVOTHROID) 75 MCG tablet  metoprolol succinate ER (TOPROL XL) 25 MG 24 hr tablet  moxifloxacin (VIGAMOX) 0.5 % ophthalmic solution  omeprazole (PRILOSEC) 40 MG DR capsule  potassium chloride ER (KLOR-CON M) 20 MEQ CR tablet  prednisoLONE acetate (PRED FORTE) 1 % ophthalmic suspension  ramipril (ALTACE) 10 MG capsule  senna-docusate (SENOKOT-S;PERICOLACE) 8.6-50 MG per tablet  simvastatin (ZOCOR) 40 MG tablet  tamoxifen (NOLVADEX) 20 MG tablet  tiotropium (SPIRIVA RESPIMAT) 2.5 MCG/ACT inhaler  tiZANidine (ZANAFLEX) 4 MG tablet  triamcinolone (KENALOG) 0.1 % external cream  triamcinolone (KENALOG) 0.1 % external ointment  VITAMIN D, CHOLECALCIFEROL, PO          Review of Systems   Constitutional: Negative.    HENT:  Positive for sore throat.    Eyes: Negative.    Respiratory:  Positive for cough. Negative for shortness of breath, wheezing and stridor.    Cardiovascular: Negative.    Gastrointestinal: Negative.    Endocrine: Negative.    Genitourinary: Negative.    Musculoskeletal: Negative.    Skin: Negative.    Allergic/Immunologic: Negative.    Neurological: Negative.    Hematological:  Negative.    Psychiatric/Behavioral: Negative.         Physical Exam   BP: 160/82  Pulse: 94  Temp: 99.8  F (37.7  C)  Resp: 18  SpO2: 97 %      GENERAL APPEARANCE:  The patient is a 79 year old well-developed, well-nourished individual in no acute distress that appears as stated age.  THROAT: Injected posterior oropharynx.  No tonsil hypertrophy or exudate.  No stridor present.  NECK:  Supple.  Trachea is midline.   CHEST:  Symmetric.  Non-tender to palpation.  No crepitus or deformity.  LUNGS:  Breathing is easy.  Breath sounds are equal and clear bilaterally.  No wheezes, rhonchi, or rales.  HEART:  Regular rate and rhythm with normal S1 and S2.  No murmurs, gallops, or rubs.  EXTREMITIES: 1+ lower extremity edema bilaterally.  NEUROLOGIC:  No focal sensory or motor deficits are noted.    PSYCHIATRIC:  The patient is awake, alert, and oriented x4.  Recent and remote memory is intact.  Appropriate mood and affect.  Calm and cooperative with history and physical exam.  SKIN:  Warm, dry, and well perfused.  Good turgor.  No lesions, nodules, or rashes are noted.  No bruising noted.      Comment: Discrepancies between my note and notes on behalf of the nursing team or other care providers are secondary to my findings reflecting my physical examination and questioning of the patient.  Any conflicting information provided is not in line with my examination of the patient.       ED Course     ED Course as of 04/30/24 1807 Tue Apr 30, 2024   1557 Labs and x-ray ordered while patient in lobby.   1714 In to see patient and history/physical completed.    1714 Strep test ordered.   1801 Labs benign.  Patient in no distress.  Will discharge home with OTC sore throat treatment.  Follow-up with PCP.  Patient in agreement.                 Results for orders placed or performed during the hospital encounter of 04/30/24 (from the past 24 hour(s))   Symptomatic Influenza A/B, RSV, & SARS-CoV2 PCR (COVID-19) Nasopharyngeal     Specimen: Nasopharyngeal; Swab   Result Value Ref Range    Influenza A PCR Negative Negative    Influenza B PCR Negative Negative    RSV PCR Negative Negative    SARS CoV2 PCR Negative Negative    Narrative    Testing was performed using the Xpert Xpress CoV2/Flu/RSV Assay on the Cepheid GeneXpert Instrument. This test should be ordered for the detection of SARS-CoV-2, influenza, and RSV viruses in individuals who meet clinical and/or epidemiological criteria. Test performance is unknown in asymptomatic patients. This test is for in vitro diagnostic use under the FDA EUA for laboratories certified under CLIA to perform high or moderate complexity testing. This test has not been FDA cleared or approved. A negative result does not rule out the presence of PCR inhibitors in the specimen or target RNA in concentration below the limit of detection for the assay. If only one viral target is positive but coinfection with multiple targets is suspected, the sample should be re-tested with another FDA cleared, approved, or authorized test, if coinfection would change clinical management. This test was validated by the [facility]. These laboratories are certified under the Clinical Laboratory Improvement Amendments of 1988 (CLIA-88) as qualified to perform high complexity laboratory testing.   CBC with platelets differential    Narrative    The following orders were created for panel order CBC with platelets differential.  Procedure                               Abnormality         Status                     ---------                               -----------         ------                     CBC with platelets and d...[[id number]]                      Final result                 Please view results for these tests on the individual orders.   Comprehensive metabolic panel   Result Value Ref Range    Sodium 133 (L) 135 - 145 mmol/L    Potassium 4.1 3.4 - 5.3 mmol/L    Carbon Dioxide (CO2) 29 22 - 29 mmol/L     Anion Gap 11 7 - 15 mmol/L    Urea Nitrogen 16.7 8.0 - 23.0 mg/dL    Creatinine 0.84 0.51 - 0.95 mg/dL    GFR Estimate 71 >60 mL/min/1.73m2    Calcium 9.5 8.8 - 10.2 mg/dL    Chloride 93 (L) 98 - 107 mmol/L    Glucose 195 (H) 70 - 99 mg/dL    Alkaline Phosphatase 38 (L) 40 - 150 U/L    AST 29 0 - 45 U/L    ALT 19 0 - 50 U/L    Protein Total 7.6 6.4 - 8.3 g/dL    Albumin 4.1 3.5 - 5.2 g/dL    Bilirubin Total 1.2 <=1.2 mg/dL   [place] Draw    Narrative    The following orders were created for panel order [place] Draw.  Procedure                               Abnormality         Status                     ---------                               -----------         ------                     Extra Blue Top Tube[[id number]]                              Final result               Extra Red Top Tube[[id number]]                               Final result               Extra Green Top (Lithium...[[id number]]                      Final result               Extra Purple Top Tube[[id number]]                                                         Please view results for these tests on the individual orders.   CBC with platelets and differential   Result Value Ref Range    WBC Count 8.8 4.0 - 11.0 10e3/uL    RBC Count 4.30 3.80 - 5.20 10e6/uL    Hemoglobin 13.3 11.7 - 15.7 g/dL    Hematocrit 39.1 35.0 - 47.0 %    MCV 91 78 - 100 fL    MCH 30.9 26.5 - 33.0 pg    MCHC 34.0 31.5 - 36.5 g/dL    RDW 12.5 10.0 - 15.0 %    Platelet Count 156 150 - 450 10e3/uL    % Neutrophils 61 %    % Lymphocytes 25 %    % Monocytes 8 %    % Eosinophils 5 %    % Basophils 1 %    % Immature Granulocytes 1 %    NRBCs per 100 WBC 0 <1 /100    Absolute Neutrophils 5.4 1.6 - 8.3 10e3/uL    Absolute Lymphocytes 2.2 0.8 - 5.3 10e3/uL    Absolute Monocytes 0.7 0.0 - 1.3 10e3/uL    Absolute Eosinophils 0.4 0.0 - 0.7 10e3/uL    Absolute Basophils 0.1 0.0 - 0.2 10e3/uL    Absolute Immature Granulocytes 0.1 <=0.4 10e3/uL    Absolute NRBCs 0.0 10e3/uL   Extra Blue  Top Tube   Result Value Ref Range    Hold Specimen JIC    Extra Red Top Tube   Result Value Ref Range    Hold Specimen JIC    Extra Green Top (Lithium Heparin) Tube   Result Value Ref Range    Hold Specimen JIC    XR Chest 2 Views    Narrative    PROCEDURE:  XR CHEST 2 VIEWS    HISTORY: Cough, .    COMPARISON:  4/22/2024    FINDINGS:  The cardiomediastinal contours are stable. The trachea is midline.  No focal consolidation, effusion or pneumothorax.    No suspicious osseous lesion or subdiaphragmatic free air. Old right  rib fracture is redemonstrated.      Impression    IMPRESSION:      No new focal consolidation.      CANELO GARRISON MD         SYSTEM ID:  [id number]   Group A Streptococcus PCR Throat Swab    Specimen: Throat; Swab   Result Value Ref Range    Group A strep by PCR Not Detected Not Detected    Narrative    The Xpert Xpress Strep A test, performed on the [employer], is a rapid, qualitative in vitro diagnostic test for the detection of Streptococcus pyogenes (Group A ß-hemolytic Streptococcus, Strep A) in throat swab specimens from patients with signs and symptoms of pharyngitis. The Xpert Xpress Strep A test can be used as an aid in the diagnosis of Group A Streptococcal pharyngitis. The assay is not intended to monitor treatment for Group A Streptococcus infections. The Xpert Xpress Strep A test utilizes an automated real-time polymerase chain reaction (PCR) to detect Streptococcus pyogenes DNA.       Medications - No data to display    Assessments & Plan (with Medical Decision Making)     I have reviewed the nursing notes.    I have reviewed the findings, diagnosis, plan and need for follow up with the patient.    Summary:  Patient presents to the ER today for sore throat and cough.  Potential diagnosis which have been considered and evaluated include strep, influenza, RSV, COVID, PTA, pneumonia, as well as others. Many of these have been excluded using the various  modalities and assessment as noted on the chart. At the present time, the diagnosis given seems to be the most likely sore throat without etiology and cough.  Upon arrival, vitals signs are normal.  The patient is alert and oriented no distress.  Does have slight injection of posterior oropharynx but no tonsillar hypertrophy or exudate.  No wheezing or stridor.  Patient complains of sore throat.  No swelling of the tongue or uvula present.  Lab work was obtained showing negative influenza, COVID, RSV.  WBC 8.8 with hemoglobin 13.3.  Electrolytes, renal, hepatic functions benign.  Strep test is negative.  Chest x-ray personally reviewed showing no acute cardiopulmonary abnormalities.  At this time patient in no distress.  Only has sore throat.  For this reason we will discharge home to do OTC sore throat treatment as discussed in AVS.  Good hydration therapy education given.  Return to ER if new or worsening symptoms, otherwise follow-up with PCP.  Patient verbalized understanding to plan of care.  Patient discharged home.      Critical Care Time: None    Impression and plan discussed with patient. Questions answered, concerns addressed, indications for urgent re-evaluation reviewed, and  given. Patient/Parent/Caregiver agree with treatment plan and have no further questions at this time.  AVS provided at discharge.    This note was created by the Dragon Voice Dictation System. Inadvertent typographical errors, due to software recognition problems, may still exist.             New Prescriptions    No medications on file       Final diagnoses:   Sore throat   Acute cough       4/30/2024   [facility]       Clifton Helms APRN CNP  04/30/24 [id number]       Clifton Helms APRN CNP  04/30/24 1100

## 2024-04-30 NOTE — ED TRIAGE NOTES
"\"Frequent cough and sore throat since coming home from [facility] last Wed.  Having shortness of breath.  Also having episodes of sweating.  I was in [facility] for a heart attack and acute heart failure.\"         "

## 2024-04-30 NOTE — TELEPHONE ENCOUNTER
9:04 AM    Reason for Call: Phone Call    Description: Patient friend Shasha Sanford is concerned for Flora she was discharge from [facility] on 4/24/24 for a massive heart attack and she doesn't have any resources and she can hardly get herself dress she needs at least home care to come help her out at home.     No Consent for Communication on file to give out scheduling or medical information to give out to Shasha Sanford    Was an appointment offered for this call? No  If yes : Appointment type              Date    Preferred method for responding to this message: Telephone Call  What is your phone number ? Call Flora at [phone]    If we cannot reach you directly, may we leave a detailed response at the number you provided? Yes    Can this message wait until your PCP/provider returns, if available today? YES, No

## 2024-05-01 ENCOUNTER — OFFICE VISIT (OUTPATIENT)
Dept: CARDIOLOGY | Facility: OTHER | Age: 79
End: 2024-05-01
Attending: INTERNAL MEDICINE
Payer: COMMERCIAL

## 2024-05-01 VITALS
HEART RATE: 70 BPM | BODY MASS INDEX: 26.58 KG/M2 | SYSTOLIC BLOOD PRESSURE: 110 MMHG | OXYGEN SATURATION: 98 % | HEIGHT: 63 IN | WEIGHT: 150 LBS | DIASTOLIC BLOOD PRESSURE: 58 MMHG

## 2024-05-01 DIAGNOSIS — I48.91 ATRIAL FIBRILLATION WITH RAPID VENTRICULAR RESPONSE (H): Primary | ICD-10-CM

## 2024-05-01 DIAGNOSIS — E78.2 MIXED HYPERLIPIDEMIA: ICD-10-CM

## 2024-05-01 DIAGNOSIS — R06.09 DOE (DYSPNEA ON EXERTION): ICD-10-CM

## 2024-05-01 DIAGNOSIS — I51.7 CARDIOMEGALY: ICD-10-CM

## 2024-05-01 DIAGNOSIS — R79.89 ELEVATED BRAIN NATRIURETIC PEPTIDE (BNP) LEVEL: ICD-10-CM

## 2024-05-01 DIAGNOSIS — I50.32 DIASTOLIC DYSFUNCTION WITH CHRONIC HEART FAILURE (H): ICD-10-CM

## 2024-05-01 DIAGNOSIS — I48.0 PAROXYSMAL ATRIAL FIBRILLATION (H): ICD-10-CM

## 2024-05-01 DIAGNOSIS — I10 ESSENTIAL HYPERTENSION: ICD-10-CM

## 2024-05-01 DIAGNOSIS — I50.22 CHRONIC SYSTOLIC HEART FAILURE (H): ICD-10-CM

## 2024-05-01 DIAGNOSIS — J43.9 PULMONARY EMPHYSEMA, UNSPECIFIED EMPHYSEMA TYPE (H): ICD-10-CM

## 2024-05-01 DIAGNOSIS — J44.9 COPD, MILD (H): ICD-10-CM

## 2024-05-01 PROCEDURE — G0463 HOSPITAL OUTPT CLINIC VISIT: HCPCS

## 2024-05-01 PROCEDURE — 99215 OFFICE O/P EST HI 40 MIN: CPT | Performed by: INTERNAL MEDICINE

## 2024-05-01 PROCEDURE — G0463 HOSPITAL OUTPT CLINIC VISIT: HCPCS | Mod: 25

## 2024-05-01 RX ORDER — METOPROLOL SUCCINATE 50 MG/1
50 TABLET, EXTENDED RELEASE ORAL DAILY
Qty: 90 TABLET | Refills: 3 | Status: SHIPPED | OUTPATIENT
Start: 2024-05-01 | End: 2024-06-03

## 2024-05-01 ASSESSMENT — PAIN SCALES - GENERAL: PAINLEVEL: NO PAIN (0)

## 2024-05-01 NOTE — PATIENT INSTRUCTIONS
7.  Follow-up in 3 months or sooner with issues.  Entresto twice a day  2.  Jardiance or Farxiga 10 mg once a day

## 2024-05-13 ENCOUNTER — OFFICE VISIT (OUTPATIENT)
Dept: AUDIOLOGY | Facility: OTHER | Age: 79
End: 2024-05-13
Attending: NURSE PRACTITIONER
Payer: MEDICARE

## 2024-05-13 DIAGNOSIS — H90.3 SENSORINEURAL HEARING LOSS (SNHL) OF BOTH EARS: Primary | ICD-10-CM

## 2024-05-13 DIAGNOSIS — H91.93 BILATERAL HEARING LOSS, UNSPECIFIED HEARING LOSS TYPE: ICD-10-CM

## 2024-05-13 PROCEDURE — 92557 COMPREHENSIVE HEARING TEST: CPT | Performed by: AUDIOLOGIST

## 2024-05-13 PROCEDURE — 92550 TYMPANOMETRY & REFLEX THRESH: CPT | Performed by: AUDIOLOGIST

## 2024-05-13 NOTE — PROGRESS NOTES
Audiology Evaluation Completed. Please refer SCANNED AUDIOGRAM and/or TYMPANOGRAM for BACKGROUND, RESULTS, RECOMMENDATIONS.      Lexie CHAVEZ, [facility]  Audiologist #[id number]

## 2024-05-23 ENCOUNTER — MEDICAL CORRESPONDENCE (OUTPATIENT)
Dept: HEALTH INFORMATION MANAGEMENT | Facility: HOSPITAL | Age: 79
End: 2024-05-23

## 2024-05-23 ENCOUNTER — TELEPHONE (OUTPATIENT)
Dept: CARDIOLOGY | Facility: OTHER | Age: 79
End: 2024-05-23

## 2024-05-23 NOTE — TELEPHONE ENCOUNTER
Patient calls back states the other Dr's office they feel like she needs medication changed. Patient is asking for a medication change prior to appt. Advised Dr Corea is review her monitor and he was ok with her seeing Tamanna on 06/04. Instructed I would forward her message to Dr Corea to review and will contact her back if he is wanting a med change.

## 2024-05-23 NOTE — TELEPHONE ENCOUNTER
3:09 PM    Reason for Call: Phone Call    Description: pt wants Dr. Corea to know  pt called stated she saw Dr. Deng cardiologist  in [place] stated that pt should try to slow down heart rate   Because of heart monitor. Please call pt    Was an appointment offered for this call? No  If yes : Appointment type              Date    Preferred method for responding to this message: Telephone Call  What is your phone number ? [phone]     If we cannot reach you directly, may we leave a detailed response at the number you provided? Yes    Can this message wait until your PCP/provider returns, if available today? Maeve De Los Santos

## 2024-05-23 NOTE — TELEPHONE ENCOUNTER
Patient instructed of instructions by Dr Corea. Appt changed to Dr Veras 06/03 as she prefers a [place] appt.

## 2024-05-23 NOTE — TELEPHONE ENCOUNTER
5/23/24 11:08 AM  Note  Jin Corea, DO  You19 minutes ago (10:48 AM)      DB  No, she can be seen with Tamanna as scheduled.  I saw her after this monitor.  I increased her beta-blocker.  I suggested a cardioversion but she declined.  The monitor through [facility] showed a lot of SVT which potentially is A-fib but I cannot say definitively as I do not have the actual rhythm strips but just report.  Episodes only lasted up to 14 beats.  Even if they are A-fib, she is presently being treated for this with the blood thinner and an increased dose of beta-blocker.    Dr. Corea

## 2024-05-23 NOTE — TELEPHONE ENCOUNTER
Patient had a Holter monitor 04/22/24 (in the EMR) and is concerned with the findings. [facility] called her and told her she needed to be seen to discuss ASAP due to SVT's.  I did schedulefit in an appt with Tamanna 06/04 to discuss. No openings with Dr Corea until mid June. Can this wait until mid June to see Dr Corea?

## 2024-05-23 NOTE — TELEPHONE ENCOUNTER
Jin Corea, DO  You19 minutes ago (10:48 AM)     DB  No, she can be seen with Tamanna as scheduled.  I saw her after this monitor.  I increased her beta-blocker.  I suggested a cardioversion but she declined.  The monitor through [facility] showed a lot of SVT which potentially is A-fib but I cannot say definitively as I do not have the actual rhythm strips but just report.  Episodes only lasted up to 14 beats.  Even if they are A-fib, she is presently being treated for this with the blood thinner and an increased dose of beta-blocker.    Dr. Corea

## 2024-05-23 NOTE — TELEPHONE ENCOUNTER
8:48 AM    Reason for Call: Phone Call    Description: Patient would like a call back to explain why she is sending something by mail.     Was an appointment offered for this call? No  If yes : Appointment type              Date    Preferred method for responding to this message: Telephone Call  What is your phone number ? [phone]    If we cannot reach you directly, may we leave a detailed response at the number you provided? Yes    Can this message wait until your PCP/provider returns, if available today? Please call patient back to advise.    Rianna Malloy

## 2024-05-27 NOTE — PROGRESS NOTES
"  Assessment & Plan     (I10) Essential hypertension  (primary encounter diagnosis)  Plan: Well controlled. Continue current medications. Encouraged daily exercise and a low sodium diet. Recommended checking BP's 2x/wk, call the clinic if consistantly s>140 or d>90. Follow up in 6 months.     (E78.2) Mixed hyperlipidemia  Comment: tolerating statin  Plan: Lipids well controlled in 2/2024. AST and ALT normal in 2/2024.     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 Will notify patient of the results when available and intervene accordingly. Glucoses have been high. Using her insulin pump. Will get her an appointment with Kerri Elliott. BP at goal. On statin. Eye exam UTD. Will see her back in 3 months, sooner with new or worsening symptoms.     (I48.0) Paroxysmal atrial fibrillation (H)  (I50.9) Chronic congestive heart failure, unspecified heart failure type (H)  Plan: Stable. Sounds to be in a regular rhythm now. HR 75. On Eliquis and lasix. Patient denies chest pain, but is often fatigued and her heart races. Follows with cardiology. Plans to discuss cardioversion with cardiology.     (J43.9) Pulmonary emphysema, unspecified emphysema type (H)  Plan: Stable. No longer using the Spiriva.     The longitudinal plan of care for the diagnosis(es)/condition(s) as documented were addressed during this visit. Due to the added complexity in care, I will continue to support Flora in the subsequent management and with ongoing continuity of care.    BMI  Estimated body mass index is 26.93 kg/m  as calculated from the following:    Height as of this encounter: 1.6 m (5' 3\").    Weight as of this encounter: 68.9 kg (152 lb).       Subjective   Flora is a 79 year old, presenting for the following health issues:  Diabetes, Lipids, Hypertension, afib, and COPD    History of Present Illness       Diabetes:   She presents for " follow up of diabetes.   She is checking home blood glucose with a continuous glucose monitor.   She checks blood glucose before meals, after meals, before and after meals and at bedtime.  Blood glucose is sometimes over 200 and sometimes under 70. She is aware of hypoglycemia symptoms including shakiness, dizziness, weakness, blurred vision and confusion.   She is concerned about blood sugar frequently over 200.   She is having excessive thirst and weight gain.            Heart Failure:  She presents for follow up of heart failure. She is experiencing shortness of breath with rest and activity, which is same as usual. She is experiencing lower extremity edema which is same as usual.   She denies orthopenea and coughs at night. Patient is checking weight daily. She has recently had a weight increase.  She has no side effects from medications.  She has has a medical visit for heart failure 1 time since the last visit.    Vascular Disease:  She presents for follow up of vascular disease.     She never takes nitroglycerin. She takes daily aspirin.    She eats 0-1 servings of fruits and vegetables daily.She consumes 1 sweetened beverage(s) daily. She exercises with enough effort to increase her heart rate 3 or less days per week. She is missing 1 dose(s) of medications per week.  She is not taking prescribed medications regularly due to remembering to take.       Diabetes Follow-up    How often are you checking your blood sugar? Continuous glucose monitor  What time of day are you checking your blood sugars (select all that apply)?  Before meals, After meals, and At bedtime  Have you had any blood sugars above 200?  Yes; often over 400   Have you had any blood sugars below 70?  Yes   What symptoms do you notice when your blood sugar is low?  Shaky, Dizzy, Weak, Lethargy, Blurred vision, and Confusion  What concerns do you have today about your diabetes? None   Do you have any of these symptoms? (Select all that apply)   Numbness in feet and Burning in feet  A1C was 7.1 on 12/15/23.   Denies chest pain, dizziness, syncope, or palpitations.  Some chronic shortness of breath related to her heart failure. Follows with cardiology. Per note, cardiology feels she would benefit from cardioversion. Appointment scheduled.   Has insulin pump. Follows with the diabetic center. Due for an appointment.     Hyperlipidemia Follow-Up    Are you regularly taking any medication or supplement to lower your cholesterol?   Yes- Simvastatin 40 mg  Are you having muscle aches or other side effects that you think could be caused by your cholesterol lowering medication?  No  Denies chest pain, dizziness, syncope, or palpitations. Some chronic shortness of breath with activity related to her heart failure. Follows with cardiology.  Per note, cardiology feels she would benefit from cardioversion. Appointment scheduled.       Hypertension Follow-up    Do you check your blood pressure regularly outside of the clinic? Yes   Are you following a low salt diet? Yes  Are your blood pressures ever more than 140 on the top number (systolic) OR more   than 90 on the bottom number (diastolic), for example 140/90? No  -Taking Ramipril 10 mg with metoprolol succinate 50 mg. Also on lasix for CHf. No side effects.   -Denies chest pain, dizziness, syncope, or palpitations.  Some chronic shortness of breath with activity related to her heart failure.       BP Readings from Last 2 Encounters:   05/28/24 110/60   05/01/24 110/58     Hemoglobin A1C (%)   Date Value   12/15/2023 7.1 (H)   08/31/2023 7.2 (H)   01/29/2021 6.9 (H)   07/01/2020 7.2 (H)     LDL Cholesterol Calculated (mg/dL)   Date Value   02/28/2024 55   02/13/2023 56   05/05/2021 49   07/01/2020 36         Atrial Fibrillation Follow-up    Symptoms: no recent chest pain, significant palpitations, dizziness/lightheadedness, dyspnea, or increased peripheral edema.  Stroke prevention: DOAC (Eliquis, Xarelto,  "Pradaxa)  Follows with cardiology.  Per note, cardiology feels she would benefit from cardioversion. Appointment scheduled.         4/30/2024     2:27 PM 4/30/2024     5:50 PM 4/30/2024     6:00 PM 5/1/2024    12:47 PM 5/28/2024     2:59 PM   Date   Pulse 94 73 75 70 75     Current [id number] Score: 10 points - A score of 5 or greater represents a 7.2 - 12.2% annual risk of major embolic event, without anti-coagulation or an LAAO device.       COPD Follow-Up  Overall, how are your COPD symptoms since your last clinic visit?  No change  How much fatigue or shortness of breath do you have when you are walking?  Same as usual  How much shortness of breath do you have when you are resting?  Same as usual  How often do you cough? Often  Have you noticed any change in your sputum/phlegm?  No  Have you experienced a recent fever? No  Please describe how far you can walk without stopping to rest:  not very far  How many flights of stairs are you able to walk up without stopping?  None  Have you had any Emergency Room Visits, Urgent Care Visits, or Hospital Admissions because of your COPD since your last office visit?  No  Stopped using Spiriva; did not feel it was helping.     History   Smoking Status    Never   Smokeless Tobacco    Never     No results found for: \"FEV1\", \"[id number]\"        Review of Systems  Constitutional, HEENT, cardiovascular, pulmonary, gi and gu systems are negative, except as otherwise noted.      Objective    /60 (BP Location: Right arm, Patient Position: Sitting, Cuff Size: Adult Regular)   Pulse 75   Temp 99.6  F (37.6  C) (Tympanic)   Ht 1.6 m (5' 3\")   Wt 68.9 kg (152 lb)   SpO2 95%   BMI 26.93 kg/m    Body mass index is 26.93 kg/m .  Physical Exam   GENERAL: alert and no distress   EYES: Eyes grossly normal to inspection, PERRL and conjunctivae and sclerae normal  HENT: ear canals and TM's normal, nose and mouth without ulcers or lesions  NECK: no adenopathy, no asymmetry, masses, " or scars  RESP: lungs clear to auscultation - no rales, rhonchi or wheezes  CV: regular rate and rhythm, no murmur, click or rub, 1+ bilateral peripheral edema  ABDOMEN: soft, nontender, no masses and bowel sounds normal  NEURO: Normal strength and tone, mentation intact and speech normal  PSYCH: mentation appears normal, affect normal/bright    Labs in process        Signed Electronically by: Nicolasa Guillen NP

## 2024-05-28 ENCOUNTER — OFFICE VISIT (OUTPATIENT)
Dept: FAMILY MEDICINE | Facility: OTHER | Age: 79
End: 2024-05-28
Attending: NURSE PRACTITIONER
Payer: COMMERCIAL

## 2024-05-28 ENCOUNTER — TELEPHONE (OUTPATIENT)
Dept: CARDIOLOGY | Facility: OTHER | Age: 79
End: 2024-05-28
Payer: COMMERCIAL

## 2024-05-28 VITALS
OXYGEN SATURATION: 95 % | WEIGHT: 152 LBS | SYSTOLIC BLOOD PRESSURE: 110 MMHG | HEIGHT: 63 IN | TEMPERATURE: 99.6 F | HEART RATE: 75 BPM | DIASTOLIC BLOOD PRESSURE: 60 MMHG | BODY MASS INDEX: 26.93 KG/M2

## 2024-05-28 DIAGNOSIS — I10 ESSENTIAL HYPERTENSION: Primary | ICD-10-CM

## 2024-05-28 DIAGNOSIS — E78.2 MIXED HYPERLIPIDEMIA: ICD-10-CM

## 2024-05-28 DIAGNOSIS — E13.9 LADA (LATENT AUTOIMMUNE DIABETES IN ADULTS), MANAGED AS TYPE 1 (H): ICD-10-CM

## 2024-05-28 DIAGNOSIS — J43.9 PULMONARY EMPHYSEMA, UNSPECIFIED EMPHYSEMA TYPE (H): ICD-10-CM

## 2024-05-28 DIAGNOSIS — I48.0 PAROXYSMAL ATRIAL FIBRILLATION (H): ICD-10-CM

## 2024-05-28 DIAGNOSIS — I50.9 CHRONIC CONGESTIVE HEART FAILURE, UNSPECIFIED HEART FAILURE TYPE (H): ICD-10-CM

## 2024-05-28 LAB
ANION GAP SERPL CALCULATED.3IONS-SCNC: 10 MMOL/L (ref 7–15)
BUN SERPL-MCNC: 15.6 MG/DL (ref 8–23)
CALCIUM SERPL-MCNC: 10 MG/DL (ref 8.8–10.2)
CHLORIDE SERPL-SCNC: 95 MMOL/L (ref 98–107)
CREAT SERPL-MCNC: 0.8 MG/DL (ref 0.51–0.95)
DEPRECATED HCO3 PLAS-SCNC: 30 MMOL/L (ref 22–29)
EGFRCR SERPLBLD CKD-EPI 2021: 75 ML/MIN/1.73M2
EST. AVERAGE GLUCOSE BLD GHB EST-MCNC: 189 MG/DL
GLUCOSE SERPL-MCNC: 185 MG/DL (ref 70–99)
HBA1C MFR BLD: 8.2 %
POTASSIUM SERPL-SCNC: 4 MMOL/L (ref 3.4–5.3)
SODIUM SERPL-SCNC: 135 MMOL/L (ref 135–145)

## 2024-05-28 PROCEDURE — G2211 COMPLEX E/M VISIT ADD ON: HCPCS | Performed by: NURSE PRACTITIONER

## 2024-05-28 PROCEDURE — 36415 COLL VENOUS BLD VENIPUNCTURE: CPT | Mod: ZL | Performed by: NURSE PRACTITIONER

## 2024-05-28 PROCEDURE — 84295 ASSAY OF SERUM SODIUM: CPT | Mod: ZL | Performed by: NURSE PRACTITIONER

## 2024-05-28 PROCEDURE — 99214 OFFICE O/P EST MOD 30 MIN: CPT | Performed by: NURSE PRACTITIONER

## 2024-05-28 PROCEDURE — 83036 HEMOGLOBIN GLYCOSYLATED A1C: CPT | Mod: ZL | Performed by: NURSE PRACTITIONER

## 2024-05-28 PROCEDURE — G0463 HOSPITAL OUTPT CLINIC VISIT: HCPCS

## 2024-05-28 ASSESSMENT — PAIN SCALES - GENERAL: PAINLEVEL: NO PAIN (0)

## 2024-05-28 NOTE — TELEPHONE ENCOUNTER
Patient would like to know if she needs to continue taking the baby Asprin along with the Metoprolol.

## 2024-05-29 NOTE — TELEPHONE ENCOUNTER
Spoke with patient and relayed instructions from Tamanna. Patient understood stopping baby aspirin and had no further questions of concerns.  Azra Stroud LPN

## 2024-06-03 ENCOUNTER — OFFICE VISIT (OUTPATIENT)
Dept: CARDIOLOGY | Facility: OTHER | Age: 79
End: 2024-06-03
Attending: INTERNAL MEDICINE
Payer: COMMERCIAL

## 2024-06-03 VITALS
TEMPERATURE: 97.9 F | BODY MASS INDEX: 26.61 KG/M2 | RESPIRATION RATE: 15 BRPM | HEIGHT: 63 IN | DIASTOLIC BLOOD PRESSURE: 70 MMHG | WEIGHT: 150.2 LBS | SYSTOLIC BLOOD PRESSURE: 136 MMHG | HEART RATE: 81 BPM | OXYGEN SATURATION: 98 %

## 2024-06-03 DIAGNOSIS — I51.7 CARDIOMEGALY: ICD-10-CM

## 2024-06-03 DIAGNOSIS — I10 ESSENTIAL HYPERTENSION: ICD-10-CM

## 2024-06-03 DIAGNOSIS — I48.0 PAROXYSMAL ATRIAL FIBRILLATION (H): ICD-10-CM

## 2024-06-03 DIAGNOSIS — I50.32 DIASTOLIC DYSFUNCTION WITH CHRONIC HEART FAILURE (H): ICD-10-CM

## 2024-06-03 DIAGNOSIS — I42.8 NONISCHEMIC CARDIOMYOPATHY (H): ICD-10-CM

## 2024-06-03 DIAGNOSIS — I48.91 ATRIAL FIBRILLATION WITH RAPID VENTRICULAR RESPONSE (H): Primary | ICD-10-CM

## 2024-06-03 DIAGNOSIS — I50.20 SYSTOLIC HEART FAILURE, UNSPECIFIED HF CHRONICITY (H): ICD-10-CM

## 2024-06-03 DIAGNOSIS — I50.30 NYHA CLASS 3 HEART FAILURE WITH PRESERVED EJECTION FRACTION (H): ICD-10-CM

## 2024-06-03 DIAGNOSIS — E78.2 MIXED HYPERLIPIDEMIA: ICD-10-CM

## 2024-06-03 PROCEDURE — 99215 OFFICE O/P EST HI 40 MIN: CPT | Performed by: INTERNAL MEDICINE

## 2024-06-03 PROCEDURE — G0463 HOSPITAL OUTPT CLINIC VISIT: HCPCS

## 2024-06-03 RX ORDER — METOPROLOL SUCCINATE 50 MG/1
100 TABLET, EXTENDED RELEASE ORAL DAILY
Qty: 90 TABLET | Refills: 3 | Status: SHIPPED | OUTPATIENT
Start: 2024-06-03

## 2024-06-03 ASSESSMENT — PAIN SCALES - GENERAL: PAINLEVEL: NO PAIN (0)

## 2024-06-03 NOTE — PATIENT INSTRUCTIONS
Thank you for allowing Dr. Veras and our  team to participate in your care. Please call our office at [phone] with scheduling questions or if you need to cancel or change your appointment. With any other questions or concerns you may call the cardiology nurse at [phone].       If you experience chest pain, chest pressure, chest tightness, shortness of breath, fainting, lightheadedness, nausea, vomiting, or other concerning symptoms, please report to the Emergency Department or call 911. These symptoms may be emergent, and best treated in the Emergency Department.

## 2024-06-03 NOTE — PROGRESS NOTES
Maryellen 3, 2024     Dear Dr. Corea,  I had the pleasure of seeing Flora Acuna  in the [facility]. As you know, she is a 78 y/o lady followed here in the past who was recently admitted to [facility] for NSTEMI and was found to be in a-fib RVR rates around 120/min, as well as HFrEF at 26%. Per notes, she reports being dyspneic for many years.  Her shortness of breath has gotten worse over the last few months to years.  She states that the Spiriva has helped her shortness of breath significantly.  She does carry history of asthma.  She was seen by cardiology through [facility] in 2015 and 2016 for this very issue.  She reportedly had an angiogram in 2002 which was reportedly normal.  She had a repeat cath on 7/15/2015 [facility] with a 10% lesion to her LAD.  All remaining vessels were patent.  She carries a diagnosis of heart failure with preserved ejection fraction but more recently this issue was not identified.  Her cardiac catheterization 2015 also showed normal filling pressures.     She has noted that with vacuuming her house, [employer] through, she will be so significantly short of breath that she will use a fan to improve her air hunger.  As mentioned, she has essentially had 2 normal cardiac catheterizations.  She states she would not be able to do stress testing secondary to the uncomfortable feeling it creates.  Her echo from 5/8/2009 showed diastolic dysfunction grade 1.  This was not identified on her most recent echo from 9/17/2020.  She did not have a significant valvular or chamber abnormalities.  She does carry history of rheumatoid arthritis but states she was told most recently that she has osteoarthritis and not RA.  A normal chest x-ray on 3/19/2021.  PFT's on 2/18/2021 showed minimal COPD.  She states she was exposed to secondhand smoke by her father at a young age.  She herself has never used tobacco.    She confirms the above and notes that she has episodes of  palpitations. These are not very frequent but had one last Friday that felt severe after taking a shower. No syncope but did have some lightheadedness. Takes all medications as prescribed including the recently increased XL 50mg daily and eliquis without complications.     PAST MEDICAL HISTORY:  1.  FRANCO.  -Diastolic CHF/minimal COPD/Asthma.  2.  Chest pain.                 -Cardiac cath on 4/22/24-no dz, [facility].   -Cardiac cath on 7/15/15-minimal dz.   3.  HTN-controlled.  4.  Prolonged QT.  5.  Asthma.  6.  Fatty liver on 11/7/2007.  7.  CHF.                -26% on 4/23/2024 at [facility].                 -Normal on 5/26/21.   -Lower ext edema with BNP of 661 on 5/5/21.   -Diastolic grade 1 on 1/19/15.8.  DM-2-controlled.  9.  Hypothyroidism.  10.  Hyperlipidemia-controlled.  11.  Hypertriglyceridemia-uncontrolled.  12.  B12 deficiency.  -391 on 5/5/21.   13.  COPD-minimal on 2/18/21.  14.  Elevated BNP at 582 on 8/28/20.  15.  A. Fib.    -Eliquis started on 1/30/2023.  -Short runs on 1/3/2023.  16.  NSTEMI on 4/22/24                -[facility]-Normal cornaries, 4/23/2024.                 -Taksubo or A-fib causing the increased rate.     Past Medical History:   Diagnosis Date    Congestive heart failure, unspecified     Diabetes (H)     H/O rheumatoid arthritis     HLD (hyperlipidemia)     HTN (hypertension)     Myocardial infarction (H)     Uncomplicated asthma      FAMILY HISTORY:  Family History   Problem Relation Age of Onset    Breast Cancer Maternal Aunt     Breast Cancer Maternal Aunt     Lung Cancer Father      SOCIAL HISTORY:  Social History     Socioeconomic History    Marital status:     Number of children: 2   Occupational History    Occupation: [employer]     Employer: RETIRED   Tobacco Use    Smoking status: Never     Passive exposure: Never    Smokeless tobacco: Never   Vaping Use    Vaping status: Never Used   Substance and Sexual Activity    Alcohol use: No     Alcohol/week: 0.0 standard  drinks of alcohol    Drug use: No     Social Determinants of Health     Financial Resource Strain: Low Risk  (2/28/2024)    Financial Resource Strain     Within the past 12 months, have you or your family members you live with been unable to get utilities (heat, electricity) when it was really needed?: No   Food Insecurity: Low Risk  (2/28/2024)    Food Insecurity     Within the past 12 months, did you worry that your food would run out before you got money to buy more?: No     Within the past 12 months, did the food you bought just not last and you didn t have money to get more?: No   Transportation Needs: Low Risk  (2/28/2024)    Transportation Needs     Within the past 12 months, has lack of transportation kept you from medical appointments, getting your medicines, non-medical meetings or appointments, work, or from getting things that you need?: No   Interpersonal Safety: Low Risk  (12/15/2023)    Interpersonal Safety     Do you feel physically and emotionally safe where you currently live?: Yes     Within the past 12 months, have you been hit, slapped, kicked or otherwise physically hurt by someone?: No     Within the past 12 months, have you been humiliated or emotionally abused in other ways by your partner or ex-partner?: No   Housing Stability: Low Risk  (2/28/2024)    Housing Stability     Do you have housing? : Yes     Are you worried about losing your housing?: No     CURRENT MEDICATIONS:  Current Outpatient Medications   Medication Sig Dispense Refill    Acetone, Urine, Test (KETONE TEST) STRP Use as directed 50 strip 4    apixaban ANTICOAGULANT (ELIQUIS ANTICOAGULANT) 5 MG tablet Take 1 tablet (5 mg) by mouth 2 times daily Take 1 tablet by mouth twice daily 180 tablet 3    Calcium-Vitamin D-Vitamin K (VIACTIV PO) Take 2 chew tab by mouth every morning      Continuous Blood Gluc  (DEXCOM G6 ) DIMITRIS 1 each continuous 1 each 0    Continuous Blood Gluc Transmit (DEXCOM G6 TRANSMITTER) MISC  CHANGE EVERY 90 DAYS 1 each 1    Continuous Glucose Sensor (DEXCOM G6 SENSOR) MISC CHANGE EVERY 10 DAYS 3 each 5    furosemide (LASIX) 40 MG tablet TAKE 1 TABLET BY MOUTH ONCE DAILY IN THE MORNING AND 1/2 (ONE-HALF) TABLET IN AFTERNOON 135 tablet 3    Insulin Disposable Pump (OMNIPOD 5 G6 INTRO, GEN 5,) KIT 1 each every 48 hours 1 kit 0    Insulin Disposable Pump (OMNIPOD 5 G6 POD, GEN 5,) MISC 1 each every 3 days 10 each 5    insulin lispro (HUMALOG VIAL) 100 UNIT/ML vial To be used with the insulin pump TDD: 80 units 30 mL 3    INSULIN PUMP - OUTPATIENT Insulin pump settings:  Updated: 1/25/24  Insulin pump: Minimed 780G  Basal:  0000 0.9   0200 0.85   0500 0.825   0900 1  1200 1.1  2200 1   Total: 23.65  CR: (new) 11  ISF:   0000 (new) 60   Target: 100-140  Active insulin: (new) 4 hours 30 minutes      levothyroxine (SYNTHROID/LEVOTHROID) 75 MCG tablet Take 1 tablet by mouth once daily 90 tablet 2    metoprolol succinate ER (TOPROL XL) 50 MG 24 hr tablet Take 1 tablet (50 mg) by mouth daily 90 tablet 3    omeprazole (PRILOSEC) 40 MG DR capsule Take 1 capsule by mouth once daily 90 capsule 3    potassium chloride ER (KLOR-CON M) 20 MEQ CR tablet Take 1 tablet by mouth twice daily 180 tablet 3    ramipril (ALTACE) 10 MG capsule Take 1 capsule (10 mg) by mouth 2 times daily 180 capsule 2    senna-docusate (SENOKOT-S;PERICOLACE) 8.6-50 MG per tablet Take 1 tablet by mouth At Bedtime       simvastatin (ZOCOR) 40 MG tablet Take 1 tablet (40 mg) by mouth at bedtime 90 tablet 3    tamoxifen (NOLVADEX) 20 MG tablet Take 20 mg by mouth every morning      tiZANidine (ZANAFLEX) 4 MG tablet Take a 1/2 tablet by mouth in the morning, 1/2 tablet mid day and 1 full tablet at bedtime, can take an extra 1/2 tablet as needed during the night for breakthrough symptoms up to 4 times per week.       No current facility-administered medications for this visit.     EXAM:  /70 (BP Location: Right arm, Patient Position: Sitting,  "Cuff Size: Adult Regular)   Pulse 81   Temp 97.9  F (36.6  C) (Tympanic)   Resp 15   Ht 1.6 m (5' 3\")   Wt 68.1 kg (150 lb 3.2 oz)   SpO2 98%   BMI 26.61 kg/m    General: appears comfortable, alert and oriented  Head: normocephalic, atraumatic  Eyes: anicteric sclera, EOMI , PERRL  Neck: no adenopathy  Orophyarynx: moist mucosa, no lesions noted  Heart: regular, S1/S2, no murmurs, rubs or gallop. Estimated JVP at 5 cmH2O  Lungs: CTAB, No wheezing.   Abdomen: soft, non-tender, bowel sounds present, no hepatosplenomegaly  Extremities: No LE edema today  Skin: no open lesions noted  Neuro: grossly non-focal     Labs:  Lab Results   Component Value Date    WBC 8.8 04/30/2024    HGB 13.3 04/30/2024    HCT 39.1 04/30/2024     04/30/2024     05/28/2024    POTASSIUM 4.0 05/28/2024    CHLORIDE 95 (L) 05/28/2024    CO2 30 (H) 05/28/2024    BUN 15.6 05/28/2024    CR 0.80 05/28/2024     (H) 05/28/2024    DD <0.30 04/22/2024    DIMER 762 (H) 09/05/2017    NTBNPI 505 04/22/2024    NTBNP 801 02/13/2023    TROPI <0.015 05/05/2021    AST 29 04/30/2024    ALT 19 04/30/2024    GGT 14 05/05/2021    ALKPHOS 38 (L) 04/30/2024    BILITOTAL 1.2 04/30/2024     Echocardiogram on 2/27/2023:  Global and regional left ventricular function is normal with an EF of 55-60%. Left ventricular diastolic function is indeterminate.   Right ventricular function, chamber size, wall motion, and thickness are normal.  Both atria appear normal. Pulmonary artery systolic pressure cannot be assessed. The inferior vena cava is normal.  No pericardial effusion is present.     Zio patch on 1/3/2023:  Worn for 3 days and 7 hr's.  After removing artifact, total time was 2 days and 22 hr's. Placed on 1/3/2023 at 2:27 PM and completed on 1/6/2023 at 9:13 PM.  Underlying rhythm was sinus.  Hrt rate ranged from 50 bpm, maximum heart rate of 150 bmp, averaging 73 bmp. No significant bradycardia, pauses, Mobitz type II or 3rd degree heart " "block. No atrial fibrillation on this study. x3 triggered events and x2 diary entries.  These corresponded to SVE, VE, and sinus rhythm. x0 runs of VT.    x10 runs of SVT lasting up to 12.8 sec's with a maximum heart rate of 150 bmp.  These short bursts of \"SVT\" are suspicious for A. fib  Rare, <1% of PAC's, atrial couplets, atrial triplets, ventricular couplets, and ventricular triplets.  PVC's at 1.6%.  + episodes of ventricular bigeminy lasting up to 4.6 sec's.  + episodes of ventricular trigeminy lasting up to 20.5 sec's.     US carotid arteries on 1/3/2023:  No hemodynamically significant stenoses are identified at either carotid bifurcation     V/Q scan on 5/7/21:  The ventilation study demonstrates mildly diminished lateral ventilation particularly at the apices.  The perfusion study demonstrates normal symmetric perfusion without small, moderate or large defect. A shoulder prosthesis results in mild artifact.     Echocardiogram 5/26/2021:  No pericardial effusion is present.  Global and regional left ventricular function is normal with an EF of 55-60%.  Left ventricular diastolic function is normal.  The right ventricle is normal size.  Global right ventricular function is normal.  Both atria appear normal.  Trace mitral insufficiency is present.  Aortic valve is normal in structure and function.  Trace tricuspid insufficiency is present.  Right ventricular systolic pressure is 8 mmHg above the right atrial pressure.  The pulmonic valve is normal.     Echocardiogram on 9/17/2020:  No pericardial effusion is present. Global and regional left ventricular function is normal with an EF of 60-65%.  Left ventricular diastolic function is normal. The right ventricle is normal size.  Both atria appear normal.  Trace mitral insufficiency is present. Aortic valve is normal in structure and function. Trace tricuspid insufficiency is present. Right ventricular systolic pressure is 13 mmHg above the right atrial " pressure. The pulmonic valve is normal. The aorta root is normal.     Echo on 5/8/2009:   1. Normal left ventricular size with mild systolic functional impairment.    2. Evidence of grade 1 diastolic dysfunction.    3. Mild left atrial enlargement.    4. Trace physiologic amounts of mitral tricuspid and pulmonic insufficiency.    5. Trivial pericardial effusion.     Left heart cath on 7/15/2015 at [facility]:  DOMINANCE:  Right Dominant  LEFT MAIN:  is angiographically normal (0% Stenosis)  LEFT ANTERIOR DESCENDING :  Proximal Segment is angiographically normal (0% Stenosis)  rest of vessel has luminal irregularities (10% Stenosis) with distal pruning.  DIAGONAL 1:  is angiographically normal (0% Stenosis)  CIRCUMFLEX:  and its branches are angiographically normal (0% Stenosis)  RIGHT CORONARY ARTERY:  is angiographically normal (0% Stenosis)  COLLATERALS:  No collateral flow  Normal LVEDP         ASSESSMENT AND PLAN:  In summary, patient is a 79 year old lady with with above past medical history including episodes of palpitations, severely reduced ejection fraction around 25% as per most recent echocardiogram in extension to move, nonischemic cardiomyopathy with clear coronary arteries who I am seeing for episodes of palpitations in the setting of A-fib with RVR.  I also reviewed, just like Dr. Corea, the Zio patch results from essential however I do not have the actual tracings available.  It appears that patient does have frequent episodes of A-fib which she is also symptomatic often to her.  At this time I do think it is reasonable to continue increasing the metoprolol given that her blood pressure is acceptable and her heart rate was still above 80 today.  As such I recommended to doubling the metoprolol to 100 mg daily and this is the succinate or XL formulation.  Will see how she does with this I do not think cardioversion at this time should be attempted given that she was in sinus rhythm or at least  very regular rhythm when she was in clinic today.  However ablation may be considered old and other antiarrhythmics may be considered if she is unable to maintain sinus rhythm.  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  This remains to be seen, I will repeat an echocardiogram a few months after rate control has been achieved and sinus rhythm can be maintained.  I would consider doing a Zio patch once she is asymptomatic and no further episodes of A-fib detected at least on the train off.  I asked her to continue the higher dose of metoprolol unless she symptomatic until she sees Dr. Corea again on Tamanna in clinic.  I will not make any other medication change recommendations.  She is taking Eliquis and no complications from this.  Again reevaluate echo afterwards.     I appreciate the opportunity to participate in the care of Flora Acuna . Please do not hesitate to contact me with any further questions.  I have spent a total of 60 minutes today reviewing labs, imaging studies, discussing with colleagues, face-to-face time with patient and documentation in the medical record.  The longitudinal plan of care for the diagnosis(es)/condition(s) as documented were addressed during this visit. Due to the added complexity in care, I will continue to support Flora in the subsequent management and with ongoing continuity of care.    Sincerely,   Salomon Veras MD  [facility] Division of Cardiology

## 2024-06-12 ENCOUNTER — TELEPHONE (OUTPATIENT)
Dept: CARDIOLOGY | Facility: CLINIC | Age: 79
End: 2024-06-12
Payer: COMMERCIAL

## 2024-06-12 DIAGNOSIS — E87.6 HYPOKALEMIA: ICD-10-CM

## 2024-06-12 RX ORDER — POTASSIUM CHLORIDE 1500 MG/1
TABLET, EXTENDED RELEASE ORAL
Qty: 180 TABLET | Refills: 3 | Status: SHIPPED | OUTPATIENT
Start: 2024-06-12

## 2024-06-26 ENCOUNTER — ALLIED HEALTH/NURSE VISIT (OUTPATIENT)
Dept: EDUCATION SERVICES | Facility: OTHER | Age: 79
End: 2024-06-26
Attending: NURSE PRACTITIONER
Payer: COMMERCIAL

## 2024-06-26 VITALS
RESPIRATION RATE: 16 BRPM | OXYGEN SATURATION: 96 % | DIASTOLIC BLOOD PRESSURE: 76 MMHG | HEART RATE: 84 BPM | SYSTOLIC BLOOD PRESSURE: 137 MMHG

## 2024-06-26 DIAGNOSIS — E13.9 LADA (LATENT AUTOIMMUNE DIABETES IN ADULTS), MANAGED AS TYPE 1 (H): Primary | ICD-10-CM

## 2024-06-26 PROCEDURE — G0463 HOSPITAL OUTPT CLINIC VISIT: HCPCS

## 2024-06-26 PROCEDURE — 95251 CONT GLUC MNTR ANALYSIS I&R: CPT | Performed by: NURSE PRACTITIONER

## 2024-06-26 PROCEDURE — 99213 OFFICE O/P EST LOW 20 MIN: CPT | Performed by: NURSE PRACTITIONER

## 2024-06-26 ASSESSMENT — PAIN SCALES - GENERAL
PAINLEVEL: WORST PAIN (10)
PAINLEVEL: NO PAIN (0)

## 2024-06-27 ENCOUNTER — TELEPHONE (OUTPATIENT)
Dept: PHARMACY | Facility: OTHER | Age: 79
End: 2024-06-27
Payer: COMMERCIAL

## 2024-06-27 DIAGNOSIS — E13.9 LADA (LATENT AUTOIMMUNE DIABETES IN ADULTS), MANAGED AS TYPE 1 (H): ICD-10-CM

## 2024-06-27 RX ORDER — PROCHLORPERAZINE 25 MG/1
SUPPOSITORY RECTAL
Qty: 3 EACH | Refills: 5 | OUTPATIENT
Start: 2024-06-27

## 2024-06-27 NOTE — TELEPHONE ENCOUNTER
MTM Recruitment: [employer]     Referral outreach attempt #1 on June 27, 2024      Outcome: left voicemail- Call back number [phone]    Kamron Elliott PharmD  Medication Therapy Management Pharmacist  [facility]  Office phone: [phone]

## 2024-07-01 DIAGNOSIS — E13.9 LADA (LATENT AUTOIMMUNE DIABETES IN ADULTS), MANAGED AS TYPE 1 (H): ICD-10-CM

## 2024-07-01 NOTE — TELEPHONE ENCOUNTER
Hello pt is requesting the sensors as a 3 month supply we need a script that says dispense 9 instead of 3

## 2024-07-02 RX ORDER — PROCHLORPERAZINE 25 MG/1
SUPPOSITORY RECTAL
Qty: 9 EACH | Refills: 1 | Status: SHIPPED | OUTPATIENT
Start: 2024-07-02

## 2024-07-03 NOTE — PATIENT INSTRUCTIONS
Continue working on healthy eating and moving as best as you can (start low and slow, work up to 30 min, 5x/week)    BG goals:  Fasting and before meals <130, >70  2 hour after eating <180    We only need 1/2 of these numbers to be within target then your A1c will be within target    Medication changes   Keep working on the timing of your carb bolus--ideally before, every time you consume carbs     Follow up  As discussed     Call me sooner if any problems/concerns and/or questions develop including consistent low BGs <70 or consistent high BGs >200  [phone] (Marie, Unit Coordinator)    [phone] (Emeli, Nurse)

## 2024-07-03 NOTE — PROGRESS NOTES
"SUBJECTIVE:  Flora Acuna, 79 year old, female presents with the following Chief Complaint(s) with HPI to follow:  Chief Complaint   Patient presents with    Diabetes        Diabetes Follow-up    Patient is checking blood sugars: >4x/day using personal CGM (Dexcom G6) Results:    Date: 5/30 to 6/12/24  Glucose management indicator: 9.4%    Average glucose: 253    Glucose range  Very low (<54): 0  low (<70): 0  In target range (): 20%  High (>180): 33%  Very high (>250): 47%    Date: 6/13 to 6/26/24  Glucose management indicator: 9.4%    Average glucose: 254    Glucose range  Very low (<54): 0  low (<70): 0  In target range (): 19%  High (>180): 34%  Very high (>250): 47%    Symptoms of hypoglycemia (low blood sugar): yes  Paresthesias (numbness or burning in feet) or sores: Yes--same; no sores  Diabetic eye exam within the last year: DUE  Breakfast eaten regularly: sometimes  Patient counting carbs: Yes       HPI:  Flora's here today for the follow up regarding her KAREEM, managed as a Type 1    A1c trend:  Lab Results   Component Value Date    A1C 8.2 05/28/2024    A1C 7.1 12/15/2023    A1C 7.2 08/31/2023    A1C 7.5 03/21/2023    A1C 7.2 12/20/2022    A1C 6.9 01/29/2021    A1C 7.2 07/01/2020    A1C 7.9 12/16/2019    A1C 7.5 09/11/2019    A1C 7.3 06/07/2019     Current Diabetes medication:   1.  Insulin pump (Minimed 780G), with Humalog  ASA use: yes  Statin use: yes    Bo reports the following:  No issues with the insulin pump  No issues with the Dexcom G6    Regarding her health, she had a MI in April  Per her cardiologist (Dr. Veras) note on 6/3/24:   \"80 y/o lady followed here in the past who was recently admitted to [facility] for NSTEMI and was found to be in a-fib RVR rates around 120/min, as well as HFrEF at 26%\"   Per patient, unable to have any heart procedures--\"heart is too weak\"   Managing it via medication   Working with both her cardiologist and her PCP, Nicolasa Guillen NP    Weight " trend:  Wt Readings from Last 4 Encounters:   06/03/24 68.1 kg (150 lb 3.2 oz)   05/28/24 68.9 kg (152 lb)   05/01/24 68 kg (150 lb)   04/22/24 68 kg (150 lb)       Patient Active Problem List   Diagnosis    CARDIOVASCULAR SCREENING; LDL GOAL LESS THAN 160    Personal history of malignant neoplasm of breast    ACP (advance care planning)    Hypothyroidism    Essential hypertension    Malignant neoplasm of left breast in female, estrogen receptor positive (H)    S/P reverse total shoulder arthroplasty, right    Lumbar disc disease with radiculopathy    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), managed as type 1 (H)    H/O total knee replacement, left    Age-related osteoporosis without current pathological fracture    Osteoporosis    Family history of melanoma    Family history of breast cancer in female    Dysphonia    Prolonged QT interval    FRANCO (dyspnea on exertion)    Fatty liver on 11/7/2007    Diastolic dysfunction with chronic heart failure (H)    Mixed hyperlipidemia    Drug-induced hypokalemia    Vitamin D deficiency    Contrast media allergy    COPD, mild (H)    Pulmonary emphysema, unspecified emphysema type (H)    Asthma, unspecified asthma severity, unspecified whether complicated, unspecified whether persistent    Abnormal EMG    Cardiomegaly    Incisional hernia    Paroxysmal atrial fibrillation (H)    Chronic systolic heart failure (H)    Atrial fibrillation with rapid ventricular response (H)    Elevated brain natriuretic peptide (BNP) level       Past Medical History:   Diagnosis Date    Congestive heart failure, unspecified     Diabetes (H)     H/O rheumatoid arthritis     HLD (hyperlipidemia)     HTN (hypertension)     Myocardial infarction (H)     Uncomplicated asthma        Past Surgical History:   Procedure Laterality Date    APPENDECTOMY OPEN CHILD      AS TOTAL KNEE ARTHROPLASTY Right     CHOLECYSTECTOMY      COLONOSCOPY - HIM SCAN N/A 03/12/2009    per Care Everywhere documentation per  [facility].     HYSTERECTOMY TOTAL ABDOMINAL      MASTECTOMY, BILATERAL Bilateral 02/26/1992    SHOULDER SURGERY Right     SHOULDER SURGERY Left        Family History   Problem Relation Age of Onset    Breast Cancer Maternal Aunt     Breast Cancer Maternal Aunt     Lung Cancer Father        Social History     Tobacco Use    Smoking status: Never     Passive exposure: Never    Smokeless tobacco: Never   Substance Use Topics    Alcohol use: No     Alcohol/week: 0.0 standard drinks of alcohol       Current Outpatient Medications   Medication Sig Dispense Refill    Acetone, Urine, Test (KETONE TEST) STRP Use as directed 50 strip 4    apixaban ANTICOAGULANT (ELIQUIS ANTICOAGULANT) 5 MG tablet Take 1 tablet (5 mg) by mouth 2 times daily Take 1 tablet by mouth twice daily 180 tablet 3    Calcium-Vitamin D-Vitamin K (VIACTIV PO) Take 2 chew tab by mouth every morning      Continuous Blood Gluc  (DEXCOM G6 ) DIMITRIS 1 each continuous 1 each 0    Continuous Blood Gluc Transmit (DEXCOM G6 TRANSMITTER) MISC CHANGE EVERY 90 DAYS 1 each 1    furosemide (LASIX) 40 MG tablet TAKE 1 TABLET BY MOUTH ONCE DAILY IN THE MORNING AND 1/2 (ONE-HALF) TABLET IN AFTERNOON 135 tablet 3    insulin lispro (HUMALOG VIAL) 100 UNIT/ML vial To be used with the insulin pump TDD: 80 units 30 mL 3    INSULIN PUMP - OUTPATIENT Insulin pump settings:  Updated: 1/25/24  Insulin pump: Minimed 780G  Basal:  0000 0.9   0200 0.85   0500 0.825   0900 1  1200 1.1  2200 1   Total: 23.65  CR: (new) 11  ISF:   0000 (new) 60   Target: 100-140  Active insulin: (new) 4 hours 30 minutes      levothyroxine (SYNTHROID/LEVOTHROID) 75 MCG tablet Take 1 tablet by mouth once daily 90 tablet 2    metoprolol succinate ER (TOPROL XL) 50 MG 24 hr tablet Take 2 tablets (100 mg) by mouth daily 90 tablet 3    omeprazole (PRILOSEC) 40 MG DR capsule Take 1 capsule by mouth once daily 90 capsule 3    potassium chloride eduardo ER (KLOR-CON M20) 20 MEQ CR tablet Take 1  "tablet by mouth twice daily 180 tablet 3    ramipril (ALTACE) 10 MG capsule Take 1 capsule (10 mg) by mouth 2 times daily 180 capsule 2    senna-docusate (SENOKOT-S;PERICOLACE) 8.6-50 MG per tablet Take 1 tablet by mouth At Bedtime       simvastatin (ZOCOR) 40 MG tablet Take 1 tablet (40 mg) by mouth at bedtime 90 tablet 3    tamoxifen (NOLVADEX) 20 MG tablet Take 20 mg by mouth every morning      tiZANidine (ZANAFLEX) 4 MG tablet Take a 1/2 tablet by mouth in the morning, 1/2 tablet mid day and 1 full tablet at bedtime, can take an extra 1/2 tablet as needed during the night for breakthrough symptoms up to 4 times per week.      Continuous Glucose Sensor (DEXCOM G6 SENSOR) MISC Change every 10 days. 9 each 1       Allergies   Allergen Reactions    Contrast Dye Difficulty breathing    Gadolinium Derivatives      Other reaction(s): Difficulty in swallowing, Laryngeal spasm  Patient had an injection into rt. Shoulder capsule prior to MRI arthrogram.  Contained multihance gadobenate, omnipaque iohexol, and buffered lidocaine.      Ammonia     Cory-1 [Lidocaine Hcl]      Novocaine allergy      Codeine Sulfate     Food      Shrimp    Fosamax [Alendronate]      Dental infections    Iodine-131 Swelling     Ct dye    Lidocaine     Nitrous Oxide     No Clinical Screening - See Comments Nausea and Vomiting and Other (See Comments)     (3/6/09)- N/V and Heart racing    Novocain [Procaine]     Penicillins     Symbicort [Budesonide-Formoterol Fumarate]     Tramadol     Morphine Palpitations     REVIEW OF SYSTEMS  Skin: hx of skin reaction  Eyes: negative; DUE  Ears/Nose/Throat: hx of \"burnt\" voice and muscles from GERD  Respiratory: No SOB or FRANCO; no cough; no hemoptysis; history of asthma  Cardiovascular: negative; history of HF  Gastrointestinal: negative  Genitourinary: negative   Musculoskeletal: hx of back pain; hx of RA, neck pain, arthritis and right shoulder   Neurologic: positive for numbness or tingling of " "feet--same  Psychiatric: negative  Hematologic/Lymphatic/Immunologic: history of breast cancer (left) x 2 (same spot)  Endocrine: positive for diabetes and thyroid disease     OBJECTIVE:  /76   Pulse 84   Resp 16   SpO2 96%   Constitutional: alert and no distress  Respiratory:  Good diaphragmatic excursion.  Musculoskeletal: extremities normal- no gross deformities noted; using a rolling walker   Skin: scattered red/pink papular rash on arms and chest  Psychiatric: mentation appears normal and affect normal/bright    LABS  No results found for any visits on 06/26/24.    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   Per Flora, she tried \"everything\" regarding the sensor.   She was told by her derm provider that she had an allergic reaction to the medtronic sensor's glue. (No issues with the Dexcom)   Even though she's still having the rash on her arms and chest  Her cardiologist feels it might be related to her heart medication (which she can't stop)    Flora doesn't want to try using the medtronic sensor again    Decision to calculate a weight base insulin pump calculation:  Weight: 152 lb  Basal: 0.75  CR: 13  ISF: 50    Changed a few things on the insulin pump:  Basal rate, CR, ISF    Follow up  As scheduled    Call sooner if any issues develop    Patient Instructions   Continue working on healthy eating and moving as best as you can (start low and slow, work up to 30 min, 5x/week)    BG goals:  Fasting and before meals <130, >70  2 hour after eating " <180    We only need 1/2 of these numbers to be within target then your A1c will be within target    Medication changes   Keep working on the timing of your carb bolus--ideally before, every time you consume carbs     Follow up  As discussed     Call me sooner if any problems/concerns and/or questions develop including consistent low BGs <70 or consistent high BGs >200  [phone] (Marie, Unit Coordinator)    [phone] (Emeli, Nurse)    Time: 25 minutes  Barrier: none  Willingness to learn: accepting    Kerri NELSON [facility], [place]  Diabetes and Wound Care    Cc: Nicolasa Guillen NP    With the electronic record, we can now more quickly and easily track our patient diabetic goals. Our diabetes clinical review is in progress and these are the indicators we are monitoring for good diabetes health.     1.) HbA1C less than 7 (measurement of your average blood sugars)  2.) Blood Pressure less than 140/80  3.) LDL less than 100 (bad cholesterol)  4.) HbA1C is checked in the last 6 months and below 7% (more frequently if not at goal or adjusting medications)  5.) LDL is checked in the last 12 months (more frequently if not at goal or adjusting medications)  6.) Taking one baby aspirin daily (unless otherwise instructed)  7.) No tobacco use  8) Statin use     You have achieved 7 out of 8 of these and I am encouraging you to come in and get tuned up to achieve 8 out of 8!  Here is what you have achieved so far in my goals for you:  1.) HbA1C  less than 7:                              NO    Your last  HbA1C  Lab Results   Component Value Date    A1C 8.2 05/28/2024    A1C 7.1 12/15/2023    A1C 7.2 08/31/2023    A1C 7.5 03/21/2023    A1C 7.2 12/20/2022    A1C 6.9 01/29/2021    A1C 7.2 07/01/2020    A1C 7.9 12/16/2019    A1C 7.5 09/11/2019    A1C 7.3 06/07/2019      2.) Blood Pressure less than 140/80:       YES     Your last    BP Readings from Last 1 Encounters:   06/26/24 137/76     3.) LDL less than 100:                               YES      Your last     LDL Cholesterol Calculated   Date Value Ref Range Status   02/28/2024 55 <=100 mg/dL Final   05/05/2021 49 <100 mg/dL Final     Comment:     Desirable:       <100 mg/dl     4.) Checked HbA1C in the past 6 months: YES      5.) Checked LDL in the past 12 months:    YES    6.) Taking one aspirin daily:                       YES     7.) No tobacco use:                                        YES      8.) Statin use      YES       Insurance requirements for personal CGM:   Patient has been seen in the clinic within the last 6 month  Diagnosis of KAREEM, managed as a Type 1 for >6 months  Has been testing their BGs  Uses an insulin pump for >6 months  Requires frequent adjustments to their treatment regimen based on BGM and/or CGM testing results.

## 2024-07-09 ENCOUNTER — TELEPHONE (OUTPATIENT)
Dept: FAMILY MEDICINE | Facility: OTHER | Age: 79
End: 2024-07-09

## 2024-07-09 NOTE — TELEPHONE ENCOUNTER
Symptom or reason needing to speak to RN: left side pain after drinking      Best number to return call: [phone]     Best time to return call: anytime - okay to leave VM

## 2024-07-09 NOTE — TELEPHONE ENCOUNTER
Patient called stating that every time she takes 2 sips of liquid,water, pop, coffee, ect, she gets a sharp pain below her left rib cage.  She is wondering if this is something that could be related to her heart problems.    Pain only happens when she is drinking and right after she swallows.  Patient states that the pain doesn't last long after swallowing.  Patient has to drink a lot of water because of her diabetes so it happens a lot.    Do you want to see her?

## 2024-08-14 ENCOUNTER — ALLIED HEALTH/NURSE VISIT (OUTPATIENT)
Dept: EDUCATION SERVICES | Facility: OTHER | Age: 79
End: 2024-08-14
Attending: NURSE PRACTITIONER
Payer: COMMERCIAL

## 2024-08-14 VITALS
SYSTOLIC BLOOD PRESSURE: 102 MMHG | OXYGEN SATURATION: 96 % | RESPIRATION RATE: 16 BRPM | DIASTOLIC BLOOD PRESSURE: 66 MMHG | HEART RATE: 77 BPM

## 2024-08-14 DIAGNOSIS — E13.9 LADA (LATENT AUTOIMMUNE DIABETES IN ADULTS), MANAGED AS TYPE 1 (H): Primary | ICD-10-CM

## 2024-08-14 PROCEDURE — 95251 CONT GLUC MNTR ANALYSIS I&R: CPT | Performed by: NURSE PRACTITIONER

## 2024-08-14 PROCEDURE — G0463 HOSPITAL OUTPT CLINIC VISIT: HCPCS

## 2024-08-14 PROCEDURE — 99214 OFFICE O/P EST MOD 30 MIN: CPT | Mod: 25 | Performed by: NURSE PRACTITIONER

## 2024-08-14 ASSESSMENT — PAIN SCALES - GENERAL: PAINLEVEL: MODERATE PAIN (5)

## 2024-08-15 NOTE — PROGRESS NOTES
"SUBJECTIVE:  Flora Acuna, 79 year old, female presents with the following Chief Complaint(s) with HPI to follow:  Chief Complaint   Patient presents with    Diabetes        Diabetes Follow-up    Patient is checking blood sugars: >4x/day using personal CGM (Dexcom G6) Results:    Date: 7/18 to 7/31/24  Glucose management indicator: 10.2%    Average glucose: 287    Glucose range  Very low (<54): 0  low (<70): 0  In target range (): 5%  High (>180): 26%  Very high (>250): 69%    Date: 8/1 to 8/14/24  Glucose management indicator: 10.4%    Average glucose: 296    Glucose range  Very low (<54): 0  low (<70): 0  In target range (): 2%  High (>180): 25%  Very high (>250): 73%    Symptoms of hypoglycemia (low blood sugar): no; hx of \"crashing\"  Paresthesias (numbness or burning in feet) or sores: Yes; no sores  Diabetic eye exam within the last year: UTD  Breakfast eaten regularly: sometimes  Patient counting carbs: Yes       HPI:  Flora's here today for the follow up regarding her KAREEM, managed as a Type 1    A1c trend:  Lab Results   Component Value Date    A1C 8.2 05/28/2024    A1C 7.1 12/15/2023    A1C 7.2 08/31/2023    A1C 7.5 03/21/2023    A1C 7.2 12/20/2022    A1C 6.9 01/29/2021    A1C 7.2 07/01/2020    A1C 7.9 12/16/2019    A1C 7.5 09/11/2019    A1C 7.3 06/07/2019     Current Diabetes medication:   1.  Insulin pump (Minimed 780G), with Humalog  ASA use: yes  Statin use: yes    Bo reports the following:  No issues with the insulin pump  No issues with the Dexcom G6    BGs have been higher  States she's been tired  Has an appointment with Nicolasa PEREZ NP at the end of the month  Has an Echo in the beginning of September  Seeing Dr. Corea shortly after the Echo    Continue to have the skin rash  Believes it might be from one of her cardiac medication but \"she needs it\"  Has seen dermatology, who told her it was a CGM rash  Stopped that CGM back in May/June    Weight trend:  Wt Readings from Last 4 " Encounters:   06/03/24 68.1 kg (150 lb 3.2 oz)   05/28/24 68.9 kg (152 lb)   05/01/24 68 kg (150 lb)   04/22/24 68 kg (150 lb)       Patient Active Problem List   Diagnosis    CARDIOVASCULAR SCREENING; LDL GOAL LESS THAN 160    Personal history of malignant neoplasm of breast    ACP (advance care planning)    Hypothyroidism    Essential hypertension    Malignant neoplasm of left breast in female, estrogen receptor positive (H)    S/P reverse total shoulder arthroplasty, right    Lumbar disc disease with radiculopathy    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), managed as type 1 (H)    H/O total knee replacement, left    Age-related osteoporosis without current pathological fracture    Osteoporosis    Family history of melanoma    Family history of breast cancer in female    Dysphonia    Prolonged QT interval    FRANCO (dyspnea on exertion)    Fatty liver on 11/7/2007    Diastolic dysfunction with chronic heart failure (H)    Mixed hyperlipidemia    Drug-induced hypokalemia    Vitamin D deficiency    Contrast media allergy    COPD, mild (H)    Pulmonary emphysema, unspecified emphysema type (H)    Asthma, unspecified asthma severity, unspecified whether complicated, unspecified whether persistent    Abnormal EMG    Cardiomegaly    Incisional hernia    Paroxysmal atrial fibrillation (H)    Chronic systolic heart failure (H)    Atrial fibrillation with rapid ventricular response (H)    Elevated brain natriuretic peptide (BNP) level       Past Medical History:   Diagnosis Date    Congestive heart failure, unspecified     Diabetes (H)     H/O rheumatoid arthritis     HLD (hyperlipidemia)     HTN (hypertension)     Myocardial infarction (H)     Uncomplicated asthma        Past Surgical History:   Procedure Laterality Date    APPENDECTOMY OPEN CHILD      AS TOTAL KNEE ARTHROPLASTY Right     CHOLECYSTECTOMY      COLONOSCOPY - HIM SCAN N/A 03/12/2009    per Care Everywhere documentation per [facility].     HYSTERECTOMY  TOTAL ABDOMINAL      MASTECTOMY, BILATERAL Bilateral 02/26/1992    SHOULDER SURGERY Right     SHOULDER SURGERY Left        Family History   Problem Relation Age of Onset    Breast Cancer Maternal Aunt     Breast Cancer Maternal Aunt     Lung Cancer Father        Social History     Tobacco Use    Smoking status: Never     Passive exposure: Never    Smokeless tobacco: Never   Substance Use Topics    Alcohol use: No     Alcohol/week: 0.0 standard drinks of alcohol       Current Outpatient Medications   Medication Sig Dispense Refill    Acetone, Urine, Test (KETONE TEST) STRP Use as directed 50 strip 4    apixaban ANTICOAGULANT (ELIQUIS ANTICOAGULANT) 5 MG tablet Take 1 tablet (5 mg) by mouth 2 times daily Take 1 tablet by mouth twice daily 180 tablet 3    Calcium-Vitamin D-Vitamin K (VIACTIV PO) Take 2 chew tab by mouth every morning      Continuous Blood Gluc  (DEXCOM G6 ) DIMITRIS 1 each continuous 1 each 0    Continuous Blood Gluc Transmit (DEXCOM G6 TRANSMITTER) MISC CHANGE EVERY 90 DAYS 1 each 1    Continuous Glucose Sensor (DEXCOM G6 SENSOR) MISC Change every 10 days. 9 each 1    furosemide (LASIX) 40 MG tablet TAKE 1 TABLET BY MOUTH ONCE DAILY IN THE MORNING AND 1/2 (ONE-HALF) TABLET IN AFTERNOON 135 tablet 3    insulin lispro (HUMALOG VIAL) 100 UNIT/ML vial To be used with the insulin pump TDD: 80 units 30 mL 3    INSULIN PUMP - OUTPATIENT Insulin pump settings:  Updated: 6/25/24  Insulin pump: Minimed 780G  Basal:  0000 0.75 (new)  Total: 18  CR: 13 (new)  ISF: 50 (new)   Target: 100-130  Active insulin:  changed to 4 hours 30 minutes      levothyroxine (SYNTHROID/LEVOTHROID) 75 MCG tablet Take 1 tablet by mouth once daily 90 tablet 2    metoprolol succinate ER (TOPROL XL) 50 MG 24 hr tablet Take 2 tablets (100 mg) by mouth daily 90 tablet 3    omeprazole (PRILOSEC) 40 MG DR capsule Take 1 capsule by mouth once daily 90 capsule 3    potassium chloride eduardo ER (KLOR-CON M20) 20 MEQ CR tablet Take 1  "tablet by mouth twice daily 180 tablet 3    ramipril (ALTACE) 10 MG capsule Take 1 capsule (10 mg) by mouth 2 times daily 180 capsule 2    senna-docusate (SENOKOT-S;PERICOLACE) 8.6-50 MG per tablet Take 1 tablet by mouth At Bedtime       simvastatin (ZOCOR) 40 MG tablet Take 1 tablet (40 mg) by mouth at bedtime 90 tablet 3    tamoxifen (NOLVADEX) 20 MG tablet Take 20 mg by mouth every morning      tiZANidine (ZANAFLEX) 4 MG tablet Take a 1/2 tablet by mouth in the morning, 1/2 tablet mid day and 1 full tablet at bedtime, can take an extra 1/2 tablet as needed during the night for breakthrough symptoms up to 4 times per week.         Allergies   Allergen Reactions    Contrast Dye Difficulty breathing    Gadolinium Derivatives      Other reaction(s): Difficulty in swallowing, Laryngeal spasm  Patient had an injection into rt. Shoulder capsule prior to MRI arthrogram.  Contained multihance gadobenate, omnipaque iohexol, and buffered lidocaine.      Ammonia     Cory-1 [Lidocaine Hcl]      Novocaine allergy      Codeine Sulfate     Food      Shrimp    Fosamax [Alendronate]      Dental infections    Iodine-131 Swelling     Ct dye    Lidocaine     Nitrous Oxide     No Clinical Screening - See Comments Nausea and Vomiting and Other (See Comments)     (3/6/09)- N/V and Heart racing    Novocain [Procaine]     Penicillins     Symbicort [Budesonide-Formoterol Fumarate]     Tramadol     Morphine Palpitations     REVIEW OF SYSTEMS  Skin: hx of skin reaction  Eyes: negative  Ears/Nose/Throat: hx of \"burnt\" voice and muscles from GERD  Respiratory: No SOB or FRANCO; no cough; no hemoptysis; history of asthma  Cardiovascular: negative; history of HF  Gastrointestinal: negative  Genitourinary: negative   Musculoskeletal: hx of back pain; hx of RA, neck pain, arthritis and right shoulder   Neurologic: positive for numbness or tingling of feet--same  Psychiatric: negative  Hematologic/Lymphatic/Immunologic: history of breast cancer " (left) x 2 (same spot)  Endocrine: positive for diabetes and thyroid disease     OBJECTIVE:  /66   Pulse 77   Resp 16   SpO2 96%   Constitutional: alert and no distress  Respiratory:  Good diaphragmatic excursion.  Musculoskeletal: extremities normal- no gross deformities noted; using a rolling walker   Skin: scattered red/pink papular rash on arms and chest  Psychiatric: mentation appears normal and affect normal/bright    LABS  Results for orders placed or performed in visit on 24   GLUCOSE MONITOR, 72 HOUR, PHYS INTERP     Status: None    Narrative    Date:  to 24  Glucose management indicator: 10.2%    Average glucose: 287    Glucose range  Very low (<54): 0  low (<70): 0  In target range (): 5%  High (>180): 26%  Very high (>250): 69%    Date:  to 24  Glucose management indicator: 10.4%    Average glucose: 296    Glucose range  Very low (<54): 0  low (<70): 0  In target range (): 2%  High (>180): 25%  Very high (>250): 73%       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     Noted a lot of missed opportunities with carb bolus   Education focused on this--some is better than none     We did talk about restarting the Medtronic 780G sensor   She's still having the rash  Providers feel its her heart medication     She agreed as she had better BGs when she wore it.    Permission given to reach out to Jose  I gave her a sample of the underlying patches.   Jose also has some recommendations too    Follow up  As discussed    Please call sooner if any changes develop    Patient Instructions   Continue working on healthy eating and moving as best as you can (start low and slow, work up to 30 min, 5x/week)    BG goals:  Fasting and before meals  <130, >70  2 hour after eating <180    We only need 1/2 of these numbers to be within target then your A1c will be within target    Medication changes   Keep working on the timing of your carb bolus--ideally before and every time you consume carbs     Will reach out to Jose regarding training for the Medtronic CGM  Try the products he recommends  Let me know if the underpatch works    Follow up  As discussed     Call me sooner if any problems/concerns and/or questions develop including consistent low BGs <70 or consistent high BGs >200  [phone] (Unit Coordinator)    [phone] (Emeli, Nurse)    Time: 30 minutes  Barrier: none  Willingness to learn: accepting    Kerri NELSON [facility]-BC  Diabetes and Wound Care    Cc: Nicolasa Guillen NP    30 minutes was spent with patient.    All of this time was spent on counseling patient regarding illness, medication and/or treatment options, coordinating further cares and follow ups that are needed along with resource material that will be helpful in the treatment of these issues.       With the electronic record, we can now more quickly and easily track our patient diabetic goals. Our diabetes clinical review is in progress and these are the indicators we are monitoring for good diabetes health.     1.) HbA1C less than 7 (measurement of your average blood sugars)  2.) Blood Pressure less than 140/80  3.) LDL less than 100 (bad cholesterol)  4.) HbA1C is checked in the last 6 months and below 7% (more frequently if not at goal or adjusting medications)  5.) LDL is checked in the last 12 months (more frequently if not at goal or adjusting medications)  6.) Taking one baby aspirin daily (unless otherwise instructed)  7.) No tobacco use  8) Statin use     You have achieved 7 out of 8 of these and I am encouraging you to come in and get tuned up to achieve 8 out of 8!  Here is what you have achieved so far in my goals for you:  1.) HbA1C  less than 7:                               NO    Your last  HbA1C  Lab Results   Component Value Date    A1C 8.2 05/28/2024    A1C 7.1 12/15/2023    A1C 7.2 08/31/2023    A1C 7.5 03/21/2023    A1C 7.2 12/20/2022    A1C 6.9 01/29/2021    A1C 7.2 07/01/2020    A1C 7.9 12/16/2019    A1C 7.5 09/11/2019    A1C 7.3 06/07/2019      2.) Blood Pressure less than 140/80:       YES     Your last    BP Readings from Last 1 Encounters:   08/14/24 102/66     3.) LDL less than 100:                              YES      Your last     LDL Cholesterol Calculated   Date Value Ref Range Status   02/28/2024 55 <=100 mg/dL Final   05/05/2021 49 <100 mg/dL Final     Comment:     Desirable:       <100 mg/dl     4.) Checked HbA1C in the past 6 months: YES      5.) Checked LDL in the past 12 months:    YES    6.) Taking one aspirin daily:                       YES     7.) No tobacco use:                                        YES      8.) Statin use      YES       Insurance requirements for personal CGM:   Patient has been seen in the clinic within the last 6 month  Diagnosis of KAREEM, managed as a Type 1 for >6 months  Has been testing their BGs  Uses an insulin pump for >6 months  Requires frequent adjustments to their treatment regimen based on BGM and/or CGM testing results.        Addendum:  Flora is asking about Ozempic  Will talk to Nicolasa PEREZ NP about it

## 2024-08-15 NOTE — PATIENT INSTRUCTIONS
Continue working on healthy eating and moving as best as you can (start low and slow, work up to 30 min, 5x/week)    BG goals:  Fasting and before meals <130, >70  2 hour after eating <180    We only need 1/2 of these numbers to be within target then your A1c will be within target    Medication changes   Keep working on the timing of your carb bolus--ideally before and every time you consume carbs     Will reach out to Jose regarding training for the Xanitostronic CGM  Try the products he recommends  Let me know if the underpatch works    Follow up  As discussed     Call me sooner if any problems/concerns and/or questions develop including consistent low BGs <70 or consistent high BGs >200  [phone] (Unit Coordinator)    [phone] (Emeli, Nurse)

## 2024-08-18 DIAGNOSIS — I10 ESSENTIAL HYPERTENSION: ICD-10-CM

## 2024-08-18 DIAGNOSIS — E87.6 HYPOKALEMIA: ICD-10-CM

## 2024-08-18 DIAGNOSIS — I50.32 DIASTOLIC DYSFUNCTION WITH CHRONIC HEART FAILURE (H): ICD-10-CM

## 2024-08-19 ENCOUNTER — TELEPHONE (OUTPATIENT)
Dept: CARDIOLOGY | Facility: OTHER | Age: 79
End: 2024-08-19
Payer: COMMERCIAL

## 2024-08-19 RX ORDER — RAMIPRIL 10 MG/1
10 CAPSULE ORAL 2 TIMES DAILY
Qty: 180 CAPSULE | Refills: 1 | Status: SHIPPED | OUTPATIENT
Start: 2024-08-19

## 2024-08-19 NOTE — TELEPHONE ENCOUNTER
Patient called to see if she could be seen and have echo sooner due of SOB with any activities, Fatigue, and off and on chest pain. Advised patient to be seen in the ER with any chest pain. Did reschedule appointment with Dr Corea for a sooner appointment and transferred to [facility]. Did again advise patient to go to emergency room with any chest pain or if other symptoms get to severe.     Azra Stroud LPN

## 2024-08-19 NOTE — TELEPHONE ENCOUNTER
Ramipril (Altace) 10 MG capsule     Last Written Prescription Date:  11/21/2023  Last Fill Quantity: 180,   # refills: 0  Last Office Visit: 05/28/2024

## 2024-08-21 ENCOUNTER — HOSPITAL ENCOUNTER (OUTPATIENT)
Dept: CARDIOLOGY | Facility: HOSPITAL | Age: 79
Discharge: HOME | End: 2024-08-21
Attending: INTERNAL MEDICINE | Admitting: INTERNAL MEDICINE
Payer: MEDICARE

## 2024-08-21 DIAGNOSIS — E13.9 LADA (LATENT AUTOIMMUNE DIABETES IN ADULTS), MANAGED AS TYPE 1 (H): Primary | ICD-10-CM

## 2024-08-21 DIAGNOSIS — I50.32 DIASTOLIC DYSFUNCTION WITH CHRONIC HEART FAILURE (H): ICD-10-CM

## 2024-08-21 DIAGNOSIS — I10 ESSENTIAL HYPERTENSION: ICD-10-CM

## 2024-08-21 DIAGNOSIS — R06.09 DOE (DYSPNEA ON EXERTION): ICD-10-CM

## 2024-08-21 DIAGNOSIS — J43.9 PULMONARY EMPHYSEMA, UNSPECIFIED EMPHYSEMA TYPE (H): ICD-10-CM

## 2024-08-21 DIAGNOSIS — I51.7 CARDIOMEGALY: ICD-10-CM

## 2024-08-21 LAB — LVEF ECHO: NORMAL

## 2024-08-21 PROCEDURE — 93306 TTE W/DOPPLER COMPLETE: CPT

## 2024-08-21 PROCEDURE — 93306 TTE W/DOPPLER COMPLETE: CPT | Mod: 26 | Performed by: INTERNAL MEDICINE

## 2024-08-21 NOTE — PROGRESS NOTES
Decision to trial Ozempic.     Explained how it works  Side effects to report  Encouraged to keep hydrated      Start Ozempic 0.25 mg weekly      Kerri Elliott APRN FNP-BC  Diabetes and Wound Care

## 2024-08-22 ENCOUNTER — TELEPHONE (OUTPATIENT)
Dept: PHARMACY | Facility: PHYSICIAN GROUP | Age: 79
End: 2024-08-22
Payer: COMMERCIAL

## 2024-08-22 NOTE — PROGRESS NOTES
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 Tolerating. Will continue follow-up with the diabetic center. On statin. Blood pressure at goal. Eye exam UTd. Will see her back in 3 months for her diabetes.     (I10) Essential hypertension  Plan: Well controlled. Continue current medications. Encouraged daily exercise and a low sodium diet. Recommended checking BP's 2x/wk, call the clinic if consistantly s>140 or d>90. Follow up in 6 months.     (E78.2) Mixed hyperlipidemia  Comment: tolerating statin  Plan: Lipids controlled in 2/2024. AST and ALT normal in 4/2024.     (J43.9) Pulmonary emphysema, unspecified emphysema type (H)  (R06.09) FRANCO (dyspnea on exertion)  (I50.32) Diastolic dysfunction with chronic heart failure (H)  Asthma  Plan: Patient continues to complain of FRANCO. Does not feel the cardiac stent placement helped. CXR clear. BNP normal. Cardiology feels it is possibly related to vocal cord dysfunction or diastolic heart failure. She was referred to ENT and her lasix dose was adjusted. She also has asthma/COPD and does have some wheezes with auscultation. Will update PFTs. Will notify patient of the results when available and intervene accordingly. She does note that she stopped her Spiriva as she could not afford it. Unsure if it helped. Will avoid starting additional inhalers until we get the PFTs back.     Will see her back after she has her PFTs completed. Sooner with new or worsening symptoms.     (I48.0) Paroxysmal atrial fibrillation (H)  Comment: stable, HR 73  Plan: EKG today showing sinus rhythm. On Eliquis and metoprolol. HR 73. C/W current medications and follow up with Dr. Corea.     The longitudinal plan of care for the diagnosis(es)/condition(s) as documented were addressed during  this visit. Due to the added complexity in care, I will continue to support Flora in the subsequent management and with ongoing continuity of care.    No follow-ups on file.    Subjective   Flora is a 79 year old, presenting for the following health issues:  Diabetes, Lipids, Hypertension, afib, and COPD    HPI     Diabetes Follow-up    How often are you checking your blood sugar? Continuous glucose monitor  What time of day are you checking your blood sugars (select all that apply)?  Before and after meals and At bedtime  Have you had any blood sugars above 200?  Yes -often  Have you had any blood sugars below 70?  No  What symptoms do you notice when your blood sugar is low?  Shaky and Thirsty, double vision, difficulty breathing, sweating  What concerns do you have today about your diabetes? None   Do you have any of these symptoms? (Select all that apply)  Excessive thirst, Blurry vision, and Weight loss  A1c is 9 today. Follows with Kerri Elliott.   She does have the insulin pump. Ozempic was also recently started. Today she notes that she is tolerating the Ozempic. No side effects. Weight trending down.   Denies chest pain, dizziness, syncope, or palpitations. Chronic FRANCO-Dr. Corea aware. BNP normal. CXR normal. Was referred to ENT for possible vocal cord dysfunction. Lasix dose also adjusted. Some increased lower leg swelling. Has known diastolic dysfunction.   She did start a weight loss supplement 4 weeks ago, but will stop after we had a long discussion regarding this.     Hyperlipidemia Follow-Up    Are you regularly taking any medication or supplement to lower your cholesterol?   Yes- Simvastatin  Are you having muscle aches or other side effects that you think could be caused by your cholesterol lowering medication?  No    Hypertension Follow-up    Do you check your blood pressure regularly outside of the clinic? Yes   Are you following a low salt diet? No watches salt intake  Are your blood  "pressures ever more than 140 on the top number (systolic) OR more   than 90 on the bottom number (diastolic), for example 140/90? No  Taking ramipril 10 mg BID with metoprolol succinate 100 mg without side effects. Also on lasix 60 mg.     BP Readings from Last 2 Encounters:   08/28/24 127/81   08/14/24 102/66     Hemoglobin A1C (%)   Date Value   08/28/2024 9.0 (H)   05/28/2024 8.2 (H)   01/29/2021 6.9 (H)   07/01/2020 7.2 (H)     LDL Cholesterol Calculated (mg/dL)   Date Value   02/28/2024 55   02/13/2023 56   05/05/2021 49   07/01/2020 36         Atrial Fibrillation Follow-up    Symptoms: palpitations, dizziness/lightheadedness, dyspnea, and increased edema LE' s  Stroke prevention: DOAC (Eliquis, Xarelto, Pradaxa)        6/3/2024    12:28 PM 6/26/2024     2:18 PM 8/14/2024     1:48 PM 8/28/2024    12:28 PM 8/28/2024     1:41 PM   Date   Pulse 81 84 77 73 73     Current [id number] Score: 10 points - A score of 5 or greater represents a 7.2 - 12.2% annual risk of major embolic event, without anti-coagulation or an LAAO device. s      COPD Follow-Up  Overall, how are your COPD symptoms since your last clinic visit?  Stable; still has chronic FRANCO, but this has not changed  How much fatigue or shortness of breath do you have when you are walking?  More than usual  How much shortness of breath do you have when you are resting?  None  How often do you cough? Sometimes  Have you noticed any change in your sputum/phlegm?  No  Have you experienced a recent fever? No  Please describe how far you can walk without stopping to rest:  Less than 10 feet  How many flights of stairs are you able to walk up without stopping?  None  Have you had any Emergency Room Visits, Urgent Care Visits, or Hospital Admissions because of your COPD since your last office visit?  No  Uses albuterol as needed. Continues to have FRANCO. No longer using her Sprivia due to cost. Admits that she \"will be in the donut hole\" if she uses this.     History " "  Smoking Status    Never   Smokeless Tobacco    Never     No results found for: \"FEV1\", \"WCQ1YHY\"        Review of Systems  Constitutional, HEENT, cardiovascular, pulmonary, gi and gu systems are negative, except as otherwise noted.      Objective    /81   Pulse 73   Temp 97.9  F (36.6  C) (Tympanic)   Resp 16   Ht 1.6 m (5' 3\")   Wt 65.3 kg (144 lb)   SpO2 98%   BMI 25.51 kg/m    Body mass index is 25.51 kg/m .  Physical Exam   GENERAL: alert and no distress  EYES: Eyes grossly normal to inspection, PERRL and conjunctivae and sclerae normal  HENT: ear canals and TM's normal, nose and mouth without ulcers or lesions  NECK: no adenopathy, no asymmetry, masses, or scars  RESP: inspiratory wheezes noted  CV: regular rate and rhythm, no murmur, click or rub, trace bilateral peripheral edema  ABDOMEN: soft, nontender, no hepatosplenomegaly, no masses and bowel sounds normal  MS: no gross musculoskeletal defects noted, no edema  NEURO: Normal strength and tone, mentation intact and speech normal  PSYCH: mentation appears normal, affect normal/bright  Diabetic foot exam: normal DP and PT pulses, no trophic changes or ulcerative lesions, and normal sensory exam    Results for orders placed or performed in visit on 08/28/24   Magnesium     Status: Normal   Result Value Ref Range    Magnesium 2.0 1.7 - 2.3 mg/dL   N terminal pro BNP outpatient     Status: Normal   Result Value Ref Range    N Terminal Pro BNP Outpatient 324 0 - 1,800 pg/mL   TSH with free T4 reflex     Status: Normal   Result Value Ref Range    TSH 2.27 0.30 - 4.20 uIU/mL   Basic metabolic panel     Status: Abnormal   Result Value Ref Range    Sodium 133 (L) 135 - 145 mmol/L    Potassium 4.2 3.4 - 5.3 mmol/L    Chloride 93 (L) 98 - 107 mmol/L    Carbon Dioxide (CO2) 27 22 - 29 mmol/L    Anion Gap 13 7 - 15 mmol/L    Urea Nitrogen 14.6 8.0 - 23.0 mg/dL    Creatinine 0.95 0.51 - 0.95 mg/dL    GFR Estimate 61 >60 mL/min/1.73m2    Calcium 10.2 8.8 - " 10.4 mg/dL    Glucose 206 (H) 70 - 99 mg/dL   Hemoglobin A1c     Status: Abnormal   Result Value Ref Range    Estimated Average Glucose 212 mg/dL    Hemoglobin A1C 9.0 (H) <5.7 %   CBC with platelets     Status: Normal   Result Value Ref Range    WBC Count 8.7 4.0 - 11.0 10e3/uL    RBC Count 4.12 3.80 - 5.20 10e6/uL    Hemoglobin 12.9 11.7 - 15.7 g/dL    Hematocrit 37.7 35.0 - 47.0 %    MCV 92 78 - 100 fL    MCH 31.3 26.5 - 33.0 pg    MCHC 34.2 31.5 - 36.5 g/dL    RDW 12.0 10.0 - 15.0 %    Platelet Count 172 150 - 450 10e3/uL   EKG 12-lead complete w/read - (Clinic Performed)     Status: None (Preliminary result)   Result Value Ref Range    Systolic Blood Pressure  mmHg    Diastolic Blood Pressure  mmHg    Ventricular Rate 72 BPM    Atrial Rate 72 BPM    KS Interval 192 ms    QRS Duration 102 ms     ms    QTc 440 ms    P Axis 81 degrees    R AXIS -22 degrees    T Axis 121 degrees    Interpretation ECG       Sinus rhythm with occasional Premature ventricular complexes  ST & T wave abnormality, consider anterolateral ischemia  Abnormal ECG  When compared with ECG of 22-Apr-2024 17:08,  Vent. rate has decreased by  48 bpm  ST now depressed in Anterior leads  T wave inversion now evident in Anterior leads     Results for orders placed or performed in visit on 08/28/24   X-ray Chest 2 vws*     Status: None    Narrative    PROCEDURE: XR CHEST 2 VIEWS 8/28/2024 1:32 PM    HISTORY: FRANCO (dyspnea on exertion)    COMPARISONS: 4/30/2024.    TECHNIQUE: 2 views.    FINDINGS: Heart is not enlarged. Lungs are relatively clear and no  pleural effusion is seen.    Surgical clips project over the left axilla in the left lower chest.  There is an S-shaped scoliosis with degenerative change in the spine.  There is a reverse right shoulder arthroplasty.         Impression    IMPRESSION: No acute disease.    AINSLEY BUSTOS MD         SYSTEM ID:  [id number]           Signed Electronically by: Nicolasa Guillen NP

## 2024-08-22 NOTE — TELEPHONE ENCOUNTER
MTM Recruitment: [employer]     Referral outreach attempt #2 on August 22, 2024      Outcome: left voicemail- Call back number [phone]    Kamron Elliott PharmD  Medication Therapy Management Pharmacist  [facility]  Office phone: [phone]

## 2024-08-27 PROBLEM — J44.9 COPD, MILD (H): Status: RESOLVED | Noted: 2021-08-18 | Resolved: 2024-08-27

## 2024-08-27 PROBLEM — R06.09 DOE (DYSPNEA ON EXERTION): Status: RESOLVED | Noted: 2021-05-05 | Resolved: 2024-08-27

## 2024-08-28 ENCOUNTER — OFFICE VISIT (OUTPATIENT)
Dept: CARDIOLOGY | Facility: OTHER | Age: 79
End: 2024-08-28
Attending: INTERNAL MEDICINE
Payer: MEDICARE

## 2024-08-28 ENCOUNTER — OFFICE VISIT (OUTPATIENT)
Dept: FAMILY MEDICINE | Facility: OTHER | Age: 79
End: 2024-08-28
Attending: NURSE PRACTITIONER
Payer: MEDICARE

## 2024-08-28 ENCOUNTER — ANCILLARY PROCEDURE (OUTPATIENT)
Dept: GENERAL RADIOLOGY | Facility: OTHER | Age: 79
End: 2024-08-28
Attending: INTERNAL MEDICINE
Payer: MEDICARE

## 2024-08-28 VITALS
HEART RATE: 73 BPM | RESPIRATION RATE: 16 BRPM | DIASTOLIC BLOOD PRESSURE: 81 MMHG | BODY MASS INDEX: 25.52 KG/M2 | HEIGHT: 63 IN | OXYGEN SATURATION: 98 % | WEIGHT: 144 LBS | SYSTOLIC BLOOD PRESSURE: 127 MMHG

## 2024-08-28 VITALS
RESPIRATION RATE: 16 BRPM | WEIGHT: 144 LBS | DIASTOLIC BLOOD PRESSURE: 81 MMHG | BODY MASS INDEX: 25.52 KG/M2 | TEMPERATURE: 97.9 F | HEART RATE: 73 BPM | HEIGHT: 63 IN | OXYGEN SATURATION: 98 % | SYSTOLIC BLOOD PRESSURE: 127 MMHG

## 2024-08-28 DIAGNOSIS — J43.9 PULMONARY EMPHYSEMA, UNSPECIFIED EMPHYSEMA TYPE (H): ICD-10-CM

## 2024-08-28 DIAGNOSIS — E53.8 VITAMIN B12 DEFICIENCY (NON ANEMIC): ICD-10-CM

## 2024-08-28 DIAGNOSIS — E13.9 LADA (LATENT AUTOIMMUNE DIABETES IN ADULTS), MANAGED AS TYPE 1 (H): ICD-10-CM

## 2024-08-28 DIAGNOSIS — I48.0 PAROXYSMAL ATRIAL FIBRILLATION (H): ICD-10-CM

## 2024-08-28 DIAGNOSIS — I10 ESSENTIAL HYPERTENSION: ICD-10-CM

## 2024-08-28 DIAGNOSIS — E55.9 VITAMIN D DEFICIENCY: ICD-10-CM

## 2024-08-28 DIAGNOSIS — K76.0 FATTY LIVER: ICD-10-CM

## 2024-08-28 DIAGNOSIS — E78.2 MIXED HYPERLIPIDEMIA: ICD-10-CM

## 2024-08-28 DIAGNOSIS — I50.32 DIASTOLIC DYSFUNCTION WITH CHRONIC HEART FAILURE (H): ICD-10-CM

## 2024-08-28 DIAGNOSIS — J45.909 ASTHMA, UNSPECIFIED ASTHMA SEVERITY, UNSPECIFIED WHETHER COMPLICATED, UNSPECIFIED WHETHER PERSISTENT: ICD-10-CM

## 2024-08-28 DIAGNOSIS — R73.9 HYPERGLYCEMIA: ICD-10-CM

## 2024-08-28 DIAGNOSIS — I48.91 ATRIAL FIBRILLATION WITH RAPID VENTRICULAR RESPONSE (H): ICD-10-CM

## 2024-08-28 DIAGNOSIS — I10 ESSENTIAL HYPERTENSION: Primary | ICD-10-CM

## 2024-08-28 DIAGNOSIS — R06.09 DOE (DYSPNEA ON EXERTION): ICD-10-CM

## 2024-08-28 DIAGNOSIS — I50.22 CHRONIC SYSTOLIC HEART FAILURE (H): ICD-10-CM

## 2024-08-28 DIAGNOSIS — E13.9 LADA (LATENT AUTOIMMUNE DIABETES IN ADULTS), MANAGED AS TYPE 1 (H): Primary | ICD-10-CM

## 2024-08-28 DIAGNOSIS — R79.89 ELEVATED BRAIN NATRIURETIC PEPTIDE (BNP) LEVEL: ICD-10-CM

## 2024-08-28 DIAGNOSIS — R49.9 VOICE DISTURBANCE: ICD-10-CM

## 2024-08-28 LAB
ALLEN'S TEST: YES
ANION GAP SERPL CALCULATED.3IONS-SCNC: 13 MMOL/L (ref 7–15)
ATRIAL RATE - MUSE: 72 BPM
BASE EXCESS BLDA CALC-SCNC: 5.4 MMOL/L (ref -3–3)
BUN SERPL-MCNC: 14.6 MG/DL (ref 8–23)
CALCIUM SERPL-MCNC: 10.2 MG/DL (ref 8.8–10.4)
CHLORIDE SERPL-SCNC: 93 MMOL/L (ref 98–107)
COHGB MFR BLD: 97.9 % (ref 95–96)
CREAT SERPL-MCNC: 0.95 MG/DL (ref 0.51–0.95)
CREAT UR-MCNC: 85.8 MG/DL
DIASTOLIC BLOOD PRESSURE - MUSE: NORMAL MMHG
EGFRCR SERPLBLD CKD-EPI 2021: 61 ML/MIN/1.73M2
ERYTHROCYTE [DISTWIDTH] IN BLOOD BY AUTOMATED COUNT: 12 % (ref 10–15)
EST. AVERAGE GLUCOSE BLD GHB EST-MCNC: 212 MG/DL
GLUCOSE SERPL-MCNC: 206 MG/DL (ref 70–99)
HBA1C MFR BLD: 9 %
HCO3 BLD-SCNC: 28 MMOL/L (ref 21–28)
HCO3 SERPL-SCNC: 27 MMOL/L (ref 22–29)
HCT VFR BLD AUTO: 37.7 % (ref 35–47)
HGB BLD-MCNC: 12.9 G/DL (ref 11.7–15.7)
INTERPRETATION ECG - MUSE: NORMAL
MAGNESIUM SERPL-MCNC: 2 MG/DL (ref 1.7–2.3)
MCH RBC QN AUTO: 31.3 PG (ref 26.5–33)
MCHC RBC AUTO-ENTMCNC: 34.2 G/DL (ref 31.5–36.5)
MCV RBC AUTO: 92 FL (ref 78–100)
MICROALBUMIN UR-MCNC: <12 MG/L
MICROALBUMIN/CREAT UR: NORMAL MG/G{CREAT}
NT-PROBNP SERPL-MCNC: 324 PG/ML (ref 0–1800)
O2/TOTAL GAS SETTING VFR VENT: 21 %
P AXIS - MUSE: 81 DEGREES
PCO2 BLD: 35 MM HG (ref 35–45)
PH BLD: 7.51 [PH] (ref 7.35–7.45)
PLATELET # BLD AUTO: 172 10E3/UL (ref 150–450)
PO2 BLD: 89 MM HG (ref 80–105)
POTASSIUM SERPL-SCNC: 4.2 MMOL/L (ref 3.4–5.3)
PR INTERVAL - MUSE: 192 MS
QRS DURATION - MUSE: 102 MS
QT - MUSE: 402 MS
QTC - MUSE: 440 MS
R AXIS - MUSE: -22 DEGREES
RBC # BLD AUTO: 4.12 10E6/UL (ref 3.8–5.2)
SAO2 % BLDA: 97 % (ref 92–100)
SODIUM SERPL-SCNC: 133 MMOL/L (ref 135–145)
SYSTOLIC BLOOD PRESSURE - MUSE: NORMAL MMHG
T AXIS - MUSE: 121 DEGREES
TSH SERPL DL<=0.005 MIU/L-ACNC: 2.27 UIU/ML (ref 0.3–4.2)
VENTRICULAR RATE- MUSE: 72 BPM
WBC # BLD AUTO: 8.7 10E3/UL (ref 4–11)

## 2024-08-28 PROCEDURE — 82746 ASSAY OF FOLIC ACID SERUM: CPT | Mod: ZL | Performed by: INTERNAL MEDICINE

## 2024-08-28 PROCEDURE — 99214 OFFICE O/P EST MOD 30 MIN: CPT | Performed by: INTERNAL MEDICINE

## 2024-08-28 PROCEDURE — G0463 HOSPITAL OUTPT CLINIC VISIT: HCPCS | Mod: 25,27

## 2024-08-28 PROCEDURE — 84443 ASSAY THYROID STIM HORMONE: CPT | Mod: ZL | Performed by: INTERNAL MEDICINE

## 2024-08-28 PROCEDURE — 80048 BASIC METABOLIC PNL TOTAL CA: CPT | Mod: ZL | Performed by: INTERNAL MEDICINE

## 2024-08-28 PROCEDURE — 85027 COMPLETE CBC AUTOMATED: CPT | Mod: ZL | Performed by: INTERNAL MEDICINE

## 2024-08-28 PROCEDURE — 83735 ASSAY OF MAGNESIUM: CPT | Mod: ZL | Performed by: INTERNAL MEDICINE

## 2024-08-28 PROCEDURE — 83880 ASSAY OF NATRIURETIC PEPTIDE: CPT | Mod: ZL | Performed by: INTERNAL MEDICINE

## 2024-08-28 PROCEDURE — 82607 VITAMIN B-12: CPT | Mod: ZL | Performed by: INTERNAL MEDICINE

## 2024-08-28 PROCEDURE — 71046 X-RAY EXAM CHEST 2 VIEWS: CPT | Mod: TC

## 2024-08-28 PROCEDURE — 36415 COLL VENOUS BLD VENIPUNCTURE: CPT | Mod: ZL | Performed by: INTERNAL MEDICINE

## 2024-08-28 PROCEDURE — 99214 OFFICE O/P EST MOD 30 MIN: CPT | Performed by: NURSE PRACTITIONER

## 2024-08-28 PROCEDURE — 83036 HEMOGLOBIN GLYCOSYLATED A1C: CPT | Mod: ZL | Performed by: INTERNAL MEDICINE

## 2024-08-28 PROCEDURE — 82043 UR ALBUMIN QUANTITATIVE: CPT | Mod: ZL | Performed by: NURSE PRACTITIONER

## 2024-08-28 PROCEDURE — 82805 BLOOD GASES W/O2 SATURATION: CPT | Mod: ZL | Performed by: NURSE PRACTITIONER

## 2024-08-28 PROCEDURE — 93010 ELECTROCARDIOGRAM REPORT: CPT | Performed by: INTERNAL MEDICINE

## 2024-08-28 PROCEDURE — G0463 HOSPITAL OUTPT CLINIC VISIT: HCPCS

## 2024-08-28 PROCEDURE — G2211 COMPLEX E/M VISIT ADD ON: HCPCS | Performed by: NURSE PRACTITIONER

## 2024-08-28 PROCEDURE — 93005 ELECTROCARDIOGRAM TRACING: CPT | Performed by: INTERNAL MEDICINE

## 2024-08-28 RX ORDER — FUROSEMIDE 40 MG
60 TABLET ORAL DAILY
Qty: 135 TABLET | Refills: 3 | Status: SHIPPED | OUTPATIENT
Start: 2024-08-28

## 2024-08-28 RX ORDER — PROCHLORPERAZINE 25 MG/1
SUPPOSITORY RECTAL
Qty: 1 EACH | Refills: 1 | Status: SHIPPED | OUTPATIENT
Start: 2024-08-28

## 2024-08-28 ASSESSMENT — PAIN SCALES - GENERAL
PAINLEVEL: NO PAIN (0)
PAINLEVEL: NO PAIN (0)

## 2024-08-28 NOTE — LETTER
August 29, 2024      Flora Apariciocurt  [address] [facility]   [place] [address]        Dear ,    We are writing to inform you of your test results.    Here is a copy of your ABG.  This is the blood that was drawn from your artery to check your oxygen levels.  Based on this blood test,  you are not low on oxygen.  You do not need to have supplemental oxygen based on your shortness of breath.    Resulted Orders   Blood gas arterial   Result Value Ref Range    pH Arterial 7.51 (H) 7.35 - 7.45    pCO2 Arterial 35 35 - 45 mm Hg    pO2 Arterial 89 80 - 105 mm Hg    FIO2 21     Bicarbonate Arterial 28 21 - 28 mmol/L    Base Excess/Deficit Arterial 5.4 (H) -3.0 - 3.0 mmol/L    Segundo's Test Yes     Oxyhemoglobin Arterial 97 92 - 100 %    O2 Sat, Arterial 97.9 (H) 95.0 - 96.0 %    Narrative    In healthy individuals, oxyhemoglobin (O2Hb) and oxygen saturation (SO2) are approximately equal. In the presence of dyshemoglobins, oxyhemoglobin can be considerably lower than oxygen saturation.       If you have any questions or concerns, please call the clinic at the number listed above.       Sincerely,      Jin Corea, DO

## 2024-08-28 NOTE — LETTER
September 3, 2024      Flora Acuna  [address] [facility]   [place] [address]        Dear ,    We are writing to inform you of your test results.    Here is a copy of your lab results.  First of all, your sodium was mildly reduced at 133.  This is only mildly reduced and should not be causing any issues.  This is something you may want to follow-up in the future by having your regular doctor recheck this lab.  Your A1c which is a 3-month average of your sugars was elevated.  Your hemoglobin A1c is consistent with diabetes at 9.0%.  A normal A1c would be 6.0 or less.  Your glucose/sugar was elevated at 206.  Anything over 200 is consider diabetic. This suggest your sugars are significantly uncontrolled.  Otherwise, not seeing any other significant issues on your labs.    Resulted Orders   Magnesium   Result Value Ref Range    Magnesium 2.0 1.7 - 2.3 mg/dL   N terminal pro BNP outpatient   Result Value Ref Range    N Terminal Pro BNP Outpatient 324 0 - 1,800 pg/mL      Comment:      Reference range shown and results flagged as abnormal are for the outpatient, non acute settings. Establishing a baseline value for each individual patient is useful for follow-up.    Suggested inpatient cut points for confirming diagnosis of CHF in an acute setting are:  >450 pg/mL (age 18 to less than 50)  >900 pg/mL (age 50 to less than 75)  >1800 pg/mL (75 yrs and older)    An inpatient or emergency department NT-proPBNP <300 pg/mL effectively rules out acute CHF, with 99% negative predictive value.       TSH with free T4 reflex   Result Value Ref Range    TSH 2.27 0.30 - 4.20 uIU/mL   Basic metabolic panel   Result Value Ref Range    Sodium 133 (L) 135 - 145 mmol/L    Potassium 4.2 3.4 - 5.3 mmol/L    Chloride 93 (L) 98 - 107 mmol/L    Carbon Dioxide (CO2) 27 22 - 29 mmol/L    Anion Gap 13 7 - 15 mmol/L    Urea Nitrogen 14.6 8.0 - 23.0 mg/dL    Creatinine 0.95 0.51 - 0.95 mg/dL    GFR Estimate 61 >60 mL/min/1.73m2       Comment:      eGFR calculated using 2021 CKD-EPI equation.    Calcium 10.2 8.8 - 10.4 mg/dL      Comment:      Reference intervals for this test were updated on 7/16/2024 to reflect our healthy population more accurately. There may be differences in the flagging of prior results with similar values performed with this method. Those prior results can be interpreted in the context of the updated reference intervals.    Glucose 206 (H) 70 - 99 mg/dL   Hemoglobin A1c   Result Value Ref Range    Estimated Average Glucose 212 mg/dL    Hemoglobin A1C 9.0 (H) <5.7 %      Comment:      Normal <5.7%   Prediabetes 5.7-6.4%    Diabetes 6.5% or higher     Note: Adopted from ADA consensus guidelines.   CBC with platelets   Result Value Ref Range    WBC Count 8.7 4.0 - 11.0 10e3/uL    RBC Count 4.12 3.80 - 5.20 10e6/uL    Hemoglobin 12.9 11.7 - 15.7 g/dL    Hematocrit 37.7 35.0 - 47.0 %    MCV 92 78 - 100 fL    MCH 31.3 26.5 - 33.0 pg    MCHC 34.2 31.5 - 36.5 g/dL    RDW 12.0 10.0 - 15.0 %    Platelet Count 172 150 - 450 10e3/uL   Vitamin B12   Result Value Ref Range    Vitamin B12 560 232 - 1,245 pg/mL   Folate   Result Value Ref Range    Folic Acid 23.5 4.6 - 34.8 ng/mL       If you have any questions or concerns, please call the clinic at the number listed above.       Sincerely,      Jin Corea, DO

## 2024-08-28 NOTE — PROGRESS NOTES
[facility] HEART CARE   CARDIOLOGY PROGRESS NOTE     Chief Complaint   Patient presents with    Follow Up          Diagnosis:  1.  FRANCO.  -Diastolic CHF/minimal COPD/Asthma.  2.  Chest pain.    -Cardiac cath on 4/22/24-no dz, [facility].   -Cardiac cath on 7/15/15-minimal dz.   3.  HTN-controlled.  4.  Prolonged QT.  5.  Asthma.  6.  Fatty liver on 11/7/2007.  7.  CHF.   -50-55% on 8/21/24.   -Right heart cath with mildly elevated pressures on 4/23/2024, [facility].   -26% on 4/23/2024 at [facility].    -Normal on 5/26/21.   -Lower ext edema with BNP of 661 on 5/5/21.   -Diastolic grade 1 on 1/19/15.8.  DM-2-controlled.  9.  Hypothyroidism.  10.  Hyperlipidemia-controlled.  11.  Hypertriglyceridemia-uncontrolled.  12.  B12 deficiency.  -391 on 5/5/21.   13.  COPD-minimal on 2/18/21.  14.  Elevated BNP at 582 on 8/28/20.  15.  A. Fib.    -Eliquis started on 1/30/2023.  -Short runs on 1/3/2023.  16.  NSTEMI on 4/22/24   -[facility]-Normal cornaries, 4/23/2024.    -Taksubo or A-fib causing the increased rate.         Assessment/Plan:    Seen by me on 5/1/24.  Dr. Veras on 6/3/24, increase BB and repeat echo.   No refills.  Labs on 4/30/24.  1.  FRANCO: I am not fully understanding was causing her shortness of breath.  At this point, I suspect diastolic dysfunction or some type of vocal cord dysfunction.  She has a hoarse voice and having hard time talking.  Her shortness of breath seems to exacerbate when she has increased swelling.  She reports being fine on Thursday, 8/22/2024.  But on Friday the following day having increased dyspnea.  Had increased swelling.  She is currently on Lasix 40 mg in the morning and 20 mg in the afternoon.  She has taken extra dose of Lasix.  Swelling is come down.  She feels worn out.  This worsens her mood.  I suggest that she take Lasix 60 mg in the morning.  She has been drinking a lot of liquid.  I told her to cut back on liquid and watch her salt intake.  I am also send her to ENT to  evaluate for possible vocal cord dysfunction as a cause of her shortness of breath.  Will plan for labs today including ABG.  Will plan for PFTs and chest x-ray.  Will follow-up after completion of testing.  2.  NSTEMI: Type II.  Seen in ER on 4/22/2024 for a 3-month history of dyspnea on exertion with left anterior precordial pressure with exertion.  Found to have an increasing troponin.  Initially, troponin was at at 82, increasing to 194.  Concern for NSTEMI.  Referred to [facility].  Underwent angiogram on 4/23/2024.  Found of normal coronaries.  Noted to have mildly increased left ventricular pressures.  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  Discharged from the hospital.  Was to follow-up with [facility] cardiology.  Has a follow-up here.   3.  A-fib: EKG today showing sinus rhythm.  EKG on 4/22/2024 showed A-fib with a rate of 120 bpm.  Currently on Eliquis 5 mg twice a day.  Currently on metoprolol 100 mg once daily.  Discussed A-fib using a heart model in the room.  Discussed rate control, antiarrhythmics, cardioversion, and possibility of ablation.  Continue Eliquis and metoprolol.  4.  Hypertension: Controlled.  Blood pressure today 127/81.  Lasix 60 mg once daily, metoprolol 100 mg once daily, and ramipril 10 mg twice a day.  5.  CHF: Now with a severely depressed EF at 26% on 4/23/2024 on echocardiogram through [facility].  Likely rate induced with uncontrolled A-fib with RVR.  More recent echocardiogram on 8/21/2024 shows normal EF of 50-55%.  Lasix 60 mg once daily, metoprolol 5000 mg daily, and ramipril 10 mg twice a day.  6.  Hyperlipidemia: Controlled.  Continue simvastatin 40 mg daily.    7.  Follow-up in 1 to 2 months.  Plan for labs today, ABG, chest x-ray, PFTs, and referral to ENT for hoarse voice.    Interval history:  See above.      HPI:    Mrs. Acuna is being seen by cardiology for dyspnea exertion.  She has had this issue for many years.   She was seen by cardiology in 2015 and 2016 with work-up which included a cardiac catheterization.  She reports being dyspneic on exertion for most of her life.  Her symptoms are improved with inhalers.     Bo reports being dyspneic for many years.  Her shortness of breath has gotten worse over the last few months to years.  She states that the Spiriva has helped her shortness of breath significantly.  She does carry history of asthma.  She was seen by cardiology through [facility] in 2015 and 2016 for this very issue.  She reportedly had an angiogram in 2002 which was reportedly normal.  She had a repeat cath on 7/15/2015 [facility] with a 10% lesion to her LAD.  All remaining vessels were patent.  She carries a diagnosis of heart failure with preserved ejection fraction but more recently this issue was not identified.  Her cardiac catheterization 2015 also showed normal filling pressures.       She has noted that with vacuuming her house, half-way through, she will be so significantly short of breath that she will use a fan to improve her air hunger.  As mentioned, she has essentially had 2 normal cardiac catheterizations.  She states she would not be able to do stress testing secondary to the uncomfortable feeling it creates.  Her echo from 5/8/2009 showed diastolic dysfunction grade 1.  This was not identified on her most recent echo from 9/17/2020.  She did not have a significant valvular or chamber abnormalities.  She does carry history of rheumatoid arthritis but states she was told most recently that she has osteoarthritis and not RA.  A normal chest x-ray on 3/19/2021.  PFT's on 2/18/2021 showed minimal COPD.  She states she was exposed to secondhand smoke by her father at a young age.  She herself has never used tobacco.     I believe the top possibilities for her shortness of breath would be a history of asthma, the beta-blocker she is on, the possibility of a DVT/PE, concern for cirrhosis as  she was noted to have a fatty liver on imaging from 11/7/2007, PE, diastolic dysfunction, hypothyroidism, and potentially rhythm issues. With the exception of a prolonged QT, EKG normal on 5/5/2021.     She also carries diagnosis of diabetes.  Her A1c most recently was 6.9%. She has hyperlipidemia which is well controlled on Zocor 40 mg daily.       She has a history of hypothyroidism.  She has been on levothyroxine 75 mcg.  Her last TSH was on 6/7/2019 and was normal at 1.17.     She also has a history of prolonged QTC.  She has hypertension which has been controlled.      Relevant testing:  ECHO on 8/21/24:  The left ventricular size and wall thickness are normal.   The ejection fraction is 50-55% (borderline).  Doppler findings (E/E' ratio and/or other parameters) suggest left ventricular filling pressures are normal.  The right ventricle size and function are normal  No significant valvular abnormalities.  IVC is normal     This study was compared with the study from 2/27/2023. Left ventricular  function slightly worsened    Echocardiogram on 2/27/2023:  Global and regional left ventricular function is normal with an EF of 55-60%.  Left ventricular diastolic function is indeterminate.  Right ventricular function, chamber size, wall motion, and thickness are  normal.  Both atria appear normal.  Pulmonary artery systolic pressure cannot be assessed.  The inferior vena cava is normal.  No pericardial effusion is present.    Zio patch on 1/3/2023:  Worn for 3 days and 7 hr's.  After removing artifact, total time was 2 days and 22 hr's. Placed on 1/3/2023 at 2:27 PM and completed on 1/6/2023 at 9:13 PM.  Underlying rhythm was sinus.  Hrt rate ranged from 50 bpm, maximum heart rate of 150 bmp, averaging 73 bmp.  No significant bradycardia, pauses, Mobitz type II or 3rd degree heart block.  No atrial fibrillation on this study.  x3 triggered events and x2 diary entries.  These corresponded to SVE, VE, and sinus  "rhythm.  x0 runs of VT.    x10 runs of SVT lasting up to 12.8 sec's with a maximum heart rate of 150 bmp.  These short bursts of \"SVT\" are suspicious for A. fib  Rare, <1% of PAC's, atrial couplets, atrial triplets, ventricular couplets, and ventricular triplets.  PVC's at 1.6%.  + episodes of ventricular bigeminy lasting up to 4.6 sec's.  + episodes of ventricular trigeminy lasting up to 20.5 sec's.    US carotid arteries on 1/3/2023:  No hemodynamically significant stenoses are identified at  either carotid bifurcation    V/Q scan on 5/7/21:  The ventilation study demonstrates mildly diminished lateral  ventilation particularly at the apices.  The perfusion study demonstrates normal symmetric perfusion without  small, moderate or large defect. A shoulder prosthesis results in mild artifact.    Echocardiogram 5/26/2021:  No pericardial effusion is present.  Global and regional left ventricular function is normal with an EF of 55-60%.  Left ventricular diastolic function is normal.  The right ventricle is normal size.  Global right ventricular function is normal.  Both atria appear normal.  Trace mitral insufficiency is present.  Aortic valve is normal in structure and function.  Trace tricuspid insufficiency is present.  Right ventricular systolic pressure is 8 mmHg above the right atrial pressure.  The pulmonic valve is normal.    Echocardiogram on 9/17/2020:  No pericardial effusion is present.  Global and regional left ventricular function is normal with an EF of 60-65%.  Left ventricular diastolic function is normal.  The right ventricle is normal size.  Both atria appear normal.  Trace mitral insufficiency is present.  Aortic valve is normal in structure and function.  Trace tricuspid insufficiency is present.  Right ventricular systolic pressure is 13 mmHg above the right atrial pressure.  The pulmonic valve is normal.  The aorta root is normal.     Echo on 5/8/2009:   1. Normal left ventricular size with " mild systolic functional impairment.    2. Evidence of grade 1 diastolic dysfunction.    3. Mild left atrial enlargement.    4. Trace physiologic amounts of mitral tricuspid and pulmonic insufficiency.    5. Trivial pericardial effusion.     Left heart cath on 7/15/2015 at :  DOMINANCE:  Right Dominant  LEFT MAIN:  is angiographically normal (0% Stenosis)  LEFT ANTERIOR DESCENDING :  Proximal Segment is angiographically normal (0% Stenosis)  rest of vessel has luminal irregularities (10% Stenosis) with distal pruning.  DIAGONAL 1:  is angiographically normal (0% Stenosis)  CIRCUMFLEX:  and its branches are angiographically normal (0% Stenosis)  RIGHT CORONARY ARTERY:  is angiographically normal (0% Stenosis)  COLLATERALS:  No collateral flow  Normal LVEDP        ICD-10-CM    1. Essential hypertension  I10 EKG 12-lead complete w/read - (Clinic Performed)     furosemide (LASIX) 40 MG tablet      2. Atrial fibrillation with rapid ventricular response (H)  I48.91 EKG 12-lead complete w/read - (Clinic Performed)     TSH with free T4 reflex     apixaban ANTICOAGULANT (ELIQUIS ANTICOAGULANT) 5 MG tablet     TSH with free T4 reflex      3. FRANCO (dyspnea on exertion)  R06.09 EKG 12-lead complete w/read - (Clinic Performed)     X-ray Chest 2 vws*     General PFT Lab (Please always keep checked)     Pulmonary Function Test     Basic metabolic panel     CBC with platelets     Blood gas arterial     General PFT Lab (Please always keep checked)     Basic metabolic panel     CBC with platelets      4. Diastolic dysfunction with chronic heart failure (H)  I50.32 EKG 12-lead complete w/read - (Clinic Performed)     Magnesium     Magnesium      5. Paroxysmal atrial fibrillation (H)  I48.0 apixaban ANTICOAGULANT (ELIQUIS ANTICOAGULANT) 5 MG tablet      6. Pulmonary emphysema, unspecified emphysema type (H)  J43.9 N terminal pro BNP outpatient     N terminal pro BNP outpatient      7. Asthma, unspecified asthma severity,  unspecified whether complicated, unspecified whether persistent  J45.909       8. Chronic systolic heart failure (H)  I50.22 N terminal pro BNP outpatient     TSH with free T4 reflex     N terminal pro BNP outpatient     TSH with free T4 reflex      9. Elevated brain natriuretic peptide (BNP) level  R79.89 Magnesium     N terminal pro BNP outpatient     Magnesium     N terminal pro BNP outpatient      10. Vitamin D deficiency  E55.9       11. Fatty liver on 11/7/2007  K76.0       12. Hyperglycemia  R73.9 Hemoglobin A1c     Hemoglobin A1c      13. Vitamin B12 deficiency (non anemic)  E53.8 Vitamin B12     Folate     Vitamin B12     Folate      14. Voice disturbance  R49.9 Adult ENT  Referral                Past Medical History:   Diagnosis Date    Congestive heart failure, unspecified     Diabetes (H)     H/O rheumatoid arthritis     HLD (hyperlipidemia)     HTN (hypertension)     Myocardial infarction (H)     Uncomplicated asthma        Past Surgical History:   Procedure Laterality Date    APPENDECTOMY OPEN CHILD      AS TOTAL KNEE ARTHROPLASTY Right     CHOLECYSTECTOMY      COLONOSCOPY - HIM SCAN N/A 03/12/2009    per Care Everywhere documentation per [facility].     HYSTERECTOMY TOTAL ABDOMINAL      MASTECTOMY, BILATERAL Bilateral 02/26/1992    SHOULDER SURGERY Right     SHOULDER SURGERY Left        Allergies   Allergen Reactions    Contrast Dye Difficulty breathing    Gadolinium Derivatives      Other reaction(s): Difficulty in swallowing, Laryngeal spasm  Patient had an injection into rt. Shoulder capsule prior to MRI arthrogram.  Contained multihance gadobenate, omnipaque iohexol, and buffered lidocaine.      Ammonia     Cory-1 [Lidocaine Hcl]      Novocaine allergy      Codeine Sulfate     Food      Shrimp    Fosamax [Alendronate]      Dental infections    Iodine-131 Swelling     Ct dye    Lidocaine     Nitrous Oxide     No Clinical Screening - See Comments Nausea and Vomiting and Other (See  Comments)     (3/6/09)- N/V and Heart racing    Novocain [Procaine]     Penicillins     Symbicort [Budesonide-Formoterol Fumarate]     Tramadol     Morphine Palpitations       Current Outpatient Medications   Medication Sig Dispense Refill    Acetone, Urine, Test (KETONE TEST) STRP Use as directed 50 strip 4    apixaban ANTICOAGULANT (ELIQUIS ANTICOAGULANT) 5 MG tablet Take 1 tablet (5 mg) by mouth 2 times daily. Take 1 tablet by mouth twice daily 60 tablet 11    Calcium-Vitamin D-Vitamin K (VIACTIV PO) Take 2 chew tab by mouth every morning      Continuous Blood Gluc  (DEXCOM G6 ) DIMITRIS 1 each continuous 1 each 0    Continuous Glucose Sensor (DEXCOM G6 SENSOR) MISC Change every 10 days. 9 each 1    Continuous Glucose Transmitter (DEXCOM G6 TRANSMITTER) MISC CHANGE EVERY 90 DAYS 1 each 1    furosemide (LASIX) 40 MG tablet Take 1.5 tablets (60 mg) by mouth daily. 135 tablet 3    insulin lispro (HUMALOG VIAL) 100 UNIT/ML vial To be used with the insulin pump TDD: 80 units 30 mL 3    INSULIN PUMP - OUTPATIENT Insulin pump settings:  Updated: 8/14/24  Insulin pump: Minimed 780G  Basal:  0000 0.85   Total: 18  CR: 13   ISF: 50    Target: 100-130  Active insulin:  4 hours 30 minutes      levothyroxine (SYNTHROID/LEVOTHROID) 75 MCG tablet Take 1 tablet by mouth once daily 90 tablet 2    metoprolol succinate ER (TOPROL XL) 50 MG 24 hr tablet Take 2 tablets (100 mg) by mouth daily 90 tablet 3    omeprazole (PRILOSEC) 40 MG DR capsule Take 1 capsule by mouth once daily 90 capsule 3    potassium chloride eduardo ER (KLOR-CON M20) 20 MEQ CR tablet Take 1 tablet by mouth twice daily 180 tablet 3    ramipril (ALTACE) 10 MG capsule Take 1 capsule by mouth twice daily 180 capsule 1    semaglutide (OZEMPIC) 2 MG/3ML pen Inject 0.25 mg subcutaneously every 7 days.      senna-docusate (SENOKOT-S;PERICOLACE) 8.6-50 MG per tablet Take 1 tablet by mouth At Bedtime       simvastatin (ZOCOR) 40 MG tablet Take 1 tablet (40 mg)  by mouth at bedtime 90 tablet 3    tamoxifen (NOLVADEX) 20 MG tablet Take 20 mg by mouth every morning      tiZANidine (ZANAFLEX) 4 MG tablet Take a 1/2 tablet by mouth in the morning, 1/2 tablet mid day and 1 full tablet at bedtime, can take an extra 1/2 tablet as needed during the night for breakthrough symptoms up to 4 times per week.         Social History     Socioeconomic History    Marital status:      Spouse name: Not on file    Number of children: 2    Years of education: Not on file    Highest education level: Not on file   Occupational History    Occupation: tydy     Employer: RETIRED   Tobacco Use    Smoking status: Never     Passive exposure: Never    Smokeless tobacco: Never   Vaping Use    Vaping status: Never Used   Substance and Sexual Activity    Alcohol use: No     Alcohol/week: 0.0 standard drinks of alcohol    Drug use: No    Sexual activity: Not on file   Other Topics Concern    Parent/sibling w/ CABG, MI or angioplasty before 65F 55M? Not Asked   Social History Narrative    Not on file     Social Determinants of Health     Financial Resource Strain: Low Risk  (2/28/2024)    Financial Resource Strain     Within the past 12 months, have you or your family members you live with been unable to get utilities (heat, electricity) when it was really needed?: No   Food Insecurity: Low Risk  (2/28/2024)    Food Insecurity     Within the past 12 months, did you worry that your food would run out before you got money to buy more?: No     Within the past 12 months, did the food you bought just not last and you didn t have money to get more?: No   Transportation Needs: Low Risk  (2/28/2024)    Transportation Needs     Within the past 12 months, has lack of transportation kept you from medical appointments, getting your medicines, non-medical meetings or appointments, work, or from getting things that you need?: No   Physical Activity: Not on file   Stress: Not on file   Social Connections: Not  on file   Interpersonal Safety: Low Risk  (12/15/2023)    Interpersonal Safety     Do you feel physically and emotionally safe where you currently live?: Yes     Within the past 12 months, have you been hit, slapped, kicked or otherwise physically hurt by someone?: No     Within the past 12 months, have you been humiliated or emotionally abused in other ways by your partner or ex-partner?: No   Housing Stability: Low Risk  (2/28/2024)    Housing Stability     Do you have housing? : Yes     Are you worried about losing your housing?: No       LAB RESULTS:   No visits with results within 2 Month(s) from this visit.   Latest known visit with results is:   Office Visit on 05/05/2021   Component Date Value Ref Range Status    Folate 05/05/2021 >100.0  >5.4 ng/mL Final    pH Arterial 05/05/2021 7.49* 7.35 - 7.45 pH Final    pCO2 Arterial 05/05/2021 37  35 - 45 mm Hg Final    pO2 Arterial 05/05/2021 96  80 - 105 mm Hg Final    Bicarbonate Arterial 05/05/2021 28  21 - 28 mmol/L Final    FIO2 05/05/2021 Unknown   Final    Oxyhemoglobin Arterial 05/05/2021 97  92 - 100 % Final    Base Excess Art 05/05/2021 4.4  mmol/L Final    Vitamin D Deficiency screening 05/05/2021 94* 20 - 75 ug/L Final    Vitamin B12 05/05/2021 391  193 - 986 pg/mL Final    TSH 05/05/2021 2.04  0.40 - 4.00 mU/L Final    Troponin I ES 05/05/2021 <0.015  0.000 - 0.045 ug/L Final    N-Terminal Pro Bnp 05/05/2021 661* 0 - 125 pg/mL Final    RIOS interpretation 05/05/2021 Negative  NEG^Negative Final    GGT 05/05/2021 14  0 - 40 U/L Final    D Dimer 05/05/2021 0.5  0.0 - 0.50 ug/ml FEU Final    CRP Inflammation 05/05/2021 <2.9  0.0 - 8.0 mg/L Final    WBC 05/05/2021 6.7  4.0 - 11.0 10e9/L Final    RBC Count 05/05/2021 4.17  3.8 - 5.2 10e12/L Final    Hemoglobin 05/05/2021 12.7  11.7 - 15.7 g/dL Final    Hematocrit 05/05/2021 38.5  35.0 - 47.0 % Final    MCV 05/05/2021 92  78 - 100 fl Final    MCH 05/05/2021 30.5  26.5 - 33.0 pg Final    MCHC 05/05/2021 33.0   31.5 - 36.5 g/dL Final    RDW 05/05/2021 12.0  10.0 - 15.0 % Final    Platelet Count 05/05/2021 146* 150 - 450 10e9/L Final    Diff Method 05/05/2021 Automated Method   Final    % Neutrophils 05/05/2021 61.4  % Final    % Lymphocytes 05/05/2021 26.4  % Final    % Monocytes 05/05/2021 7.0  % Final    % Eosinophils 05/05/2021 4.3  % Final    % Basophils 05/05/2021 0.3  % Final    % Immature Granulocytes 05/05/2021 0.6  % Final    Nucleated RBCs 05/05/2021 0  0 /100 Final    Absolute Neutrophil 05/05/2021 4.1  1.6 - 8.3 10e9/L Final    Absolute Lymphocytes 05/05/2021 1.8  0.8 - 5.3 10e9/L Final    Absolute Monocytes 05/05/2021 0.5  0.0 - 1.3 10e9/L Final    Absolute Eosinophils 05/05/2021 0.3  0.0 - 0.7 10e9/L Final    Absolute Basophils 05/05/2021 0.0  0.0 - 0.2 10e9/L Final    Abs Immature Granulocytes 05/05/2021 0.0  0 - 0.4 10e9/L Final    Absolute Nucleated RBC 05/05/2021 0.0   Final    Sodium 05/05/2021 137  133 - 144 mmol/L Final    Potassium 05/05/2021 3.5  3.4 - 5.3 mmol/L Final    Chloride 05/05/2021 101  94 - 109 mmol/L Final    Carbon Dioxide 05/05/2021 30  20 - 32 mmol/L Final    Anion Gap 05/05/2021 6  3 - 14 mmol/L Final    Glucose 05/05/2021 146* 70 - 99 mg/dL Final    Urea Nitrogen 05/05/2021 13  7 - 30 mg/dL Final    Creatinine 05/05/2021 0.76  0.52 - 1.04 mg/dL Final    GFR Estimate 05/05/2021 77  >60 mL/min/[1.73_m2] Final    GFR Estimate If Black 05/05/2021 90  >60 mL/min/[1.73_m2] Final    Calcium 05/05/2021 8.9  8.5 - 10.1 mg/dL Final    Bilirubin Total 05/05/2021 0.8  0.2 - 1.3 mg/dL Final    Albumin 05/05/2021 3.8  3.4 - 5.0 g/dL Final    Protein Total 05/05/2021 8.0  6.8 - 8.8 g/dL Final    Alkaline Phosphatase 05/05/2021 35* 40 - 150 U/L Final    ALT 05/05/2021 23  0 - 50 U/L Final    AST 05/05/2021 15  0 - 45 U/L Final    Cholesterol 05/05/2021 135  <200 mg/dL Final    Triglycerides 05/05/2021 168* <150 mg/dL Final    HDL Cholesterol 05/05/2021 52  >49 mg/dL Final    LDL Cholesterol  "Calculated 05/05/2021 49  <100 mg/dL Final    Non HDL Cholesterol 05/05/2021 83  <130 mg/dL Final        Review of systems: Negative except that which was noted in the HPI.    Physical examination:  /81   Pulse 73   Resp 16   Ht 1.6 m (5' 3\")   Wt 65.3 kg (144 lb)   SpO2 98%   BMI 25.51 kg/m      GENERAL APPEARANCE: healthy, alert and no distress  CHEST: lungs clear to auscultation.  Reduced breath sounds bilaterally.  CARDIOVASCULAR: Irregular rate irregular rhythm, normal S1 with physiologic split S2, no S3 or S4 and no murmur, click or rub  EXTREMITIES: no clubbing, cyanosis with mild peripheral edema      Total time spent on day of visit, including review of tests, obtaining/reviewing separately obtained history, ordering medications/tests/procedures, communicating with PCP/consultants, and documenting in electronic medical record: 36 minutes.           Thank you for allowing me to participate in the care of your patient. Please do not hesitate to contact me if you have any questions.     Jin Corea, DO        "

## 2024-08-28 NOTE — PATIENT INSTRUCTIONS
Thank you for allowing Dr LAURA Corea and our  team to participate in your care. Please call our office at [phone] with scheduling questions or if you need to cancel or change your appointment. With any other questions or concerns you may call cardiology nurse at  [phone].       If you experience chest pain, chest pressure, chest tightness, shortness of breath, fainting, lightheadedness, nausea, vomiting, or other concerning symptoms, please report to the Emergency Department or call 911. These symptoms may be emergent, and best treated in the Emergency Department.

## 2024-08-29 ENCOUNTER — TELEPHONE (OUTPATIENT)
Dept: FAMILY MEDICINE | Facility: OTHER | Age: 79
End: 2024-08-29

## 2024-08-29 LAB
FOLATE SERPL-MCNC: 23.5 NG/ML (ref 4.6–34.8)
VIT B12 SERPL-MCNC: 560 PG/ML (ref 232–1245)

## 2024-08-29 NOTE — TELEPHONE ENCOUNTER
Results requested: test results     Best number to reach patient at: [phone]      Best time to call patient: anytime    Send to care team in basket.

## 2024-09-05 ENCOUNTER — TELEPHONE (OUTPATIENT)
Dept: FAMILY MEDICINE | Facility: OTHER | Age: 79
End: 2024-09-05

## 2024-09-05 NOTE — TELEPHONE ENCOUNTER
10:19 AM    Reason for Call: Phone Call    Description: pt called about home health care pt states that her insurance will cover and she really needs the help and wants this. Please call pt    Was an appointment offered for this call? No  If yes : Appointment type              Date    Preferred method for responding to this message: Telephone Call  What is your phone number ? [phone]     If we cannot reach you directly, may we leave a detailed response at the number you provided? Yes    Can this message wait until your PCP/provider returns, if available today? Maeve De Los Santos

## 2024-09-05 NOTE — TELEPHONE ENCOUNTER
Patient is going to check with some local sources to see if her insurance will cover the costs for a home health aide. Will return call once she gets information.

## 2024-09-11 ENCOUNTER — MYC MEDICAL ADVICE (OUTPATIENT)
Dept: FAMILY MEDICINE | Facility: OTHER | Age: 79
End: 2024-09-11

## 2024-09-11 DIAGNOSIS — E78.2 MIXED HYPERLIPIDEMIA: ICD-10-CM

## 2024-09-11 DIAGNOSIS — E13.9 LADA (LATENT AUTOIMMUNE DIABETES IN ADULTS), MANAGED AS TYPE 1 (H): ICD-10-CM

## 2024-09-11 DIAGNOSIS — E03.9 HYPOTHYROIDISM, UNSPECIFIED TYPE: ICD-10-CM

## 2024-09-11 DIAGNOSIS — E13.9 LADA (LATENT AUTOIMMUNE DIABETES IN ADULTS), MANAGED AS TYPE 1 (H): Primary | ICD-10-CM

## 2024-09-11 RX ORDER — SIMVASTATIN 40 MG
40 TABLET ORAL AT BEDTIME
Qty: 90 TABLET | Refills: 3 | Status: SHIPPED | OUTPATIENT
Start: 2024-09-11

## 2024-09-11 RX ORDER — LEVOTHYROXINE SODIUM 75 UG/1
75 TABLET ORAL DAILY
Qty: 90 TABLET | Refills: 2 | Status: SHIPPED | OUTPATIENT
Start: 2024-09-11

## 2024-09-11 NOTE — PROGRESS NOTES
Had questions regarding her Ozempic  Questions answered     She asking for medication refills and a podiatry referral.   Medications:  Simvastatin  Levothyroxine     Message sent to PCP, ARSALAN Dobbs [facility]  Diabetes and Wound Care

## 2024-09-11 NOTE — TELEPHONE ENCOUNTER
Left message for patient to call clinic back to inquire about what specific needs patient is looking for with the home health aide to send to provider with referral.

## 2024-09-12 NOTE — TELEPHONE ENCOUNTER
Spoke with patient, she is not looking for nursing services. She is looking for someone to help with cooking and cleaning. Her friend goes through [employer].   Explained she would need to contact [facility] or she could call [facility] directly to see what steps to take and she should not need a referral from her provider for this.   Gave her the number for [facility] and for [employer]. She will call and find out more information and let us know if she needs anything else.

## 2024-09-14 ENCOUNTER — HEALTH MAINTENANCE LETTER (OUTPATIENT)
Age: 79
End: 2024-09-14

## 2024-09-24 ENCOUNTER — HOSPITAL ENCOUNTER (OUTPATIENT)
Dept: RESPIRATORY THERAPY | Facility: HOSPITAL | Age: 79
Discharge: HOME | End: 2024-09-24
Attending: INTERNAL MEDICINE
Payer: MEDICARE

## 2024-09-24 DIAGNOSIS — R06.09 DOE (DYSPNEA ON EXERTION): ICD-10-CM

## 2024-09-24 PROCEDURE — 94010 BREATHING CAPACITY TEST: CPT

## 2024-09-24 PROCEDURE — 94729 DIFFUSING CAPACITY: CPT

## 2024-09-24 PROCEDURE — 94726 PLETHYSMOGRAPHY LUNG VOLUMES: CPT

## 2024-09-25 ENCOUNTER — TELEPHONE (OUTPATIENT)
Dept: FAMILY MEDICINE | Facility: OTHER | Age: 79
End: 2024-09-25

## 2024-10-02 ENCOUNTER — OFFICE VISIT (OUTPATIENT)
Dept: OTOLARYNGOLOGY | Facility: OTHER | Age: 79
End: 2024-10-02
Attending: INTERNAL MEDICINE
Payer: COMMERCIAL

## 2024-10-02 VITALS
TEMPERATURE: 98.1 F | OXYGEN SATURATION: 98 % | RESPIRATION RATE: 16 BRPM | BODY MASS INDEX: 24.98 KG/M2 | DIASTOLIC BLOOD PRESSURE: 85 MMHG | HEIGHT: 63 IN | WEIGHT: 141 LBS | HEART RATE: 80 BPM | SYSTOLIC BLOOD PRESSURE: 115 MMHG

## 2024-10-02 DIAGNOSIS — R49.0 MUSCLE TENSION DYSPHONIA: ICD-10-CM

## 2024-10-02 DIAGNOSIS — R06.09 DOE (DYSPNEA ON EXERTION): Primary | ICD-10-CM

## 2024-10-02 DIAGNOSIS — R49.9 VOICE DISTURBANCE: ICD-10-CM

## 2024-10-02 PROCEDURE — 99213 OFFICE O/P EST LOW 20 MIN: CPT | Mod: 25 | Performed by: NURSE PRACTITIONER

## 2024-10-02 PROCEDURE — 31575 DIAGNOSTIC LARYNGOSCOPY: CPT | Performed by: NURSE PRACTITIONER

## 2024-10-02 PROCEDURE — G0463 HOSPITAL OUTPT CLINIC VISIT: HCPCS | Mod: 25

## 2024-10-02 ASSESSMENT — PAIN SCALES - GENERAL: PAINLEVEL: EXTREME PAIN (8)

## 2024-10-02 NOTE — LETTER
"10/2/2024      Flora Acuna  [address]  [place] [address]      Dear Colleague,    Thank you for referring your patient, Flora Acuna, to the [facility]. Please see a copy of my visit note below.    Otolaryngology Note         Chief Complaint:     Patient presents with:  Throat Problem: Voice disturbance Jin Corea Do referring           History of Present Illness:     Flora Acuna is a 79 year old female seen today, referred by Dr Corea for concerns for worsening FRANCO and dysphonia.      She has a history of severe muscle tension dysphonia diagnosed by Dr. Shen at the [facility] at the [facility] in 2019.  She worked with speech therapy in the past.      She presented to the emergency department on 4/22/2024 for an evaluation of a 3-month history of dyspnea on exertion and was diagnosed with a non-STEMI.  She was transferred to [facility].  She also has a history of paroxysmal atrial fibrillation, chronic systolic heart failure and asthma.    Pulmonary function testing completed 9/24/2024.  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  She reports she subsequently quit her steroid inhaler due to the cost of medication and subjective feeling of no improvement.    Today she reports a long history of dyspnea on exertion that has seemed to have worsened over the last 3 months.  She reports that slight activity can bring on significant dyspnea.    She has gravelly sound to her voice most of the time but can completely lose her voice for a short time that seems to be exacerbated by exertion.      Never smoker  Alcohol - never drinker  No dysphagia  She does note soreness in her throat/neck after one of her \"attacks\"   No history of head or neck surgery  She did have shoulder surgery x 2 in the distant past  Drinking adequate water  Voice habits - lives alone, doesn't talk much    Occasional cough - coughs " up phlegm at times  9 pound weight loss in the past - she is on ozempic and this has been changing her appetite.   She was having nausea from ozempic.   No otalgia         Medications:     Current Outpatient Rx   Medication Sig Dispense Refill     Acetone, Urine, Test (KETONE TEST) STRP Use as directed 50 strip 4     apixaban ANTICOAGULANT (ELIQUIS ANTICOAGULANT) 5 MG tablet Take 1 tablet (5 mg) by mouth 2 times daily. Take 1 tablet by mouth twice daily 60 tablet 11     Calcium-Vitamin D-Vitamin K (VIACTIV PO) Take 2 chew tab by mouth every morning       Continuous Blood Gluc  (DEXCOM G6 ) DIMITRIS 1 each continuous 1 each 0     Continuous Glucose Sensor (DEXCOM G6 SENSOR) MISC Change every 10 days. 9 each 1     Continuous Glucose Transmitter (DEXCOM G6 TRANSMITTER) MISC CHANGE EVERY 90 DAYS 1 each 1     furosemide (LASIX) 40 MG tablet Take 1.5 tablets (60 mg) by mouth daily. 135 tablet 3     insulin lispro (HUMALOG VIAL) 100 UNIT/ML vial To be used with the insulin pump TDD: 80 units 30 mL 3     INSULIN PUMP - OUTPATIENT Insulin pump settings:  Updated: 8/14/24  Insulin pump: Minimed 780G  Basal:  0000 0.85   Total: 18  CR: 13   ISF: 50    Target: 100-130  Active insulin:  4 hours 30 minutes       levothyroxine (SYNTHROID/LEVOTHROID) 75 MCG tablet Take 1 tablet (75 mcg) by mouth daily. 90 tablet 2     metoprolol succinate ER (TOPROL XL) 50 MG 24 hr tablet Take 2 tablets (100 mg) by mouth daily 90 tablet 3     omeprazole (PRILOSEC) 40 MG DR capsule Take 1 capsule by mouth once daily 90 capsule 3     potassium chloride eduardo ER (KLOR-CON M20) 20 MEQ CR tablet Take 1 tablet by mouth twice daily 180 tablet 3     ramipril (ALTACE) 10 MG capsule Take 1 capsule by mouth twice daily 180 capsule 1     semaglutide (OZEMPIC) 2 MG/3ML pen Inject 0.5 mg subcutaneously every 7 days.       senna-docusate (SENOKOT-S;PERICOLACE) 8.6-50 MG per tablet Take 1 tablet by mouth At Bedtime        simvastatin (ZOCOR) 40 MG  tablet Take 1 tablet (40 mg) by mouth at bedtime. 90 tablet 3     tamoxifen (NOLVADEX) 20 MG tablet Take 20 mg by mouth every morning       tiZANidine (ZANAFLEX) 4 MG tablet Take a 1/2 tablet by mouth in the morning, 1/2 tablet mid day and 1 full tablet at bedtime, can take an extra 1/2 tablet as needed during the night for breakthrough symptoms up to 4 times per week.              Allergies:     Allergies: Contrast dye, Gadolinium derivatives, Ammonia, Cory-1 [lidocaine hcl], Codeine sulfate, Food, Fosamax [alendronate], Iodine-131, Lidocaine, Nitrous oxide, No clinical screening - see comments, Novocain [procaine], Penicillins, Symbicort [budesonide-formoterol fumarate], Tramadol, and Morphine          Past Medical History:     Past Medical History:   Diagnosis Date     Congestive heart failure, unspecified      Diabetes (H)      H/O rheumatoid arthritis      HLD (hyperlipidemia)      HTN (hypertension)      Myocardial infarction (H)      Uncomplicated asthma             Past Surgical History:     Past Surgical History:   Procedure Laterality Date     APPENDECTOMY OPEN CHILD       AS TOTAL KNEE ARTHROPLASTY Right      CHOLECYSTECTOMY       COLONOSCOPY - HIM SCAN N/A 03/12/2009    per [facility] Everywhere documentation per [facility].      HYSTERECTOMY TOTAL ABDOMINAL       MASTECTOMY, BILATERAL Bilateral 02/26/1992     SHOULDER SURGERY Right      SHOULDER SURGERY Left        ENT family history reviewed         Social History:     Social History     Tobacco Use     Smoking status: Never     Passive exposure: Never     Smokeless tobacco: Never   Vaping Use     Vaping status: Never Used   Substance Use Topics     Alcohol use: No     Alcohol/week: 0.0 standard drinks of alcohol     Drug use: No            Review of Systems:     ROS: See HPI         Physical Exam:     /85 (BP Location: Right arm, Patient Position: Sitting, Cuff Size: Adult Regular)   Pulse 80   Temp 98.1  F (36.7  C) (Tympanic)   Resp 16    "Ht 1.6 m (5' 3\")   Wt 64 kg (141 lb)   SpO2 98%   BMI 24.98 kg/m      General - The patient is well nourished and well developed, and appears to have good nutritional status.  Alert and oriented to person and place, answers questions and cooperates with examination appropriately.   Head and Face - Normocephalic and atraumatic, with no gross asymmetry noted.  The facial nerve is intact, with strong symmetric movements.  Voice and Breathing - The patient was breathing comfortably without the use of accessory muscles. There was no wheezing, stridor. The patients voice was clear and strong, and had appropriate pitch and quality.  Ears - External ear normal. Canals are patent. Right tympanic membrane is intact without effusion, retraction or mass. Left tympanic membrane is intact without effusion, retraction or mass.  Eyes - Extraocular movements intact, sclera were not icteric or injected.  Mouth - Examination of the oral cavity showed pink, healthy oral mucosa. Dentition in good condition. No lesions or ulcerations noted. The tongue was mobile and midline.  No masses or lesions noted on tongue with palpation and inspection.    Throat - The walls of the oropharynx were smooth, pink, moist, symmetric, and had no lesions or ulcerations.  The tonsillar pillars and soft palate were symmetric. The uvula was midline on elevation.    Neck - Normal midline excursion of the laryngotracheal complex during swallowing.  Normal ROM of neck with active movement.  Palpation of the occipital, submental, submandibular, internal jugular chain, and supraclavicular nodes did not demonstrate any abnormal lymph nodes or masses.  Palpation of the thyroid was soft and smooth, with no nodules or goiter appreciated.  The trachea was mobile and midline.  Nose - External contour is symmetric, no gross deflection or scars.  Nasal mucosa is pink and moist with no abnormal mucus.  The septum and turbinates were evaluated with nasal speculum, no " polyps, masses, or purulence noted on examination.    Attempts at mirror laryngoscopy were not possible due to gag reflex.  Therefore I proceeded with a fiberoptic examination after informed consent.  First I sprayed both sides of the nose with a mixture of lidocaine and neosynephrine.  I then passed the scope through the nasal cavity.     The nasopharynx was mucosally covered and symmetric.  The eustachian tube openings were unobstructed.  Going further down I had a clear view of the base of tongue which had normal appearing lingual tonsillar tissue.  The base of tongue was free of lesions, and the vallecula was open.  The epiglottis was smooth and mucosally covered.  The supraglottic larynx was then clearly visualized.  The vocal cords moved smoothly and symmetrically and were pearly white.  The pyriform sinuses were open and without gabrielle mass or pooling of secretions upon valsalva, and the limited view of the postcricoid region did not show any lesions.  The patient tolerated the procedure well.           Assessment and Plan:       ICD-10-CM    1. FRANCO (dyspnea on exertion)  R06.09 Occupational Therapy  Referral      2. Voice disturbance  R49.9 Adult ENT  Referral     lidocaine 2%-oxymetazoline 0.025% nasal solution 2 spray      3. Muscle tension dysphonia  R49.0         Reassured Flora today that her vocal cords are moving normally.  No masses or abnormalities noted in the oropharynx.    We discussed speech therapy, at this time she is hesitant to repeat speech therapy.  We did discuss some possible occupational therapy to work on energy conservation and optimizing her ability to complete her ADLs.  She is interested in a consultation.  I will place an order for this.    Consider speech therapy in the future, they can also work with breathing techniques to decrease muscle tension dysphonia.    Follow-up as needed     Heather MCKINLEYC  [facility] ENT      Again, thank you  for allowing me to participate in the care of your patient.        Sincerely,        Heather Sharma NP

## 2024-10-02 NOTE — LETTER
"10/2/2024      Flora Acuna  [address]  [place] [address]      Dear Colleague,    Thank you for referring your patient, Flora Acuna, to the [facility]. Please see a copy of my visit note below.    Otolaryngology Note         Chief Complaint:     Patient presents with:  Throat Problem: Voice disturbance Jin Corea Do referring           History of Present Illness:     Flora Acuna is a 79 year old female seen today, referred by Dr Corea for concerns for worsening FRANCO and dysphonia.      She has a history of severe muscle tension dysphonia diagnosed by Dr. Shen at the [facility] at the [facility] in 2019.  She worked with speech therapy in the past.      She presented to the emergency department on 4/22/2024 for an evaluation of a 3-month history of dyspnea on exertion and was diagnosed with a non-STEMI.  She was transferred to [facility].  She also has a history of paroxysmal atrial fibrillation, chronic systolic heart failure and asthma.    Pulmonary function testing completed 9/24/2024.  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  She reports she subsequently quit her steroid inhaler due to the cost of medication and subjective feeling of no improvement.    Today she reports a long history of dyspnea on exertion that has seemed to have worsened over the last 3 months.  She reports that slight activity can bring on significant dyspnea.    She has gravelly sound to her voice most of the time but can completely lose her voice for a short time that seems to be exacerbated by exertion.      Never smoker  Alcohol - never drinker  No dysphagia  She does note soreness in her throat/neck after one of her \"attacks\"   No history of head or neck surgery  She did have shoulder surgery x 2 in the distant past  Drinking adequate water  Voice habits - lives alone, doesn't talk much    Occasional cough - coughs " up phlegm at times  9 pound weight loss in the past - she is on ozempic and this has been changing her appetite.   She was having nausea from ozempic.   No otalgia         Medications:     Current Outpatient Rx   Medication Sig Dispense Refill     Acetone, Urine, Test (KETONE TEST) STRP Use as directed 50 strip 4     apixaban ANTICOAGULANT (ELIQUIS ANTICOAGULANT) 5 MG tablet Take 1 tablet (5 mg) by mouth 2 times daily. Take 1 tablet by mouth twice daily 60 tablet 11     Calcium-Vitamin D-Vitamin K (VIACTIV PO) Take 2 chew tab by mouth every morning       Continuous Blood Gluc  (DEXCOM G6 ) DIMITRIS 1 each continuous 1 each 0     Continuous Glucose Sensor (DEXCOM G6 SENSOR) MISC Change every 10 days. 9 each 1     Continuous Glucose Transmitter (DEXCOM G6 TRANSMITTER) MISC CHANGE EVERY 90 DAYS 1 each 1     furosemide (LASIX) 40 MG tablet Take 1.5 tablets (60 mg) by mouth daily. 135 tablet 3     insulin lispro (HUMALOG VIAL) 100 UNIT/ML vial To be used with the insulin pump TDD: 80 units 30 mL 3     INSULIN PUMP - OUTPATIENT Insulin pump settings:  Updated: 8/14/24  Insulin pump: Minimed 780G  Basal:  0000 0.85   Total: 18  CR: 13   ISF: 50    Target: 100-130  Active insulin:  4 hours 30 minutes       levothyroxine (SYNTHROID/LEVOTHROID) 75 MCG tablet Take 1 tablet (75 mcg) by mouth daily. 90 tablet 2     metoprolol succinate ER (TOPROL XL) 50 MG 24 hr tablet Take 2 tablets (100 mg) by mouth daily 90 tablet 3     omeprazole (PRILOSEC) 40 MG DR capsule Take 1 capsule by mouth once daily 90 capsule 3     potassium chloride eduardo ER (KLOR-CON M20) 20 MEQ CR tablet Take 1 tablet by mouth twice daily 180 tablet 3     ramipril (ALTACE) 10 MG capsule Take 1 capsule by mouth twice daily 180 capsule 1     semaglutide (OZEMPIC) 2 MG/3ML pen Inject 0.5 mg subcutaneously every 7 days.       senna-docusate (SENOKOT-S;PERICOLACE) 8.6-50 MG per tablet Take 1 tablet by mouth At Bedtime        simvastatin (ZOCOR) 40 MG  tablet Take 1 tablet (40 mg) by mouth at bedtime. 90 tablet 3     tamoxifen (NOLVADEX) 20 MG tablet Take 20 mg by mouth every morning       tiZANidine (ZANAFLEX) 4 MG tablet Take a 1/2 tablet by mouth in the morning, 1/2 tablet mid day and 1 full tablet at bedtime, can take an extra 1/2 tablet as needed during the night for breakthrough symptoms up to 4 times per week.              Allergies:     Allergies: Contrast dye, Gadolinium derivatives, Ammonia, Cory-1 [lidocaine hcl], Codeine sulfate, Food, Fosamax [alendronate], Iodine-131, Lidocaine, Nitrous oxide, No clinical screening - see comments, Novocain [procaine], Penicillins, Symbicort [budesonide-formoterol fumarate], Tramadol, and Morphine          Past Medical History:     Past Medical History:   Diagnosis Date     Congestive heart failure, unspecified      Diabetes (H)      H/O rheumatoid arthritis      HLD (hyperlipidemia)      HTN (hypertension)      Myocardial infarction (H)      Uncomplicated asthma             Past Surgical History:     Past Surgical History:   Procedure Laterality Date     APPENDECTOMY OPEN CHILD       AS TOTAL KNEE ARTHROPLASTY Right      CHOLECYSTECTOMY       COLONOSCOPY - HIM SCAN N/A 03/12/2009    per Care Everywhere documentation per [facility].      HYSTERECTOMY TOTAL ABDOMINAL       MASTECTOMY, BILATERAL Bilateral 02/26/1992     SHOULDER SURGERY Right      SHOULDER SURGERY Left        ENT family history reviewed         Social History:     Social History     Tobacco Use     Smoking status: Never     Passive exposure: Never     Smokeless tobacco: Never   Vaping Use     Vaping status: Never Used   Substance Use Topics     Alcohol use: No     Alcohol/week: 0.0 standard drinks of alcohol     Drug use: No            Review of Systems:     ROS: See HPI         Physical Exam:     /85 (BP Location: Right arm, Patient Position: Sitting, Cuff Size: Adult Regular)   Pulse 80   Temp 98.1  F (36.7  C) (Tympanic)   Resp 16    "Ht 1.6 m (5' 3\")   Wt 64 kg (141 lb)   SpO2 98%   BMI 24.98 kg/m      General - The patient is well nourished and well developed, and appears to have good nutritional status.  Alert and oriented to person and place, answers questions and cooperates with examination appropriately.   Head and Face - Normocephalic and atraumatic, with no gross asymmetry noted.  The facial nerve is intact, with strong symmetric movements.  Voice and Breathing - The patient was breathing comfortably without the use of accessory muscles. There was no wheezing, stridor. The patients voice was clear and strong, and had appropriate pitch and quality.  Ears - External ear normal. Canals are patent. Right tympanic membrane is intact without effusion, retraction or mass. Left tympanic membrane is intact without effusion, retraction or mass.  Eyes - Extraocular movements intact, sclera were not icteric or injected.  Mouth - Examination of the oral cavity showed pink, healthy oral mucosa. Dentition in good condition. No lesions or ulcerations noted. The tongue was mobile and midline.  No masses or lesions noted on tongue with palpation and inspection.    Throat - The walls of the oropharynx were smooth, pink, moist, symmetric, and had no lesions or ulcerations.  The tonsillar pillars and soft palate were symmetric. The uvula was midline on elevation.    Neck - Normal midline excursion of the laryngotracheal complex during swallowing.  Normal ROM of neck with active movement.  Palpation of the occipital, submental, submandibular, internal jugular chain, and supraclavicular nodes did not demonstrate any abnormal lymph nodes or masses.  Palpation of the thyroid was soft and smooth, with no nodules or goiter appreciated.  The trachea was mobile and midline.  Nose - External contour is symmetric, no gross deflection or scars.  Nasal mucosa is pink and moist with no abnormal mucus.  The septum and turbinates were evaluated with nasal speculum, no " polyps, masses, or purulence noted on examination.    Attempts at mirror laryngoscopy were not possible due to gag reflex.  Therefore I proceeded with a fiberoptic examination after informed consent.  First I sprayed both sides of the nose with a mixture of lidocaine and neosynephrine.  I then passed the scope through the nasal cavity.     The nasopharynx was mucosally covered and symmetric.  The eustachian tube openings were unobstructed.  Going further down I had a clear view of the base of tongue which had normal appearing lingual tonsillar tissue.  The base of tongue was free of lesions, and the vallecula was open.  The epiglottis was smooth and mucosally covered.  The supraglottic larynx was then clearly visualized.  The vocal cords moved smoothly and symmetrically and were pearly white.  The pyriform sinuses were open and without gabrielle mass or pooling of secretions upon valsalva, and the limited view of the postcricoid region did not show any lesions.  The patient tolerated the procedure well.           Assessment and Plan:       ICD-10-CM    1. FRANCO (dyspnea on exertion)  R06.09 Occupational Therapy  Referral      2. Voice disturbance  R49.9 Adult ENT  Referral     lidocaine 2%-oxymetazoline 0.025% nasal solution 2 spray      3. Muscle tension dysphonia  R49.0         Reassured Flora today that her vocal cords are moving normally.  No masses or abnormalities noted in the oropharynx.    We discussed speech therapy, at this time she is hesitant to repeat speech therapy.  We did discuss some possible occupational therapy to work on energy conservation and optimizing her ability to complete her ADLs.  She is interested in a consultation.  I will place an order for this.    Consider speech therapy in the future, they can also work with breathing techniques to decrease muscle tension dysphonia.    Follow-up as needed     Heather Sharma NP-C  [facility] ENT    Again, thank you for  allowing me to participate in the care of your patient.        Sincerely,        Heather Sharma NP

## 2024-10-02 NOTE — PATIENT INSTRUCTIONS
Thank you for allowing Heather Sharma and our ENT team to participate in your care.  If your medications are too expensive, please give the nurse a call.  We can possibly change this medication.  If you have a scheduling or an appointment question please contact our Health Unit Coordinator at their direct line [phone] ext [phone].   ALL nursing questions or concerns can be directed to your ENT nurse at: [phone] - [facility] occupational therapy, someone will call you to schedule

## 2024-10-02 NOTE — PROGRESS NOTES
"Otolaryngology Note         Chief Complaint:     Patient presents with:  Throat Problem: Voice disturbance Jin Corea Do referring           History of Present Illness:     Flora Acuna is a 79 year old female seen today, referred by Dr Corea for concerns for worsening FRANCO and dysphonia.      She has a history of severe muscle tension dysphonia diagnosed by Dr. Shen at the [facility] at the [facility] in 2019.  She worked with speech therapy in the past.      She presented to the emergency department on 4/22/2024 for an evaluation of a 3-month history of dyspnea on exertion and was diagnosed with a non-STEMI.  She was transferred to [facility].  She also has a history of paroxysmal atrial fibrillation, chronic systolic heart failure and asthma.    Pulmonary function testing completed 9/24/2024.  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  She reports she subsequently quit her steroid inhaler due to the cost of medication and subjective feeling of no improvement.    Today she reports a long history of dyspnea on exertion that has seemed to have worsened over the last 3 months.  She reports that slight activity can bring on significant dyspnea.    She has gravelly sound to her voice most of the time but can completely lose her voice for a short time that seems to be exacerbated by exertion.      Never smoker  Alcohol - never drinker  No dysphagia  She does note soreness in her throat/neck after one of her \"attacks\"   No history of head or neck surgery  She did have shoulder surgery x 2 in the distant past  Drinking adequate water  Voice habits - lives alone, doesn't talk much    Occasional cough - coughs up phlegm at times  9 pound weight loss in the past - she is on ozempic and this has been changing her appetite.   She was having nausea from ozempic.   No otalgia         Medications:     Current Outpatient Rx   Medication Sig Dispense Refill    " Acetone, Urine, Test (KETONE TEST) STRP Use as directed 50 strip 4    apixaban ANTICOAGULANT (ELIQUIS ANTICOAGULANT) 5 MG tablet Take 1 tablet (5 mg) by mouth 2 times daily. Take 1 tablet by mouth twice daily 60 tablet 11    Calcium-Vitamin D-Vitamin K (VIACTIV PO) Take 2 chew tab by mouth every morning      Continuous Blood Gluc  (DEXCOM G6 ) DIMITRIS 1 each continuous 1 each 0    Continuous Glucose Sensor (DEXCOM G6 SENSOR) MISC Change every 10 days. 9 each 1    Continuous Glucose Transmitter (DEXCOM G6 TRANSMITTER) MISC CHANGE EVERY 90 DAYS 1 each 1    furosemide (LASIX) 40 MG tablet Take 1.5 tablets (60 mg) by mouth daily. 135 tablet 3    insulin lispro (HUMALOG VIAL) 100 UNIT/ML vial To be used with the insulin pump TDD: 80 units 30 mL 3    INSULIN PUMP - OUTPATIENT Insulin pump settings:  Updated: 8/14/24  Insulin pump: Minimed 780G  Basal:  0000 0.85   Total: 18  CR: 13   ISF: 50    Target: 100-130  Active insulin:  4 hours 30 minutes      levothyroxine (SYNTHROID/LEVOTHROID) 75 MCG tablet Take 1 tablet (75 mcg) by mouth daily. 90 tablet 2    metoprolol succinate ER (TOPROL XL) 50 MG 24 hr tablet Take 2 tablets (100 mg) by mouth daily 90 tablet 3    omeprazole (PRILOSEC) 40 MG DR capsule Take 1 capsule by mouth once daily 90 capsule 3    potassium chloride eduardo ER (KLOR-CON M20) 20 MEQ CR tablet Take 1 tablet by mouth twice daily 180 tablet 3    ramipril (ALTACE) 10 MG capsule Take 1 capsule by mouth twice daily 180 capsule 1    semaglutide (OZEMPIC) 2 MG/3ML pen Inject 0.5 mg subcutaneously every 7 days.      senna-docusate (SENOKOT-S;PERICOLACE) 8.6-50 MG per tablet Take 1 tablet by mouth At Bedtime       simvastatin (ZOCOR) 40 MG tablet Take 1 tablet (40 mg) by mouth at bedtime. 90 tablet 3    tamoxifen (NOLVADEX) 20 MG tablet Take 20 mg by mouth every morning      tiZANidine (ZANAFLEX) 4 MG tablet Take a 1/2 tablet by mouth in the morning, 1/2 tablet mid day and 1 full tablet at bedtime, can  "take an extra 1/2 tablet as needed during the night for breakthrough symptoms up to 4 times per week.              Allergies:     Allergies: Contrast dye, Gadolinium derivatives, Ammonia, Ocry-1 [lidocaine hcl], Codeine sulfate, Food, Fosamax [alendronate], Iodine-131, Lidocaine, Nitrous oxide, No clinical screening - see comments, Novocain [procaine], Penicillins, Symbicort [budesonide-formoterol fumarate], Tramadol, and Morphine          Past Medical History:     Past Medical History:   Diagnosis Date    Congestive heart failure, unspecified     Diabetes (H)     H/O rheumatoid arthritis     HLD (hyperlipidemia)     HTN (hypertension)     Myocardial infarction (H)     Uncomplicated asthma             Past Surgical History:     Past Surgical History:   Procedure Laterality Date    APPENDECTOMY OPEN CHILD      AS TOTAL KNEE ARTHROPLASTY Right     CHOLECYSTECTOMY      COLONOSCOPY - HIM SCAN N/A 03/12/2009    per Care Everywhere documentation per [facility].     HYSTERECTOMY TOTAL ABDOMINAL      MASTECTOMY, BILATERAL Bilateral 02/26/1992    SHOULDER SURGERY Right     SHOULDER SURGERY Left        ENT family history reviewed         Social History:     Social History     Tobacco Use    Smoking status: Never     Passive exposure: Never    Smokeless tobacco: Never   Vaping Use    Vaping status: Never Used   Substance Use Topics    Alcohol use: No     Alcohol/week: 0.0 standard drinks of alcohol    Drug use: No            Review of Systems:     ROS: See HPI         Physical Exam:     /85 (BP Location: Right arm, Patient Position: Sitting, Cuff Size: Adult Regular)   Pulse 80   Temp 98.1  F (36.7  C) (Tympanic)   Resp 16   Ht 1.6 m (5' 3\")   Wt 64 kg (141 lb)   SpO2 98%   BMI 24.98 kg/m      General - The patient is well nourished and well developed, and appears to have good nutritional status.  Alert and oriented to person and place, answers questions and cooperates with examination appropriately.   Head " and Face - Normocephalic and atraumatic, with no gross asymmetry noted.  The facial nerve is intact, with strong symmetric movements.  Voice and Breathing - The patient was breathing comfortably without the use of accessory muscles. There was no wheezing, stridor. The patients voice was clear and strong, and had appropriate pitch and quality.  Ears - External ear normal. Canals are patent. Right tympanic membrane is intact without effusion, retraction or mass. Left tympanic membrane is intact without effusion, retraction or mass.  Eyes - Extraocular movements intact, sclera were not icteric or injected.  Mouth - Examination of the oral cavity showed pink, healthy oral mucosa. Dentition in good condition. No lesions or ulcerations noted. The tongue was mobile and midline.  No masses or lesions noted on tongue with palpation and inspection.    Throat - The walls of the oropharynx were smooth, pink, moist, symmetric, and had no lesions or ulcerations.  The tonsillar pillars and soft palate were symmetric. The uvula was midline on elevation.    Neck - Normal midline excursion of the laryngotracheal complex during swallowing.  Normal ROM of neck with active movement.  Palpation of the occipital, submental, submandibular, internal jugular chain, and supraclavicular nodes did not demonstrate any abnormal lymph nodes or masses.  Palpation of the thyroid was soft and smooth, with no nodules or goiter appreciated.  The trachea was mobile and midline.  Nose - External contour is symmetric, no gross deflection or scars.  Nasal mucosa is pink and moist with no abnormal mucus.  The septum and turbinates were evaluated with nasal speculum, no polyps, masses, or purulence noted on examination.    Attempts at mirror laryngoscopy were not possible due to gag reflex.  Therefore I proceeded with a fiberoptic examination after informed consent.  First I sprayed both sides of the nose with a mixture of lidocaine and neosynephrine.  I  then passed the scope through the nasal cavity.     The nasopharynx was mucosally covered and symmetric.  The eustachian tube openings were unobstructed.  Going further down I had a clear view of the base of tongue which had normal appearing lingual tonsillar tissue.  The base of tongue was free of lesions, and the vallecula was open.  The epiglottis was smooth and mucosally covered.  The supraglottic larynx was then clearly visualized.  The vocal cords moved smoothly and symmetrically and were pearly white.  The pyriform sinuses were open and without gabrielle mass or pooling of secretions upon valsalva, and the limited view of the postcricoid region did not show any lesions.  The patient tolerated the procedure well.           Assessment and Plan:       ICD-10-CM    1. FRANCO (dyspnea on exertion)  R06.09 Occupational Therapy  Referral      2. Voice disturbance  R49.9 Adult ENT  Referral     lidocaine 2%-oxymetazoline 0.025% nasal solution 2 spray      3. Muscle tension dysphonia  R49.0         Reassured Flora today that her vocal cords are moving normally.  No masses or abnormalities noted in the oropharynx.    We discussed speech therapy, at this time she is hesitant to repeat speech therapy.  We did discuss some possible occupational therapy to work on energy conservation and optimizing her ability to complete her ADLs.  She is interested in a consultation.  I will place an order for this.    Consider speech therapy in the future, they can also work with breathing techniques to decrease muscle tension dysphonia.    Follow-up as needed     Heather Sharma NP-C  [facility]

## 2024-10-09 ENCOUNTER — OFFICE VISIT (OUTPATIENT)
Dept: PODIATRY | Facility: OTHER | Age: 79
End: 2024-10-09
Attending: PODIATRIST
Payer: COMMERCIAL

## 2024-10-09 VITALS
DIASTOLIC BLOOD PRESSURE: 69 MMHG | TEMPERATURE: 97.9 F | SYSTOLIC BLOOD PRESSURE: 108 MMHG | HEART RATE: 78 BPM | OXYGEN SATURATION: 92 %

## 2024-10-09 DIAGNOSIS — Z13.89 SCREENING FOR DIABETIC PERIPHERAL NEUROPATHY: ICD-10-CM

## 2024-10-09 DIAGNOSIS — M62.461 GASTROCNEMIUS EQUINUS OF RIGHT LOWER EXTREMITY: ICD-10-CM

## 2024-10-09 DIAGNOSIS — M77.42 METATARSALGIA OF LEFT FOOT: ICD-10-CM

## 2024-10-09 DIAGNOSIS — E13.40: ICD-10-CM

## 2024-10-09 DIAGNOSIS — M21.41 PES PLANUS OF BOTH FEET: ICD-10-CM

## 2024-10-09 DIAGNOSIS — E10.42 DIABETIC POLYNEUROPATHY ASSOCIATED WITH TYPE 1 DIABETES MELLITUS (H): ICD-10-CM

## 2024-10-09 DIAGNOSIS — L84 CALLUS OF FOOT: ICD-10-CM

## 2024-10-09 DIAGNOSIS — M62.462 GASTROCNEMIUS EQUINUS OF LEFT LOWER EXTREMITY: ICD-10-CM

## 2024-10-09 DIAGNOSIS — M21.42 PES PLANUS OF BOTH FEET: ICD-10-CM

## 2024-10-09 DIAGNOSIS — L60.3 ONYCHODYSTROPHY: Primary | ICD-10-CM

## 2024-10-09 DIAGNOSIS — M77.41 METATARSALGIA OF RIGHT FOOT: ICD-10-CM

## 2024-10-09 PROCEDURE — G0463 HOSPITAL OUTPT CLINIC VISIT: HCPCS | Mod: 25

## 2024-10-09 PROCEDURE — 99203 OFFICE O/P NEW LOW 30 MIN: CPT | Mod: 25 | Performed by: PODIATRIST

## 2024-10-09 PROCEDURE — 11056 PARNG/CUTG B9 HYPRKR LES 2-4: CPT | Performed by: PODIATRIST

## 2024-10-09 PROCEDURE — 99207 PR FOOT EXAM NO CHARGE: CPT | Performed by: PODIATRIST

## 2024-10-09 PROCEDURE — G0463 HOSPITAL OUTPT CLINIC VISIT: HCPCS

## 2024-10-09 PROCEDURE — 11721 DEBRIDE NAIL 6 OR MORE: CPT | Performed by: PODIATRIST

## 2024-10-09 ASSESSMENT — PAIN SCALES - GENERAL: PAINLEVEL: NO PAIN (0)

## 2024-10-09 NOTE — PROGRESS NOTES
Chief complaint: Patient presents with:  Toenail: DFE      History of Present Illness: This 79 year old female is seen at the request of No ref. provider found for evaluation and suggestions of management of thickened toenails.    She says she has HF and she had an MI on 07/20/2024. She has difficulty breathing because of her HF which makes it more difficult to reach her feet to trim her toenails.    She says she has type I DM. She gets burning, tingling, and numbness in her feet, but it is not consistently. She also develops calluses on the bilateral medial great toe.    She says it sometimes feels like she is stepping on something hard on the ball of the foot. This is mostly in the RIGHT foot. She is not sure how to treat this pain in her foot.    No further pedal complaints today.        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), managed as type 1 (H)    H/O total knee replacement, left    Age-related osteoporosis without current pathological fracture    Osteoporosis    Family history of melanoma    Family history of breast cancer in female    Dysphonia    Prolonged QT interval    Fatty liver on 11/7/2007    Diastolic dysfunction with chronic heart failure (H)    Mixed hyperlipidemia    Drug-induced hypokalemia    Vitamin D deficiency    Contrast media allergy    Pulmonary emphysema, unspecified emphysema type (H)    Asthma, unspecified asthma severity, unspecified whether  complicated, unspecified whether persistent    Abnormal EMG    Cardiomegaly    Incisional hernia    Paroxysmal atrial fibrillation (H)    Chronic systolic heart failure (H)    Atrial fibrillation with rapid ventricular response (H)    Elevated brain natriuretic peptide (BNP) level       Past Surgical History:   Procedure Laterality Date    APPENDECTOMY OPEN CHILD      AS TOTAL KNEE ARTHROPLASTY Right     CHOLECYSTECTOMY      COLONOSCOPY - HIM SCAN N/A 03/12/2009    per Care Everywhere documentation per [facility].     HYSTERECTOMY TOTAL ABDOMINAL      MASTECTOMY, BILATERAL Bilateral 02/26/1992    SHOULDER SURGERY Right     SHOULDER SURGERY Left        Current Outpatient Medications   Medication Sig Dispense Refill    Acetone, Urine, Test (KETONE TEST) STRP Use as directed 50 strip 4    apixaban ANTICOAGULANT (ELIQUIS ANTICOAGULANT) 5 MG tablet Take 1 tablet (5 mg) by mouth 2 times daily. Take 1 tablet by mouth twice daily 60 tablet 11    Calcium-Vitamin D-Vitamin K (VIACTIV PO) Take 2 chew tab by mouth every morning      Continuous Blood Gluc  (DEXCOM G6 ) DIMITRIS 1 each continuous 1 each 0    Continuous Glucose Sensor (DEXCOM G6 SENSOR) MISC Change every 10 days. 9 each 1    Continuous Glucose Transmitter (DEXCOM G6 TRANSMITTER) MISC CHANGE EVERY 90 DAYS 1 each 1    furosemide (LASIX) 40 MG tablet Take 1.5 tablets (60 mg) by mouth daily. 135 tablet 3    insulin lispro (HUMALOG VIAL) 100 UNIT/ML vial To be used with the insulin pump TDD: 80 units 30 mL 3    INSULIN PUMP - OUTPATIENT Insulin pump settings:  Updated: 8/14/24  Insulin pump: Minimed 780G  Basal:  0000 0.85   Total: 18  CR: 13   ISF: 50    Target: 100-130  Active insulin:  4 hours 30 minutes      levothyroxine (SYNTHROID/LEVOTHROID) 75 MCG tablet Take 1 tablet (75 mcg) by mouth daily. 90 tablet 2    metoprolol succinate ER (TOPROL XL) 50 MG 24 hr tablet Take 2 tablets (100 mg) by mouth daily 90 tablet 3    omeprazole (PRILOSEC) 40 MG   capsule Take 1 capsule by mouth once daily 90 capsule 3    potassium chloride eduardo ER (KLOR-CON M20) 20 MEQ CR tablet Take 1 tablet by mouth twice daily 180 tablet 3    ramipril (ALTACE) 10 MG capsule Take 1 capsule by mouth twice daily 180 capsule 1    semaglutide (OZEMPIC) 2 MG/3ML pen Inject 0.5 mg subcutaneously every 7 days.      senna-docusate (SENOKOT-S;PERICOLACE) 8.6-50 MG per tablet Take 1 tablet by mouth At Bedtime       simvastatin (ZOCOR) 40 MG tablet Take 1 tablet (40 mg) by mouth at bedtime. 90 tablet 3    tamoxifen (NOLVADEX) 20 MG tablet Take 20 mg by mouth every morning      tiZANidine (ZANAFLEX) 4 MG tablet Take a 1/2 tablet by mouth in the morning, 1/2 tablet mid day and 1 full tablet at bedtime, can take an extra 1/2 tablet as needed during the night for breakthrough symptoms up to 4 times per week.       No current facility-administered medications for this visit.          Allergies   Allergen Reactions    Contrast Dye Difficulty breathing    Gadolinium Derivatives      Other reaction(s): Difficulty in swallowing, Laryngeal spasm  Patient had an injection into rt. Shoulder capsule prior to MRI arthrogram.  Contained multihance gadobenate, omnipaque iohexol, and buffered lidocaine.      Ammonia     Cory-1 [Lidocaine Hcl]      Novocaine allergy      Codeine Sulfate     Food      Shrimp    Fosamax [Alendronate]      Dental infections    Iodine-131 Swelling     Ct dye    Lidocaine     Nitrous Oxide     No Clinical Screening - See Comments Nausea and Vomiting and Other (See Comments)     (3/6/09)- N/V and Heart racing    Novocain [Procaine]     Penicillins     Symbicort [Budesonide-Formoterol Fumarate]     Tramadol     Morphine Palpitations       Family History   Problem Relation Age of Onset    Breast Cancer Maternal Aunt     Breast Cancer Maternal Aunt     Lung Cancer Father        Social History     Socioeconomic History    Marital status:      Spouse name: None    Number of children: 2     Years of education: None    Highest education level: None   Occupational History    Occupation: [id number]     Employer: RETIRED   Tobacco Use    Smoking status: Never     Passive exposure: Never    Smokeless tobacco: Never   Vaping Use    Vaping status: Never Used   Substance and Sexual Activity    Alcohol use: No     Alcohol/week: 0.0 standard drinks of alcohol    Drug use: No     Social Determinants of Health     Financial Resource Strain: Low Risk  (2/28/2024)    Financial Resource Strain     Within the past 12 months, have you or your family members you live with been unable to get utilities (heat, electricity) when it was really needed?: No   Food Insecurity: Low Risk  (2/28/2024)    Food Insecurity     Within the past 12 months, did you worry that your food would run out before you got money to buy more?: No     Within the past 12 months, did the food you bought just not last and you didn t have money to get more?: No   Transportation Needs: Low Risk  (2/28/2024)    Transportation Needs     Within the past 12 months, has lack of transportation kept you from medical appointments, getting your medicines, non-medical meetings or appointments, work, or from getting things that you need?: No   Interpersonal Safety: Low Risk  (10/9/2024)    Interpersonal Safety     Do you feel physically and emotionally safe where you currently live?: Yes     Within the past 12 months, have you been hit, slapped, kicked or otherwise physically hurt by someone?: No     Within the past 12 months, have you been humiliated or emotionally abused in other ways by your partner or ex-partner?: No   Housing Stability: Low Risk  (2/28/2024)    Housing Stability     Do you have housing? : Yes     Are you worried about losing your housing?: No       ROS: 10 point ROS neg other than the symptoms noted above in the HPI.  EXAM  Constitutional: healthy, alert, and no distress    Psychiatric: mentation appears normal and affect  normal/bright    VASCULAR:  -Dorsalis pedis pulse +2/4 b/l  -Posterior tibial pulse +2/4 b/l  -Capillary refill time < 3 seconds to b/l hallux  -Hair growth Absent to b/l anterior legs and ankles  NEURO:  -Light touch sensation intact to b/l plantar forefoot  DERM:  -Skin temperature, texture and turgor WNL b/l  -Toenails elongated, thickened, dystrophic and discolored x 10  -Hyperkeratotic lesion on the medial plantar distal aspect of the proximal phalanx of the bilateral hallux  MSK:  -Lateral deviation of hallux with medial deviation of 1st metatarsal, bilaterally   -Prominent bony prominence to dorsal and medial 1st metatarsal head, bilaterally   -Moderate decrease in arch height while patient is NWB, bilaterally   -Moderate medial midfoot arch collapse bilaterally   -Pain on palpation to the metatarsal heads 2-4 bilaterally   -Muscle strength of ankles +5/5 for dorsiflexion, plantarflexion, ABDUction and ADDuction b/l    -Ankle joint passive ROM within normal limits except for dorsiflexion:    Dorsiflexion, RIGHT Straight knee 0 degrees    Dorsiflexion, LEFT Straight knee 0 degrees    ============================================================    ASSESSMENT:  (L60.3) Onychodystrophy  (primary encounter diagnosis)    (L84) Callus of foot    (M77.41) Metatarsalgia of right foot    (M77.42) Metatarsalgia of left foot    (M21.41,  M21.42) Pes planus of both feet    (M62.461) Gastrocnemius equinus of right lower extremity    (M62.462) Gastrocnemius equinus of left lower extremity    (E13.40) 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 Treatment options discussed with no educational barriers noted.  -High risk toenail debridement x 10 toenails without incident    -Callus pared x 2 to the medial  plantar distal aspect of the proximal phalanx of the bilateral hallux   without incident  ---Patient reminded that the callus will likely return due to the underlying, prominent bone causing the callus while the patient is walking.     -Diabetic Foot Education provided. This included checking the feet daily looking for new new blisters or wounds, wearing shoes at all times when walking including around the house, and avoiding lotion application between the toes. If there are any signs of infection, the patient should present to the ED as soon as possible. Infections of the foot can be life threatening or lead to amputations of the foot or leg.    Diabetic Shoes:  ---Discussed DM shoes and educated the patient as to the advantages to DM shoes. DM shoes have a thicker sole which helps protect the feet from puncture wounds of sharp objects that can puncture through shoes. They also have more depth to the toe box and a wider toe box in attempt to prevent the shoes from rubbing on the toes and causing blisters/wounds. Additionally, the shoes come with a custom molded insert that is designed to also attempt to prevent blisters or ulcers of the foot. Blisters and ulcers can still occur while wearing DM shoes (espeically when the shoes are new), but they are aimed at helping prevent blisters and ulcers. The patient understands the benefits of DM shoes and would like a referral for DM shoes.  ---Referral made for DM shoes through the orthotist, Joy Richards.    --------------------------------------------------------------    Metatarsalgia / gastroc equinus / pes planus:  -Discussed potential causes and treatment options for metatarsalgia. Pain along the metatarsals can be caused from a number of factors, but is commonly caused by an imbalance of the biomechanics. This can include but is not limited to a flat foot, high arched foot, tendon imbalance, gastrocnemius equinus, and compensation for pain in other areas of the foot.  In this case, patient has intermittent pain along the metatarsal heads when walking.  ---Conservative treatment options consist of wider shoe gear and orthotics +/- padding/splinting to correct the biomechanics of the foot. Consistently wearing supportive shoe gear can also decrease this pain (stability shoe gear involves wearing a stability shoe that bends at the toe, but has a solid midfoot shank and heel contour).   -Patient also has a bilateral gastroc equinus and bilateral pes planus which increases pressure int he ball of the footL  -----Stretching: Stressed the importance of stretching the calf muscles to increase dorsiflexion ROM at the ankles. Educated patient on how this contributes to plantar fascia pain. If possible, patient should aim to stretch the calf muscles for a combined total of one hour per day.  ---Patient understands the treatment options. At this time, the patient wishes to pursue an orthotic with a metatarsal pad. Will add this to her DM shoe insert..  -This is an acute, uncomplicated illness/injury with OTC treatment options reviewed.     -Patient in agreement with the above treatment plan and all of patient's questions were answered.      Return to clinic 63+ days for a diabetic foot exam and high risk nail debridement and callus paring        Jane Chaidez DPM

## 2024-10-10 ENCOUNTER — THERAPY VISIT (OUTPATIENT)
Dept: OCCUPATIONAL THERAPY | Facility: HOSPITAL | Age: 79
End: 2024-10-10
Attending: NURSE PRACTITIONER
Payer: MEDICARE

## 2024-10-10 DIAGNOSIS — R06.09 DOE (DYSPNEA ON EXERTION): ICD-10-CM

## 2024-10-10 PROCEDURE — 97530 THERAPEUTIC ACTIVITIES: CPT | Mod: GO

## 2024-10-10 PROCEDURE — 97165 OT EVAL LOW COMPLEX 30 MIN: CPT | Mod: GO

## 2024-10-10 NOTE — PROGRESS NOTES
OCCUPATIONAL THERAPY EVALUATION  Type of Visit: Evaluation       Fall Risk Screen:  Fall screen completed by: OT  Have you fallen 2 or more times in the past year?: No  Have you fallen and had an injury in the past year?: No  Is patient a fall risk?: No    Subjective      Flora is a 79-year old female who presented to occupational therapy evaluation today for dyspnea on exertion. She reported that her son has been staying with her and helping with IADLs which has been very helpful for her. Due to transportation limitations, patient declined any further OT at this time but demonstrated understanding of education provided during evaluation.   Presenting condition or subjective complaint: breathing because of heart failure  Date of onset: 10/02/24 (Date of physician referral)    Relevant medical history:   History of heart attack in April 2024.   Dates & types of surgery:      Prior diagnostic imaging/testing results: Other echogram and pulminary and ent tests .   Prior therapy history for the same diagnosis, illness or injury: No      Prior Level of Function  Transfers: Independent  Ambulation: Assistive equipment, uses 4WW  ADL: Independent, reported increased time  IADL: Driving, Housekeeping, Laundry, Meal preparation, Home Maintenance    Living Environment  Social support: With family members   Type of home: Apartment/condo   Stairs to enter the home: No       Ramp: Yes   Stairs inside the home: No       Help at home: Self Cares (home health aide/personal care attendant, family, etc); Assist for driving and community activities  Equipment owned: Straight Cane; Walker with wheels; Grab bars; Bath bench     Employment: No    Hobbies/Interests:      Patient goals for therapy: control my breathing so i can optimize my capacity to do things    Pain assessment: Pain denied     Objective   Cognitive Status Examination  Orientation: Oriented to person, place and time   Level of Consciousness: Alert  Follows Commands  and Answers Questions: 100% of the time  Personal Safety and Judgement: Intact  Memory: Intact  Attention: No deficits identified  Organization/Problem Solving: No deficits identified  Executive Function: No deficits identified    VISUAL SKILLS  Visual Acuity: Wears glasses  Visual Field: Appears normal  Visual Attention: Appears normal  Oculomotor: WNL    SENSATION: UE Sensation WNL    POSTURE: WNL  RANGE OF MOTION: UE AROM WNL  MUSCLE TONE: WNL  COORDINATION: WNL  BALANCE: WNL    FUNCTIONAL MOBILITY  Assistive Device(s): Walker (four wheeled)  Ambulation: Mod I with 4WW  Wheelchair: NA    BED MOBILITY: Independent    TRANSFERS: Independent    BATHING: Independent  Equipment: Grab bar, Long-handled sponge, Shower chair/Tub bench    UPPER BODY DRESSING: Independent    LOWER BODY DRESSING: Independent    TOILETING: Independent    GROOMING: Independent, while seated    EATING/SELF FEEDING: Independent     ACTIVITY TOLERANCE: Patient reported impaired activity tolerance secondary to CHF.    INSTRUMENTAL ACTIVITIES OF DAILY LIVING (IADL): Patient reported that her son is currently living with her and assists with IADLs as needed.   Meal Planning/Prep: Son assists  Home/Financial Management: Son assists  Communication/Computer Use: Independent  Community Mobility: Patient does not drive. Her son or friend provide transportation    Assessment & Plan   CLINICAL IMPRESSIONS  Medical Diagnosis: Dyspnea on exertion    Treatment Diagnosis: Impaired activity tolerance    Impression/Assessment: Pt is a 79 year old female presenting to Occupational Therapy due to dyspnea on exertion.  The following significant findings have been identified: Impaired activity tolerance and Impaired strength.  These identified deficits interfere with their ability to perform self care tasks, household chores, and meal planning and preparation as compared to previous level of function. Bo reported that she has been experiencing decreased activity  tolerance since heart attack and diagnosis of CHF. Continued OT was recommended in order to further address energy conservation and activity tolerance. Due to lack of transportation, patient declined any further therapy at this time but demonstrated understanding of education provided at evaluation.     Clinical Decision Making (Complexity):  Assessment of Occupational Performance: 1-3 Performance Deficits  Occupational Performance Limitations: health management and maintenance, home establishment and management, and meal preparation and cleanup  Clinical Decision Making (Complexity): Low complexity    PLAN OF CARE  Treatment Interventions:  Interventions: Therapeutic Activity    Long Term Goals   OT Goal 1  Goal Identifier: STG 1  Goal Description: Patient will demonstrate understanding of energy conservation and pacing principles in order to improve engagement in daily activities.  Goal Progress: Goal met.  Target Date: 10/10/24  Date Met: 10/10/24      Frequency of Treatment: Evaluation only  Duration of Treatment: 1x evaluation only     Recommended Referrals to Other Professionals:  Cardiac Rehabilitation was recommended to patient. Patient declined at this time due to lack of consistent transportation.  Education Assessment: Learner/Method: Patient;Reading;Demonstration;Listening  Education Comments: Written and verbal education was provided on energy conservation principles, adaptive equipment, and breathing strategies.     Risks and benefits of evaluation/treatment have been explained.   Patient/Family/caregiver agrees with Plan of Care.     Evaluation Time:         Signing Clinician: IMELDA Worley        [facility]                                                                                   OUTPATIENT OCCUPATIONAL THERAPY      PLAN OF TREATMENT FOR OUTPATIENT REHABILITATION   Patient's Last Name, First Name, Flora Gutierrez    YOB: 1945    Provider's Name   [facility]   Medical Record No.  [id number]     Onset Date: 10/02/24 (Date of physician referral) Start of Care Date: 10/10/24     Medical Diagnosis:  Dyspnea on exertion      OT Treatment Diagnosis:  Impaired activity tolerance Plan of Treatment  Frequency/Duration:Evaluation only/1x evaluation only    Certification date from 10/10/24   To 10/10/24        See note for plan of treatment details and functional goals     Cintia Villagomez, OTR                         I CERTIFY THE NEED FOR THESE SERVICES FURNISHED UNDER        THIS PLAN OF TREATMENT AND WHILE UNDER MY CARE     (Physician attestation of this document indicates review and certification of the therapy plan).              Referring Provider:  Heather Sharma    Initial Assessment  See Epic Evaluation- 10/10/24

## 2024-10-15 ENCOUNTER — TELEPHONE (OUTPATIENT)
Dept: CARE COORDINATION | Facility: OTHER | Age: 79
End: 2024-10-15

## 2024-10-15 ENCOUNTER — PATIENT OUTREACH (OUTPATIENT)
Dept: CARDIOLOGY | Facility: OTHER | Age: 79
End: 2024-10-15

## 2024-10-15 DIAGNOSIS — E13.9 LADA (LATENT AUTOIMMUNE DIABETES IN ADULTS), MANAGED AS TYPE 1 (H): ICD-10-CM

## 2024-10-15 RX ORDER — INSULIN LISPRO 100 [IU]/ML
INJECTION, SOLUTION INTRAVENOUS; SUBCUTANEOUS
Qty: 30 ML | Refills: 2 | Status: SHIPPED | OUTPATIENT
Start: 2024-10-15

## 2024-10-15 NOTE — TELEPHONE ENCOUNTER
From: Heather Franco RN   Sent: 10/15/2024   8:26 AM CDT   To: Marcial Aden, LOKI; Gayathri Kemp RN; *   Subject: RE: Cosign-Required Note                         The referral I'm unclear about.  When I had trained with the HF Care Coordinator in [place] said yes, we needed them.  No one has said anything here.  Do you need a referral for ENT CC?  I'm guessing it would be the same for all specialty's?    Sure I can follow her.  I see she's established care with Dr. Corea, and Dr. Veras already, which is good.   Does she need to be seen sooner than her 11/18 appt?  I see it was for a 2-3 mos follow up when it was made at the time.  And she also had appts made in September, which she had cancelled.   Let me know.

## 2024-10-15 NOTE — TELEPHONE ENCOUNTER
Nading, Marcial, RN sent to Heather Franco RN  Cc: Heather Sharma, NP; Gayathri Kemp RN  Good morning, Heather,  This patient needs care coordination due to cardiac issues. Is this something you would cover? Do we need to place a referral?  Thanks,  LOKI Ceja Care Coordinator, ENT

## 2024-10-15 NOTE — TELEPHONE ENCOUNTER
Marcial Aden, LOKI sent to Heather Franco RN     We don't usually get referrals, but we didn't get training on getting referrals either. 11/18 will be fine for her. She could use some help with transportation for OT for Energy conservation. She saw them previously and it was helping, but she had to stop due to her lack of transport, and she has heart failure that is greatly affecting her daily life. OT would likely be helpful if she could make it.

## 2024-10-15 NOTE — TELEPHONE ENCOUNTER
Humalog      Last Written Prescription Date:  11/7/23  Last Fill Quantity: 30mL,   # refills: 3  Last Office Visit: 8/28/24  Future Office visit:    Next 5 appointments (look out 90 days)      Nov 18, 2024 1:00 PM  (Arrive by 12:45 PM)  Return Visit with Jin Corea DO  [facility] [place] ([facility] [place] ) [address]  [place] [address]  [phone]     Dec 02, 2024 1:00 PM  (Arrive by 12:45 PM)  Provider Visit with Nicolasa Guillen NP  [facility] [place] [facility] ([facility] [place] ) [address]  [place] [address]  [phone]             Routing refill request to provider for review/approval because:

## 2024-10-15 NOTE — TELEPHONE ENCOUNTER
Call to [employer] to inquire if patient is covered for NEMT.  Speaking with a [employer] representative, she confirmed that the patient does not have that benefit.  Her medication transportation availability is limited to hospital-hospital and hospital - SNF.

## 2024-10-16 DIAGNOSIS — Z59.82 TRANSPORTATION INSECURITY: Primary | ICD-10-CM

## 2024-10-16 SDOH — ECONOMIC STABILITY - TRANSPORTATION SECURITY: TRANSPORTATION INSECURITY: Z59.82

## 2024-10-17 NOTE — PROGRESS NOTES
Received an angry message from patient's son regarding [employer] supplies.     Called [employer]   They needed office notes     Faxed noted to their emergency fax number    Called Flora to let her know that the notes were faxed.   Should be process per [employer] by tomorrow      Kerri NELSON FNP-BC  Diabetes and Wound Care

## 2024-10-18 ENCOUNTER — PATIENT OUTREACH (OUTPATIENT)
Dept: CARE COORDINATION | Facility: OTHER | Age: 79
End: 2024-10-18

## 2024-10-18 ENCOUNTER — TELEPHONE (OUTPATIENT)
Dept: CARE COORDINATION | Facility: OTHER | Age: 79
End: 2024-10-18

## 2024-10-18 NOTE — PROGRESS NOTES
Telephone call to patient.  No answer.  Left message, with phone number,  requesting return call to cardiology CC.     Xi Franco RN  Cardiology/CHF Care Coordinator  October 18, 2024...3:58 PM

## 2024-10-21 ENCOUNTER — TELEPHONE (OUTPATIENT)
Dept: EDUCATION SERVICES | Facility: OTHER | Age: 79
End: 2024-10-21

## 2024-10-21 NOTE — TELEPHONE ENCOUNTER
I called [employer] to verify the pt's Guardian 4 sensor has been shipped as Kerri Elliott faxed records 10/17/24 to the [employer] emergency supply number. There is no record in [employer]'s system that it has been received and is being processed. I was given a different fax number to send to. Records re-sent.

## 2024-10-22 ENCOUNTER — PATIENT OUTREACH (OUTPATIENT)
Dept: CARE COORDINATION | Facility: OTHER | Age: 79
End: 2024-10-22

## 2024-10-22 ENCOUNTER — TELEPHONE (OUTPATIENT)
Dept: CARE COORDINATION | Facility: OTHER | Age: 79
End: 2024-10-22

## 2024-10-22 NOTE — TELEPHONE ENCOUNTER
"Outreach call to patient.  Able to reach the patient today.  She stated that her son is staying with her now, and helping her a lot.  She said she feels better than she had been,  and feels that she's getting stronger on her own.  She said that having him there helping her IADL's  and with other things she is unable to so.  Patient also said that having him there with her has taken away the fear that she had of being alone and not having help that she needed.  The patient reports that she does still have SOB, but that she agrees with Dr. Corea, and it is mostly likely just symptoms of her CHF.      She said she had a conversation with one of her providers, and that they had told her that \"she knows her body better than anyone,  and if she feels at this time that she isn't in need of those services, that is ok, as long as she continues to make progress.\"  The patient does state that she is unable to drive, and that does make it harder to get to her appts etc, but her son is now helping her.     This writer did inform patient of the [email or web address] and that was an option to look into at any point in the future should transportation to her appts becomes an issue again.  She was also encouraged to call Cardiology Care Coordination if she had any questions or needs she may need assistance with in the future.   She verbalized understanding.   "

## 2024-10-22 NOTE — PROGRESS NOTES
"Outreach call to patient.  Able to reach the patient today.  She stated that her son is staying with her now, and helping her a lot.  She said she feels better than she had been,  and feels that she's getting stronger on her own.  She said that having him there helping her IADL's  and with other things she is unable to so.  Patient also said that having him there with her has taken away the fear that she had of being alone and not having help that she needed.  The patient reports that she does still have SOB, but that she agrees with Dr. Corea, and it is mostly likely just symptoms of her CHF.       She said she had a conversation with one of her providers, and that they had told her that \"she knows her body better than anyone,  and if she feels at this time that she isn't in need of those services, that is ok, as long as she continues to make progress.\"  The patient does state that she is unable to drive, and that does make it harder to get to her appts etc, but her son is now helping her.     This writer did inform patient of the [email or web address] and that was an option to look into at any point in the future should transportation to her appts becomes an issue again.  She was also encouraged to call Cardiology Care Coordination if she had any questions or needs she may need assistance with in the future.   She verbalized understanding.      "

## 2024-10-27 ENCOUNTER — HOSPITAL ENCOUNTER (EMERGENCY)
Facility: HOSPITAL | Age: 79
Discharge: HOME | DRG: 177 | End: 2024-10-27
Attending: STUDENT IN AN ORGANIZED HEALTH CARE EDUCATION/TRAINING PROGRAM | Admitting: STUDENT IN AN ORGANIZED HEALTH CARE EDUCATION/TRAINING PROGRAM
Payer: MEDICARE

## 2024-10-27 VITALS
TEMPERATURE: 98.7 F | HEART RATE: 90 BPM | SYSTOLIC BLOOD PRESSURE: 116 MMHG | RESPIRATION RATE: 18 BRPM | DIASTOLIC BLOOD PRESSURE: 92 MMHG | OXYGEN SATURATION: 95 %

## 2024-10-27 DIAGNOSIS — D69.3 ITP SECONDARY TO INFECTION (H): ICD-10-CM

## 2024-10-27 DIAGNOSIS — A08.4 VIRAL GASTROENTERITIS: ICD-10-CM

## 2024-10-27 DIAGNOSIS — U07.1 COVID-19: ICD-10-CM

## 2024-10-27 LAB
ALBUMIN SERPL BCG-MCNC: 3.5 G/DL (ref 3.5–5.2)
ALP SERPL-CCNC: 26 U/L (ref 40–150)
ALT SERPL W P-5'-P-CCNC: 45 U/L (ref 0–50)
ANION GAP SERPL CALCULATED.3IONS-SCNC: 13 MMOL/L (ref 7–15)
AST SERPL W P-5'-P-CCNC: 76 U/L (ref 0–45)
B-OH-BUTYR SERPL-SCNC: 0.59 MMOL/L
BILIRUB SERPL-MCNC: 0.8 MG/DL
BUN SERPL-MCNC: 8.5 MG/DL (ref 8–23)
CALCIUM SERPL-MCNC: 8.5 MG/DL (ref 8.8–10.4)
CHLORIDE SERPL-SCNC: 97 MMOL/L (ref 98–107)
CREAT SERPL-MCNC: 0.78 MG/DL (ref 0.51–0.95)
EGFRCR SERPLBLD CKD-EPI 2021: 78 ML/MIN/1.73M2
ERYTHROCYTE [DISTWIDTH] IN BLOOD BY AUTOMATED COUNT: 12.5 % (ref 10–15)
FLUAV RNA SPEC QL NAA+PROBE: NEGATIVE
FLUBV RNA RESP QL NAA+PROBE: NEGATIVE
GLUCOSE BLDC GLUCOMTR-MCNC: 124 MG/DL (ref 70–99)
GLUCOSE SERPL-MCNC: 130 MG/DL (ref 70–99)
HCO3 SERPL-SCNC: 25 MMOL/L (ref 22–29)
HCT VFR BLD AUTO: 33.5 % (ref 35–47)
HGB BLD-MCNC: 11.3 G/DL (ref 11.7–15.7)
HOLD SPECIMEN: NORMAL
LIPASE SERPL-CCNC: 20 U/L (ref 13–60)
MAGNESIUM SERPL-MCNC: 1.9 MG/DL (ref 1.7–2.3)
MCH RBC QN AUTO: 31.5 PG (ref 26.5–33)
MCHC RBC AUTO-ENTMCNC: 33.7 G/DL (ref 31.5–36.5)
MCV RBC AUTO: 93 FL (ref 78–100)
PLATELET # BLD AUTO: 92 10E3/UL (ref 150–450)
POTASSIUM SERPL-SCNC: 3.6 MMOL/L (ref 3.4–5.3)
PROT SERPL-MCNC: 6.3 G/DL (ref 6.4–8.3)
RBC # BLD AUTO: 3.59 10E6/UL (ref 3.8–5.2)
RSV RNA SPEC NAA+PROBE: NEGATIVE
SARS-COV-2 RNA RESP QL NAA+PROBE: POSITIVE
SODIUM SERPL-SCNC: 135 MMOL/L (ref 135–145)
WBC # BLD AUTO: 4.1 10E3/UL (ref 4–11)

## 2024-10-27 PROCEDURE — 96361 HYDRATE IV INFUSION ADD-ON: CPT

## 2024-10-27 PROCEDURE — 83690 ASSAY OF LIPASE: CPT | Performed by: STUDENT IN AN ORGANIZED HEALTH CARE EDUCATION/TRAINING PROGRAM

## 2024-10-27 PROCEDURE — 36415 COLL VENOUS BLD VENIPUNCTURE: CPT | Performed by: STUDENT IN AN ORGANIZED HEALTH CARE EDUCATION/TRAINING PROGRAM

## 2024-10-27 PROCEDURE — 96374 THER/PROPH/DIAG INJ IV PUSH: CPT

## 2024-10-27 PROCEDURE — 84075 ASSAY ALKALINE PHOSPHATASE: CPT | Performed by: STUDENT IN AN ORGANIZED HEALTH CARE EDUCATION/TRAINING PROGRAM

## 2024-10-27 PROCEDURE — 99284 EMERGENCY DEPT VISIT MOD MDM: CPT | Mod: 25

## 2024-10-27 PROCEDURE — 87798 DETECT AGENT NOS DNA AMP: CPT | Performed by: STUDENT IN AN ORGANIZED HEALTH CARE EDUCATION/TRAINING PROGRAM

## 2024-10-27 PROCEDURE — 83735 ASSAY OF MAGNESIUM: CPT | Performed by: STUDENT IN AN ORGANIZED HEALTH CARE EDUCATION/TRAINING PROGRAM

## 2024-10-27 PROCEDURE — 250N000013 HC RX MED GY IP 250 OP 250 PS 637: Performed by: STUDENT IN AN ORGANIZED HEALTH CARE EDUCATION/TRAINING PROGRAM

## 2024-10-27 PROCEDURE — 99284 EMERGENCY DEPT VISIT MOD MDM: CPT | Performed by: STUDENT IN AN ORGANIZED HEALTH CARE EDUCATION/TRAINING PROGRAM

## 2024-10-27 PROCEDURE — 87637 SARSCOV2&INF A&B&RSV AMP PRB: CPT | Performed by: STUDENT IN AN ORGANIZED HEALTH CARE EDUCATION/TRAINING PROGRAM

## 2024-10-27 PROCEDURE — 250N000011 HC RX IP 250 OP 636: Performed by: STUDENT IN AN ORGANIZED HEALTH CARE EDUCATION/TRAINING PROGRAM

## 2024-10-27 PROCEDURE — 82010 KETONE BODYS QUAN: CPT | Performed by: STUDENT IN AN ORGANIZED HEALTH CARE EDUCATION/TRAINING PROGRAM

## 2024-10-27 PROCEDURE — 258N000003 HC RX IP 258 OP 636: Performed by: STUDENT IN AN ORGANIZED HEALTH CARE EDUCATION/TRAINING PROGRAM

## 2024-10-27 PROCEDURE — 80051 ELECTROLYTE PANEL: CPT | Performed by: STUDENT IN AN ORGANIZED HEALTH CARE EDUCATION/TRAINING PROGRAM

## 2024-10-27 PROCEDURE — 82962 GLUCOSE BLOOD TEST: CPT

## 2024-10-27 PROCEDURE — 85027 COMPLETE CBC AUTOMATED: CPT | Performed by: STUDENT IN AN ORGANIZED HEALTH CARE EDUCATION/TRAINING PROGRAM

## 2024-10-27 RX ORDER — ONDANSETRON 4 MG/1
4 TABLET, ORALLY DISINTEGRATING ORAL EVERY 8 HOURS PRN
Qty: 15 TABLET | Refills: 0 | Status: SHIPPED | OUTPATIENT
Start: 2024-10-27

## 2024-10-27 RX ORDER — ONDANSETRON 2 MG/ML
4 INJECTION INTRAMUSCULAR; INTRAVENOUS EVERY 30 MIN PRN
Status: DISCONTINUED | OUTPATIENT
Start: 2024-10-27 | End: 2024-10-27 | Stop reason: HOSPADM

## 2024-10-27 RX ORDER — FOLIC ACID 1 MG/1
1 TABLET ORAL DAILY
Status: DISCONTINUED | OUTPATIENT
Start: 2024-10-27 | End: 2024-10-27 | Stop reason: HOSPADM

## 2024-10-27 RX ADMIN — FOLIC ACID 1 MG: 1 TABLET ORAL at 08:14

## 2024-10-27 RX ADMIN — ONDANSETRON 4 MG: 2 INJECTION INTRAMUSCULAR; INTRAVENOUS at 07:52

## 2024-10-27 RX ADMIN — SODIUM CHLORIDE 1000 ML: 9 INJECTION, SOLUTION INTRAVENOUS at 07:53

## 2024-10-27 ASSESSMENT — ACTIVITIES OF DAILY LIVING (ADL)
ADLS_ACUITY_SCORE: 0

## 2024-10-27 ASSESSMENT — COLUMBIA-SUICIDE SEVERITY RATING SCALE - C-SSRS
1. IN THE PAST MONTH, HAVE YOU WISHED YOU WERE DEAD OR WISHED YOU COULD GO TO SLEEP AND NOT WAKE UP?: NO
2. HAVE YOU ACTUALLY HAD ANY THOUGHTS OF KILLING YOURSELF IN THE PAST MONTH?: NO
6. HAVE YOU EVER DONE ANYTHING, STARTED TO DO ANYTHING, OR PREPARED TO DO ANYTHING TO END YOUR LIFE?: NO

## 2024-10-27 NOTE — ED TRIAGE NOTES
Patient presents with complaints of N/V/D since Thursday evening. She states that she has started ozempic about 2.5 months ago as well. She states that she has thrown up and had diarrhea about 3/4 times a day since Friday. She also endorses not being able to take her meds the last 3 days either as she states she has to be able to take them with water.     Triage Assessment (Adult)       Row Name 10/27/24 0611          Triage Assessment    Airway WDL WDL        Peripheral/Neurovascular WDL    Capillary Refill, General less than/equal to 3 secs        Cognitive/Neuro/Behavioral WDL    Cognitive/Neuro/Behavioral WDL WDL

## 2024-10-27 NOTE — ED PROVIDER NOTES
[facility]  ED Provider Note    Chief Complaint   Patient presents with    Nausea, Vomiting, & Diarrhea     History:  Flora Acuna is a 79 year old female with nausea vomiting diarrhea and a small cough over the last couple of days.  some chills and sweats. No other complaints     Review of Systems   Performed; see HPI for pertinent positives and negatives.     Medical history, surgical history, and social history was reviewed.  Nursing documentation, triage note, and vitals were reviewed.    Vitals:  BP: 130/84  Pulse: 70  Temp: 99.5  F (37.5  C)  Resp: 18  SpO2: 95 %    Physical Exam:  Constitutional: Alert and conversant. NAD   HENT: NCAT   Eyes: Normal pupils   Neck: supple   CV: No pallor  Pulmonary/Chest: Non-labored respirations  Abdominal: non-distended, soft nontender no rebound no guarding negative Avilez sign no pain McBurney's point  MSK: GRANT.   Neuro: Alert and appropriate   Skin: Warm and dry. No diaphoresis. No rashes on exposed skin    Psych: Appropriate mood and affect       MDM:      ED Course as of 10/27/24 0925   Sun Oct 27, 2024   0809 Pt with nausea, vomiting and diarrhea  Differential includes but is not limited to food poisoning, gastroenteritis, pancreatitis, appendicitis, GERD, SBO, gastroparesis, gastric outlet obstruction, gastritis, biliary colic, renal colic/pyelonephritis, intracranial etiology (e.g. stroke, ich, increased ICP), peripheral vertigo, C. diff infection, parasitic infection, bacterial etiologies (such as Yersinia, Shigella, E. Coli, campylobacter, salmonella etc), laxative use.  Vitals acceptable and overall reassuring  Exam reassuring, benign abdomen  Based on hx, exam, clinical presentation, vitals, the likelihood of catastrophic intraabdominal etiology or surgical process is sufficiently low to obviate the need for advanced imaging. Therefore, no CT was performed. Based on hx, exam, clinical presentation and vitals, laboratory evaluation was  indicated with the following acute findings requiring intervention: thrombocytopenia.    0811 Platelet Count(!): 92  Platelet level not low enough to require transfusion and patient has no evidence of bleeding.  Differential diagnosis includes but is not limited to drug-induced thrombocytopenia including her tamoxifen which has a known but rare association with thrombocytopenia.  ITP possibly secondary to gastroenteritis.  Bone marrow suppression.  She does not have a high white count and her hemoglobin is slightly lower than normal.  Unclear how likely this is truly the underlying etiology considered but doubt TTP given lack of fever renal neurological symptoms and her anemia is not particularly impressive.  Also considered DIC.  She does have a history of autoimmune diseases and this could be potentially related.  No significant bleeding to suggest consumption.  No history of cirrhosis to suggest splenic sequestration.  Also considered anaplasmosis.  Overall I consider this less likely but this is an endemic area and so we can test   0816 Also considering her Ozempic as a possible cause of her nausea vomiting diarrhea that she has not had diarrhea with this previously and her complaints of chills and sweats would be a little bit atypical and more consistent with a gastroenteritis   0918 SARS CoV2 PCR(!): Positive  Patient does have COVID.  This likely explains all of her symptoms including her thrombocytopenia mild AST elevation nausea vomiting diarrhea.   0922 I considered Paxlovid for this patient, however she is on a blood thinner.  Will hold off on Paxlovid at this time.  I considered steroids for this patient for ITP related to her COVID, but she has no obvious bleeding and her platelet count is at 92 which I do not believe meets a reasonable threshold for starting steroids at this time.  She will however require follow-up testing to make sure she does not drop so low that she requires steroids.  Will order  outpatient retest of her platelet levels and have her follow-up with her primary care doctor.  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

## 2024-10-27 NOTE — ED NOTES
AVS reviewed with patient and son. All questions and concerns answered. Education reviewed, pt states no further questions at this time.     Patient acknowledged agreement to follow up with PCP for blood work repeat in 48 hrs.

## 2024-10-27 NOTE — DISCHARGE INSTRUCTIONS
Follow-up closely with your primary care provider early next week.  Call to schedule an appointment first thing Monday morning.  In 48 hours I have ordered you a repeat blood test to follow your platelet levels. If your platelet levels drop below 50, you may require a change in the dose of your eliquis.

## 2024-10-28 ENCOUNTER — HOSPITAL ENCOUNTER (INPATIENT)
Facility: HOSPITAL | Age: 79
LOS: 1 days | Discharge: HOME | DRG: 177 | End: 2024-10-30
Attending: PHYSICIAN ASSISTANT | Admitting: INTERNAL MEDICINE
Payer: MEDICARE

## 2024-10-28 ENCOUNTER — TELEPHONE (OUTPATIENT)
Dept: FAMILY MEDICINE | Facility: OTHER | Age: 79
End: 2024-10-28

## 2024-10-28 ENCOUNTER — APPOINTMENT (OUTPATIENT)
Dept: CT IMAGING | Facility: HOSPITAL | Age: 79
DRG: 177 | End: 2024-10-28
Attending: PHYSICIAN ASSISTANT
Payer: MEDICARE

## 2024-10-28 DIAGNOSIS — R09.02 HYPOXIA: ICD-10-CM

## 2024-10-28 DIAGNOSIS — J96.01 ACUTE RESPIRATORY FAILURE WITH HYPOXIA (H): Primary | ICD-10-CM

## 2024-10-28 DIAGNOSIS — D69.3 IDIOPATHIC THROMBOCYTOPENIC PURPURA (H): Primary | ICD-10-CM

## 2024-10-28 DIAGNOSIS — I50.32 DIASTOLIC DYSFUNCTION WITH CHRONIC HEART FAILURE (H): ICD-10-CM

## 2024-10-28 DIAGNOSIS — R53.1 WEAKNESS: ICD-10-CM

## 2024-10-28 DIAGNOSIS — I10 ESSENTIAL HYPERTENSION: ICD-10-CM

## 2024-10-28 DIAGNOSIS — Z79.4 INSULIN-REQUIRING OR DEPENDENT TYPE II DIABETES MELLITUS (H): ICD-10-CM

## 2024-10-28 DIAGNOSIS — E11.9 INSULIN-REQUIRING OR DEPENDENT TYPE II DIABETES MELLITUS (H): ICD-10-CM

## 2024-10-28 DIAGNOSIS — U07.1 COVID-19: ICD-10-CM

## 2024-10-28 LAB
A PHAGOCYTOPH DNA BLD QL NAA+PROBE: NOT DETECTED
ANION GAP SERPL CALCULATED.3IONS-SCNC: 15 MMOL/L (ref 7–15)
BABESIA DNA BLD QL NAA+PROBE: NOT DETECTED
BASOPHILS # BLD AUTO: 0 10E3/UL (ref 0–0.2)
BASOPHILS NFR BLD AUTO: 0 %
BUN SERPL-MCNC: 11.3 MG/DL (ref 8–23)
CALCIUM SERPL-MCNC: 8.7 MG/DL (ref 8.8–10.4)
CHLORIDE SERPL-SCNC: 97 MMOL/L (ref 98–107)
CREAT SERPL-MCNC: 1.01 MG/DL (ref 0.51–0.95)
CRP SERPL-MCNC: <3 MG/L
EGFRCR SERPLBLD CKD-EPI 2021: 57 ML/MIN/1.73M2
EHRLICHIA DNA SPEC QL NAA+PROBE: NOT DETECTED
EOSINOPHIL # BLD AUTO: 0 10E3/UL (ref 0–0.7)
EOSINOPHIL NFR BLD AUTO: 1 %
ERYTHROCYTE [DISTWIDTH] IN BLOOD BY AUTOMATED COUNT: 12.3 % (ref 10–15)
GLUCOSE BLDC GLUCOMTR-MCNC: 124 MG/DL (ref 70–99)
GLUCOSE SERPL-MCNC: 102 MG/DL (ref 70–99)
HCO3 SERPL-SCNC: 22 MMOL/L (ref 22–29)
HCT VFR BLD AUTO: 35.8 % (ref 35–47)
HGB BLD-MCNC: 12 G/DL (ref 11.7–15.7)
HOLD SPECIMEN: NORMAL
HOLD SPECIMEN: NORMAL
IMM GRANULOCYTES # BLD: 0 10E3/UL
IMM GRANULOCYTES NFR BLD: 1 %
INR PPP: 1.47 (ref 0.85–1.15)
LYMPHOCYTES # BLD AUTO: 1.1 10E3/UL (ref 0.8–5.3)
LYMPHOCYTES NFR BLD AUTO: 31 %
MCH RBC QN AUTO: 31.3 PG (ref 26.5–33)
MCHC RBC AUTO-ENTMCNC: 33.5 G/DL (ref 31.5–36.5)
MCV RBC AUTO: 93 FL (ref 78–100)
MONOCYTES # BLD AUTO: 0.4 10E3/UL (ref 0–1.3)
MONOCYTES NFR BLD AUTO: 10 %
NEUTROPHILS # BLD AUTO: 2.1 10E3/UL (ref 1.6–8.3)
NEUTROPHILS NFR BLD AUTO: 58 %
NRBC # BLD AUTO: 0 10E3/UL
NRBC BLD AUTO-RTO: 0 /100
PLATELET # BLD AUTO: 88 10E3/UL (ref 150–450)
POTASSIUM SERPL-SCNC: 3.5 MMOL/L (ref 3.4–5.3)
RBC # BLD AUTO: 3.84 10E6/UL (ref 3.8–5.2)
SODIUM SERPL-SCNC: 134 MMOL/L (ref 135–145)
TROPONIN T SERPL HS-MCNC: 21 NG/L
TROPONIN T SERPL HS-MCNC: 21 NG/L
WBC # BLD AUTO: 3.6 10E3/UL (ref 4–11)

## 2024-10-28 PROCEDURE — 82962 GLUCOSE BLOOD TEST: CPT

## 2024-10-28 PROCEDURE — 93010 ELECTROCARDIOGRAM REPORT: CPT | Performed by: INTERNAL MEDICINE

## 2024-10-28 PROCEDURE — 85048 AUTOMATED LEUKOCYTE COUNT: CPT | Performed by: PHYSICIAN ASSISTANT

## 2024-10-28 PROCEDURE — 96374 THER/PROPH/DIAG INJ IV PUSH: CPT

## 2024-10-28 PROCEDURE — 93005 ELECTROCARDIOGRAM TRACING: CPT

## 2024-10-28 PROCEDURE — G0378 HOSPITAL OBSERVATION PER HR: HCPCS

## 2024-10-28 PROCEDURE — 80048 BASIC METABOLIC PNL TOTAL CA: CPT | Performed by: PHYSICIAN ASSISTANT

## 2024-10-28 PROCEDURE — 99285 EMERGENCY DEPT VISIT HI MDM: CPT | Performed by: PHYSICIAN ASSISTANT

## 2024-10-28 PROCEDURE — 36415 COLL VENOUS BLD VENIPUNCTURE: CPT | Performed by: PHYSICIAN ASSISTANT

## 2024-10-28 PROCEDURE — 70450 CT HEAD/BRAIN W/O DYE: CPT | Mod: ME

## 2024-10-28 PROCEDURE — 86140 C-REACTIVE PROTEIN: CPT | Performed by: PHYSICIAN ASSISTANT

## 2024-10-28 PROCEDURE — 83735 ASSAY OF MAGNESIUM: CPT | Performed by: INTERNAL MEDICINE

## 2024-10-28 PROCEDURE — 71250 CT THORAX DX C-: CPT | Mod: MG

## 2024-10-28 PROCEDURE — 85025 COMPLETE CBC W/AUTO DIFF WBC: CPT | Performed by: PHYSICIAN ASSISTANT

## 2024-10-28 PROCEDURE — 250N000011 HC RX IP 250 OP 636: Performed by: PHYSICIAN ASSISTANT

## 2024-10-28 PROCEDURE — 85610 PROTHROMBIN TIME: CPT | Performed by: PHYSICIAN ASSISTANT

## 2024-10-28 PROCEDURE — 84484 ASSAY OF TROPONIN QUANT: CPT | Performed by: PHYSICIAN ASSISTANT

## 2024-10-28 PROCEDURE — 99285 EMERGENCY DEPT VISIT HI MDM: CPT | Mod: 25

## 2024-10-28 PROCEDURE — G1010 CDSM STANSON: HCPCS

## 2024-10-28 RX ORDER — METOPROLOL SUCCINATE 50 MG/1
100 TABLET, EXTENDED RELEASE ORAL DAILY
Status: DISCONTINUED | OUTPATIENT
Start: 2024-10-29 | End: 2024-10-30 | Stop reason: HOSPADM

## 2024-10-28 RX ORDER — POTASSIUM CHLORIDE 1500 MG/1
20 TABLET, EXTENDED RELEASE ORAL 2 TIMES DAILY
Status: DISCONTINUED | OUTPATIENT
Start: 2024-10-28 | End: 2024-10-30 | Stop reason: HOSPADM

## 2024-10-28 RX ORDER — SODIUM CHLORIDE 9 MG/ML
INJECTION, SOLUTION INTRAVENOUS CONTINUOUS
Status: DISCONTINUED | OUTPATIENT
Start: 2024-10-28 | End: 2024-10-29

## 2024-10-28 RX ORDER — AMOXICILLIN 250 MG
2 CAPSULE ORAL 2 TIMES DAILY PRN
Status: DISCONTINUED | OUTPATIENT
Start: 2024-10-28 | End: 2024-10-30 | Stop reason: HOSPADM

## 2024-10-28 RX ORDER — TAMOXIFEN CITRATE 10 MG/1
20 TABLET ORAL EVERY MORNING
Status: DISCONTINUED | OUTPATIENT
Start: 2024-10-29 | End: 2024-10-30 | Stop reason: HOSPADM

## 2024-10-28 RX ORDER — LISINOPRIL 20 MG/1
40 TABLET ORAL DAILY
Status: DISCONTINUED | OUTPATIENT
Start: 2024-10-29 | End: 2024-10-30 | Stop reason: HOSPADM

## 2024-10-28 RX ORDER — ONDANSETRON 4 MG/1
4 TABLET, ORALLY DISINTEGRATING ORAL EVERY 6 HOURS PRN
Status: DISCONTINUED | OUTPATIENT
Start: 2024-10-28 | End: 2024-10-30 | Stop reason: HOSPADM

## 2024-10-28 RX ORDER — TIZANIDINE 2 MG/1
2-4 TABLET ORAL SEE ADMIN INSTRUCTIONS
Status: DISCONTINUED | OUTPATIENT
Start: 2024-10-28 | End: 2024-10-30 | Stop reason: HOSPADM

## 2024-10-28 RX ORDER — ONDANSETRON 2 MG/ML
4 INJECTION INTRAMUSCULAR; INTRAVENOUS EVERY 6 HOURS PRN
Status: DISCONTINUED | OUTPATIENT
Start: 2024-10-28 | End: 2024-10-30 | Stop reason: HOSPADM

## 2024-10-28 RX ORDER — DEXTROSE MONOHYDRATE 25 G/50ML
25-50 INJECTION, SOLUTION INTRAVENOUS
Status: DISCONTINUED | OUTPATIENT
Start: 2024-10-28 | End: 2024-10-30 | Stop reason: HOSPADM

## 2024-10-28 RX ORDER — AMOXICILLIN 250 MG
1 CAPSULE ORAL 2 TIMES DAILY PRN
Status: DISCONTINUED | OUTPATIENT
Start: 2024-10-28 | End: 2024-10-30 | Stop reason: HOSPADM

## 2024-10-28 RX ORDER — INSULIN LISPRO 100 [IU]/ML
80 INJECTION, SOLUTION INTRAVENOUS; SUBCUTANEOUS ONCE
Status: DISCONTINUED | OUTPATIENT
Start: 2024-10-28 | End: 2024-10-28

## 2024-10-28 RX ORDER — SIMVASTATIN 20 MG
40 TABLET ORAL AT BEDTIME
Status: DISCONTINUED | OUTPATIENT
Start: 2024-10-30 | End: 2024-10-30 | Stop reason: HOSPADM

## 2024-10-28 RX ORDER — NICOTINE POLACRILEX 4 MG
15-30 LOZENGE BUCCAL
Status: DISCONTINUED | OUTPATIENT
Start: 2024-10-28 | End: 2024-10-30 | Stop reason: HOSPADM

## 2024-10-28 RX ORDER — LEVOTHYROXINE SODIUM 75 UG/1
75 TABLET ORAL DAILY
Status: DISCONTINUED | OUTPATIENT
Start: 2024-10-29 | End: 2024-10-30 | Stop reason: HOSPADM

## 2024-10-28 RX ORDER — AMOXICILLIN 250 MG
1 CAPSULE ORAL AT BEDTIME
Status: DISCONTINUED | OUTPATIENT
Start: 2024-10-28 | End: 2024-10-30 | Stop reason: HOSPADM

## 2024-10-28 RX ADMIN — PROCHLORPERAZINE EDISYLATE 5 MG: 5 INJECTION INTRAMUSCULAR; INTRAVENOUS at 14:12

## 2024-10-28 ASSESSMENT — ACTIVITIES OF DAILY LIVING (ADL)
ADLS_ACUITY_SCORE: 0

## 2024-10-28 ASSESSMENT — ENCOUNTER SYMPTOMS
CONFUSION: 1
ABDOMINAL PAIN: 0
HEADACHES: 1
ACTIVITY CHANGE: 1
NAUSEA: 1
COUGH: 0
WHEEZING: 0
DYSURIA: 0
AGITATION: 0
SHORTNESS OF BREATH: 0
WEAKNESS: 1
FATIGUE: 1
DIARRHEA: 1

## 2024-10-28 NOTE — ED NOTES
Patient seen yesterday by Dr. Aguilar and was found to be COVID positive with ITP r/t illness. Writer assisted patient as nurse yesterday, as well as today. Nurse notes mood change in patient from when seen yesterday. Patient is less alert, unable to keep eyes open, and seems to be agitated and anxious compared to yesterday. Reported to provider.

## 2024-10-28 NOTE — ED PROVIDER NOTES
History     Chief Complaint   Patient presents with    Generalized Weakness    Covid Concern     HPI  Flora Acuna is a 79 year old female who returns to the ER (just seen yesterday on 10/27/2024 recently diagnosed with COVID) due to being found down in her bathroom, details unclear.  Per EMS, patient was awake and breathing and alert upon arrival.  She is unsure if she hit her head, but no clear trauma.  She is not having neck pain    She has been having diarrhea, vomiting, nausea, dizziness, weakness, illness.  Mentions a significant/severe headache earlier today, but no vision changes or loss of motor function. She does take apixaban for a.fib. Denies any chest pain/pressure, or shortness of breath, dyspnea.  Not taking much p.o. intake      Allergies:  Allergies   Allergen Reactions    Contrast Dye Difficulty breathing    Gadolinium Derivatives      Other reaction(s): Difficulty in swallowing, Laryngeal spasm  Patient had an injection into rt. Shoulder capsule prior to MRI arthrogram.  Contained multihance gadobenate, omnipaque iohexol, and buffered lidocaine.      Ammonia     Cory-1 [Lidocaine Hcl]      Novocaine allergy      Codeine Sulfate     Food      Shrimp    Fosamax [Alendronate]      Dental infections    Iodine-131 Swelling     Ct dye    Lidocaine     Nitrous Oxide     No Clinical Screening - See Comments Nausea and Vomiting and Other (See Comments)     (3/6/09)- N/V and Heart racing    Novocain [Procaine]     Penicillins     Symbicort [Budesonide-Formoterol Fumarate]     Tramadol     Morphine Palpitations       Problem List:    Patient Active Problem List    Diagnosis Date Noted    Hypoxia 10/28/2024     Priority: Medium    COVID 10/28/2024     Priority: Medium    Chronic systolic heart failure (H) 05/01/2024     Priority: Medium    Atrial fibrillation with rapid ventricular response (H) 05/01/2024     Priority: Medium    Elevated brain natriuretic peptide (BNP) level 05/01/2024     Priority:  Medium    Paroxysmal atrial fibrillation (H) 03/18/2024     Priority: Medium    Pulmonary emphysema, unspecified emphysema type (H) 02/13/2023     Priority: Medium    Asthma, unspecified asthma severity, unspecified whether complicated, unspecified whether persistent 02/13/2023     Priority: Medium    Contrast media allergy 05/14/2021     Priority: Medium    Fatty liver on 11/7/2007 05/05/2021     Priority: Medium    Diastolic dysfunction with chronic heart failure (H) 05/05/2021     Priority: Medium    Mixed hyperlipidemia 05/05/2021     Priority: Medium    Drug-induced hypokalemia 05/05/2021     Priority: Medium    Vitamin D deficiency 05/05/2021     Priority: Medium    Prolonged QT interval 08/28/2020     Priority: Medium    Dysphonia 06/03/2019     Priority: Medium    Hypothyroidism 05/07/2019     Priority: Medium    Essential hypertension 05/07/2019     Priority: Medium    Malignant neoplasm of left breast in female, estrogen receptor positive (H) 05/07/2019     Priority: Medium     Follows with Dr. 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  Flora Acuna has no plan or code status on file. Discussed available resources and provided with information. Confirmed code status reflects current choices pending further ACP discussions.  Confirmed/documented legally designated decision makers.  Added by Ruth Velasquez          Personal history of  malignant neoplasm of breast 11/23/2015     Priority: Medium    Family history of melanoma 11/21/2015     Priority: Medium    Family history of breast cancer in female 11/21/2015     Priority: Medium    Abnormal EMG 02/12/2013     Priority: Medium     Formatting of this note might be different from the original.  possible findings suggestive of myopathy in muscles that could be consistent with inclusion body myopathy, repeat EMG normal.      CARDIOVASCULAR SCREENING; LDL GOAL LESS THAN 160 10/05/2012     Priority: Medium    Osteoporosis 01/03/2012     Priority: Medium     Overview:   IMO Update      Cardiomegaly 05/08/2009     Priority: Medium     Formatting of this note might be different from the original.  TTE w/Doppler      Incisional hernia 05/04/2009     Priority: Medium     Formatting of this note might be different from the original.  IMO Update 10/11          Past Medical History:    Past Medical History:   Diagnosis Date    Congestive heart failure, unspecified     Diabetes (H)     H/O rheumatoid arthritis     HLD (hyperlipidemia)     HTN (hypertension)     Myocardial infarction (H)     Uncomplicated asthma        Past Surgical History:    Past Surgical History:   Procedure Laterality Date    APPENDECTOMY OPEN CHILD      AS TOTAL KNEE ARTHROPLASTY Right     CHOLECYSTECTOMY      COLONOSCOPY - HIM SCAN N/A 03/12/2009    per Care Everywhere documentation per [facility].     HYSTERECTOMY TOTAL ABDOMINAL      MASTECTOMY, BILATERAL Bilateral 02/26/1992    SHOULDER SURGERY Right     SHOULDER SURGERY Left        Family History:    Family History   Problem Relation Age of Onset    Breast Cancer Maternal Aunt     Breast Cancer Maternal Aunt     Lung Cancer Father        Social History:  Marital Status:   [5]  Social History     Tobacco Use    Smoking status: Never     Passive exposure: Never    Smokeless tobacco: Never   Vaping Use    Vaping status: Never Used   Substance Use Topics    Alcohol use: No      Alcohol/week: 0.0 standard drinks of alcohol    Drug use: No        Medications:    apixaban ANTICOAGULANT (ELIQUIS ANTICOAGULANT) 5 MG tablet  Calcium-Vitamin D-Vitamin K (VIACTIV PO)  levothyroxine (SYNTHROID/LEVOTHROID) 75 MCG tablet  metoprolol succinate ER (TOPROL XL) 50 MG 24 hr tablet  omeprazole (PRILOSEC) 40 MG DR capsule  ondansetron (ZOFRAN ODT) 4 MG ODT tab  potassium chloride eduardo ER (KLOR-CON M20) 20 MEQ CR tablet  ramipril (ALTACE) 10 MG capsule  semaglutide (OZEMPIC) 2 MG/3ML pen  simvastatin (ZOCOR) 40 MG tablet  tamoxifen (NOLVADEX) 20 MG tablet  tiZANidine (ZANAFLEX) 4 MG tablet  Acetone, Urine, Test (KETONE TEST) STRP  Continuous Blood Gluc  (DEXCOM G6 ) DIMITRIS  Continuous Glucose Sensor (DEXCOM G6 SENSOR) MISC  Continuous Glucose Transmitter (DEXCOM G6 TRANSMITTER) MISC  furosemide (LASIX) 40 MG tablet  insulin lispro (HUMALOG VIAL) 100 UNIT/ML vial  INSULIN PUMP - OUTPATIENT  senna-docusate (SENOKOT-S;PERICOLACE) 8.6-50 MG per tablet          Review of Systems   Constitutional:  Positive for activity change and fatigue.   Respiratory:  Negative for cough, shortness of breath and wheezing.    Cardiovascular:  Negative for chest pain.   Gastrointestinal:  Positive for diarrhea and nausea. Negative for abdominal pain.   Genitourinary:  Negative for dysuria.   Neurological:  Positive for weakness and headaches.   Psychiatric/Behavioral:  Positive for confusion. Negative for agitation.        Physical Exam   BP: 113/65  Pulse: 78  Temp: 98.1  F (36.7  C)  Resp: 15  SpO2: 100 %      Physical Exam  Moderately ill appearing   Lethargic, but awakes to voice, follows commands  Non-labored respiratory effort, CTAB, no wheezing or rhonchi   O2 sats 95% on 3 L/min supplemental oxygen nasal cannula  Hemodynamically stable, but with some softer blood pressures 90-low 100s, normal heart rate  Abdomen soft, nontender, no peritonitis  Skin warm, initially pale, but then return of her color,  perfusion  Moves extremities      ED Course   1810: paged on-call Hospitalist to discuss admission     ED Course as of 10/28/24 2030   Mon Oct 28, 2024   1819 Reevaluated, patient much more awake, alert, clear speech and appropriate behavior, following commands.  Still requiring 3 L/min supplemental oxygen, attempted to decrease to 1 L/min, and patient became hypoxic in the low 90s percent.     1920: ER RN re-paged Hospitalist, and Dr. Marie Liu called back (unable to talk with her at this time as I was dealing with another patient)    1940: I called back and discuss patient with Dr. Marie Liu      Results for orders placed or performed during the hospital encounter of 10/28/24 (from the past 24 hours)   EKG 12-lead, tracing only   Result Value Ref Range    Systolic Blood Pressure  mmHg    Diastolic Blood Pressure  mmHg    Ventricular Rate 71 BPM    Atrial Rate 71 BPM    WY Interval 182 ms    QRS Duration 102 ms     ms    QTc 536 ms    P Axis 28 degrees    R AXIS -26 degrees    T Axis 196 degrees    Interpretation ECG       Sinus rhythm  ST & Marked T wave abnormality, consider anterolateral ischemia  Prolonged QT  Abnormal ECG  When compared with ECG of 28-Aug-2024 12:38,  Premature ventricular complexes are no longer Present  Inverted T waves have replaced nonspecific T wave abnormality in Inferior leads  T wave inversion more evident in Lateral leads  QT has lengthened     CBC with platelets differential    Narrative    The following orders were created for panel order CBC with platelets differential.  Procedure                               Abnormality         Status                     ---------                               -----------         ------                     CBC with platelets and d...[[id number]]  Abnormal            Final result               RBC and Platelet Morphology[[id number]]                                                   Please view results for these tests on the  individual orders.   Basic metabolic panel   Result Value Ref Range    Sodium 134 (L) 135 - 145 mmol/L    Potassium 3.5 3.4 - 5.3 mmol/L    Chloride 97 (L) 98 - 107 mmol/L    Carbon Dioxide (CO2) 22 22 - 29 mmol/L    Anion Gap 15 7 - 15 mmol/L    Urea Nitrogen 11.3 8.0 - 23.0 mg/dL    Creatinine 1.01 (H) 0.51 - 0.95 mg/dL    GFR Estimate 57 (L) >60 mL/min/1.73m2    Calcium 8.7 (L) 8.8 - 10.4 mg/dL    Glucose 102 (H) 70 - 99 mg/dL   Troponin T, High Sensitivity   Result Value Ref Range    Troponin T, High Sensitivity 21 (H) <=14 ng/L   CRP inflammation   Result Value Ref Range    CRP Inflammation <3.00 <5.00 mg/L   INR   Result Value Ref Range    INR 1.47 (H) 0.85 - 1.15   CBC with platelets and differential   Result Value Ref Range    WBC Count 3.6 (L) 4.0 - 11.0 10e3/uL    RBC Count 3.84 3.80 - 5.20 10e6/uL    Hemoglobin 12.0 11.7 - 15.7 g/dL    Hematocrit 35.8 35.0 - 47.0 %    MCV 93 78 - 100 fL    MCH 31.3 26.5 - 33.0 pg    MCHC 33.5 31.5 - 36.5 g/dL    RDW 12.3 10.0 - 15.0 %    Platelet Count 88 (L) 150 - 450 10e3/uL    % Neutrophils 58 %    % Lymphocytes 31 %    % Monocytes 10 %    % Eosinophils 1 %    % Basophils 0 %    % Immature Granulocytes 1 %    NRBCs per 100 WBC 0 <1 /100    Absolute Neutrophils 2.1 1.6 - 8.3 10e3/uL    Absolute Lymphocytes 1.1 0.8 - 5.3 10e3/uL    Absolute Monocytes 0.4 0.0 - 1.3 10e3/uL    Absolute Eosinophils 0.0 0.0 - 0.7 10e3/uL    Absolute Basophils 0.0 0.0 - 0.2 10e3/uL    Absolute Immature Granulocytes 0.0 <=0.4 10e3/uL    Absolute NRBCs 0.0 10e3/uL   Extra Tube    Narrative    The following orders were created for panel order Extra Tube.  Procedure                               Abnormality         Status                     ---------                               -----------         ------                     Extra Red Top Tube[[id number]]                               Final result               Extra Heparinized Syringe[[id number]]                        Final result                  Please view results for these tests on the individual orders.   Extra Red Top Tube   Result Value Ref Range    Hold Specimen JIC    Extra Heparinized Syringe   Result Value Ref Range    Hold Specimen JIC    Chest CT w/o contrast    Narrative    CT CHEST W/O CONTRAST    HISTORY:  covid, shortness of breath      TECHNIQUE:  Non-contrast helical thoracic CT was performed. This CT  exam was performed using one or more the following dose reduction  techniques: automated exposure control, adjustment of the mA and/or kV  according to patient size, and/or iterative reconstruction technique.    COMPARISON:  12/5/2023    FINDINGS:           The neck base is grossly symmetric. Cardiac size is stable. Slightly  prominent epicardial fat is redemonstrated. No coronary calcifications  are seen. There is no pericardial effusion. The thoracic aorta is  normal in size and course. No abnormal thoracic adenopathy is  identified.    The central airways are patent. There is no focal consolidation. Mild  dependent atelectasis is seen. No suspicious osseous lesion is  identified.    Limited views of the upper abdomen reveal no adrenal mass or acute  process.     No suspicious osseous lesions are seen.      Impression    IMPRESSION:    Mild dependent atelectasis. No discrete consolidation.    CANELO GARRISON MD         SYSTEM ID:  [id number]   Head CT w/o contrast    Narrative    PROCEDURE: CT HEAD W/O CONTRAST     HISTORY: headache, dizziness.    COMPARISON: None.    TECHNIQUE:  Helical images of the head from the foramen magnum to the  vertex were obtained without contrast. This CT exam was performed  using one or more the following dose reduction techniques: automated  exposure control, adjustment of the mA and/or kV according to patient  size, and/or iterative reconstruction technique.    FINDINGS: The ventricles and sulci are prominent, compatible with  moderate, generalized volume loss. No acute intracranial hemorrhage,  mass  effect, midline shift, hydrocephalus or basilar cystern  effacement are present.    No acute transcortical ischemia is identified. Scattered  hypoattenuation within the supratentorial subcortical and  periventricular white matter is most compatible with microvascular  ischemic change.     The calvarium is intact.  The visualized paranasal sinuses are clear.      Impression    IMPRESSION: No acute intracranial hemorrhage or CT evidence of acute  transcortical ischemia.      CANELO GARRISON MD         SYSTEM ID:  [id number]   Troponin T, High Sensitivity   Result Value Ref Range    Troponin T, High Sensitivity 21 (H) <=14 ng/L       Medications   prochlorperazine (COMPAZINE) injection 5 mg (5 mg Intravenous $Given 10/28/24 1412)       Assessments & Plan (with Medical Decision Making)   Flora Acuna presents to the Emergency Department with c/o recent COVID infection and increased weakness, after a possible vasovagal event earlier today.    -Details unclear about the event, EMS did report that patient was awake/alert/oriented upon initial evaluation  -Patient definitely moderately ill-appearing, but hemodynamically stable and oriented x 3    -Leukopenia, and mildly low platelet count 88  -Head CT scan without any intracranial bleed  -CT chest showed no associated pneumonia (contrast allergy)    -High-sensitivity troponin 21, repeat x 2 hours stable at 21.  Reviewed EKG did have some T wave inversions with some mild ST depression, but compared to August 2024 similar.  She is not having any specific cardiac symptoms at this time. Reviewed with Dr. Randall.     -Given patient's age, being frail, recent COVID infection, and hypoxia she warrants hospital observation.      Disposition: [facility] Observation     I have reviewed the nursing notes.    I have reviewed the findings, diagnosis, plan and need for follow up with the patient.       ED to Inpatient Handoff:    Discussed with Dr. Salazar  Noe at 1940  Patient accepted for Observation Stay  Pending studies include none  Code Status: did not discuss               New Prescriptions    No medications on file       Final diagnoses:   COVID-19   Weakness   Hypoxia       10/28/2024   [facility]       Jeferson Regan PA-C  10/28/24 2030

## 2024-10-28 NOTE — ED NOTES
"Patient arrived by [facility] ambulance service. Patient was found by son on the floor of the bathroom. He states that she was unresponsive, pale, and \"eyes in the back of the head\". Patient was alert and orientated at EMS arrival and upon arrival. Assessed by BAILEE Govea at bedside.     Son is at bedside.   "

## 2024-10-28 NOTE — ED TRIAGE NOTES
Patient arrived by [facility]. Family stated she wasn't breathing while found in the bathroom. EMS states that she was breathing and alert upon arrival. Patient was seen in the ED yesterday and was found to be COVID positive. Patient has diarrhea and vomiting. Patient is denying any pain, but states she feels nauseous and sick. Changed into a gown. Monitors on. EKG obtained.      Triage Assessment (Adult)       Row Name 10/28/24 1356          Triage Assessment    Airway WDL WDL        Respiratory WDL    Respiratory WDL X;rhythm/pattern     Rhythm/Pattern, Respiratory shortness of breath;labored        Cognitive/Neuro/Behavioral WDL    Cognitive/Neuro/Behavioral WDL X;orientation     Orientation disoriented to;place        [place] Coma Scale    Best Eye Response 4-->(E4) spontaneous     Best Motor Response 6-->(M6) obeys commands     Best Verbal Response 5-->(V5) oriented     [place] Coma Scale Score 15

## 2024-10-28 NOTE — ED NOTES
Face to face report given with opportunity to observe patient.    Report given to LOKI Curiel RN   10/28/2024  6:56 PM

## 2024-10-28 NOTE — TELEPHONE ENCOUNTER
Request for possible overbook.  Patient seen in ED yesterday and diagnosed with COVID and gastroenteritis. ED provider requests patient be seen in clinic early this week due to low platelet levels. AVS note below:    Follow-up closely with your primary care provider early next week. Call  to schedule an appointment first thing Monday morning. In 48 hours I  have ordered you a repeat blood test to follow your platelet levels. If  your platelet levels drop below 50, you may require a change in the  dose of your eliquis.    Request for possible overbook due to PCP schedule being full.  Writer informed patient to be seen in UC/ED with any new or worsening symptoms. Patient verbalized understanding.    Emergency Department and Urgent Care Follow-up    Reason for ER/UC visit: covid and gastroenteritis  Date seen: 10/27/24    New or Worsening symptoms:  throat pain     Prescription Received/Picked up from Pharmacy?: Zofran   Medications started? yes  Any questions or issues regarding your prescription?: no    Follow-up Results or Labs that are pending: everything completed. Provider states for patient to follow up closely with PCP due to platelet count.    Questions or concerns?: concerns with lab work    ER Recommends Follow-up by: early this week    RN Recommendations: be reseen in uC/eD with any new or worsening symptoms.  Appointment scheduled: request for possible overbook.    If you start feeling worse, or have any further questions, please feel free to contact Nurse Triage at [phone].  If needing immediate medical attention at any time please call 911/Go to the ER.

## 2024-10-28 NOTE — TELEPHONE ENCOUNTER
Symptom or reason needing to speak to RN: ER follow up      Best number to return call: [phone]     Best time to return call: anytime

## 2024-10-29 ENCOUNTER — APPOINTMENT (OUTPATIENT)
Dept: PHYSICAL THERAPY | Facility: HOSPITAL | Age: 79
DRG: 177 | End: 2024-10-29
Attending: INTERNAL MEDICINE
Payer: MEDICARE

## 2024-10-29 ENCOUNTER — APPOINTMENT (OUTPATIENT)
Dept: OCCUPATIONAL THERAPY | Facility: HOSPITAL | Age: 79
DRG: 177 | End: 2024-10-29
Attending: INTERNAL MEDICINE
Payer: MEDICARE

## 2024-10-29 PROBLEM — R53.1 WEAKNESS: Status: ACTIVE | Noted: 2024-10-29

## 2024-10-29 PROBLEM — U07.1 COVID-19: Status: ACTIVE | Noted: 2024-10-29

## 2024-10-29 LAB
ALBUMIN UR-MCNC: NEGATIVE MG/DL
ANION GAP SERPL CALCULATED.3IONS-SCNC: 10 MMOL/L (ref 7–15)
APPEARANCE UR: ABNORMAL
ATRIAL RATE - MUSE: 71 BPM
BACTERIA #/AREA URNS HPF: ABNORMAL /HPF
BILIRUB UR QL STRIP: NEGATIVE
BUN SERPL-MCNC: 12.1 MG/DL (ref 8–23)
CALCIUM SERPL-MCNC: 8.1 MG/DL (ref 8.8–10.4)
CHLORIDE SERPL-SCNC: 99 MMOL/L (ref 98–107)
COLOR UR AUTO: YELLOW
CREAT SERPL-MCNC: 0.76 MG/DL (ref 0.51–0.95)
DIASTOLIC BLOOD PRESSURE - MUSE: NORMAL MMHG
EGFRCR SERPLBLD CKD-EPI 2021: 80 ML/MIN/1.73M2
ERYTHROCYTE [DISTWIDTH] IN BLOOD BY AUTOMATED COUNT: 12.1 % (ref 10–15)
GLUCOSE BLDC GLUCOMTR-MCNC: 113 MG/DL (ref 70–99)
GLUCOSE BLDC GLUCOMTR-MCNC: 163 MG/DL (ref 70–99)
GLUCOSE SERPL-MCNC: 97 MG/DL (ref 70–99)
GLUCOSE UR STRIP-MCNC: NEGATIVE MG/DL
HCO3 SERPL-SCNC: 25 MMOL/L (ref 22–29)
HCT VFR BLD AUTO: 33.1 % (ref 35–47)
HGB BLD-MCNC: 11.1 G/DL (ref 11.7–15.7)
HGB UR QL STRIP: NEGATIVE
HYALINE CASTS: 7 /LPF
INTERPRETATION ECG - MUSE: NORMAL
KETONES UR STRIP-MCNC: 40 MG/DL
LEUKOCYTE ESTERASE UR QL STRIP: NEGATIVE
MAGNESIUM SERPL-MCNC: 1.9 MG/DL (ref 1.7–2.3)
MCH RBC QN AUTO: 31 PG (ref 26.5–33)
MCHC RBC AUTO-ENTMCNC: 33.5 G/DL (ref 31.5–36.5)
MCV RBC AUTO: 93 FL (ref 78–100)
MUCOUS THREADS #/AREA URNS LPF: PRESENT /LPF
NITRATE UR QL: NEGATIVE
P AXIS - MUSE: 28 DEGREES
PH UR STRIP: 5 [PH] (ref 4.7–8)
PLATELET # BLD AUTO: 73 10E3/UL (ref 150–450)
POTASSIUM SERPL-SCNC: 4 MMOL/L (ref 3.4–5.3)
PR INTERVAL - MUSE: 182 MS
QRS DURATION - MUSE: 102 MS
QT - MUSE: 494 MS
QTC - MUSE: 536 MS
R AXIS - MUSE: -26 DEGREES
RBC # BLD AUTO: 3.58 10E6/UL (ref 3.8–5.2)
RBC URINE: <1 /HPF
SODIUM SERPL-SCNC: 134 MMOL/L (ref 135–145)
SP GR UR STRIP: 1.02 (ref 1–1.03)
SQUAMOUS EPITHELIAL: 4 /HPF
SYSTOLIC BLOOD PRESSURE - MUSE: NORMAL MMHG
T AXIS - MUSE: 196 DEGREES
UROBILINOGEN UR STRIP-MCNC: NORMAL MG/DL
VENTRICULAR RATE- MUSE: 71 BPM
WBC # BLD AUTO: 3.4 10E3/UL (ref 4–11)
WBC URINE: <1 /HPF

## 2024-10-29 PROCEDURE — 99222 1ST HOSP IP/OBS MODERATE 55: CPT | Mod: AI | Performed by: INTERNAL MEDICINE

## 2024-10-29 PROCEDURE — 97530 THERAPEUTIC ACTIVITIES: CPT | Mod: GO

## 2024-10-29 PROCEDURE — 258N000003 HC RX IP 258 OP 636: Performed by: INTERNAL MEDICINE

## 2024-10-29 PROCEDURE — 85018 HEMOGLOBIN: CPT | Performed by: INTERNAL MEDICINE

## 2024-10-29 PROCEDURE — 97165 OT EVAL LOW COMPLEX 30 MIN: CPT | Mod: GO

## 2024-10-29 PROCEDURE — 36415 COLL VENOUS BLD VENIPUNCTURE: CPT | Performed by: INTERNAL MEDICINE

## 2024-10-29 PROCEDURE — 80048 BASIC METABOLIC PNL TOTAL CA: CPT | Performed by: INTERNAL MEDICINE

## 2024-10-29 PROCEDURE — 120N000001 HC R&B MED SURG/OB

## 2024-10-29 PROCEDURE — 97161 PT EVAL LOW COMPLEX 20 MIN: CPT | Mod: GP

## 2024-10-29 PROCEDURE — 82310 ASSAY OF CALCIUM: CPT | Performed by: INTERNAL MEDICINE

## 2024-10-29 PROCEDURE — 250N000011 HC RX IP 250 OP 636: Performed by: INTERNAL MEDICINE

## 2024-10-29 PROCEDURE — G0378 HOSPITAL OBSERVATION PER HR: HCPCS

## 2024-10-29 PROCEDURE — 81001 URINALYSIS AUTO W/SCOPE: CPT | Performed by: INTERNAL MEDICINE

## 2024-10-29 PROCEDURE — 93005 ELECTROCARDIOGRAM TRACING: CPT

## 2024-10-29 PROCEDURE — 250N000013 HC RX MED GY IP 250 OP 250 PS 637: Performed by: INTERNAL MEDICINE

## 2024-10-29 PROCEDURE — 85048 AUTOMATED LEUKOCYTE COUNT: CPT | Performed by: INTERNAL MEDICINE

## 2024-10-29 RX ORDER — CHLORHEXIDINE GLUCONATE 4 %
1 LIQUID (ML) TOPICAL 2 TIMES DAILY
COMMUNITY

## 2024-10-29 RX ORDER — BENZONATATE 100 MG/1
200 CAPSULE ORAL 3 TIMES DAILY PRN
Status: DISCONTINUED | OUTPATIENT
Start: 2024-10-29 | End: 2024-10-30 | Stop reason: HOSPADM

## 2024-10-29 RX ORDER — GUAIFENESIN/DEXTROMETHORPHAN 100-10MG/5
10 SYRUP ORAL EVERY 4 HOURS PRN
Status: DISCONTINUED | OUTPATIENT
Start: 2024-10-29 | End: 2024-10-30 | Stop reason: HOSPADM

## 2024-10-29 RX ORDER — IPRATROPIUM BROMIDE AND ALBUTEROL SULFATE 2.5; .5 MG/3ML; MG/3ML
3 SOLUTION RESPIRATORY (INHALATION) EVERY 4 HOURS PRN
Status: DISCONTINUED | OUTPATIENT
Start: 2024-10-29 | End: 2024-10-30 | Stop reason: HOSPADM

## 2024-10-29 RX ADMIN — FAMOTIDINE 20 MG: 10 INJECTION, SOLUTION INTRAVENOUS at 00:15

## 2024-10-29 RX ADMIN — FAMOTIDINE 20 MG: 10 INJECTION, SOLUTION INTRAVENOUS at 21:12

## 2024-10-29 RX ADMIN — POTASSIUM CHLORIDE 20 MEQ: 1500 TABLET, EXTENDED RELEASE ORAL at 00:15

## 2024-10-29 RX ADMIN — TIZANIDINE 2 MG: 2 TABLET ORAL at 21:32

## 2024-10-29 RX ADMIN — APIXABAN 5 MG: 5 TABLET, FILM COATED ORAL at 08:17

## 2024-10-29 RX ADMIN — BENZONATATE 200 MG: 100 CAPSULE ORAL at 21:12

## 2024-10-29 RX ADMIN — LEVOTHYROXINE SODIUM 75 MCG: 75 TABLET ORAL at 21:12

## 2024-10-29 RX ADMIN — SODIUM CHLORIDE: 9 INJECTION, SOLUTION INTRAVENOUS at 00:15

## 2024-10-29 RX ADMIN — TAMOXIFEN CITRATE 20 MG: 10 TABLET ORAL at 08:17

## 2024-10-29 RX ADMIN — POTASSIUM CHLORIDE 20 MEQ: 1500 TABLET, EXTENDED RELEASE ORAL at 21:12

## 2024-10-29 RX ADMIN — LISINOPRIL 40 MG: 20 TABLET ORAL at 08:32

## 2024-10-29 RX ADMIN — FAMOTIDINE 20 MG: 10 INJECTION, SOLUTION INTRAVENOUS at 09:38

## 2024-10-29 RX ADMIN — POTASSIUM CHLORIDE 20 MEQ: 1500 TABLET, EXTENDED RELEASE ORAL at 08:17

## 2024-10-29 RX ADMIN — APIXABAN 5 MG: 5 TABLET, FILM COATED ORAL at 00:15

## 2024-10-29 RX ADMIN — APIXABAN 5 MG: 5 TABLET, FILM COATED ORAL at 21:12

## 2024-10-29 NOTE — PROGRESS NOTES
10/29/24 0900   Appointment Info   Signing Clinician's Name / Credentials (PT) Natalie Drake DPT   Living Environment   People in Home child(tadeo), adult   Current Living Arrangements apartment   Home Accessibility no concerns   Transportation Anticipated family or friend will provide   Living Environment Comments Pt lives in an apartment with her son. Reports son works from home so she always has someone there   Self-Care   Usual Activity Tolerance moderate   Current Activity Tolerance fair   Equipment Currently Used at Home walker, rolling   Fall history within last six months no   Activity/Exercise/Self-Care Comment Pt ambulates with 4WW. Reports long distances are difficult due to CHF. Reports she has been feeling weaker lately since increasing dose of ozempic   General Information   Onset of Illness/Injury or Date of Surgery 10/28/24   Referring Physician Ayla Liu MD   Patient/Family Therapy Goals Statement (PT) Return home with sons   Pertinent History of Current Problem (include personal factors and/or comorbidities that impact the POC) Pt hospitalized c COVID, acute respiratory failure c hypoxia   Existing Precautions/Restrictions fall   Cognition   Affect/Mental Status (Cognition) WNL   Orientation Status (Cognition) oriented x 4   Follows Commands (Cognition) WFL   Pain Assessment   Patient Currently in Pain No   Range of Motion (ROM)   Range of Motion ROM is WFL   Strength (Manual Muscle Testing)   Strength (Manual Muscle Testing) strength is WFL   Strength Comments Mildy impaired LE strength, 4- to 4/5   Bed Mobility   Bed Mobility no deficits identified   Transfers   Transfers sit-stand transfer   Sit-Stand Transfer   Sit-Stand [place] (Transfers) contact guard;supervision   Assistive Device (Sit-Stand Transfers) walker, front-wheeled   Gait/Stairs (Locomotion)   [place] Level (Gait) contact guard   Assistive Device (Gait) walker, front-wheeled   Distance in Feet (Gait) 80'    Pattern (Gait) swing-to   Deviations/Abnormal Patterns (Gait) gait speed decreased   Comment, (Gait/Stairs) Distance limited by onset of nausea   Sensory Examination   Sensory Perception patient reports no sensory changes   Coordination   Coordination no deficits were identified   Muscle Tone   Muscle Tone no deficits were identified   Clinical Impression   Criteria for Skilled Therapeutic Intervention Yes, treatment indicated   PT Diagnosis (PT) Decreased activity tolerance   Influenced by the following impairments Mild weakness, decreased activity tolerance   Functional limitations due to impairments Decreased tolerance for functional mobility   Clinical Presentation (PT Evaluation Complexity) stable   Clinical Presentation Rationale Clinical impression   Clinical Decision Making (Complexity) low complexity   Planned Therapy Interventions (PT) balance training;bed mobility training;gait training;home exercise program;neuromuscular re-education;stair training;strengthening;transfer training;progressive activity/exercise;home program guidelines   Risk & Benefits of therapy have been explained evaluation/treatment results reviewed;care plan/treatment goals reviewed;current/potential barriers reviewed;risks/benefits reviewed;participants voiced agreement with care plan;participants included;patient   Clinical Impression Comments PT evaluation completed for d/c recommendation. Pt demonstrates with mild weakness and mildy impaired activity tolerance compared to baseline. Pt plans to d/c home with assistance from sons who pt reports are with her 24/7. Pt may benefit from home PT. Plan to treat pt during her stay and monitor progress.   PT Total Evaluation Time   PT Eval, Low Complexity Minutes (80904) 15   Physical Therapy Goals   PT Frequency 6x/week   PT Predicted Duration/Target Date for Goal Attainment 11/05/24   PT Goals Transfers;Bed Mobility;Gait   PT: Bed Mobility Independent   PT: Transfers Modified  independent   PT: Gait Modified independent;Rolling walker;Greater than 200 feet   PT Discharge Planning   PT Plan Progress mobility as tolerated   PT Discharge Recommendation (DC Rec) home with home care physical therapy;home with assist   PT Rationale for DC Rec See clinical impression/comments   PT Brief overview of current status SBA transfers, amb 80' c FWW and CGA limited by nausea

## 2024-10-29 NOTE — PLAN OF CARE
Goal Outcome Evaluation:    Plan of Care Reviewed With: Patient    Overall Patient Progress: Progressing    Pt is alert and oriented. Denies pain. Had on episode of SOB after shower. Lungs are clear, on RA. BG have been stable. Pt has been monitoring BG through her device and given bolus of insulin when needed. Pt only gave insulin bolus for lunch at 2.85 units. Pt appetite has been decent. New sensor was placed on pt. So some BG checked with facility meter. Pt ate all her eggs for brk and ate good for dinner, not for lunch. Has had 595 mL this shift. VSS. Metoprolol held due to parameters not met. Assessment as charted. Call light in reach, uses appropriately.

## 2024-10-29 NOTE — PROGRESS NOTES
[facility]    Hospitalist Progress Note    Patient is a 79-year-old female history of diabetes mellitus insulin requiring, history of presumed diastolic heart failure.  She initially presented to the ER on 10/27 for some nausea vomiting diarrhea that been ongoing for 3 to 4 days.  Vital signs are stable she did test positive for COVID.  She is on Eliquis for paroxysmal atrial fibrillation.  Had a platelet count of 92,000.  They did not believe that she needed any steroids and because she is on the blood thinners they did not start Paxlovid.  She was discharged home  Subsequent return to the ER last evening 10/28 after family found her on the floor in the bathroom.  When EMS arrived she was breathing and alert.  Transported to the ER in the ER she was may be less alert than the day before.  Hard to keep her eyes open and at times seems somewhat agitated and anxious.  Vital signs on arrival that she had temp of 99.5 heart rate 70 regular blood pressure 130/84 and sats were 95% on room air.  Workup really did not show anything major her labs were not much different from the previous day creatinine was up a little bit 1.01 CRP was undetectable glucose 102 troponin 21.  White count was 3600 platelet count was 88,000 hemoglobin of 12.  She did translate drop her oxygen level down to 80%.  Ended up going on O2.  A CT chest without contrast was ordered which showed really no significant findings other than some mild dependent atelectasis.  Really no big interventions were performed her oxygen levels did improve she was on 1 to 2 L.  Was more alert however decision was made to admit her for observation status.    Assessment/plan:    1.  COVID-19 infection: Patient with no significant fevers 99.7.  CRP was undetectable.  Has have some thrombocytopenia.  Mild leukopenia at 3400.  Will continue support at this point.  She did have some mild hypoxia although it looks at this point she probably will be off oxygen.  Do  not see any obvious indications for steroids at this point.  Due to monitor closely she is on isolation.  Patient feels 100% better already this morning.  She was able to eat.  Very alert and appropriate.  Will get her up and ambulate her at this point.  She is now off the oxygen also.  Hopefully continues to do well today with potential discharge home tomorrow.    2.  Acute respiratory failure with hypoxia: Remained on room air with good sats.  During her stay in ER she did drop down.  Currently appearing now she is 100% sats on 1 L.  I am sure be able to get her off of this.  CT scan really did not show any obvious acute infiltrate some atelectasis.  Has had chronic dyspnea which they feel is likely due to some diastolic dysfunction.  She is now on room air denies any dyspnea.  Lungs are clear.  She does have her insulin pump on still.  Will discontinue our sliding scale and follow her home sliding scale.  Her Dexcom is working appropriately.    3.  Diabetes mellitus type 2 insulin requiring: Patient is on insulin pump at home.  With a Dexcom sensor.  Her A1c's have not been the greatest the last 1 was 9% on 8/28.  She has not been eating much over the last several days per her report.  Sugars here currently fine at 124.  I do not believe she was still on her pump currently.  Will need to evaluate   will have her manage her diabetes with the nurses being informed.    4.  History of dyspnea likely diastolic dysfunction: Patient has a history of dyspnea.  Believe secondary to diastolic dysfunction.  PFTs done showing just some minimal obstructive disease.  Last echocardiogram done in August showed EF from 50 to 55%.  Back in April she did come in with elevated troponins went to [facility] had angiography done which showed no obstructive disease at all.  Her EF at that time was 26%.  Was felt secondary to A-fib with uncontrolled rate.  And subsequent echo was then in August of this year showed now back up to 50-55%.   Doing well at this point.  Holding her diuretic today.    5.  Hypothyroidism: On replacement will continue same.    6.  Paroxysmal atrial fibrillation on anticoagulation: Is on Eliquis for prophylaxis.  Heart rate currently 77 in sinus rhythm.  No evidence of A-fib tolerating the Eliquis without difficulty    7.  History of breast cancer with subsequent chest wall recurrence: She continues on her tamoxifen for this is followed by Dr. Snyder.    8.  Thrombocytopenia: Blood count 73,000 today.  Back in August and admit 172,000 and an hour since her COVID was 92, and 88 and now 73,000.  Continue to follow closely.    Diet: Combination Diet No Caffeine Diet, Low Saturated Fat Na <2400mg Diet  Fluid restriction 1500 ML FLUID  Fluids: None    DVT Prophylaxis: DOAC  Code Status: Full Code    Disposition: Expected discharge in 1-2 days once stable platelet count and eating well..    Zander Raza MD    Interval History   Patient feels markedly better today she is alert appropriate completely oriented.  No nausea.  Was able to eat okay.  Off oxygen.  Will ambulate her today.  Platelet count is only thing that is on the lower side 7/2000.  I think most of her issues were from the COVID.  She is somewhat concerned about the Wegovy that she is on.  She had nausea and vomiting over the last 4 to 5 days.  This coincided with increasing her dose also.  I told her we certainly can go back down to the 25 dosing of her Wegovy she can discuss it with Kerri.  If she feels well tomorrow no issues will likely be able to discharge home.    -Data reviewed today: I reviewed all new labs and imaging results over the last 24 hours. I personally reviewed no images or EKG's today.    Physical Exam   Temp: 98  F (36.7  C) Temp src: Tympanic BP: 127/62 Pulse: 77   Resp: 16 SpO2: 100 % O2 Device: Nasal cannula Oxygen Delivery: 1 LPM  Vitals:    10/28/24 2127   Weight: 63.3 kg (139 lb 8.8 oz)     Vital Signs with Ranges  Temp:  [98  F (36.7   C)-99.7  F (37.6  C)] 98  F (36.7  C)  Pulse:  [64-81] 77  Resp:  [7-26] 16  BP: ()/(49-67) 127/62  SpO2:  [80 %-100 %] 100 %  No intake/output data recorded.    Peripheral IV 10/28/24 Left Antecubital fossa (Active)   Site Assessment WDL 10/29/24 0000   Line Status Infusing 10/29/24 0000   Dressing Transparent 10/29/24 0000   Dressing Status clean;dry;intact 10/29/24 0000   Line Intervention Flushed 10/29/24 0000   Phlebitis Scale 0-->no symptoms 10/29/24 0000   Infiltration? no 10/29/24 0000   Number of days: 1     No line/device    Constitutional: Alert and oriented x3. No distress    HEENT: Normocephalic/atraumatic, PERRL, EOMI, mouth moist, neck supple, no LN.     Cardiovascular: RRR. no Murmur, no  rubs, or gallops.  JVD no.  Bruits no.  Pulses 2    Respiratory: Clear to auscultation bilaterally    Abdomen: Soft, nontender, nondistended, no organomegaly. Bowel sounds present    Extremities: Warm/dry. No edema    Neuro:   Non focal, cranial nerves intact, Moves all extremities.  Medications   Current Facility-Administered Medications   Medication Dose Route Frequency Provider Last Rate Last Admin    Patient is already receiving anticoagulation with heparin, enoxaparin (LOVENOX), warfarin (COUMADIN)  or other anticoagulant medication   Does not apply Continuous PRN Ayla Liu MD         Current Facility-Administered Medications   Medication Dose Route Frequency Provider Last Rate Last Admin    apixaban ANTICOAGULANT (ELIQUIS) tablet 5 mg  5 mg Oral BID Ayla Liu MD   5 mg at 10/29/24 0015    famotidine (PEPCID) injection 20 mg  20 mg Intravenous Q12H Ayla Liu MD   20 mg at 10/29/24 0015    furosemide (LASIX) tablet 60 mg  60 mg Oral Daily Ayla Liu MD        insulin aspart (NovoLOG) injection (RAPID ACTING)  1-7 Units Subcutaneous TID AC Ayla Liu MD        insulin aspart (NovoLOG) injection (RAPID ACTING)  1-5 Units Subcutaneous At Bedtime Noe  Ayla COHN MD        levothyroxine (SYNTHROID/LEVOTHROID) tablet 75 mcg  75 mcg Oral Daily Ayla Liu MD        lisinopril (ZESTRIL) tablet 40 mg  40 mg Oral Daily Ayla Liu MD        metoprolol succinate ER (TOPROL XL) 24 hr tablet 100 mg  100 mg Oral Daily Ayla Liu MD        potassium chloride eduardo ER (KLOR-CON M20) CR tablet 20 mEq  20 mEq Oral BID Ayla Liu MD   20 mEq at 10/29/24 0015    [Held by provider] semaglutide (OZEMPIC) pen for injection 0.5 mg  0.5 mg Subcutaneous Q7 Days Ayla Liu MD        senna-docusate (SENOKOT-S/PERICOLACE) 8.6-50 MG per tablet 1 tablet  1 tablet Oral At Bedtime Ayla Liu MD        [START ON 10/30/2024] simvastatin (ZOCOR) tablet 40 mg  40 mg Oral At Bedtime Ayla Liu MD        tamoxifen (NOLVADEX) tablet 20 mg  20 mg Oral QAM Ayla Liu MD        tiZANidine (ZANAFLEX) tablet 2-4 mg  2-4 mg Oral See Admin Instructions Ayla Liu MD           Data   Recent Labs   Lab 10/29/24  0600 10/28/24  2107 10/28/24  1420 10/27/24  0803 10/27/24  0653   WBC 3.4*  --  3.6*  --  4.1   HGB 11.1*  --  12.0  --  11.3*   MCV 93  --  93  --  93   PLT 73*  --  88*  --  92*   INR  --   --  1.47*  --   --    *  --  134*  --  135   POTASSIUM 4.0  --  3.5  --  3.6   CHLORIDE 99  --  97*  --  97*   CO2 25  --  22  --  25   BUN 12.1  --  11.3  --  8.5   CR 0.76  --  1.01*  --  0.78   ANIONGAP 10  --  15  --  13   BUDDY 8.1*  --  8.7*  --  8.5*   GLC 97 124* 102*   < > 130*   ALBUMIN  --   --   --   --  3.5   PROTTOTAL  --   --   --   --  6.3*   BILITOTAL  --   --   --   --  0.8   ALKPHOS  --   --   --   --  26*   ALT  --   --   --   --  45   AST  --   --   --   --  76*   LIPASE  --   --   --   --  20    < > = values in this interval not displayed.       Recent Results (from the past 24 hours)   Chest CT w/o contrast    Narrative    CT CHEST W/O CONTRAST    HISTORY:  covid, shortness of breath       TECHNIQUE:  Non-contrast helical thoracic CT was performed. This CT  exam was performed using one or more the following dose reduction  techniques: automated exposure control, adjustment of the mA and/or kV  according to patient size, and/or iterative reconstruction technique.    COMPARISON:  12/5/2023    FINDINGS:           The neck base is grossly symmetric. Cardiac size is stable. Slightly  prominent epicardial fat is redemonstrated. No coronary calcifications  are seen. There is no pericardial effusion. The thoracic aorta is  normal in size and course. No abnormal thoracic adenopathy is  identified.    The central airways are patent. There is no focal consolidation. Mild  dependent atelectasis is seen. No suspicious osseous lesion is  identified.    Limited views of the upper abdomen reveal no adrenal mass or acute  process.     No suspicious osseous lesions are seen.      Impression    IMPRESSION:    Mild dependent atelectasis. No discrete consolidation.    CANELO GARRISON MD         SYSTEM ID:  [id number]   Head CT w/o contrast    Narrative    PROCEDURE: CT HEAD W/O CONTRAST     HISTORY: headache, dizziness.    COMPARISON: None.    TECHNIQUE:  Helical images of the head from the foramen magnum to the  vertex were obtained without contrast. This CT exam was performed  using one or more the following dose reduction techniques: automated  exposure control, adjustment of the mA and/or kV according to patient  size, and/or iterative reconstruction technique.    FINDINGS: The ventricles and sulci are prominent, compatible with  moderate, generalized volume loss. No acute intracranial hemorrhage,  mass effect, midline shift, hydrocephalus or basilar cystern  effacement are present.    No acute transcortical ischemia is identified. Scattered  hypoattenuation within the supratentorial subcortical and  periventricular white matter is most compatible with microvascular  ischemic change.     The calvarium is  intact.  The visualized paranasal sinuses are clear.      Impression    IMPRESSION: No acute intracranial hemorrhage or CT evidence of acute  transcortical ischemia.      CANELO GARRISON MD         SYSTEM ID:  [id number]

## 2024-10-29 NOTE — MEDICATION SCRIBE - ADMISSION MEDICATION HISTORY
Medication Scribe Admission Medication History    Admission medication history is complete. The information provided in this note is only as accurate as the sources available at the time of the update.    Information Source(s): Patient and CareEverywhere/SureScripts via phone    Pertinent Information:   Patient manages her own medications-reliable historian. [facility] patient, dispense hx checked.   Insulin pump- reports settings in PTA meds should be up to date. Confirms the use of humalog in pump.     Changes made to PTA medication list:  Added: women's multivitamin, calcium +d supplement,  Deleted: ketone strips- not using. Viactiv- takes different supplement  Changed:   Pt takes synthroid and prilosec at HS  Takes metoprolol with supper d/t nausea on empty stomach  Potassium- takes with meals  Ozempic- pt gets samples from provider. Administers Sundays.   Zanaflex entered in 2 entries.     Allergies reviewed with patient and updates made in EHR: yes    Medication History Completed By: Mirtha Singleton 10/29/2024 10:16 AM    PTA Med List   Medication Sig Note Last Dose/Taking    apixaban ANTICOAGULANT (ELIQUIS) 5 MG tablet Take 5 mg by mouth 2 times daily.  10/28/2024 Morning    Calcium Carb-Cholecalciferol (CALCIUM PLUS VITAMIN D PO) Take 1 tablet by mouth 2 times daily (with meals).  10/28/2024 Evening    furosemide (LASIX) 40 MG tablet Take 1.5 tablets (60 mg) by mouth daily.  10/28/2024 Morning    insulin lispro (HUMALOG VIAL) 100 UNIT/ML vial TO BE USED WITH THE INSULIN PUMP TOTAL DAILY DOSE 80 UNITS  10/29/2024 Morning    levothyroxine (SYNTHROID/LEVOTHROID) 75 MCG tablet Take 1 tablet (75 mcg) by mouth daily. (Patient taking differently: Take 75 mcg by mouth at bedtime.)  10/27/2024 Bedtime    metoprolol succinate ER (TOPROL XL) 50 MG 24 hr tablet Take 2 tablets (100 mg) by mouth daily (Patient taking differently: Take 100 mg by mouth daily (with dinner).)  10/28/2024 Evening    Multiple Vitamins-Minerals  (WOMENS MULTIVITAMIN) TABS Take 1 tablet by mouth 2 times daily.  10/28/2024 Morning    omeprazole (PRILOSEC) 40 MG DR capsule Take 1 capsule by mouth once daily (Patient taking differently: Take 40 mg by mouth at bedtime.)  10/27/2024 Bedtime    ondansetron (ZOFRAN ODT) 4 MG ODT tab Take 1 tablet (4 mg) by mouth every 8 hours as needed.  10/28/2024 Morning    potassium chloride eduardo ER (KLOR-CON M20) 20 MEQ CR tablet Take 1 tablet by mouth twice daily (Patient taking differently: Take 20 mEq by mouth 2 times daily (with meals).)  10/28/2024 Morning    ramipril (ALTACE) 10 MG capsule Take 1 capsule by mouth twice daily  10/28/2024 Morning    semaglutide (OZEMPIC) 2 MG/3ML pen Inject 0.5 mg subcutaneously every 7 days. 10/29/2024: Gets from provider.  10/20/2024    senna-docusate (SENOKOT-S;PERICOLACE) 8.6-50 MG per tablet Take 1 tablet by mouth At Bedtime   10/27/2024 Bedtime    simvastatin (ZOCOR) 40 MG tablet Take 1 tablet (40 mg) by mouth at bedtime.  10/27/2024 Bedtime    tamoxifen (NOLVADEX) 20 MG tablet Take 20 mg by mouth every morning  10/28/2024 Morning    tiZANidine (ZANAFLEX) 4 MG tablet Take 4 mg by mouth at bedtime. May take an extra 1/2 tablet (2 mg) during the night for breakthrough symptoms up to 4 times per week.  10/27/2024 Bedtime    tiZANidine (ZANAFLEX) 4 MG tablet Take 2 mg by mouth 2 times daily. Take in the morning and midday.  10/28/2024 Morning

## 2024-10-29 NOTE — PROGRESS NOTES
10/29/24 1100   Appointment Info   Signing Clinician's Name / Credentials (OT) Barbara Louis, OTR/L   Living Environment   People in Home child(tadeo), adult   Current Living Arrangements apartment   Home Accessibility no concerns   Transportation Anticipated family or friend will provide   Living Environment Comments Son works from home. The bathroom has a tub/shower combo with 1 grab bar to enter, a shower chair, and handheld shower head   Self-Care   Usual Activity Tolerance moderate   Current Activity Tolerance fair   Equipment Currently Used at Home walker, rolling;grab bar, tub/shower;shower chair;other (see comments)  (handheld shower head)   Fall history within last six months no   Activity/Exercise/Self-Care Comment Pt reports she is independent with ADLs and uses a 4WW for functional mobility. She does have baseline FRANCO d/t CHF   Instrumental Activities of Daily Living (IADL)   IADL Comments Son assists with IADLs. Pt does not drive any more d/t DM   General Information   Onset of Illness/Injury or Date of Surgery 10/27/24   Referring Physician Dr. Liu   Patient/Family Therapy Goal Statement (OT) Return home with son   Additional Occupational Profile Info/Pertinent History of Current Problem Pt admitted with COVID-19, acute respiratory failure with hypoxia, DM2, h/o dyspnea likely diastolic dysfunction, hypothyroidism, afib, h/o breast cancer, thrombocytopenia   Cognitive Status Examination   Orientation Status orientation to person, place and time   Affect/Mental Status (Cognitive) WNL   Follows Commands WNL   Cognitive Status Comments pt off on actual date by ~2 days otherwise alert and oriented x3   Pain Assessment   Patient Currently in Pain No   Range of Motion Comprehensive   Comment, General Range of Motion WFL   Strength Comprehensive (MMT)   Comment, General Manual Muscle Testing (MMT) Assessment WFL - mild generalized weakness   Transfers   Transfers sit-stand transfer   Sit-Stand Transfer    Sit-Stand Cutler (Transfers) supervision   Assistive Device (Sit-Stand Transfers) walker, front-wheeled   Sit/Stand Transfer Comments Pt having difficulties with our walker, stating the wheels aren't working well and it takes more effort to push it so she is having her son bring hers in   Balance   Balance Comments Pt had slight loss of balance but recorrect IND. She was standing with SBA with the FWW and was fatigued/working on deep breathing. Pt shut her eyes, which likely affected her balance. Pt requested to sit back down.   Activities of Daily Living   BADL Assessment/Intervention lower body dressing   Lower Body Dressing Assessment/Training   Position (Lower Body Dressing) unsupported sitting   Cutler Level (Lower Body Dressing) doff;don;socks;modified independence   Clinical Impression   Criteria for Skilled Therapeutic Interventions Met (OT) Yes, treatment indicated   OT Diagnosis decreased activity tolerance, mild generalized weakness   Influenced by the following impairments h/o CHF, decreased endurance, mild weakness   Assessment of Occupational Performance 1-3 Performance Deficits   Identified Performance Deficits decreased tolerance for ADLs   Planned Therapy Interventions (OT) ADL retraining;risk factor education;progressive activity/exercise;home program guidelines;strengthening   Clinical Decision Making Complexity (OT) problem focused assessment/low complexity   Risk & Benefits of therapy have been explained evaluation/treatment results reviewed;care plan/treatment goals reviewed;risks/benefits reviewed;current/potential barriers reviewed;participants voiced agreement with care plan;participants included;patient   Clinical Impression Comments Pt presents with mild deficits with weakness and decreased activity tolerance compared to baseline. She plans to return home with her son who lives with her and is there 24/7. Pt would benefit from home OT.   OT Total Evaluation Time   OT Lane  Low Complexity Minutes (25525) 20   OT Goals   Therapy Frequency (OT) 5 times/week   OT Predicted Duration/Target Date for Goal Attainment 11/05/24   OT Goals Hygiene/Grooming;Lower Body Dressing;Toilet Transfer/Toileting   OT: Hygiene/Grooming modified independent   OT: Lower Body Dressing Modified independent   OT: Toilet Transfer/Toileting Modified independent   Interventions   Interventions Quick Adds Therapeutic Activity   Therapeutic Activities   Therapeutic Activity Minutes (85662) 8   Treatment Detail/Skilled Intervention Discussed and educated pt in basic energy conservation techniques   OT Discharge Planning   OT Plan Progress ADLs, activity tolerance, strength; further education on ECWS   OT Discharge Recommendation (DC Rec) home with assist;home with home care occupational therapy   OT Rationale for DC Rec Pt presents with mild deficits with weakness and decreased activity tolerance compared to baseline. She plans to return home with her son who lives with her and is there 24/7. Pt would benefit from home OT.   OT Brief overview of current status Mod I LB dressing sitting in the chair, SBA sit<>stand from the chair, SBA marching in place with 1 slight LOB but pt recorrect IND; SpO2 did decrease to high 80s with activity though difficulties obtaining a good reading   Total Session Time   Timed Code Treatment Minutes 8   Total Session Time (sum of timed and untimed services) 28

## 2024-10-29 NOTE — PROGRESS NOTES
Chart reviewed and pt discussed in interdisciplinary rounds. No anticipated discharge needs at this time. CTS team will continue to monitor daily in interdisciplinary rounds and will intervene as needed/appropriate.    Checked in with Flora.  She denies questions or concerns at this time.  CTS will continue to remain available for support and resources.

## 2024-10-29 NOTE — H&P
HOSPITALIST TELEMED ADMISSION H&P    Service Date : 10/28/2024  Dr. Ayla ALEXANDRA am located in [place]  Flora Acuna is located in [place] at [facility]     LOKI Rogers on Med/Surg is assisting me today with this patient.    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  Patient was seen in the emergency room yesterday and diagnosed with COVID, and adequately discharged.     Emergency Room Course - in the emergency room, serologies for CMP, CBC, CRP, tickborne disease panel by PCR,     INR = 1.47   Troponin #1 = 21   Troponin #2 = 21     EKG: Sinus Rhythm with no acute ST- T wave changes.      Radiological diagnostics included:      PROCEDURE: CT HEAD W/O CONTRAST      HISTORY: headache, dizziness.     COMPARISON: None.     TECHNIQUE:  Helical images of the head from the foramen magnum to the  vertex were obtained without contrast. This CT exam was performed  using one or more the following dose reduction techniques: automated  exposure control, adjustment of the mA and/or kV according to patient  size, and/or iterative reconstruction technique.     FINDINGS: The ventricles and sulci are prominent, compatible with  moderate, generalized volume loss. No acute intracranial hemorrhage,  mass effect, midline shift, hydrocephalus or basilar cystern  effacement are present.     No acute transcortical ischemia is identified. Scattered  hypoattenuation within the supratentorial subcortical and  periventricular white matter is most compatible  with microvascular  ischemic change.      The calvarium is intact.  The visualized paranasal sinuses are clear.                                                                      IMPRESSION: No acute intracranial hemorrhage or CT evidence of acute  transcortical ischemia.         CT CHEST W/O CONTRAST     HISTORY:  covid, shortness of breath       TECHNIQUE:  Non-contrast helical thoracic CT was performed. This CT  exam was performed using one or more the following dose reduction  techniques: automated exposure control, adjustment of the mA and/or kV  according to patient size, and/or iterative reconstruction technique.     COMPARISON:  2023     FINDINGS:           The neck base is grossly symmetric. Cardiac size is stable. Slightly  prominent epicardial fat is redemonstrated. No coronary calcifications  are seen. There is no pericardial effusion. The thoracic aorta is  normal in size and course. No abnormal thoracic adenopathy is  identified.     The central airways are patent. There is no focal consolidation. Mild  dependent atelectasis is seen. No suspicious osseous lesion is  identified.     Limited views of the upper abdomen reveal no adrenal mass or acute  process.      No suspicious osseous lesions are seen.                                                                      IMPRESSION:     Mild dependent atelectasis. No discrete consolidation.       [facility]  Echocardiography Laboratory  [address] [place] [address]     Name: BASIL BECKETT  MRN: [id number]  : 1945  Study Date: 2024 12:55 PM  Age: 79 yrs  Gender: Female  Patient Location: [facility]  Reason For Study: Essential hypertension, Diastolic dysfunction with chronic  heart  History: HTN, FRANCO, Cardiomegaly, Diastolic dysfunction  Ordering Physician: JENN COHN  Referring Physician: JENN COHN  Performed By: Rosalia Linares RDCS     BSA: 1.7 m2  Height: 63 in  Weight: 150  lb  ______________________________________________________________________________  Procedure  Echocardiogram with two-dimensional, color and spectral Doppler performed.  ______________________________________________________________________________  Interpretation Summary  The left ventricular size and wall thickness are normal. The ejection fraction  is 50-55% (borderline).  The right ventricle size and function are normal  No significant valvular abnormalities.  IVC is normal     This study was compared with the study from 2/27/2023. Left ventricular  function slightly worsened  ______________________________________________________________________________  Left Ventricle  Left ventricular wall thickness is normal. Left ventricular size is normal.  Left ventricular function is decreased. The ejection fraction is 50-55%  (borderline). Left ventricular diastolic function is normal. Diastolic Doppler  findings (E/E' ratio and/or other parameters) suggest left ventricular filling  pressures are normal. No regional wall motion abnormalities are seen.     Right Ventricle  The right ventricle is normal size. Global right ventricular function is  normal.     Atria  Both atria appear normal.     Mitral Valve  The mitral valve is normal. Trace mitral insufficiency is present.     Aortic Valve  The aortic valve is tricuspid. On Doppler interrogation, there is no  significant stenosis or regurgitation.     Tricuspid Valve  The valve leaflets are not well visualized. Trace tricuspid insufficiency is  present. Pulmonary artery systolic pressure cannot be assessed.     Pulmonic Valve  The valve leaflets are not well visualized. On Doppler interrogation, there is  no significant stenosis or regurgitation.     Vessels  The aorta root is normal. The thoracic aorta is normal. IVC diameter <2.1 cm  collapsing >50% with sniff suggests a normal RA pressure of 3 mmHg.     Pericardium  Prominent epicardial fat is noted.      Miscellaneous  No significant valvular abnormalities present.     Compared to Previous Study  This study was compared with the study from 2023 . Left ventricular  function slightly worsened.     Attestation  I have personally viewed the imaging and agree with the interpretation and  report as documented by the fellow, Susan Smart MD, and/or edited by me.  ______________________________________________________________________________  MMode/2D Measurements & Calculations  IVSd: 0.78 cm  LVIDd: 5.0 cm  LVIDs: 4.4 cm  LVPWd: 0.81 cm  FS: 12.0 %  LV mass(C)d: 134.7 grams  LV mass(C)dI: 78.7 grams/m2  Ao root diam: 3.1 cm  LA dimension: 3.0 cm  LA/Ao: 0.96  LVOT diam: 2.1 cm  LVOT area: 3.4 cm2  Ao root diam index Ht(cm/m): 1.9  Ao root diam index BSA (cm/m2): 1.8     LA Volume Indexed (AL/bp): 30.2 ml/m2  RWT: 0.32  TAPSE: 2.0 cm     Doppler Measurements & Calculations  MV E max david: 45.0 cm/sec  MV A max david: 61.0 cm/sec  MV E/A: 0.74  MV dec slope: 183.0 cm/sec2  MV dec time: 0.25 sec  Ao V2 max: 75.0 cm/sec  Ao max P.3 mmHg  Ao V2 mean: 59.0 cm/sec  Ao mean P.5 mmHg  Ao V2 VTI: 17.9 cm  ALYX(I,D): 2.9 cm2  ALYX(V,D): 2.7 cm2  LV V1 max P.4 mmHg  LV V1 max: 60.0 cm/sec  LV V1 VTI: 15.2 cm  SV(LVOT): 51.2 ml  SI(LVOT): 29.9 ml/m2  AV David Ratio (DI): 0.80  ALYX Index (cm2/m2): 1.7  E/E' av.4  Lateral E/e': 8.8  Medial E/e': 10.1     ______________________________________________________________________________  Report approved by: Andressa Goss 2024 02:00 PM               Component 2 mo ago   LVEF 50-55% (borderline)   Resulting Agency [facility]           Exam Ended: 24  1:21 PM Last Resulted: 24 12:55 PM              Past Medical History  Past Medical History:   Diagnosis Date    Congestive heart failure, unspecified     Diabetes (H)     H/O rheumatoid arthritis     HLD (hyperlipidemia)     HTN (hypertension)     Myocardial infarction (H)     Uncomplicated asthma        Patient Active Problem List   Diagnosis    CARDIOVASCULAR SCREENING; LDL GOAL LESS THAN 160    Personal history of malignant neoplasm of breast    ACP (advance care planning)    Hypothyroidism    Essential hypertension    Malignant neoplasm of left breast in female, estrogen receptor positive (H)    S/P reverse total shoulder arthroplasty, right    Lumbar disc disease with radiculopathy    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), managed as type 1 (H)    H/O total knee replacement, left    Age-related osteoporosis without current pathological fracture    Osteoporosis    Family history of melanoma    Family history of breast cancer in female    Dysphonia    Prolonged QT interval    Fatty liver on 11/7/2007    Diastolic dysfunction with chronic heart failure (H)    Mixed hyperlipidemia    Drug-induced hypokalemia    Vitamin D deficiency    Contrast media allergy    Pulmonary emphysema, unspecified emphysema type (H)    Asthma, unspecified asthma severity, unspecified whether complicated, unspecified whether persistent    Abnormal EMG    Cardiomegaly    Incisional hernia    Paroxysmal atrial fibrillation (H)    Chronic systolic heart failure (H)    Atrial fibrillation with rapid ventricular response (H)    Elevated brain natriuretic peptide (BNP) level    Hypoxia    COVID         Past Surgical History  Past Surgical History:   Procedure Laterality Date    APPENDECTOMY OPEN CHILD      AS TOTAL KNEE ARTHROPLASTY Right     CHOLECYSTECTOMY      COLONOSCOPY - HIM SCAN N/A 03/12/2009    per Care Everywhere documentation per [facility].     HYSTERECTOMY TOTAL ABDOMINAL      MASTECTOMY, BILATERAL Bilateral 02/26/1992    SHOULDER SURGERY Right     SHOULDER SURGERY Left       Social History  Flora  reports that she has never smoked. She has never been exposed to tobacco smoke. She has never used smokeless tobacco. She reports that she does not drink alcohol and does not use drugs.    Family History  Flora's  family history includes Breast Cancer in her maternal aunt and maternal aunt; Lung Cancer in her father.    Home Medications  Current Outpatient Medications   Medication Instructions    Acetone, Urine, Test (KETONE TEST) STRP In Vitro, Use as directed    apixaban ANTICOAGULANT (ELIQUIS ANTICOAGULANT) 5 mg, Oral, 2 TIMES DAILY, Take 1 tablet by mouth twice daily    Calcium-Vitamin D-Vitamin K (VIACTIV PO) 2 chew tab, EVERY MORNING    Continuous Blood Gluc  (DEXCOM G6 ) DIMITRIS 1 each, Does not apply, CONTINUOUS    Continuous Glucose Sensor (DEXCOM G6 SENSOR) MISC Change every 10 days.    Continuous Glucose Transmitter (DEXCOM G6 TRANSMITTER) MISC CHANGE EVERY 90 DAYS    furosemide (LASIX) 60 mg, Oral, DAILY    insulin lispro (HUMALOG VIAL) 100 UNIT/ML vial TO BE USED WITH THE INSULIN PUMP TOTAL DAILY DOSE 80 UNITS    INSULIN PUMP - OUTPATIENT Subcutaneous, Insulin pump settings:  Updated: 8/14/24  Insulin pump: Minimed 780G  Basal:  0000 0.85   Total: 18  CR: 13   ISF: 50    Target: 100-130  Active insulin:  4 hours 30 minutes    levothyroxine (SYNTHROID/LEVOTHROID) 75 mcg, Oral, DAILY    metoprolol succinate ER (TOPROL XL) 100 mg, Oral, DAILY    omeprazole (PRILOSEC) 40 MG DR capsule Take 1 capsule by mouth once daily    ondansetron (ZOFRAN ODT) 4 mg, Oral, EVERY 8 HOURS PRN    potassium chloride eduardo ER (KLOR-CON M20) 20 MEQ CR tablet Take 1 tablet by mouth twice daily    ramipril (ALTACE) 10 mg, Oral, 2 TIMES DAILY    semaglutide (OZEMPIC) 0.5 mg, Subcutaneous, EVERY 7 DAYS    senna-docusate (SENOKOT-S;PERICOLACE) 8.6-50 MG per tablet 1 tablet, Oral, AT BEDTIME    simvastatin (ZOCOR) 40 mg, Oral, AT BEDTIME    tamoxifen (NOLVADEX) 20 mg, EVERY MORNING    tiZANidine (ZANAFLEX) 4 MG tablet Take a 1/2 tablet by mouth in the morning, 1/2 tablet mid day and 1 full tablet at bedtime, can take an extra 1/2 tablet as needed during the night for breakthrough symptoms up to 4 times per week.        Allergies  Allergies   Allergen Reactions    Contrast Dye Difficulty breathing    Gadolinium Derivatives      Other reaction(s): Difficulty in swallowing, Laryngeal spasm  Patient had an injection into rt. Shoulder capsule prior to MRI arthrogram.  Contained multihance gadobenate, omnipaque iohexol, and buffered lidocaine.      Ammonia     Cory-1 [Lidocaine Hcl]      Novocaine allergy      Codeine Sulfate     Food      Shrimp    Fosamax [Alendronate]      Dental infections    Iodine-131 Swelling     Ct dye    Lidocaine     Nitrous Oxide     No Clinical Screening - See Comments Nausea and Vomiting and Other (See Comments)     (3/6/09)- N/V and Heart racing    Novocain [Procaine]     Penicillins     Symbicort [Budesonide-Formoterol Fumarate]     Tramadol     Morphine Palpitations        10 pt ROS neg except as noted in HPI    Physical Exam:  I performed all aspects of the physical examination via Telemedicine associated with two way audio and video communication.    Vital Signs: Blood pressure 96/60, pulse 75, temperature 98.1  F (36.7  C), temperature source Oral, resp. rate 15, SpO2 98%.    Physical Exam    Gen:  Well-developed, well-nourished, in no acute distress, lying semi-supine in her hospital bed  HEENT: NC/AT, hearing intact to voice  Resp:  No accessory muscle use, breath sounds clear; no wheezes no rales no rhonchi  Card:  No murmur, normal S1, S2   Abd:  Soft per RN exam, no TTP, non-distended, normoactive bowel sounds are present  Ext: No clubbing, cyanosis or edema was noted  Skin: No rashes or skin breakdown  Psych:  Not anxious, not agitated    DATA:    I&O: No intake/output data recorded.     Clinically Significant Risk Factors Present on Admission          Labs:  I have personally reviewed the following studies:  Recent Labs   Lab 10/28/24  1420 10/27/24  0653   POTASSIUM 3.5 3.6   CHLORIDE 97* 97*   CO2 22 25   BUN 11.3 8.5   ALBUMIN  --  3.5   ALKPHOS  --  26*   AST  --  76*   ALT  --  45    LIPASE  --  20   INR 1.47*  --       Recent Labs   Lab 10/28/24  1420   WBC 3.6*   HGB 12.0   HCT 35.8   PLT 88*         Imaging: ER imaging reviewed    Misc  DVT prophylaxis:   Eliquis  Code Status: Full code   Cardiac Monitoring:   yes active telemetry  Torre:  no  Lines:  peripheral IV  Diet:   diabetic and cardiac    ASSESSMENT/PLAN:       79-year-old female COVID infection, vomiting, diarrhea and  Acute Hypoxic Respiratory Failure, after syncopal episode will be placed in observation for further management will be placed in observation for further management.      The patient's recovery is dependent on the following treatments of   -  gentle IV fluids 75ml/hr x 6 hrs (total 500ml)  -  oxygen support  -  Strict I/Os      Correcting any electrolyte abnormalities to stabilize this condition.       The patient is at risk of developing further complications of end organ failure with sepsis if not treated in an acute care setting.     I expect the patient's hospital stay to require 24 - 36hrs      Our patient will be admitted under the hospitalist services and further management to be based on patient's clinical progress.         # ACTIVE PROBLEM LISTS:    # atrial fibrillation  -  Eliquis 5 mg twice daily    #CHF  -  Echocardiogram complete  - Lasix 60mg q day  - Fluid restrict 1500ml/24  - Strict I/Os  -  potassium chloride 10 mEq daily    # Hypertension:  94/50  Stable.  - Monitor with daily vitals,    - Continue  metoprolol succinate  mg daily  -  lisinopril 40 mg daily (ramipril 10 mg twice daily substituted)    #Latent Autoimmune Diabetes in Adults (KAREEM)  - Treated like Type I  -  sliding scale aspart insulin  -  semaglutide q. weekly  - Acck q AC and HS    #Breast Cancer  - tamoxifen 20 mg  a.m.    # Hyperlipidemia:   Cholesterol   Date Value Ref Range Status   02/28/2024 152 <200 mg/dL Final   05/05/2021 135 <200 mg/dL Final    -Stable  - Continue medication management with  simvastatin 40 mg at  bedtime    # Hypothyroidism:    TSH   Date Value Ref Range Status   08/28/2024 2.27 0.30 - 4.20 uIU/mL Final   05/13/2022 1.64 0.40 - 4.00 mU/L Final   05/05/2021 2.04 0.40 - 4.00 mU/L Final    stable.    Continue with levothyroxine  75 mcg daily    #COPD/ Asthma:   -  DuoNeb Tx, as needed  - patient had to stop Spiriva because she could not afford this medication.   - Consider [employer] or [employer] discount cards for Symbicort or Advair    # Thrombocytopenia:  -  Lowest platelets = 88 in last 2 days,   -  will monitor for bleeding       Our patient will be admitted under the hospitalist services and further management to be based on patient's clinical progress.      As the provider for the telehealth service, I attest that I introduced myself to the patient and provided my credentials. Based on review of the patient s chart and/or a discussion with members of the patient s treatment team, I determined that telemedicine via a real-time, two-way, interactive audio and video platform is an appropriate means of providing this service.   The encounter was approximately 10 minutes. The nurse was present for the duration of the encounter.    Chart reviewed,labs and available imaging reviewed,consultant notes reviewed. Previous available medical records have been reviewed  Care plan discussed with care team    I have seen and examined the patient today. Documentation for this visit was completed using a template. Everything documented was personally performed today with the necessary additions, deletions and changes made as appropriate with the diagnoses being listed in no particular order of clinical relevance.    Ayla Liu MD, FACP  Securely message with the Vocera Web Console (learn more here)  Text page via [facility] Paging/Directory

## 2024-10-29 NOTE — PLAN OF CARE
Goal Outcome Evaluation:[facility] Inpatient Admission Note:    Patient admitted to 3228/3228-1 at approximately 2127 via cart accompanied by transport tech from emergency room . Report received from Veena MANJARREZ in SBAR format at 2100 via telephone. Patient transferred to bed via self.. Patient is alert and oriented X 3, denies pain; rates at 0 on 0-10 scale.  Patient oriented to room, unit, hourly rounding, and plan of care. Explained admission packet and patient handbook with patient bill of rights brochure. Will continue to monitor and document as needed.     Inpatient Nursing criteria listed below was met:    Health care directives status obtained and documented: Yes    Patient identifies a surrogate decision maker: No      If initial lactic acid greater than 2.0, repeat lactic acid drawn within one hour of arrival to unit: No.     Clergy visit ordered if patient requests: No    Skin issues/needs documented: No and N/A    Isolation Patient: yes Education given, correct sign in place and documentation row added to PCS:  Yes    Fall Prevention Yes: Care plan updated, education given and documented, sticker and magnet in place: Yes    Care Plan initiated: Yes    Education Documented (including assessment): Yes    Patient has discharge needs : Yes        Plan of Care Reviewed With: patient and son Nav     Overall Patient Progress:progressing    Face to face report given with opportunity to observe patient.    Report given to Johana Blackmon RN   10/29/2024  7:28 AM

## 2024-10-30 ENCOUNTER — APPOINTMENT (OUTPATIENT)
Dept: PHYSICAL THERAPY | Facility: HOSPITAL | Age: 79
DRG: 177 | End: 2024-10-30
Payer: MEDICARE

## 2024-10-30 ENCOUNTER — TELEPHONE (OUTPATIENT)
Dept: FAMILY MEDICINE | Facility: OTHER | Age: 79
End: 2024-10-30

## 2024-10-30 ENCOUNTER — APPOINTMENT (OUTPATIENT)
Dept: OCCUPATIONAL THERAPY | Facility: HOSPITAL | Age: 79
DRG: 177 | End: 2024-10-30
Payer: MEDICARE

## 2024-10-30 VITALS
WEIGHT: 139.55 LBS | BODY MASS INDEX: 24.73 KG/M2 | TEMPERATURE: 97.6 F | HEIGHT: 63 IN | DIASTOLIC BLOOD PRESSURE: 55 MMHG | OXYGEN SATURATION: 93 % | RESPIRATION RATE: 18 BRPM | SYSTOLIC BLOOD PRESSURE: 100 MMHG | HEART RATE: 86 BPM

## 2024-10-30 PROBLEM — Z79.4 INSULIN-REQUIRING OR DEPENDENT TYPE II DIABETES MELLITUS (H): Status: ACTIVE | Noted: 2024-10-30

## 2024-10-30 PROBLEM — J96.01 ACUTE RESPIRATORY FAILURE WITH HYPOXIA (H): Status: ACTIVE | Noted: 2024-10-30

## 2024-10-30 PROBLEM — E11.9 INSULIN-REQUIRING OR DEPENDENT TYPE II DIABETES MELLITUS (H): Status: ACTIVE | Noted: 2024-10-30

## 2024-10-30 LAB
ALBUMIN SERPL BCG-MCNC: 3.2 G/DL (ref 3.5–5.2)
ALP SERPL-CCNC: 20 U/L (ref 40–150)
ALT SERPL W P-5'-P-CCNC: 66 U/L (ref 0–50)
ANION GAP SERPL CALCULATED.3IONS-SCNC: 11 MMOL/L (ref 7–15)
AST SERPL W P-5'-P-CCNC: 55 U/L (ref 0–45)
BILIRUB SERPL-MCNC: 0.5 MG/DL
BUN SERPL-MCNC: 9.8 MG/DL (ref 8–23)
CALCIUM SERPL-MCNC: 8.4 MG/DL (ref 8.8–10.4)
CHLORIDE SERPL-SCNC: 100 MMOL/L (ref 98–107)
CREAT SERPL-MCNC: 0.68 MG/DL (ref 0.51–0.95)
EGFRCR SERPLBLD CKD-EPI 2021: 89 ML/MIN/1.73M2
ERYTHROCYTE [DISTWIDTH] IN BLOOD BY AUTOMATED COUNT: 12.4 % (ref 10–15)
GLUCOSE SERPL-MCNC: 103 MG/DL (ref 70–99)
HCO3 SERPL-SCNC: 24 MMOL/L (ref 22–29)
HCT VFR BLD AUTO: 32.8 % (ref 35–47)
HGB BLD-MCNC: 11 G/DL (ref 11.7–15.7)
MCH RBC QN AUTO: 31.4 PG (ref 26.5–33)
MCHC RBC AUTO-ENTMCNC: 33.5 G/DL (ref 31.5–36.5)
MCV RBC AUTO: 94 FL (ref 78–100)
PLATELET # BLD AUTO: 79 10E3/UL (ref 150–450)
POTASSIUM SERPL-SCNC: 4.3 MMOL/L (ref 3.4–5.3)
PROT SERPL-MCNC: 5.6 G/DL (ref 6.4–8.3)
RBC # BLD AUTO: 3.5 10E6/UL (ref 3.8–5.2)
SODIUM SERPL-SCNC: 135 MMOL/L (ref 135–145)
WBC # BLD AUTO: 3 10E3/UL (ref 4–11)

## 2024-10-30 PROCEDURE — 97530 THERAPEUTIC ACTIVITIES: CPT | Mod: GO

## 2024-10-30 PROCEDURE — 999N000104 HC STATISTIC NO CHARGE

## 2024-10-30 PROCEDURE — 85014 HEMATOCRIT: CPT | Performed by: INTERNAL MEDICINE

## 2024-10-30 PROCEDURE — 36415 COLL VENOUS BLD VENIPUNCTURE: CPT | Performed by: INTERNAL MEDICINE

## 2024-10-30 PROCEDURE — 80053 COMPREHEN METABOLIC PANEL: CPT | Performed by: INTERNAL MEDICINE

## 2024-10-30 PROCEDURE — 99239 HOSP IP/OBS DSCHRG MGMT >30: CPT | Performed by: INTERNAL MEDICINE

## 2024-10-30 PROCEDURE — 250N000011 HC RX IP 250 OP 636: Performed by: INTERNAL MEDICINE

## 2024-10-30 PROCEDURE — 250N000013 HC RX MED GY IP 250 OP 250 PS 637: Performed by: INTERNAL MEDICINE

## 2024-10-30 RX ORDER — FUROSEMIDE 40 MG/1
60 TABLET ORAL DAILY
Qty: 135 TABLET | Refills: 3 | Status: SHIPPED | OUTPATIENT
Start: 2024-10-30 | End: 2024-11-06

## 2024-10-30 RX ADMIN — TAMOXIFEN CITRATE 20 MG: 10 TABLET ORAL at 08:28

## 2024-10-30 RX ADMIN — POTASSIUM CHLORIDE 20 MEQ: 1500 TABLET, EXTENDED RELEASE ORAL at 08:29

## 2024-10-30 RX ADMIN — FAMOTIDINE 20 MG: 10 INJECTION, SOLUTION INTRAVENOUS at 08:35

## 2024-10-30 RX ADMIN — LISINOPRIL 40 MG: 20 TABLET ORAL at 08:28

## 2024-10-30 RX ADMIN — APIXABAN 5 MG: 5 TABLET, FILM COATED ORAL at 08:28

## 2024-10-30 ASSESSMENT — ACTIVITIES OF DAILY LIVING (ADL)
ADLS_ACUITY_SCORE: 0

## 2024-10-30 NOTE — TELEPHONE ENCOUNTER
3:00 PM    Reason for Call: OVERBOOK    Cass calling from [facility] patient is getting discharged from [facility] today needs a hospital follow up within 7 to 10 days of discharge .    The patient is requesting an appointment for hospital follow up with Nicolasa Guillen.    Was an appointment offered for this call? No  If yes : Appointment type              Date    Preferred method for responding to this message: Telephone Call  What is your phone number ? [phone]    If we cannot reach you directly, may we leave a detailed response at the number you provided? Yes    Can this message wait until your PCP/provider returns, if unavailable today? NO, Yes

## 2024-10-30 NOTE — DISCHARGE INSTRUCTIONS
You will receive a call from [facility] to schedule your follow up appointment with Nicolasa Lares NP within the next two business days. If you don't hear anything please call to schedule the appointment for within the next 7-10 days at [phone]. Thank you!

## 2024-10-30 NOTE — PLAN OF CARE
Face to face report given with opportunity to observe patient.    Report given to LOKI Rogers RN   10/29/2024  7:37 PM

## 2024-10-30 NOTE — PLAN OF CARE
Goal Outcome Evaluation:      Plan of Care Reviewed With: patient    Overall Patient Progress: improvingOverall Patient Progress: improving    A/O, VSS, Denies pain or concerns.Assessment as charted. Using [employer] device to check her blood glucose levels. At [id number] and gave herself 1.8 unit insulin bolus from her machine. BG= 122 at 0230. BP softer at 0230, pt sleeping very hard when awakened up. Face to face report given with opportunity to observe patient.    Report given to Cass Blackmon RN   10/30/2024  7:23 AM

## 2024-10-30 NOTE — DISCHARGE SUMMARY
[facility]  Hospitalist Discharge Summary      Date of Admission:  10/28/2024  Date of Discharge:  10/30/2024  Discharging Provider: Zander Raza MD  Discharge Service: Hospitalist Service    Discharge Diagnoses     COVID 19 infection  Generalized weakness  Nausea and vomiting  Acute respiratory failure with hypoxia  Insulin requiring type 2 diabetes mellitus  Thrombocytopenia  Paroxysmal atrial fibrillation on anticoagulation  Diastolic dysfunction    Clinically Significant Risk Factors     # DMII: A1C = 9.0 % (Ref range: <5.7 %) within past 6 months       Follow-ups Needed After Discharge   Follow-up Appointments       Follow-up and recommended labs and tests       Follow up with primary care provider, Nicolasa Guillen, within 7 days for hospital follow- up.  The following labs/tests are recommended: CBC CMP.            Evaluate for restarting her Lasix.    Unresulted Labs Ordered in the Past 30 Days of this Admission       No orders found from 9/28/2024 to 10/29/2024.        These results will be followed up by not needed    Discharge Disposition   Discharged to home  Condition at discharge: Stable    Hospital Course     Patient is a 79-year-old female history of diabetes mellitus insulin requiring, history of presumed diastolic heart failure.  She initially presented to the ER on 10/27 for some nausea vomiting diarrhea that been ongoing for 3 to 4 days.  Vital signs are stable she did test positive for COVID.  She is on Eliquis for paroxysmal atrial fibrillation.  Had a platelet count of 92,000.  They did not believe that she needed any steroids and because she is on the blood thinners they did not start Paxlovid.  She was discharged home  Subsequent return to the ER last evening 10/28 after family found her on the floor in the bathroom.  When EMS arrived she was breathing and alert.  Transported to the ER in the ER she was may be less alert than the day before.  Hard to keep her eyes open and at  times seems somewhat agitated and anxious.  Vital signs on arrival that she had temp of 99.5 heart rate 70 regular blood pressure 130/84 and sats were 95% on room air.  Workup really did not show anything major her labs were not much different from the previous day creatinine was up a little bit 1.01 CRP was undetectable glucose 102 troponin 21.  White count was 3600 platelet count was 88,000 hemoglobin of 12.  She did transiently drop her oxygen level down to 80%.  Ended up going on O2.  A CT chest without contrast was ordered which showed really no significant findings other than some mild dependent atelectasis.  Really no big interventions were performed her oxygen levels did improve she was on 1 to 2 L.  Was more alert however decision was made to admit her for observation status.     Assessment/plan:     1.  COVID-19 infection: Patient with no significant fevers 99.7.  CRP was undetectable.  Has have some thrombocytopenia.  Mild leukopenia at 3400.  Will continue support at this point.  She did have some mild hypoxia although it looks at this point she probably will be off oxygen.  Do not see any obvious indications for steroids at this point.  Due to monitor closely she is on isolation.  Patient feels 100% better already this morning.  She was able to eat.  Very alert and appropriate.  Will get her up and ambulate her at this point.  She is now off the oxygen also.  Hopefully continues to do well today with potential discharge home tomorrow.  -10/30: Doing markedly better.  She has been afebrile oxygenation has been good on room air.  No further nausea and vomiting at all.  Still has some mild leukopenia and thrombocytopenia.  But overall feels markedly better.     2.  Acute respiratory failure with hypoxia: Remained on room air with good sats.  During her stay in ER she did drop down.  Currently appearing now she is 100% sats on 1 L.  I am sure be able to get her off of this.  CT scan really did not show any  obvious acute infiltrate some atelectasis.  Has had chronic dyspnea which they feel is likely due to some diastolic dysfunction.  She is now on room air denies any dyspnea.  Lungs are clear.  -10/30: Patient is on room air sats 93%.  Really does not complain any of acute dyspnea she has her chronic dyspnea which chronic comes and goes.  Definitely is not volume overloaded at this point.  No fevers lungs are clear to auscultation.  She feels at her baseline.  Is able to ambulate around the room without any issues as far she is concerned.     3.  Diabetes mellitus type 2 insulin requiring: Patient is on insulin pump at home.  With a Dexcom sensor.  Her A1c's have not been the greatest the last 1 was 9% on 8/28.  She has not been eating much over the last several days per her report.  Sugars here currently fine at 124.  I do not believe she was still on her pump currently.  Will need to evaluate   will have her manage her diabetes with the nurses being informed.  -10/30: Sugars have been fine she has been using her insulin pump.  Giving herself boluses.  No real concerns at this point.  She does have some concerns regarding Wegovy.  I did let Kerri Elliott know about this and will be contacting her regarding this.  She might want to go back on one of the doses.  Thinking maybe that caused her nausea and vomiting however she does have the COVID infection which may be the main cause of all of this.     4.  History of dyspnea likely diastolic dysfunction: Patient has a history of dyspnea.  Believe secondary to diastolic dysfunction.  PFTs done showing just some minimal obstructive disease.  Last echocardiogram done in August showed EF from 50 to 55%.  Back in April she did come in with elevated troponins went to [facility] had angiography done which showed no obstructive disease at all.  Her EF at that time was 26%.  Was felt secondary to A-fib with uncontrolled rate.  And subsequent echo was then in August of this year  showed now back up to 50-55%.  Doing well at this point.  Holding her diuretic today.  -10/30: Pressures are okay was 120 systolic this morning this afternoon around 100 systolic pulse in the 80s.  She is doing okay I have been holding her Lasix and will continue to hold that I think as an outpatient for a few days at least.  As I think she was definitely volume depleted when she initially came in.     5.  Hypothyroidism: On replacement will continue same.     6.  Paroxysmal atrial fibrillation on anticoagulation: Is on Eliquis for prophylaxis.  Heart rate currently 77 in sinus rhythm.  No evidence of A-fib tolerating the Eliquis without difficulty     7.  History of breast cancer with subsequent chest wall recurrence: She continues on her tamoxifen for this is followed by Dr. Snyder.     8.  Thrombocytopenia: Blood count 73,000 today.  Back in August and admit 172,000 and an hour since her COVID was 92, and 88 and now 73,000.  Continue to follow closely.  -10/30: Platelet count is 79,000 no evidence of any bleeding.  White counts 3000.  Hemoglobin is 11.0.  Stable at this point.  Just needs a follow-up check I think as an outpatient.     9.  Disposition: Had a long discussion with the patient this afternoon.  She actually feels back to her baseline.  Does not really like the food here.  Would rather be at home as she feels very comfortable and safe at this point.  Therefore we will discharge her home.  As above the only change I am making is just holding her Lasix for at least the short-term.       Consultations This Hospital Stay   PHYSICAL THERAPY ADULT IP CONSULT  OCCUPATIONAL THERAPY ADULT IP CONSULT  CARE MANAGEMENT / SOCIAL WORK IP CONSULT    Code Status   Full Code    Time Spent on this Encounter   I, Zander Raza MD, personally saw the patient today and spent greater than 30 minutes discharging this patient.       Zander Raza MD  [facility]  [address] [place] [address]  [id number]  Phone: [phone]  Fax: [phone]  ______________________________________________________________________    Physical Exam   Vital Signs: Temp: 97.6  F (36.4  C) Temp src: Tympanic BP: 100/55 Pulse: 86   Resp: 18 SpO2: 93 % O2 Device: None (Room air)    Weight: 139 lbs 8.82 oz  Constitutional: awake, alert, cooperative, no apparent distress, and appears stated age  ENT: Normocephalic, without obvious abnormality, atraumatic, sinuses nontender on palpation, external ears without lesions, oral pharynx with moist mucous membranes, tonsils without erythema or exudates, gums normal and good dentition.  Respiratory: No increased work of breathing, good air exchange, clear to auscultation bilaterally, no crackles or wheezing  Cardiovascular: Normal apical impulse, regular rate and rhythm, normal S1 and S2, no S3 or S4, and no murmur noted  GI: No scars, normal bowel sounds, soft, non-distended, non-tender, no masses palpated, no hepatosplenomegally  Musculoskeletal: There is no redness, warmth, or swelling of the joints.  Full range of motion noted.  Motor strength is 5 out of 5 all extremities bilaterally.  Tone is normal.       Primary Care Physician   Nicolasa Guillen    Discharge Orders      Home Care Referral      Reason for your hospital stay    Patient was brought in because of nausea vomiting diarrhea and increasing dyspnea.  She was found to be COVID-positive.  No evidence of pneumonia.  Had some transient drop in her oxygen level but that resolved rather quickly.  Her Lasix was held as she was felt to be on the dry side continue with her antihypertensive agents.  Her nausea vomiting resolved.  She is able to eat without difficulty.  No diarrhea.  Sats remained stable on room air.  She has been ambulating without a great deal of difficulty.  At this point is requesting to go home as she feels at her baseline.  Will discharge her home on her same medications however holding her Lasix at least for  the short-term.  Her blood pressures have been right around 100 120 systolic range.  Does not show any evidence of volume overload at this time.  This can be restarted as an outpatient or when she sees her primary care provider and follow-up     Follow-up and recommended labs and tests     Follow up with primary care provider, Nicolasa Guillen, within 7 days for hospital follow- up.  The following labs/tests are recommended: CBC CMP.     Activity    Your activity upon discharge: activity as tolerated     Discharge Instructions    At this point I am having patient hold her furosemide.  Blood pressures are stable but feel that she at this point is not volume overloaded.  This can be started as needed or wait until she sees her primary care provider in follow-up.     Diet    Follow this diet upon discharge: Current Diet:Orders Placed This Encounter      Fluid restriction 1500 ML FLUID      Combination Diet No Caffeine Diet, Low Saturated Fat Na <2400mg Diet       Significant Results and Procedures   Most Recent 3 CBC's:  Recent Labs   Lab Test 10/30/24  0714 10/29/24  0600 10/28/24  1420   WBC 3.0* 3.4* 3.6*   HGB 11.0* 11.1* 12.0   MCV 94 93 93   PLT 79* 73* 88*     Most Recent 3 BMP's:  Recent Labs   Lab Test 10/30/24  0714 10/29/24  1730 10/29/24  1233 10/29/24  0600 10/28/24  2107 10/28/24  1420     --   --  134*  --  134*   POTASSIUM 4.3  --   --  4.0  --  3.5   CHLORIDE 100  --   --  99  --  97*   CO2 24  --   --  25  --  22   BUN 9.8  --   --  12.1  --  11.3   CR 0.68  --   --  0.76  --  1.01*   ANIONGAP 11  --   --  10  --  15   BUDDY 8.4*  --   --  8.1*  --  8.7*   * 163* 113* 97   < > 102*    < > = values in this interval not displayed.     Most Recent 2 LFT's:  Recent Labs   Lab Test 10/30/24  0714 10/27/24  0653   AST 55* 76*   ALT 66* 45   ALKPHOS 20* 26*   BILITOTAL 0.5 0.8     7-Day Micro Results       Collected Updated Procedure Result Status      10/27/2024 0818 10/27/2024 0901 Asymptomatic  Influenza A/B, RSV, & SARS-CoV2 PCR (COVID-19) Nose [[id number]]    (Abnormal)   Swab from Nose    Final result Component Value   Influenza A PCR Negative   Influenza B PCR Negative   RSV PCR Negative   SARS CoV2 PCR Positive   POSITIVE: SARS-CoV-2 (COVID-19) RNA detected, presumed positive.            10/27/2024 0754 10/28/2024 1205 Tick-Borne Disease Panel by PCR [[id number]]    Blood from Line, venous    Final result Component Value   Anaplasma phagocytophilum by PCR Not Detected   Babesia species by PCR Not Detected   Ehrlichia species by PCR Not Detected                  Most Recent 6 glucoses:  Recent Labs   Lab Test 10/30/24  0714 10/29/24  1730 10/29/24  1233 10/29/24  0600 10/28/24  2107 10/28/24  1420   * 163* 113* 97 124* 102*     Most Recent ESR & CRP:  Recent Labs   Lab Test 10/28/24  1420 05/05/21  1428   CRP  --  <2.9   CRPI <3.00  --    ,   Results for orders placed or performed during the hospital encounter of 10/28/24   Head CT w/o contrast    Narrative    PROCEDURE: CT HEAD W/O CONTRAST     HISTORY: headache, dizziness.    COMPARISON: None.    TECHNIQUE:  Helical images of the head from the foramen magnum to the  vertex were obtained without contrast. This CT exam was performed  using one or more the following dose reduction techniques: automated  exposure control, adjustment of the mA and/or kV according to patient  size, and/or iterative reconstruction technique.    FINDINGS: The ventricles and sulci are prominent, compatible with  moderate, generalized volume loss. No acute intracranial hemorrhage,  mass effect, midline shift, hydrocephalus or basilar cystern  effacement are present.    No acute transcortical ischemia is identified. Scattered  hypoattenuation within the supratentorial subcortical and  periventricular white matter is most compatible with microvascular  ischemic change.     The calvarium is intact.  The visualized paranasal sinuses are clear.      Impression     IMPRESSION: No acute intracranial hemorrhage or CT evidence of acute  transcortical ischemia.      CANELO GARRISON MD         SYSTEM ID:  [id number]   Chest CT w/o contrast    Narrative    CT CHEST W/O CONTRAST    HISTORY:  covid, shortness of breath      TECHNIQUE:  Non-contrast helical thoracic CT was performed. This CT  exam was performed using one or more the following dose reduction  techniques: automated exposure control, adjustment of the mA and/or kV  according to patient size, and/or iterative reconstruction technique.    COMPARISON:  12/5/2023    FINDINGS:           The neck base is grossly symmetric. Cardiac size is stable. Slightly  prominent epicardial fat is redemonstrated. No coronary calcifications  are seen. There is no pericardial effusion. The thoracic aorta is  normal in size and course. No abnormal thoracic adenopathy is  identified.    The central airways are patent. There is no focal consolidation. Mild  dependent atelectasis is seen. No suspicious osseous lesion is  identified.    Limited views of the upper abdomen reveal no adrenal mass or acute  process.     No suspicious osseous lesions are seen.      Impression    IMPRESSION:    Mild dependent atelectasis. No discrete consolidation.    CANELO GARRISON MD         SYSTEM ID:  [id number]       Discharge Medications   Current Discharge Medication List        CONTINUE these medications which have CHANGED    Details   furosemide (LASIX) 40 MG tablet Take 1.5 tablets (60 mg) by mouth daily. Patient should hold this medication until seen by primary care provider  Qty: 135 tablet, Refills: 3    Associated Diagnoses: Essential hypertension           CONTINUE these medications which have NOT CHANGED    Details   apixaban ANTICOAGULANT (ELIQUIS) 5 MG tablet Take 5 mg by mouth 2 times daily.      Calcium Carb-Cholecalciferol (CALCIUM PLUS VITAMIN D PO) Take 1 tablet by mouth 2 times daily (with meals).      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  Qty: 90 tablet, Refills: 2    Associated Diagnoses: Hypothyroidism, unspecified type      metoprolol succinate ER (TOPROL XL) 50 MG 24 hr tablet Take 2 tablets (100 mg) by mouth daily  Qty: 90 tablet, Refills: 3    Associated Diagnoses: Paroxysmal atrial fibrillation (H); Essential hypertension; Diastolic dysfunction with chronic heart failure (H); Cardiomegaly      Multiple Vitamins-Minerals (WOMENS MULTIVITAMIN) TABS Take 1 tablet by mouth 2 times daily.      omeprazole (PRILOSEC) 40 MG DR capsule Take 1 capsule by mouth once daily  Qty: 90 capsule, Refills: 3    Associated Diagnoses: Gastroesophageal reflux disease without esophagitis      ondansetron (ZOFRAN ODT) 4 MG ODT tab Take 1 tablet (4 mg) by mouth every 8 hours as needed.  Qty: 15 tablet, Refills: 0      potassium chloride eduardo ER (KLOR-CON M20) 20 MEQ CR tablet Take 1 tablet by mouth twice daily  Qty: 180 tablet, Refills: 3    Associated Diagnoses: Hypokalemia      ramipril (ALTACE) 10 MG capsule Take 1 capsule by mouth twice daily  Qty: 180 capsule, Refills: 1    Associated Diagnoses: Essential hypertension; Diastolic dysfunction with chronic heart failure (H); Hypokalemia      semaglutide (OZEMPIC) 2 MG/3ML pen Inject 0.5 mg subcutaneously every 7 days.    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  Qty: 90 tablet, Refills: 3    Associated Diagnoses: Mixed hyperlipidemia      tamoxifen (NOLVADEX) 20 MG tablet Take 20 mg by mouth every morning      !!  tiZANidine (ZANAFLEX) 4 MG tablet Take 4 mg by mouth at bedtime. May take an extra 1/2 tablet (2 mg) during the night for breakthrough symptoms up to 4 times per week.      !! tiZANidine (ZANAFLEX) 4 MG tablet Take 2 mg by mouth 2 times daily. Take in the morning and midday.      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 - Potential duplicate medications found. Please discuss with provider.        Allergies   Allergies   Allergen Reactions    Amoxicillin Angioedema    Contrast Dye Difficulty breathing    Gadolinium Derivatives      Other reaction(s): Difficulty in swallowing, Laryngeal spasm  Patient had an injection into rt. Shoulder capsule prior to MRI arthrogram.  Contained multihance gadobenate, omnipaque iohexol, and buffered lidocaine.      Ammonia     Cory-1 [Lidocaine Hcl]      Novocaine allergy      Codeine Sulfate     Food      Shrimp    Fosamax [Alendronate]      Dental infections    Iodine-131 Swelling     Ct dye    Lidocaine     Nitrous Oxide     No Clinical Screening - See Comments Nausea and Vomiting and Other (See Comments)     (3/6/09)- N/V and Heart racing    Novocain [Procaine]     Penicillins     Symbicort [Budesonide-Formoterol Fumarate]      Tramadol     Morphine Palpitations

## 2024-10-30 NOTE — PROGRESS NOTES
10/30/24 1400   Appointment Info   Signing Clinician's Name / Credentials (OT) Barbara Louis OTR/L   Interventions   Interventions Quick Adds Therapeutic Activity   Therapeutic Activities   Therapeutic Activity Minutes (75972) 15   Symptoms noted during/after treatment fatigue   Treatment Detail/Skilled Intervention Pt up in the chair upon OT arrival, agreeable to tx. Pt declined brushing her teeth reporting she already did. She was eager to ambulate. Pt transferred sit<>stand from the chair with SBA. She ambulated to/from the door and towards the window x2 using 4WW with SBA. Pt reported fatigue and wanted to rest. SpO2 before activity was high 90s and after 97% on RA. Dr. Raza entered and discussed d/c, which pt is eager to d/c. MD updated on performed in OT and discussed possibility of home care/therapy, which pt is agreeable to if insurance covers it. MD stepped out to work on d/c and pt wanting to call her son to discuss. Pt was left resting in the chair, call light within reach.   OT Discharge Planning   OT Plan Progress ADLs, activity tolerance, strength; further education on ECWS   OT Discharge Recommendation (DC Rec) home with assist;home with home care occupational therapy   OT Rationale for DC Rec Pt presents with mild deficits with weakness and decreased activity tolerance but is still moving around with SBA. Pt appears appropriate to return home with son's assist, who is there 24/7. She would benefit from home OT to optimize safety and independence in ADLs in her home environment.   OT Brief overview of current status Pt up in the chair upon OT arrival, agreeable to tx. Pt declined brushing her teeth reporting she already did. She was eager to ambulate. Pt transferred sit<>stand from the chair with SBA. She ambulated to/from the door and towards the window x2 using 4WW with SBA. Pt reported fatigue and wanted to rest. SpO2 before activity was high 90s and after 97% on RA. Dr. Raza entered and  discussed d/c, which pt is eager to d/c. MD updated on performed in OT and discussed possibility of home care/therapy, which pt is agreeable to if insurance covers it. MD stepped out to work on d/c and pt wanting to call her son to discuss. Pt was left resting in the chair, call light within reach.   Total Session Time   Timed Code Treatment Minutes 15   Total Session Time (sum of timed and untimed services) 15

## 2024-10-30 NOTE — PROGRESS NOTES
10/30/24 1500   Appointment Info   Signing Clinician's Name / Credentials (PT) Joy Dias PTA   Appointment Canceled Reason (PT) Patient declined   Rehab Comments (PT) Pt states she walked with nursing staff in the room and states she is not up for PT this afternoon.

## 2024-10-30 NOTE — PROGRESS NOTES
[facility]    Hospitalist Progress Note     Patient is a 79-year-old female history of diabetes mellitus insulin requiring, history of presumed diastolic heart failure.  She initially presented to the ER on 10/27 for some nausea vomiting diarrhea that been ongoing for 3 to 4 days.  Vital signs are stable she did test positive for COVID.  She is on Eliquis for paroxysmal atrial fibrillation.  Had a platelet count of 92,000.  They did not believe that she needed any steroids and because she is on the blood thinners they did not start Paxlovid.  She was discharged home  Subsequent return to the ER last evening 10/28 after family found her on the floor in the bathroom.  When EMS arrived she was breathing and alert.  Transported to the ER in the ER she was may be less alert than the day before.  Hard to keep her eyes open and at times seems somewhat agitated and anxious.  Vital signs on arrival that she had temp of 99.5 heart rate 70 regular blood pressure 130/84 and sats were 95% on room air.  Workup really did not show anything major her labs were not much different from the previous day creatinine was up a little bit 1.01 CRP was undetectable glucose 102 troponin 21.  White count was 3600 platelet count was 88,000 hemoglobin of 12.  She did transiently drop her oxygen level down to 80%.  Ended up going on O2.  A CT chest without contrast was ordered which showed really no significant findings other than some mild dependent atelectasis.  Really no big interventions were performed her oxygen levels did improve she was on 1 to 2 L.  Was more alert however decision was made to admit her for observation status.     Assessment/plan:     1.  COVID-19 infection: Patient with no significant fevers 99.7.  CRP was undetectable.  Has have some thrombocytopenia.  Mild leukopenia at 3400.  Will continue support at this point.  She did have some mild hypoxia although it looks at this point she probably will be off  oxygen.  Do not see any obvious indications for steroids at this point.  Due to monitor closely she is on isolation.  Patient feels 100% better already this morning.  She was able to eat.  Very alert and appropriate.  Will get her up and ambulate her at this point.  She is now off the oxygen also.  Hopefully continues to do well today with potential discharge home tomorrow.  -10/30: Doing markedly better.  She has been afebrile oxygenation has been good on room air.  No further nausea and vomiting at all.  Still has some mild leukopenia and thrombocytopenia.  But overall feels markedly better.    2.  Acute respiratory failure with hypoxia: Remained on room air with good sats.  During her stay in ER she did drop down.  Currently appearing now she is 100% sats on 1 L.  I am sure be able to get her off of this.  CT scan really did not show any obvious acute infiltrate some atelectasis.  Has had chronic dyspnea which they feel is likely due to some diastolic dysfunction.  She is now on room air denies any dyspnea.  Lungs are clear.  -10/30: Patient is on room air sats 93%.  Really does not complain any of acute dyspnea she has her chronic dyspnea which chronic comes and goes.  Definitely is not volume overloaded at this point.  No fevers lungs are clear to auscultation.  She feels at her baseline.  Is able to ambulate around the room without any issues as far she is concerned.     3.  Diabetes mellitus type 2 insulin requiring: Patient is on insulin pump at home.  With a Dexcom sensor.  Her A1c's have not been the greatest the last 1 was 9% on 8/28.  She has not been eating much over the last several days per her report.  Sugars here currently fine at 124.  I do not believe she was still on her pump currently.  Will need to evaluate   will have her manage her diabetes with the nurses being informed.  -10/30: Sugars have been fine she has been using her insulin pump.  Giving herself boluses.  No real concerns at this  point.  She does have some concerns regarding Wegovy.  I did let Kerri Elliott know about this and will be contacting her regarding this.  She might want to go back on one of the doses.  Thinking maybe that caused her nausea and vomiting however she does have the COVID infection which may be the main cause of all of this.     4.  History of dyspnea likely diastolic dysfunction: Patient has a history of dyspnea.  Believe secondary to diastolic dysfunction.  PFTs done showing just some minimal obstructive disease.  Last echocardiogram done in August showed EF from 50 to 55%.  Back in April she did come in with elevated troponins went to [facility] had angiography done which showed no obstructive disease at all.  Her EF at that time was 26%.  Was felt secondary to A-fib with uncontrolled rate.  And subsequent echo was then in August of this year showed now back up to 50-55%.  Doing well at this point.  Holding her diuretic today.  -10/30: Pressures are okay was 120 systolic this morning this afternoon around 100 systolic pulse in the 80s.  She is doing okay I have been holding her Lasix and will continue to hold that I think as an outpatient for a few days at least.  As I think she was definitely volume depleted when she initially came in.     5.  Hypothyroidism: On replacement will continue same.     6.  Paroxysmal atrial fibrillation on anticoagulation: Is on Eliquis for prophylaxis.  Heart rate currently 77 in sinus rhythm.  No evidence of A-fib tolerating the Eliquis without difficulty     7.  History of breast cancer with subsequent chest wall recurrence: She continues on her tamoxifen for this is followed by Dr. Snyder.     8.  Thrombocytopenia: Blood count 73,000 today.  Back in August and admit 172,000 and an hour since her COVID was 92, and 88 and now 73,000.  Continue to follow closely.  -10/30: Platelet count is 79,000 no evidence of any bleeding.  White counts 3000.  Hemoglobin is 11.0.  Stable at this  point.  Just needs a follow-up check I think as an outpatient.     9.  Disposition: Had a long discussion with the patient this afternoon.  She actually feels back to her baseline.  Does not really like the food here.  Would rather be at home as she feels very comfortable and safe at this point.  Therefore we will discharge her home.  As above the only change I am making is just holding her Lasix for at least the short-term.    Diet: Combination Diet No Caffeine Diet, Low Saturated Fat Na <2400mg Diet  Fluid restriction 1500 ML FLUID  Fluids: None    DVT Prophylaxis: DOAC  Code Status: Full Code    Disposition: Expected discharge today   Zander Raza MD    Interval History   Alert pleasant feeling quite well.  Ambulating around the room without difficulty.  Sats are fine no real dyspnea.  Appetite is improved just does not really like the food here.  She really would like to go home if at all possible as she does feel safe and very close to her baseline.  She did well with therapy.  Will send her home with some home care at least for short-term to make sure she continues to do well.    -Data reviewed today: I reviewed all new labs and imaging results over the last 24 hours. I personally reviewed no images or EKG's today.    Physical Exam   Temp: 97.9  F (36.6  C) Temp src: Tympanic BP: (!) 99/37 Pulse: 72   Resp: 16 SpO2: 95 % O2 Device: None (Room air)    Vitals:    10/28/24 2127   Weight: 63.3 kg (139 lb 8.8 oz)     Vital Signs with Ranges  Temp:  [97.7  F (36.5  C)-99.1  F (37.3  C)] 97.9  F (36.6  C)  Pulse:  [72-77] 72  Resp:  [16-18] 16  BP: ()/(37-59) 99/37  SpO2:  [95 %-100 %] 95 %  I/O last 3 completed shifts:  In: 1927 [P.O.:1495; I.V.:432]  Out: 1150 [Urine:1150]    Peripheral IV 10/29/24 Anterior;Distal;Right Upper arm (Active)   Site Assessment WDL 10/30/24 0100   Line Status Saline locked 10/30/24 0100   Dressing Transparent 10/30/24 0100   Dressing Status clean;dry;intact 10/30/24 0103    Line Intervention Flushed 10/30/24 0100   Phlebitis Scale 0-->no symptoms 10/30/24 0100   Number of days: 1     No line/device    Constitutional: Alert and oriented x3. No distress    HEENT: Normocephalic/atraumatic, PERRL, EOMI, mouth moist, neck supple, no LN.     Cardiovascular: RRR.  No murmur, no  rubs, or gallops.  JVD no.  Bruits no.  Pulses 2+    Respiratory: Clear to auscultation bilaterally    Abdomen: Soft, nontender, nondistended, no organomegaly. Bowel sounds present    Extremities: Warm/dry.  No edema    Neuro:   Non focal, cranial nerves intact, Moves all extremities.  Medications   Current Facility-Administered Medications   Medication Dose Route Frequency Provider Last Rate Last Admin    Patient is already receiving anticoagulation with heparin, enoxaparin (LOVENOX), warfarin (COUMADIN)  or other anticoagulant medication   Does not apply Continuous PRN Ayla Liu MD         Current Facility-Administered Medications   Medication Dose Route Frequency Provider Last Rate Last Admin    apixaban ANTICOAGULANT (ELIQUIS) tablet 5 mg  5 mg Oral BID Ayla Liu MD   5 mg at 10/29/24 2112    famotidine (PEPCID) injection 20 mg  20 mg Intravenous Q12H Ayla Liu MD   20 mg at 10/29/24 2112    [Held by provider] furosemide (LASIX) tablet 60 mg  60 mg Oral Daily Ayla Liu MD        levothyroxine (SYNTHROID/LEVOTHROID) tablet 75 mcg  75 mcg Oral Daily Ayla Liu MD   75 mcg at 10/29/24 2112    lisinopril (ZESTRIL) tablet 40 mg  40 mg Oral Daily Ayla Liu MD   40 mg at 10/29/24 0832    metoprolol succinate ER (TOPROL XL) 24 hr tablet 100 mg  100 mg Oral Daily Ayla Liu MD        potassium chloride deuardo ER (KLOR-CON M20) CR tablet 20 mEq  20 mEq Oral BID Ayla Liu MD   20 mEq at 10/29/24 2112    [Held by provider] semaglutide (OZEMPIC) pen for injection 0.5 mg  0.5 mg Subcutaneous Q7 Days Ayla Liu MD         senna-docusate (SENOKOT-S/PERICOLACE) 8.6-50 MG per tablet 1 tablet  1 tablet Oral At Bedtime Ayla Liu MD        simvastatin (ZOCOR) tablet 40 mg  40 mg Oral At Bedtime Ayla Liu MD        tamoxifen (NOLVADEX) tablet 20 mg  20 mg Oral QAM Ayla Liu MD   20 mg at 10/29/24 0817    tiZANidine (ZANAFLEX) tablet 2-4 mg  2-4 mg Oral See Admin Instructions Ayla Liu MD   2 mg at 10/29/24 2132       Data   Recent Labs   Lab 10/29/24  1730 10/29/24  1233 10/29/24  0600 10/28/24  2107 10/28/24  1420 10/27/24  0803 10/27/24  0653   WBC  --   --  3.4*  --  3.6*  --  4.1   HGB  --   --  11.1*  --  12.0  --  11.3*   MCV  --   --  93  --  93  --  93   PLT  --   --  73*  --  88*  --  92*   INR  --   --   --   --  1.47*  --   --    NA  --   --  134*  --  134*  --  135   POTASSIUM  --   --  4.0  --  3.5  --  3.6   CHLORIDE  --   --  99  --  97*  --  97*   CO2  --   --  25  --  22  --  25   BUN  --   --  12.1  --  11.3  --  8.5   CR  --   --  0.76  --  1.01*  --  0.78   ANIONGAP  --   --  10  --  15  --  13   BUDDY  --   --  8.1*  --  8.7*  --  8.5*   * 113* 97   < > 102*   < > 130*   ALBUMIN  --   --   --   --   --   --  3.5   PROTTOTAL  --   --   --   --   --   --  6.3*   BILITOTAL  --   --   --   --   --   --  0.8   ALKPHOS  --   --   --   --   --   --  26*   ALT  --   --   --   --   --   --  45   AST  --   --   --   --   --   --  76*   LIPASE  --   --   --   --   --   --  20    < > = values in this interval not displayed.       No results found for this or any previous visit (from the past 24 hours).

## 2024-10-30 NOTE — PROGRESS NOTES
Pt is alert and orientated, makes needs known. VS and assessments as charted. BG monitored by medtronic device. Appetite poor due to pt stating they don't like the hospital food.    Patient discharged at 5:25 PM via wheel chair accompanied by son and staff. Prescriptions sent to patients preferred pharmacy. All belongings sent with patient.     Discharge instructions reviewed with patient. Listed belongings gathered and returned to patient. Yes    Patient discharged to home.     [facility] [place]  Follow up appointment made:  No clinic will call to schedule appt.  Home medications returned to patient: N/A  Patient reports pain was well managed at discharge: Yes

## 2024-10-31 ENCOUNTER — PATIENT OUTREACH (OUTPATIENT)
Dept: CARE COORDINATION | Facility: OTHER | Age: 79
End: 2024-10-31

## 2024-10-31 NOTE — PROGRESS NOTES
Notified patient. She will go in if she gets worse or if she becomes SOB. Son is with her and keeping an eye on patient as well.

## 2024-10-31 NOTE — PLAN OF CARE
Physical Therapy Discharge Summary    Reason for therapy discharge:    Discharged to home.    Progress towards therapy goal(s). See goals on Care Plan in [facility] electronic health record for goal details.  Goals not met.  Barriers to achieving goals:   discharge from facility.    Therapy recommendation(s):    No further therapy is recommended.    Goal Outcome Evaluation:

## 2024-10-31 NOTE — PROGRESS NOTES
Clinic Care Coordination Contact  Transitions of Care Outreach  Chief Complaint   Patient presents with    Hospital F/U       Most Recent Admission Date: 10/28/2024   Most Recent Admission Diagnosis: Weakness - R53.1  Hypoxia - R09.02  COVID-19 - U07.1     Most Recent Discharge Date: 10/30/2024   Most Recent Discharge Diagnosis: COVID-19 - U07.1  Weakness - R53.1  Hypoxia - R09.02  Essential hypertension - I10  Acute respiratory failure with hypoxia (H) - J96.01  Insulin-requiring or dependent type II diabetes mellitus (H) - E11.9, Z79.4  Diastolic dysfunction with chronic heart failure (H) - I50.32     Transitions of Care Assessment    Discharge Assessment  How are you doing now that you are home?: Patient reports that she now has a horrible cough that she did not have while in the hospital. Feels it is worsening. Reports sometimes the cough takes her breath away. Productive cough. Coughing up clear mucus. Was noted to be coughing significantly while on the phone with this RN CC. Reports last night she was feeling SOB, but no current SOB. Son is at home with her and taking care of her, allowing her to rest. No other new symptoms. Has all medications and is taking them. No questions. Is wondering if PCP can prescribe something for her cough. Did inform patient she needs to return to ER with return of SOB or worsening symptoms.  How are your symptoms? (Red Flag symptoms escalate to triage hotline per guidelines): New  Do you know how to contact your clinic care team if you have future questions or changes to your health status? : Yes  Does the patient have their discharge instructions? : Yes  Does the patient have questions regarding their discharge instructions? : No  Were you started on any new medications or were there changes to any of your previous medications? : No  Does the patient have all of their medications?: Yes  Do you have questions regarding any of your medications? : No  Do you have all of your needed  medical supplies or equipment (DME)?  (i.e. oxygen tank, CPAP, cane, etc.): Yes              Care Management       Care Mgmt General Assessment                         Follow up Plan     Discharge Follow-Up  Discharge follow up appointment scheduled in alignment with recommended follow up timeframe or Transitions of Risk Category? (Low = within 30 days; Moderate= within 14 days; High= within 7 days): Yes  Discharge Follow Up Appointment Date: 11/06/24  Discharge Follow Up Appointment Scheduled with?: Primary Care Provider    Future Appointments   Date Time Provider Department Center   11/6/2024 10:00 AM Nicolasa Guillen NP [facility] [place]   11/18/2024  1:00 PM Jin Corea, DO [facility] [place]   12/2/2024  1:00 PM Nicolasa Guillen NP [facility] [place]   12/11/2024  1:30 PM Jane Chaidez DPM [facility]       Outpatient Plan as outlined on AVS reviewed with patient.    For any urgent concerns, please contact our 24 hour nurse triage line: [phone] ([phone])       Nancy Park RN

## 2024-11-02 ENCOUNTER — MEDICAL CORRESPONDENCE (OUTPATIENT)
Dept: HEALTH INFORMATION MANAGEMENT | Facility: HOSPITAL | Age: 79
End: 2024-11-02

## 2024-11-02 DIAGNOSIS — Z53.9 PERSONS ENCOUNTERING HEALTH SERVICES FOR SPECIFIC PROCEDURES, NOT CARRIED OUT: Primary | ICD-10-CM

## 2024-11-02 PROCEDURE — G0180 MD CERTIFICATION HHA PATIENT: HCPCS | Performed by: NURSE PRACTITIONER

## 2024-11-04 ENCOUNTER — TELEPHONE (OUTPATIENT)
Dept: CARE COORDINATION | Facility: OTHER | Age: 79
End: 2024-11-04

## 2024-11-04 NOTE — TELEPHONE ENCOUNTER
LOKI Dobson from [facility] to request verbal orders for skilled nursing 1 x a week for 9 weeks.   Olya: [phone]

## 2024-11-04 NOTE — PROGRESS NOTES
Assessment & Plan     (Z09) Hospital discharge follow-up  (primary encounter diagnosis)  (U07.1) Infection due to 2019 novel coronavirus  (R11.2) Nausea and vomiting, unspecified vomiting type  Comment: feeling much better  Plan: Feeling much better. VSS. Oxygen sats 99 percent. Vomiting has resolved, nausea only occurs with large meals. Will eat small frequently meals and use Zofran as needed.     Will return with new or worsening symptoms.     (D69.6) Thrombocytopenia (H)  Plan: CBC with platelets and differential,         Comprehensive metabolic panel (BMP + Alb, Alk         Phos, ALT, AST, Total. Bili, TP)        Will recheck CBC today. Will notify patient of the results when available and intervene accordingly.     (I10) Essential hypertension  Comment: blood pressure low at 98/56, some dizziness  Plan: ramipril (ALTACE) 10 MG capsule        Will continue to hold her lasix, but lower her Ramipril dose to once daily from twice daily. Will see her back in 4 weeks to recheck. If BP up, will restart lasix.     (I50.32) Diastolic dysfunction with chronic heart failure (H)  Plan: No lower leg swelling. Will continue to hold the lasix as blood pressure low at 98/56. Will see her back in 4 weeks for a recheck. If swelling returns prior, she will; let me know.     (E87.6) Hypokalemia  Plan: ramipril (ALTACE) 10 MG capsule        Will recheck levels today. Taking potassium pills, but she has been holding her lasix. If potassium high, will stop.     ((E10.42) Diabetic polyneuropathy associated with type 1 diabetes mellitus (H)  Plan: asymptomatic.       MED REC REQUIRED  Post Medication Reconciliation Status:  Discharge medications reconciled, continue medications without change    The longitudinal plan of care for the diagnosis(es)/condition(s) as documented were addressed during this visit. Due to the added complexity in care, I will continue to support Flora in the subsequent management and with ongoing continuity  "of care.    Subjective   Flora is a 79 year old, presenting for the following health issues:  Hospital F/U (Covid 19)    HPI        Hospital Follow-up Visit:    Hospital/Nursing Home/IP Rehab Facility: [facility]  Date of Admission: 10/28/24  Date of Discharge: 10/30/24  Reason(s) for Admission: Covid 19, nausea and vomiting, acute respiratory failure  Was the patient in the ICU or did the patient experience delirium during hospitalization?  No  Do you have any other stressors you would like to discuss with your provider? No    Problems taking medications regularly:  None except when ill, could not hold anything down.  Medication changes since discharge: None  Problems adhering to non-medication therapy:  None    Summary of hospitalization:  [facility] discharge summary reviewed    Per Discharge Summary,    \"Hospital Course  Patient is a 79-year-old female history of diabetes mellitus insulin requiring, history of presumed diastolic heart failure.  She initially presented to the ER on 10/27 for some nausea vomiting diarrhea that been ongoing for 3 to 4 days.  Vital signs are stable she did test positive for COVID.  She is on Eliquis for paroxysmal atrial fibrillation.  Had a platelet count of 92,000.  They did not believe that she needed any steroids and because she is on the blood thinners they did not start Paxlovid.  She was discharged home  Subsequent return to the ER last evening 10/28 after family found her on the floor in the bathroom.  When EMS arrived she was breathing and alert.  Transported to the ER in the ER she was may be less alert than the day before.  Hard to keep her eyes open and at times seems somewhat agitated and anxious.  Vital signs on arrival that she had temp of 99.5 heart rate 70 regular blood pressure 130/84 and sats were 95% on room air.  Workup really did not show anything major her labs were not much different from the previous day creatinine was up a " little bit 1.01 CRP was undetectable glucose 102 troponin 21.  White count was 3600 platelet count was 88,000 hemoglobin of 12.  She did transiently drop her oxygen level down to 80%.  Ended up going on O2.  A CT chest without contrast was ordered which showed really no significant findings other than some mild dependent atelectasis.  Really no big interventions were performed her oxygen levels did improve she was on 1 to 2 L.  Was more alert however decision was made to admit her for observation status.     Assessment/plan:     1.  COVID-19 infection: Patient with no significant fevers 99.7.  CRP was undetectable.  Has have some thrombocytopenia.  Mild leukopenia at 3400.  Will continue support at this point.  She did have some mild hypoxia although it looks at this point she probably will be off oxygen.  Do not see any obvious indications for steroids at this point.  Due to monitor closely she is on isolation.  Patient feels 100% better already this morning.  She was able to eat.  Very alert and appropriate.  Will get her up and ambulate her at this point.  She is now off the oxygen also.  Hopefully continues to do well today with potential discharge home tomorrow.  -10/30: Doing markedly better.  She has been afebrile oxygenation has been good on room air.  No further nausea and vomiting at all.  Still has some mild leukopenia and thrombocytopenia.  But overall feels markedly better.     2.  Acute respiratory failure with hypoxia: Remained on room air with good sats.  During her stay in ER she did drop down.  Currently appearing now she is 100% sats on 1 L.  I am sure be able to get her off of this.  CT scan really did not show any obvious acute infiltrate some atelectasis.  Has had chronic dyspnea which they feel is likely due to some diastolic dysfunction.  She is now on room air denies any dyspnea.  Lungs are clear.  -10/30: Patient is on room air sats 93%.  Really does not complain any of acute dyspnea she  has her chronic dyspnea which chronic comes and goes.  Definitely is not volume overloaded at this point.  No fevers lungs are clear to auscultation.  She feels at her baseline.  Is able to ambulate around the room without any issues as far she is concerned.     3.  Diabetes mellitus type 2 insulin requiring: Patient is on insulin pump at home.  With a Dexcom sensor.  Her A1c's have not been the greatest the last 1 was 9% on 8/28.  She has not been eating much over the last several days per her report.  Sugars here currently fine at 124.  I do not believe she was still on her pump currently.  Will need to evaluate   will have her manage her diabetes with the nurses being informed.  -10/30: Sugars have been fine she has been using her insulin pump.  Giving herself boluses.  No real concerns at this point.  She does have some concerns regarding Wegovy.  I did let Kerri Elliott know about this and will be contacting her regarding this.  She might want to go back on one of the doses.  Thinking maybe that caused her nausea and vomiting however she does have the COVID infection which may be the main cause of all of this.     4.  History of dyspnea likely diastolic dysfunction: Patient has a history of dyspnea.  Believe secondary to diastolic dysfunction.  PFTs done showing just some minimal obstructive disease.  Last echocardiogram done in August showed EF from 50 to 55%.  Back in April she did come in with elevated troponins went to [facility] had angiography done which showed no obstructive disease at all.  Her EF at that time was 26%.  Was felt secondary to A-fib with uncontrolled rate.  And subsequent echo was then in August of this year showed now back up to 50-55%.  Doing well at this point.  Holding her diuretic today.  -10/30: Pressures are okay was 120 systolic this morning this afternoon around 100 systolic pulse in the 80s.  She is doing okay I have been holding her Lasix and will continue to hold that I think  as an outpatient for a few days at least.  As I think she was definitely volume depleted when she initially came in.     5.  Hypothyroidism: On replacement will continue same.     6.  Paroxysmal atrial fibrillation on anticoagulation: Is on Eliquis for prophylaxis.  Heart rate currently 77 in sinus rhythm.  No evidence of A-fib tolerating the Eliquis without difficulty     7.  History of breast cancer with subsequent chest wall recurrence: She continues on her tamoxifen for this is followed by Dr. Snyder.     8.  Thrombocytopenia: Blood count 73,000 today.  Back in August and admit 172,000 and an hour since her COVID was 92, and 88 and now 73,000.  Continue to follow closely.  -10/30: Platelet count is 79,000 no evidence of any bleeding.  White counts 3000.  Hemoglobin is 11.0.  Stable at this point.  Just needs a follow-up check I think as an outpatient.     9.  Disposition: Had a long discussion with the patient this afternoon.  She actually feels back to her baseline.  Does not really like the food here.  Would rather be at home as she feels very comfortable and safe at this point.  Therefore we will discharge her home.  As above the only change I am making is just holding her Lasix for at least the short-term.      Diagnostic Tests/Treatments reviewed.  Follow up needed: CBC and CMP  Other Healthcare Providers Involved in Patient s Care:         None  Update since discharge: improved. Today Flora notes that she is feeling slightly better. Still some shortness of breath and fatigue with exertion. Also has a persistent dry cough. No fevers. She is no longer vomiting, but still gets nauseated with large meals. She has been using Zofran as needed. No chest pain or abdominal pain. Staying hydrated, pushing fluids.     She has not been taking her lasix, swelling has not increased. No dizziness.       Plan of care communicated with patient       Review of Systems  Constitutional, HEENT, cardiovascular, pulmonary,  "gi and gu systems are negative, except as otherwise noted.      Objective    BP 98/56   Pulse 79   Temp 98  F (36.7  C) (Tympanic)   Resp 16   Ht 1.6 m (5' 3\")   Wt 61.7 kg (136 lb)   SpO2 99%   BMI 24.09 kg/m    Body mass index is 24.09 kg/m .  Physical Exam   GENERAL: alert and no distress  EYES: Eyes grossly normal to inspection, PERRL and conjunctivae and sclerae normal  HENT: ear canals and TM's normal, nose and mouth without ulcers or lesions  NECK: no adenopathy, no asymmetry, masses, or scars  RESP: lungs clear to auscultation - no rales, rhonchi or wheezes  CV: regular rate and rhythm, no murmur, click or rub, no peripheral edema  ABDOMEN: soft, nontender, no hepatosplenomegaly, no masses and bowel sounds normal  MS: no gross musculoskeletal defects noted, no edema  NEURO: Normal strength and tone, mentation intact and speech normal  PSYCH: mentation appears normal, affect normal/bright    Labs in process.         Signed Electronically by: Nicolasa Guillen NP    "

## 2024-11-06 ENCOUNTER — OFFICE VISIT (OUTPATIENT)
Dept: FAMILY MEDICINE | Facility: OTHER | Age: 79
End: 2024-11-06
Attending: NURSE PRACTITIONER
Payer: MEDICARE

## 2024-11-06 VITALS
BODY MASS INDEX: 24.1 KG/M2 | HEART RATE: 79 BPM | TEMPERATURE: 98 F | WEIGHT: 136 LBS | SYSTOLIC BLOOD PRESSURE: 98 MMHG | DIASTOLIC BLOOD PRESSURE: 56 MMHG | RESPIRATION RATE: 16 BRPM | HEIGHT: 63 IN | OXYGEN SATURATION: 99 %

## 2024-11-06 DIAGNOSIS — U07.1 INFECTION DUE TO 2019 NOVEL CORONAVIRUS: ICD-10-CM

## 2024-11-06 DIAGNOSIS — I50.32 DIASTOLIC DYSFUNCTION WITH CHRONIC HEART FAILURE (H): ICD-10-CM

## 2024-11-06 DIAGNOSIS — R11.2 NAUSEA AND VOMITING, UNSPECIFIED VOMITING TYPE: ICD-10-CM

## 2024-11-06 DIAGNOSIS — Z09 HOSPITAL DISCHARGE FOLLOW-UP: Primary | ICD-10-CM

## 2024-11-06 DIAGNOSIS — D69.6 THROMBOCYTOPENIA (H): ICD-10-CM

## 2024-11-06 DIAGNOSIS — E10.42 DIABETIC POLYNEUROPATHY ASSOCIATED WITH TYPE 1 DIABETES MELLITUS (H): ICD-10-CM

## 2024-11-06 DIAGNOSIS — E87.6 HYPOKALEMIA: ICD-10-CM

## 2024-11-06 DIAGNOSIS — I10 ESSENTIAL HYPERTENSION: ICD-10-CM

## 2024-11-06 PROBLEM — J96.01 ACUTE RESPIRATORY FAILURE WITH HYPOXIA (H): Status: RESOLVED | Noted: 2024-10-30 | Resolved: 2024-11-06

## 2024-11-06 PROBLEM — R09.02 HYPOXIA: Status: RESOLVED | Noted: 2024-10-28 | Resolved: 2024-11-06

## 2024-11-06 PROBLEM — R53.1 WEAKNESS: Status: RESOLVED | Noted: 2024-10-29 | Resolved: 2024-11-06

## 2024-11-06 LAB
ALBUMIN SERPL BCG-MCNC: 3.7 G/DL (ref 3.5–5.2)
ALP SERPL-CCNC: 29 U/L (ref 40–150)
ALT SERPL W P-5'-P-CCNC: 36 U/L (ref 0–50)
ANION GAP SERPL CALCULATED.3IONS-SCNC: 10 MMOL/L (ref 7–15)
AST SERPL W P-5'-P-CCNC: 24 U/L (ref 0–45)
BASOPHILS # BLD AUTO: 0 10E3/UL (ref 0–0.2)
BASOPHILS NFR BLD AUTO: 0 %
BILIRUB SERPL-MCNC: 0.9 MG/DL
BUN SERPL-MCNC: 9.9 MG/DL (ref 8–23)
CALCIUM SERPL-MCNC: 9.4 MG/DL (ref 8.8–10.4)
CHLORIDE SERPL-SCNC: 102 MMOL/L (ref 98–107)
CREAT SERPL-MCNC: 0.82 MG/DL (ref 0.51–0.95)
EGFRCR SERPLBLD CKD-EPI 2021: 73 ML/MIN/1.73M2
EOSINOPHIL # BLD AUTO: 0.4 10E3/UL (ref 0–0.7)
EOSINOPHIL NFR BLD AUTO: 6 %
ERYTHROCYTE [DISTWIDTH] IN BLOOD BY AUTOMATED COUNT: 12.7 % (ref 10–15)
GLUCOSE SERPL-MCNC: 227 MG/DL (ref 70–99)
HCO3 SERPL-SCNC: 23 MMOL/L (ref 22–29)
HCT VFR BLD AUTO: 36.8 % (ref 35–47)
HGB BLD-MCNC: 12.2 G/DL (ref 11.7–15.7)
IMM GRANULOCYTES # BLD: 0 10E3/UL
IMM GRANULOCYTES NFR BLD: 0 %
LYMPHOCYTES # BLD AUTO: 1.8 10E3/UL (ref 0.8–5.3)
LYMPHOCYTES NFR BLD AUTO: 24 %
MCH RBC QN AUTO: 31.4 PG (ref 26.5–33)
MCHC RBC AUTO-ENTMCNC: 33.2 G/DL (ref 31.5–36.5)
MCV RBC AUTO: 95 FL (ref 78–100)
MONOCYTES # BLD AUTO: 0.5 10E3/UL (ref 0–1.3)
MONOCYTES NFR BLD AUTO: 7 %
NEUTROPHILS # BLD AUTO: 4.6 10E3/UL (ref 1.6–8.3)
NEUTROPHILS NFR BLD AUTO: 63 %
NRBC # BLD AUTO: 0 10E3/UL
NRBC BLD AUTO-RTO: 0 /100
PLATELET # BLD AUTO: 161 10E3/UL (ref 150–450)
POTASSIUM SERPL-SCNC: 4.6 MMOL/L (ref 3.4–5.3)
PROT SERPL-MCNC: 6.5 G/DL (ref 6.4–8.3)
RBC # BLD AUTO: 3.88 10E6/UL (ref 3.8–5.2)
SODIUM SERPL-SCNC: 135 MMOL/L (ref 135–145)
WBC # BLD AUTO: 7.4 10E3/UL (ref 4–11)

## 2024-11-06 PROCEDURE — 36415 COLL VENOUS BLD VENIPUNCTURE: CPT | Mod: ZL | Performed by: NURSE PRACTITIONER

## 2024-11-06 PROCEDURE — 82310 ASSAY OF CALCIUM: CPT | Mod: ZL | Performed by: NURSE PRACTITIONER

## 2024-11-06 PROCEDURE — 85004 AUTOMATED DIFF WBC COUNT: CPT | Mod: ZL | Performed by: NURSE PRACTITIONER

## 2024-11-06 PROCEDURE — 99495 TRANSJ CARE MGMT MOD F2F 14D: CPT | Performed by: NURSE PRACTITIONER

## 2024-11-06 PROCEDURE — G0463 HOSPITAL OUTPT CLINIC VISIT: HCPCS

## 2024-11-06 RX ORDER — TAMOXIFEN CITRATE 20 MG/1
20 TABLET ORAL EVERY MORNING
Qty: 30 TABLET | Refills: 3 | Status: CANCELLED | OUTPATIENT
Start: 2024-11-06

## 2024-11-06 RX ORDER — RAMIPRIL 10 MG/1
10 CAPSULE ORAL DAILY
Qty: 90 CAPSULE | Refills: 1 | Status: SHIPPED | OUTPATIENT
Start: 2024-11-06

## 2024-11-06 ASSESSMENT — PAIN SCALES - GENERAL: PAINLEVEL_OUTOF10: NO PAIN (0)

## 2024-11-07 NOTE — PROGRESS NOTES
Asking for Ozempic  I don't have any samples.     Asked if she would qualify for [employer] patient assistance program      Kerri Elliott APRN FNP-BC  Diabetes and Wound Care

## 2024-11-11 ENCOUNTER — TELEPHONE (OUTPATIENT)
Dept: FAMILY MEDICINE | Facility: OTHER | Age: 79
End: 2024-11-11

## 2024-11-11 NOTE — TELEPHONE ENCOUNTER
Symptom or reason needing to speak to RN: verbal orders     Best number to return call: [phone]     Best time to return call: asap

## 2024-11-12 NOTE — TELEPHONE ENCOUNTER
Bess from [facility] calling to request PT orders 1 x a week for 6 weeks.   [phone]

## 2024-11-13 ENCOUNTER — MEDICAL CORRESPONDENCE (OUTPATIENT)
Dept: HEALTH INFORMATION MANAGEMENT | Facility: CLINIC | Age: 79
End: 2024-11-13
Payer: COMMERCIAL

## 2024-11-19 ENCOUNTER — TELEPHONE (OUTPATIENT)
Dept: CARE COORDINATION | Facility: OTHER | Age: 79
End: 2024-11-19

## 2024-11-19 ENCOUNTER — MEDICAL CORRESPONDENCE (OUTPATIENT)
Dept: HEALTH INFORMATION MANAGEMENT | Facility: HOSPITAL | Age: 79
End: 2024-11-19

## 2024-11-19 NOTE — TELEPHONE ENCOUNTER
Telephone call to patient.  No answer.  Left a detailed message, with phone number,  requesting return call to cardiology CC.

## 2024-11-26 ENCOUNTER — TELEPHONE (OUTPATIENT)
Dept: CARE COORDINATION | Facility: OTHER | Age: 79
End: 2024-11-26

## 2024-11-26 NOTE — TELEPHONE ENCOUNTER
Telephone call to patient.  Explained the [employer] PAP and if she would like to apply to see if she can get her medications covered, I will send out today the application to her with as much filled in as I am able.  I also let the patient know that when she has the necessary paperwork completed, signed and any proofs required, that if she wants she can bring the papers back to the clinic, and we can fax them for her, or if she chooses she can fax or mail them herself. She verbalized understanding and agreement with the plan.

## 2024-12-06 ENCOUNTER — TELEPHONE (OUTPATIENT)
Dept: FAMILY MEDICINE | Facility: OTHER | Age: 79
End: 2024-12-06

## 2024-12-06 NOTE — TELEPHONE ENCOUNTER
Bess Pham  OT  [phone]    Verbal order for extension of OT starting next week 1x 3 weeks for lymphadema management.

## 2024-12-09 ENCOUNTER — TELEPHONE (OUTPATIENT)
Dept: CARE COORDINATION | Facility: OTHER | Age: 79
End: 2024-12-09

## 2024-12-09 NOTE — TELEPHONE ENCOUNTER
PT with [facility] calling for continuation orders for PT 1X2.  Contact number: [phone].  Continuation orders given per workflow.

## 2024-12-10 ENCOUNTER — MEDICAL CORRESPONDENCE (OUTPATIENT)
Dept: HEALTH INFORMATION MANAGEMENT | Facility: HOSPITAL | Age: 79
End: 2024-12-10

## 2024-12-10 NOTE — PROGRESS NOTES
Assessment & Plan     (R07.9) Chest pain, unspecified type  (primary encounter diagnosis)  Comment: patient with episode of chest pain yesterday, resolved, normal angiogram in 4/2024, no associated shortness of breath, VSS, oxygen sats 99 percent  Plan: EKG 12-lead complete w/read - (Clinic         Performed), CBC with platelets and         differential, Comprehensive metabolic panel         (BMP + Alb, Alk Phos, ALT, AST, Total. Bili,         TP), XR Chest 2 Views        Patient declined to go to the ER. EKG and CXR done today. No acute findings were seen. Reassuring that she had normal angiogram in 4/2024. Also takes Eliquis. Possible MSK related. Discharged home. Agrees to return with new or worsening symptoms. Also has follow-up scheduled with Dr. Corea.     (M79.89) Right leg swelling  Comment: patient presented with bilateral lower leg swelling, but right was worsen than left; also has right calf pain  Plan: US Lower Extremity Venous Duplex Right        US done. No DVT seen. Pain most likely r/t her swelling. On lasix. PT coming tomorrow to do leg wraps to help with the swelling. Also has follow-up with cardiology scheduled. Will return with new or worsening symptoms.     (I10) Essential hypertension  Plan: Controlled. Continue current medications. Encouraged daily exercise and a low sodium diet. Recommended checking BP's 2x/wk, call the clinic if consistantly s>140 or d>90. Follow up in 3 months.     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 Controlled. On statin. Working on better blood pressure control. Eye exam UTD. Will see Kerri tomorrow. Will see me back in 3 months.       Return in about 3 months (around 3/11/2025).    The longitudinal plan of care for the diagnosis(es)/condition(s) as documented were addressed during this visit. Due to the added complexity in care, I will continue to support Flora in the subsequent management  "and with ongoing continuity of care.    Subjective   Flora is a 79 year old, presenting for the following health issues:  Hypertension        12/11/2024     2:14 PM   Additional Questions   Roomed by Faisal Mack   Accompanied by None         12/11/2024     2:14 PM   Patient Reported Additional Medications   Patient reports taking the following new medications None     HPI     Hypertension Follow-up    Do you check your blood pressure regularly outside of the clinic? Yes   Are you following a low salt diet? Yes  Are your blood pressures ever more than 140 on the top number (systolic) OR more   than 90 on the bottom number (diastolic), for example 140/90? No    Last seen on 11/6/24. Denied lower leg swelling. BP was 98/56. Lasix was held. She then saw cardiology later that week and edema had increased. Lasix was restarted. Bo notes that she currently is taking lasix 40 mg BID per Dr. Corea's recommendations.     Reports one month history of increased leg swelling. She wears compression socks at home during the day and takes them off at night. Not wearing today due to appointments. R leg is more swollen than the left. She has some difficulty bending her ankle, but denies warmth, or redness. Pain present on palpation of R calf. No increase in SOB from baseline. Reports she experienced sharp, intense, heavy chest pain yesterday around 10 am when she was walking around her house. She rested and the pain passed. The same pain occurred again that afternoon around 4 pm at rest with her son. States she was diaphoretic and felt she couldn't breathe. Her son wanted to bring her to the ER, but she refused.     Following this history, patient was encouraged to go to the ER, but refused. She kept stating that \"nothing was wrong with her.\"     She does take Eliquis for A-fib and has not missed a dose.       Review of Systems  Constitutional, HEENT, cardiovascular, pulmonary, gi and gu systems are negative, except as " otherwise noted.      Objective    /78 (BP Location: Left arm, Patient Position: Sitting, Cuff Size: Adult Regular)   Pulse 82   Temp 98.2  F (36.8  C) (Tympanic)   Resp 16   Wt 65.3 kg (144 lb)   SpO2 99%   BMI 25.51 kg/m    Body mass index is 25.51 kg/m .  Physical Exam   GENERAL: alert and no distress  EYES: Eyes grossly normal to inspection, PERRL and conjunctivae and sclerae normal  HENT: ear canals and TM's normal, nose and mouth without ulcers or lesions  NECK: no adenopathy, no asymmetry, masses, or scars  RESP: lungs clear to auscultation - no rales, rhonchi or wheezes  CV: irregular rate and irregular rhythm, no murmur, click or rub, LE non-pitting edema present, however R leg is markedly more swollen. Patient had a strong pain response with pressure to right calf. No pain on palpation of left calf. Right leg is always slightly more swollen than left.   ABDOMEN: soft, nontender  SKIN: no suspicious lesions or rashes  NEURO: Normal strength and tone, mentation intact and speech normal  PSYCH: mentation appears normal, affect normal/bright    Results for orders placed or performed in visit on 12/11/24 (from the past 24 hours)   EKG 12-lead complete w/read - (Clinic Performed)   Result Value Ref Range    Systolic Blood Pressure  mmHg    Diastolic Blood Pressure  mmHg    Ventricular Rate 89 BPM    Atrial Rate 89 BPM    HI Interval 166 ms    QRS Duration 96 ms     ms    QTc 399 ms    P Axis 28 degrees    R AXIS -15 degrees    T Axis 77 degrees    Interpretation ECG       Sinus rhythm  Nonspecific ST and T wave abnormality  Abnormal ECG  When compared with ECG of 28-Oct-2024 14:01,  ST no longer depressed in Anterior leads  T wave inversion no longer evident in Inferior leads  Nonspecific T wave abnormality has replaced inverted T waves in Anterolateral leads  QT has shortened     CBC with platelets and differential    Narrative    The following orders were created for panel order CBC with  platelets and differential.  Procedure                               Abnormality         Status                     ---------                               -----------         ------                     CBC with platelets and d...[[id number]]  Abnormal            Final result                 Please view results for these tests on the individual orders.   Comprehensive metabolic panel (BMP + Alb, Alk Phos, ALT, AST, Total. Bili, TP)   Result Value Ref Range    Sodium 137 135 - 145 mmol/L    Potassium 3.7 3.4 - 5.3 mmol/L    Carbon Dioxide (CO2) 29 22 - 29 mmol/L    Anion Gap 11 7 - 15 mmol/L    Urea Nitrogen 10.7 8.0 - 23.0 mg/dL    Creatinine 0.72 0.51 - 0.95 mg/dL    GFR Estimate 86 >60 mL/min/1.73m2    Calcium 9.5 8.8 - 10.4 mg/dL    Chloride 97 (L) 98 - 107 mmol/L    Glucose 120 (H) 70 - 99 mg/dL    Alkaline Phosphatase 36 (L) 40 - 150 U/L    AST 19 0 - 45 U/L    ALT 14 0 - 50 U/L    Protein Total 7.5 6.4 - 8.3 g/dL    Albumin 4.2 3.5 - 5.2 g/dL    Bilirubin Total 1.0 <=1.2 mg/dL   CBC with platelets and differential   Result Value Ref Range    WBC Count 7.0 4.0 - 11.0 10e3/uL    RBC Count 3.76 (L) 3.80 - 5.20 10e6/uL    Hemoglobin 11.9 11.7 - 15.7 g/dL    Hematocrit 35.3 35.0 - 47.0 %    MCV 94 78 - 100 fL    MCH 31.6 26.5 - 33.0 pg    MCHC 33.7 31.5 - 36.5 g/dL    RDW 12.3 10.0 - 15.0 %    Platelet Count 158 150 - 450 10e3/uL    % Neutrophils 54 %    % Lymphocytes 29 %    % Monocytes 7 %    % Eosinophils 8 %    % Basophils 1 %    % Immature Granulocytes 0 %    NRBCs per 100 WBC 0 <1 /100    Absolute Neutrophils 3.8 1.6 - 8.3 10e3/uL    Absolute Lymphocytes 2.1 0.8 - 5.3 10e3/uL    Absolute Monocytes 0.5 0.0 - 1.3 10e3/uL    Absolute Eosinophils 0.6 0.0 - 0.7 10e3/uL    Absolute Basophils 0.0 0.0 - 0.2 10e3/uL    Absolute Immature Granulocytes 0.0 <=0.4 10e3/uL    Absolute NRBCs 0.0 10e3/uL   Hemoglobin A1c   Result Value Ref Range    Estimated Average Glucose 146 (H) <117 mg/dL    Hemoglobin A1C 6.7 (H) <5.7 %        ZANE Bourgeois  [facility]     I was present with the nurse practitioner student who participated in the service and in the documentation of the note. I have verified the history and personally performed the physical exam and medical decision making. I agree with the assessment and plan of care as documented in the note.     Nicolasa Guillen, CNP

## 2024-12-11 ENCOUNTER — HOSPITAL ENCOUNTER (OUTPATIENT)
Dept: ULTRASOUND IMAGING | Facility: HOSPITAL | Age: 79
Discharge: HOME | End: 2024-12-11
Attending: NURSE PRACTITIONER
Payer: MEDICARE

## 2024-12-11 ENCOUNTER — OFFICE VISIT (OUTPATIENT)
Dept: FAMILY MEDICINE | Facility: OTHER | Age: 79
End: 2024-12-11
Attending: NURSE PRACTITIONER
Payer: MEDICARE

## 2024-12-11 ENCOUNTER — OFFICE VISIT (OUTPATIENT)
Dept: PODIATRY | Facility: OTHER | Age: 79
End: 2024-12-11
Attending: PODIATRIST
Payer: MEDICARE

## 2024-12-11 ENCOUNTER — ANCILLARY PROCEDURE (OUTPATIENT)
Dept: GENERAL RADIOLOGY | Facility: OTHER | Age: 79
End: 2024-12-11
Attending: NURSE PRACTITIONER
Payer: MEDICARE

## 2024-12-11 VITALS
HEART RATE: 70 BPM | DIASTOLIC BLOOD PRESSURE: 65 MMHG | TEMPERATURE: 98.4 F | SYSTOLIC BLOOD PRESSURE: 125 MMHG | OXYGEN SATURATION: 96 %

## 2024-12-11 VITALS
OXYGEN SATURATION: 99 % | BODY MASS INDEX: 25.51 KG/M2 | DIASTOLIC BLOOD PRESSURE: 78 MMHG | RESPIRATION RATE: 16 BRPM | SYSTOLIC BLOOD PRESSURE: 136 MMHG | WEIGHT: 144 LBS | TEMPERATURE: 98.2 F | HEART RATE: 82 BPM

## 2024-12-11 DIAGNOSIS — I10 ESSENTIAL HYPERTENSION: ICD-10-CM

## 2024-12-11 DIAGNOSIS — M79.89 RIGHT LEG SWELLING: ICD-10-CM

## 2024-12-11 DIAGNOSIS — E10.42 DIABETIC POLYNEUROPATHY ASSOCIATED WITH TYPE 1 DIABETES MELLITUS (H): ICD-10-CM

## 2024-12-11 DIAGNOSIS — R07.9 CHEST PAIN, UNSPECIFIED TYPE: ICD-10-CM

## 2024-12-11 DIAGNOSIS — E13.40: ICD-10-CM

## 2024-12-11 DIAGNOSIS — L60.3 ONYCHODYSTROPHY: Primary | ICD-10-CM

## 2024-12-11 DIAGNOSIS — Z13.89 SCREENING FOR DIABETIC PERIPHERAL NEUROPATHY: ICD-10-CM

## 2024-12-11 DIAGNOSIS — R07.9 CHEST PAIN, UNSPECIFIED TYPE: Primary | ICD-10-CM

## 2024-12-11 DIAGNOSIS — E13.9 LADA (LATENT AUTOIMMUNE DIABETES IN ADULTS), MANAGED AS TYPE 1 (H): ICD-10-CM

## 2024-12-11 LAB
ALBUMIN SERPL BCG-MCNC: 4.2 G/DL (ref 3.5–5.2)
ALP SERPL-CCNC: 36 U/L (ref 40–150)
ALT SERPL W P-5'-P-CCNC: 14 U/L (ref 0–50)
ANION GAP SERPL CALCULATED.3IONS-SCNC: 11 MMOL/L (ref 7–15)
AST SERPL W P-5'-P-CCNC: 19 U/L (ref 0–45)
BASOPHILS # BLD AUTO: 0 10E3/UL (ref 0–0.2)
BASOPHILS NFR BLD AUTO: 1 %
BILIRUB SERPL-MCNC: 1 MG/DL
BUN SERPL-MCNC: 10.7 MG/DL (ref 8–23)
CALCIUM SERPL-MCNC: 9.5 MG/DL (ref 8.8–10.4)
CHLORIDE SERPL-SCNC: 97 MMOL/L (ref 98–107)
CREAT SERPL-MCNC: 0.72 MG/DL (ref 0.51–0.95)
EGFRCR SERPLBLD CKD-EPI 2021: 86 ML/MIN/1.73M2
EOSINOPHIL # BLD AUTO: 0.6 10E3/UL (ref 0–0.7)
EOSINOPHIL NFR BLD AUTO: 8 %
ERYTHROCYTE [DISTWIDTH] IN BLOOD BY AUTOMATED COUNT: 12.3 % (ref 10–15)
EST. AVERAGE GLUCOSE BLD GHB EST-MCNC: 146 MG/DL
GLUCOSE SERPL-MCNC: 120 MG/DL (ref 70–99)
HBA1C MFR BLD: 6.7 %
HCO3 SERPL-SCNC: 29 MMOL/L (ref 22–29)
HCT VFR BLD AUTO: 35.3 % (ref 35–47)
HGB BLD-MCNC: 11.9 G/DL (ref 11.7–15.7)
IMM GRANULOCYTES # BLD: 0 10E3/UL
IMM GRANULOCYTES NFR BLD: 0 %
LYMPHOCYTES # BLD AUTO: 2.1 10E3/UL (ref 0.8–5.3)
LYMPHOCYTES NFR BLD AUTO: 29 %
MCH RBC QN AUTO: 31.6 PG (ref 26.5–33)
MCHC RBC AUTO-ENTMCNC: 33.7 G/DL (ref 31.5–36.5)
MCV RBC AUTO: 94 FL (ref 78–100)
MONOCYTES # BLD AUTO: 0.5 10E3/UL (ref 0–1.3)
MONOCYTES NFR BLD AUTO: 7 %
NEUTROPHILS # BLD AUTO: 3.8 10E3/UL (ref 1.6–8.3)
NEUTROPHILS NFR BLD AUTO: 54 %
NRBC # BLD AUTO: 0 10E3/UL
NRBC BLD AUTO-RTO: 0 /100
PLATELET # BLD AUTO: 158 10E3/UL (ref 150–450)
POTASSIUM SERPL-SCNC: 3.7 MMOL/L (ref 3.4–5.3)
PROT SERPL-MCNC: 7.5 G/DL (ref 6.4–8.3)
RBC # BLD AUTO: 3.76 10E6/UL (ref 3.8–5.2)
SODIUM SERPL-SCNC: 137 MMOL/L (ref 135–145)
WBC # BLD AUTO: 7 10E3/UL (ref 4–11)

## 2024-12-11 PROCEDURE — 85004 AUTOMATED DIFF WBC COUNT: CPT | Mod: ZL | Performed by: NURSE PRACTITIONER

## 2024-12-11 PROCEDURE — 36415 COLL VENOUS BLD VENIPUNCTURE: CPT | Mod: ZL | Performed by: NURSE PRACTITIONER

## 2024-12-11 PROCEDURE — G0463 HOSPITAL OUTPT CLINIC VISIT: HCPCS

## 2024-12-11 PROCEDURE — 85018 HEMOGLOBIN: CPT | Mod: ZL | Performed by: NURSE PRACTITIONER

## 2024-12-11 PROCEDURE — 83036 HEMOGLOBIN GLYCOSYLATED A1C: CPT | Mod: ZL | Performed by: NURSE PRACTITIONER

## 2024-12-11 PROCEDURE — 80053 COMPREHEN METABOLIC PANEL: CPT | Mod: ZL | Performed by: NURSE PRACTITIONER

## 2024-12-11 PROCEDURE — 71046 X-RAY EXAM CHEST 2 VIEWS: CPT | Mod: TC

## 2024-12-11 PROCEDURE — 93971 EXTREMITY STUDY: CPT | Mod: RT

## 2024-12-11 PROCEDURE — 11721 DEBRIDE NAIL 6 OR MORE: CPT | Performed by: PODIATRIST

## 2024-12-11 ASSESSMENT — PAIN SCALES - GENERAL
PAINLEVEL_OUTOF10: NO PAIN (0)
PAINLEVEL_OUTOF10: NO PAIN (0)

## 2024-12-11 NOTE — PROGRESS NOTES
Chief complaint: Patient presents with:  Toenail: DFE      History of Present Illness: This 79 year old female is seen at the request of No ref. provider found for evaluation and suggestions of management of thickened toenails.    She says she has HF and she had an MI on 07/20/2024. She has had to increase her Lasix recently because she has had a lot of edema with her CHF. She is seeing her PCP after her podiatry appointment today..    She says she has type I DM. She gets burning, tingling, and numbness in her feet, but it is not consistently. She also develops calluses on the bilateral medial great toe but she has been treating them and applying lotion on her feet.      No further pedal complaints today.        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), managed as type 1 (H)    H/O total knee replacement, left    Age-related osteoporosis without current pathological fracture    Osteoporosis    Family history of melanoma    Family history of breast cancer in female    Dysphonia    Prolonged QT interval    Fatty liver on 11/7/2007    Diastolic dysfunction with chronic heart failure (H)    Mixed hyperlipidemia    Drug-induced hypokalemia    Vitamin D deficiency    Contrast media allergy    Pulmonary emphysema, unspecified emphysema type (H)    Asthma, unspecified asthma severity, unspecified whether complicated, unspecified whether persistent    Thrombocytopenia (H)    Abnormal EMG     Cardiomegaly    Incisional hernia    Paroxysmal atrial fibrillation (H)    Chronic systolic heart failure (H)    Atrial fibrillation with rapid ventricular response (H)    Elevated brain natriuretic peptide (BNP) level    COVID-19    Insulin-requiring or dependent type II diabetes mellitus (H)    Diabetic polyneuropathy associated with type 1 diabetes mellitus (H)       Past Surgical History:   Procedure Laterality Date    APPENDECTOMY OPEN CHILD      AS TOTAL KNEE ARTHROPLASTY Right     CHOLECYSTECTOMY      COLONOSCOPY - HIM SCAN N/A 03/12/2009    per Care Everywhere documentation per [facility].     HYSTERECTOMY TOTAL ABDOMINAL      MASTECTOMY, BILATERAL Bilateral 02/26/1992    SHOULDER SURGERY Right     SHOULDER SURGERY Left        Current Outpatient Medications   Medication Sig Dispense Refill    apixaban ANTICOAGULANT (ELIQUIS) 5 MG tablet Take 5 mg by mouth 2 times daily.      Calcium Carb-Cholecalciferol (CALCIUM PLUS VITAMIN D PO) Take 1 tablet by mouth 2 times daily (with meals).      Continuous Blood Gluc  (DEXCOM G6 ) DIMITRIS 1 each continuous 1 each 0    Continuous Glucose Sensor (DEXCOM G6 SENSOR) MISC Change every 10 days. 9 each 1    Continuous Glucose Transmitter (DEXCOM G6 TRANSMITTER) MISC CHANGE EVERY 90 DAYS 1 each 1    dapagliflozin (FARXIGA) 10 MG TABS tablet Take 1 tablet (10 mg) by mouth daily. 90 tablet 3    furosemide (LASIX) 40 MG tablet Take 1 tablet (40 mg) by mouth daily. 90 tablet 3    insulin lispro (HUMALOG VIAL) 100 UNIT/ML vial TO BE USED WITH THE INSULIN PUMP TOTAL DAILY DOSE 80 UNITS 30 mL 2    INSULIN PUMP - OUTPATIENT Insulin pump settings:  Updated: 8/14/24  Insulin pump: Minimed 780G  Basal:  0000 0.85   Total: 18  CR: 13   ISF: 50    Target: 100-130  Active insulin:  4 hours 30 minutes      levothyroxine (SYNTHROID/LEVOTHROID) 75 MCG tablet Take 1 tablet (75 mcg) by mouth daily. (Patient taking differently: Take 75 mcg by mouth at bedtime.) 90 tablet 2     metoprolol succinate ER (TOPROL XL) 50 MG 24 hr tablet Take 1 tablet (50 mg) by mouth daily. 90 tablet 3    Multiple Vitamins-Minerals (WOMENS MULTIVITAMIN) TABS Take 1 tablet by mouth 2 times daily.      omeprazole (PRILOSEC) 40 MG DR capsule Take 1 capsule by mouth once daily 90 capsule 3    ramipril (ALTACE) 10 MG capsule Take 1 capsule (10 mg) by mouth daily. 90 capsule 3    ramipril (ALTACE) 10 MG capsule Take 1 capsule (10 mg) by mouth daily. 90 capsule 1    senna-docusate (SENOKOT-S;PERICOLACE) 8.6-50 MG per tablet Take 1 tablet by mouth At Bedtime       simvastatin (ZOCOR) 40 MG tablet Take 1 tablet (40 mg) by mouth at bedtime. 90 tablet 3    tamoxifen (NOLVADEX) 20 MG tablet Take 20 mg by mouth every morning      ondansetron (ZOFRAN ODT) 4 MG ODT tab Take 1 tablet (4 mg) by mouth every 8 hours as needed. (Patient not taking: Reported on 12/11/2024) 15 tablet 0    semaglutide (OZEMPIC) 2 MG/3ML pen Inject 0.5 mg subcutaneously every 7 days. (Patient not taking: Reported on 12/11/2024)      tiZANidine (ZANAFLEX) 4 MG tablet Take 2 mg by mouth 2 times daily. Take in the morning and midday. (Patient not taking: Reported on 12/11/2024)       No current facility-administered medications for this visit.          Allergies   Allergen Reactions    Amoxicillin Angioedema    Contrast Dye Difficulty breathing    Gadolinium Derivatives      Other reaction(s): Difficulty in swallowing, Laryngeal spasm  Patient had an injection into rt. Shoulder capsule prior to MRI arthrogram.  Contained multihance gadobenate, omnipaque iohexol, and buffered lidocaine.      Ammonia     Cory-1 [Lidocaine Hcl]      Novocaine allergy      Codeine Sulfate     Food      Shrimp    Fosamax [Alendronate]      Dental infections    Iodine-131 Swelling     Ct dye    Lidocaine     Nitrous Oxide     No Clinical Screening - See Comments Nausea and Vomiting and Other (See Comments)     (3/6/09)- N/V and Heart racing    Novocain [Procaine]      Penicillins     Symbicort [Budesonide-Formoterol Fumarate]     Tramadol     Morphine Palpitations       Family History   Problem Relation Age of Onset    Breast Cancer Maternal Aunt     Breast Cancer Maternal Aunt     Lung Cancer Father        Social History     Socioeconomic History    Marital status:      Spouse name: None    Number of children: 2    Years of education: None    Highest education level: None   Occupational History    Occupation: [employer]     Employer: RETIRED   Tobacco Use    Smoking status: Never     Passive exposure: Never    Smokeless tobacco: Never   Vaping Use    Vaping status: Never Used   Substance and Sexual Activity    Alcohol use: No     Alcohol/week: 0.0 standard drinks of alcohol    Drug use: No     Social Determinants of Health     Financial Resource Strain: Low Risk  (2/28/2024)    Financial Resource Strain     Within the past 12 months, have you or your family members you live with been unable to get utilities (heat, electricity) when it was really needed?: No   Food Insecurity: Low Risk  (2/28/2024)    Food Insecurity     Within the past 12 months, did you worry that your food would run out before you got money to buy more?: No     Within the past 12 months, did the food you bought just not last and you didn t have money to get more?: No   Transportation Needs: Low Risk  (2/28/2024)    Transportation Needs     Within the past 12 months, has lack of transportation kept you from medical appointments, getting your medicines, non-medical meetings or appointments, work, or from getting things that you need?: No   Interpersonal Safety: Low Risk  (10/9/2024)    Interpersonal Safety     Do you feel physically and emotionally safe where you currently live?: Yes     Within the past 12 months, have you been hit, slapped, kicked or otherwise physically hurt by someone?: No     Within the past 12 months, have you been humiliated or emotionally abused in other ways by your partner or  ex-partner?: No   Housing Stability: Low Risk  (2/28/2024)    Housing Stability     Do you have housing? : Yes     Are you worried about losing your housing?: No       ROS: 10 point ROS neg other than the symptoms noted above in the HPI.  EXAM  Constitutional: healthy, alert, and no distress    Psychiatric: mentation appears normal and affect normal/bright    VASCULAR:  -Dorsalis pedis pulse +2/4 b/l  -Posterior tibial pulse +2/4 b/l  -Capillary refill time < 3 seconds to b/l hallux  -Hair growth Absent to b/l anterior legs and ankles  NEURO:  -Light touch sensation intact to b/l plantar forefoot  DERM:  -Skin temperature, texture and turgor WNL b/l  -Toenails elongated, thickened, dystrophic and discolored x 10  -Minimal hyperkeratotic lesion on the medial plantar distal aspect of the proximal phalanx of the bilateral hallux  MSK:  -Lateral deviation of hallux with medial deviation of 1st metatarsal, bilaterally   -Prominent bony prominence to dorsal and medial 1st metatarsal head, bilaterally   -Moderate decrease in arch height while patient is NWB, bilaterally   -Moderate medial midfoot arch collapse bilaterally   -Pain on palpation to the metatarsal heads 2-4 bilaterally   -Muscle strength of ankles +5/5 for dorsiflexion, plantarflexion, ABDUction and ADDuction b/l    -Ankle joint passive ROM within normal limits except for dorsiflexion:    Dorsiflexion, RIGHT Straight knee 0 degrees    Dorsiflexion, LEFT Straight knee 0 degrees    ============================================================    ASSESSMENT:  (L60.3) Onychodystrophy  (primary encounter diagnosis)    (E13.40) 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 Treatment options discussed with no educational barriers noted.  -High risk  toenail debridement x 10 toenails without incident    -Diabetic Foot Education provided. This included checking the feet daily looking for new new blisters or wounds, wearing shoes at all times when walking including around the house, and avoiding lotion application between the toes. If there are any signs of infection, the patient should present to the ED as soon as possible. Infections of the foot can be life threatening or lead to amputations of the foot or leg.    Diabetic Shoes: DM shoe referral made on 10/03/2024    -Patient in agreement with the above treatment plan and all of patient's questions were answered.      Return to clinic 63+ days for a diabetic foot exam and high risk nail debridement and possible callus ervin Chaidez DPM

## 2024-12-12 LAB
ATRIAL RATE - MUSE: 89 BPM
DIASTOLIC BLOOD PRESSURE - MUSE: NORMAL MMHG
INTERPRETATION ECG - MUSE: NORMAL
P AXIS - MUSE: 28 DEGREES
PR INTERVAL - MUSE: 166 MS
QRS DURATION - MUSE: 96 MS
QT - MUSE: 328 MS
QTC - MUSE: 399 MS
R AXIS - MUSE: -15 DEGREES
SYSTOLIC BLOOD PRESSURE - MUSE: NORMAL MMHG
T AXIS - MUSE: 77 DEGREES
VENTRICULAR RATE- MUSE: 89 BPM

## 2024-12-16 DIAGNOSIS — I48.0 PAROXYSMAL ATRIAL FIBRILLATION (H): Primary | ICD-10-CM

## 2024-12-16 NOTE — TELEPHONE ENCOUNTER
apixaban ANTICOAGULANT (ELIQUIS) 5 MG tablet -- --  -- No   Sig - Route: Take 5 mg by mouth 2 times daily. - Oral   Class: Historical   Patient LM stating she had pharmacy change and needs sent in ASAP. Wants it sent by fax to [facility] [phone]    Last Office Visit: 08/28/24  Future Office visit:    Next 5 appointments (look out 90 days)      Jan 08, 2025 1:30 PM  (Arrive by 1:15 PM)  Return Visit with Jin Corea DO  [facility] ([facility] ) [address] [place] [id number]  [phone]     Feb 12, 2025 12:45 PM  (Arrive by 12:30 PM)  Return Visit with Jane Chaidez DPM  [facility] ([facility] ) [address] [place] [id number] [phone]     Mar 11, 2025 2:00 PM  (Arrive by 1:45 PM)  Provider Visit with Nicolasa Guillen NP  [facility] ([facility] ) [address] [place] [id number]  [phone]             Routing refill request to provider for review/approval because:

## 2024-12-16 NOTE — ED AVS SNAPSHOT
[facility]    [address]    [place] [address]    Phone:  [phone]                                       Flora Acuna   MRN: [id number]    Department:  [facility]   Date of Visit:  6/11/2017           After Visit Summary Signature Page     I have received my discharge instructions, and my questions have been answered. I have discussed any challenges I see with this plan with the nurse or doctor.    ..........................................................................................................................................  Patient/Patient Representative Signature      ..........................................................................................................................................  Patient Representative Print Name and Relationship to Patient    ..................................................               ................................................  Date                                            Time    ..........................................................................................................................................  Reviewed by Signature/Title    ...................................................              ..............................................  Date                                                            Time           normal mood with appropriate affect

## 2024-12-17 ENCOUNTER — MEDICAL CORRESPONDENCE (OUTPATIENT)
Dept: HEALTH INFORMATION MANAGEMENT | Facility: HOSPITAL | Age: 79
End: 2024-12-17

## 2024-12-17 ENCOUNTER — TELEPHONE (OUTPATIENT)
Dept: CARE COORDINATION | Facility: OTHER | Age: 79
End: 2024-12-17

## 2024-12-17 NOTE — TELEPHONE ENCOUNTER
Telephone call to patient.  No answer.  Left a detailed message, with phone number,  requesting return call to cardiology.

## 2024-12-18 ENCOUNTER — MEDICAL CORRESPONDENCE (OUTPATIENT)
Dept: HEALTH INFORMATION MANAGEMENT | Facility: CLINIC | Age: 79
End: 2024-12-18
Payer: COMMERCIAL

## 2024-12-20 ENCOUNTER — MEDICAL CORRESPONDENCE (OUTPATIENT)
Dept: HEALTH INFORMATION MANAGEMENT | Facility: CLINIC | Age: 79
End: 2024-12-20
Payer: COMMERCIAL

## 2024-12-24 ENCOUNTER — PATIENT OUTREACH (OUTPATIENT)
Dept: CARE COORDINATION | Facility: OTHER | Age: 79
End: 2024-12-24

## 2024-12-24 NOTE — PROGRESS NOTES
Telephone call to patient to discuss cardiology CC again.  Patient states at this time she doesn't feel she has a need for CC and feels she is doing fine with her son's help.   Patient was told to call cardiology CC at any time if she changes her mind and would like to have CC follow her to help her with any of her cardiology related questions/needs.  She verbalized understanding.

## 2024-12-29 ENCOUNTER — HOSPITAL ENCOUNTER (EMERGENCY)
Facility: HOSPITAL | Age: 79
Discharge: HOME | End: 2024-12-29
Payer: MEDICARE

## 2024-12-29 ENCOUNTER — APPOINTMENT (OUTPATIENT)
Dept: GENERAL RADIOLOGY | Facility: HOSPITAL | Age: 79
End: 2024-12-29
Attending: EMERGENCY MEDICINE
Payer: MEDICARE

## 2024-12-29 VITALS
DIASTOLIC BLOOD PRESSURE: 72 MMHG | HEART RATE: 93 BPM | TEMPERATURE: 101.8 F | SYSTOLIC BLOOD PRESSURE: 156 MMHG | OXYGEN SATURATION: 95 % | RESPIRATION RATE: 20 BRPM

## 2024-12-29 DIAGNOSIS — J10.1 INFLUENZA A: ICD-10-CM

## 2024-12-29 DIAGNOSIS — Z77.29 CARBON MONOXIDE EXPOSURE: ICD-10-CM

## 2024-12-29 LAB
COHGB MFR BLD: 4.7 % (ref 0–2)
FLUAV RNA SPEC QL NAA+PROBE: POSITIVE
FLUBV RNA RESP QL NAA+PROBE: NEGATIVE
HOLD SPECIMEN: NORMAL
RSV RNA SPEC NAA+PROBE: NEGATIVE
SARS-COV-2 RNA RESP QL NAA+PROBE: NEGATIVE

## 2024-12-29 PROCEDURE — 36415 COLL VENOUS BLD VENIPUNCTURE: CPT | Performed by: NURSE PRACTITIONER

## 2024-12-29 PROCEDURE — 71046 X-RAY EXAM CHEST 2 VIEWS: CPT

## 2024-12-29 PROCEDURE — 82375 ASSAY CARBOXYHB QUANT: CPT | Performed by: NURSE PRACTITIONER

## 2024-12-29 PROCEDURE — 99284 EMERGENCY DEPT VISIT MOD MDM: CPT | Performed by: NURSE PRACTITIONER

## 2024-12-29 PROCEDURE — 87637 SARSCOV2&INF A&B&RSV AMP PRB: CPT | Performed by: EMERGENCY MEDICINE

## 2024-12-29 PROCEDURE — 99284 EMERGENCY DEPT VISIT MOD MDM: CPT | Mod: 25

## 2024-12-29 ASSESSMENT — ENCOUNTER SYMPTOMS
ENDOCRINE NEGATIVE: 1
SHORTNESS OF BREATH: 0
MUSCULOSKELETAL NEGATIVE: 1
GASTROINTESTINAL NEGATIVE: 1
FATIGUE: 1
FEVER: 1
COUGH: 1
EYES NEGATIVE: 1
NEUROLOGICAL NEGATIVE: 1
PSYCHIATRIC NEGATIVE: 1
CARDIOVASCULAR NEGATIVE: 1
ALLERGIC/IMMUNOLOGIC NEGATIVE: 1
CHILLS: 1
HEMATOLOGIC/LYMPHATIC NEGATIVE: 1

## 2024-12-29 NOTE — ED NOTES
Patient assessed in Triage by Clifton MEDRANO NP. Patient reports symptoms started last Friday and have progressed into today. Reports cough, congestion, body aches, and fevers. Denies any chest pain, shortness of breath or difficulty breathing.

## 2024-12-29 NOTE — ED TRIAGE NOTES
Patient presents from [facility] where patient is staying after a CO alarm and subsequent exposure at patient's apartment. Per EMS call for alarm was around 1100 this morning. Patient has hx of COPD and noted a worsening cough last  night. Patient noted to have a fever in triage. Viral swab obtained in triage.

## 2024-12-29 NOTE — DISCHARGE INSTRUCTIONS
Influenza:    The flu (influenza) is a viral infection that affects your respiratory tract. The respiratory tract is made up of your mouth, nose, and lungs, and the passages between them. Unlike a cold, the flu can make you very ill. It may lead to pneumonia, a serious lung infection. The flu can have serious complications and even cause death.   Because of the COVID-19 pandemic, experts strongly advise getting a flu vaccine during 4917-6999 to protect yourself, your family, and others. Flu vaccines and COVID-19 vaccines can be given at the same time. Flu viruses and the COVID-19 virus are both likely to spread during flu season. A flu vaccine will help save medical resources to care for people with COVID-19. People at high risk for complications from the flu are also likely to be at high risk for serious problems from COVID-19, so it's important to get a flu vaccine.        Viruses that cause influenza spread through the air in droplets when someone who has the flu coughs, sneezes, laughs, or talks.      How does the flu spread?  The flu is caused by a virus. The virus spreads through the air in droplets when someone who has the flu coughs, sneezes, laughs, or talks. You can become infected when you inhale these viruses directly. You can also become infected when you touch a surface on which the droplets have landed and then transfer the germs to your eyes, nose, or mouth. Touching used tissues, or sharing utensils, drinking glasses, or a toothbrush from an infected person can expose you to flu viruses, too.     What are the symptoms of the flu?  Flu symptoms tend to come on quickly and may last a few days to a few weeks. They include:   Fever usually higher than 100.4  F  ( 38 C ) and chills  Sore throat and headache  Dry cough  Runny nose  Tiredness and weakness  Muscle aches    How is the flu treated?  The flu usually gets better after 7 days or so. In some cases, your healthcare provider may prescribe an  antiviral medicine. This may help you get well sooner. It also may reduce the risk for and severity of complications. For the medicine to help, you need to take it as soon as possible (ideally within 48 hours) after your symptoms start.   If you develop pneumonia or other serious illness, you may need to stay in the hospital.      Easing flu symptoms  During this time it is important to keep well hydrated with water, juices, or low calorie sports drinks.  Using 1/2 strength G2, Gatorade Zero, or PowerAde Zero is best choice (mix half and half with water).  DO NOT use caffeinated or alcoholic beverages for hydration, as these actually dehydrate you.   If your past medical conditions, allergies, current medications, or current status does not prevent you from using acetaminophen and/or ibuprofen, use the following: Acetaminophen (Tylenol) every 6 hours as needed for pain in addition to ibuprofen (Motrin) mg every 6 hours as needed for pain.  Take these two medications together if wanted.              Taking steps to protect others  Wash your hands often, especially after coughing or sneezing. Or clean your hands with an alcohol-based hand  containing at least 60% alcohol.  Cough or sneeze into a tissue. Then throw the tissue away and wash your hands. If you don t have a tissue, cough and sneeze into your elbow.  Stay home until at least 24 hours after you no longer have a fever or chills. Be sure the fever isn t being hidden by fever-reducing medicine.  Don t share food, utensils, drinking glasses, or a toothbrush with others.       Fever/pain control:   If your past medical conditions, allergies, current medications, or current status does not prevent you from using acetaminophen and/or ibuprofen, use the following: Acetaminophen 650-1000 mg every 6 hours as needed for pain/fever.  Ibuprofen 400 mg every 6 hours as needed for pain/fever.  These can be taken together if wanted.    Remember that these are for AS  NEEDED.  If not needed, do not take.  It is important to remember that fever is beneficial in the healing process.  It is usually recommended to start using medication if/when temperature is =100.4 F  or when you have discomfort.  Studies show that not using medication for fever control shows better outcomes...       Follow-up with your primary care provider for reevaluation.  Contact your primary care provider if you have any questions or concerns.  Do not hesitate to return to the ER if any new or worsening symptoms.     Please read the attached instructions (if any).  They highlight more specific treatments and interventions for you at home.              Thank you for letting me participate in your care and wish you a fast and uneventful recovery,    Clifton NELSON, CNP    Do not hesitate to contact me with questions or concerns.  [email or web address]

## 2024-12-29 NOTE — ED PROVIDER NOTES
History     Chief Complaint   Patient presents with    Cough     HPI  Flora Acuna is a 79 year old individual with history of hypertension, latent autoimmune diabetes, osteoporosis, chronic heart failure, pulmonary emphysema, asthma, cardiomegaly, atrial fibrillation on anticoagulation with apixaban, comes in for cough.  Patient states she is in an assisted living apartment and carbon monoxide alarm went off.  Patient transferred to a different facility stay.  Patient notes that she has cough, body aches, chills so comes in.  Currently denies any shortness of breath or dizziness.    Allergies:  Allergies   Allergen Reactions    Amoxicillin Angioedema    Contrast Dye Difficulty breathing    Gadolinium Derivatives      Other reaction(s): Difficulty in swallowing, Laryngeal spasm  Patient had an injection into rt. Shoulder capsule prior to MRI arthrogram.  Contained multihance gadobenate, omnipaque iohexol, and buffered lidocaine.      Ammonia     Cory-1 [Lidocaine Hcl]      Novocaine allergy      Codeine Sulfate     Food      Shrimp    Fosamax [Alendronate]      Dental infections    Iodine-131 Swelling     Ct dye    Lidocaine     Nitrous Oxide     No Clinical Screening - See Comments Nausea and Vomiting and Other (See Comments)     (3/6/09)- N/V and Heart racing    Novocain [Procaine]     Penicillins     Symbicort [Budesonide-Formoterol Fumarate]     Tramadol     Morphine Palpitations       Problem List:    Patient Active Problem List    Diagnosis Date Noted    Diabetic polyneuropathy associated with type 1 diabetes mellitus (H) 11/06/2024     Priority: Medium    Insulin-requiring or dependent type II diabetes mellitus (H) 10/30/2024     Priority: Medium    COVID-19 10/29/2024     Priority: Medium    Chronic systolic heart failure (H) 05/01/2024     Priority: Medium    Atrial fibrillation with rapid ventricular response (H) 05/01/2024     Priority: Medium    Elevated brain natriuretic peptide (BNP) level  05/01/2024     Priority: Medium    Paroxysmal atrial fibrillation (H) 03/18/2024     Priority: Medium    Thrombocytopenia (H) 03/21/2023     Priority: Medium    Pulmonary emphysema, unspecified emphysema type (H) 02/13/2023     Priority: Medium    Asthma, unspecified asthma severity, unspecified whether complicated, unspecified whether persistent 02/13/2023     Priority: Medium    Contrast media allergy 05/14/2021     Priority: Medium    Fatty liver on 11/7/2007 05/05/2021     Priority: Medium    Diastolic dysfunction with chronic heart failure (H) 05/05/2021     Priority: Medium    Mixed hyperlipidemia 05/05/2021     Priority: Medium    Drug-induced hypokalemia 05/05/2021     Priority: Medium    Vitamin D deficiency 05/05/2021     Priority: Medium    Prolonged QT interval 08/28/2020     Priority: Medium    Dysphonia 06/03/2019     Priority: Medium    Hypothyroidism 05/07/2019     Priority: Medium    Essential hypertension 05/07/2019     Priority: Medium    Malignant neoplasm of left breast in female, estrogen receptor positive (H) 05/07/2019     Priority: Medium     Follows with Dr. 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  Flora Armand has no plan or code status on file. Discussed available resources and provided with information. Confirmed code status reflects current choices pending further ACP discussions.  Confirmed/documented legally  designated decision makers.  Added by Ruth Velasquez          Personal history of malignant neoplasm of breast 11/23/2015     Priority: Medium    Family history of melanoma 11/21/2015     Priority: Medium    Family history of breast cancer in female 11/21/2015     Priority: Medium    Abnormal EMG 02/12/2013     Priority: Medium     Formatting of this note might be different from the original.  possible findings suggestive of myopathy in muscles that could be consistent with inclusion body myopathy, repeat EMG normal.      CARDIOVASCULAR SCREENING; LDL GOAL LESS THAN 160 10/05/2012     Priority: Medium    Osteoporosis 01/03/2012     Priority: Medium     Overview:   IMO Update      Cardiomegaly 05/08/2009     Priority: Medium     Formatting of this note might be different from the original.  TTE w/Doppler      Incisional hernia 05/04/2009     Priority: Medium     Formatting of this note might be different from the original.  IMO Update 10/11          Past Medical History:    Past Medical History:   Diagnosis Date    Congestive heart failure, unspecified     Diabetes (H)     H/O rheumatoid arthritis     HLD (hyperlipidemia)     HTN (hypertension)     Myocardial infarction (H)     Uncomplicated asthma        Past Surgical History:    Past Surgical History:   Procedure Laterality Date    APPENDECTOMY OPEN CHILD      AS TOTAL KNEE ARTHROPLASTY Right     CHOLECYSTECTOMY      COLONOSCOPY - HIM SCAN N/A 03/12/2009    per Care Everywhere documentation per [facility].     HYSTERECTOMY TOTAL ABDOMINAL      MASTECTOMY, BILATERAL Bilateral 02/26/1992    SHOULDER SURGERY Right     SHOULDER SURGERY Left        Family History:    Family History   Problem Relation Age of Onset    Breast Cancer Maternal Aunt     Breast Cancer Maternal Aunt     Lung Cancer Father        Social History:  Marital Status:   [5]  Social History     Tobacco Use    Smoking status: Never     Passive exposure: Never    Smokeless tobacco: Never    Vaping Use    Vaping status: Never Used   Substance Use Topics    Alcohol use: No     Alcohol/week: 0.0 standard drinks of alcohol    Drug use: No        Medications:    apixaban ANTICOAGULANT (ELIQUIS) 5 MG tablet  Calcium Carb-Cholecalciferol (CALCIUM PLUS VITAMIN D PO)  Continuous Blood Gluc  (DEXCOM G6 ) DIMITRIS  Continuous Glucose Sensor (DEXCOM G6 SENSOR) MISC  Continuous Glucose Transmitter (DEXCOM G6 TRANSMITTER) MISC  dapagliflozin (FARXIGA) 10 MG TABS tablet  furosemide (LASIX) 40 MG tablet  insulin lispro (HUMALOG VIAL) 100 UNIT/ML vial  INSULIN PUMP - OUTPATIENT  levothyroxine (SYNTHROID/LEVOTHROID) 75 MCG tablet  metoprolol succinate ER (TOPROL XL) 50 MG 24 hr tablet  Multiple Vitamins-Minerals (WOMENS MULTIVITAMIN) TABS  omeprazole (PRILOSEC) 40 MG DR capsule  ondansetron (ZOFRAN ODT) 4 MG ODT tab  ramipril (ALTACE) 10 MG capsule  ramipril (ALTACE) 10 MG capsule  senna-docusate (SENOKOT-S;PERICOLACE) 8.6-50 MG per tablet  simvastatin (ZOCOR) 40 MG tablet  tamoxifen (NOLVADEX) 20 MG tablet  tiZANidine (ZANAFLEX) 4 MG tablet          Review of Systems   Constitutional:  Positive for chills, fatigue and fever.   HENT: Negative.     Eyes: Negative.    Respiratory:  Positive for cough. Negative for shortness of breath.    Cardiovascular: Negative.    Gastrointestinal: Negative.    Endocrine: Negative.    Genitourinary: Negative.    Musculoskeletal: Negative.    Skin: Negative.    Allergic/Immunologic: Negative.    Neurological: Negative.    Hematological: Negative.    Psychiatric/Behavioral: Negative.         Physical Exam   BP: 156/72  Pulse: 93  Temp: (!) 101.8  F (38.8  C)  Resp: 20  SpO2: 95 %      GENERAL APPEARANCE:  The patient is a 79 year old well-developed, well-nourished individual that appears as stated age.  LUNGS:  Breathing is easy.  Breath sounds are equal bilaterally.  Scattered rhonchi is noted.  HEART: Irregular rhythm with normal rate.  No murmurs, gallops, or  rubs.  PSYCHIATRIC:  The patient is awake, alert, and oriented x4.  Recent and remote memory is intact.  Appropriate mood and affect.  Calm and cooperative with history and physical exam.  SKIN:  Warm, dry, and well perfused.  Good turgor.  No lesions, nodules, or rashes are noted.  No bruising noted.      ED Course     ED Course as of 12/29/24 1451   Sun Dec 29, 2024   1316 Lab and x-ray ordered while patient in lobby.   1415 Influenza A(!): Positive  Positive for influenza A.   1435 This time patient in no distress.  Chest x-ray shows no acute abnormality.  Will discharge patient home for follow-up with PCP.  Acetaminophen/ibuprofen for pain and fever.  Hydration therapy education given.  Return precautions given.            Results for orders placed or performed during the hospital encounter of 12/29/24 (from the past 24 hours)   Influenza A/B, RSV and SARS-CoV2 PCR (COVID-19) Nasopharyngeal    Specimen: Nasopharyngeal; Swab   Result Value Ref Range    Influenza A PCR Positive (A) Negative    Influenza B PCR Negative Negative    RSV PCR Negative Negative    SARS CoV2 PCR Negative Negative    Narrative    Testing was performed using the Xpert Xpress CoV2/Flu/RSV Assay on the [employer] GeneXpert Instrument. This test should be ordered for the detection of SARS-CoV2, influenza, and RSV viruses in individuals with signs and symptoms of respiratory tract infection. This test is for in vitro diagnostic use under the [place] FDA for laboratories certified under CLIA to perform high or moderate complexity testing. This test has been [place] FDA cleared. A negative result does not rule out the presence of PCR inhibitors in the specimen or target RNA in concentration below the limit of detection for the assay. If only one viral target is positive but coinfection with multiple targets is suspected, the sample should be re-tested with another FDA cleared, approved, or authorized test, if coninfection would change clinical management.  This test was validated by the [facility]. These laboratories are certified under the Clinical Laboratory Improvement Amendments of 1988 (CLIA-88) as qualified to perfom high complexity laboratory testing.   XR Chest 2 Views    Narrative    EXAM: XR CHEST 2 VIEWS  LOCATION: [facility]  DATE: 12/29/2024    INDICATION: cough, fever  COMPARISON: Chest radiograph 12/11/2024      Impression    IMPRESSION: Stable chest radiograph without acute cardiopulmonary abnormality.   Carbon monoxide   Result Value Ref Range    Carbon Monoxide 4.7 (H) 0.0 - 2.0 %   Extra Tube    Narrative    The following orders were created for panel order Extra Tube.  Procedure                               Abnormality         Status                     ---------                               -----------         ------                     Extra Purple Top Tube[[id number]]                            In process                   Please view results for these tests on the individual orders.       Medications - No data to display    Assessments & Plan (with Medical Decision Making)     I have reviewed the nursing notes.    I have reviewed the findings, diagnosis, plan and need for follow up with the patient.    Summary:  Patient presents to the ER today for body aches, fatigue, cough, chills, carbon monoxide exposure.  Potential diagnosis which have been considered and evaluated include carbon monoxide poisoning, influenza, COVID, RSV, other viral illness,, as well as others. Many of these have been excluded using the various modalities and assessment as noted on the chart. At the present time, the diagnosis given seems to be the most likely influenza A and carbon monoxide exposure.  Upon arrival, vitals signs show blood pressure 156/72 with a pulse of 93.  Temperature 101.8  F.  Respirations 20 with oxygenation of 95% on room air.  The patient is alert and oriented in no distress.  Patient has a regular heart rhythm  with known history of atrial fibrillation.  Does have scattered expiratory rhonchi present.  No respiratory distress.  Neurological examination normal.  Influenza, COVID, RSV testing conducted while in lobby and was positive for influenza A.  Carbon monoxide level is slightly elevated at 4.7 but does not require treatment.  Chest x-ray personally reviewed showing no acute cardiopulmonary abnormalities.  Patient in no distress.  Will discharge back to apartment to do OTC treatment of acetaminophen/ibuprofen and hydration.  Patient deferred Tamiflu.  Advised patient to follow-up with PCP and return to ER for new or worsening symptoms.  Patient verbalized understand agrees with plan of care.  Patient discharged home.      Critical Care Time: None    Impression and plan discussed with patient. Questions answered, concerns addressed, indications for urgent re-evaluation reviewed, and  given. Patient/Parent/Caregiver agree with treatment plan and have no further questions at this time.  AVS provided at discharge.    This document was prepared using a combination of typing and voice generated software.  While every attempt was made for accuracy, spelling and grammatical errors may exist.              Discharge Medication List as of 12/29/2024  2:36 PM          Final diagnoses:   Influenza A   Carbon monoxide exposure       12/29/2024   [facility]       Clifton Helms APRN CNP  12/29/24 1449       Clifton Helms APRN CNP  12/29/24 1456

## 2025-01-01 ENCOUNTER — MEDICAL CORRESPONDENCE (OUTPATIENT)
Dept: HEALTH INFORMATION MANAGEMENT | Facility: HOSPITAL | Age: 80
End: 2025-01-01

## 2025-01-02 ENCOUNTER — TELEPHONE (OUTPATIENT)
Dept: FAMILY MEDICINE | Facility: OTHER | Age: 80
End: 2025-01-02

## 2025-01-02 NOTE — TELEPHONE ENCOUNTER
10:54 AM    Reason for Call: Phone Call    Description: Nellie with [facility] calling. Needs verbal orders for continuation of care. 1 time a week for 9 weeks, please call Nellie    Was an appointment offered for this call? No  If yes : Appointment type              Date    Preferred method for responding to this message: Telephone Call  What is your phone number ?  [phone]     If we cannot reach you directly, may we leave a detailed response at the number you provided? Yes (secured line)    Can this message wait until your PCP/provider returns, if available today? YES    Mai Bunch

## 2025-01-06 NOTE — TELEPHONE ENCOUNTER
Nellie with [facility] called again to get verbal orders. Please call Nellie [phone]- Secured line, OK to leave detailed voicemail.

## 2025-01-08 ENCOUNTER — OFFICE VISIT (OUTPATIENT)
Dept: CARDIOLOGY | Facility: OTHER | Age: 80
End: 2025-01-08
Attending: INTERNAL MEDICINE
Payer: MEDICARE

## 2025-01-08 VITALS
WEIGHT: 144 LBS | OXYGEN SATURATION: 97 % | HEART RATE: 74 BPM | SYSTOLIC BLOOD PRESSURE: 130 MMHG | DIASTOLIC BLOOD PRESSURE: 78 MMHG | BODY MASS INDEX: 25.52 KG/M2 | TEMPERATURE: 98.9 F | RESPIRATION RATE: 16 BRPM | HEIGHT: 63 IN

## 2025-01-08 DIAGNOSIS — R05.9 COUGH, UNSPECIFIED TYPE: Primary | ICD-10-CM

## 2025-01-08 PROCEDURE — G0463 HOSPITAL OUTPT CLINIC VISIT: HCPCS

## 2025-01-08 RX ORDER — AZITHROMYCIN 250 MG/1
TABLET, FILM COATED ORAL
Qty: 6 TABLET | Refills: 0 | Status: SHIPPED | OUTPATIENT
Start: 2025-01-08 | End: 2025-01-13

## 2025-01-08 ASSESSMENT — PAIN SCALES - GENERAL: PAINLEVEL_OUTOF10: NO PAIN (0)

## 2025-01-08 NOTE — PATIENT INSTRUCTIONS
Thank you for allowing Dr LAURA Corea and our  team to participate in your care. Please call our office at [phone] with scheduling questions or if you need to cancel or change your appointment. With any other questions or concerns you may call cardiology nurse at  [phone].       If you experience chest pain, chest pressure, chest tightness, shortness of breath, fainting, lightheadedness, nausea, vomiting, or other concerning symptoms, please report to the Emergency Department or call 911. These symptoms may be emergent, and best treated in the Emergency Department.

## 2025-01-08 NOTE — PROGRESS NOTES
[facility] HEART CARE   CARDIOLOGY PROGRESS NOTE     Chief Complaint   Patient presents with    Follow Up     1-2 patrick follow up          Diagnosis:  1.  FRANCO.  -Diastolic CHF/minimal COPD/Asthma.  2.  Chest pain.    -Cardiac cath on 4/22/24-no dz, [facility].   -Cardiac cath on 7/15/15-minimal dz.   3.  HTN-controlled.  4.  Prolonged QT.  5.  Asthma.  6.  Fatty liver on 11/7/2007.  7.  CHF.   -50-55% on 8/21/24.   -Right heart cath with mildly elevated pressures on 4/23/2024, [facility].   -26% on 4/23/2024 at [facility].   -Normal on 5/26/21.    -55-60% on 2/27/23.  -Lower ext edema with BNP of 661 on 5/5/21.   -Diastolic grade 1 on 1/19/15.8.  DM-2-controlled.  9.  Hypothyroidism.  10.  Hyperlipidemia-controlled.  11.  Hypertriglyceridemia-uncontrolled.  12.  B12 deficiency.  -391 on 5/5/21.   13.  COPD-minimal on 2/18/21.  14.  Elevated BNP at 582 on 8/28/20.  15.  A. Fib.    -Eliquis started on 1/30/2023.  -Short runs on 1/3/2023.  16.  NSTEMI on 4/22/24   -[facility]-Normal cornaries, 4/23/2024.    -Taksubo or A-fib causing the increased rate.         Assessment/Plan:    1.  Cough: Has had a cough for multiple weeks.  Patient concerned.  Wishing for antibiotic.  Prescription for azithromycin sent to [facility].  2.  CHF: Stable, no changes since last seen.  Not having significant fluid retention or dyspnea.  Seen by Dr. Ward on 6/3/24.  Beta-blocker increased and repeat echo ordered.  Reviewed her echocardiogram today.  Was hospitalized on 10/27/2024 for COVID-19.  Was taken off of heart failure medications as patient was eating poorly and dehydrated.  Was taken off of metoprolol, ramipril, and Lasix.  She was filling up with fluid and becoming more short of breath. Previously on 40 mg in the morning and 20 mg in the evening, ramipril 10 mg twice a day, and metoprolol 100 mg XL daily.  She is looking for financial assistance assistance from the  for Farxiga 10 mg daily.  Paperwork is being filled out  and sent to them.  Has not started the medication.  Cannot afford $400 for the medication.  Patient aware could cause UTIs/infection.  Understanding the risk, she is going to start on the medication.  If too expensive, she should not  the Farxiga.    2.  NSTEMI: Type II.  Seen in ER on 4/22/2024 for a 3-month history of dyspnea on exertion with left anterior precordial pressure with exertion.  Found to have an increasing troponin.  Initially, troponin was at at 82, increasing to 194.  Concern for NSTEMI.  Referred to [facility].  Underwent angiogram on 4/23/2024.  Found to have normal coronaries.  Noted to have mildly increased left ventricular pressures.  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  Discharged from the hospital.  Was to follow-up with [facility] cardiology.  Has a follow-up here.   3.  Follow-up in 1 year.  No changes today.    Interval history:  See above.      HPI:    Mrs. Acuna is being seen by cardiology for dyspnea exertion.  She has had this issue for many years.  She was seen by cardiology in 2015 and 2016 with work-up which included a cardiac catheterization.  She reports being dyspneic on exertion for most of her life.  Her symptoms are improved with inhalers.     Bo reports being dyspneic for many years.  Her shortness of breath has gotten worse over the last few months to years.  She states that the Spiriva has helped her shortness of breath significantly.  She does carry history of asthma.  She was seen by cardiology through [facility] in 2015 and 2016 for this very issue.  She reportedly had an angiogram in 2002 which was reportedly normal.  She had a repeat cath on 7/15/2015 [facility] with a 10% lesion to her LAD.  All remaining vessels were patent.  She carries a diagnosis of heart failure with preserved ejection fraction but more recently this issue was not identified.  Her cardiac catheterization 2015 also showed  normal filling pressures.       She has noted that with vacuuming her house, retirement through, she will be so significantly short of breath that she will use a fan to improve her air hunger.  As mentioned, she has essentially had 2 normal cardiac catheterizations.  She states she would not be able to do stress testing secondary to the uncomfortable feeling it creates.  Her echo from 5/8/2009 showed diastolic dysfunction grade 1.  This was not identified on her most recent echo from 9/17/2020.  She did not have a significant valvular or chamber abnormalities.  She does carry history of rheumatoid arthritis but states she was told most recently that she has osteoarthritis and not RA.  A normal chest x-ray on 3/19/2021.  PFT's on 2/18/2021 showed minimal COPD.  She states she was exposed to secondhand smoke by her father at a young age.  She herself has never used tobacco.     I believe the top possibilities for her shortness of breath would be a history of asthma, the beta-blocker she is on, the possibility of a DVT/PE, concern for cirrhosis as she was noted to have a fatty liver on imaging from 11/7/2007, PE, diastolic dysfunction, hypothyroidism, and potentially rhythm issues. With the exception of a prolonged QT, EKG normal on 5/5/2021.     She also carries diagnosis of diabetes.  Her A1c most recently was 6.9%. She has hyperlipidemia which is well controlled on Zocor 40 mg daily.       She has a history of hypothyroidism.  She has been on levothyroxine 75 mcg.  Her last TSH was on 6/7/2019 and was normal at 1.17.     She also has a history of prolonged QTC.  She has hypertension which has been controlled.      Relevant testing:  ECHO on 8/21/24:  The left ventricular size and wall thickness are normal.   The ejection fraction is 50-55% (borderline).  Doppler findings (E/E' ratio and/or other parameters) suggest left ventricular filling pressures are normal.  The right ventricle size and function are normal  No  "significant valvular abnormalities.  IVC is normal  This study was compared with the study from 2/27/2023. Left ventricular  function slightly worsened    Echocardiogram on 2/27/2023:  Global and regional left ventricular function is normal with an EF of 55-60%.  Left ventricular diastolic function is indeterminate.  Right ventricular function, chamber size, wall motion, and thickness are  normal.  Both atria appear normal.  Pulmonary artery systolic pressure cannot be assessed.  The inferior vena cava is normal.  No pericardial effusion is present.    Zio patch on 1/3/2023:  Worn for 3 days and 7 hr's.  After removing artifact, total time was 2 days and 22 hr's. Placed on 1/3/2023 at 2:27 PM and completed on 1/6/2023 at 9:13 PM.  Underlying rhythm was sinus.  Hrt rate ranged from 50 bpm, maximum heart rate of 150 bmp, averaging 73 bmp.  No significant bradycardia, pauses, Mobitz type II or 3rd degree heart block.  No atrial fibrillation on this study.  x3 triggered events and x2 diary entries.  These corresponded to SVE, VE, and sinus rhythm.  x0 runs of VT.    x10 runs of SVT lasting up to 12.8 sec's with a maximum heart rate of 150 bmp.  These short bursts of \"SVT\" are suspicious for A. fib  Rare, <1% of PAC's, atrial couplets, atrial triplets, ventricular couplets, and ventricular triplets.  PVC's at 1.6%.  + episodes of ventricular bigeminy lasting up to 4.6 sec's.  + episodes of ventricular trigeminy lasting up to 20.5 sec's.    US carotid arteries on 1/3/2023:  No hemodynamically significant stenoses are identified at  either carotid bifurcation    V/Q scan on 5/7/21:  The ventilation study demonstrates mildly diminished lateral  ventilation particularly at the apices.  The perfusion study demonstrates normal symmetric perfusion without  small, moderate or large defect. A shoulder prosthesis results in mild artifact.    Echocardiogram 5/26/2021:  No pericardial effusion is present.  Global and regional left " ventricular function is normal with an EF of 55-60%.  Left ventricular diastolic function is normal.  The right ventricle is normal size.  Global right ventricular function is normal.  Both atria appear normal.  Trace mitral insufficiency is present.  Aortic valve is normal in structure and function.  Trace tricuspid insufficiency is present.  Right ventricular systolic pressure is 8 mmHg above the right atrial pressure.  The pulmonic valve is normal.    Echocardiogram on 9/17/2020:  No pericardial effusion is present.  Global and regional left ventricular function is normal with an EF of 60-65%.  Left ventricular diastolic function is normal.  The right ventricle is normal size.  Both atria appear normal.  Trace mitral insufficiency is present.  Aortic valve is normal in structure and function.  Trace tricuspid insufficiency is present.  Right ventricular systolic pressure is 13 mmHg above the right atrial pressure.  The pulmonic valve is normal.  The aorta root is normal.     Echo on 5/8/2009:   1. Normal left ventricular size with mild systolic functional impairment.    2. Evidence of grade 1 diastolic dysfunction.    3. Mild left atrial enlargement.    4. Trace physiologic amounts of mitral tricuspid and pulmonic insufficiency.    5. Trivial pericardial effusion.     Left heart cath on 7/15/2015 at [facility]:  DOMINANCE:  Right Dominant  LEFT MAIN:  is angiographically normal (0% Stenosis)  LEFT ANTERIOR DESCENDING :  Proximal Segment is angiographically normal (0% Stenosis)  rest of vessel has luminal irregularities (10% Stenosis) with distal pruning.  DIAGONAL 1:  is angiographically normal (0% Stenosis)  CIRCUMFLEX:  and its branches are angiographically normal (0% Stenosis)  RIGHT CORONARY ARTERY:  is angiographically normal (0% Stenosis)  COLLATERALS:  No collateral flow  Normal LVEDP      No diagnosis found.              Past Medical History:   Diagnosis Date    Congestive heart failure, unspecified      Diabetes (H)     H/O rheumatoid arthritis     HLD (hyperlipidemia)     HTN (hypertension)     Myocardial infarction (H)     Uncomplicated asthma        Past Surgical History:   Procedure Laterality Date    APPENDECTOMY OPEN CHILD      AS TOTAL KNEE ARTHROPLASTY Right     CHOLECYSTECTOMY      COLONOSCOPY - HIM SCAN N/A 03/12/2009    per Care Everywhere documentation per [facility].     HYSTERECTOMY TOTAL ABDOMINAL      MASTECTOMY, BILATERAL Bilateral 02/26/1992    SHOULDER SURGERY Right     SHOULDER SURGERY Left        Allergies   Allergen Reactions    Amoxicillin Angioedema    Contrast Dye Difficulty breathing    Gadolinium Derivatives      Other reaction(s): Difficulty in swallowing, Laryngeal spasm  Patient had an injection into rt. Shoulder capsule prior to MRI arthrogram.  Contained multihance gadobenate, omnipaque iohexol, and buffered lidocaine.      Ammonia     Cory-1 [Lidocaine Hcl]      Novocaine allergy      Codeine Sulfate     Food      Shrimp    Fosamax [Alendronate]      Dental infections    Iodine-131 Swelling     Ct dye    Lidocaine     Nitrous Oxide     No Clinical Screening - See Comments Nausea and Vomiting and Other (See Comments)     (3/6/09)- N/V and Heart racing    Novocain [Procaine]     Penicillins     Symbicort [Budesonide-Formoterol Fumarate]     Tramadol     Morphine Palpitations       Current Outpatient Medications   Medication Sig Dispense Refill    apixaban ANTICOAGULANT (ELIQUIS) 5 MG tablet Take 1 tablet (5 mg) by mouth 2 times daily. 180 tablet 3    Calcium Carb-Cholecalciferol (CALCIUM PLUS VITAMIN D PO) Take 1 tablet by mouth 2 times daily (with meals).      Continuous Blood Gluc  (DEXCOM G6 ) DIMITRIS 1 each continuous 1 each 0    Continuous Glucose Sensor (DEXCOM G6 SENSOR) MISC Change every 10 days. 9 each 1    Continuous Glucose Transmitter (DEXCOM G6 TRANSMITTER) MISC CHANGE EVERY 90 DAYS 1 each 1    dapagliflozin (FARXIGA) 10 MG TABS tablet Take 1 tablet  (10 mg) by mouth daily. 90 tablet 3    furosemide (LASIX) 40 MG tablet Take 1 tablet (40 mg) by mouth daily. 90 tablet 3    insulin lispro (HUMALOG VIAL) 100 UNIT/ML vial TO BE USED WITH THE INSULIN PUMP TOTAL DAILY DOSE 80 UNITS 30 mL 2    INSULIN PUMP - OUTPATIENT Insulin pump settings:  Updated: 8/14/24  Insulin pump: Minimed 780G  Basal:  0000 0.85   Total: 18  CR: 13   ISF: 50    Target: 100-130  Active insulin:  4 hours 30 minutes      levothyroxine (SYNTHROID/LEVOTHROID) 75 MCG tablet Take 1 tablet (75 mcg) by mouth daily. (Patient taking differently: Take 75 mcg by mouth at bedtime.) 90 tablet 2    metoprolol succinate ER (TOPROL XL) 50 MG 24 hr tablet Take 1 tablet (50 mg) by mouth daily. 90 tablet 3    Multiple Vitamins-Minerals (WOMENS MULTIVITAMIN) TABS Take 1 tablet by mouth 2 times daily.      omeprazole (PRILOSEC) 40 MG DR capsule Take 1 capsule by mouth once daily 90 capsule 3    ramipril (ALTACE) 10 MG capsule Take 1 capsule (10 mg) by mouth daily. 90 capsule 3    ramipril (ALTACE) 10 MG capsule Take 1 capsule (10 mg) by mouth daily. 90 capsule 1    senna-docusate (SENOKOT-S;PERICOLACE) 8.6-50 MG per tablet Take 1 tablet by mouth At Bedtime       simvastatin (ZOCOR) 40 MG tablet Take 1 tablet (40 mg) by mouth at bedtime. 90 tablet 3    tamoxifen (NOLVADEX) 20 MG tablet Take 20 mg by mouth every morning      ondansetron (ZOFRAN ODT) 4 MG ODT tab Take 1 tablet (4 mg) by mouth every 8 hours as needed. (Patient not taking: Reported on 1/8/2025) 15 tablet 0    tiZANidine (ZANAFLEX) 4 MG tablet Take 2 mg by mouth 2 times daily. Take in the morning and midday. (Patient not taking: Reported on 12/11/2024)         Social History     Socioeconomic History    Marital status:      Spouse name: Not on file    Number of children: 2    Years of education: Not on file    Highest education level: Not on file   Occupational History    Occupation: [employer]     Employer: RETIRED   Tobacco Use    Smoking  status: Never     Passive exposure: Never    Smokeless tobacco: Never   Vaping Use    Vaping status: Never Used   Substance and Sexual Activity    Alcohol use: No     Alcohol/week: 0.0 standard drinks of alcohol    Drug use: No    Sexual activity: Not on file   Other Topics Concern    Parent/sibling w/ CABG, MI or angioplasty before 65F 55M? Not Asked   Social History Narrative    Not on file     Social Drivers of Health     Financial Resource Strain: Low Risk  (10/28/2024)    Financial Resource Strain     Within the past 12 months, have you or your family members you live with been unable to get utilities (heat, electricity) when it was really needed?: No   Food Insecurity: Low Risk  (10/28/2024)    Food Insecurity     Within the past 12 months, did you worry that your food would run out before you got money to buy more?: No     Within the past 12 months, did the food you bought just not last and you didn t have money to get more?: No   Transportation Needs: Low Risk  (10/28/2024)    Transportation Needs     Within the past 12 months, has lack of transportation kept you from medical appointments, getting your medicines, non-medical meetings or appointments, work, or from getting things that you need?: No   Physical Activity: Not on file   Stress: Not on file   Social Connections: Not on file   Interpersonal Safety: Low Risk  (12/11/2024)    Interpersonal Safety     Do you feel physically and emotionally safe where you currently live?: Yes     Within the past 12 months, have you been hit, slapped, kicked or otherwise physically hurt by someone?: No     Within the past 12 months, have you been humiliated or emotionally abused in other ways by your partner or ex-partner?: No   Housing Stability: Low Risk  (10/28/2024)    Housing Stability     Do you have housing? : Yes     Are you worried about losing your housing?: No       LAB RESULTS:   No visits with results within 2 Month(s) from this visit.   Latest known visit  with results is:   Office Visit on 05/05/2021   Component Date Value Ref Range Status    Folate 05/05/2021 >100.0  >5.4 ng/mL Final    pH Arterial 05/05/2021 7.49* 7.35 - 7.45 pH Final    pCO2 Arterial 05/05/2021 37  35 - 45 mm Hg Final    pO2 Arterial 05/05/2021 96  80 - 105 mm Hg Final    Bicarbonate Arterial 05/05/2021 28  21 - 28 mmol/L Final    FIO2 05/05/2021 Unknown   Final    Oxyhemoglobin Arterial 05/05/2021 97  92 - 100 % Final    Base Excess Art 05/05/2021 4.4  mmol/L Final    Vitamin D Deficiency screening 05/05/2021 94* 20 - 75 ug/L Final    Vitamin B12 05/05/2021 391  193 - 986 pg/mL Final    TSH 05/05/2021 2.04  0.40 - 4.00 mU/L Final    Troponin I ES 05/05/2021 <0.015  0.000 - 0.045 ug/L Final    N-Terminal Pro Bnp 05/05/2021 661* 0 - 125 pg/mL Final    RIOS interpretation 05/05/2021 Negative  NEG^Negative Final    GGT 05/05/2021 14  0 - 40 U/L Final    D Dimer 05/05/2021 0.5  0.0 - 0.50 ug/ml FEU Final    CRP Inflammation 05/05/2021 <2.9  0.0 - 8.0 mg/L Final    WBC 05/05/2021 6.7  4.0 - 11.0 10e9/L Final    RBC Count 05/05/2021 4.17  3.8 - 5.2 10e12/L Final    Hemoglobin 05/05/2021 12.7  11.7 - 15.7 g/dL Final    Hematocrit 05/05/2021 38.5  35.0 - 47.0 % Final    MCV 05/05/2021 92  78 - 100 fl Final    MCH 05/05/2021 30.5  26.5 - 33.0 pg Final    MCHC 05/05/2021 33.0  31.5 - 36.5 g/dL Final    RDW 05/05/2021 12.0  10.0 - 15.0 % Final    Platelet Count 05/05/2021 146* 150 - 450 10e9/L Final    Diff Method 05/05/2021 Automated Method   Final    % Neutrophils 05/05/2021 61.4  % Final    % Lymphocytes 05/05/2021 26.4  % Final    % Monocytes 05/05/2021 7.0  % Final    % Eosinophils 05/05/2021 4.3  % Final    % Basophils 05/05/2021 0.3  % Final    % Immature Granulocytes 05/05/2021 0.6  % Final    Nucleated RBCs 05/05/2021 0  0 /100 Final    Absolute Neutrophil 05/05/2021 4.1  1.6 - 8.3 10e9/L Final    Absolute Lymphocytes 05/05/2021 1.8  0.8 - 5.3 10e9/L Final    Absolute Monocytes 05/05/2021 0.5  0.0 -  "1.3 10e9/L Final    Absolute Eosinophils 05/05/2021 0.3  0.0 - 0.7 10e9/L Final    Absolute Basophils 05/05/2021 0.0  0.0 - 0.2 10e9/L Final    Abs Immature Granulocytes 05/05/2021 0.0  0 - 0.4 10e9/L Final    Absolute Nucleated RBC 05/05/2021 0.0   Final    Sodium 05/05/2021 137  133 - 144 mmol/L Final    Potassium 05/05/2021 3.5  3.4 - 5.3 mmol/L Final    Chloride 05/05/2021 101  94 - 109 mmol/L Final    Carbon Dioxide 05/05/2021 30  20 - 32 mmol/L Final    Anion Gap 05/05/2021 6  3 - 14 mmol/L Final    Glucose 05/05/2021 146* 70 - 99 mg/dL Final    Urea Nitrogen 05/05/2021 13  7 - 30 mg/dL Final    Creatinine 05/05/2021 0.76  0.52 - 1.04 mg/dL Final    GFR Estimate 05/05/2021 77  >60 mL/min/[1.73_m2] Final    GFR Estimate If Black 05/05/2021 90  >60 mL/min/[1.73_m2] Final    Calcium 05/05/2021 8.9  8.5 - 10.1 mg/dL Final    Bilirubin Total 05/05/2021 0.8  0.2 - 1.3 mg/dL Final    Albumin 05/05/2021 3.8  3.4 - 5.0 g/dL Final    Protein Total 05/05/2021 8.0  6.8 - 8.8 g/dL Final    Alkaline Phosphatase 05/05/2021 35* 40 - 150 U/L Final    ALT 05/05/2021 23  0 - 50 U/L Final    AST 05/05/2021 15  0 - 45 U/L Final    Cholesterol 05/05/2021 135  <200 mg/dL Final    Triglycerides 05/05/2021 168* <150 mg/dL Final    HDL Cholesterol 05/05/2021 52  >49 mg/dL Final    LDL Cholesterol Calculated 05/05/2021 49  <100 mg/dL Final    Non HDL Cholesterol 05/05/2021 83  <130 mg/dL Final        Review of systems: Negative except that which was noted in the HPI.    Physical examination:  /78 (BP Location: Left arm, Patient Position: Sitting, Cuff Size: Adult Regular)   Pulse 74   Temp 98.9  F (37.2  C) (Tympanic)   Resp 16   Ht 1.6 m (5' 3\")   Wt 65.3 kg (144 lb)   SpO2 97%   BMI 25.51 kg/m      GENERAL APPEARANCE: healthy, alert and no distress  CHEST: lungs clear to auscultation.  Reduced breath sounds bilaterally.  Has some upper respiratory congestion.  CARDIOVASCULAR: Irregular rate irregular rhythm, normal S1 " with physiologic split S2, no S3 or S4 and no murmur, click or rub  EXTREMITIES: no clubbing, cyanosis with mild peripheral edema             Thank you for allowing me to participate in the care of your patient. Please do not hesitate to contact me if you have any questions.     Jin Corea, DO

## 2025-01-09 ENCOUNTER — ALLIED HEALTH/NURSE VISIT (OUTPATIENT)
Dept: EDUCATION SERVICES | Facility: OTHER | Age: 80
End: 2025-01-09
Attending: NURSE PRACTITIONER
Payer: COMMERCIAL

## 2025-01-09 ENCOUNTER — TELEPHONE (OUTPATIENT)
Dept: CARE COORDINATION | Facility: OTHER | Age: 80
End: 2025-01-09

## 2025-01-09 VITALS
RESPIRATION RATE: 16 BRPM | OXYGEN SATURATION: 96 % | SYSTOLIC BLOOD PRESSURE: 115 MMHG | HEART RATE: 74 BPM | DIASTOLIC BLOOD PRESSURE: 70 MMHG

## 2025-01-09 DIAGNOSIS — E13.9 LADA (LATENT AUTOIMMUNE DIABETES IN ADULTS), MANAGED AS TYPE 1 (H): Primary | ICD-10-CM

## 2025-01-09 PROCEDURE — G0463 HOSPITAL OUTPT CLINIC VISIT: HCPCS

## 2025-01-09 PROCEDURE — 99214 OFFICE O/P EST MOD 30 MIN: CPT | Performed by: NURSE PRACTITIONER

## 2025-01-09 PROCEDURE — 95251 CONT GLUC MNTR ANALYSIS I&R: CPT | Performed by: NURSE PRACTITIONER

## 2025-01-16 ENCOUNTER — TELEPHONE (OUTPATIENT)
Dept: CARE COORDINATION | Facility: OTHER | Age: 80
End: 2025-01-16

## 2025-01-16 NOTE — TELEPHONE ENCOUNTER
Telephone call to patient to update and PAP paperwork response that was received.  No answer, left detailed VM with telephone number, requesting a return call to cardiology CC

## 2025-01-17 ENCOUNTER — TELEPHONE (OUTPATIENT)
Dept: FAMILY MEDICINE | Facility: OTHER | Age: 80
End: 2025-01-17

## 2025-01-17 NOTE — TELEPHONE ENCOUNTER
Symptom or reason needing to speak to RN: Yany OT with [facility] called to request verbal orders for OT for once a week for 7 weeks.     Best number to return call: [phone]     Best time to return call: Anytime

## 2025-01-20 ENCOUNTER — TELEPHONE (OUTPATIENT)
Dept: CARE COORDINATION | Facility: OTHER | Age: 80
End: 2025-01-20

## 2025-01-20 NOTE — TELEPHONE ENCOUNTER
Telephone call to patient.  Let patient know that we will need her full signature to re-submist the [place] form.  Let her know that I will leave the form up front in registration, and all she needs to do is sign her full signature, date and and then when it comes back to me I will fax it back to [employer] for her.   She verbalized understanding.

## 2025-01-23 ENCOUNTER — MEDICAL CORRESPONDENCE (OUTPATIENT)
Dept: HEALTH INFORMATION MANAGEMENT | Facility: CLINIC | Age: 80
End: 2025-01-23
Payer: COMMERCIAL

## 2025-01-30 NOTE — PATIENT INSTRUCTIONS
Continue working on healthy eating and moving as best as you can (start low and slow, work up to 30 min, 5x/week)    BG goals:  Fasting and before meals <130, >70  2 hour after eating <180    We only need 1/2 of these numbers to be within target then your A1c will be within target    Medication changes   Keep working on the timing of your carb bolus--ideally before and every time you consume carbs     Follow up  As discussed     Call me sooner if any problems/concerns and/or questions develop including consistent low BGs <70 or consistent high BGs >200  [phone] (Unit Coordinator)    [phone] (Emeli, Nurse)

## 2025-01-30 NOTE — PROGRESS NOTES
"SUBJECTIVE:  Flora Acuna, 79 year old, female presents with the following Chief Complaint(s) with HPI to follow:  Chief Complaint   Patient presents with    Diabetes        Diabetes Follow-up    Patient is checking blood sugars: >4x/day using personal CGM (Guardian 4) Results:    Date: 12/28/24 to 1/10/25  Glucose management indicator: n/a    Average glucose: 168 +/- 52    Glucose range  Very low (<54): 0  low (<70): 0  In target range (): 63%  High (>180): 30%  Very high (>250): 7%    Symptoms of hypoglycemia (low blood sugar): no; hx of \"crashing\"  Paresthesias (numbness or burning in feet) or sores: Yes; no sores  Diabetic eye exam within the last year: UTD  Breakfast eaten regularly: sometimes  Patient counting carbs: Yes       HPI:  Flora's here today for the follow up regarding her KAREEM, managed as a Type 1    A1c trend:  Lab Results   Component Value Date    A1C 6.7 12/11/2024    A1C 9.0 08/28/2024    A1C 8.2 05/28/2024    A1C 7.1 12/15/2023    A1C 7.2 08/31/2023    A1C 6.9 01/29/2021    A1C 7.2 07/01/2020    A1C 7.9 12/16/2019    A1C 7.5 09/11/2019    A1C 7.3 06/07/2019     Current Diabetes medication:   1.  Insulin pump (Minimed 780G), with Humalog  ASA use: yes  Statin use: yes    Bo reports the following:  No issues with the insulin pump  Has some questions regarding the [employer] CGM    Had influenza A in December  Still coughing  Got a prescription yesterday for a Z-pack    Saw her cardiologist, Dr. Corea, yesterday  Working with him regarding Farxiga     Has an appt with her PCP, Nicolasa Guillen NP, in March    Weight trend:  Wt Readings from Last 4 Encounters:   01/08/25 65.3 kg (144 lb)   12/11/24 65.3 kg (144 lb)   11/18/24 64 kg (141 lb)   11/06/24 61.7 kg (136 lb)       Patient Active Problem List   Diagnosis    CARDIOVASCULAR SCREENING; LDL GOAL LESS THAN 160    Personal history of malignant neoplasm of breast    ACP (advance care planning)    Hypothyroidism    Essential hypertension "    Malignant neoplasm of left breast in female, estrogen receptor positive (H)    S/P reverse total shoulder arthroplasty, right    Lumbar disc disease with radiculopathy    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), managed as type 1 (H)    H/O total knee replacement, left    Age-related osteoporosis without current pathological fracture    Osteoporosis    Family history of melanoma    Family history of breast cancer in female    Dysphonia    Prolonged QT interval    Fatty liver on 11/7/2007    Diastolic dysfunction with chronic heart failure (H)    Mixed hyperlipidemia    Drug-induced hypokalemia    Vitamin D deficiency    Contrast media allergy    Pulmonary emphysema, unspecified emphysema type (H)    Asthma, unspecified asthma severity, unspecified whether complicated, unspecified whether persistent    Thrombocytopenia    Abnormal EMG    Cardiomegaly    Incisional hernia    Paroxysmal atrial fibrillation (H)    Chronic systolic heart failure (H)    Atrial fibrillation with rapid ventricular response (H)    Elevated brain natriuretic peptide (BNP) level    COVID-19    Insulin-requiring or dependent type II diabetes mellitus (H)    Diabetic polyneuropathy associated with type 1 diabetes mellitus (H)       Past Medical History:   Diagnosis Date    Congestive heart failure, unspecified     Diabetes (H)     H/O rheumatoid arthritis     HLD (hyperlipidemia)     HTN (hypertension)     Myocardial infarction (H)     Uncomplicated asthma        Past Surgical History:   Procedure Laterality Date    APPENDECTOMY OPEN CHILD      AS TOTAL KNEE ARTHROPLASTY Right     CHOLECYSTECTOMY      COLONOSCOPY - HIM SCAN N/A 03/12/2009    per Care Everywhere documentation per [facility].     HYSTERECTOMY TOTAL ABDOMINAL      MASTECTOMY, BILATERAL Bilateral 02/26/1992    SHOULDER SURGERY Right     SHOULDER SURGERY Left        Family History   Problem Relation Age of Onset    Breast Cancer Maternal Aunt     Breast Cancer Maternal  Aunt     Lung Cancer Father        Social History     Tobacco Use    Smoking status: Never     Passive exposure: Never    Smokeless tobacco: Never   Substance Use Topics    Alcohol use: No     Alcohol/week: 0.0 standard drinks of alcohol       Current Outpatient Medications   Medication Sig Dispense Refill    apixaban ANTICOAGULANT (ELIQUIS) 5 MG tablet Take 1 tablet (5 mg) by mouth 2 times daily. 180 tablet 3    Calcium Carb-Cholecalciferol (CALCIUM PLUS VITAMIN D PO) Take 1 tablet by mouth 2 times daily (with meals).      dapagliflozin (FARXIGA) 10 MG TABS tablet Take 1 tablet (10 mg) by mouth daily. 90 tablet 3    furosemide (LASIX) 40 MG tablet Take 1 tablet (40 mg) by mouth daily. 90 tablet 3    insulin lispro (HUMALOG VIAL) 100 UNIT/ML vial TO BE USED WITH THE INSULIN PUMP TOTAL DAILY DOSE 80 UNITS 30 mL 2    INSULIN PUMP - OUTPATIENT Insulin pump settings:  Updated: 8/14/24  Insulin pump: Minimed 780G  Basal:  0000 0.85   Total: 18  CR: 13   ISF: 50    Target: 100-130  Active insulin:  4 hours 30 minutes      levothyroxine (SYNTHROID/LEVOTHROID) 75 MCG tablet Take 1 tablet (75 mcg) by mouth daily. (Patient taking differently: Take 75 mcg by mouth at bedtime.) 90 tablet 2    metoprolol succinate ER (TOPROL XL) 50 MG 24 hr tablet Take 1 tablet (50 mg) by mouth daily. 90 tablet 3    Multiple Vitamins-Minerals (WOMENS MULTIVITAMIN) TABS Take 1 tablet by mouth 2 times daily.      omeprazole (PRILOSEC) 40 MG DR capsule Take 1 capsule by mouth once daily 90 capsule 3    ramipril (ALTACE) 10 MG capsule Take 1 capsule (10 mg) by mouth daily. 90 capsule 3    ramipril (ALTACE) 10 MG capsule Take 1 capsule (10 mg) by mouth daily. 90 capsule 1    senna-docusate (SENOKOT-S;PERICOLACE) 8.6-50 MG per tablet Take 1 tablet by mouth At Bedtime       simvastatin (ZOCOR) 40 MG tablet Take 1 tablet (40 mg) by mouth at bedtime. 90 tablet 3    tamoxifen (NOLVADEX) 20 MG tablet Take 20 mg by mouth every morning      ondansetron  "(ZOFRAN ODT) 4 MG ODT tab Take 1 tablet (4 mg) by mouth every 8 hours as needed. (Patient not taking: Reported on 12/11/2024) 15 tablet 0    tiZANidine (ZANAFLEX) 4 MG tablet Take 2 mg by mouth 2 times daily. Take in the morning and midday. (Patient not taking: Reported on 12/11/2024)         Allergies   Allergen Reactions    Amoxicillin Angioedema    Contrast Dye Difficulty breathing    Gadolinium Derivatives      Other reaction(s): Difficulty in swallowing, Laryngeal spasm  Patient had an injection into rt. Shoulder capsule prior to MRI arthrogram.  Contained multihance gadobenate, omnipaque iohexol, and buffered lidocaine.      Ammonia     Cory-1 [Lidocaine Hcl]      Novocaine allergy      Codeine Sulfate     Food      Shrimp    Fosamax [Alendronate]      Dental infections    Iodine-131 Swelling     Ct dye    Lidocaine     Nitrous Oxide     No Clinical Screening - See Comments Nausea and Vomiting and Other (See Comments)     (3/6/09)- N/V and Heart racing    Novocain [Procaine]     Penicillins     Symbicort [Budesonide-Formoterol Fumarate]     Tramadol     Morphine Palpitations     REVIEW OF SYSTEMS  Skin: hx of skin reaction  Eyes: negative  Ears/Nose/Throat: hx of \"burnt\" voice and muscles from GERD  Respiratory: + cough; No SOB or FRANCO; no hemoptysis; history of asthma  Cardiovascular: negative; history of HF  Gastrointestinal: negative  Genitourinary: negative   Musculoskeletal: hx of back pain; hx of RA, neck pain, arthritis and right shoulder   Neurologic: positive for numbness or tingling of feet--same  Psychiatric: negative  Hematologic/Lymphatic/Immunologic: history of breast cancer (left) x 2 (same spot)  Endocrine: positive for diabetes and thyroid disease     OBJECTIVE:  /70   Pulse 74   Resp 16   SpO2 96%   Constitutional: alert and no distress  Respiratory:  Good diaphragmatic excursion.  Musculoskeletal: extremities normal- no gross deformities noted; using a rolling walker   Skin: " scattered red/pink papular rash on arms and chest  Psychiatric: mentation appears normal and affect normal/bright    LABS  No results found for any visits on 01/09/25.      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     TIR is good, 63%  SG ave: 168  No TBT    Education focused on the following:  Keep working on the timing of her carb bolus--ideally before  Try running the insulin pump on smartguard all the time    Questions answered regarding the sensor  If the insulin pump asks for a calibration, please do it    Let me know if you are able to get on Farxiga and it there's any issues with lower BGs after starting it    Follow up  As discussed    Please call sooner if any changes develop    Patient Instructions   Continue working on healthy eating and moving as best as you can (start low and slow, work up to 30 min, 5x/week)    BG goals:  Fasting and before meals <130, >70  2 hour after eating <180    We only need 1/2 of these numbers to be within target then your A1c will be within target    Medication changes   Keep working on the timing of your carb bolus--ideally before and every time you consume carbs     Follow up  As discussed     Call me sooner if any problems/concerns and/or questions develop including consistent low BGs <70 or consistent high BGs >200  [phone] (Unit Coordinator)    [phone] (Emeli, Nurse)    Time: 30 minutes  Barrier: none  Willingness to learn: accepting    Kerri NELSON FNP-BC  Diabetes and Wound Care    Cc:  Nicolasa Guillen NP    30 minutes was spent with patient.    All of this time was spent on counseling patient regarding illness, medication and/or treatment options, coordinating further cares and follow ups that are needed along with resource material that will be helpful in the treatment of these issues.       With the electronic record, we can now more quickly and easily track our patient diabetic goals. Our diabetes clinical review is in progress and these are the indicators we are monitoring for good diabetes health.     1.) HbA1C less than 7 (measurement of your average blood sugars)  2.) Blood Pressure less than 140/80  3.) LDL less than 100 (bad cholesterol)  4.) HbA1C is checked in the last 6 months and below 7% (more frequently if not at goal or adjusting medications)  5.) LDL is checked in the last 12 months (more frequently if not at goal or adjusting medications)  6.) Taking one baby aspirin daily (unless otherwise instructed)  7.) No tobacco use  8) Statin use     You have achieved 8 out of 8 of these and I am encouraging you to come in and get tuned up to achieve 8 out of 8!  Here is what you have achieved so far in my goals for you:  1.) HbA1C  less than 7:                              YES   Your last  HbA1C  Lab Results   Component Value Date    A1C 6.7 12/11/2024    A1C 9.0 08/28/2024    A1C 8.2 05/28/2024    A1C 7.1 12/15/2023    A1C 7.2 08/31/2023    A1C 6.9 01/29/2021    A1C 7.2 07/01/2020    A1C 7.9 12/16/2019    A1C 7.5 09/11/2019    A1C 7.3 06/07/2019      2.) Blood Pressure less than 140/80:       YES     Your last    BP Readings from Last 1 Encounters:   01/09/25 115/70     3.) LDL less than 100:                              YES      Your last     LDL Cholesterol Calculated   Date Value Ref Range Status   02/28/2024 55 <=100 mg/dL Final   05/05/2021 49 <100 mg/dL Final     Comment:     Desirable:       <100 mg/dl     4.) Checked HbA1C in the past 6 months: YES      5.) Checked LDL in the past  12 months:    YES    6.) Taking one aspirin daily:                       YES     7.) No tobacco use:                                        YES      8.) Statin use      YES       Insurance requirements for personal CGM:   Patient has been seen in the clinic within the last 6 month  Diagnosis of KAREEM, managed as a Type 1 for >6 months  Has been testing their BGs >4x/day using her personal CGM  Uses an insulin pump for >6 months  Requires frequent adjustments to their treatment regimen based on BGM and/or CGM testing results.

## 2025-02-03 ENCOUNTER — MEDICAL CORRESPONDENCE (OUTPATIENT)
Dept: HEALTH INFORMATION MANAGEMENT | Facility: HOSPITAL | Age: 80
End: 2025-02-03

## 2025-02-12 ENCOUNTER — OFFICE VISIT (OUTPATIENT)
Dept: PODIATRY | Facility: OTHER | Age: 80
End: 2025-02-12
Attending: PODIATRIST
Payer: MEDICARE

## 2025-02-12 VITALS
OXYGEN SATURATION: 96 % | HEART RATE: 62 BPM | DIASTOLIC BLOOD PRESSURE: 73 MMHG | SYSTOLIC BLOOD PRESSURE: 126 MMHG | TEMPERATURE: 96.8 F

## 2025-02-12 DIAGNOSIS — L60.3 ONYCHODYSTROPHY: Primary | ICD-10-CM

## 2025-02-12 DIAGNOSIS — E10.42 DIABETIC POLYNEUROPATHY ASSOCIATED WITH TYPE 1 DIABETES MELLITUS (H): ICD-10-CM

## 2025-02-12 DIAGNOSIS — R60.0 BILATERAL LOWER EXTREMITY EDEMA: ICD-10-CM

## 2025-02-12 DIAGNOSIS — E13.40: ICD-10-CM

## 2025-02-12 DIAGNOSIS — Z13.89 SCREENING FOR DIABETIC PERIPHERAL NEUROPATHY: ICD-10-CM

## 2025-02-12 PROCEDURE — 11721 DEBRIDE NAIL 6 OR MORE: CPT | Performed by: PODIATRIST

## 2025-02-12 PROCEDURE — G0463 HOSPITAL OUTPT CLINIC VISIT: HCPCS | Mod: 25

## 2025-02-12 ASSESSMENT — PAIN SCALES - GENERAL: PAINLEVEL_OUTOF10: NO PAIN (0)

## 2025-02-12 NOTE — PROGRESS NOTES
Chief complaint: Patient presents with:  Toenail: DFE      History of Present Illness: This 80 year old female is seen at the request of No ref. provider found for evaluation and suggestions of management of thickened toenails.    She says she has HF and she had an MI on 07/20/2024. She has had to increase her Lasix recently because she has had a lot of edema with her CHF.    SHe says she noticed an increase in her varicose veins and swelling in her feet. She is wondering how to treat this and if this is normal.    She says she has type I DM. She still feels burning, tingling, and numbness in her feet, but it is not consistently.     No further pedal complaints today.        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), managed as type 1 (H)    H/O total knee replacement, left    Age-related osteoporosis without current pathological fracture    Osteoporosis    Family history of melanoma    Family history of breast cancer in female    Dysphonia    Prolonged QT interval    Fatty liver on 11/7/2007    Diastolic dysfunction with chronic heart failure (H)    Mixed hyperlipidemia    Drug-induced hypokalemia    Vitamin D deficiency    Contrast media allergy    Pulmonary emphysema, unspecified emphysema type (H)    Asthma, unspecified asthma severity, unspecified whether complicated, unspecified whether persistent    Thrombocytopenia    Abnormal EMG    Cardiomegaly    Incisional hernia     Paroxysmal atrial fibrillation (H)    Chronic systolic heart failure (H)    Atrial fibrillation with rapid ventricular response (H)    Elevated brain natriuretic peptide (BNP) level    COVID-19    Insulin-requiring or dependent type II diabetes mellitus (H)    Diabetic polyneuropathy associated with type 1 diabetes mellitus (H)       Past Surgical History:   Procedure Laterality Date    APPENDECTOMY OPEN CHILD      AS TOTAL KNEE ARTHROPLASTY Right     CHOLECYSTECTOMY      COLONOSCOPY - HIM SCAN N/A 03/12/2009    per Care Everywhere documentation per [facility].     HYSTERECTOMY TOTAL ABDOMINAL      MASTECTOMY, BILATERAL Bilateral 02/26/1992    SHOULDER SURGERY Right     SHOULDER SURGERY Left        Current Outpatient Medications   Medication Sig Dispense Refill    apixaban ANTICOAGULANT (ELIQUIS) 5 MG tablet Take 1 tablet (5 mg) by mouth 2 times daily. 180 tablet 3    Calcium Carb-Cholecalciferol (CALCIUM PLUS VITAMIN D PO) Take 1 tablet by mouth 2 times daily (with meals).      dapagliflozin (FARXIGA) 10 MG TABS tablet Take 1 tablet (10 mg) by mouth daily. 90 tablet 3    furosemide (LASIX) 40 MG tablet Take 1 tablet (40 mg) by mouth daily. 90 tablet 3    insulin lispro (HUMALOG VIAL) 100 UNIT/ML vial TO BE USED WITH THE INSULIN PUMP TOTAL DAILY DOSE 80 UNITS 30 mL 2    INSULIN PUMP - OUTPATIENT Inject subcutaneously. Insulin pump: Minimed 780G  Update: 1/9/25  Basal   0000 0.85  Total basal: 20.4   CR: 13  ISF: 50  Blood target: 100-130  Active insulin time: 2 hours      levothyroxine (SYNTHROID/LEVOTHROID) 75 MCG tablet Take 1 tablet (75 mcg) by mouth daily. (Patient taking differently: Take 75 mcg by mouth at bedtime.) 90 tablet 2    metoprolol succinate ER (TOPROL XL) 50 MG 24 hr tablet Take 1 tablet (50 mg) by mouth daily. 90 tablet 3    Multiple Vitamins-Minerals (WOMENS MULTIVITAMIN) TABS Take 1 tablet by mouth 2 times daily.      omeprazole (PRILOSEC) 40 MG DR capsule Take 1 capsule by mouth once daily  90 capsule 3    ramipril (ALTACE) 10 MG capsule Take 1 capsule (10 mg) by mouth daily. 90 capsule 3    ramipril (ALTACE) 10 MG capsule Take 1 capsule (10 mg) by mouth daily. 90 capsule 1    senna-docusate (SENOKOT-S;PERICOLACE) 8.6-50 MG per tablet Take 1 tablet by mouth At Bedtime       simvastatin (ZOCOR) 40 MG tablet Take 1 tablet (40 mg) by mouth at bedtime. 90 tablet 3    tamoxifen (NOLVADEX) 20 MG tablet Take 20 mg by mouth every morning      ondansetron (ZOFRAN ODT) 4 MG ODT tab Take 1 tablet (4 mg) by mouth every 8 hours as needed. (Patient not taking: Reported on 2/12/2025) 15 tablet 0    tiZANidine (ZANAFLEX) 4 MG tablet Take 2 mg by mouth 2 times daily. Take in the morning and midday. (Patient not taking: Reported on 12/11/2024)       No current facility-administered medications for this visit.          Allergies   Allergen Reactions    Amoxicillin Angioedema    Contrast Dye Difficulty breathing    Gadolinium Derivatives      Other reaction(s): Difficulty in swallowing, Laryngeal spasm  Patient had an injection into rt. Shoulder capsule prior to MRI arthrogram.  Contained multihance gadobenate, omnipaque iohexol, and buffered lidocaine.      Ammonia     Cory-1 [Lidocaine Hcl]      Novocaine allergy      Codeine Sulfate     Food      Shrimp    Fosamax [Alendronate]      Dental infections    Iodine-131 Swelling     Ct dye    Lidocaine     Nitrous Oxide     No Clinical Screening - See Comments Nausea and Vomiting and Other (See Comments)     (3/6/09)- N/V and Heart racing    Novocain [Procaine]     Penicillins     Symbicort [Budesonide-Formoterol Fumarate]     Tramadol     Morphine Palpitations       Family History   Problem Relation Age of Onset    Breast Cancer Maternal Aunt     Breast Cancer Maternal Aunt     Lung Cancer Father        Social History     Socioeconomic History    Marital status:      Spouse name: None    Number of children: 2    Years of education: None    Highest education level:  None   Occupational History    Occupation: [employer]     Employer: RETIRED   Tobacco Use    Smoking status: Never     Passive exposure: Never    Smokeless tobacco: Never   Vaping Use    Vaping status: Never Used   Substance and Sexual Activity    Alcohol use: No     Alcohol/week: 0.0 standard drinks of alcohol    Drug use: No     Social Determinants of Health     Financial Resource Strain: Low Risk  (2/28/2024)    Financial Resource Strain     Within the past 12 months, have you or your family members you live with been unable to get utilities (heat, electricity) when it was really needed?: No   Food Insecurity: Low Risk  (2/28/2024)    Food Insecurity     Within the past 12 months, did you worry that your food would run out before you got money to buy more?: No     Within the past 12 months, did the food you bought just not last and you didn t have money to get more?: No   Transportation Needs: Low Risk  (2/28/2024)    Transportation Needs     Within the past 12 months, has lack of transportation kept you from medical appointments, getting your medicines, non-medical meetings or appointments, work, or from getting things that you need?: No   Interpersonal Safety: Low Risk  (10/9/2024)    Interpersonal Safety     Do you feel physically and emotionally safe where you currently live?: Yes     Within the past 12 months, have you been hit, slapped, kicked or otherwise physically hurt by someone?: No     Within the past 12 months, have you been humiliated or emotionally abused in other ways by your partner or ex-partner?: No   Housing Stability: Low Risk  (2/28/2024)    Housing Stability     Do you have housing? : Yes     Are you worried about losing your housing?: No       ROS: 10 point ROS neg other than the symptoms noted above in the HPI.  EXAM  Constitutional: healthy, alert, and no distress    Psychiatric: mentation appears normal and affect normal/bright    VASCULAR:  -Dorsalis pedis pulse +2/4 b/l  -Posterior  tibial pulse +2/4 b/l  -Capillary refill time < 3 seconds to b/l hallux  -Hair growth Absent to b/l anterior legs and ankles  -Varicosities and telangiectasias to foot, bilaterally   -Moderate 2+ pitting edema to foot and ankle, bilaterally   NEURO:  -Light touch sensation intact to b/l plantar forefoot  DERM:  -Skin temperature, texture and turgor WNL b/l  -Toenails elongated, thickened, dystrophic and discolored x 10  -Minimal hyperkeratotic lesion on the medial plantar distal aspect of the proximal phalanx of the bilateral hallux  MSK:  -Lateral deviation of hallux with medial deviation of 1st metatarsal, bilaterally   -Prominent bony prominence to dorsal and medial 1st metatarsal head, bilaterally   -Moderate decrease in arch height while patient is NWB, bilaterally   -Moderate medial midfoot arch collapse bilaterally   -Pain on palpation to the metatarsal heads 2-4 bilaterally   -Muscle strength of ankles +5/5 for dorsiflexion, plantarflexion, ABDUction and ADDuction b/l    -Ankle joint passive ROM within normal limits except for dorsiflexion:    Dorsiflexion, RIGHT Straight knee 0 degrees    Dorsiflexion, LEFT Straight knee 0 degrees    ============================================================    ASSESSMENT:  (L60.3) Onychodystrophy  (primary encounter diagnosis)    (R60.0) Bilateral lower extremity edema    (E13.40) 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 Treatment options discussed with no educational barriers noted.  -High risk toenail debridement x 10 toenails without incident    -Diabetic Foot Education provided. This included checking the feet daily looking for new new blisters or wounds, wearing shoes at all times when walking including around the house, and avoiding lotion  application between the toes. If there are any signs of infection, the patient should present to the ED as soon as possible. Infections of the foot can be life threatening or lead to amputations of the foot or leg.    Diabetic Shoes: DM shoe referral made on 10/03/2024. She is being measured for Dm shoes on 02/12/2025.    Diabetes Mellitus: Patient's DM is managed by their PCP. The DM appears to be stable. Patient's last HbA1C was 6.7% on 12/11/2024.    ------------------------------------    Lower extremity edema:  -Discussed LOWER EXTREMITY edema including etiology and treatment options.  -Patient has multiple telangiectasias and varicosities. This usually indicates incompetence of the valves in the veins that help push blood flow back to the heart. Blood pools in the legs and causes inflammation.   -Blood pooling can can brown discoloration to the foot, ankle or leg (hemosiderin deposition).  -Chronic LOWER EXTREMITY edema can cause pain or open ulcers.  -Treatment consists of compression socks and elevation of the legs above the level of the heart. Treatment of the inflammation is like wearing glasses in that it's not reversing the problem that is causing the edema, but it is decreasing the inflammation during the time of treatment.  -Patient advised to avoid idle standing and attempt to keep moving when standing. The muscles in the legs act as natural pumps to promote blood flow back to the heart.  -When sitting, attempt to elevate legs above the level of the heart.  -No additional elevation is required when sleeping flat on a bed. Compression socks should also be removed at night when lying down flat on a bed.  -This is an acute, uncomplicated illness/injury with OTC treatment options reviewed.     -Patient in agreement with the above treatment plan and all of patient's questions were answered.      Return to clinic 63+ days for a diabetic foot exam and high risk nail debridement and possible callus  ervin Chaidez DPM

## 2025-02-12 NOTE — TELEPHONE ENCOUNTER
"omeprazole       Med discontinued by PCP 12/16/19, pt called requesting omeprazole. Pt stated \"I'm out and I want to pick it up by Friday\".     PCP in tomorrow will route to pcp to advise.   "
PAIN

## 2025-02-18 ENCOUNTER — TELEPHONE (OUTPATIENT)
Dept: CARDIOLOGY | Facility: OTHER | Age: 80
End: 2025-02-18
Payer: COMMERCIAL

## 2025-02-18 DIAGNOSIS — I51.7 CARDIOMEGALY: ICD-10-CM

## 2025-02-18 DIAGNOSIS — R79.89 ELEVATED BRAIN NATRIURETIC PEPTIDE (BNP) LEVEL: ICD-10-CM

## 2025-02-18 DIAGNOSIS — I10 ESSENTIAL HYPERTENSION: ICD-10-CM

## 2025-02-18 DIAGNOSIS — T50.905A DRUG-INDUCED HYPOKALEMIA: Primary | ICD-10-CM

## 2025-02-18 DIAGNOSIS — I50.22 CHRONIC SYSTOLIC HEART FAILURE (H): ICD-10-CM

## 2025-02-18 DIAGNOSIS — I50.32 DIASTOLIC DYSFUNCTION WITH CHRONIC HEART FAILURE (H): ICD-10-CM

## 2025-02-18 DIAGNOSIS — E87.6 DRUG-INDUCED HYPOKALEMIA: Primary | ICD-10-CM

## 2025-02-18 RX ORDER — FUROSEMIDE 40 MG/1
40 TABLET ORAL
Qty: 180 TABLET | Refills: 1 | Status: CANCELLED | OUTPATIENT
Start: 2025-02-18

## 2025-02-18 RX ORDER — FUROSEMIDE 40 MG/1
40 TABLET ORAL
Qty: 180 TABLET | Refills: 3 | Status: SHIPPED | OUTPATIENT
Start: 2025-02-18

## 2025-02-18 NOTE — TELEPHONE ENCOUNTER
New Rx for lasix sent to [facility]. Please let the pt know. Also, with the increase, I would like her to come in to have labs competed. Orders for labs have been placed.    Dr. Corea

## 2025-02-18 NOTE — TELEPHONE ENCOUNTER
Spoke with patient. States she has been taking the Lasix 40 mg BID due to increased leg/foot swelling and chest congestion. Per patient she discussed this with Dr Flores at her last visit. Needs a new RX sent with new sig and increased amount sent to [facility].

## 2025-02-18 NOTE — TELEPHONE ENCOUNTER
Telephone call to patient per her request.  No answer.  Left message, with phone number,  requesting return call to cardiology CC.

## 2025-02-18 NOTE — TELEPHONE ENCOUNTER
11:07 AM    Reason for Call: Phone Call    Description: Patient is requesting a call back from Dr. Corea's nurse. Medication is furosemide (LASIX) 40 MG tablet     Was an appointment offered for this call? No  If yes : Appointment type              Date    Preferred method for responding to this message: Telephone Call  What is your phone number ?  [phone]     If we cannot reach you directly, may we leave a detailed response at the number you provided? Yes    Can this message wait until your PCP/provider returns, if available today? Not applicable    Rianna Malloy

## 2025-03-03 DIAGNOSIS — E13.9 LADA (LATENT AUTOIMMUNE DIABETES IN ADULTS), MANAGED AS TYPE 1 (H): ICD-10-CM

## 2025-03-03 RX ORDER — INSULIN LISPRO 100 [IU]/ML
INJECTION, SOLUTION INTRAVENOUS; SUBCUTANEOUS
Qty: 30 ML | Refills: 2 | Status: SHIPPED | OUTPATIENT
Start: 2025-03-03

## 2025-03-03 NOTE — TELEPHONE ENCOUNTER
Insulin Lispro      Last Written Prescription Date:  10/15/24  Last Fill Quantity: 30ml,   # refills: 2  Last Office Visit: 01/09/25  Future Office visit:    Next 5 appointments (look out 90 days)      Mar 11, 2025 2:00 PM  (Arrive by 1:45 PM)  Provider Visit with Nicolasa Guillen NP  [facility] ([facility] ) [address] [place] [address] [id number] [phone]     Apr 22, 2025 1:00 PM  (Arrive by 12:45 PM)  Return Visit with Jane Chaidez DPM  [facility] ([facility] ) [address] [place] [address] [phone]

## 2025-03-04 ENCOUNTER — LAB (OUTPATIENT)
Dept: LAB | Facility: OTHER | Age: 80
End: 2025-03-04
Payer: MEDICARE

## 2025-03-04 DIAGNOSIS — T50.905A DRUG-INDUCED HYPOKALEMIA: ICD-10-CM

## 2025-03-04 DIAGNOSIS — I50.22 CHRONIC SYSTOLIC HEART FAILURE (H): ICD-10-CM

## 2025-03-04 DIAGNOSIS — I50.32 DIASTOLIC DYSFUNCTION WITH CHRONIC HEART FAILURE (H): ICD-10-CM

## 2025-03-04 DIAGNOSIS — E87.6 DRUG-INDUCED HYPOKALEMIA: ICD-10-CM

## 2025-03-04 LAB
ANION GAP SERPL CALCULATED.3IONS-SCNC: 13 MMOL/L (ref 7–15)
BUN SERPL-MCNC: 12.2 MG/DL (ref 8–23)
CALCIUM SERPL-MCNC: 9.1 MG/DL (ref 8.8–10.4)
CHLORIDE SERPL-SCNC: 96 MMOL/L (ref 98–107)
CREAT SERPL-MCNC: 0.86 MG/DL (ref 0.51–0.95)
EGFRCR SERPLBLD CKD-EPI 2021: 69 ML/MIN/1.73M2
GLUCOSE SERPL-MCNC: 218 MG/DL (ref 70–99)
HCO3 SERPL-SCNC: 26 MMOL/L (ref 22–29)
MAGNESIUM SERPL-MCNC: 2 MG/DL (ref 1.7–2.3)
NT-PROBNP SERPL-MCNC: 451 PG/ML (ref 0–1800)
POTASSIUM SERPL-SCNC: 3.7 MMOL/L (ref 3.4–5.3)
SODIUM SERPL-SCNC: 135 MMOL/L (ref 135–145)

## 2025-03-04 PROCEDURE — 80048 BASIC METABOLIC PNL TOTAL CA: CPT | Mod: ZL

## 2025-03-04 PROCEDURE — 36415 COLL VENOUS BLD VENIPUNCTURE: CPT | Mod: ZL

## 2025-03-04 PROCEDURE — 83880 ASSAY OF NATRIURETIC PEPTIDE: CPT | Mod: ZL

## 2025-03-04 PROCEDURE — 82310 ASSAY OF CALCIUM: CPT | Mod: ZL

## 2025-03-04 PROCEDURE — 83735 ASSAY OF MAGNESIUM: CPT | Mod: ZL

## 2025-03-07 NOTE — PROGRESS NOTES
Assessment & Plan     (I10) Essential hypertension  (primary encounter diagnosis)  Plan: Well controlled. Continue current medications. Encouraged daily exercise and a low sodium diet. Recommended checking BP's 2x/wk, call the clinic if consistantly s>140 or d>90. Follow up in 3 months.     (E78.2) Mixed hyperlipidemia  Comment: tolerating statin  Plan: Lipid Profile (Chol, Trig, HDL, LDL calc)        Lipids in process. Will notify patient of the results when available and intervene accordingly.     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 BP at goal. On statin. Eye exam scheduled. Will see her back in 3 months.     (I48.0) Paroxysmal atrial fibrillation (H)  Comment: stable  Plan: C/W current medications and f/up with cardiology.     (I50.32) Diastolic dysfunction with chronic heart failure (H)  Comment: controlled  Plan: Continue f/up with Dr. Corea.     (E03.9) Hypothyroidism, unspecified type  Plan: TSH normal in 8/2024.    (E87.6,  T50.905A) Drug-induced hypokalemia  Plan: Recent potassium normal.     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     (K21.9) Gastroesophageal reflux disease without esophagitis  Comment: controlled.   Plan: C/W omeprazole.     (Z23) Need for prophylactic vaccination and inoculation against influenza  Plan: Influenza vaccine given.     (Z23) High priority for 2019-nCoV vaccine  Plan: Covid vaccine given.     (J43.9) Pulmonary emphysema, unspecified emphysema type (H)  Comment: controlled without inhalers  Plan: Continue to monitor. She has tried inhalers in the past, but did not feel they helped.    (C50.912,  Z17.0) Malignant neoplasm of left breast in female, estrogen receptor positive, unspecified site of breast (H)  Comment: stable, no issues  Plan: Continue f/up with Dr. Snyder.       The  longitudinal plan of care for the diagnosis(es)/condition(s) as documented were addressed during this visit. Due to the added complexity in care, I will continue to support Flora in the subsequent management and with ongoing continuity of care.    Subjective   Flora is a 80 year old, presenting for the following health issues:  Diabetes, Lipids, Hypertension, Thyroid Problem (Hypothyroid), Heart Problem (Afib), Heart Failure, Imm/Inj (Flu Shot), and Imm/Inj (COVID-19 VACCINE)    HPI      Diabetes Follow-up  How often are you checking your blood sugar? Continuous glucose monitor, pump  What time of day are you checking your blood sugars (select all that apply)?  Before and after meals and At bedtime  Have you had any blood sugars above 200?  No  Have you had any blood sugars below 70?  No  What symptoms do you notice when your blood sugar is low?  Shaky, Blurred vision, and Sweaty, confusion, unable to speak.  What concerns do you have today about your diabetes? None   Do you have any of these symptoms? (Select all that apply)  Excessive thirst and Blurry vision  Have you had a diabetic eye exam in the last 12 months? No  Has insulin pump - Humalog. Farxiga is ordered, but she has not been taking.   Denies chest pain, syncope, or palpitations. Some chronic shortness of breath.    Follows with the diabetic center.   Due for an eye exam. Rescheduled for end of this month.  Some neuropathy associated with her diabetes type 2. Controlled.     Hyperlipidemia Follow-Up  Are you regularly taking any medication or supplement to lower your cholesterol?   Yes- Simvastatin   Are you having muscle aches or other side effects that you think could be caused by your cholesterol lowering medication?  No    Hypertension Follow-up    Do you check your blood pressure regularly outside of the clinic? Yes   Are you following a low salt diet? Yes  Are your blood pressures ever more than 140 on the top number (systolic) OR  more   than 90 on the bottom number (diastolic), for example 140/90? Yes  Taking ramipril with metoprolol without side effects.   Also on lasix 40 mg BID - no swelling. If she misses a dose, the swelling returns.     H/O breast cancer. Follows with Dr. Snyder. No issues. On tamoxifen without side effects.     GERD; controlled with omeprazole. No nausea or vomiting.     Emphysema; controlled without inhalers.     BP Readings from Last 2 Encounters:   03/11/25 110/64   02/12/25 126/73     Hemoglobin A1C (%)   Date Value   12/11/2024 6.7 (H)   08/28/2024 9.0 (H)   01/29/2021 6.9 (H)   07/01/2020 7.2 (H)     LDL Cholesterol Calculated (mg/dL)   Date Value   02/28/2024 55   02/13/2023 56   05/05/2021 49   07/01/2020 36         Atrial Fibrillation Follow-up  Symptoms: no chest pain, palpitations, dizziness/lightheadedness, dyspnea, and no increased dependent edema  Stroke prevention: DOAC (Eliquis, Xarelto, Pradaxa)  No episodes of bleeding.         12/29/2024     1:08 PM 1/8/2025     1:26 PM 1/9/2025     1:26 PM 2/12/2025    12:46 PM 3/11/2025     1:08 PM   Date   Pulse 93 74 74 62 83     Current [id number] Score: 10 points - A score of 5 or greater represents a 7.2 - 12.2% annual risk of major embolic event, without anti-coagulation or an LAAO device.     Heart Failure Follow-up   Are you experiencing any shortness of breath? Yes, with rest and activity   How would you describe your shortness of breath?  Same as usual  Are you experiencing any swelling in your legs or feet?  Better than usual  Are you using more pillows than usual? No  Do you cough at night?  Yes  Do you check your weight daily?  Yes  Have you had a weight change recently?  No  Are you having any of the following side effects from your medications? (Select all that apply)  The patient does not report symptoms of dizziness, fatigue, cough, swelling, or slow heart beat.  Has symptoms, not associated to medications.  Since your last visit, how many  "times have you gone to the cardiologist, urgent care, emergency room, or hospital because of your heart failure?   1 time cardiologist vist.  Last Echo:   Echo result w/o MOPS: Interpretation SummaryThe left ventricular size and wall thickness are normal. The ejection fractionis 50-55% (borderline).The right ventricle size and function are normalNo significant valvular abnormalities.IVC is normal This study was compared with the study from 2/27/2023. Left ventricularfunction slightly worsened  -Follows with Dr. Corea.   -Farxiga has been ordered, but she has not started.     Hypothyroidism Follow-up  Since last visit, patient describes the following symptoms: dry skin    Osteoporosis; due for dexa-scan. Does not take bisphosphonate due to jaw pain. Agreeable.           Review of Systems  Constitutional, HEENT, cardiovascular, pulmonary, gi and gu systems are negative, except as otherwise noted.      Objective    /64   Pulse 83   Temp 98.5  F (36.9  C) (Tympanic)   Resp 16   Ht 1.6 m (5' 3\")   Wt 64 kg (141 lb)   SpO2 97%   BMI 24.98 kg/m    Body mass index is 24.98 kg/m .  Physical Exam   GENERAL: alert and no distress  EYES: Eyes grossly normal to inspection, PERRL and conjunctivae and sclerae normal  HENT: ear canals and TM's normal, nose and mouth without ulcers or lesions  NECK: no adenopathy, no asymmetry, masses, or scars  RESP: lungs clear to auscultation - no rales, rhonchi or wheezes  CV: regular rate and rhythm, no murmur, click or rub, no peripheral edema  ABDOMEN: soft, nontender, no hepatosplenomegaly, no masses and bowel sounds normal  MS: no gross musculoskeletal defects noted, no edema  NEURO: Normal strength and tone, mentation intact and speech normal  PSYCH: mentation appears normal, affect normal/bright  Diabetic foot exam: normal DP and PT pulses, no trophic changes or ulcerative lesions, and normal sensory exam    Labs in process        Signed Electronically by: Nicolasa Guillen, " NP

## 2025-03-11 ENCOUNTER — OFFICE VISIT (OUTPATIENT)
Dept: FAMILY MEDICINE | Facility: OTHER | Age: 80
End: 2025-03-11
Attending: NURSE PRACTITIONER
Payer: MEDICARE

## 2025-03-11 VITALS
WEIGHT: 141 LBS | HEART RATE: 83 BPM | OXYGEN SATURATION: 97 % | RESPIRATION RATE: 16 BRPM | DIASTOLIC BLOOD PRESSURE: 64 MMHG | BODY MASS INDEX: 24.98 KG/M2 | SYSTOLIC BLOOD PRESSURE: 110 MMHG | TEMPERATURE: 98.5 F | HEIGHT: 63 IN

## 2025-03-11 DIAGNOSIS — I10 ESSENTIAL HYPERTENSION: Primary | ICD-10-CM

## 2025-03-11 DIAGNOSIS — E03.9 HYPOTHYROIDISM, UNSPECIFIED TYPE: ICD-10-CM

## 2025-03-11 DIAGNOSIS — I50.32 DIASTOLIC DYSFUNCTION WITH CHRONIC HEART FAILURE (H): ICD-10-CM

## 2025-03-11 DIAGNOSIS — I48.0 PAROXYSMAL ATRIAL FIBRILLATION (H): ICD-10-CM

## 2025-03-11 DIAGNOSIS — C50.912 MALIGNANT NEOPLASM OF LEFT BREAST IN FEMALE, ESTROGEN RECEPTOR POSITIVE, UNSPECIFIED SITE OF BREAST (H): ICD-10-CM

## 2025-03-11 DIAGNOSIS — Z17.0 MALIGNANT NEOPLASM OF LEFT BREAST IN FEMALE, ESTROGEN RECEPTOR POSITIVE, UNSPECIFIED SITE OF BREAST (H): ICD-10-CM

## 2025-03-11 DIAGNOSIS — K21.9 GASTROESOPHAGEAL REFLUX DISEASE WITHOUT ESOPHAGITIS: ICD-10-CM

## 2025-03-11 DIAGNOSIS — J43.9 PULMONARY EMPHYSEMA, UNSPECIFIED EMPHYSEMA TYPE (H): ICD-10-CM

## 2025-03-11 DIAGNOSIS — E78.2 MIXED HYPERLIPIDEMIA: ICD-10-CM

## 2025-03-11 DIAGNOSIS — E13.9 LADA (LATENT AUTOIMMUNE DIABETES IN ADULTS), MANAGED AS TYPE 1 (H): ICD-10-CM

## 2025-03-11 DIAGNOSIS — T50.905A DRUG-INDUCED HYPOKALEMIA: ICD-10-CM

## 2025-03-11 DIAGNOSIS — Z78.0 ASYMPTOMATIC POSTMENOPAUSAL ESTROGEN DEFICIENCY: ICD-10-CM

## 2025-03-11 DIAGNOSIS — Z23 NEED FOR PROPHYLACTIC VACCINATION AND INOCULATION AGAINST INFLUENZA: ICD-10-CM

## 2025-03-11 DIAGNOSIS — E87.6 DRUG-INDUCED HYPOKALEMIA: ICD-10-CM

## 2025-03-11 DIAGNOSIS — Z23 HIGH PRIORITY FOR 2019-NCOV VACCINE: ICD-10-CM

## 2025-03-11 DIAGNOSIS — E10.42 DIABETIC POLYNEUROPATHY ASSOCIATED WITH TYPE 1 DIABETES MELLITUS (H): ICD-10-CM

## 2025-03-11 LAB
BASOPHILS # BLD AUTO: 0 10E3/UL (ref 0–0.2)
BASOPHILS NFR BLD AUTO: 1 %
CHOLEST SERPL-MCNC: 160 MG/DL
EOSINOPHIL # BLD AUTO: 0.6 10E3/UL (ref 0–0.7)
EOSINOPHIL NFR BLD AUTO: 8 %
ERYTHROCYTE [DISTWIDTH] IN BLOOD BY AUTOMATED COUNT: 12.8 % (ref 10–15)
EST. AVERAGE GLUCOSE BLD GHB EST-MCNC: 154 MG/DL
FASTING STATUS PATIENT QL REPORTED: NO
HBA1C MFR BLD: 7 %
HCT VFR BLD AUTO: 37.1 % (ref 35–47)
HDLC SERPL-MCNC: 60 MG/DL
HGB BLD-MCNC: 12.2 G/DL (ref 11.7–15.7)
IMM GRANULOCYTES # BLD: 0 10E3/UL
IMM GRANULOCYTES NFR BLD: 1 %
LDLC SERPL CALC-MCNC: 78 MG/DL
LYMPHOCYTES # BLD AUTO: 2.2 10E3/UL (ref 0.8–5.3)
LYMPHOCYTES NFR BLD AUTO: 28 %
MCH RBC QN AUTO: 30.7 PG (ref 26.5–33)
MCHC RBC AUTO-ENTMCNC: 32.9 G/DL (ref 31.5–36.5)
MCV RBC AUTO: 94 FL (ref 78–100)
MONOCYTES # BLD AUTO: 0.5 10E3/UL (ref 0–1.3)
MONOCYTES NFR BLD AUTO: 6 %
NEUTROPHILS # BLD AUTO: 4.2 10E3/UL (ref 1.6–8.3)
NEUTROPHILS NFR BLD AUTO: 56 %
NONHDLC SERPL-MCNC: 100 MG/DL
NRBC # BLD AUTO: 0 10E3/UL
NRBC BLD AUTO-RTO: 0 /100
PLATELET # BLD AUTO: 170 10E3/UL (ref 150–450)
RBC # BLD AUTO: 3.97 10E6/UL (ref 3.8–5.2)
TRIGL SERPL-MCNC: 109 MG/DL
WBC # BLD AUTO: 7.6 10E3/UL (ref 4–11)

## 2025-03-11 PROCEDURE — 80061 LIPID PANEL: CPT | Mod: ZL | Performed by: NURSE PRACTITIONER

## 2025-03-11 PROCEDURE — 36415 COLL VENOUS BLD VENIPUNCTURE: CPT | Mod: ZL | Performed by: NURSE PRACTITIONER

## 2025-03-11 PROCEDURE — 90662 IIV NO PRSV INCREASED AG IM: CPT

## 2025-03-11 PROCEDURE — 83036 HEMOGLOBIN GLYCOSYLATED A1C: CPT | Mod: ZL | Performed by: NURSE PRACTITIONER

## 2025-03-11 PROCEDURE — G0463 HOSPITAL OUTPT CLINIC VISIT: HCPCS | Mod: 25

## 2025-03-11 PROCEDURE — 90480 ADMN SARSCOV2 VAC 1/ONLY CMP: CPT

## 2025-03-11 PROCEDURE — 85004 AUTOMATED DIFF WBC COUNT: CPT | Mod: ZL | Performed by: NURSE PRACTITIONER

## 2025-03-11 PROCEDURE — 82465 ASSAY BLD/SERUM CHOLESTEROL: CPT | Mod: ZL | Performed by: NURSE PRACTITIONER

## 2025-03-11 PROCEDURE — G0008 ADMIN INFLUENZA VIRUS VAC: HCPCS

## 2025-03-11 RX ORDER — SIMVASTATIN 40 MG
40 TABLET ORAL AT BEDTIME
Qty: 90 TABLET | Refills: 3 | Status: SHIPPED | OUTPATIENT
Start: 2025-03-11

## 2025-03-11 RX ORDER — LEVOTHYROXINE SODIUM 75 UG/1
75 TABLET ORAL DAILY
Qty: 90 TABLET | Refills: 3 | Status: SHIPPED | OUTPATIENT
Start: 2025-03-11

## 2025-03-11 RX ORDER — RAMIPRIL 10 MG/1
10 CAPSULE ORAL DAILY
Qty: 90 CAPSULE | Refills: 3 | Status: SHIPPED | OUTPATIENT
Start: 2025-03-11

## 2025-03-11 RX ORDER — INSULIN LISPRO 100 [IU]/ML
INJECTION, SOLUTION INTRAVENOUS; SUBCUTANEOUS
Qty: 30 ML | Refills: 3 | Status: SHIPPED | OUTPATIENT
Start: 2025-03-11

## 2025-03-11 RX ORDER — METOPROLOL SUCCINATE 50 MG/1
50 TABLET, EXTENDED RELEASE ORAL DAILY
Qty: 90 TABLET | Refills: 3 | Status: SHIPPED | OUTPATIENT
Start: 2025-03-11

## 2025-03-11 RX ORDER — OMEPRAZOLE 40 MG/1
40 CAPSULE, DELAYED RELEASE ORAL DAILY
Qty: 90 CAPSULE | Refills: 3 | Status: SHIPPED | OUTPATIENT
Start: 2025-03-11

## 2025-03-11 RX ORDER — TAMOXIFEN CITRATE 20 MG/1
20 TABLET ORAL EVERY MORNING
Qty: 90 TABLET | Refills: 3 | Status: CANCELLED | OUTPATIENT
Start: 2025-03-11

## 2025-03-11 ASSESSMENT — PAIN SCALES - GENERAL: PAINLEVEL_OUTOF10: NO PAIN (0)

## 2025-03-12 ENCOUNTER — MEDICAL CORRESPONDENCE (OUTPATIENT)
Dept: HEALTH INFORMATION MANAGEMENT | Facility: HOSPITAL | Age: 80
End: 2025-03-12

## 2025-03-13 ENCOUNTER — TELEPHONE (OUTPATIENT)
Dept: CARE COORDINATION | Facility: OTHER | Age: 80
End: 2025-03-13

## 2025-03-13 NOTE — TELEPHONE ENCOUNTER
[place] [facility] re-[facility]ed to for consideration

## 2025-03-20 ENCOUNTER — TELEPHONE (OUTPATIENT)
Dept: CARE COORDINATION | Facility: OTHER | Age: 80
End: 2025-03-20

## 2025-03-20 NOTE — LETTER
"     [facility] [place]  [address]  [place] [address]  Phone: [phone]     March 20th, 2025      Flora Acuna  [address] [place] [address]        Dear lFora,    I was unable to reach you by phone, however, I did leave you a voicemail letting you know that I would be sending the [place] financial assistance form out to you.   I've highlighted what needs to be filled out along with the requirements/copies you will need to submit along with the application.  The return address for [place] is at the top of the application as well as the fax number.  If you would like, I can fax these to the financial department as well for you.  Bring the documents to registration at the clinic and drop them off.   Just make sure that you address the envelope \"Attn:  Xi Franco/Cardiology\" to make sure that it gets directly to me.  If you have any questions, please don't hesitate to call.      Sincerely,        Xi Franco RN  CHF/Cardiology Care Coordinator  [phone]  "

## 2025-03-20 NOTE — TELEPHONE ENCOUNTER
Telephone call to patient.  No answer.   Left a message letting her  that unfortunately she did not qualify for the PAP due to her current insurance coverage, but that I will mail out a patient financial assistance form for [place] for her to fill out/submit to see if she qualifies for assistance paying for her medications that way.

## 2025-04-03 ENCOUNTER — TRANSFERRED RECORDS (OUTPATIENT)
Dept: HEALTH INFORMATION MANAGEMENT | Facility: CLINIC | Age: 80
End: 2025-04-03
Payer: COMMERCIAL

## 2025-04-07 ENCOUNTER — TELEPHONE (OUTPATIENT)
Dept: FAMILY MEDICINE | Facility: OTHER | Age: 80
End: 2025-04-07

## 2025-04-07 DIAGNOSIS — E13.9 LADA (LATENT AUTOIMMUNE DIABETES IN ADULTS), MANAGED AS TYPE 1 (H): Primary | ICD-10-CM

## 2025-04-08 ENCOUNTER — MEDICAL CORRESPONDENCE (OUTPATIENT)
Dept: HEALTH INFORMATION MANAGEMENT | Facility: HOSPITAL | Age: 80
End: 2025-04-08

## 2025-04-16 ENCOUNTER — ALLIED HEALTH/NURSE VISIT (OUTPATIENT)
Dept: EDUCATION SERVICES | Facility: OTHER | Age: 80
End: 2025-04-16
Attending: NURSE PRACTITIONER
Payer: COMMERCIAL

## 2025-04-16 VITALS
DIASTOLIC BLOOD PRESSURE: 74 MMHG | HEART RATE: 71 BPM | RESPIRATION RATE: 16 BRPM | SYSTOLIC BLOOD PRESSURE: 132 MMHG | WEIGHT: 141 LBS | OXYGEN SATURATION: 98 % | BODY MASS INDEX: 24.98 KG/M2

## 2025-04-16 DIAGNOSIS — E13.9 LADA (LATENT AUTOIMMUNE DIABETES IN ADULTS), MANAGED AS TYPE 1 (H): Primary | ICD-10-CM

## 2025-04-16 NOTE — PATIENT INSTRUCTIONS
Continue working on healthy eating and moving as best as you can (start low and slow, work up to 30 min, 5x/week)    BG goals:  Fasting and before meals <130, >70  2 hour after eating <180    We only need 1/2 of these numbers to be within target then your A1c will be within target    Medication changes   Listen to your insulin pump    Keep up the hard work!    Follow up  As discussed     Call me sooner if any problems/concerns and/or questions develop including consistent low BGs <70 or consistent high BGs >200  [phone] (Unit Coordinator)    [phone] (Emeli, Nurse)

## 2025-04-16 NOTE — PROGRESS NOTES
"SUBJECTIVE:  Flora Acuna, 80 year old, female presents with the following Chief Complaint(s) with HPI to follow:  Chief Complaint   Patient presents with    Diabetes        Diabetes Follow-up    Patient is checking blood sugars: >4x/day using personal CGM (Guardian 4) Results:    Date: 4/4 to 4/17/25  Glucose management indicator: 7.6%    Average glucose: 179 +/- 57    Glucose range  Very low (<54): 0  low (<70): 0  In target range (): 58%  High (>180): 31%  Very high (>250): 11%    Symptoms of hypoglycemia (low blood sugar): no--\"No crashing\"  Paresthesias (numbness or burning in feet) or sores: Yes; no sores  Diabetic eye exam within the last year: UTD  Breakfast eaten regularly: sometimes  Patient counting carbs: Yes       HPI:  Flora's here today for the follow up regarding her KAREEM, managed as a Type 1    A1c trend:  Lab Results   Component Value Date    A1C 7.0 03/11/2025    A1C 6.7 12/11/2024    A1C 9.0 08/28/2024    A1C 8.2 05/28/2024    A1C 7.1 12/15/2023    A1C 6.9 01/29/2021    A1C 7.2 07/01/2020    A1C 7.9 12/16/2019    A1C 7.5 09/11/2019    A1C 7.3 06/07/2019     Current Diabetes medication:   1.  Insulin pump (Minimed 780G), with Humalog  2. Farxiga 10 mg daily (cardiology)--trying to get   ASA use: yes  Statin use: yes    Bo reports the following:  No issues with the insulin pump  No issues with her personal CGM  States she just lets the insulin pump run    No new changes to her health  Continued SOB (occasional) with her \"3 heart issues\"--followed by cardiology    Had an eye appointment last month  No changes    Saw her PCP, Nicolasa Guillen NP, in March--noted A1c    Weight trend:  Wt Readings from Last 4 Encounters:   04/16/25 64 kg (141 lb)   03/11/25 64 kg (141 lb)   01/08/25 65.3 kg (144 lb)   12/11/24 65.3 kg (144 lb)       Patient Active Problem List   Diagnosis    CARDIOVASCULAR SCREENING; LDL GOAL LESS THAN 160    Personal history of malignant neoplasm of breast    ACP (advance " care planning)    Hypothyroidism    Essential hypertension    Malignant neoplasm of left breast in female, estrogen receptor positive (H)    S/P reverse total shoulder arthroplasty, right    Lumbar disc disease with radiculopathy    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), managed as type 1 (H)    H/O total knee replacement, left    Age-related osteoporosis without current pathological fracture    Family history of melanoma    Family history of breast cancer in female    Dysphonia    Prolonged QT interval    Fatty liver on 11/7/2007    Diastolic dysfunction with chronic heart failure (H)    Mixed hyperlipidemia    Drug-induced hypokalemia    Vitamin D deficiency    Contrast media allergy    Pulmonary emphysema, unspecified emphysema type (H)    Asthma, unspecified asthma severity, unspecified whether complicated, unspecified whether persistent    Thrombocytopenia    Abnormal EMG    Cardiomegaly    Incisional hernia    Paroxysmal atrial fibrillation (H)    Chronic systolic heart failure (H)    Atrial fibrillation with rapid ventricular response (H)    Elevated brain natriuretic peptide (BNP) level    COVID-19    Insulin-requiring or dependent type II diabetes mellitus (H)    Diabetic polyneuropathy associated with type 1 diabetes mellitus (H)       Past Medical History:   Diagnosis Date    Congestive heart failure, unspecified     Diabetes (H)     H/O rheumatoid arthritis     HLD (hyperlipidemia)     HTN (hypertension)     Myocardial infarction (H)     Uncomplicated asthma        Past Surgical History:   Procedure Laterality Date    APPENDECTOMY OPEN CHILD      AS TOTAL KNEE ARTHROPLASTY Right     CHOLECYSTECTOMY      COLONOSCOPY - HIM SCAN N/A 03/12/2009    per Care Everywhere documentation per [facility].     HYSTERECTOMY TOTAL ABDOMINAL      MASTECTOMY, BILATERAL Bilateral 02/26/1992    SHOULDER SURGERY Right     SHOULDER SURGERY Left        Family History   Problem Relation Age of Onset    Breast Cancer  Maternal Aunt     Breast Cancer Maternal Aunt     Lung Cancer Father        Social History     Tobacco Use    Smoking status: Never     Passive exposure: Never    Smokeless tobacco: Never   Substance Use Topics    Alcohol use: No     Alcohol/week: 0.0 standard drinks of alcohol       Current Outpatient Medications   Medication Sig Dispense Refill    apixaban ANTICOAGULANT (ELIQUIS) 5 MG tablet Take 1 tablet (5 mg) by mouth 2 times daily. 180 tablet 3    Calcium Carb-Cholecalciferol (CALCIUM PLUS VITAMIN D PO) Take 1 tablet by mouth 2 times daily (with meals).      furosemide (LASIX) 40 MG tablet Take 1 tablet (40 mg) by mouth 2 times daily. 180 tablet 3    insulin lispro (HUMALOG VIAL) 100 UNIT/ML vial TO BE USED WITH THE INSULIN PUMP TOTAL DAILY DOSE 80 UNITS 30 mL 3    INSULIN PUMP - OUTPATIENT Inject subcutaneously. Insulin pump: Minimed 780G  Update: 1/9/25  Basal   0000 0.85  Total basal: 20.4   CR: 13  ISF: 50  Blood target: 100-130  Active insulin time: 2 hours      levothyroxine (SYNTHROID/LEVOTHROID) 75 MCG tablet Take 1 tablet (75 mcg) by mouth daily. 90 tablet 3    metoprolol succinate ER (TOPROL XL) 50 MG 24 hr tablet Take 1 tablet (50 mg) by mouth daily. 90 tablet 3    Multiple Vitamins-Minerals (WOMENS MULTIVITAMIN) TABS Take 1 tablet by mouth 2 times daily.      omeprazole (PRILOSEC) 40 MG DR capsule Take 1 capsule (40 mg) by mouth daily. 90 capsule 3    ramipril (ALTACE) 10 MG capsule Take 1 capsule (10 mg) by mouth daily. 90 capsule 3    senna-docusate (SENOKOT-S;PERICOLACE) 8.6-50 MG per tablet Take 1 tablet by mouth At Bedtime       simvastatin (ZOCOR) 40 MG tablet Take 1 tablet (40 mg) by mouth at bedtime. 90 tablet 3    tamoxifen (NOLVADEX) 20 MG tablet Take 20 mg by mouth every morning      dapagliflozin (FARXIGA) 10 MG TABS tablet Take 1 tablet (10 mg) by mouth daily. (Patient not taking: Reported on 4/16/2025) 90 tablet 3       Allergies   Allergen Reactions    Amoxicillin Angioedema     "Contrast Dye Difficulty breathing    Gadolinium Derivatives      Other reaction(s): Difficulty in swallowing, Laryngeal spasm  Patient had an injection into rt. Shoulder capsule prior to MRI arthrogram.  Contained multihance gadobenate, omnipaque iohexol, and buffered lidocaine.      Ammonia     Cory-1 [Lidocaine Hcl]      Novocaine allergy      Codeine Sulfate     Food      Shrimp    Fosamax [Alendronate]      Dental infections    Iodine-131 Swelling     Ct dye    Lidocaine     Nitrous Oxide     No Clinical Screening - See Comments Nausea and Vomiting and Other (See Comments)     (3/6/09)- N/V and Heart racing    Novocain [Procaine]     Penicillins     Symbicort [Budesonide-Formoterol Fumarate]     Tramadol     Morphine Palpitations     REVIEW OF SYSTEMS  Skin: hx of skin reaction  Eyes: negative  Ears/Nose/Throat: hx of \"burnt\" voice and muscles from GERD  Respiratory: + SOB or FRANCO--same; no hemoptysis or cough; history of asthma  Cardiovascular: negative; history of HF  Gastrointestinal: negative  Genitourinary: negative   Musculoskeletal: hx of back pain; hx of RA, neck pain, arthritis and right shoulder   Neurologic: positive for numbness or tingling of feet--same  Psychiatric: negative  Hematologic/Lymphatic/Immunologic: history of breast cancer (left) x 2 (same spot)  Endocrine: positive for diabetes and thyroid disease     OBJECTIVE:  /74   Pulse 71   Resp 16   Wt 64 kg (141 lb)   SpO2 98%   BMI 24.98 kg/m    Constitutional: alert and no distress  Respiratory:  Good diaphragmatic excursion.  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     TIR is good, 58%  SG ave: 179  No TBT    Education focused on the following:  Try running the insulin pump on smartguard all the time--it helps  Explained how this insulin pump works  It can only give so many corrections before it \"kicks\" her out (or asks for a BG)  After 2 hours, if BGs are still >250--please enter a correction      Keep me in the loop with Farxiga  Might need to adjust the insulin pump after starting it    Follow up  As discussed    Please call sooner if any changes develop    Patient Instructions   Continue working on healthy eating and moving as best as you can (start low and slow, work up to 30 min, 5x/week)    BG goals:  Fasting and before meals <130, >70  2 hour after eating <180    We only need 1/2 of these numbers to be within target then your A1c will be within target    Medication changes   Listen to your insulin pump    Keep up the hard work!    Follow up  As discussed     Call me sooner if any problems/concerns and/or questions develop including consistent low BGs <70 or consistent high BGs >200  [phone] (Unit Coordinator)    [phone] (Emeli, Nurse)    Time: 25 minutes  Barrier: none  Willingness to learn: accepting    Kerri NELSON [facility]  Diabetes " and Wound Care    Cc: Nicolasa Guillen NP    With the electronic record, we can now more quickly and easily track our patient diabetic goals. Our diabetes clinical review is in progress and these are the indicators we are monitoring for good diabetes health.     1.) HbA1C less than 7 (measurement of your average blood sugars)  2.) Blood Pressure less than 140/80  3.) LDL less than 100 (bad cholesterol)  4.) HbA1C is checked in the last 6 months and below 7% (more frequently if not at goal or adjusting medications)  5.) LDL is checked in the last 12 months (more frequently if not at goal or adjusting medications)  6.) Taking one baby aspirin daily (unless otherwise instructed)  7.) No tobacco use  8) Statin use     You have achieved 8 out of 8 of these and I am encouraging you to come in and get tuned up to achieve 8 out of 8!  Here is what you have achieved so far in my goals for you:  1.) HbA1C  less than 7:                              YES   Your last  HbA1C  Lab Results   Component Value Date    A1C 7.0 03/11/2025    A1C 6.7 12/11/2024    A1C 9.0 08/28/2024    A1C 8.2 05/28/2024    A1C 7.1 12/15/2023    A1C 6.9 01/29/2021    A1C 7.2 07/01/2020    A1C 7.9 12/16/2019    A1C 7.5 09/11/2019    A1C 7.3 06/07/2019      2.) Blood Pressure less than 140/80:       YES     Your last    BP Readings from Last 1 Encounters:   04/16/25 132/74     3.) LDL less than 100:                              YES      Your last     LDL Cholesterol Calculated   Date Value Ref Range Status   03/11/2025 78 <100 mg/dL Final   05/05/2021 49 <100 mg/dL Final     Comment:     Desirable:       <100 mg/dl     4.) Checked HbA1C in the past 6 months: YES      5.) Checked LDL in the past 12 months:    YES    6.) Taking one aspirin daily:                       YES     7.) No tobacco use:                                        YES      8.) Statin use      YES       Insurance requirements for personal CGM:   Patient has been seen in the clinic within the  last 6 month  Diagnosis of KAREEM, managed as a Type 1 for >6 months  Has been testing their BGs >4x/day using her personal CGM  Uses an insulin pump for >6 months  Requires frequent adjustments to their treatment regimen based on BGM and/or CGM testing results.

## 2025-04-22 ENCOUNTER — OFFICE VISIT (OUTPATIENT)
Dept: PODIATRY | Facility: OTHER | Age: 80
End: 2025-04-22
Attending: PODIATRIST
Payer: MEDICARE

## 2025-04-22 VITALS
HEART RATE: 69 BPM | RESPIRATION RATE: 18 BRPM | TEMPERATURE: 98.9 F | DIASTOLIC BLOOD PRESSURE: 71 MMHG | SYSTOLIC BLOOD PRESSURE: 118 MMHG | OXYGEN SATURATION: 98 %

## 2025-04-22 DIAGNOSIS — L84 CALLUS OF FOOT: ICD-10-CM

## 2025-04-22 DIAGNOSIS — E13.40: ICD-10-CM

## 2025-04-22 DIAGNOSIS — Z13.89 SCREENING FOR DIABETIC PERIPHERAL NEUROPATHY: ICD-10-CM

## 2025-04-22 DIAGNOSIS — E10.42 DIABETIC POLYNEUROPATHY ASSOCIATED WITH TYPE 1 DIABETES MELLITUS (H): Primary | ICD-10-CM

## 2025-04-22 DIAGNOSIS — L60.3 ONYCHODYSTROPHY: ICD-10-CM

## 2025-04-22 PROCEDURE — 11056 PARNG/CUTG B9 HYPRKR LES 2-4: CPT | Performed by: PODIATRIST

## 2025-04-22 PROCEDURE — 11721 DEBRIDE NAIL 6 OR MORE: CPT | Performed by: PODIATRIST

## 2025-04-22 PROCEDURE — G0463 HOSPITAL OUTPT CLINIC VISIT: HCPCS

## 2025-04-22 ASSESSMENT — PAIN SCALES - GENERAL: PAINLEVEL_OUTOF10: NO PAIN (0)

## 2025-04-22 NOTE — PROGRESS NOTES
Chief complaint: Patient presents with:  Toenail: trim      History of Present Illness: This 80 year old female is seen at the request of No ref. provider found for evaluation and suggestions of management of thickened toenails.    She says she has HF and she had an MI on 07/20/2024. She takes Lasix which which reduces her edema from her CHF. She is also taking Eliquis.    She was paring her callus recently and she says she accidentally cut her skin. It healed but she wants to know if the toe looks okay.    She says she has type I DM. She still feels burning, tingling, and numbness in her feet, but it is not consistently.     No further pedal complaints today.        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), managed as type 1 (H)    H/O total knee replacement, left    Age-related osteoporosis without current pathological fracture    Family history of melanoma    Family history of breast cancer in female    Dysphonia    Prolonged QT interval    Fatty liver on 11/7/2007    Diastolic dysfunction with chronic heart failure (H)    Mixed hyperlipidemia    Drug-induced hypokalemia    Vitamin D deficiency    Contrast media allergy    Pulmonary emphysema, unspecified emphysema type (H)    Asthma, unspecified asthma severity, unspecified whether complicated, unspecified whether persistent    Thrombocytopenia    Abnormal EMG    Cardiomegaly    Incisional hernia    Paroxysmal atrial fibrillation (H)    Chronic systolic heart failure (H)    Atrial  fibrillation with rapid ventricular response (H)    Elevated brain natriuretic peptide (BNP) level    COVID-19    Insulin-requiring or dependent type II diabetes mellitus (H)    Diabetic polyneuropathy associated with type 1 diabetes mellitus (H)       Past Surgical History:   Procedure Laterality Date    APPENDECTOMY OPEN CHILD      AS TOTAL KNEE ARTHROPLASTY Right     CHOLECYSTECTOMY      COLONOSCOPY - HIM SCAN N/A 03/12/2009    per Care Everywhere documentation per [facility].     HYSTERECTOMY TOTAL ABDOMINAL      MASTECTOMY, BILATERAL Bilateral 02/26/1992    SHOULDER SURGERY Right     SHOULDER SURGERY Left        Current Outpatient Medications   Medication Sig Dispense Refill    tiZANidine (ZANAFLEX) 4 MG tablet TAKE 1/2 (ONE-HALF) TABLET BY MOUTH IN THE MORNING, 1/2 (ONE-HALF) TABLET MID DAY, AND 1 AT BEDTIME. CAN TAKE AN EXTRA ONE-HALF TABLET AS NEEDED DURING THE NIGHT FOR BREAKTHROUGH SYMPTOMS UP TO 4 TIMES PER WEEK.      apixaban ANTICOAGULANT (ELIQUIS) 5 MG tablet Take 1 tablet (5 mg) by mouth 2 times daily. 180 tablet 3    Calcium Carb-Cholecalciferol (CALCIUM PLUS VITAMIN D PO) Take 1 tablet by mouth 2 times daily (with meals).      dapagliflozin (FARXIGA) 10 MG TABS tablet Take 1 tablet (10 mg) by mouth daily. (Patient not taking: Reported on 4/16/2025) 90 tablet 3    furosemide (LASIX) 40 MG tablet Take 1 tablet (40 mg) by mouth 2 times daily. 180 tablet 3    insulin lispro (HUMALOG VIAL) 100 UNIT/ML vial TO BE USED WITH THE INSULIN PUMP TOTAL DAILY DOSE 80 UNITS 30 mL 3    INSULIN PUMP - OUTPATIENT Inject subcutaneously.   Insulin pump: Minimed 780G  Update: 4/16/25  Basal   0000 0.85  Total basal: 20.4 units  CR: 13  ISF: 50  Blood target: 100-130  Active insulin time: 2 hours      levothyroxine (SYNTHROID/LEVOTHROID) 75 MCG tablet Take 1 tablet (75 mcg) by mouth daily. 90 tablet 3    metoprolol succinate ER (TOPROL XL) 50 MG 24 hr tablet Take 1 tablet (50 mg) by mouth daily. 90 tablet 3     Multiple Vitamins-Minerals (WOMENS MULTIVITAMIN) TABS Take 1 tablet by mouth 2 times daily.      omeprazole (PRILOSEC) 40 MG DR capsule Take 1 capsule (40 mg) by mouth daily. 90 capsule 3    ramipril (ALTACE) 10 MG capsule Take 1 capsule (10 mg) by mouth daily. 90 capsule 3    senna-docusate (SENOKOT-S;PERICOLACE) 8.6-50 MG per tablet Take 1 tablet by mouth At Bedtime       simvastatin (ZOCOR) 40 MG tablet Take 1 tablet (40 mg) by mouth at bedtime. 90 tablet 3    tamoxifen (NOLVADEX) 20 MG tablet Take 20 mg by mouth every morning       No current facility-administered medications for this visit.          Allergies   Allergen Reactions    Amoxicillin Angioedema    Contrast Dye Difficulty breathing    Gadolinium Derivatives      Other reaction(s): Difficulty in swallowing, Laryngeal spasm  Patient had an injection into rt. Shoulder capsule prior to MRI arthrogram.  Contained multihance gadobenate, omnipaque iohexol, and buffered lidocaine.      Ammonia     Cory-1 [Lidocaine Hcl]      Novocaine allergy      Codeine Sulfate     Food      Shrimp    Fosamax [Alendronate]      Dental infections    Iodine-131 Swelling     Ct dye    Lidocaine     Nitrous Oxide     No Clinical Screening - See Comments Nausea and Vomiting and Other (See Comments)     (3/6/09)- N/V and Heart racing    Novocain [Procaine]     Penicillins     Symbicort [Budesonide-Formoterol Fumarate]     Tramadol     Morphine Palpitations       Family History   Problem Relation Age of Onset    Breast Cancer Maternal Aunt     Breast Cancer Maternal Aunt     Lung Cancer Father        Social History     Socioeconomic History    Marital status:      Spouse name: None    Number of children: 2    Years of education: None    Highest education level: None   Occupational History    Occupation: [employer]     Employer: RETIRED   Tobacco Use    Smoking status: Never     Passive exposure: Never    Smokeless tobacco: Never   Vaping Use    Vaping status: Never Used    Substance and Sexual Activity    Alcohol use: No     Alcohol/week: 0.0 standard drinks of alcohol    Drug use: No     Social Determinants of Health     Financial Resource Strain: Low Risk  (2/28/2024)    Financial Resource Strain     Within the past 12 months, have you or your family members you live with been unable to get utilities (heat, electricity) when it was really needed?: No   Food Insecurity: Low Risk  (2/28/2024)    Food Insecurity     Within the past 12 months, did you worry that your food would run out before you got money to buy more?: No     Within the past 12 months, did the food you bought just not last and you didn t have money to get more?: No   Transportation Needs: Low Risk  (2/28/2024)    Transportation Needs     Within the past 12 months, has lack of transportation kept you from medical appointments, getting your medicines, non-medical meetings or appointments, work, or from getting things that you need?: No   Interpersonal Safety: Low Risk  (10/9/2024)    Interpersonal Safety     Do you feel physically and emotionally safe where you currently live?: Yes     Within the past 12 months, have you been hit, slapped, kicked or otherwise physically hurt by someone?: No     Within the past 12 months, have you been humiliated or emotionally abused in other ways by your partner or ex-partner?: No   Housing Stability: Low Risk  (2/28/2024)    Housing Stability     Do you have housing? : Yes     Are you worried about losing your housing?: No       ROS: 10 point ROS neg other than the symptoms noted above in the HPI.  EXAM  Constitutional: healthy, alert, and no distress    Psychiatric: mentation appears normal and affect normal/bright    VASCULAR:  -Dorsalis pedis pulse +2/4 b/l  -Posterior tibial pulse +2/4 b/l  -Capillary refill time < 3 seconds to b/l hallux  -Hair growth Absent to b/l anterior legs and ankles  -Varicosities and telangiectasias to foot, bilaterally   -Moderate 2+ pitting edema to  foot and ankle, bilaterally   NEURO:  -Light touch sensation intact to b/l plantar forefoot  DERM:  -Skin temperature, texture and turgor WNL b/l  -Toenails elongated, thickened, dystrophic and discolored x 10  -Hyperkeratotic lesion on the medial plantar distal aspect of the proximal phalanx of the RIGHT hallux and the medial plantar 1st metatarsal head of the RIGHT foot  MSK:  -Lateral deviation of hallux with medial deviation of 1st metatarsal, bilaterally   -Prominent bony prominence to dorsal and medial 1st metatarsal head, bilaterally   -Moderate decrease in arch height while patient is NWB, bilaterally   -Moderate medial midfoot arch collapse bilaterally   -Pain on palpation to the metatarsal heads 2-4 bilaterally   -Muscle strength of ankles +5/5 for dorsiflexion, plantarflexion, ABDUction and ADDuction b/l    -Ankle joint passive ROM within normal limits except for dorsiflexion:    Dorsiflexion, RIGHT Straight knee 0 degrees    Dorsiflexion, LEFT Straight knee 0 degrees    ============================================================    ASSESSMENT:    (E10.42) Diabetic polyneuropathy associated with type 1 diabetes mellitus (H)  (primary encounter diagnosis)    (L60.3) Onychodystrophy    (L84) Callus of foot    (E13.40) 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 Treatment options discussed with no educational barriers noted.  -High risk toenail debridement x 10 toenails without incident  ---There are no open wounds or concerns with the skin where the patient previously accidentally clipped her skin.    -Callus pared x 2 to the medial plantar distal aspect of the proximal phalanx of the RIGHT hallux and the RIGHT plantar medial 1st metatarsal head without incident  ---Patient reminded that the callus will likely return due to the underlying, prominent bone causing the  callus while the patient is walking.    -Diabetic Foot Education provided. This included checking the feet daily looking for new new blisters or wounds, wearing shoes at all times when walking including around the house, and avoiding lotion application between the toes. If there are any signs of infection, the patient should present to the ED as soon as possible. Infections of the foot can be life threatening or lead to amputations of the foot or leg.    Diabetic Shoes: She received new shoes in March, 2025, through the orthotist, Joy Richards. She has not worn them yet because she doesn't have any outfits to match the shoes.    Diabetes Mellitus: Patient's DM is managed by their PCP. The DM appears to be stable. Patient's last HbA1C was 7.0% on 03/11/2025.    -Patient in agreement with the above treatment plan and all of patient's questions were answered.      Return to clinic 63+ days for a diabetic foot exam and high risk nail debridement and possible callus ervin Chaidez DPM

## 2025-06-03 DIAGNOSIS — I48.0 PAROXYSMAL ATRIAL FIBRILLATION (H): ICD-10-CM

## 2025-06-03 DIAGNOSIS — I48.91 ATRIAL FIBRILLATION WITH RAPID VENTRICULAR RESPONSE (H): ICD-10-CM

## 2025-06-03 DIAGNOSIS — I48.91 ATRIAL FIBRILLATION WITH RAPID VENTRICULAR RESPONSE (H): Primary | ICD-10-CM

## 2025-06-03 NOTE — TELEPHONE ENCOUNTER
Patient LM she needs a new RX sent to a [place] pharmacy. States she has the fax number and to please call her for it.   apixaban ANTICOAGULANT (ELIQUIS) 5 MG tablet 180 tablet 3 12/16/2024     Last Office Visit: 1/8/2025  Future Office visit:    Next 5 appointments (look out 90 days)      Jun 11, 2025 3:00 PM  (Arrive by 2:45 PM)  Provider Visit with Nicolasa Guillen NP  [facility] ([facility] ) [address] [place] [id number]  [phone]     Jun 25, 2025 1:30 PM  (Arrive by 1:15 PM)  Return Visit with Jane Chaidez DPM  [facility] ([facility] ) [address] [place] [phone]             Routing refill request to provider for review/approval because:

## 2025-06-03 NOTE — TELEPHONE ENCOUNTER
Telephone call to patient to get fax number for the [facility] pharmacy she is requesting this rx be faxed to. Spoke with patient, fax number given was [phone].

## 2025-06-04 ENCOUNTER — HOSPITAL ENCOUNTER (OUTPATIENT)
Dept: BONE DENSITY | Facility: HOSPITAL | Age: 80
Discharge: HOME | End: 2025-06-04
Attending: NURSE PRACTITIONER
Payer: MEDICARE

## 2025-06-04 ENCOUNTER — RESULTS FOLLOW-UP (OUTPATIENT)
Dept: FAMILY MEDICINE | Facility: OTHER | Age: 80
End: 2025-06-04

## 2025-06-04 DIAGNOSIS — Z78.0 ASYMPTOMATIC POSTMENOPAUSAL ESTROGEN DEFICIENCY: ICD-10-CM

## 2025-06-04 PROCEDURE — 77080 DXA BONE DENSITY AXIAL: CPT | Mod: 26 | Performed by: RADIOLOGY

## 2025-06-04 PROCEDURE — 77080 DXA BONE DENSITY AXIAL: CPT

## 2025-06-04 NOTE — TELEPHONE ENCOUNTER
Patient calls stating the RX has to be called to the [place] pharmacy not Faxed. Patient gives a fax number of [phone] and a phone number of .

## 2025-06-04 NOTE — TELEPHONE ENCOUNTER
LM for pharmacy to to call with clarification on if this RX can be faxed or if needs to be called in.

## 2025-06-04 NOTE — TELEPHONE ENCOUNTER
Spoke with patient advised the Fax number provided was not working yesterday. Several failed attempts. Asked patient to confirm fax number she was unable to find it. States she will call get the fax number and return our call. Advised to please LM with the fax number if no answer. Patient agrees to plan. Number provided for return call.

## 2025-06-10 NOTE — PROGRESS NOTES
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 Will notify patient of the results when available and intervene accordingly. On statin. Blood pressure normal. Eye exam UTD. Will reassess in 3 months.     (E78.2) Mixed hyperlipidemia  Comment: tolerating simvastatin   Plan: Lipids controlled in 3/2025, will update AST and ALT. Will notify patient of the results when available and intervene accordingly.     (I10) Essential hypertension  Plan: Well controlled. Continue current medications. Encouraged daily exercise and a low sodium diet. Recommended checking BP's 2x/wk, call the clinic if consistantly s>140 or d>90. Follow up in 6 months.    (I48.0) Paroxysmal atrial fibrillation (H)  Comment: HR 74, on Eliquis  Plan: C/W current medications.     (I50.32) Diastolic dysfunction with chronic heart failure (H)  (I50.22) Chronic systolic heart failure (H)  Comment: weight up 7 pounds, does not feel her breathing or swelling have worsened  Plan: NT-proBNP        Will recheck her BNP today. Will notify patient of the results when available and intervene accordingly. If elevated, will consider bumex; already taking lasix 40 mg BID.     Insurance will not cover Farxiga.     (M81.0) Age-related osteoporosis without current pathological fracture  Comment: did not tolerate Fosamax or Reclast  Plan: Weight bearing exercises and a diet with adequate calcium and Vit D encouraged.     (L30.9) Dermatitis  Comment: appears like an eczema  Plan: triamcinolone (KENALOG) 0.1 % external ointment        Will treat with Kenalog cream and a good moisturizer. Will avoid using more than 2 weeks.       BMI  Estimated body mass index is 26.22 kg/m  as calculated from the following:    Height as of this encounter: 1.6 m (5' 3\").    Weight as of this encounter: 67.1 kg (148 " lb).         The longitudinal plan of care for the diagnosis(es)/condition(s) as documented were addressed during this visit. Due to the added complexity in care, I will continue to support Flora in the subsequent management and with ongoing continuity of care.    Katherin Hines is a 80 year old, presenting for the following health issues:  Diabetes, Lipids, Hypertension, Heart Failure, Heart Problem (Afib), and Thyroid Problem (Hypothyroid)    History of Present Illness       She eats 0-1 servings of fruits and vegetables daily.She consumes 3 sweetened beverage(s) daily.She exercises with enough effort to increase her heart rate 9 or less minutes per day.  She exercises with enough effort to increase her heart rate 3 or less days per week. She is missing 1 dose(s) of medications per week.  She is not taking prescribed medications regularly due to remembering to take.        Diabetes Follow-up    How often are you checking your blood sugar? Continuous glucose monitor  What time of day are you checking your blood sugars (select all that apply)?  Before and after meals and At bedtime  Have you had any blood sugars above 200?  No  Have you had any blood sugars below 70?  No  What symptoms do you notice when your blood sugar is low?  Shaky, Blurred vision, and Heart racing, thirsty, cold.  What concerns do you have today about your diabetes? None   Do you have any of these symptoms? (Select all that apply)  Excessive thirst and Weight gain  Follows with the diabetic center; has an insulin pump.   Denies chest pain, dizziness, syncope, or palpitations. Some chronic FRANCO - sees cardiology. Has not worsened since her last visit.     Hyperlipidemia Follow-Up    Are you regularly taking any medication or supplement to lower your cholesterol?   Yes- simvastatin 40 mg  Are you having muscle aches or other side effects that you think could be caused by your cholesterol lowering medication?  No    Hypertension  Follow-up    Do you check your blood pressure regularly outside of the clinic? Yes   Are you following a low salt diet? No no added salt  Are your blood pressures ever more than 140 on the top number (systolic) OR more   than 90 on the bottom number (diastolic), for example 140/90? Yes  Lasix 40 mg BID, Ramipril 10 mg, metoprolol succinate 50 mg - no side effects      BP Readings from Last 2 Encounters:   06/11/25 132/60   04/22/25 118/71     Hemoglobin A1C (%)   Date Value   03/11/2025 7.0 (H)   12/11/2024 6.7 (H)   01/29/2021 6.9 (H)   07/01/2020 7.2 (H)     LDL Cholesterol Calculated (mg/dL)   Date Value   03/11/2025 78   02/28/2024 55   05/05/2021 49   07/01/2020 36         Atrial Fibrillation Follow-up    Symptoms: no recent chest pain, significant palpitations, dizziness/lightheadedness, dyspnea, or increased peripheral edema.  Stroke prevention: DOAC (Eliquis, Xarelto, Pradaxa)        2/12/2025    12:46 PM 3/11/2025     1:08 PM 4/16/2025     2:22 PM 4/22/2025    12:42 PM 6/11/2025     2:45 PM   Date   Pulse 62 83 71 69 74     Current [id number] Score: 10 points - A score of 5 or greater represents a 7.2 - 12.2% annual risk of major embolic event, without anti-coagulation or an LAAO device.       Heart Failure Follow-up   Are you experiencing any shortness of breath? Yes, with rest and activity   How would you describe your shortness of breath?  Same as usual  Are you experiencing any swelling in your legs or feet?  Stable  Are you using more pillows than usual? No  Do you cough at night?  Yes  Do you check your weight daily?  Yes  Have you had a weight change recently?  Weight increase  Are you having any of the following side effects from your medications? (Select all that apply)  The patient does not report symptoms of dizziness, fatigue, cough, swelling, or slow heart beat.  Since your last visit, how many times have you gone to the cardiologist, urgent care, emergency room, or hospital because of your  "heart failure?   None  Last Echo:   Echo result w/o MOPS: Interpretation SummaryThe left ventricular size and wall thickness are normal. The ejection fractionis 50-55% (borderline).The right ventricle size and function are normalNo significant valvular abnormalities.IVC is normal This study was compared with the study from 2/27/2023. Left ventricularfunction slightly worsened    On lasix 40 mg BID.  Unable to afford Farxiga.     Weight up slightly.     Hypothyroidism Follow-up    Since last visit, patient describes the following symptoms: weight gain of 7 lbs and dry skin    She has pruritic rash on right lower arm. Present for months. Has tried several lotions without relief. No new lotions, soaps, or laundry detergents. H/O eczema. No fevers.       Review of Systems  Constitutional, HEENT, cardiovascular, pulmonary, gi and gu systems are negative, except as otherwise noted.      Objective    /60   Pulse 74   Temp 99  F (37.2  C) (Tympanic)   Resp 16   Ht 1.6 m (5' 3\")   Wt 67.1 kg (148 lb)   SpO2 97%   BMI 26.22 kg/m    Body mass index is 26.22 kg/m .  Physical Exam   GENERAL: alert and no distress  EYES: Eyes grossly normal to inspection, PERRL and conjunctivae and sclerae normal  HENT: ear canals and TM's normal, nose and mouth without ulcers or lesions  NECK: no adenopathy, no asymmetry, masses, or scars  RESP: lungs clear to auscultation - no rales, rhonchi or wheezes  CV: irregularly irregular, no murmur, click or rub, 1+ bilateral peripheral edema  ABDOMEN: soft, nontender, no hepatosplenomegaly, no masses and bowel sounds normal  MS: no gross musculoskeletal defects noted, no edema  NEURO: Normal strength and tone, mentation intact and speech normal  PSYCH: mentation appears normal, affect normal/bright  Diabetic foot exam: normal DP and PT pulses, no trophic changes or ulcerative lesions, and normal sensory exam    Labs in process        Signed Electronically by: Nicolasa Guillen NP    "

## 2025-06-11 ENCOUNTER — OFFICE VISIT (OUTPATIENT)
Dept: FAMILY MEDICINE | Facility: OTHER | Age: 80
End: 2025-06-11
Attending: NURSE PRACTITIONER
Payer: MEDICARE

## 2025-06-11 ENCOUNTER — RESULTS FOLLOW-UP (OUTPATIENT)
Dept: FAMILY MEDICINE | Facility: OTHER | Age: 80
End: 2025-06-11

## 2025-06-11 VITALS
WEIGHT: 148 LBS | HEIGHT: 63 IN | SYSTOLIC BLOOD PRESSURE: 132 MMHG | RESPIRATION RATE: 16 BRPM | OXYGEN SATURATION: 97 % | TEMPERATURE: 99 F | BODY MASS INDEX: 26.22 KG/M2 | DIASTOLIC BLOOD PRESSURE: 60 MMHG | HEART RATE: 74 BPM

## 2025-06-11 DIAGNOSIS — I50.22 CHRONIC SYSTOLIC HEART FAILURE (H): ICD-10-CM

## 2025-06-11 DIAGNOSIS — E13.9 LADA (LATENT AUTOIMMUNE DIABETES IN ADULTS), MANAGED AS TYPE 1 (H): Primary | ICD-10-CM

## 2025-06-11 DIAGNOSIS — L30.9 DERMATITIS: ICD-10-CM

## 2025-06-11 DIAGNOSIS — I50.32 DIASTOLIC DYSFUNCTION WITH CHRONIC HEART FAILURE (H): ICD-10-CM

## 2025-06-11 DIAGNOSIS — I10 ESSENTIAL HYPERTENSION: ICD-10-CM

## 2025-06-11 DIAGNOSIS — M81.0 AGE-RELATED OSTEOPOROSIS WITHOUT CURRENT PATHOLOGICAL FRACTURE: ICD-10-CM

## 2025-06-11 DIAGNOSIS — E78.2 MIXED HYPERLIPIDEMIA: ICD-10-CM

## 2025-06-11 DIAGNOSIS — I48.0 PAROXYSMAL ATRIAL FIBRILLATION (H): ICD-10-CM

## 2025-06-11 PROBLEM — U07.1 COVID-19: Status: RESOLVED | Noted: 2024-10-29 | Resolved: 2025-06-11

## 2025-06-11 PROBLEM — D69.6 THROMBOCYTOPENIA: Status: RESOLVED | Noted: 2023-03-21 | Resolved: 2025-06-11

## 2025-06-11 LAB
ALBUMIN SERPL BCG-MCNC: 4.1 G/DL (ref 3.5–5.2)
ALP SERPL-CCNC: 43 U/L (ref 40–150)
ALT SERPL W P-5'-P-CCNC: 16 U/L (ref 0–50)
ANION GAP SERPL CALCULATED.3IONS-SCNC: 11 MMOL/L (ref 7–15)
AST SERPL W P-5'-P-CCNC: 26 U/L (ref 0–45)
BILIRUB DIRECT SERPL-MCNC: 0.27 MG/DL (ref 0–0.3)
BILIRUB SERPL-MCNC: 0.8 MG/DL
BUN SERPL-MCNC: 14.3 MG/DL (ref 8–23)
CALCIUM SERPL-MCNC: 9.7 MG/DL (ref 8.8–10.4)
CHLORIDE SERPL-SCNC: 99 MMOL/L (ref 98–107)
CREAT SERPL-MCNC: 0.94 MG/DL (ref 0.51–0.95)
EGFRCR SERPLBLD CKD-EPI 2021: 62 ML/MIN/1.73M2
EST. AVERAGE GLUCOSE BLD GHB EST-MCNC: 143 MG/DL
GLUCOSE SERPL-MCNC: 135 MG/DL (ref 70–99)
HBA1C MFR BLD: 6.6 %
HCO3 SERPL-SCNC: 29 MMOL/L (ref 22–29)
NT-PROBNP SERPL-MCNC: 746 PG/ML (ref 0–624)
POTASSIUM SERPL-SCNC: 3.8 MMOL/L (ref 3.4–5.3)
PROT SERPL-MCNC: 8.2 G/DL (ref 6.4–8.3)
SODIUM SERPL-SCNC: 139 MMOL/L (ref 135–145)
TSH SERPL DL<=0.005 MIU/L-ACNC: 2.39 UIU/ML (ref 0.3–4.2)

## 2025-06-11 PROCEDURE — 82435 ASSAY OF BLOOD CHLORIDE: CPT | Mod: ZL | Performed by: NURSE PRACTITIONER

## 2025-06-11 PROCEDURE — 83880 ASSAY OF NATRIURETIC PEPTIDE: CPT | Mod: ZL | Performed by: NURSE PRACTITIONER

## 2025-06-11 PROCEDURE — 83036 HEMOGLOBIN GLYCOSYLATED A1C: CPT | Mod: ZL | Performed by: NURSE PRACTITIONER

## 2025-06-11 PROCEDURE — G0463 HOSPITAL OUTPT CLINIC VISIT: HCPCS

## 2025-06-11 PROCEDURE — 84443 ASSAY THYROID STIM HORMONE: CPT | Mod: ZL | Performed by: NURSE PRACTITIONER

## 2025-06-11 PROCEDURE — 36415 COLL VENOUS BLD VENIPUNCTURE: CPT | Mod: ZL | Performed by: NURSE PRACTITIONER

## 2025-06-11 PROCEDURE — 84450 TRANSFERASE (AST) (SGOT): CPT | Mod: ZL | Performed by: NURSE PRACTITIONER

## 2025-06-11 RX ORDER — TRIAMCINOLONE ACETONIDE 1 MG/G
OINTMENT TOPICAL 2 TIMES DAILY
Qty: 30 G | Refills: 0 | Status: SHIPPED | OUTPATIENT
Start: 2025-06-11

## 2025-06-11 ASSESSMENT — PAIN SCALES - GENERAL: PAINLEVEL_OUTOF10: NO PAIN (0)

## 2025-06-12 DIAGNOSIS — I50.9 CHF (CONGESTIVE HEART FAILURE) (H): Primary | ICD-10-CM

## 2025-06-12 DIAGNOSIS — I51.7 CARDIOMEGALY: ICD-10-CM

## 2025-06-12 DIAGNOSIS — R79.89 ELEVATED BRAIN NATRIURETIC PEPTIDE (BNP) LEVEL: ICD-10-CM

## 2025-06-12 DIAGNOSIS — I10 ESSENTIAL HYPERTENSION: Primary | ICD-10-CM

## 2025-06-12 DIAGNOSIS — I50.32 DIASTOLIC DYSFUNCTION WITH CHRONIC HEART FAILURE (H): ICD-10-CM

## 2025-06-12 DIAGNOSIS — I50.22 CHRONIC SYSTOLIC HEART FAILURE (H): ICD-10-CM

## 2025-06-12 RX ORDER — TORSEMIDE 20 MG/1
40 TABLET ORAL DAILY
Qty: 60 TABLET | Refills: 0 | Status: SHIPPED | OUTPATIENT
Start: 2025-06-12

## 2025-06-18 NOTE — PROGRESS NOTES
Assessment & Plan     (I50.32) Diastolic dysfunction with chronic heart failure (H)  (primary encounter diagnosis)  (I50.22) Chronic systolic heart failure (H)  Comment: Kidney function stable, but BNP is still elevated; lower legs are still swollen and weight has not decreased  Plan: Nursing staff will call her. Unsure dose of torsemide. If taking 20 mg, will increase to 40 mg and repeat labs one week.     The longitudinal plan of care for the diagnosis(es)/condition(s) as documented were addressed during this visit. Due to the added complexity in care, I will continue to support Flora in the subsequent management and with ongoing continuity of care.    Katherin Hines is a 80 year old, presenting for the following health issues:  Heart Failure and Kidney Problem    History of Present Illness       She eats 0-1 servings of fruits and vegetables daily.She consumes 3 sweetened beverage(s) daily.She exercises with enough effort to increase her heart rate 9 or less minutes per day.  She exercises with enough effort to increase her heart rate 3 or less days per week. She is missing 1 dose(s) of medications per week.  She is not taking prescribed medications regularly due to remembering to take.        Heart Failure Exacerbation Follow-up     Last seen on 6/11/25. Weight was up 7 pounds and lower legs were edematous. She had been taking lasix 40 mg BID. This was stopped and torsemide 40 mg once daily was ordered.     Are you experiencing any shortness of breath? Yes, with rest and activity   How would you describe your shortness of breath?  Can't tell   Are you experiencing any swelling in your legs or feet?  Stable  Are you using more pillows than usual? No  Do you cough at night?  Yes  Do you check your weight daily?  Yes  Have you had a weight change recently?  Weight increase of 8 pounds last week   Are you having any of the following side effects from your medications? (Select all that apply)  The patient  "does not report symptoms of dizziness, fatigue, cough, swelling, or slow heart beat.  Since your last visit, how many times have you gone to the cardiologist, urgent care, emergency room, or hospital because of your heart failure?   None  Last Echo:   Echo result w/o MOPS: Interpretation SummaryThe left ventricular size and wall thickness are normal. The ejection fractionis 50-55% (borderline).The right ventricle size and function are normalNo significant valvular abnormalities.IVC is normal This study was compared with the study from 2/27/2023. Left ventricularfunction slightly worsened    She notes that her weight is still up. Still has 1+ edema in bilateral legs. Unsure if the torsemide is helping.       Review of Systems  Constitutional, HEENT, cardiovascular, pulmonary, gi and gu systems are negative, except as otherwise noted.      Objective    /68 (BP Location: Right arm, Patient Position: Sitting, Cuff Size: Adult Regular)   Pulse 80   Temp 99.3  F (37.4  C) (Tympanic)   Resp 20   Ht 1.6 m (5' 3\")   Wt 67.1 kg (148 lb)   SpO2 96%   BMI 26.22 kg/m    Body mass index is 26.22 kg/m .  Physical Exam   GENERAL: alert and no distress  EYES: Eyes grossly normal to inspection, PERRL and conjunctivae and sclerae normal  HENT: ear canals and TM's normal, nose and mouth without ulcers or lesions  NECK: no adenopathy, no asymmetry, masses, or scars  RESP: lungs clear to auscultation - no rales, rhonchi or wheezes  CV: irregularly irregular rhythm and 1+ bilateral lower leg swelling  NEURO: Normal strength and tone, mentation intact and speech normal  PSYCH: mentation appears normal, affect normal/bright    Results for orders placed or performed in visit on 06/19/25 (from the past 24 hours)   NT-proBNP   Result Value Ref Range    NT-proBNP 783 (H) 0 - 624 pg/mL   CBC with platelets   Result Value Ref Range    WBC Count 7.6 4.0 - 11.0 10e3/uL    RBC Count 3.69 (L) 3.80 - 5.20 10e6/uL    Hemoglobin 11.7 11.7 - " 15.7 g/dL    Hematocrit 34.8 (L) 35.0 - 47.0 %    MCV 94 78 - 100 fL    MCH 31.7 26.5 - 33.0 pg    MCHC 33.6 31.5 - 36.5 g/dL    RDW 12.0 10.0 - 15.0 %    Platelet Count 160 150 - 450 10e3/uL   Comprehensive metabolic panel (BMP + Alb, Alk Phos, ALT, AST, Total. Bili, TP)   Result Value Ref Range    Sodium 138 135 - 145 mmol/L    Potassium 3.6 3.4 - 5.3 mmol/L    Carbon Dioxide (CO2) 26 22 - 29 mmol/L    Anion Gap 12 7 - 15 mmol/L    Urea Nitrogen 14.1 8.0 - 23.0 mg/dL    Creatinine 0.84 0.51 - 0.95 mg/dL    GFR Estimate 71 >60 mL/min/1.73m2    Calcium 9.0 8.8 - 10.4 mg/dL    Chloride 100 98 - 107 mmol/L    Glucose 213 (H) 70 - 99 mg/dL    Alkaline Phosphatase 37 (L) 40 - 150 U/L    AST 25 0 - 45 U/L    ALT 13 0 - 50 U/L    Protein Total 7.4 6.4 - 8.3 g/dL    Albumin 3.9 3.5 - 5.2 g/dL    Bilirubin Total 0.6 <=1.2 mg/dL           Signed Electronically by: Nicolasa Guillen NP

## 2025-06-19 ENCOUNTER — RESULTS FOLLOW-UP (OUTPATIENT)
Dept: FAMILY MEDICINE | Facility: OTHER | Age: 80
End: 2025-06-19

## 2025-06-19 ENCOUNTER — OFFICE VISIT (OUTPATIENT)
Dept: FAMILY MEDICINE | Facility: OTHER | Age: 80
End: 2025-06-19
Attending: NURSE PRACTITIONER
Payer: MEDICARE

## 2025-06-19 VITALS
RESPIRATION RATE: 20 BRPM | HEART RATE: 80 BPM | BODY MASS INDEX: 26.22 KG/M2 | OXYGEN SATURATION: 96 % | HEIGHT: 63 IN | DIASTOLIC BLOOD PRESSURE: 68 MMHG | SYSTOLIC BLOOD PRESSURE: 120 MMHG | TEMPERATURE: 99.3 F | WEIGHT: 148 LBS

## 2025-06-19 DIAGNOSIS — I50.32 DIASTOLIC DYSFUNCTION WITH CHRONIC HEART FAILURE (H): Primary | ICD-10-CM

## 2025-06-19 DIAGNOSIS — I50.22 CHRONIC SYSTOLIC HEART FAILURE (H): ICD-10-CM

## 2025-06-19 LAB
ALBUMIN SERPL BCG-MCNC: 3.9 G/DL (ref 3.5–5.2)
ALP SERPL-CCNC: 37 U/L (ref 40–150)
ALT SERPL W P-5'-P-CCNC: 13 U/L (ref 0–50)
ANION GAP SERPL CALCULATED.3IONS-SCNC: 12 MMOL/L (ref 7–15)
AST SERPL W P-5'-P-CCNC: 25 U/L (ref 0–45)
BILIRUB SERPL-MCNC: 0.6 MG/DL
BUN SERPL-MCNC: 14.1 MG/DL (ref 8–23)
CALCIUM SERPL-MCNC: 9 MG/DL (ref 8.8–10.4)
CHLORIDE SERPL-SCNC: 100 MMOL/L (ref 98–107)
CREAT SERPL-MCNC: 0.84 MG/DL (ref 0.51–0.95)
EGFRCR SERPLBLD CKD-EPI 2021: 71 ML/MIN/1.73M2
ERYTHROCYTE [DISTWIDTH] IN BLOOD BY AUTOMATED COUNT: 12 % (ref 10–15)
GLUCOSE SERPL-MCNC: 213 MG/DL (ref 70–99)
HCO3 SERPL-SCNC: 26 MMOL/L (ref 22–29)
HCT VFR BLD AUTO: 34.8 % (ref 35–47)
HGB BLD-MCNC: 11.7 G/DL (ref 11.7–15.7)
MCH RBC QN AUTO: 31.7 PG (ref 26.5–33)
MCHC RBC AUTO-ENTMCNC: 33.6 G/DL (ref 31.5–36.5)
MCV RBC AUTO: 94 FL (ref 78–100)
NT-PROBNP SERPL-MCNC: 783 PG/ML (ref 0–624)
PLATELET # BLD AUTO: 160 10E3/UL (ref 150–450)
POTASSIUM SERPL-SCNC: 3.6 MMOL/L (ref 3.4–5.3)
PROT SERPL-MCNC: 7.4 G/DL (ref 6.4–8.3)
RBC # BLD AUTO: 3.69 10E6/UL (ref 3.8–5.2)
SODIUM SERPL-SCNC: 138 MMOL/L (ref 135–145)
WBC # BLD AUTO: 7.6 10E3/UL (ref 4–11)

## 2025-06-19 PROCEDURE — 36415 COLL VENOUS BLD VENIPUNCTURE: CPT | Mod: ZL | Performed by: NURSE PRACTITIONER

## 2025-06-19 PROCEDURE — 84155 ASSAY OF PROTEIN SERUM: CPT | Mod: ZL | Performed by: NURSE PRACTITIONER

## 2025-06-19 PROCEDURE — 85014 HEMATOCRIT: CPT | Mod: ZL | Performed by: NURSE PRACTITIONER

## 2025-06-19 PROCEDURE — G0463 HOSPITAL OUTPT CLINIC VISIT: HCPCS

## 2025-06-19 PROCEDURE — 83880 ASSAY OF NATRIURETIC PEPTIDE: CPT | Mod: ZL | Performed by: NURSE PRACTITIONER

## 2025-06-19 ASSESSMENT — PAIN SCALES - GENERAL: PAINLEVEL_OUTOF10: NO PAIN (0)

## 2025-06-26 ENCOUNTER — LAB (OUTPATIENT)
Dept: LAB | Facility: OTHER | Age: 80
End: 2025-06-26
Payer: MEDICARE

## 2025-06-26 DIAGNOSIS — I50.32 DIASTOLIC DYSFUNCTION WITH CHRONIC HEART FAILURE (H): ICD-10-CM

## 2025-06-26 LAB
ANION GAP SERPL CALCULATED.3IONS-SCNC: 11 MMOL/L (ref 7–15)
BUN SERPL-MCNC: 17.9 MG/DL (ref 8–23)
CALCIUM SERPL-MCNC: 10 MG/DL (ref 8.8–10.4)
CHLORIDE SERPL-SCNC: 97 MMOL/L (ref 98–107)
CREAT SERPL-MCNC: 0.96 MG/DL (ref 0.51–0.95)
EGFRCR SERPLBLD CKD-EPI 2021: 60 ML/MIN/1.73M2
GLUCOSE SERPL-MCNC: 181 MG/DL (ref 70–99)
HCO3 SERPL-SCNC: 32 MMOL/L (ref 22–29)
NT-PROBNP SERPL-MCNC: 539 PG/ML (ref 0–624)
POTASSIUM SERPL-SCNC: 4.1 MMOL/L (ref 3.4–5.3)
SODIUM SERPL-SCNC: 140 MMOL/L (ref 135–145)

## 2025-06-26 PROCEDURE — 83880 ASSAY OF NATRIURETIC PEPTIDE: CPT | Mod: ZL

## 2025-06-26 PROCEDURE — 80048 BASIC METABOLIC PNL TOTAL CA: CPT | Mod: ZL

## 2025-06-26 PROCEDURE — 36415 COLL VENOUS BLD VENIPUNCTURE: CPT | Mod: ZL

## 2025-06-27 ENCOUNTER — RESULTS FOLLOW-UP (OUTPATIENT)
Dept: FAMILY MEDICINE | Facility: OTHER | Age: 80
End: 2025-06-27

## 2025-07-01 ENCOUNTER — DOCUMENTATION ONLY (OUTPATIENT)
Dept: FAMILY MEDICINE | Facility: OTHER | Age: 80
End: 2025-07-01

## 2025-07-01 ENCOUNTER — TELEPHONE (OUTPATIENT)
Dept: FAMILY MEDICINE | Facility: OTHER | Age: 80
End: 2025-07-01

## 2025-07-01 DIAGNOSIS — R26.89 IMPAIRED GAIT AND MOBILITY: Primary | ICD-10-CM

## 2025-07-01 NOTE — TELEPHONE ENCOUNTER
9:44 AM    Reason for Call: Phone Call    Description: pt walker broke and needs a prescription for a new walker sent to [facility]. Pt is needing today if at all possible they are holding her one for    Was an appointment offered for this call? No  If yes : Appointment type              Date    Preferred method for responding to this message: Telephone Call  What is your phone number ? [phone]    If we cannot reach you directly, may we leave a detailed response at the number you provided? Yes    Can this message wait until your PCP/provider returns, if available today? Not applicable    Mari De Los Santos

## 2025-07-13 DIAGNOSIS — I50.22 CHRONIC SYSTOLIC HEART FAILURE (H): ICD-10-CM

## 2025-07-13 DIAGNOSIS — I51.7 CARDIOMEGALY: ICD-10-CM

## 2025-07-13 DIAGNOSIS — I50.32 DIASTOLIC DYSFUNCTION WITH CHRONIC HEART FAILURE (H): ICD-10-CM

## 2025-07-13 DIAGNOSIS — I10 ESSENTIAL HYPERTENSION: ICD-10-CM

## 2025-07-13 DIAGNOSIS — R79.89 ELEVATED BRAIN NATRIURETIC PEPTIDE (BNP) LEVEL: ICD-10-CM

## 2025-07-14 ENCOUNTER — OFFICE VISIT (OUTPATIENT)
Dept: PODIATRY | Facility: OTHER | Age: 80
End: 2025-07-14
Attending: PODIATRIST
Payer: MEDICARE

## 2025-07-14 ENCOUNTER — TELEPHONE (OUTPATIENT)
Dept: CARDIOLOGY | Facility: OTHER | Age: 80
End: 2025-07-14

## 2025-07-14 VITALS
SYSTOLIC BLOOD PRESSURE: 108 MMHG | HEART RATE: 82 BPM | DIASTOLIC BLOOD PRESSURE: 59 MMHG | OXYGEN SATURATION: 98 % | TEMPERATURE: 97.7 F

## 2025-07-14 DIAGNOSIS — E13.40: ICD-10-CM

## 2025-07-14 DIAGNOSIS — Z13.89 SCREENING FOR DIABETIC PERIPHERAL NEUROPATHY: ICD-10-CM

## 2025-07-14 DIAGNOSIS — L60.3 ONYCHODYSTROPHY: ICD-10-CM

## 2025-07-14 DIAGNOSIS — E10.42 DIABETIC POLYNEUROPATHY ASSOCIATED WITH TYPE 1 DIABETES MELLITUS (H): Primary | ICD-10-CM

## 2025-07-14 DIAGNOSIS — L84 CALLUS OF FOOT: ICD-10-CM

## 2025-07-14 PROCEDURE — G0463 HOSPITAL OUTPT CLINIC VISIT: HCPCS

## 2025-07-14 PROCEDURE — 11056 PARNG/CUTG B9 HYPRKR LES 2-4: CPT | Performed by: PODIATRIST

## 2025-07-14 PROCEDURE — 11721 DEBRIDE NAIL 6 OR MORE: CPT | Performed by: PODIATRIST

## 2025-07-14 RX ORDER — TORSEMIDE 20 MG/1
40 TABLET ORAL DAILY
Qty: 60 TABLET | Refills: 0 | Status: SHIPPED | OUTPATIENT
Start: 2025-07-14

## 2025-07-14 ASSESSMENT — PAIN SCALES - GENERAL: PAINLEVEL_OUTOF10: NO PAIN (0)

## 2025-07-14 NOTE — TELEPHONE ENCOUNTER
Requested Prescriptions   Pending Prescriptions Disp Refills    torsemide (DEMADEX) 20 MG tablet [Pharmacy Med Name: Torsemide 20 MG Oral Tablet] 60 tablet 0     Sig: Take 2 tablets by mouth once daily       Diuretics (Including Combos) Protocol Passed - 7/14/2025  9:50 AM        Passed - Potassium level on file in past 12 months        Passed - Medication is active on med list and the sig matches. RN to manually verify dose and sig if red X/fail.     If the protocol passes (green check), you do not need to verify med dose and sig.    A prescription matches if they are the same clinical intention.    For Example: once daily and every morning are the same.    The protocol can not identify upper and lower case letters as matching and will fail.     For Example: Take 1 tablet (50 mg) by mouth daily     TAKE 1 TABLET (50 MG) BY MOUTH DAILY    For all fails (red x), verify dose and sig.    If the refill does match what is on file, the RN can still proceed to approve the refill request.       If they do not match, route to the appropriate provider.             Passed - Medication indicated for associated diagnosis     Medication is associated with one or more of the following diagnoses:     Edema   Hypertension   Hypercalcemia   Heart Failure   Chronic Kidney Disease (CKD)   Cardiomyopathy   Dyspnea   Chronic Thromboembolic Pulmonary Hypertension   Pulmonary Hypertension          Passed - Has GFR on file in past 12 months     Recent Labs   Lab Test 06/26/25  1554 11/23/21  1403 05/05/21  1428   GFRESTIMATED 60*   < > 77   GFRESTBLACK  --   --  90    < > = values in this interval not displayed.             Passed - Blood Pressure on file in past 12 months     BP Readings from Last 3 Encounters:   06/19/25 120/68   06/11/25 132/60   04/22/25 118/71       No data recorded            Passed - Recent (12 month) or future (90 days) visit with authorizing provider's specialty (provided they have been seen in the past 15 months)      The patient must have completed an in-person or virtual visit within the past 12 months or has a future visit scheduled within the next 90 days with the authorizing provider s specialty.  Urgent care and e-visits do not qualify as an office visit for this protocol.          Passed - Patient is age 18 or older        Passed - No active pregancy on record        Passed - No positive pregnancy test in past 12 months           Prescription approved per [facility] Refill Protocol.

## 2025-07-14 NOTE — TELEPHONE ENCOUNTER
Disp Refills Start End MISBAH   torsemide (DEMADEX) 20 MG tablet 60 tablet 0 6/12/2025 -- No     Last Office Visit: 01/08/2025  Future Office visit:    Next 5 appointments (look out 90 days)      Jul 14, 2025 2:45 PM  (Arrive by 2:30 PM)  Return Visit with Jane Chaidez DPM  [facility] ([facility] ) [address] [place] [id number] [phone]     Sep 11, 2025 2:00 PM  (Arrive by 1:45 PM)  Provider Visit with Nicolasa Guillen NP  [facility] ([facility] ) [address] [place] [id number]  [phone]             Routing refill request to provider for review/approval because:

## 2025-07-14 NOTE — PROGRESS NOTES
Chief complaint: Patient presents with:  Toenail: DFE      History of Present Illness: This 80 year old female is seen at the request of No ref. provider found for evaluation and suggestions of management of thickened toenails.    She says she has HF and she had an MI on 07/20/2024. She takes Lasix which which reduces her edema from her CHF. She is also taking Eliquis.    She says she has type I DM. She still feels burning, tingling, and numbness in her feet, but it is not consistently.     No further pedal complaints today.        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), managed as type 1 (H)    H/O total knee replacement, left    Age-related osteoporosis without current pathological fracture    Family history of melanoma    Family history of breast cancer in female    Dysphonia    Prolonged QT interval    Fatty liver on 11/7/2007    Diastolic dysfunction with chronic heart failure (H)    Mixed hyperlipidemia    Drug-induced hypokalemia    Vitamin D deficiency    Contrast media allergy    Pulmonary emphysema, unspecified emphysema type (H)    Asthma, unspecified asthma severity, unspecified whether complicated, unspecified whether persistent    Abnormal EMG    Cardiomegaly    Incisional hernia    Paroxysmal atrial fibrillation (H)    Chronic systolic heart failure (H)    Atrial fibrillation with rapid ventricular response (H)    Elevated brain natriuretic peptide (BNP) level     Insulin-requiring or dependent type II diabetes mellitus (H)    Diabetic polyneuropathy associated with type 1 diabetes mellitus (H)    CHF (congestive heart failure) (H)       Past Surgical History:   Procedure Laterality Date    APPENDECTOMY OPEN CHILD      AS TOTAL KNEE ARTHROPLASTY Right     CHOLECYSTECTOMY      COLONOSCOPY - HIM SCAN N/A 03/12/2009    per Care Everywhere documentation per .     HYSTERECTOMY TOTAL ABDOMINAL      MASTECTOMY, BILATERAL Bilateral 02/26/1992    ORTHOPEDIC SURGERY      SHOULDER SURGERY Right     SHOULDER SURGERY Left        Current Outpatient Medications   Medication Sig Dispense Refill    apixaban ANTICOAGULANT (ELIQUIS) 5 MG tablet Take 1 tablet (5 mg) by mouth 2 times daily. 180 tablet 3    Calcium Carb-Cholecalciferol (CALCIUM PLUS VITAMIN D PO) Take 1 tablet by mouth 2 times daily (with meals).      insulin lispro (HUMALOG VIAL) 100 UNIT/ML vial TO BE USED WITH THE INSULIN PUMP TOTAL DAILY DOSE 80 UNITS 30 mL 3    INSULIN PUMP - OUTPATIENT Inject subcutaneously.   Insulin pump: Minimed 780G  Update: 4/16/25  Basal   0000 0.85  Total basal: 20.4 units  CR: 13  ISF: 50  Blood target: 100-130  Active insulin time: 2 hours      levothyroxine (SYNTHROID/LEVOTHROID) 75 MCG tablet Take 1 tablet (75 mcg) by mouth daily. 90 tablet 3    metoprolol succinate ER (TOPROL XL) 50 MG 24 hr tablet Take 1 tablet (50 mg) by mouth daily. 90 tablet 3    Multiple Vitamins-Minerals (WOMENS MULTIVITAMIN) TABS Take 1 tablet by mouth 2 times daily.      omeprazole (PRILOSEC) 40 MG DR capsule Take 1 capsule (40 mg) by mouth daily. 90 capsule 3    ramipril (ALTACE) 10 MG capsule Take 1 capsule (10 mg) by mouth daily. 90 capsule 3    senna-docusate (SENOKOT-S;PERICOLACE) 8.6-50 MG per tablet Take 1 tablet by mouth At Bedtime       simvastatin (ZOCOR) 40 MG tablet Take 1 tablet (40 mg) by mouth at bedtime. 90 tablet 3    tamoxifen (NOLVADEX) 20 MG tablet Take 20 mg by mouth every morning       tiZANidine (ZANAFLEX) 4 MG tablet TAKE 1/2 (ONE-HALF) TABLET BY MOUTH IN THE MORNING, 1/2 (ONE-HALF) TABLET MID DAY, AND 1 AT BEDTIME. CAN TAKE AN EXTRA ONE-HALF TABLET AS NEEDED DURING THE NIGHT FOR BREAKTHROUGH SYMPTOMS UP TO 4 TIMES PER WEEK.      torsemide (DEMADEX) 20 MG tablet Take 2 tablets by mouth once daily 60 tablet 0    triamcinolone (KENALOG) 0.1 % external ointment Apply topically 2 times daily. Avoid using more than 2 weeks. 30 g 0     No current facility-administered medications for this visit.          Allergies   Allergen Reactions    Amoxicillin Angioedema    Contrast Dye Difficulty breathing    Gadolinium Derivatives      Other reaction(s): Difficulty in swallowing, Laryngeal spasm  Patient had an injection into rt. Shoulder capsule prior to MRI arthrogram.  Contained multihance gadobenate, omnipaque iohexol, and buffered lidocaine.      Ammonia     Cory-1 [Lidocaine Hcl]      Novocaine allergy      Codeine Sulfate     Food      Shrimp    Fosamax [Alendronate]      Dental infections    Iodine-131 Swelling     Ct dye    Lidocaine     Nitrous Oxide     No Clinical Screening - See Comments Nausea and Vomiting and Other (See Comments)     (3/6/09)- N/V and Heart racing    Novocain [Procaine]     Penicillins     Symbicort [Budesonide-Formoterol Fumarate]     Tramadol     Morphine Palpitations       Family History   Problem Relation Age of Onset    Breast Cancer Maternal Aunt     Breast Cancer Maternal Aunt     Lung Cancer Father     Cancer Father     Diabetes Father     Heart Disease Father     Heart Disease Brother        Social History     Socioeconomic History    Marital status:      Spouse name: None    Number of children: 2    Years of education: None    Highest education level: None   Occupational History    Occupation: [email or web address]     Employer: RETIRED   Tobacco Use    Smoking status: Never     Passive exposure: Never    Smokeless tobacco: Never   Vaping Use    Vaping status: Never  Used   Substance and Sexual Activity    Alcohol use: No     Alcohol/week: 0.0 standard drinks of alcohol    Drug use: No     Social Determinants of Health     Financial Resource Strain: Low Risk  (2/28/2024)    Financial Resource Strain     Within the past 12 months, have you or your family members you live with been unable to get utilities (heat, electricity) when it was really needed?: No   Food Insecurity: Low Risk  (2/28/2024)    Food Insecurity     Within the past 12 months, did you worry that your food would run out before you got money to buy more?: No     Within the past 12 months, did the food you bought just not last and you didn t have money to get more?: No   Transportation Needs: Low Risk  (2/28/2024)    Transportation Needs     Within the past 12 months, has lack of transportation kept you from medical appointments, getting your medicines, non-medical meetings or appointments, work, or from getting things that you need?: No   Interpersonal Safety: Low Risk  (10/9/2024)    Interpersonal Safety     Do you feel physically and emotionally safe where you currently live?: Yes     Within the past 12 months, have you been hit, slapped, kicked or otherwise physically hurt by someone?: No     Within the past 12 months, have you been humiliated or emotionally abused in other ways by your partner or ex-partner?: No   Housing Stability: Low Risk  (2/28/2024)    Housing Stability     Do you have housing? : Yes     Are you worried about losing your housing?: No       ROS: 10 point ROS neg other than the symptoms noted above in the HPI.  EXAM  Constitutional: healthy, alert, and no distress    Psychiatric: mentation appears normal and affect normal/bright    VASCULAR:  -Dorsalis pedis pulse +2/4 b/l  -Posterior tibial pulse +2/4 b/l  -Capillary refill time < 3 seconds to b/l hallux  -Hair growth Absent to b/l anterior legs and ankles  -Varicosities and telangiectasias to foot, bilaterally   -Moderate 2+ pitting edema  to foot and ankle, bilaterally   NEURO:  -Light touch sensation intact to b/l plantar forefoot  DERM:  -Skin temperature, texture and turgor WNL b/l  -Toenails elongated, thickened, dystrophic and discolored x 10  -Hyperkeratotic lesion on the medial plantar distal aspect of the proximal phalanx of the RIGHT hallux and the medial plantar 1st metatarsal head of the RIGHT foot  MSK:  -Lateral deviation of hallux with medial deviation of 1st metatarsal, bilaterally   -Prominent bony prominence to dorsal and medial 1st metatarsal head, bilaterally   -Moderate decrease in arch height while patient is NWB, bilaterally   -Moderate medial midfoot arch collapse bilaterally   -Pain on palpation to the metatarsal heads 2-4 bilaterally   -Muscle strength of ankles +5/5 for dorsiflexion, plantarflexion, ABDUction and ADDuction b/l    -Ankle joint passive ROM within normal limits except for dorsiflexion:    Dorsiflexion, RIGHT Straight knee 0 degrees    Dorsiflexion, LEFT Straight knee 0 degrees    ============================================================    ASSESSMENT:    (E10.42) Diabetic polyneuropathy associated with type 1 diabetes mellitus (H)  (primary encounter diagnosis)    (L60.3) Onychodystrophy    (L84) Callus of foot    (E13.40) 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 Treatment options discussed with no educational barriers noted.  -High risk toenail debridement x 10 toenails without incident  ---There are no open wounds or concerns with the skin where the patient previously accidentally clipped her skin.    -Callus pared x 2 to the medial plantar distal aspect of the proximal phalanx of the RIGHT hallux and the RIGHT plantar medial 1st metatarsal head without incident  ---Patient reminded that the callus will likely return due to the underlying, prominent bone causing the  callus while the patient is walking.    -Diabetic Foot Education provided. This included checking the feet daily looking for new new blisters or wounds, wearing shoes at all times when walking including around the house, and avoiding lotion application between the toes. If there are any signs of infection, the patient should present to the ED as soon as possible. Infections of the foot can be life threatening or lead to amputations of the foot or leg.    Diabetic Shoes: She received new shoes in March, 2025, through the orthotist, Joy Richards. She has not worn them yet because she doesn't have any outfits to match the shoes.    Diabetes Mellitus: Patient's DM is managed by their PCP. The DM appears to be stable. Patient's last HbA1C was 6.6% on 06/11/2025.    -Patient in agreement with the above treatment plan and all of patient's questions were answered.      Return to clinic 63+ days for a diabetic foot exam and high risk nail debridement and possible callus ervin Chaidez DPM

## 2025-07-28 ENCOUNTER — TELEPHONE (OUTPATIENT)
Dept: FAMILY MEDICINE | Facility: OTHER | Age: 80
End: 2025-07-28

## 2025-07-28 NOTE — TELEPHONE ENCOUNTER
Symptom or reason needing to speak to RN: Patient is having coughing since 07/27, unable to sleep because causing no sleep and difficulty breathing. I did mention if patient thought that she was in heart failure to call 911.      Best number to return call: [phone]      Best time to return call: ASAP

## 2025-07-29 ENCOUNTER — NURSE TRIAGE (OUTPATIENT)
Dept: FAMILY MEDICINE | Facility: OTHER | Age: 80
End: 2025-07-29

## 2025-07-29 ENCOUNTER — HOSPITAL ENCOUNTER (EMERGENCY)
Facility: HOSPITAL | Age: 80
Discharge: HOME | End: 2025-07-29
Attending: STUDENT IN AN ORGANIZED HEALTH CARE EDUCATION/TRAINING PROGRAM
Payer: MEDICARE

## 2025-07-29 VITALS
HEART RATE: 77 BPM | RESPIRATION RATE: 14 BRPM | OXYGEN SATURATION: 99 % | DIASTOLIC BLOOD PRESSURE: 61 MMHG | SYSTOLIC BLOOD PRESSURE: 129 MMHG | TEMPERATURE: 100.3 F

## 2025-07-29 DIAGNOSIS — I50.33 ACUTE ON CHRONIC DIASTOLIC HEART FAILURE (H): Primary | ICD-10-CM

## 2025-07-29 LAB
ANION GAP SERPL CALCULATED.3IONS-SCNC: 16 MMOL/L (ref 7–15)
BASOPHILS # BLD AUTO: 0 10E3/UL (ref 0–0.2)
BASOPHILS NFR BLD AUTO: 0 %
BUN SERPL-MCNC: 11 MG/DL (ref 8–23)
CALCIUM SERPL-MCNC: 9.2 MG/DL (ref 8.8–10.4)
CHLORIDE SERPL-SCNC: 95 MMOL/L (ref 98–107)
CREAT SERPL-MCNC: 0.86 MG/DL (ref 0.51–0.95)
EGFRCR SERPLBLD CKD-EPI 2021: 69 ML/MIN/1.73M2
EOSINOPHIL # BLD AUTO: 0.3 10E3/UL (ref 0–0.7)
EOSINOPHIL NFR BLD AUTO: 4 %
ERYTHROCYTE [DISTWIDTH] IN BLOOD BY AUTOMATED COUNT: 12.3 % (ref 10–15)
GLUCOSE SERPL-MCNC: 211 MG/DL (ref 70–99)
HCO3 SERPL-SCNC: 25 MMOL/L (ref 22–29)
HCT VFR BLD AUTO: 36 % (ref 35–47)
HGB BLD-MCNC: 12.2 G/DL (ref 11.7–15.7)
HOLD SPECIMEN: NORMAL
IMM GRANULOCYTES # BLD: 0 10E3/UL
IMM GRANULOCYTES NFR BLD: 0 %
LYMPHOCYTES # BLD AUTO: 1.2 10E3/UL (ref 0.8–5.3)
LYMPHOCYTES NFR BLD AUTO: 16 %
MCH RBC QN AUTO: 32 PG (ref 26.5–33)
MCHC RBC AUTO-ENTMCNC: 33.9 G/DL (ref 31.5–36.5)
MCV RBC AUTO: 95 FL (ref 78–100)
MONOCYTES # BLD AUTO: 0.5 10E3/UL (ref 0–1.3)
MONOCYTES NFR BLD AUTO: 7 %
NEUTROPHILS # BLD AUTO: 5.3 10E3/UL (ref 1.6–8.3)
NEUTROPHILS NFR BLD AUTO: 73 %
NRBC # BLD AUTO: 0 10E3/UL
NRBC BLD AUTO-RTO: 0 /100
NT-PROBNP SERPL-MCNC: 1356 PG/ML (ref 0–624)
PLATELET # BLD AUTO: 148 10E3/UL (ref 150–450)
POTASSIUM SERPL-SCNC: 3.5 MMOL/L (ref 3.4–5.3)
RBC # BLD AUTO: 3.81 10E6/UL (ref 3.8–5.2)
SODIUM SERPL-SCNC: 136 MMOL/L (ref 135–145)
TROPONIN T SERPL HS-MCNC: 13 NG/L
TROPONIN T SERPL HS-MCNC: 15 NG/L
WBC # BLD AUTO: 7.3 10E3/UL (ref 4–11)

## 2025-07-29 PROCEDURE — 83880 ASSAY OF NATRIURETIC PEPTIDE: CPT | Performed by: STUDENT IN AN ORGANIZED HEALTH CARE EDUCATION/TRAINING PROGRAM

## 2025-07-29 PROCEDURE — 82310 ASSAY OF CALCIUM: CPT | Performed by: STUDENT IN AN ORGANIZED HEALTH CARE EDUCATION/TRAINING PROGRAM

## 2025-07-29 PROCEDURE — 250N000009 HC RX 250: Performed by: STUDENT IN AN ORGANIZED HEALTH CARE EDUCATION/TRAINING PROGRAM

## 2025-07-29 PROCEDURE — 36415 COLL VENOUS BLD VENIPUNCTURE: CPT | Performed by: STUDENT IN AN ORGANIZED HEALTH CARE EDUCATION/TRAINING PROGRAM

## 2025-07-29 PROCEDURE — 99284 EMERGENCY DEPT VISIT MOD MDM: CPT | Mod: 25 | Performed by: STUDENT IN AN ORGANIZED HEALTH CARE EDUCATION/TRAINING PROGRAM

## 2025-07-29 PROCEDURE — 85014 HEMATOCRIT: CPT | Performed by: STUDENT IN AN ORGANIZED HEALTH CARE EDUCATION/TRAINING PROGRAM

## 2025-07-29 PROCEDURE — 84484 ASSAY OF TROPONIN QUANT: CPT | Performed by: STUDENT IN AN ORGANIZED HEALTH CARE EDUCATION/TRAINING PROGRAM

## 2025-07-29 PROCEDURE — 93005 ELECTROCARDIOGRAM TRACING: CPT | Performed by: STUDENT IN AN ORGANIZED HEALTH CARE EDUCATION/TRAINING PROGRAM

## 2025-07-29 PROCEDURE — 94640 AIRWAY INHALATION TREATMENT: CPT

## 2025-07-29 PROCEDURE — 999N000157 HC STATISTIC RCP TIME EA 10 MIN

## 2025-07-29 PROCEDURE — 93010 ELECTROCARDIOGRAM REPORT: CPT | Performed by: INTERNAL MEDICINE

## 2025-07-29 PROCEDURE — 99284 EMERGENCY DEPT VISIT MOD MDM: CPT | Performed by: STUDENT IN AN ORGANIZED HEALTH CARE EDUCATION/TRAINING PROGRAM

## 2025-07-29 RX ORDER — IPRATROPIUM BROMIDE AND ALBUTEROL SULFATE 2.5; .5 MG/3ML; MG/3ML
3 SOLUTION RESPIRATORY (INHALATION) ONCE
Status: COMPLETED | OUTPATIENT
Start: 2025-07-29 | End: 2025-07-29

## 2025-07-29 RX ADMIN — IPRATROPIUM BROMIDE AND ALBUTEROL SULFATE 3 ML: .5; 3 SOLUTION RESPIRATORY (INHALATION) at 12:23

## 2025-07-29 ASSESSMENT — ACTIVITIES OF DAILY LIVING (ADL)
ADLS_ACUITY_SCORE: 59

## 2025-07-29 ASSESSMENT — ENCOUNTER SYMPTOMS
SHORTNESS OF BREATH: 1
APPETITE CHANGE: 0
COUGH: 0
CHILLS: 0
DYSURIA: 0
ABDOMINAL PAIN: 0
FEVER: 0
FATIGUE: 1

## 2025-07-29 NOTE — TELEPHONE ENCOUNTER
Called patient back to triage symptoms.Left message to call back and go to ER if having difficulty breathing asap, patient sounded like she was gasping for air on the message she left.    Called patient again and she answered.  She is coughing, wheezing every time she tries to talk and is gasping for air and crying.  Advised she need's to go to the ER asap.  She states she does not want to go to the ER and writer encouraged her to go.  She states she doesn't have a ride.  Writer offered to call an ambulance for her because her son's were not home at the time to drive her.  She adamantly refused, she does not want an ambulance.  She states she will figure something out and hung up.

## 2025-07-29 NOTE — DISCHARGE INSTRUCTIONS
As discussed this is likely a flareup of your heart failure.  I strongly recommend following up with your primary provider to see if they would want to make any medication changes.  If you develop any new or worsening symptoms in the meantime you may always return to the emergency room for evaluation.    Over the next 3 days I would recommend increasing your torsemide to taking 2 pills in the morning and 2 pills in the evening, and follow-up closely with your primary provider.

## 2025-07-29 NOTE — ED PROVIDER NOTES
History     Chief Complaint   Patient presents with    Shortness of Breath     HPI  Flora Acuna is a 80 year old female who has a history of chronic diastolic heart failure, who presents to the emergency room for concerns of having some shortness of breath worse with exertion.  She denies any chest pain, lightheadedness or dizziness.  She does believe she has gained about 10 pounds over the last month or so.  She feels like her legs are somewhat more swollen than usual.  She denies any illness such as fevers, chills, cough, sore throat, body aches.  She is not having any symptoms here at rest.  She states she has felt very lethargic as well and is sleeping more than usual.    Reviewed office visit from June 19, 2025.  Patient was seen for concerns of heart failure issues.  Her torsemide was increased from 20 mg to 40 mg twice daily.  Most recent labs from June 26 were reviewed.  No significant metabolic derangements.  No sign of kidney dysfunction.  GFR of 60 which is slightly down from previous of 71 1 week prior.    Allergies:  Allergies   Allergen Reactions    Amoxicillin Angioedema    Contrast Dye Difficulty breathing    Gadolinium Derivatives      Other reaction(s): Difficulty in swallowing, Laryngeal spasm  Patient had an injection into rt. Shoulder capsule prior to MRI arthrogram.  Contained multihance gadobenate, omnipaque iohexol, and buffered lidocaine.      Ammonia     Cory-1 [Lidocaine Hcl]      Novocaine allergy      Codeine Sulfate     Food      Shrimp    Fosamax [Alendronate]      Dental infections    Iodine-131 Swelling     Ct dye    Lidocaine     Nitrous Oxide     No Clinical Screening - See Comments Nausea and Vomiting and Other (See Comments)     (3/6/09)- N/V and Heart racing    Novocain [Procaine]     Penicillins     Symbicort [Budesonide-Formoterol Fumarate]     Tramadol     Morphine Palpitations       Problem List:    Patient Active Problem List    Diagnosis Date Noted    CHF  (congestive heart failure) (H) 06/12/2025     Priority: Medium    Diabetic polyneuropathy associated with type 1 diabetes mellitus (H) 11/06/2024     Priority: Medium    Insulin-requiring or dependent type II diabetes mellitus (H) 10/30/2024     Priority: Medium    Chronic systolic heart failure (H) 05/01/2024     Priority: Medium    Atrial fibrillation with rapid ventricular response (H) 05/01/2024     Priority: Medium    Elevated brain natriuretic peptide (BNP) level 05/01/2024     Priority: Medium    Paroxysmal atrial fibrillation (H) 03/18/2024     Priority: Medium    Pulmonary emphysema, unspecified emphysema type (H) 02/13/2023     Priority: Medium    Asthma, unspecified asthma severity, unspecified whether complicated, unspecified whether persistent 02/13/2023     Priority: Medium    Contrast media allergy 05/14/2021     Priority: Medium    Fatty liver on 11/7/2007 05/05/2021     Priority: Medium    Diastolic dysfunction with chronic heart failure (H) 05/05/2021     Priority: Medium    Mixed hyperlipidemia 05/05/2021     Priority: Medium    Drug-induced hypokalemia 05/05/2021     Priority: Medium    Vitamin D deficiency 05/05/2021     Priority: Medium    Prolonged QT interval 08/28/2020     Priority: Medium    Dysphonia 06/03/2019     Priority: Medium    Hypothyroidism 05/07/2019     Priority: Medium    Essential hypertension 05/07/2019     Priority: Medium    Malignant neoplasm of left breast in female, estrogen receptor positive (H) 05/07/2019     Priority: Medium     Follows with Dr. 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  Flora Acuna has no plan or code status on file. Discussed available resources and provided with information. Confirmed code status reflects current choices pending further ACP discussions.  Confirmed/documented legally designated decision makers.  Added by Ruth Velasquez          Personal history of malignant neoplasm of breast 11/23/2015     Priority: Medium    Family history of melanoma 11/21/2015     Priority: Medium    Family history of breast cancer in female 11/21/2015     Priority: Medium    Abnormal EMG 02/12/2013     Priority: Medium     Formatting of this note might be different from the original.  possible findings suggestive of myopathy in muscles that could be consistent with inclusion body myopathy, repeat EMG normal.      CARDIOVASCULAR SCREENING; LDL GOAL LESS THAN 160 10/05/2012     Priority: Medium    Cardiomegaly 05/08/2009     Priority: Medium     Formatting of this note might be different from the original.  TTE w/Doppler      Incisional hernia 05/04/2009     Priority: Medium     Formatting of this note might be different from the original.  IMO Update 10/11          Past Medical History:    Past Medical History:   Diagnosis Date    Arthritis     Bone disease     Cancer (H)     Complication of anesthesia     Congestive heart failure, unspecified     Diabetes (H)     Gastroesophageal reflux disease     H/O rheumatoid arthritis     HLD (hyperlipidemia)     Hoarseness     HTN (hypertension)     Myocardial infarction (H)     Osteoporosis     Pneumonia     Reduced vision     Thyroid disease     Uncomplicated asthma        Past Surgical History:    Past Surgical History:   Procedure Laterality Date    APPENDECTOMY OPEN CHILD      AS TOTAL KNEE ARTHROPLASTY Right     CHOLECYSTECTOMY      COLONOSCOPY - HIM SCAN  N/A 03/12/2009    per Care Everywhere documentation per [facility].     HYSTERECTOMY TOTAL ABDOMINAL      MASTECTOMY, BILATERAL Bilateral 02/26/1992    ORTHOPEDIC SURGERY      SHOULDER SURGERY Right     SHOULDER SURGERY Left        Family History:    Family History   Problem Relation Age of Onset    Breast Cancer Maternal Aunt     Breast Cancer Maternal Aunt     Lung Cancer Father     Cancer Father     Diabetes Father     Heart Disease Father     Heart Disease Brother        Social History:  Marital Status:   [5]  Social History     Tobacco Use    Smoking status: Never     Passive exposure: Never    Smokeless tobacco: Never   Vaping Use    Vaping status: Never Used   Substance Use Topics    Alcohol use: No     Alcohol/week: 0.0 standard drinks of alcohol    Drug use: No        Medications:    apixaban ANTICOAGULANT (ELIQUIS) 5 MG tablet  Calcium Carb-Cholecalciferol (CALCIUM PLUS VITAMIN D PO)  insulin lispro (HUMALOG VIAL) 100 UNIT/ML vial  INSULIN PUMP - OUTPATIENT  levothyroxine (SYNTHROID/LEVOTHROID) 75 MCG tablet  metoprolol succinate ER (TOPROL XL) 50 MG 24 hr tablet  Multiple Vitamins-Minerals (WOMENS MULTIVITAMIN) TABS  omeprazole (PRILOSEC) 40 MG DR capsule  ramipril (ALTACE) 10 MG capsule  senna-docusate (SENOKOT-S;PERICOLACE) 8.6-50 MG per tablet  simvastatin (ZOCOR) 40 MG tablet  tamoxifen (NOLVADEX) 20 MG tablet  tiZANidine (ZANAFLEX) 4 MG tablet  torsemide (DEMADEX) 20 MG tablet  triamcinolone (KENALOG) 0.1 % external ointment          Review of Systems   Constitutional:  Positive for fatigue. Negative for appetite change, chills and fever.   Respiratory:  Positive for shortness of breath. Negative for cough.    Cardiovascular:  Positive for leg swelling. Negative for chest pain.   Gastrointestinal:  Negative for abdominal pain.   Genitourinary:  Negative for dysuria.       Physical Exam   BP: 128/84  Pulse: 105  Resp: 22  SpO2: 95 %      Physical Exam  Constitutional:       Appearance:  Normal appearance. She is not ill-appearing or diaphoretic.   HENT:      Head: Atraumatic.   Cardiovascular:      Rate and Rhythm: Normal rate.      Heart sounds: No murmur heard.     No gallop.   Pulmonary:      Effort: Pulmonary effort is normal. No respiratory distress.      Breath sounds: Wheezing present. No rales.      Comments: Slight expiratory wheezing worse in the upper fields  Abdominal:      General: There is no distension.      Tenderness: There is no abdominal tenderness. There is no guarding.   Skin:     General: Skin is warm and dry.      Coloration: Skin is not pale.      Findings: No erythema.   Neurological:      Mental Status: She is alert.         ED Course                      EKG Interpretation:      Interpreted by Louis De Souza PA-C  Time reviewed:   Symptoms at time of EK:35   Rhythm: normal sinus   Rate: normal  Axis: normal  Ectopy: none  Conduction: normal  ST Segments/ T Waves: Non specific T wave abnormality  Q Waves: none  Comparison to prior: Unchanged from 2024    Clinical Impression: normal EKG      Critical Care time:  none     None         Recent Results (from the past 24 hours)   EKG 12-lead, tracing only   Result Value Ref Range    Systolic Blood Pressure  mmHg    Diastolic Blood Pressure  mmHg    Ventricular Rate 98 BPM    Atrial Rate 98 BPM    NY Interval 172 ms    QRS Duration 100 ms     ms    QTc 472 ms    P Axis 97 degrees    R AXIS 5 degrees    T Axis 104 degrees    Interpretation ECG       Sinus rhythm with occasional Premature ventricular complexes  Nonspecific T wave abnormality  Abnormal ECG  When compared with ECG of 11-Dec-2024 14:55,  Premature ventricular complexes are now Present  QT has lengthened     [place] Draw    Narrative    The following orders were created for panel order [place] Draw.  Procedure                               Abnormality         Status                     ---------                               -----------          ------                     Extra Blue Top Tube[[id number]]                             Final result               Extra Red Top Tube[[id number]]                              Final result               Extra Green Top (Lithiu...[[id number]]                      Final result               Extra Purple Top Tube[[id number]]                           Final result                 Please view results for these tests on the individual orders.   Extra Blue Top Tube   Result Value Ref Range    Hold Specimen JIC    Extra Red Top Tube   Result Value Ref Range    Hold Specimen JIC    Extra Green Top (Lithium Heparin) Tube   Result Value Ref Range    Hold Specimen JIC    Extra Purple Top Tube   Result Value Ref Range    Hold Specimen JIC    CBC with Platelets and Differential (Limited Occurrences)    Narrative    The following orders were created for panel order CBC with Platelets and Differential (Limited Occurrences).  Procedure                               Abnormality         Status                     ---------                               -----------         ------                     CBC with platelets and ...[[id number]]  Abnormal            Final result                 Please view results for these tests on the individual orders.   Basic Metabolic Panel (Limited Occurrences)   Result Value Ref Range    Sodium 136 135 - 145 mmol/L    Potassium 3.5 3.4 - 5.3 mmol/L    Chloride 95 (L) 98 - 107 mmol/L    Carbon Dioxide (CO2) 25 22 - 29 mmol/L    Anion Gap 16 (H) 7 - 15 mmol/L    Urea Nitrogen 11.0 8.0 - 23.0 mg/dL    Creatinine 0.86 0.51 - 0.95 mg/dL    GFR Estimate 69 >60 mL/min/1.73m2    Calcium 9.2 8.8 - 10.4 mg/dL    Glucose 211 (H) 70 - 99 mg/dL   Troponin T, High Sensitivity   Result Value Ref Range    Troponin T, High Sensitivity 15 (H) <=14 ng/L   CBC with platelets and differential   Result Value Ref Range    WBC Count 7.3 4.0 - 11.0 10e3/uL    RBC Count 3.81 3.80 - 5.20 10e6/uL    Hemoglobin 12.2 11.7 - 15.7 g/dL     Hematocrit 36.0 35.0 - 47.0 %    MCV 95 78 - 100 fL    MCH 32.0 26.5 - 33.0 pg    MCHC 33.9 31.5 - 36.5 g/dL    RDW 12.3 10.0 - 15.0 %    Platelet Count 148 (L) 150 - 450 10e3/uL    % Neutrophils 73 %    % Lymphocytes 16 %    % Monocytes 7 %    % Eosinophils 4 %    % Basophils 0 %    % Immature Granulocytes 0 %    NRBCs per 100 WBC 0 <1 /100    Absolute Neutrophils 5.3 1.6 - 8.3 10e3/uL    Absolute Lymphocytes 1.2 0.8 - 5.3 10e3/uL    Absolute Monocytes 0.5 0.0 - 1.3 10e3/uL    Absolute Eosinophils 0.3 0.0 - 0.7 10e3/uL    Absolute Basophils 0.0 0.0 - 0.2 10e3/uL    Absolute Immature Granulocytes 0.0 <=0.4 10e3/uL    Absolute NRBCs 0.0 10e3/uL   NT-proBNP   Result Value Ref Range    NT-proBNP 1,356 (H) 0 - 624 pg/mL   Troponin T, High Sensitivity   Result Value Ref Range    Troponin T, High Sensitivity 13 <=14 ng/L       Medications   ipratropium - albuterol 0.5 mg/2.5 mg (3mg)/3 mL (DUONEB) neb solution 3 mL (3 mLs Nebulization $Given 7/29/25 1227)       Assessments & Plan (with Medical Decision Making)     I have reviewed the nursing notes.    I have reviewed the findings, diagnosis, plan and need for follow up with the patient.           Medical Decision Making  The patient's presentation was of low complexity (2+ clearly self-limited or minor problems).    The patient's evaluation involved:  history and exam without other MDM data elements    The patient's management necessitated moderate risk (prescription drug management including medications given in the ED).    80-year-old female with a history of hypertension, chronic diastolic heart failure, presenting with increasing shortness of breath over the last 2 days.  Shortness of breath worse with exertion.  She has also had some lethargy.  She denies any systemic illness such as fevers, chills, cough, sore throat or bodyaches.  She has noticed some weight gain over the last month of an estimated 10 pounds or so.  She has also felt like her lower extremities  are more swollen than usual.  She is taking her torsemide 20 mg twice a day.  She denies any chest pains.    On exam she is in no acute distress.  Vital signs reviewed and unremarkable.  She is normotensive, nontachycardic, with no signs of significant respiratory distress.  She does have some slight expiratory wheezing.    Given presentation will evaluate with cardiac workup including basic labs, chest x-ray, EKG, troponin.  Given her slight wheezing and shortness of breath we will try a DuoNeb to see if this helps with some of her breathing.    No significant metabolic derangements on labs.  Her BNP is elevated at 1356.  She generally runs in the 5-700 range, so this is showing significant elevation.  Initial high-sensitivity troponin of 15.  Will reevaluate this at the 2-hour augie.  Patient refused chest x-ray.    Repeat troponin is 13 which is in the normal range.  Low suspicion for any acute cardiac event.  Patient appropriate for outpatient management of possible acute exacerbation of her heart failure without hypoxia.  Strongly recommended follow-up with primary provider.    New Prescriptions    No medications on file       Final diagnoses:   Acute on chronic diastolic heart failure (H)       7/29/2025   [facility]       Louis De Souza PA-C  07/29/25 1401

## 2025-07-29 NOTE — ED TRIAGE NOTES
"Patient presents with c/o shortness of breath and cough that started Sunday. Denies any sick contacts, \"I think I am in heart failure because I can't stop coughing.\" Patient audibly wheezing, incessant productive cough. Denies any pain.         "

## 2025-07-31 LAB
ATRIAL RATE - MUSE: 98 BPM
DIASTOLIC BLOOD PRESSURE - MUSE: NORMAL MMHG
INTERPRETATION ECG - MUSE: NORMAL
P AXIS - MUSE: 97 DEGREES
PR INTERVAL - MUSE: 172 MS
QRS DURATION - MUSE: 100 MS
QT - MUSE: 370 MS
QTC - MUSE: 472 MS
R AXIS - MUSE: 5 DEGREES
SYSTOLIC BLOOD PRESSURE - MUSE: NORMAL MMHG
T AXIS - MUSE: 104 DEGREES
VENTRICULAR RATE- MUSE: 98 BPM

## 2025-08-04 ENCOUNTER — TELEPHONE (OUTPATIENT)
Dept: FAMILY MEDICINE | Facility: OTHER | Age: 80
End: 2025-08-04

## 2025-08-07 ENCOUNTER — TELEPHONE (OUTPATIENT)
Dept: CARE COORDINATION | Facility: OTHER | Age: 80
End: 2025-08-07

## 2025-08-11 ENCOUNTER — ANCILLARY PROCEDURE (OUTPATIENT)
Dept: GENERAL RADIOLOGY | Facility: OTHER | Age: 80
End: 2025-08-11
Attending: NURSE PRACTITIONER
Payer: MEDICARE

## 2025-08-11 ENCOUNTER — OFFICE VISIT (OUTPATIENT)
Dept: FAMILY MEDICINE | Facility: OTHER | Age: 80
End: 2025-08-11
Attending: NURSE PRACTITIONER
Payer: MEDICARE

## 2025-08-11 VITALS
OXYGEN SATURATION: 95 % | WEIGHT: 155 LBS | RESPIRATION RATE: 20 BRPM | DIASTOLIC BLOOD PRESSURE: 50 MMHG | SYSTOLIC BLOOD PRESSURE: 128 MMHG | TEMPERATURE: 98.3 F | BODY MASS INDEX: 27.46 KG/M2 | HEART RATE: 80 BPM

## 2025-08-11 DIAGNOSIS — I50.32 DIASTOLIC DYSFUNCTION WITH CHRONIC HEART FAILURE (H): Primary | ICD-10-CM

## 2025-08-11 DIAGNOSIS — E87.6 HYPOKALEMIA: ICD-10-CM

## 2025-08-11 DIAGNOSIS — R79.89 ELEVATED BRAIN NATRIURETIC PEPTIDE (BNP) LEVEL: ICD-10-CM

## 2025-08-11 DIAGNOSIS — I50.9 ACUTE EXACERBATION OF CHRONIC HEART FAILURE (H): ICD-10-CM

## 2025-08-11 DIAGNOSIS — R06.02 SHORTNESS OF BREATH: ICD-10-CM

## 2025-08-11 LAB
ANION GAP SERPL CALCULATED.3IONS-SCNC: 10 MMOL/L (ref 7–15)
BUN SERPL-MCNC: 12.9 MG/DL (ref 8–23)
CALCIUM SERPL-MCNC: 9.1 MG/DL (ref 8.8–10.4)
CHLORIDE SERPL-SCNC: 95 MMOL/L (ref 98–107)
CREAT SERPL-MCNC: 0.82 MG/DL (ref 0.51–0.95)
EGFRCR SERPLBLD CKD-EPI 2021: 73 ML/MIN/1.73M2
GLUCOSE SERPL-MCNC: 97 MG/DL (ref 70–99)
HCO3 SERPL-SCNC: 34 MMOL/L (ref 22–29)
NT-PROBNP SERPL-MCNC: 1271 PG/ML (ref 0–624)
POTASSIUM SERPL-SCNC: 2.9 MMOL/L (ref 3.4–5.3)
SODIUM SERPL-SCNC: 139 MMOL/L (ref 135–145)

## 2025-08-11 PROCEDURE — 83880 ASSAY OF NATRIURETIC PEPTIDE: CPT | Mod: ZL | Performed by: NURSE PRACTITIONER

## 2025-08-11 PROCEDURE — 71046 X-RAY EXAM CHEST 2 VIEWS: CPT | Mod: TC

## 2025-08-11 PROCEDURE — G0463 HOSPITAL OUTPT CLINIC VISIT: HCPCS | Mod: 25

## 2025-08-11 PROCEDURE — 71046 X-RAY EXAM CHEST 2 VIEWS: CPT | Mod: 26 | Performed by: RADIOLOGY

## 2025-08-11 PROCEDURE — 36415 COLL VENOUS BLD VENIPUNCTURE: CPT | Mod: ZL | Performed by: NURSE PRACTITIONER

## 2025-08-11 PROCEDURE — 82374 ASSAY BLOOD CARBON DIOXIDE: CPT | Mod: ZL | Performed by: NURSE PRACTITIONER

## 2025-08-11 RX ORDER — POTASSIUM CHLORIDE 1500 MG/1
40 TABLET, EXTENDED RELEASE ORAL DAILY
Qty: 60 TABLET | Refills: 1 | Status: SHIPPED | OUTPATIENT
Start: 2025-08-11

## 2025-08-11 RX ORDER — METOLAZONE 2.5 MG/1
2.5 TABLET ORAL EVERY OTHER DAY
Qty: 30 TABLET | Refills: 0 | Status: SHIPPED | OUTPATIENT
Start: 2025-08-11

## 2025-08-11 RX ORDER — TORSEMIDE 20 MG/1
40 TABLET ORAL DAILY
Qty: 60 TABLET | Refills: 3 | Status: SHIPPED | OUTPATIENT
Start: 2025-08-11

## 2025-08-13 ENCOUNTER — TELEPHONE (OUTPATIENT)
Dept: CARE COORDINATION | Facility: OTHER | Age: 80
End: 2025-08-13

## 2025-08-17 ENCOUNTER — APPOINTMENT (OUTPATIENT)
Dept: GENERAL RADIOLOGY | Facility: HOSPITAL | Age: 80
End: 2025-08-17
Attending: FAMILY MEDICINE
Payer: MEDICARE

## 2025-08-17 ENCOUNTER — HOSPITAL ENCOUNTER (EMERGENCY)
Facility: HOSPITAL | Age: 80
Discharge: HOME | End: 2025-08-17
Attending: FAMILY MEDICINE
Payer: MEDICARE

## 2025-08-17 ASSESSMENT — ACTIVITIES OF DAILY LIVING (ADL)
ADLS_ACUITY_SCORE: 59

## 2025-08-17 ASSESSMENT — COLUMBIA-SUICIDE SEVERITY RATING SCALE - C-SSRS
2. HAVE YOU ACTUALLY HAD ANY THOUGHTS OF KILLING YOURSELF IN THE PAST MONTH?: NO
6. HAVE YOU EVER DONE ANYTHING, STARTED TO DO ANYTHING, OR PREPARED TO DO ANYTHING TO END YOUR LIFE?: NO
1. IN THE PAST MONTH, HAVE YOU WISHED YOU WERE DEAD OR WISHED YOU COULD GO TO SLEEP AND NOT WAKE UP?: NO

## 2025-08-19 ASSESSMENT — ENCOUNTER SYMPTOMS
FEVER: 0
VOMITING: 0
HEADACHES: 0
RHINORRHEA: 0
COUGH: 0
FATIGUE: 1
DIZZINESS: 0
DIARRHEA: 0
FREQUENCY: 0
DYSURIA: 0
SORE THROAT: 0
MYALGIAS: 0
ABDOMINAL PAIN: 0
NAUSEA: 0
SHORTNESS OF BREATH: 1
EYE REDNESS: 0
APPETITE CHANGE: 0
CHILLS: 0
NECK STIFFNESS: 0
ACTIVITY CHANGE: 0
HEMATURIA: 0
ARTHRALGIAS: 0

## 2025-08-26 ENCOUNTER — APPOINTMENT (OUTPATIENT)
Dept: CT IMAGING | Facility: HOSPITAL | Age: 80
End: 2025-08-26
Attending: NURSE PRACTITIONER
Payer: MEDICARE

## 2025-08-26 ENCOUNTER — HOSPITAL ENCOUNTER (INPATIENT)
Facility: HOSPITAL | Age: 80
End: 2025-08-26
Attending: NURSE PRACTITIONER | Admitting: INTERNAL MEDICINE
Payer: MEDICARE

## 2025-08-26 PROBLEM — K52.9 ENTERITIS: Status: ACTIVE | Noted: 2025-08-26

## 2025-08-26 ASSESSMENT — COLUMBIA-SUICIDE SEVERITY RATING SCALE - C-SSRS
1. IN THE PAST MONTH, HAVE YOU WISHED YOU WERE DEAD OR WISHED YOU COULD GO TO SLEEP AND NOT WAKE UP?: NO
6. HAVE YOU EVER DONE ANYTHING, STARTED TO DO ANYTHING, OR PREPARED TO DO ANYTHING TO END YOUR LIFE?: NO
2. HAVE YOU ACTUALLY HAD ANY THOUGHTS OF KILLING YOURSELF IN THE PAST MONTH?: NO

## 2025-08-26 ASSESSMENT — ENCOUNTER SYMPTOMS
CARDIOVASCULAR NEGATIVE: 1
PSYCHIATRIC NEGATIVE: 1
ABDOMINAL PAIN: 0
DIARRHEA: 1
ENDOCRINE NEGATIVE: 1
HEMATOLOGIC/LYMPHATIC NEGATIVE: 1
EYES NEGATIVE: 1
FATIGUE: 1
ALLERGIC/IMMUNOLOGIC NEGATIVE: 1
NAUSEA: 1
BLOOD IN STOOL: 0
NEUROLOGICAL NEGATIVE: 1
CONSTIPATION: 0
RESPIRATORY NEGATIVE: 1
VOMITING: 1
MUSCULOSKELETAL NEGATIVE: 1

## 2025-08-26 ASSESSMENT — ACTIVITIES OF DAILY LIVING (ADL)
ADLS_ACUITY_SCORE: 59

## 2025-08-27 ENCOUNTER — APPOINTMENT (OUTPATIENT)
Dept: GENERAL RADIOLOGY | Facility: HOSPITAL | Age: 80
End: 2025-08-27
Attending: INTERNAL MEDICINE
Payer: MEDICARE

## 2025-08-27 PROBLEM — E11.9 INSULIN-REQUIRING OR DEPENDENT TYPE II DIABETES MELLITUS (H): Status: RESOLVED | Noted: 2024-10-30 | Resolved: 2025-08-27

## 2025-08-27 PROBLEM — I50.22 CHRONIC SYSTOLIC HEART FAILURE (H): Status: RESOLVED | Noted: 2024-05-01 | Resolved: 2025-08-27

## 2025-08-27 PROBLEM — I48.91 ATRIAL FIBRILLATION WITH RAPID VENTRICULAR RESPONSE (H): Status: RESOLVED | Noted: 2024-05-01 | Resolved: 2025-08-27

## 2025-08-27 PROBLEM — E87.20 LACTIC ACIDOSIS: Status: ACTIVE | Noted: 2025-08-27

## 2025-08-27 PROBLEM — Z79.4 INSULIN-REQUIRING OR DEPENDENT TYPE II DIABETES MELLITUS (H): Status: RESOLVED | Noted: 2024-10-30 | Resolved: 2025-08-27

## 2025-08-27 PROBLEM — I50.9 CHF (CONGESTIVE HEART FAILURE) (H): Status: RESOLVED | Noted: 2025-06-12 | Resolved: 2025-08-27

## 2025-08-27 PROCEDURE — 71045 X-RAY EXAM CHEST 1 VIEW: CPT

## 2025-08-27 PROCEDURE — 71045 X-RAY EXAM CHEST 1 VIEW: CPT | Mod: 26 | Performed by: RADIOLOGY

## 2025-08-27 ASSESSMENT — ACTIVITIES OF DAILY LIVING (ADL)
ADLS_ACUITY_SCORE: 61
ADLS_ACUITY_SCORE: 61
PREVIOUS_RESPONSIBILITIES: MEDICATION MANAGEMENT
ADLS_ACUITY_SCORE: 61

## 2025-08-28 ENCOUNTER — APPOINTMENT (OUTPATIENT)
Dept: GENERAL RADIOLOGY | Facility: HOSPITAL | Age: 80
End: 2025-08-28
Attending: INTERNAL MEDICINE
Payer: MEDICARE

## 2025-08-28 PROCEDURE — 71045 X-RAY EXAM CHEST 1 VIEW: CPT

## 2025-08-28 PROCEDURE — 71045 X-RAY EXAM CHEST 1 VIEW: CPT | Mod: 26 | Performed by: RADIOLOGY

## 2025-08-28 ASSESSMENT — ACTIVITIES OF DAILY LIVING (ADL)
ADLS_ACUITY_SCORE: 61
ADLS_ACUITY_SCORE: 61
ADLS_ACUITY_SCORE: 62
ADLS_ACUITY_SCORE: 61
ADLS_ACUITY_SCORE: 62
ADLS_ACUITY_SCORE: 61
ADLS_ACUITY_SCORE: 62
ADLS_ACUITY_SCORE: 61
ADLS_ACUITY_SCORE: 62
ADLS_ACUITY_SCORE: 61
ADLS_ACUITY_SCORE: 62
ADLS_ACUITY_SCORE: 61

## 2025-08-29 ASSESSMENT — ACTIVITIES OF DAILY LIVING (ADL)
ADLS_ACUITY_SCORE: 61
ADLS_ACUITY_SCORE: 62
ADLS_ACUITY_SCORE: 61
ADLS_ACUITY_SCORE: 62
ADLS_ACUITY_SCORE: 61

## 2025-08-30 ASSESSMENT — ACTIVITIES OF DAILY LIVING (ADL)
ADLS_ACUITY_SCORE: 62

## 2025-08-31 ASSESSMENT — ACTIVITIES OF DAILY LIVING (ADL)
ADLS_ACUITY_SCORE: 61
ADLS_ACUITY_SCORE: 64
ADLS_ACUITY_SCORE: 61
ADLS_ACUITY_SCORE: 64
ADLS_ACUITY_SCORE: 61
ADLS_ACUITY_SCORE: 64
ADLS_ACUITY_SCORE: 61
ADLS_ACUITY_SCORE: 64
ADLS_ACUITY_SCORE: 64
ADLS_ACUITY_SCORE: 61
ADLS_ACUITY_SCORE: 64
ADLS_ACUITY_SCORE: 61
ADLS_ACUITY_SCORE: 64
ADLS_ACUITY_SCORE: 61
ADLS_ACUITY_SCORE: 64

## 2025-09-01 ASSESSMENT — ACTIVITIES OF DAILY LIVING (ADL)
ADLS_ACUITY_SCORE: 61

## 2025-09-02 ASSESSMENT — ACTIVITIES OF DAILY LIVING (ADL)
ADLS_ACUITY_SCORE: 61
ADLS_ACUITY_SCORE: 64
ADLS_ACUITY_SCORE: 61
ADLS_ACUITY_SCORE: 64
ADLS_ACUITY_SCORE: 61
ADLS_ACUITY_SCORE: 64
ADLS_ACUITY_SCORE: 64
ADLS_ACUITY_SCORE: 61
ADLS_ACUITY_SCORE: 61
ADLS_ACUITY_SCORE: 64
ADLS_ACUITY_SCORE: 61
ADLS_ACUITY_SCORE: 64
ADLS_ACUITY_SCORE: 64

## 2025-09-03 ASSESSMENT — ACTIVITIES OF DAILY LIVING (ADL)
ADLS_ACUITY_SCORE: 61
ADLS_ACUITY_SCORE: 64
ADLS_ACUITY_SCORE: 61

## 2025-09-04 ASSESSMENT — ACTIVITIES OF DAILY LIVING (ADL)
ADLS_ACUITY_SCORE: 61
ADLS_ACUITY_SCORE: 65
ADLS_ACUITY_SCORE: 64
ADLS_ACUITY_SCORE: 61
ADLS_ACUITY_SCORE: 61
ADLS_ACUITY_SCORE: 65
ADLS_ACUITY_SCORE: 61
ADLS_ACUITY_SCORE: 65
ADLS_ACUITY_SCORE: 61
ADLS_ACUITY_SCORE: 64
ADLS_ACUITY_SCORE: 64
ADLS_ACUITY_SCORE: 61
ADLS_ACUITY_SCORE: 61
ADLS_ACUITY_SCORE: 65
ADLS_ACUITY_SCORE: 61
ADLS_ACUITY_SCORE: 65
ADLS_ACUITY_SCORE: 61